# Patient Record
Sex: MALE | Race: WHITE | NOT HISPANIC OR LATINO | Employment: FULL TIME | URBAN - METROPOLITAN AREA
[De-identification: names, ages, dates, MRNs, and addresses within clinical notes are randomized per-mention and may not be internally consistent; named-entity substitution may affect disease eponyms.]

---

## 2017-03-06 ENCOUNTER — ALLSCRIPTS OFFICE VISIT (OUTPATIENT)
Dept: OTHER | Facility: OTHER | Age: 42
End: 2017-03-06

## 2017-03-06 DIAGNOSIS — K80.20 CALCULUS OF GALLBLADDER WITHOUT CHOLECYSTITIS WITHOUT OBSTRUCTION: ICD-10-CM

## 2017-03-06 DIAGNOSIS — R10.9 ABDOMINAL PAIN: ICD-10-CM

## 2017-03-10 ENCOUNTER — HOSPITAL ENCOUNTER (OUTPATIENT)
Dept: ULTRASOUND IMAGING | Facility: HOSPITAL | Age: 42
Discharge: HOME | End: 2017-03-10
Payer: COMMERCIAL

## 2017-03-10 DIAGNOSIS — R10.9 ABDOMINAL PAIN: ICD-10-CM

## 2017-03-10 PROCEDURE — 76705 ECHO EXAM OF ABDOMEN: CPT

## 2017-03-13 ENCOUNTER — GENERIC CONVERSION - ENCOUNTER (OUTPATIENT)
Dept: OTHER | Facility: OTHER | Age: 42
End: 2017-03-13

## 2017-03-23 ENCOUNTER — ALLSCRIPTS OFFICE VISIT (OUTPATIENT)
Dept: OTHER | Facility: OTHER | Age: 42
End: 2017-03-23

## 2017-03-23 ENCOUNTER — APPOINTMENT (OUTPATIENT)
Dept: LAB | Facility: CLINIC | Age: 42
End: 2017-03-23
Payer: COMMERCIAL

## 2017-03-23 DIAGNOSIS — K80.20 CALCULUS OF GALLBLADDER WITHOUT CHOLECYSTITIS WITHOUT OBSTRUCTION: ICD-10-CM

## 2017-03-23 LAB
ALBUMIN SERPL BCP-MCNC: 3.6 G/DL (ref 3.5–5)
ALP SERPL-CCNC: 91 U/L (ref 46–116)
ALT SERPL W P-5'-P-CCNC: 28 U/L (ref 12–78)
AST SERPL W P-5'-P-CCNC: 20 U/L (ref 5–45)
BILIRUB DIRECT SERPL-MCNC: 0.2 MG/DL (ref 0–0.2)
BILIRUB SERPL-MCNC: 0.96 MG/DL (ref 0.2–1)
PROT SERPL-MCNC: 7.5 G/DL (ref 6.4–8.2)

## 2017-03-23 PROCEDURE — 80076 HEPATIC FUNCTION PANEL: CPT

## 2017-03-23 PROCEDURE — 36415 COLL VENOUS BLD VENIPUNCTURE: CPT

## 2017-03-27 RX ORDER — ENALAPRIL MALEATE 10 MG/1
10 TABLET ORAL 2 TIMES DAILY
COMMUNITY
End: 2018-01-24 | Stop reason: SDUPTHER

## 2017-03-27 RX ORDER — CARVEDILOL 25 MG/1
37.5 TABLET ORAL 2 TIMES DAILY WITH MEALS
COMMUNITY
End: 2018-01-24 | Stop reason: SDUPTHER

## 2017-03-27 RX ORDER — COVID-19 ANTIGEN TEST
KIT MISCELLANEOUS AS NEEDED
COMMUNITY
End: 2018-01-24 | Stop reason: SDUPTHER

## 2017-03-28 ENCOUNTER — ANESTHESIA EVENT (OUTPATIENT)
Dept: PERIOP | Facility: HOSPITAL | Age: 42
End: 2017-03-28
Payer: COMMERCIAL

## 2017-03-29 ENCOUNTER — GENERIC CONVERSION - ENCOUNTER (OUTPATIENT)
Dept: OTHER | Facility: OTHER | Age: 42
End: 2017-03-29

## 2017-03-29 ENCOUNTER — HOSPITAL ENCOUNTER (OUTPATIENT)
Facility: HOSPITAL | Age: 42
Setting detail: OUTPATIENT SURGERY
Discharge: HOME | End: 2017-03-29
Attending: INTERNAL MEDICINE | Admitting: INTERNAL MEDICINE
Payer: COMMERCIAL

## 2017-03-29 ENCOUNTER — ANESTHESIA (OUTPATIENT)
Dept: PERIOP | Facility: HOSPITAL | Age: 42
End: 2017-03-29
Payer: COMMERCIAL

## 2017-03-29 VITALS
WEIGHT: 280.2 LBS | RESPIRATION RATE: 20 BRPM | DIASTOLIC BLOOD PRESSURE: 59 MMHG | TEMPERATURE: 97.6 F | SYSTOLIC BLOOD PRESSURE: 112 MMHG | OXYGEN SATURATION: 96 % | BODY MASS INDEX: 35.96 KG/M2 | HEART RATE: 63 BPM | HEIGHT: 74 IN

## 2017-03-29 DIAGNOSIS — R13.14 DYSPHAGIA, PHARYNGOESOPHAGEAL PHASE: ICD-10-CM

## 2017-03-29 DIAGNOSIS — K80.20 CALCULUS OF GALLBLADDER WITHOUT CHOLECYSTITIS WITHOUT OBSTRUCTION: ICD-10-CM

## 2017-03-29 LAB
ATRIAL RATE: 55 BPM
P AXIS: 36 DEGREES
PR INTERVAL: 176 MS
QRS AXIS: 53 DEGREES
QRSD INTERVAL: 92 MS
QT INTERVAL: 430 MS
QTC INTERVAL: 411 MS
T WAVE AXIS: 131 DEGREES
VENTRICULAR RATE: 55 BPM

## 2017-03-29 PROCEDURE — 88342 IMHCHEM/IMCYTCHM 1ST ANTB: CPT | Performed by: INTERNAL MEDICINE

## 2017-03-29 PROCEDURE — 88305 TISSUE EXAM BY PATHOLOGIST: CPT | Performed by: INTERNAL MEDICINE

## 2017-03-29 PROCEDURE — 93005 ELECTROCARDIOGRAM TRACING: CPT

## 2017-03-29 RX ORDER — PROPOFOL 10 MG/ML
INJECTION, EMULSION INTRAVENOUS AS NEEDED
Status: DISCONTINUED | OUTPATIENT
Start: 2017-03-29 | End: 2017-03-29 | Stop reason: SURG

## 2017-03-29 RX ORDER — SODIUM CHLORIDE, SODIUM LACTATE, POTASSIUM CHLORIDE, CALCIUM CHLORIDE 600; 310; 30; 20 MG/100ML; MG/100ML; MG/100ML; MG/100ML
50 INJECTION, SOLUTION INTRAVENOUS CONTINUOUS
Status: DISCONTINUED | OUTPATIENT
Start: 2017-03-29 | End: 2017-03-29 | Stop reason: HOSPADM

## 2017-03-29 RX ADMIN — PROPOFOL 50 MG: 10 INJECTION, EMULSION INTRAVENOUS at 08:19

## 2017-03-29 RX ADMIN — SODIUM CHLORIDE, SODIUM LACTATE, POTASSIUM CHLORIDE, AND CALCIUM CHLORIDE: .6; .31; .03; .02 INJECTION, SOLUTION INTRAVENOUS at 07:49

## 2017-03-29 RX ADMIN — PROPOFOL 150 MG: 10 INJECTION, EMULSION INTRAVENOUS at 08:16

## 2017-04-19 ENCOUNTER — HOSPITAL ENCOUNTER (OUTPATIENT)
Dept: NUCLEAR MEDICINE | Facility: HOSPITAL | Age: 42
Discharge: HOME | End: 2017-04-19
Payer: COMMERCIAL

## 2017-04-19 DIAGNOSIS — K80.20 CALCULUS OF GALLBLADDER WITHOUT CHOLECYSTITIS WITHOUT OBSTRUCTION: ICD-10-CM

## 2017-04-19 PROCEDURE — A9537 TC99M MEBROFENIN: HCPCS

## 2017-04-19 PROCEDURE — 78227 HEPATOBIL SYST IMAGE W/DRUG: CPT

## 2017-04-19 RX ORDER — MORPHINE SULFATE 4 MG/ML
4 INJECTION, SOLUTION INTRAMUSCULAR; INTRAVENOUS
Status: COMPLETED | OUTPATIENT
Start: 2017-04-19 | End: 2017-04-19

## 2017-04-19 RX ADMIN — MORPHINE SULFATE 4 MG: 4 INJECTION, SOLUTION INTRAMUSCULAR; INTRAVENOUS at 10:15

## 2017-04-20 ENCOUNTER — GENERIC CONVERSION - ENCOUNTER (OUTPATIENT)
Dept: OTHER | Facility: OTHER | Age: 42
End: 2017-04-20

## 2017-05-01 ENCOUNTER — ALLSCRIPTS OFFICE VISIT (OUTPATIENT)
Dept: OTHER | Facility: OTHER | Age: 42
End: 2017-05-01

## 2017-05-02 ENCOUNTER — TRANSCRIBE ORDERS (OUTPATIENT)
Dept: ADMINISTRATIVE | Facility: HOSPITAL | Age: 42
End: 2017-05-02

## 2017-05-02 ENCOUNTER — ALLSCRIPTS OFFICE VISIT (OUTPATIENT)
Dept: OTHER | Facility: OTHER | Age: 42
End: 2017-05-02

## 2017-05-02 DIAGNOSIS — I42.0 DILATED CARDIOMYOPATHY (HCC): ICD-10-CM

## 2017-05-02 DIAGNOSIS — K80.10 CALCULUS OF GALLBLADDER WITH CHRONIC CHOLECYSTITIS WITHOUT OBSTRUCTION: ICD-10-CM

## 2017-05-02 DIAGNOSIS — I42.0 CONGESTIVE CARDIOMYOPATHY (HCC): ICD-10-CM

## 2017-05-02 DIAGNOSIS — Z95.810 PRESENCE OF AUTOMATIC IMPLANTABLE CARDIOVERTER-DEFIBRILLATOR: ICD-10-CM

## 2017-05-02 DIAGNOSIS — R00.0 TACHYCARDIA, UNSPECIFIED: Primary | ICD-10-CM

## 2017-05-02 DIAGNOSIS — R00.0 TACHYCARDIA: ICD-10-CM

## 2017-05-10 ENCOUNTER — ALLSCRIPTS OFFICE VISIT (OUTPATIENT)
Dept: OTHER | Facility: OTHER | Age: 42
End: 2017-05-10

## 2017-05-11 ENCOUNTER — APPOINTMENT (OUTPATIENT)
Dept: LAB | Facility: CLINIC | Age: 42
End: 2017-05-11
Payer: COMMERCIAL

## 2017-05-11 DIAGNOSIS — K80.10 CALCULUS OF GALLBLADDER WITH CHRONIC CHOLECYSTITIS WITHOUT OBSTRUCTION: ICD-10-CM

## 2017-05-11 LAB
ALBUMIN SERPL BCP-MCNC: 3.8 G/DL (ref 3.5–5)
ALP SERPL-CCNC: 89 U/L (ref 46–116)
ALT SERPL W P-5'-P-CCNC: 32 U/L (ref 12–78)
ANION GAP SERPL CALCULATED.3IONS-SCNC: 6 MMOL/L (ref 4–13)
AST SERPL W P-5'-P-CCNC: 21 U/L (ref 5–45)
BILIRUB SERPL-MCNC: 0.94 MG/DL (ref 0.2–1)
BUN SERPL-MCNC: 11 MG/DL (ref 5–25)
CALCIUM SERPL-MCNC: 8.6 MG/DL (ref 8.3–10.1)
CHLORIDE SERPL-SCNC: 106 MMOL/L (ref 100–108)
CO2 SERPL-SCNC: 27 MMOL/L (ref 21–32)
CREAT SERPL-MCNC: 0.88 MG/DL (ref 0.6–1.3)
ERYTHROCYTE [DISTWIDTH] IN BLOOD BY AUTOMATED COUNT: 14.9 % (ref 11.6–15.1)
GFR SERPL CREATININE-BSD FRML MDRD: >60 ML/MIN/1.73SQ M
GLUCOSE SERPL-MCNC: 76 MG/DL (ref 65–140)
HCT VFR BLD AUTO: 44.7 % (ref 36.5–49.3)
HGB BLD-MCNC: 14.5 G/DL (ref 12–17)
MCH RBC QN AUTO: 29.1 PG (ref 26.8–34.3)
MCHC RBC AUTO-ENTMCNC: 32.4 G/DL (ref 31.4–37.4)
MCV RBC AUTO: 90 FL (ref 82–98)
PLATELET # BLD AUTO: 231 THOUSANDS/UL (ref 149–390)
PMV BLD AUTO: 12.3 FL (ref 8.9–12.7)
POTASSIUM SERPL-SCNC: 4.1 MMOL/L (ref 3.5–5.3)
PROT SERPL-MCNC: 7.5 G/DL (ref 6.4–8.2)
RBC # BLD AUTO: 4.98 MILLION/UL (ref 3.88–5.62)
SODIUM SERPL-SCNC: 139 MMOL/L (ref 136–145)
WBC # BLD AUTO: 10.73 THOUSAND/UL (ref 4.31–10.16)

## 2017-05-11 PROCEDURE — 80053 COMPREHEN METABOLIC PANEL: CPT

## 2017-05-11 PROCEDURE — 85027 COMPLETE CBC AUTOMATED: CPT

## 2017-05-11 PROCEDURE — 36415 COLL VENOUS BLD VENIPUNCTURE: CPT

## 2017-05-16 ENCOUNTER — HOSPITAL ENCOUNTER (OUTPATIENT)
Dept: NON INVASIVE DIAGNOSTICS | Facility: CLINIC | Age: 42
Discharge: HOME | End: 2017-05-16
Payer: COMMERCIAL

## 2017-05-16 DIAGNOSIS — I42.0 CONGESTIVE CARDIOMYOPATHY (HCC): ICD-10-CM

## 2017-05-16 DIAGNOSIS — R00.0 TACHYCARDIA, UNSPECIFIED: ICD-10-CM

## 2017-05-16 PROCEDURE — 93306 TTE W/DOPPLER COMPLETE: CPT

## 2017-05-18 ENCOUNTER — ALLSCRIPTS OFFICE VISIT (OUTPATIENT)
Dept: OTHER | Facility: OTHER | Age: 42
End: 2017-05-18

## 2017-05-19 ENCOUNTER — ANESTHESIA (OUTPATIENT)
Dept: PERIOP | Facility: HOSPITAL | Age: 42
End: 2017-05-19
Payer: COMMERCIAL

## 2017-05-19 ENCOUNTER — HOSPITAL ENCOUNTER (OUTPATIENT)
Facility: HOSPITAL | Age: 42
Setting detail: OUTPATIENT SURGERY
Discharge: HOME | End: 2017-05-19
Attending: SURGERY | Admitting: SURGERY
Payer: COMMERCIAL

## 2017-05-19 ENCOUNTER — ANESTHESIA EVENT (OUTPATIENT)
Dept: PERIOP | Facility: HOSPITAL | Age: 42
End: 2017-05-19
Payer: COMMERCIAL

## 2017-05-19 VITALS
HEIGHT: 73 IN | OXYGEN SATURATION: 95 % | SYSTOLIC BLOOD PRESSURE: 110 MMHG | HEART RATE: 55 BPM | TEMPERATURE: 97.2 F | BODY MASS INDEX: 37.14 KG/M2 | WEIGHT: 280.2 LBS | RESPIRATION RATE: 20 BRPM | DIASTOLIC BLOOD PRESSURE: 57 MMHG

## 2017-05-19 DIAGNOSIS — K80.10 CHRONIC CALCULOUS CHOLECYSTITIS: ICD-10-CM

## 2017-05-19 PROBLEM — K80.20 CALCULUS OF GALLBLADDER WITHOUT CHOLECYSTITIS: Status: ACTIVE | Noted: 2017-05-19

## 2017-05-19 PROCEDURE — 88304 TISSUE EXAM BY PATHOLOGIST: CPT | Performed by: SURGERY

## 2017-05-19 RX ORDER — DOCUSATE SODIUM 100 MG/1
100 CAPSULE, LIQUID FILLED ORAL 2 TIMES DAILY PRN
Qty: 60 CAPSULE | Refills: 0 | COMMUNITY
Start: 2017-05-19 | End: 2018-01-25 | Stop reason: HOSPADM

## 2017-05-19 RX ORDER — ONDANSETRON 2 MG/ML
INJECTION INTRAMUSCULAR; INTRAVENOUS AS NEEDED
Status: DISCONTINUED | OUTPATIENT
Start: 2017-05-19 | End: 2017-05-19 | Stop reason: SURG

## 2017-05-19 RX ORDER — MIDAZOLAM HYDROCHLORIDE 1 MG/ML
INJECTION INTRAMUSCULAR; INTRAVENOUS AS NEEDED
Status: DISCONTINUED | OUTPATIENT
Start: 2017-05-19 | End: 2017-05-19 | Stop reason: SURG

## 2017-05-19 RX ORDER — ONDANSETRON 2 MG/ML
4 INJECTION INTRAMUSCULAR; INTRAVENOUS ONCE AS NEEDED
Status: DISCONTINUED | OUTPATIENT
Start: 2017-05-19 | End: 2017-05-19 | Stop reason: HOSPADM

## 2017-05-19 RX ORDER — LIDOCAINE HYDROCHLORIDE 10 MG/ML
INJECTION, SOLUTION INFILTRATION; PERINEURAL AS NEEDED
Status: DISCONTINUED | OUTPATIENT
Start: 2017-05-19 | End: 2017-05-19 | Stop reason: SURG

## 2017-05-19 RX ORDER — SODIUM CHLORIDE, SODIUM LACTATE, POTASSIUM CHLORIDE, CALCIUM CHLORIDE 600; 310; 30; 20 MG/100ML; MG/100ML; MG/100ML; MG/100ML
100 INJECTION, SOLUTION INTRAVENOUS CONTINUOUS
Status: DISCONTINUED | OUTPATIENT
Start: 2017-05-26 | End: 2017-05-19

## 2017-05-19 RX ORDER — FENTANYL CITRATE 50 UG/ML
INJECTION, SOLUTION INTRAMUSCULAR; INTRAVENOUS AS NEEDED
Status: DISCONTINUED | OUTPATIENT
Start: 2017-05-19 | End: 2017-05-19 | Stop reason: SURG

## 2017-05-19 RX ORDER — SODIUM CHLORIDE, SODIUM LACTATE, POTASSIUM CHLORIDE, CALCIUM CHLORIDE 600; 310; 30; 20 MG/100ML; MG/100ML; MG/100ML; MG/100ML
100 INJECTION, SOLUTION INTRAVENOUS CONTINUOUS
Status: DISCONTINUED | OUTPATIENT
Start: 2017-05-19 | End: 2017-05-19 | Stop reason: HOSPADM

## 2017-05-19 RX ORDER — METOCLOPRAMIDE HYDROCHLORIDE 5 MG/ML
10 INJECTION INTRAMUSCULAR; INTRAVENOUS ONCE AS NEEDED
Status: DISCONTINUED | OUTPATIENT
Start: 2017-05-19 | End: 2017-05-19 | Stop reason: HOSPADM

## 2017-05-19 RX ORDER — MAGNESIUM HYDROXIDE 1200 MG/15ML
LIQUID ORAL AS NEEDED
Status: DISCONTINUED | OUTPATIENT
Start: 2017-05-19 | End: 2017-05-19 | Stop reason: HOSPADM

## 2017-05-19 RX ORDER — OXYCODONE HYDROCHLORIDE AND ACETAMINOPHEN 5; 325 MG/1; MG/1
2 TABLET ORAL EVERY 4 HOURS PRN
Qty: 50 TABLET | Refills: 0 | Status: SHIPPED | OUTPATIENT
Start: 2017-05-19 | End: 2017-05-29

## 2017-05-19 RX ORDER — SUCCINYLCHOLINE CHLORIDE 20 MG/ML
INJECTION INTRAMUSCULAR; INTRAVENOUS AS NEEDED
Status: DISCONTINUED | OUTPATIENT
Start: 2017-05-19 | End: 2017-05-19 | Stop reason: SURG

## 2017-05-19 RX ORDER — EPHEDRINE SULFATE 50 MG/ML
INJECTION, SOLUTION INTRAVENOUS AS NEEDED
Status: DISCONTINUED | OUTPATIENT
Start: 2017-05-19 | End: 2017-05-19 | Stop reason: SURG

## 2017-05-19 RX ORDER — BUPIVACAINE HYDROCHLORIDE 5 MG/ML
INJECTION, SOLUTION PERINEURAL AS NEEDED
Status: DISCONTINUED | OUTPATIENT
Start: 2017-05-19 | End: 2017-05-19 | Stop reason: HOSPADM

## 2017-05-19 RX ORDER — ALBUMIN, HUMAN INJ 5% 5 %
SOLUTION INTRAVENOUS CONTINUOUS PRN
Status: DISCONTINUED | OUTPATIENT
Start: 2017-05-19 | End: 2017-05-19 | Stop reason: SURG

## 2017-05-19 RX ORDER — ALBUTEROL SULFATE 2.5 MG/3ML
2.5 SOLUTION RESPIRATORY (INHALATION) ONCE AS NEEDED
Status: DISCONTINUED | OUTPATIENT
Start: 2017-05-19 | End: 2017-05-19 | Stop reason: HOSPADM

## 2017-05-19 RX ORDER — FENTANYL CITRATE/PF 50 MCG/ML
25 SYRINGE (ML) INJECTION
Status: DISCONTINUED | OUTPATIENT
Start: 2017-05-19 | End: 2017-05-19 | Stop reason: HOSPADM

## 2017-05-19 RX ORDER — PROPOFOL 10 MG/ML
INJECTION, EMULSION INTRAVENOUS AS NEEDED
Status: DISCONTINUED | OUTPATIENT
Start: 2017-05-19 | End: 2017-05-19 | Stop reason: SURG

## 2017-05-19 RX ORDER — GLYCOPYRROLATE 0.2 MG/ML
INJECTION INTRAMUSCULAR; INTRAVENOUS AS NEEDED
Status: DISCONTINUED | OUTPATIENT
Start: 2017-05-19 | End: 2017-05-19 | Stop reason: SURG

## 2017-05-19 RX ORDER — ROCURONIUM BROMIDE 10 MG/ML
INJECTION, SOLUTION INTRAVENOUS AS NEEDED
Status: DISCONTINUED | OUTPATIENT
Start: 2017-05-19 | End: 2017-05-19 | Stop reason: SURG

## 2017-05-19 RX ADMIN — CEFAZOLIN SODIUM 2000 MG: 2 SOLUTION INTRAVENOUS at 08:20

## 2017-05-19 RX ADMIN — HYDROMORPHONE HYDROCHLORIDE 0.2 MG: 1 INJECTION, SOLUTION INTRAMUSCULAR; INTRAVENOUS; SUBCUTANEOUS at 11:56

## 2017-05-19 RX ADMIN — GLYCOPYRROLATE 0.4 MG: 0.2 INJECTION, SOLUTION INTRAMUSCULAR; INTRAVENOUS at 10:41

## 2017-05-19 RX ADMIN — NEOSTIGMINE METHYLSULFATE 3 MG: 1 INJECTION, SOLUTION INTRAMUSCULAR; INTRAVENOUS; SUBCUTANEOUS at 10:41

## 2017-05-19 RX ADMIN — CEFAZOLIN SODIUM 1000 MG: 1 SOLUTION INTRAVENOUS at 08:20

## 2017-05-19 RX ADMIN — SODIUM CHLORIDE, SODIUM LACTATE, POTASSIUM CHLORIDE, AND CALCIUM CHLORIDE: .6; .31; .03; .02 INJECTION, SOLUTION INTRAVENOUS at 07:51

## 2017-05-19 RX ADMIN — FENTANYL CITRATE 50 MCG: 50 INJECTION, SOLUTION INTRAMUSCULAR; INTRAVENOUS at 08:16

## 2017-05-19 RX ADMIN — EPHEDRINE SULFATE 10 MG: 50 INJECTION, SOLUTION INTRAMUSCULAR; INTRAVENOUS; SUBCUTANEOUS at 09:48

## 2017-05-19 RX ADMIN — ALBUMIN HUMAN: 0.05 INJECTION, SOLUTION INTRAVENOUS at 08:36

## 2017-05-19 RX ADMIN — ROCURONIUM BROMIDE 10 MG: 10 INJECTION, SOLUTION INTRAVENOUS at 08:28

## 2017-05-19 RX ADMIN — FENTANYL CITRATE 25 MCG: 50 INJECTION INTRAMUSCULAR; INTRAVENOUS at 11:36

## 2017-05-19 RX ADMIN — HYDROMORPHONE HYDROCHLORIDE 0.2 MG: 1 INJECTION, SOLUTION INTRAMUSCULAR; INTRAVENOUS; SUBCUTANEOUS at 12:11

## 2017-05-19 RX ADMIN — ROCURONIUM BROMIDE 10 MG: 10 INJECTION, SOLUTION INTRAVENOUS at 09:37

## 2017-05-19 RX ADMIN — LIDOCAINE HYDROCHLORIDE 80 MG: 10 INJECTION, SOLUTION INFILTRATION; PERINEURAL at 08:16

## 2017-05-19 RX ADMIN — HYDROMORPHONE HYDROCHLORIDE 0.2 MG: 1 INJECTION, SOLUTION INTRAMUSCULAR; INTRAVENOUS; SUBCUTANEOUS at 12:26

## 2017-05-19 RX ADMIN — ONDANSETRON 4 MG: 2 INJECTION INTRAMUSCULAR; INTRAVENOUS at 10:08

## 2017-05-19 RX ADMIN — SUCCINYLCHOLINE CHLORIDE 200 MG: 20 INJECTION, SOLUTION INTRAMUSCULAR; INTRAVENOUS at 08:16

## 2017-05-19 RX ADMIN — ROCURONIUM BROMIDE 20 MG: 10 INJECTION, SOLUTION INTRAVENOUS at 08:21

## 2017-05-19 RX ADMIN — HYDROMORPHONE HYDROCHLORIDE 0.2 MG: 1 INJECTION, SOLUTION INTRAMUSCULAR; INTRAVENOUS; SUBCUTANEOUS at 12:16

## 2017-05-19 RX ADMIN — FENTANYL CITRATE 25 MCG: 50 INJECTION INTRAMUSCULAR; INTRAVENOUS at 11:46

## 2017-05-19 RX ADMIN — PHENYLEPHRINE HYDROCHLORIDE 50 MCG/MIN: 10 INJECTION, SOLUTION INTRAMUSCULAR; INTRAVENOUS; SUBCUTANEOUS at 08:46

## 2017-05-19 RX ADMIN — HYDROMORPHONE HYDROCHLORIDE 0.2 MG: 1 INJECTION, SOLUTION INTRAMUSCULAR; INTRAVENOUS; SUBCUTANEOUS at 11:51

## 2017-05-19 RX ADMIN — DEXAMETHASONE SODIUM PHOSPHATE 10 MG: 10 INJECTION INTRAMUSCULAR; INTRAVENOUS at 08:20

## 2017-05-19 RX ADMIN — FENTANYL CITRATE 25 MCG: 50 INJECTION INTRAMUSCULAR; INTRAVENOUS at 11:31

## 2017-05-19 RX ADMIN — ALBUMIN HUMAN: 0.05 INJECTION, SOLUTION INTRAVENOUS at 09:21

## 2017-05-19 RX ADMIN — HYDROMORPHONE HYDROCHLORIDE 0.2 MG: 1 INJECTION, SOLUTION INTRAMUSCULAR; INTRAVENOUS; SUBCUTANEOUS at 12:06

## 2017-05-19 RX ADMIN — HYDROMORPHONE HYDROCHLORIDE 0.2 MG: 1 INJECTION, SOLUTION INTRAMUSCULAR; INTRAVENOUS; SUBCUTANEOUS at 12:21

## 2017-05-19 RX ADMIN — ENOXAPARIN SODIUM 40 MG: 40 INJECTION, SOLUTION INTRAVENOUS; SUBCUTANEOUS at 08:00

## 2017-05-19 RX ADMIN — MIDAZOLAM HYDROCHLORIDE 2 MG: 1 INJECTION, SOLUTION INTRAMUSCULAR; INTRAVENOUS at 08:12

## 2017-05-19 RX ADMIN — FENTANYL CITRATE 50 MCG: 50 INJECTION, SOLUTION INTRAMUSCULAR; INTRAVENOUS at 10:22

## 2017-05-19 RX ADMIN — FENTANYL CITRATE 25 MCG: 50 INJECTION INTRAMUSCULAR; INTRAVENOUS at 11:41

## 2017-05-19 RX ADMIN — PROPOFOL 200 MG: 10 INJECTION, EMULSION INTRAVENOUS at 08:16

## 2017-05-19 RX ADMIN — HYDROMORPHONE HYDROCHLORIDE 0.2 MG: 1 INJECTION, SOLUTION INTRAMUSCULAR; INTRAVENOUS; SUBCUTANEOUS at 12:01

## 2017-05-30 ENCOUNTER — TRANSCRIBE ORDERS (OUTPATIENT)
Dept: LAB | Facility: CLINIC | Age: 42
End: 2017-05-30

## 2017-05-30 ENCOUNTER — LAB REQUISITION (OUTPATIENT)
Dept: LAB | Facility: HOSPITAL | Age: 42
End: 2017-05-30
Payer: COMMERCIAL

## 2017-05-30 ENCOUNTER — APPOINTMENT (OUTPATIENT)
Dept: LAB | Facility: CLINIC | Age: 42
End: 2017-05-30
Payer: COMMERCIAL

## 2017-05-30 DIAGNOSIS — R00.0 TACHYCARDIA: ICD-10-CM

## 2017-05-30 DIAGNOSIS — I42.0 CONGESTIVE CARDIOMYOPATHY (HCC): Primary | ICD-10-CM

## 2017-05-30 DIAGNOSIS — I42.0 DILATED CARDIOMYOPATHY (HCC): ICD-10-CM

## 2017-05-30 DIAGNOSIS — Z95.810 PRESENCE OF AUTOMATIC IMPLANTABLE CARDIOVERTER-DEFIBRILLATOR: ICD-10-CM

## 2017-05-30 DIAGNOSIS — I42.0 CONGESTIVE CARDIOMYOPATHY (HCC): ICD-10-CM

## 2017-05-30 LAB
ALBUMIN SERPL BCP-MCNC: 3.7 G/DL (ref 3.5–5)
ALP SERPL-CCNC: 68 U/L (ref 46–116)
ALT SERPL W P-5'-P-CCNC: 28 U/L (ref 12–78)
ANION GAP SERPL CALCULATED.3IONS-SCNC: 7 MMOL/L (ref 4–13)
APTT PPP: 34 SECONDS (ref 23–35)
AST SERPL W P-5'-P-CCNC: 20 U/L (ref 5–45)
BILIRUB SERPL-MCNC: 1.19 MG/DL (ref 0.2–1)
BUN SERPL-MCNC: 13 MG/DL (ref 5–25)
CALCIUM SERPL-MCNC: 8.9 MG/DL (ref 8.3–10.1)
CHLORIDE SERPL-SCNC: 107 MMOL/L (ref 100–108)
CO2 SERPL-SCNC: 25 MMOL/L (ref 21–32)
CREAT SERPL-MCNC: 1.03 MG/DL (ref 0.6–1.3)
ERYTHROCYTE [DISTWIDTH] IN BLOOD BY AUTOMATED COUNT: 14.3 % (ref 11.6–15.1)
GFR SERPL CREATININE-BSD FRML MDRD: >60 ML/MIN/1.73SQ M
GLUCOSE P FAST SERPL-MCNC: 105 MG/DL (ref 65–99)
HCT VFR BLD AUTO: 42 % (ref 36.5–49.3)
HGB BLD-MCNC: 13.8 G/DL (ref 12–17)
INR PPP: 1.03 (ref 0.86–1.16)
MAGNESIUM SERPL-MCNC: 2.2 MG/DL (ref 1.6–2.6)
MCH RBC QN AUTO: 29.1 PG (ref 26.8–34.3)
MCHC RBC AUTO-ENTMCNC: 32.9 G/DL (ref 31.4–37.4)
MCV RBC AUTO: 89 FL (ref 82–98)
PLATELET # BLD AUTO: 232 THOUSANDS/UL (ref 149–390)
PMV BLD AUTO: 11.6 FL (ref 8.9–12.7)
POTASSIUM SERPL-SCNC: 4.3 MMOL/L (ref 3.5–5.3)
PROT SERPL-MCNC: 7.5 G/DL (ref 6.4–8.2)
PROTHROMBIN TIME: 13.5 SECONDS (ref 12.1–14.4)
RBC # BLD AUTO: 4.74 MILLION/UL (ref 3.88–5.62)
SODIUM SERPL-SCNC: 139 MMOL/L (ref 136–145)
WBC # BLD AUTO: 7.7 THOUSAND/UL (ref 4.31–10.16)

## 2017-05-30 PROCEDURE — 85610 PROTHROMBIN TIME: CPT

## 2017-05-30 PROCEDURE — 86900 BLOOD TYPING SEROLOGIC ABO: CPT | Performed by: INTERNAL MEDICINE

## 2017-05-30 PROCEDURE — 85027 COMPLETE CBC AUTOMATED: CPT

## 2017-05-30 PROCEDURE — 85730 THROMBOPLASTIN TIME PARTIAL: CPT

## 2017-05-30 PROCEDURE — 80053 COMPREHEN METABOLIC PANEL: CPT

## 2017-05-30 PROCEDURE — 86850 RBC ANTIBODY SCREEN: CPT | Performed by: INTERNAL MEDICINE

## 2017-05-30 PROCEDURE — 83735 ASSAY OF MAGNESIUM: CPT

## 2017-05-30 PROCEDURE — 86901 BLOOD TYPING SEROLOGIC RH(D): CPT | Performed by: INTERNAL MEDICINE

## 2017-05-30 PROCEDURE — 36415 COLL VENOUS BLD VENIPUNCTURE: CPT

## 2017-06-01 ENCOUNTER — ANESTHESIA EVENT (OUTPATIENT)
Dept: PERIOP | Facility: HOSPITAL | Age: 42
End: 2017-06-01
Payer: COMMERCIAL

## 2017-06-01 LAB
ABO GROUP BLD: NORMAL
BLD GP AB SCN SERPL QL: NEGATIVE
RH BLD: POSITIVE
SPECIMEN EXPIRATION DATE: NORMAL

## 2017-06-02 ENCOUNTER — ANESTHESIA (OUTPATIENT)
Dept: PERIOP | Facility: HOSPITAL | Age: 42
End: 2017-06-02
Payer: COMMERCIAL

## 2017-06-02 ENCOUNTER — HOSPITAL ENCOUNTER (OUTPATIENT)
Facility: HOSPITAL | Age: 42
Discharge: HOME | End: 2017-06-03
Attending: INTERNAL MEDICINE | Admitting: INTERNAL MEDICINE
Payer: COMMERCIAL

## 2017-06-02 ENCOUNTER — APPOINTMENT (OUTPATIENT)
Dept: NON INVASIVE DIAGNOSTICS | Facility: HOSPITAL | Age: 42
End: 2017-06-02
Payer: COMMERCIAL

## 2017-06-02 ENCOUNTER — APPOINTMENT (OUTPATIENT)
Dept: NON INVASIVE DIAGNOSTICS | Facility: HOSPITAL | Age: 42
End: 2017-06-02
Attending: INTERNAL MEDICINE
Payer: COMMERCIAL

## 2017-06-02 ENCOUNTER — GENERIC CONVERSION - ENCOUNTER (OUTPATIENT)
Dept: OTHER | Facility: OTHER | Age: 42
End: 2017-06-02

## 2017-06-02 ENCOUNTER — APPOINTMENT (OUTPATIENT)
Dept: RADIOLOGY | Facility: HOSPITAL | Age: 42
End: 2017-06-02
Attending: INTERNAL MEDICINE
Payer: COMMERCIAL

## 2017-06-02 DIAGNOSIS — Z95.0 PACEMAKER: ICD-10-CM

## 2017-06-02 LAB
ANION GAP SERPL CALCULATED.3IONS-SCNC: 6 MMOL/L (ref 4–13)
ATRIAL RATE: 51 BPM
BASOPHILS # BLD AUTO: 0.02 THOUSANDS/ΜL (ref 0–0.1)
BASOPHILS NFR BLD AUTO: 0 % (ref 0–1)
BUN SERPL-MCNC: 14 MG/DL (ref 5–25)
CALCIUM SERPL-MCNC: 9.3 MG/DL (ref 8.3–10.1)
CHLORIDE SERPL-SCNC: 107 MMOL/L (ref 100–108)
CO2 SERPL-SCNC: 28 MMOL/L (ref 21–32)
CREAT SERPL-MCNC: 1.01 MG/DL (ref 0.6–1.3)
EOSINOPHIL # BLD AUTO: 0.11 THOUSAND/ΜL (ref 0–0.61)
EOSINOPHIL NFR BLD AUTO: 2 % (ref 0–6)
ERYTHROCYTE [DISTWIDTH] IN BLOOD BY AUTOMATED COUNT: 14 % (ref 11.6–15.1)
GFR SERPL CREATININE-BSD FRML MDRD: >60 ML/MIN/1.73SQ M
GLUCOSE P FAST SERPL-MCNC: 96 MG/DL (ref 65–99)
GLUCOSE SERPL-MCNC: 96 MG/DL (ref 65–140)
HCT VFR BLD AUTO: 41.6 % (ref 36.5–49.3)
HGB BLD-MCNC: 13.6 G/DL (ref 12–17)
INR PPP: 0.99 (ref 0.86–1.16)
LYMPHOCYTES # BLD AUTO: 1.62 THOUSANDS/ΜL (ref 0.6–4.47)
LYMPHOCYTES NFR BLD AUTO: 26 % (ref 14–44)
MAGNESIUM SERPL-MCNC: 2.2 MG/DL (ref 1.6–2.6)
MCH RBC QN AUTO: 28.9 PG (ref 26.8–34.3)
MCHC RBC AUTO-ENTMCNC: 32.7 G/DL (ref 31.4–37.4)
MCV RBC AUTO: 88 FL (ref 82–98)
MONOCYTES # BLD AUTO: 0.76 THOUSAND/ΜL (ref 0.17–1.22)
MONOCYTES NFR BLD AUTO: 12 % (ref 4–12)
NEUTROPHILS # BLD AUTO: 3.84 THOUSANDS/ΜL (ref 1.85–7.62)
NEUTS SEG NFR BLD AUTO: 60 % (ref 43–75)
NRBC BLD AUTO-RTO: 0 /100 WBCS
P AXIS: 32 DEGREES
PLATELET # BLD AUTO: 213 THOUSANDS/UL (ref 149–390)
PMV BLD AUTO: 11.7 FL (ref 8.9–12.7)
POTASSIUM SERPL-SCNC: 4.3 MMOL/L (ref 3.5–5.3)
PR INTERVAL: 172 MS
PROTHROMBIN TIME: 13.1 SECONDS (ref 12.1–14.4)
QRS AXIS: 32 DEGREES
QRSD INTERVAL: 94 MS
QT INTERVAL: 448 MS
QTC INTERVAL: 412 MS
RBC # BLD AUTO: 4.71 MILLION/UL (ref 3.88–5.62)
SODIUM SERPL-SCNC: 141 MMOL/L (ref 136–145)
T WAVE AXIS: 15 DEGREES
VENTRICULAR RATE: 51 BPM
WBC # BLD AUTO: 6.36 THOUSAND/UL (ref 4.31–10.16)

## 2017-06-02 PROCEDURE — C1894 INTRO/SHEATH, NON-LASER: HCPCS | Performed by: INTERNAL MEDICINE

## 2017-06-02 PROCEDURE — 80048 BASIC METABOLIC PNL TOTAL CA: CPT | Performed by: PHYSICIAN ASSISTANT

## 2017-06-02 PROCEDURE — 85025 COMPLETE CBC W/AUTO DIFF WBC: CPT | Performed by: PHYSICIAN ASSISTANT

## 2017-06-02 PROCEDURE — 93312 ECHO TRANSESOPHAGEAL: CPT

## 2017-06-02 PROCEDURE — C1777 LEAD, AICD, ENDO SINGLE COIL: HCPCS | Performed by: INTERNAL MEDICINE

## 2017-06-02 PROCEDURE — C1893 INTRO/SHEATH, FIXED,NON-PEEL: HCPCS | Performed by: INTERNAL MEDICINE

## 2017-06-02 PROCEDURE — 71010 HB CHEST X-RAY 1 VIEW FRONTAL: CPT

## 2017-06-02 PROCEDURE — 83735 ASSAY OF MAGNESIUM: CPT | Performed by: PHYSICIAN ASSISTANT

## 2017-06-02 PROCEDURE — 85610 PROTHROMBIN TIME: CPT | Performed by: PHYSICIAN ASSISTANT

## 2017-06-02 PROCEDURE — C1769 GUIDE WIRE: HCPCS | Performed by: INTERNAL MEDICINE

## 2017-06-02 PROCEDURE — C1722 AICD, SINGLE CHAMBER: HCPCS | Performed by: INTERNAL MEDICINE

## 2017-06-02 PROCEDURE — 93005 ELECTROCARDIOGRAM TRACING: CPT | Performed by: PHYSICIAN ASSISTANT

## 2017-06-02 PROCEDURE — 86920 COMPATIBILITY TEST SPIN: CPT

## 2017-06-02 PROCEDURE — C1892 INTRO/SHEATH,FIXED,PEEL-AWAY: HCPCS | Performed by: INTERNAL MEDICINE

## 2017-06-02 PROCEDURE — C1773 RET DEV, INSERTABLE: HCPCS | Performed by: INTERNAL MEDICINE

## 2017-06-02 PROCEDURE — C2628 CATHETER, OCCLUSION: HCPCS | Performed by: INTERNAL MEDICINE

## 2017-06-02 DEVICE — ANTIBIOTIC ENVELOPE TYRX AIGIS MED PACER: Type: IMPLANTABLE DEVICE | Site: CHEST | Status: FUNCTIONAL

## 2017-06-02 DEVICE — LEAD ICD SPRINT QUATTRO SECURE DF-4 62CM: Type: IMPLANTABLE DEVICE | Site: HEART | Status: FUNCTIONAL

## 2017-06-02 DEVICE — ICD GENERATOR VISIA AF MRI SURESCAN VR DEVICE: Type: IMPLANTABLE DEVICE | Site: CHEST | Status: FUNCTIONAL

## 2017-06-02 RX ORDER — PROPOFOL 10 MG/ML
INJECTION, EMULSION INTRAVENOUS CONTINUOUS PRN
Status: DISCONTINUED | OUTPATIENT
Start: 2017-06-02 | End: 2017-06-02 | Stop reason: SURG

## 2017-06-02 RX ORDER — LIDOCAINE HYDROCHLORIDE 10 MG/ML
INJECTION, SOLUTION INFILTRATION; PERINEURAL AS NEEDED
Status: DISCONTINUED | OUTPATIENT
Start: 2017-06-02 | End: 2017-06-02 | Stop reason: HOSPADM

## 2017-06-02 RX ORDER — FENTANYL CITRATE/PF 50 MCG/ML
50 SYRINGE (ML) INJECTION
Status: COMPLETED | OUTPATIENT
Start: 2017-06-02 | End: 2017-06-02

## 2017-06-02 RX ORDER — CEFAZOLIN SODIUM 1 G/3ML
INJECTION, POWDER, FOR SOLUTION INTRAMUSCULAR; INTRAVENOUS AS NEEDED
Status: DISCONTINUED | OUTPATIENT
Start: 2017-06-02 | End: 2017-06-02 | Stop reason: SURG

## 2017-06-02 RX ORDER — ONDANSETRON 2 MG/ML
4 INJECTION INTRAMUSCULAR; INTRAVENOUS ONCE AS NEEDED
Status: DISCONTINUED | OUTPATIENT
Start: 2017-06-02 | End: 2017-06-02 | Stop reason: HOSPADM

## 2017-06-02 RX ORDER — OXYCODONE HYDROCHLORIDE 5 MG/1
5 TABLET ORAL EVERY 4 HOURS PRN
Status: DISCONTINUED | OUTPATIENT
Start: 2017-06-02 | End: 2017-06-02

## 2017-06-02 RX ORDER — OXYCODONE HYDROCHLORIDE AND ACETAMINOPHEN 5; 325 MG/1; MG/1
1 TABLET ORAL EVERY 4 HOURS PRN
COMMUNITY
End: 2017-06-03 | Stop reason: HOSPADM

## 2017-06-02 RX ORDER — PROPOFOL 10 MG/ML
INJECTION, EMULSION INTRAVENOUS AS NEEDED
Status: DISCONTINUED | OUTPATIENT
Start: 2017-06-02 | End: 2017-06-02 | Stop reason: SURG

## 2017-06-02 RX ORDER — FENTANYL CITRATE 50 UG/ML
INJECTION, SOLUTION INTRAMUSCULAR; INTRAVENOUS AS NEEDED
Status: DISCONTINUED | OUTPATIENT
Start: 2017-06-02 | End: 2017-06-02 | Stop reason: SURG

## 2017-06-02 RX ORDER — OXYCODONE HYDROCHLORIDE AND ACETAMINOPHEN 5; 325 MG/1; MG/1
1 TABLET ORAL EVERY 4 HOURS PRN
Status: DISCONTINUED | OUTPATIENT
Start: 2017-06-02 | End: 2017-06-03 | Stop reason: HOSPADM

## 2017-06-02 RX ORDER — OXYCODONE HYDROCHLORIDE 5 MG/1
5 TABLET ORAL EVERY 4 HOURS PRN
Status: DISCONTINUED | OUTPATIENT
Start: 2017-06-02 | End: 2017-06-03 | Stop reason: HOSPADM

## 2017-06-02 RX ORDER — ENALAPRIL MALEATE 10 MG/1
10 TABLET ORAL 2 TIMES DAILY
Status: DISCONTINUED | OUTPATIENT
Start: 2017-06-02 | End: 2017-06-03 | Stop reason: HOSPADM

## 2017-06-02 RX ORDER — ACETAMINOPHEN 325 MG/1
650 TABLET ORAL EVERY 4 HOURS PRN
Status: DISCONTINUED | OUTPATIENT
Start: 2017-06-02 | End: 2017-06-03 | Stop reason: HOSPADM

## 2017-06-02 RX ORDER — SODIUM CHLORIDE 9 MG/ML
50 INJECTION, SOLUTION INTRAVENOUS CONTINUOUS
Status: DISCONTINUED | OUTPATIENT
Start: 2017-06-02 | End: 2017-06-03 | Stop reason: HOSPADM

## 2017-06-02 RX ORDER — SODIUM CHLORIDE 9 MG/ML
INJECTION, SOLUTION INTRAVENOUS CONTINUOUS PRN
Status: DISCONTINUED | OUTPATIENT
Start: 2017-06-02 | End: 2017-06-02 | Stop reason: SURG

## 2017-06-02 RX ORDER — MIDAZOLAM HYDROCHLORIDE 1 MG/ML
INJECTION INTRAMUSCULAR; INTRAVENOUS AS NEEDED
Status: DISCONTINUED | OUTPATIENT
Start: 2017-06-02 | End: 2017-06-02 | Stop reason: SURG

## 2017-06-02 RX ORDER — DOCUSATE SODIUM 100 MG/1
100 CAPSULE, LIQUID FILLED ORAL 2 TIMES DAILY PRN
Status: DISCONTINUED | OUTPATIENT
Start: 2017-06-02 | End: 2017-06-03 | Stop reason: HOSPADM

## 2017-06-02 RX ORDER — ROCURONIUM BROMIDE 10 MG/ML
INJECTION, SOLUTION INTRAVENOUS AS NEEDED
Status: DISCONTINUED | OUTPATIENT
Start: 2017-06-02 | End: 2017-06-02 | Stop reason: SURG

## 2017-06-02 RX ORDER — SUCCINYLCHOLINE CHLORIDE 20 MG/ML
INJECTION INTRAMUSCULAR; INTRAVENOUS AS NEEDED
Status: DISCONTINUED | OUTPATIENT
Start: 2017-06-02 | End: 2017-06-02 | Stop reason: SURG

## 2017-06-02 RX ADMIN — OXYCODONE HYDROCHLORIDE AND ACETAMINOPHEN 1 TABLET: 5; 325 TABLET ORAL at 14:20

## 2017-06-02 RX ADMIN — CARVEDILOL 37.5 MG: 25 TABLET, FILM COATED ORAL at 16:49

## 2017-06-02 RX ADMIN — SODIUM CHLORIDE 50 ML/HR: 0.9 INJECTION, SOLUTION INTRAVENOUS at 10:30

## 2017-06-02 RX ADMIN — PROPOFOL 200 MG: 10 INJECTION, EMULSION INTRAVENOUS at 07:41

## 2017-06-02 RX ADMIN — SUCCINYLCHOLINE CHLORIDE 100 MG: 20 INJECTION, SOLUTION INTRAMUSCULAR; INTRAVENOUS at 07:41

## 2017-06-02 RX ADMIN — OXYCODONE HYDROCHLORIDE 5 MG: 5 TABLET ORAL at 21:58

## 2017-06-02 RX ADMIN — ROCURONIUM BROMIDE 40 MG: 10 INJECTION, SOLUTION INTRAVENOUS at 07:48

## 2017-06-02 RX ADMIN — FENTANYL CITRATE 50 MCG: 50 INJECTION INTRAMUSCULAR; INTRAVENOUS at 10:52

## 2017-06-02 RX ADMIN — CEFAZOLIN 3000 MG: 1 INJECTION, POWDER, FOR SOLUTION INTRAVENOUS at 08:00

## 2017-06-02 RX ADMIN — OXYCODONE HYDROCHLORIDE AND ACETAMINOPHEN 1 TABLET: 5; 325 TABLET ORAL at 19:45

## 2017-06-02 RX ADMIN — FENTANYL CITRATE 50 MCG: 50 INJECTION INTRAMUSCULAR; INTRAVENOUS at 10:59

## 2017-06-02 RX ADMIN — GENTAMICIN SULFATE: 40 INJECTION, SOLUTION INTRAMUSCULAR; INTRAVENOUS at 14:56

## 2017-06-02 RX ADMIN — FENTANYL CITRATE 50 MCG: 50 INJECTION INTRAMUSCULAR; INTRAVENOUS at 11:21

## 2017-06-02 RX ADMIN — FENTANYL CITRATE 50 MCG: 50 INJECTION INTRAMUSCULAR; INTRAVENOUS at 11:35

## 2017-06-02 RX ADMIN — MIDAZOLAM HYDROCHLORIDE 2 MG: 1 INJECTION, SOLUTION INTRAMUSCULAR; INTRAVENOUS at 07:39

## 2017-06-02 RX ADMIN — PROPOFOL 50 MCG/KG/MIN: 10 INJECTION, EMULSION INTRAVENOUS at 07:43

## 2017-06-02 RX ADMIN — SODIUM CHLORIDE: 0.9 INJECTION, SOLUTION INTRAVENOUS at 07:38

## 2017-06-02 RX ADMIN — ENALAPRIL MALEATE 10 MG: 10 TABLET ORAL at 16:49

## 2017-06-02 RX ADMIN — OXYCODONE HYDROCHLORIDE 5 MG: 5 TABLET ORAL at 16:55

## 2017-06-02 RX ADMIN — FENTANYL CITRATE 100 MCG: 50 INJECTION, SOLUTION INTRAMUSCULAR; INTRAVENOUS at 07:41

## 2017-06-03 ENCOUNTER — APPOINTMENT (OUTPATIENT)
Dept: RADIOLOGY | Facility: HOSPITAL | Age: 42
End: 2017-06-03
Payer: COMMERCIAL

## 2017-06-03 VITALS
OXYGEN SATURATION: 97 % | HEART RATE: 72 BPM | TEMPERATURE: 98.9 F | SYSTOLIC BLOOD PRESSURE: 111 MMHG | BODY MASS INDEX: 35.7 KG/M2 | HEIGHT: 73 IN | DIASTOLIC BLOOD PRESSURE: 62 MMHG | WEIGHT: 269.4 LBS | RESPIRATION RATE: 18 BRPM

## 2017-06-03 PROBLEM — T82.110A ICD (IMPLANTABLE CARDIOVERTER-DEFIBRILLATOR) LEAD FAILURE: Chronic | Status: ACTIVE | Noted: 2017-06-03

## 2017-06-03 LAB
ANION GAP SERPL CALCULATED.3IONS-SCNC: 7 MMOL/L (ref 4–13)
BUN SERPL-MCNC: 9 MG/DL (ref 5–25)
CALCIUM SERPL-MCNC: 8.7 MG/DL (ref 8.3–10.1)
CHLORIDE SERPL-SCNC: 107 MMOL/L (ref 100–108)
CO2 SERPL-SCNC: 26 MMOL/L (ref 21–32)
CREAT SERPL-MCNC: 0.78 MG/DL (ref 0.6–1.3)
ERYTHROCYTE [DISTWIDTH] IN BLOOD BY AUTOMATED COUNT: 14 % (ref 11.6–15.1)
GFR SERPL CREATININE-BSD FRML MDRD: >60 ML/MIN/1.73SQ M
GLUCOSE P FAST SERPL-MCNC: 95 MG/DL (ref 65–99)
GLUCOSE SERPL-MCNC: 95 MG/DL (ref 65–140)
HCT VFR BLD AUTO: 39.4 % (ref 36.5–49.3)
HGB BLD-MCNC: 13.1 G/DL (ref 12–17)
MCH RBC QN AUTO: 29 PG (ref 26.8–34.3)
MCHC RBC AUTO-ENTMCNC: 33.2 G/DL (ref 31.4–37.4)
MCV RBC AUTO: 87 FL (ref 82–98)
PLATELET # BLD AUTO: 173 THOUSANDS/UL (ref 149–390)
PMV BLD AUTO: 11 FL (ref 8.9–12.7)
POTASSIUM SERPL-SCNC: 3.7 MMOL/L (ref 3.5–5.3)
RBC # BLD AUTO: 4.52 MILLION/UL (ref 3.88–5.62)
SODIUM SERPL-SCNC: 140 MMOL/L (ref 136–145)
WBC # BLD AUTO: 8.46 THOUSAND/UL (ref 4.31–10.16)

## 2017-06-03 PROCEDURE — 80048 BASIC METABOLIC PNL TOTAL CA: CPT | Performed by: INTERNAL MEDICINE

## 2017-06-03 PROCEDURE — 85027 COMPLETE CBC AUTOMATED: CPT | Performed by: INTERNAL MEDICINE

## 2017-06-03 PROCEDURE — 71020 HB CHEST X-RAY 2VW FRONTAL&LATL: CPT

## 2017-06-03 RX ORDER — OXYCODONE HYDROCHLORIDE AND ACETAMINOPHEN 5; 325 MG/1; MG/1
1 TABLET ORAL EVERY 4 HOURS PRN
Qty: 16 TABLET | Refills: 0 | Status: SHIPPED | OUTPATIENT
Start: 2017-06-03 | End: 2017-06-13

## 2017-06-03 RX ADMIN — CARVEDILOL 37.5 MG: 25 TABLET, FILM COATED ORAL at 08:18

## 2017-06-03 RX ADMIN — OXYCODONE HYDROCHLORIDE AND ACETAMINOPHEN 1 TABLET: 5; 325 TABLET ORAL at 08:18

## 2017-06-03 RX ADMIN — ENALAPRIL MALEATE 10 MG: 10 TABLET ORAL at 08:18

## 2017-06-03 RX ADMIN — OXYCODONE HYDROCHLORIDE 5 MG: 5 TABLET ORAL at 13:14

## 2017-06-03 RX ADMIN — OXYCODONE HYDROCHLORIDE 5 MG: 5 TABLET ORAL at 05:39

## 2017-06-04 LAB
ABO GROUP BLD BPU: NORMAL
BPU ID: NORMAL
CROSSMATCH: NORMAL
UNIT DISPENSE STATUS: NORMAL
UNIT PRODUCT CODE: NORMAL
UNIT RH: NORMAL

## 2017-06-15 ENCOUNTER — ALLSCRIPTS OFFICE VISIT (OUTPATIENT)
Dept: OTHER | Facility: OTHER | Age: 42
End: 2017-06-15

## 2017-06-21 ENCOUNTER — ALLSCRIPTS OFFICE VISIT (OUTPATIENT)
Dept: OTHER | Facility: OTHER | Age: 42
End: 2017-06-21

## 2017-06-21 ENCOUNTER — GENERIC CONVERSION - ENCOUNTER (OUTPATIENT)
Dept: OTHER | Facility: OTHER | Age: 42
End: 2017-06-21

## 2017-08-24 ENCOUNTER — ALLSCRIPTS OFFICE VISIT (OUTPATIENT)
Dept: OTHER | Facility: OTHER | Age: 42
End: 2017-08-24

## 2017-08-24 DIAGNOSIS — G47.33 OBSTRUCTIVE SLEEP APNEA: ICD-10-CM

## 2017-08-24 DIAGNOSIS — Z13.220 ENCOUNTER FOR SCREENING FOR LIPOID DISORDERS: ICD-10-CM

## 2017-08-24 DIAGNOSIS — I42.0 DILATED CARDIOMYOPATHY (HCC): ICD-10-CM

## 2017-08-24 DIAGNOSIS — I10 ESSENTIAL (PRIMARY) HYPERTENSION: ICD-10-CM

## 2017-08-28 ENCOUNTER — TRANSCRIBE ORDERS (OUTPATIENT)
Dept: ADMINISTRATIVE | Facility: HOSPITAL | Age: 42
End: 2017-08-28

## 2017-08-28 DIAGNOSIS — G47.30 SLEEP APNEA, UNSPECIFIED TYPE: Primary | ICD-10-CM

## 2017-09-07 ENCOUNTER — APPOINTMENT (OUTPATIENT)
Dept: LAB | Facility: CLINIC | Age: 42
End: 2017-09-07
Payer: COMMERCIAL

## 2017-09-07 DIAGNOSIS — Z13.220 ENCOUNTER FOR SCREENING FOR LIPOID DISORDERS: ICD-10-CM

## 2017-09-07 DIAGNOSIS — I42.0 DILATED CARDIOMYOPATHY (HCC): ICD-10-CM

## 2017-09-07 DIAGNOSIS — G47.33 OBSTRUCTIVE SLEEP APNEA: ICD-10-CM

## 2017-09-07 DIAGNOSIS — I10 ESSENTIAL (PRIMARY) HYPERTENSION: ICD-10-CM

## 2017-09-07 LAB
ALBUMIN SERPL BCP-MCNC: 3.9 G/DL (ref 3.5–5)
ALP SERPL-CCNC: 79 U/L (ref 46–116)
ALT SERPL W P-5'-P-CCNC: 33 U/L (ref 12–78)
ANION GAP SERPL CALCULATED.3IONS-SCNC: 6 MMOL/L (ref 4–13)
AST SERPL W P-5'-P-CCNC: 28 U/L (ref 5–45)
BASOPHILS # BLD AUTO: 0.04 THOUSANDS/ΜL (ref 0–0.1)
BASOPHILS NFR BLD AUTO: 1 % (ref 0–1)
BILIRUB SERPL-MCNC: 1.56 MG/DL (ref 0.2–1)
BUN SERPL-MCNC: 14 MG/DL (ref 5–25)
CALCIUM SERPL-MCNC: 9 MG/DL (ref 8.3–10.1)
CHLORIDE SERPL-SCNC: 106 MMOL/L (ref 100–108)
CHOLEST SERPL-MCNC: 140 MG/DL (ref 50–200)
CO2 SERPL-SCNC: 27 MMOL/L (ref 21–32)
CREAT SERPL-MCNC: 1.01 MG/DL (ref 0.6–1.3)
EOSINOPHIL # BLD AUTO: 0.17 THOUSAND/ΜL (ref 0–0.61)
EOSINOPHIL NFR BLD AUTO: 2 % (ref 0–6)
ERYTHROCYTE [DISTWIDTH] IN BLOOD BY AUTOMATED COUNT: 14.6 % (ref 11.6–15.1)
GFR SERPL CREATININE-BSD FRML MDRD: 92 ML/MIN/1.73SQ M
GLUCOSE P FAST SERPL-MCNC: 88 MG/DL (ref 65–99)
HCT VFR BLD AUTO: 44 % (ref 36.5–49.3)
HDLC SERPL-MCNC: 48 MG/DL (ref 40–60)
HGB BLD-MCNC: 14.9 G/DL (ref 12–17)
LDLC SERPL CALC-MCNC: 75 MG/DL (ref 0–100)
LYMPHOCYTES # BLD AUTO: 2.14 THOUSANDS/ΜL (ref 0.6–4.47)
LYMPHOCYTES NFR BLD AUTO: 27 % (ref 14–44)
MCH RBC QN AUTO: 29.6 PG (ref 26.8–34.3)
MCHC RBC AUTO-ENTMCNC: 33.9 G/DL (ref 31.4–37.4)
MCV RBC AUTO: 88 FL (ref 82–98)
MONOCYTES # BLD AUTO: 0.86 THOUSAND/ΜL (ref 0.17–1.22)
MONOCYTES NFR BLD AUTO: 11 % (ref 4–12)
NEUTROPHILS # BLD AUTO: 4.59 THOUSANDS/ΜL (ref 1.85–7.62)
NEUTS SEG NFR BLD AUTO: 59 % (ref 43–75)
NRBC BLD AUTO-RTO: 0 /100 WBCS
PLATELET # BLD AUTO: 211 THOUSANDS/UL (ref 149–390)
PMV BLD AUTO: 12.2 FL (ref 8.9–12.7)
POTASSIUM SERPL-SCNC: 4.2 MMOL/L (ref 3.5–5.3)
PROT SERPL-MCNC: 7.8 G/DL (ref 6.4–8.2)
RBC # BLD AUTO: 5.03 MILLION/UL (ref 3.88–5.62)
SODIUM SERPL-SCNC: 139 MMOL/L (ref 136–145)
TRIGL SERPL-MCNC: 85 MG/DL
WBC # BLD AUTO: 7.81 THOUSAND/UL (ref 4.31–10.16)

## 2017-09-07 PROCEDURE — 36415 COLL VENOUS BLD VENIPUNCTURE: CPT

## 2017-09-07 PROCEDURE — 80053 COMPREHEN METABOLIC PANEL: CPT

## 2017-09-07 PROCEDURE — 80061 LIPID PANEL: CPT

## 2017-09-07 PROCEDURE — 85025 COMPLETE CBC W/AUTO DIFF WBC: CPT

## 2017-09-19 ENCOUNTER — TRANSCRIBE ORDERS (OUTPATIENT)
Dept: SLEEP CENTER | Facility: CLINIC | Age: 42
End: 2017-09-19

## 2017-09-19 DIAGNOSIS — G47.33 OSA (OBSTRUCTIVE SLEEP APNEA): Primary | ICD-10-CM

## 2017-10-23 ENCOUNTER — HOSPITAL ENCOUNTER (OUTPATIENT)
Dept: SLEEP CENTER | Facility: CLINIC | Age: 42
Discharge: HOME | End: 2017-10-23
Payer: COMMERCIAL

## 2017-10-23 DIAGNOSIS — G47.33 OSA (OBSTRUCTIVE SLEEP APNEA): ICD-10-CM

## 2017-10-23 PROCEDURE — 95810 POLYSOM 6/> YRS 4/> PARAM: CPT

## 2017-10-25 ENCOUNTER — TRANSCRIBE ORDERS (OUTPATIENT)
Dept: SLEEP CENTER | Facility: CLINIC | Age: 42
End: 2017-10-25

## 2017-10-25 DIAGNOSIS — G47.33 OSA (OBSTRUCTIVE SLEEP APNEA): Primary | ICD-10-CM

## 2017-10-26 ENCOUNTER — HOSPITAL ENCOUNTER (OUTPATIENT)
Dept: SLEEP CENTER | Facility: CLINIC | Age: 42
Discharge: HOME | End: 2017-10-26
Payer: COMMERCIAL

## 2017-10-26 ENCOUNTER — TRANSCRIBE ORDERS (OUTPATIENT)
Dept: SLEEP CENTER | Facility: CLINIC | Age: 42
End: 2017-10-26

## 2017-10-26 DIAGNOSIS — G47.33 OSA (OBSTRUCTIVE SLEEP APNEA): ICD-10-CM

## 2017-10-26 DIAGNOSIS — G47.33 OSA (OBSTRUCTIVE SLEEP APNEA): Primary | ICD-10-CM

## 2017-10-26 NOTE — PROGRESS NOTES
Consultation - Sleep Center   Yohannes Lynne : 1975  MRN: [id number]      Assessment:  The patient is symptomatic from his obstructive sleep apnea  He requires treatment and I have started APAP with a range of 4 to 20 cm  I am hopeful that his sleep quality improves  He seems to be very sensitive to the type of mask that he uses  He is requesting a full face mask, since various types of nasal masks were not well tolerated  Plan:  Set up APAP 4 to 20 cm with a full face mask of the patient's choice    Follow up:  2018 for a compliance check    History of Present Illness:  40 y o male status post sleep study which demonstrated mild obstructive sleep apnea (AHI = 8 2)  The patient has previously diagnosed obstructive sleep apnea, but had stopped using his equipment because of mask discomfort  He has a history of viral cardiomyopathy with ICD placement  He also has a history of hypertension and complains of severe daytime sleepiness  He complained that his mind races and was placed on trazodone at bedtime for insomnia  Review of Systems:  Headache, bruxism, chronic pain, GERD, somnambulism    The patient denies irrepressible sleep, sleep paralysis, cataplexy, restless legs, thrashing during sleep, nocturia, disturbing dreams, poor memory, poor concentration, depression or anxiety      Historical Information    Past Medical History:  Viral cardiomyopathy, ICD placement, hypertension, obstructive sleep apnea, ALLERGIC RHINITIS     Past Surgical History:  ICD    Social History  History   Alcohol use: Not on file     History   Drug Use Not on file     History   Smoking Status    Not on file   Smokeless Tobacco    Not on file     Family History: non-contributory     Sleep History: See above     Sleep Schedule:  UNREMARKABLE     Snoring/Apnea:  YES TO BOTH    Medications/Allergies:  See chart    Objective:    Vital Signs:   See Chart    Hill Sleepiness Scale:  17    Physical Exam:    General: Alert, appropriate, cooperative, overweight    Head: NC/AT, no retrognathia    Nose: No septal deviation, nares PARTIALLY obstructed, mucosa normal    Throat: Airway lumen narrowed, tongue base thickened, no tonsils visualized    Neck: Normal, no thyromegaly or lymphadenopathy, no JVD    Heart: RR, normal S1 and S2, no murmurs    Chest: Clear bilaterally    Extremity: No clubbing, cyanosis, NO edema    Skin: Warm, dry    Neuro: No motor abnormalities, cranial nerves appear intact    Sleep Study Results:   As above  PAP Pressure: Auto PAP: 4 to 20 cm  DME Provider: [employer]    Counseling / Coordination of Care  Total clinic time spent today 25 minutes  Greater than 50% of total time was spent with the patient and / or family counseling and / or coordination of care  A description of the counseling / coordination of care: We discussed the pathophysiology of obstructive sleep apnea as well as the possible treatment options  We also discussed the rationale for positive airway pressure therapy  JAY JAY Lou    Board Certified Sleep Specialist

## 2017-12-27 ENCOUNTER — ALLSCRIPTS OFFICE VISIT (OUTPATIENT)
Dept: OTHER | Facility: OTHER | Age: 42
End: 2017-12-27

## 2017-12-27 ENCOUNTER — GENERIC CONVERSION - ENCOUNTER (OUTPATIENT)
Dept: OTHER | Facility: OTHER | Age: 42
End: 2017-12-27
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
## 2018-01-12 VITALS
BODY MASS INDEX: 38.98 KG/M2 | OXYGEN SATURATION: 98 % | HEART RATE: 64 BPM | TEMPERATURE: 98 F | HEIGHT: 73 IN | SYSTOLIC BLOOD PRESSURE: 124 MMHG | DIASTOLIC BLOOD PRESSURE: 82 MMHG | WEIGHT: 294.13 LBS

## 2018-01-13 VITALS
RESPIRATION RATE: 14 BRPM | HEIGHT: 73 IN | TEMPERATURE: 98.6 F | DIASTOLIC BLOOD PRESSURE: 80 MMHG | SYSTOLIC BLOOD PRESSURE: 118 MMHG | HEART RATE: 68 BPM | BODY MASS INDEX: 37.37 KG/M2 | WEIGHT: 282 LBS

## 2018-01-13 VITALS
BODY MASS INDEX: 37.31 KG/M2 | OXYGEN SATURATION: 98 % | SYSTOLIC BLOOD PRESSURE: 122 MMHG | HEIGHT: 73 IN | HEART RATE: 67 BPM | WEIGHT: 281.5 LBS | DIASTOLIC BLOOD PRESSURE: 74 MMHG | TEMPERATURE: 98.8 F

## 2018-01-13 VITALS
BODY MASS INDEX: 37.37 KG/M2 | DIASTOLIC BLOOD PRESSURE: 74 MMHG | SYSTOLIC BLOOD PRESSURE: 134 MMHG | HEIGHT: 73 IN | WEIGHT: 282 LBS

## 2018-01-14 VITALS
BODY MASS INDEX: 37.37 KG/M2 | DIASTOLIC BLOOD PRESSURE: 78 MMHG | SYSTOLIC BLOOD PRESSURE: 120 MMHG | HEIGHT: 73 IN | HEART RATE: 70 BPM | WEIGHT: 282 LBS

## 2018-01-15 NOTE — RESULT NOTES
Message   He has gallstones but no inflammation of the gallbladder  He has fatty liver  He should f/u with GI as we discussed     Verified Results  [address] [place] [address] [id number] 06:37PM Raheem Patel Order Number: [id number]    - Patient Instructions: To schedule this appointment, please contact Central Scheduling at [phone]  Test Name Result Flag Reference   US ABDOMEN LIMITED (Report)     RIGHT UPPER QUADRANT ULTRASOUND     INDICATION: Right upper quadrant pain  COMPARISON: None  TECHNIQUE:  Real-time ultrasound of the right upper quadrant was performed with a curvilinear transducer with both volumetric sweeps and still imaging techniques  FINDINGS:     PANCREAS: Medicine pancreas is limited     AORTA AND IVC: Visualized portions are normal for patient age  LIVER:   Size: Within normal range  The liver measures 16 1 cm in the midclavicular line  Contour: Surface contour is smooth  Parenchyma: Increased echogenicity of the liver parenchyma seen  Evaluation the liver parenchyma is limited for focal lesion due to poor acoustic window   Limited imaging of the main portal vein shows it to be patent and hepatopedal       BILIARY:   The gallbladder is normal in caliber  No wall thickening or pericholecystic fluid  Multiple liver calculi are seen within the gallbladder with the distal shadowing   No sonographic Odell's sign  No intrahepatic biliary dilatation  CBD measures 4 9 mm  No choledocholithiasis  KIDNEY:    Right kidney measures 11 6 x 5 6 cm  Within normal limits  ASCITES:  None  IMPRESSION:     Cholelithiasis  No pericholecystic fluid and no gallbladder wall thickening seen and no biliary dilation seen  Diffuse increased echogenicity of the liver suggests fatty infiltration         Workstation performed: [id number]     Signed by:   Skyler Maynard MD   3/13/17

## 2018-01-17 NOTE — RESULT NOTES
Verified Results  * [facility] HEPATOBILIARY W RX [id number] 08:16AM Francella Sickles Cara Meigs Order Number: [id number]    - Patient Instructions: To schedule this appointment, please contact Central Scheduling at [phone]  Test Name Result Flag Reference   NM HEPATOBILIARY W RX (Report)     HEPATOBILIARY SCAN     INDICATION: Right upper quadrant pain, cholelithiasis     COMPARISON: Ultrasound 3/10/2017     TECHNIQUE:  Following the intravenous administration of 4 8 mCi Tc-99m labeled mebrofenin, dynamic abdominal imaging was obtained in the anterior projection over a 60 minute time period  FINDINGS:      There is prompt, uniform accumulation with normal clearance of the radiopharmaceutical by the liver  There is normal filling of the intrahepatic ducts, common bile duct with normal excretion of the radiopharmaceutical into the duodenum  The gallbladder   was not visualized initially  Additional imaging was performed after the intravenous administration of 4 mg morphine and an additional 1 1 mCi technetium 99m mebrofenin  Final images demonstrate radiotracer uptake by the gallbladder  IMPRESSION:     The gallbladder is visualized, indicating a patent cystic duct  Workstation performed: [id number]     Signed by:   Scot Langley MD   4/19/17   Contraindication of Morphine overridden due to adverse reaction to only vicodin in the past  4mg Morphine given pt has had morphine in the past w/o incident

## 2018-01-17 NOTE — MISCELLANEOUS
RE: Gina Fuad   : 77    To Whom It May Concern:    Mr Lucianne Libman is under my professional care for his cardiology management  He did undergo a recent cardiac device procedure  Mr Bucky Soliman will be able to return to work on 17 without any cardiac restrictions  If you have any further questions, please do not hesitate to contact my office at [phone]  Sincerely,      Adan Engle MD  [facility]  Cardiac Electrophysiology       Electronically signed Angel PILLAI    2017  5:16PM EST Author

## 2018-01-24 RX ORDER — COVID-19 ANTIGEN TEST
KIT MISCELLANEOUS
COMMUNITY
End: 2018-01-24 | Stop reason: SDUPTHER

## 2018-01-24 RX ORDER — CARVEDILOL 25 MG/1
TABLET ORAL 2 TIMES DAILY
COMMUNITY
Start: 2012-11-16 | End: 2018-01-24 | Stop reason: SDUPTHER

## 2018-01-24 RX ORDER — CETIRIZINE HYDROCHLORIDE 10 MG/1
1 TABLET ORAL
COMMUNITY
End: 2018-01-24 | Stop reason: SDUPTHER

## 2018-01-24 RX ORDER — FLUTICASONE PROPIONATE 50 MCG
2 SPRAY, SUSPENSION (ML) NASAL DAILY
COMMUNITY
Start: 2017-11-21 | End: 2020-04-30

## 2018-01-24 RX ORDER — ENALAPRIL MALEATE 10 MG/1
1 TABLET ORAL 2 TIMES DAILY
COMMUNITY
Start: 2014-01-09 | End: 2018-01-24 | Stop reason: SDUPTHER

## 2018-01-24 RX ORDER — ENALAPRIL MALEATE 10 MG/1
10 TABLET ORAL 2 TIMES DAILY
Refills: 0 | Status: CANCELLED | OUTPATIENT
Start: 2018-01-24

## 2018-01-24 RX ORDER — CARVEDILOL 25 MG/1
37.5 TABLET ORAL 2 TIMES DAILY WITH MEALS
Refills: 0 | Status: CANCELLED | OUTPATIENT
Start: 2018-01-24

## 2018-01-24 RX ORDER — TRAZODONE HYDROCHLORIDE 100 MG/1
1 TABLET ORAL
COMMUNITY
Start: 2017-08-24 | End: 2018-08-09 | Stop reason: SDUPTHER

## 2018-01-25 ENCOUNTER — OFFICE VISIT (OUTPATIENT)
Dept: CARDIOLOGY CLINIC | Facility: CLINIC | Age: 43
End: 2018-01-25
Payer: COMMERCIAL

## 2018-01-25 VITALS
WEIGHT: 291 LBS | DIASTOLIC BLOOD PRESSURE: 64 MMHG | OXYGEN SATURATION: 96 % | SYSTOLIC BLOOD PRESSURE: 118 MMHG | HEART RATE: 63 BPM | HEIGHT: 74 IN | BODY MASS INDEX: 37.35 KG/M2

## 2018-01-25 DIAGNOSIS — G47.33 OBSTRUCTIVE SLEEP APNEA: ICD-10-CM

## 2018-01-25 DIAGNOSIS — I15.9 SECONDARY HYPERTENSION: Primary | ICD-10-CM

## 2018-01-25 DIAGNOSIS — I42.0 DILATED CARDIOMYOPATHY (HCC): ICD-10-CM

## 2018-01-25 DIAGNOSIS — T82.110D FAILURE OF IMPLANTABLE CARDIOVERTER-DEFIBRILLATOR (ICD) LEAD, SUBSEQUENT ENCOUNTER: Chronic | ICD-10-CM

## 2018-01-25 PROBLEM — I10 HYPERTENSION: Status: ACTIVE | Noted: 2018-01-25

## 2018-01-25 PROCEDURE — 93000 ELECTROCARDIOGRAM COMPLETE: CPT | Performed by: INTERNAL MEDICINE

## 2018-01-25 PROCEDURE — 99214 OFFICE O/P EST MOD 30 MIN: CPT | Performed by: INTERNAL MEDICINE

## 2018-01-25 RX ORDER — ENALAPRIL MALEATE 10 MG/1
10 TABLET ORAL 2 TIMES DAILY
COMMUNITY
End: 2018-05-11 | Stop reason: SDUPTHER

## 2018-01-25 RX ORDER — NAPROXEN 250 MG/1
250 TABLET ORAL AS NEEDED
COMMUNITY

## 2018-01-25 RX ORDER — CARVEDILOL 25 MG/1
37.5 TABLET ORAL 2 TIMES DAILY WITH MEALS
COMMUNITY
End: 2018-05-11 | Stop reason: SDUPTHER

## 2018-01-25 NOTE — PROGRESS NOTES
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)    Dilated cardiomyopathy (HCC)    Relevant Medications    carvedilol (COREG) 25 mg tablet    Hypertension - Primary    Relevant Medications    carvedilol (COREG) 25 mg tablet    enalapril (VASOTEC) 10 mg tablet    Other Relevant Orders    POCT ECG    Obstructive sleep apnea          39 yo male  1) H/o Nonischemic CM  EF 25% NYHAII on Enalapril/coreg  NO edema no recent CHF exacerbation  2) S/p ICD extraction recall SPrint He lead May 2017 w new Medtronic single chamber ICD now working appropriately  3) HTN well controlled  4) CONCHIS on CPAP    DISCUSSION AND PLAN:  1  Continue current CHF regemin  2  Refer to Dr Lyric Heath or Olga Benites for CHF COnsult to manage long term medications/CHF        Orders Placed This Encounter   Procedures    POCT ECG     Medications Discontinued During This Encounter   Medication Reason    Naproxen Sodium (ALEVE) 194 MG CAPS Duplicate order    Naproxen Sodium (ALEVE) 005 MG CAPS Duplicate order    cetirizine (ZyrTEC) 10 mg tablet Duplicate order    Cetirizine HCl (ZYRTEC ALLERGY) 10 MG CAPS Duplicate order    enalapril (VASOTEC) 10 mg tablet Duplicate order    enalapril (VASOTEC) 10 mg tablet Duplicate order    carvedilol (COREG) 25 mg tablet Duplicate order    carvedilol (COREG) 25 mg tablet Duplicate order    docusate sodium (COLACE) 100 mg capsule Stop Taking at Discharge       Follow up in: prn  F/u w Dr Lyric Heath or Olga Benites in 6 months            HPI: 39 yo male  39 yo male  1) H/o Nonischemic CM  EF 25% NYHAII on Enalapril/coreg  NO edema no recent CHF exacerbation  2) S/p ICD extraction recall Josephine Cutler lead May 2017 w new Medtronic single chamber ICD now working appropriately  3) HTN well controlled  4) CONCHIS on CPAP      HE is doing well today  No CP/SOB  Occassional mild palpitations  No edema  He is physcially active at work w/o restriction  ICD check has not shown arrhythmias    Please note HPI is listed by problem with with update following it, it is copied again in the assessment above and reflects medical decision making as well  Complete 12 point ROS reviewed and otherwise non pertinent or negative except as per HPI pertinent positives in Cardiovascular and Respiratory emphasized  Please see paper chart for outpatient clinic patients where the patient completed the 12 point ROS survey  Past Medical History:   Diagnosis Date    AICD (automatic cardioverter/defibrillator) present     [facility])     Hypertension     Myocarditis ([address] )     Sleep apnea     Ventricular tachycardia (HCC)        Allergies   Allergen Reactions    Vicodin [Hydrocodone-Acetaminophen] Other (See Comments)     "i get nasty"     I reviewed the Home Medication list and Allergies in the chart  Scheduled Meds:  Current Outpatient Prescriptions   Medication Sig Dispense Refill    carvedilol (COREG) 25 mg tablet Take 37 5 mg by mouth 2 (two) times a day with meals      enalapril (VASOTEC) 10 mg tablet Take 10 mg by mouth 2 (two) times a day      naproxen (NAPROSYN) 250 mg tablet Take 250 mg by mouth as needed for mild pain      traZODone (DESYREL) 100 mg tablet Take 1 tablet by mouth      fluticasone (FLONASE) 50 mcg/act nasal spray 2 sprays into each nostril daily       No current facility-administered medications for this visit        PRN Meds:         Family History   Problem Relation Age of Onset    No Known Problems Mother     No Known Problems Father      Social History     Social History    Marital status: Single     Spouse name: N/A    Number of children: N/A    Years of education: N/A     Occupational History    Not on file  Social History Main Topics    Smoking status: Never Smoker    Smokeless tobacco: Never Used    Alcohol use Yes      Comment: OCCASIONAL    Drug use: No    Sexual activity: Not on file     Other Topics Concern    Not on file     Social History Narrative    No narrative on file         OBJECTIVE:    /64 (BP Location: Left arm, Patient Position: Sitting, Cuff Size: Adult)   Pulse 63   Ht 6' 2" (1 88 m)   Wt 132 kg (291 lb)   SpO2 96%   BMI 37 36 kg/m²   Vitals:    01/25/18 0958   Weight: 132 kg (291 lb)     GEN: No acute distress, Alert and oriented, well appearing  HEENT:Head, neck, ears, oral pharynx: Mucus membranes moist, oral pharynx clear, nares clear  External ears normal  EYES: Pupils equal, sclera anicteric, midline, normal conjuctiva  NECK: No JVD, supple, no obvious masses or thryomegaly or goiter  CARDIOVASCULAR:  RRR, No murmur, rub, gallops S1,S2  LUNGS: Clear To auscultation bilaterally, normal effort, no rales, rhonchi, crackles  ABDOMEN:  nondistended,  without obvious organomegaly or ascites  EXTREMITIES/VASCULAR:  No edema  Radial pulses intact, pedal pulses difficult to palpate, warm an well perfused  PSYCH: Normal Affect, no overt suicidal ideation, linear speech pattern without evidence of psychosis  NEURO: Grossly intact, moving all extremiteis equal, face symmetric, alert and responsive, no obvious focal defecits  GAIT:  Ambulates normally without difficulty  HEME: No bleeding, bruising, petechia, purpura  SKIN: No significant rashes, warm, no diaphoresis or pallor       Lab Results:     @RESULTRCNT(1M])@    CBC with diff:     CMP:    TSH:       Lipid Profile:          Cardiac testing:   Results for orders placed during the hospital encounter of 05/16/17   Echo complete with contrast if indicated    Narrative [facility] [address]  [phone]    Transthoracic Echocardiogram  2D, M-mode, Doppler, and Color Doppler    Study date:  16-May-2017    Patient: Sanjana Olivarez  MR number: [id number]  Account number: [de-identified]  : 1975  Age: 37 years  Gender: Male  Status: Outpatient  [facility]: [facility]  Height: 73 in  Weight: 282 lb  BP: 118/ 80 mmHg    Indications: Tachycardia  Diagnoses: R00 0 - Tachycardia, unspecified    Sonographer:  Back RCS  Interpreting Physician:  Ed Camarena MD  Primary Physician:  Yessi Suresh  Referring Physician:  Lucio Shea DO    SUMMARY    LEFT VENTRICLE:  markedly dilated, non-hypertrophied left ventricle with global hypokinesis  Ejection fraction is estimated at about 25-30% range  Features were consistent with a pseudonormal left ventricular filling pattern, with concomitant abnormal relaxation and increased filling pressure (grade 2 diastolic dysfunction)  LEFT ATRIUM:  The atrium was moderately dilated  MITRAL VALVE:  There was trace regurgitation  TRICUSPID VALVE:  There was mild regurgitation  SUMMARY MEASUREMENTS  Unspecified Scan Mode measurements:  Aortic Valve:   HR was 53 /min  HISTORY: PRIOR HISTORY: Dilated cardiomyopathy  Risk factors: hypertension  PRIOR PROCEDURES: ICD implantation  PROCEDURE: The study was performed in the [address] [place] [address]  This was a routine study  The transthoracic approach was used  The study included complete 2D imaging, M-mode, complete spectral Doppler, and color Doppler  This  was a technically difficult study  LEFT VENTRICLE: markedly dilated, non-hypertrophied left ventricle with global hypokinesis   Ejection fraction is estimated at about 25-30% range DOPPLER: Features were consistent with a pseudonormal left ventricular filling pattern, with  concomitant abnormal relaxation and increased filling pressure (grade 2 diastolic dysfunction)  RIGHT VENTRICLE: The size was normal  Systolic function was normal  A pacing wire was present  LEFT ATRIUM: The atrium was moderately dilated  RIGHT ATRIUM: Size was normal     MITRAL VALVE: Valve structure was normal  There was normal leaflet separation  DOPPLER: The transmitral velocity was within the normal range  There was no evidence for stenosis  There was trace regurgitation  AORTIC VALVE: The valve was trileaflet  Leaflets exhibited normal thickness and normal cuspal separation  DOPPLER: Transaortic velocity was within the normal range  There was no evidence for stenosis  There was no regurgitation  TRICUSPID VALVE: DOPPLER: There was mild regurgitation  PERICARDIUM: There was no pericardial effusion  The pericardium was normal in appearance  AORTA: The root exhibited normal size  PULMONARY ARTERY: DOPPLER: Systolic pressure was within the normal range  SYSTEM MEASUREMENT TABLES    Apical four chamber  TAPSE: 27 1 mm    Unspecified Scan Mode  Aortic Root Diameter; End Systole;: 29 8 mm  Gradient Pressure, Peak; Simplified Bernoulli; Antegrade Flow; Systole;: 4 5 mm[Hg]  Gradient pressure, average; Simplified Bernoulli; Antegrade Flow; Systole;: 2 4 mm[Hg]  HR: 53 /min  Left atrial diameter; End Diastole;: 50 4 mm  Cardiac Output; Teichholz; Systole;: 4 04 L/min  Heart rate; Teichholz;: 56 /min  Interventricular Septum Diastolic Thickness; Teichholz; End Diastole;: 8 6 mm  Left Ventricle Internal End Diastolic Dimension; Teichholz;: 69 mm  Left Ventricle Internal Systolic Dimension; Teichholz; End Systole;: 59 3 mm  Left Ventricle Mass; Mass AVCube with Teichholz; End Diastole;: 247 g  Left Ventricle Posterior Wall Diastolic Thickness; Teichholz; End Diastole;: 7 9 mm  Left Ventricular Ejection Fraction; Teichholz;: 29 2 %  Left Ventricular End Diastolic Volume; Teichholz;: 247 3 ml  Left Ventricular End Systolic Volume;  Teichholz;: 175 2 ml  Left Ventricular Fractional Shortening;: 14 1 %  Stroke volume; Teichholz; Systole;: 72 1 ml  Mitral Valve E to A Ratio; Systole;: 2 24  Gradient Pressure, Peak; Simplified Bernoulli; Regurgitant Flow; Systole;: 24 6 mm[Hg]    [facility]    Prepared and electronically signed by    Stella Valdez MD  Signed 17-May-2017 18:22:30       No results found for this or any previous visit  No results found for this or any previous visit  No results found for this or any previous visit  EKG today is NSR w one PVC  Nonspecific  ST changes  No changes compared June 2 2017 EKG, I reviewed and interpreted EKG myself  I reviewed ICD interogation and shows normal function w/o arrhythmias

## 2018-03-07 NOTE — PROGRESS NOTES
"  Discussion/Summary  Normal device function      Results/Data  Cardiac Device Remote [id number] 07:28AM Karen Peralta     Test Name Result Flag Reference   MISCELLANEOUS COMMENT      CARELINK TRANSMISSION: BATTERY VOLTAGE ADEQUATE (11 2 YR)   < 0 1%  ALL LEAD PARAMETERS WITHIN NORMAL LIMITS  NO SIGNIFICANT HIGH RATE EPISODES  OPTI-VOL WITHIN NORMAL LIMITS  NORMAL DEVICE FUNCTION  RG   Cardiac Electrophysiology Report      [id number]  pdf   DEVICE TYPE CRT-D       Cardiac Electrophysiology Report [id number] 07:28AM Karen Peralta     Test Name Result Flag Reference   Cardiac Electrophysiology Report      [id number] pdf     Signatures   Electronically signed by : Bruce Jones, ; Dec 27 2017  2:00PM EST                       (Author)    Electronically signed by : Rob Davis DO; Jan 1 2018  8:29PM EST                       (Author)    "

## 2018-03-07 NOTE — PROGRESS NOTES
"  Discussion/Summary  Normal device function      Results/Data  Cardiac Device In Clinic [id number] 01:31PM Maame Lares     Test Name Result Flag Reference   MISCELLANEOUS COMMENT (Report)     DEVICE INTERROGATED IN THE [facility] OFFICE  WOUND CHECK: INCISION CLEAN AND DRY WITH EDGES APPROXIMATED; WOUND CARE AND RESTRICTIONS REVIEWED WITH PATIENT  [id number] VOLTAGE ADEQUATE (11 4 YRS)   = 0%  ALL LEAD PARAMETERS TEST WITHIN NORMAL LIMITS & STABLE  NO SIGNIFICANT HIGH RATE EPISODES  OPTIVOL INITIALIZING  NO PROGRAMMING CHANGES MADE TO DEVICE PARAMETERS  APPROPRIATELY FUNCTIONING ICD   eb   Cardiac Electrophysiology Report      [id number]  pdf   DEVICE TYPE CRT-D       Cardiac Electrophysiology Report [id number] 01:31PM Maame Lares     Test Name Result Flag Reference   Cardiac Electrophysiology Report      [id number] pdf     Signatures   Electronically signed by : Brook Orozco, ; Jun 21 2017  9:50AM EST                       (Author)    Electronically signed by : JAY JAY Houston ; Jun 21 2017  4:55PM EST                       (Author)    "

## 2018-03-07 NOTE — PROGRESS NOTES
"  Discussion/Summary  Normal device function      Results/Data  Cardiac Device In Clinic [id number] 04:54PM Ronen Beardsley     Test Name Result Flag Reference   MISCELLANEOUS COMMENT (Report)     DEVICE INTERROGATED IN THE [place] OFFICE: *ENTRUST & VIDYA ALEART*[id number] VOLTAGE BASILIO AS OF 4/8/17   0%  1 VHR NOTED, NO THERAPIES GIVEN  SIMILIAR MORPHOLOGY TO INTRINSIC  PT ON CARVEDILOL & EF 30% (2008)  ALL LEAD PARAMETERS WITHIN NORMAL LIMITS  BEEPING TURNED OFF  NC   Cardiac Electrophysiology Report      [id number]  pdf   DEVICE TYPE ICD       Cardiac Electrophysiology Report [id number] 04:54PM Ronen Shiv     Test Name Result Flag Reference   Cardiac Electrophysiology Report      [id number]  pdf     Signatures   Electronically signed by : Ahmet Menard, ; May  1 2017  1:03PM EST                       (Author)    Electronically signed by : Sekou Richardson DO; May  1 2017  3:18PM EST                       (Author)    "

## 2018-05-11 DIAGNOSIS — I10 ESSENTIAL HYPERTENSION: Primary | ICD-10-CM

## 2018-05-11 RX ORDER — ENALAPRIL MALEATE 10 MG/1
TABLET ORAL
Qty: 180 TABLET | Refills: 3 | Status: SHIPPED | OUTPATIENT
Start: 2018-05-11 | End: 2019-05-10 | Stop reason: SDUPTHER

## 2018-05-11 RX ORDER — CARVEDILOL 25 MG/1
TABLET ORAL
Qty: 270 TABLET | Refills: 3 | Status: SHIPPED | OUTPATIENT
Start: 2018-05-11 | End: 2019-05-10 | Stop reason: SDUPTHER

## 2018-08-09 DIAGNOSIS — F51.01 PRIMARY INSOMNIA: Primary | ICD-10-CM

## 2018-08-10 RX ORDER — TRAZODONE HYDROCHLORIDE 100 MG/1
100 TABLET ORAL
Qty: 90 TABLET | Refills: 0 | Status: SHIPPED | OUTPATIENT
Start: 2018-08-10 | End: 2018-11-08 | Stop reason: SDUPTHER

## 2018-10-16 DIAGNOSIS — B86 SCABIES: Primary | ICD-10-CM

## 2018-10-16 RX ORDER — PERMETHRIN 50 MG/G
CREAM TOPICAL ONCE
Qty: 60 G | Refills: 1 | Status: SHIPPED | OUTPATIENT
Start: 2018-10-16 | End: 2018-10-16

## 2018-11-08 DIAGNOSIS — F51.01 PRIMARY INSOMNIA: ICD-10-CM

## 2018-11-08 RX ORDER — TRAZODONE HYDROCHLORIDE 100 MG/1
100 TABLET ORAL
Qty: 90 TABLET | Refills: 0 | Status: SHIPPED | OUTPATIENT
Start: 2018-11-08 | End: 2019-02-03 | Stop reason: SDUPTHER

## 2018-12-17 ENCOUNTER — REMOTE DEVICE CLINIC VISIT (OUTPATIENT)
Dept: CARDIOLOGY CLINIC | Facility: CLINIC | Age: 43
End: 2018-12-17
Payer: COMMERCIAL

## 2018-12-17 DIAGNOSIS — I42.0 DILATED CARDIOMYOPATHY (HCC): Primary | ICD-10-CM

## 2018-12-17 DIAGNOSIS — Z95.810 AICD (AUTOMATIC CARDIOVERTER/DEFIBRILLATOR) PRESENT: ICD-10-CM

## 2018-12-17 PROCEDURE — 93295 DEV INTERROG REMOTE 1/2/MLT: CPT | Performed by: INTERNAL MEDICINE

## 2018-12-17 PROCEDURE — 93296 REM INTERROG EVL PM/IDS: CPT | Performed by: INTERNAL MEDICINE

## 2019-02-03 DIAGNOSIS — F51.01 PRIMARY INSOMNIA: ICD-10-CM

## 2019-02-04 RX ORDER — TRAZODONE HYDROCHLORIDE 100 MG/1
100 TABLET ORAL
Qty: 90 TABLET | Refills: 0 | Status: SHIPPED | OUTPATIENT
Start: 2019-02-04 | End: 2019-05-11 | Stop reason: SDUPTHER

## 2019-03-19 ENCOUNTER — REMOTE DEVICE CLINIC VISIT (OUTPATIENT)
Dept: CARDIOLOGY CLINIC | Facility: CLINIC | Age: 44
End: 2019-03-19
Payer: COMMERCIAL

## 2019-03-19 DIAGNOSIS — Z95.810 PRESENCE OF AUTOMATIC CARDIOVERTER/DEFIBRILLATOR (AICD): ICD-10-CM

## 2019-03-19 DIAGNOSIS — I42.0 DILATED CARDIOMYOPATHY (HCC): Primary | ICD-10-CM

## 2019-03-19 PROCEDURE — 93295 DEV INTERROG REMOTE 1/2/MLT: CPT | Performed by: INTERNAL MEDICINE

## 2019-03-19 PROCEDURE — 93296 REM INTERROG EVL PM/IDS: CPT | Performed by: INTERNAL MEDICINE

## 2019-03-19 PROCEDURE — 93297 REM INTERROG DEV EVAL ICPMS: CPT | Performed by: INTERNAL MEDICINE

## 2019-03-20 ENCOUNTER — TELEPHONE (OUTPATIENT)
Dept: CARDIOLOGY CLINIC | Facility: CLINIC | Age: 44
End: 2019-03-20

## 2019-03-20 DIAGNOSIS — I10 ESSENTIAL HYPERTENSION: ICD-10-CM

## 2019-03-20 DIAGNOSIS — I42.0 DILATED CARDIOMYOPATHY (HCC): Primary | ICD-10-CM

## 2019-03-20 NOTE — PROGRESS NOTES
Results for orders placed or performed in visit on 03/19/19   Cardiac EP device report    Narrative    MDT-SINGLE CHAMBER ICD  CARELINK TRANSMISSION: BATTERY ADEQUATE (10 6 YRS)   0%  ALL LEAD PARAMETERS WITHIN NORMAL LIMITS  NO EPISODES  OPTI-VOL FLUID THRESHOLD CROSSED SINCE BEGGINING OF MARCH, 2019  SENT TO NP  NORMAL DEVICE FUNCTION   PL

## 2019-03-20 NOTE — TELEPHONE ENCOUNTER
Denies any CHF symptoms  Does not weigh himself daily  Discussed checking daily weights  If he gains 3 pounds in one day, 5 pounds in one week, or experience worsening shortness of breath or increasing lower leg swelling he is to call office  Advised to monitor sodium/fluid intake as well  Reviewed last o/v note from Dr Alex King  Referred him to CHF  Appt made 5/30 @ EMIL Perez CM has ICD on ACE & BB  Last echo 5/2017   Do you want to get an updated one prior to o/v?

## 2019-03-20 NOTE — TELEPHONE ENCOUNTER
----- Message from Micki Franz sent at 3/20/2019  4:54 PM EDT -----  Regarding: Optivol Threshold Crossed  Bisi Don,       Please review from yesterdays remote transmission  See below    CARELINK TRANSMISSION: BATTERY ADEQUATE (10 6 YRS)   0%  ALL LEAD PARAMETERS WITHIN NORMAL LIMITS  NO EPISODES  OPTI-VOL FLUID THRESHOLD CROSSED SINCE BEGGINING OF MARCH, 2019  SENT TO NP  NORMAL DEVICE FUNCTION   PL  Pat Muse

## 2019-04-09 ENCOUNTER — HOSPITAL ENCOUNTER (OUTPATIENT)
Dept: NON INVASIVE DIAGNOSTICS | Facility: CLINIC | Age: 44
Discharge: HOME | End: 2019-04-09
Payer: COMMERCIAL

## 2019-04-09 DIAGNOSIS — I42.0 DILATED CARDIOMYOPATHY (HCC): ICD-10-CM

## 2019-04-09 DIAGNOSIS — I10 ESSENTIAL HYPERTENSION: ICD-10-CM

## 2019-04-09 PROCEDURE — 93306 TTE W/DOPPLER COMPLETE: CPT

## 2019-04-10 PROCEDURE — 93306 TTE W/DOPPLER COMPLETE: CPT | Performed by: INTERNAL MEDICINE

## 2019-05-10 DIAGNOSIS — I10 ESSENTIAL HYPERTENSION: ICD-10-CM

## 2019-05-10 RX ORDER — CARVEDILOL 25 MG/1
TABLET ORAL
Qty: 270 TABLET | Refills: 3 | Status: SHIPPED | OUTPATIENT
Start: 2019-05-10 | End: 2020-06-19 | Stop reason: SDUPTHER

## 2019-05-10 RX ORDER — ENALAPRIL MALEATE 10 MG/1
TABLET ORAL
Qty: 180 TABLET | Refills: 3 | Status: SHIPPED | OUTPATIENT
Start: 2019-05-10 | End: 2019-05-30 | Stop reason: ALTCHOICE

## 2019-05-11 DIAGNOSIS — F51.01 PRIMARY INSOMNIA: ICD-10-CM

## 2019-05-12 RX ORDER — TRAZODONE HYDROCHLORIDE 100 MG/1
100 TABLET ORAL
Qty: 90 TABLET | Refills: 0 | Status: SHIPPED | OUTPATIENT
Start: 2019-05-12 | End: 2019-08-03 | Stop reason: SDUPTHER

## 2019-05-30 ENCOUNTER — OFFICE VISIT (OUTPATIENT)
Dept: CARDIOLOGY CLINIC | Facility: CLINIC | Age: 44
End: 2019-05-30
Payer: COMMERCIAL

## 2019-05-30 VITALS
BODY MASS INDEX: 37.49 KG/M2 | WEIGHT: 292 LBS | SYSTOLIC BLOOD PRESSURE: 130 MMHG | DIASTOLIC BLOOD PRESSURE: 70 MMHG | OXYGEN SATURATION: 97 % | HEART RATE: 70 BPM

## 2019-05-30 DIAGNOSIS — I50.22 CHRONIC SYSTOLIC HEART FAILURE (HCC): Primary | ICD-10-CM

## 2019-05-30 PROCEDURE — 99214 OFFICE O/P EST MOD 30 MIN: CPT | Performed by: INTERNAL MEDICINE

## 2019-05-30 RX ORDER — CETIRIZINE HYDROCHLORIDE 10 MG/1
10 TABLET ORAL DAILY
COMMUNITY
End: 2020-04-30

## 2019-05-30 RX ORDER — DEXTROMETHORPHAN HYDROBROMIDE AND PROMETHAZINE HYDROCHLORIDE 15; 6.25 MG/5ML; MG/5ML
5 SYRUP ORAL
COMMUNITY
Start: 2017-02-07 | End: 2020-04-30

## 2019-06-05 ENCOUNTER — TELEPHONE (OUTPATIENT)
Dept: CARDIOLOGY CLINIC | Facility: CLINIC | Age: 44
End: 2019-06-05

## 2019-06-05 NOTE — TELEPHONE ENCOUNTER
Pt called and stated that he saw Dr Omer Enrique on 5/30 and his bp medication was changed  Pt did not remember the name of medication but,  pt has been experiencing a lot of itchiness and red dots all over his stomach  Pt also stated that when he has a bowl movement it has been burning  Pt stated he will not be taking the medication again and would like a call back

## 2019-06-05 NOTE — TELEPHONE ENCOUNTER
Please make sure he waits at least 48 hours prior to starting the enalapril again   he needs to let the [facility] watch out  Thanks!

## 2019-06-05 NOTE — TELEPHONE ENCOUNTER
Spoke with pt and explained this to him, he stated his understanding and said he's going to start taking the old med Enalapril tomorrow because he does not want to go without meds  Crescencio Aguilar

## 2019-06-05 NOTE — TELEPHONE ENCOUNTER
Thank you  Let's stop the [facility] and see if his symptoms resolve  If they resolve, then we can switch him to an alternative medication  Please instruct him to call back in 1 week with symptom update  Thanks!

## 2019-06-05 NOTE — TELEPHONE ENCOUNTER
Spoke with pt to let him know, he understood and said he stopped it yesterday around 5pm so he'll start it tomorrow after 5pm to total 48hrs-MS

## 2019-06-18 ENCOUNTER — REMOTE DEVICE CLINIC VISIT (OUTPATIENT)
Dept: CARDIOLOGY CLINIC | Facility: CLINIC | Age: 44
End: 2019-06-18
Payer: COMMERCIAL

## 2019-06-18 DIAGNOSIS — Z95.810 PRESENCE OF AUTOMATIC CARDIOVERTER/DEFIBRILLATOR (AICD): Primary | ICD-10-CM

## 2019-06-18 PROCEDURE — 93297 REM INTERROG DEV EVAL ICPMS: CPT | Performed by: INTERNAL MEDICINE

## 2019-06-18 PROCEDURE — 93295 DEV INTERROG REMOTE 1/2/MLT: CPT | Performed by: INTERNAL MEDICINE

## 2019-06-18 PROCEDURE — 93296 REM INTERROG EVL PM/IDS: CPT | Performed by: INTERNAL MEDICINE

## 2019-08-03 DIAGNOSIS — F51.01 PRIMARY INSOMNIA: ICD-10-CM

## 2019-08-03 RX ORDER — TRAZODONE HYDROCHLORIDE 100 MG/1
TABLET ORAL
Qty: 90 TABLET | Refills: 0 | Status: SHIPPED | OUTPATIENT
Start: 2019-08-03 | End: 2019-11-02 | Stop reason: SDUPTHER

## 2019-10-08 ENCOUNTER — REMOTE DEVICE CLINIC VISIT (OUTPATIENT)
Dept: CARDIOLOGY CLINIC | Facility: CLINIC | Age: 44
End: 2019-10-08
Payer: COMMERCIAL

## 2019-10-08 DIAGNOSIS — Z95.810 PRESENCE OF IMPLANTABLE CARDIOVERTER-DEFIBRILLATOR (ICD): Primary | ICD-10-CM

## 2019-10-08 PROCEDURE — 93296 REM INTERROG EVL PM/IDS: CPT | Performed by: INTERNAL MEDICINE

## 2019-10-08 PROCEDURE — 93295 DEV INTERROG REMOTE 1/2/MLT: CPT | Performed by: INTERNAL MEDICINE

## 2019-10-08 NOTE — PROGRESS NOTES
MDT-SINGLE CHAMBER ICD  CARELINK TRANSMISSION:  BATTERY VOLTAGE ADEQUATE (10 6 YR)    0%   ALL LEAD PARAMETERS WITHIN NORMAL LIMITS   1 VT-NS EPISODE WITH EGM SHOWING PROBABLE NSVT (10 @ 199 BPM)   OPTI-VOL WITHIN NORMAL LIMITS   NORMAL DEVICE FUNCTION   RG

## 2019-11-02 DIAGNOSIS — F51.01 PRIMARY INSOMNIA: ICD-10-CM

## 2019-11-02 RX ORDER — TRAZODONE HYDROCHLORIDE 100 MG/1
TABLET ORAL
Qty: 90 TABLET | Refills: 0 | Status: SHIPPED | OUTPATIENT
Start: 2019-11-02 | End: 2020-01-20

## 2020-01-07 ENCOUNTER — REMOTE DEVICE CLINIC VISIT (OUTPATIENT)
Dept: CARDIOLOGY CLINIC | Facility: CLINIC | Age: 45
End: 2020-01-07

## 2020-01-07 DIAGNOSIS — Z95.810 PRESENCE OF AUTOMATIC CARDIOVERTER/DEFIBRILLATOR (AICD): Primary | ICD-10-CM

## 2020-01-07 PROCEDURE — 93296 REM INTERROG EVL PM/IDS: CPT | Performed by: INTERNAL MEDICINE

## 2020-01-07 PROCEDURE — 93295 DEV INTERROG REMOTE 1/2/MLT: CPT | Performed by: INTERNAL MEDICINE

## 2020-01-07 NOTE — PROGRESS NOTES
Results for orders placed or performed in visit on 01/07/20   Cardiac EP device report    Narrative    MDT-SINGLE CHAMBER ICD  CARELINK TRANSMISSION: BATTERY VOLTAGE ADEQUATE(10 1 YRS)   <0 1%  ALL AVAILABLE LEAD PARAMETERS WITHIN NORMAL LIMITS  1 VT-NS EPISODE, DURATION 7 BEATS @  BPM  NO THERAPY DELIVERED  EF 25% (4/2019 ECHO)  PT TAKES CARVEDILOL  OPTI-VOL WITHIN NORMAL LIMITS  NORMAL DEVICE FUNCTION      EB

## 2020-01-18 DIAGNOSIS — F51.01 PRIMARY INSOMNIA: ICD-10-CM

## 2020-01-20 RX ORDER — TRAZODONE HYDROCHLORIDE 100 MG/1
TABLET ORAL
Qty: 90 TABLET | Refills: 0 | Status: SHIPPED | OUTPATIENT
Start: 2020-01-20 | End: 2020-06-19 | Stop reason: SDUPTHER

## 2020-03-18 ENCOUNTER — TELEPHONE (OUTPATIENT)
Dept: FAMILY MEDICINE CLINIC | Facility: CLINIC | Age: 45
End: 2020-03-18

## 2020-03-18 ENCOUNTER — TELEPHONE (OUTPATIENT)
Dept: CARDIOLOGY CLINIC | Facility: CLINIC | Age: 45
End: 2020-03-18

## 2020-03-18 NOTE — TELEPHONE ENCOUNTER
Patient called in and stated you are his PCP, however we have not record of an appt in the last 3 years for him    He did try to call his cardiologist but he said they don't call him back  He works for [employer]) and he is asking if he can get a letter b/c of his heart condition and he is a higher risk, so he can get paid time off/    At this time he didn't mention and exposure, fever or travel  Please advise

## 2020-03-18 NOTE — TELEPHONE ENCOUNTER
I think the letter should come from his cardiologist (who is his cardiologist, maybe we can try calling them?)  And since he has not been seen in 3 years, not even considered my patient

## 2020-04-08 ENCOUNTER — REMOTE DEVICE CLINIC VISIT (OUTPATIENT)
Dept: CARDIOLOGY CLINIC | Facility: CLINIC | Age: 45
End: 2020-04-08
Payer: COMMERCIAL

## 2020-04-08 ENCOUNTER — TELEPHONE (OUTPATIENT)
Dept: CARDIOLOGY CLINIC | Facility: CLINIC | Age: 45
End: 2020-04-08

## 2020-04-08 DIAGNOSIS — Z95.810 PRESENCE OF AUTOMATIC CARDIOVERTER/DEFIBRILLATOR (AICD): Primary | ICD-10-CM

## 2020-04-08 PROCEDURE — 93295 DEV INTERROG REMOTE 1/2/MLT: CPT | Performed by: INTERNAL MEDICINE

## 2020-04-08 PROCEDURE — 93296 REM INTERROG EVL PM/IDS: CPT | Performed by: INTERNAL MEDICINE

## 2020-04-10 ENCOUNTER — TELEPHONE (OUTPATIENT)
Dept: CARDIOLOGY CLINIC | Facility: CLINIC | Age: 45
End: 2020-04-10

## 2020-04-19 DIAGNOSIS — F51.01 PRIMARY INSOMNIA: ICD-10-CM

## 2020-04-20 RX ORDER — TRAZODONE HYDROCHLORIDE 100 MG/1
TABLET ORAL
Qty: 90 TABLET | Refills: 0 | OUTPATIENT
Start: 2020-04-20

## 2020-04-27 DIAGNOSIS — F51.01 PRIMARY INSOMNIA: ICD-10-CM

## 2020-04-27 RX ORDER — TRAZODONE HYDROCHLORIDE 100 MG/1
100 TABLET ORAL
Qty: 90 TABLET | Refills: 0 | OUTPATIENT
Start: 2020-04-27

## 2020-04-30 ENCOUNTER — TELEMEDICINE (OUTPATIENT)
Dept: CARDIOLOGY CLINIC | Facility: CLINIC | Age: 45
End: 2020-04-30
Payer: COMMERCIAL

## 2020-04-30 VITALS — WEIGHT: 300 LBS | HEIGHT: 74 IN | BODY MASS INDEX: 38.5 KG/M2

## 2020-04-30 DIAGNOSIS — I50.9 CHRONIC CONGESTIVE HEART FAILURE, UNSPECIFIED HEART FAILURE TYPE (HCC): Primary | ICD-10-CM

## 2020-04-30 PROCEDURE — 99214 OFFICE O/P EST MOD 30 MIN: CPT | Performed by: INTERNAL MEDICINE

## 2020-04-30 RX ORDER — ENALAPRIL MALEATE 10 MG/1
10 TABLET ORAL 2 TIMES DAILY
COMMUNITY
Start: 2020-02-02 | End: 2020-06-19 | Stop reason: SDUPTHER

## 2020-05-07 ENCOUNTER — TELEPHONE (OUTPATIENT)
Dept: CARDIOLOGY CLINIC | Facility: CLINIC | Age: 45
End: 2020-05-07

## 2020-05-28 ENCOUNTER — DOCUMENTATION (OUTPATIENT)
Dept: CARDIOLOGY CLINIC | Facility: CLINIC | Age: 45
End: 2020-05-28

## 2020-06-19 DIAGNOSIS — I10 ESSENTIAL HYPERTENSION: ICD-10-CM

## 2020-06-19 DIAGNOSIS — F51.01 PRIMARY INSOMNIA: ICD-10-CM

## 2020-06-19 RX ORDER — ENALAPRIL MALEATE 10 MG/1
10 TABLET ORAL 2 TIMES DAILY
Qty: 180 TABLET | Refills: 3 | Status: SHIPPED | OUTPATIENT
Start: 2020-06-19 | End: 2021-06-21 | Stop reason: SDUPTHER

## 2020-06-19 RX ORDER — TRAZODONE HYDROCHLORIDE 100 MG/1
100 TABLET ORAL
Qty: 90 TABLET | Refills: 3 | Status: SHIPPED | OUTPATIENT
Start: 2020-06-19 | End: 2021-07-12 | Stop reason: SDUPTHER

## 2020-06-19 RX ORDER — CARVEDILOL 25 MG/1
37.5 TABLET ORAL 2 TIMES DAILY
Qty: 270 TABLET | Refills: 3 | Status: SHIPPED | OUTPATIENT
Start: 2020-06-19 | End: 2021-06-21

## 2020-06-19 RX ORDER — CARVEDILOL 25 MG/1
37.5 TABLET ORAL 2 TIMES DAILY
Qty: 270 TABLET | Refills: 3 | Status: SHIPPED | OUTPATIENT
Start: 2020-06-19 | End: 2020-06-19 | Stop reason: SDUPTHER

## 2020-07-08 ENCOUNTER — REMOTE DEVICE CLINIC VISIT (OUTPATIENT)
Dept: CARDIOLOGY CLINIC | Facility: CLINIC | Age: 45
End: 2020-07-08
Payer: COMMERCIAL

## 2020-07-08 DIAGNOSIS — Z95.810 PRESENCE OF AUTOMATIC CARDIOVERTER/DEFIBRILLATOR (AICD): Primary | ICD-10-CM

## 2020-07-08 PROCEDURE — 93296 REM INTERROG EVL PM/IDS: CPT | Performed by: INTERNAL MEDICINE

## 2020-07-08 PROCEDURE — 93295 DEV INTERROG REMOTE 1/2/MLT: CPT | Performed by: INTERNAL MEDICINE

## 2020-08-07 ENCOUNTER — IN-CLINIC DEVICE VISIT (OUTPATIENT)
Dept: CARDIOLOGY CLINIC | Facility: CLINIC | Age: 45
End: 2020-08-07
Payer: COMMERCIAL

## 2020-08-07 DIAGNOSIS — Z95.810 PRESENCE OF IMPLANTABLE CARDIOVERTER-DEFIBRILLATOR (ICD): Primary | ICD-10-CM

## 2020-08-07 PROCEDURE — 93282 PRGRMG EVAL IMPLANTABLE DFB: CPT | Performed by: INTERNAL MEDICINE

## 2020-08-07 NOTE — PROGRESS NOTES
MDT-SINGLE CHAMBER ICD/ACTIVE SYSTEM IS MRI CONDITIONAL   DEVICE INTERROGATED IN THE [place] OFFICE:  BATTERY VOLTAGE ADEQUATE (9 6 YR)    0%   ALL LEAD PARAMETERS WITHIN NORMAL LIMITS   1 VT-NS EPISODE ON 7/12/20, WITH EGM SHOWING NSVT VS RVR (6 @ 214 BPM)   OPTI-VOL WITHIN NORMAL LIMITS   NO PROGRAMMING CHANGES MADE TO DEVICE PARAMETERS   NORMAL DEVICE FUNCTION   RG

## 2020-11-06 ENCOUNTER — TELEMEDICINE (OUTPATIENT)
Dept: CARDIOLOGY CLINIC | Facility: CLINIC | Age: 45
End: 2020-11-06
Payer: COMMERCIAL

## 2020-11-06 DIAGNOSIS — I48.0 PAROXYSMAL A-FIB (HCC): Primary | ICD-10-CM

## 2020-11-06 DIAGNOSIS — I48.0 PAROXYSMAL ATRIAL FIBRILLATION (HCC): Primary | ICD-10-CM

## 2020-11-06 PROCEDURE — 99213 OFFICE O/P EST LOW 20 MIN: CPT | Performed by: PHYSICIAN ASSISTANT

## 2020-11-09 ENCOUNTER — TELEPHONE (OUTPATIENT)
Dept: CARDIOLOGY CLINIC | Facility: CLINIC | Age: 45
End: 2020-11-09

## 2020-11-10 ENCOUNTER — REMOTE DEVICE CLINIC VISIT (OUTPATIENT)
Dept: CARDIOLOGY CLINIC | Facility: CLINIC | Age: 45
End: 2020-11-10
Payer: COMMERCIAL

## 2020-11-10 DIAGNOSIS — Z95.810 PRESENCE OF AUTOMATIC CARDIOVERTER/DEFIBRILLATOR (AICD): Primary | ICD-10-CM

## 2020-11-10 PROCEDURE — 93295 DEV INTERROG REMOTE 1/2/MLT: CPT | Performed by: INTERNAL MEDICINE

## 2020-11-10 PROCEDURE — 93296 REM INTERROG EVL PM/IDS: CPT | Performed by: INTERNAL MEDICINE

## 2021-02-09 ENCOUNTER — REMOTE DEVICE CLINIC VISIT (OUTPATIENT)
Dept: CARDIOLOGY CLINIC | Facility: CLINIC | Age: 46
End: 2021-02-09
Payer: COMMERCIAL

## 2021-02-09 DIAGNOSIS — Z95.810 AICD (AUTOMATIC CARDIOVERTER/DEFIBRILLATOR) PRESENT: Primary | ICD-10-CM

## 2021-02-09 PROCEDURE — 93296 REM INTERROG EVL PM/IDS: CPT | Performed by: INTERNAL MEDICINE

## 2021-02-09 PROCEDURE — 93295 DEV INTERROG REMOTE 1/2/MLT: CPT | Performed by: INTERNAL MEDICINE

## 2021-02-09 NOTE — PROGRESS NOTES
Results for orders placed or performed in visit on 02/09/21   Cardiac EP device report    Narrative    MDT-SINGLE CHAMBER ICD/ACTIVE SYSTEM IS MRI CONDITIONAL  CARELINK TRANSMISSION: BATTERY STATUS "OK"   0%  ALL AVAILABLE LEAD PARAMETERS WITHIN NORMAL LIMITS  1 VHR NOTED; NO THEREAPIES GIVEN  CAN'T EXCLUDE NSVT VS SVT  EF 25% (2019) & ON COREG  OPTI-VOL WITHIN NORMAL LIMITS  NORMAL DEVICE FUNCTION   NC       Current Outpatient Medications:     carvedilol (COREG) 25 mg tablet, Take 1 5 tablets (37 5 mg total) by mouth 2 (two) times a day, Disp: 270 tablet, Rfl: 3    enalapril (VASOTEC) 10 mg tablet, Take 1 tablet (10 mg total) by mouth 2 (two) times a day, Disp: 180 tablet, Rfl: 3    naproxen (NAPROSYN) 250 mg tablet, Take 250 mg by mouth as needed for mild pain, Disp: , Rfl:     rivaroxaban (XARELTO) 20 mg tablet, Take 1 tablet (20 mg total) by mouth daily with dinner, Disp: 30 tablet, Rfl: 11    traZODone (DESYREL) 100 mg tablet, Take 1 tablet (100 mg total) by mouth daily at bedtime, Disp: 90 tablet, Rfl: 3

## 2021-03-10 DIAGNOSIS — Z23 ENCOUNTER FOR IMMUNIZATION: ICD-10-CM

## 2021-03-24 ENCOUNTER — IMMUNIZATIONS (OUTPATIENT)
Dept: FAMILY MEDICINE CLINIC | Facility: HOSPITAL | Age: 46
End: 2021-03-24

## 2021-03-24 DIAGNOSIS — Z23 ENCOUNTER FOR IMMUNIZATION: Primary | ICD-10-CM

## 2021-03-24 PROCEDURE — 91300 SARS-COV-2 / COVID-19 MRNA VACCINE (PFIZER-BIONTECH) 30 MCG: CPT

## 2021-03-24 PROCEDURE — 0001A SARS-COV-2 / COVID-19 MRNA VACCINE (PFIZER-BIONTECH) 30 MCG: CPT

## 2021-04-16 ENCOUNTER — IMMUNIZATIONS (OUTPATIENT)
Dept: FAMILY MEDICINE CLINIC | Facility: HOSPITAL | Age: 46
End: 2021-04-16

## 2021-04-16 DIAGNOSIS — Z23 ENCOUNTER FOR IMMUNIZATION: Primary | ICD-10-CM

## 2021-04-16 PROCEDURE — 0002A SARS-COV-2 / COVID-19 MRNA VACCINE (PFIZER-BIONTECH) 30 MCG: CPT

## 2021-04-16 PROCEDURE — 91300 SARS-COV-2 / COVID-19 MRNA VACCINE (PFIZER-BIONTECH) 30 MCG: CPT

## 2021-05-11 ENCOUNTER — REMOTE DEVICE CLINIC VISIT (OUTPATIENT)
Dept: CARDIOLOGY CLINIC | Facility: CLINIC | Age: 46
End: 2021-05-11
Payer: COMMERCIAL

## 2021-05-11 DIAGNOSIS — Z95.810 PRESENCE OF AUTOMATIC CARDIOVERTER/DEFIBRILLATOR (AICD): Primary | ICD-10-CM

## 2021-05-11 PROCEDURE — 93295 DEV INTERROG REMOTE 1/2/MLT: CPT | Performed by: INTERNAL MEDICINE

## 2021-05-11 PROCEDURE — 93296 REM INTERROG EVL PM/IDS: CPT | Performed by: INTERNAL MEDICINE

## 2021-05-11 NOTE — PROGRESS NOTES
Results for orders placed or performed in visit on 05/11/21   Cardiac EP device report    Narrative    MDT-SINGLE CHAMBER ICD/ACTIVE SYSTEM IS MRI CONDITIONAL  CARELINK TRANSMISSION: BATTERY VOLTAGE ADEQUATE (8 7 YRS)  : <0 1%  ALL AVAILABLE LEAD PARAMETERS WITHIN NORMAL LIMITS  1 VT-NS MONITORED EPISODE W/ EGRM SHOWING NSVT vs RVR 8 BEATS @ 196 BPM  PT TAKES XARELTO, COREG  EF: 25% (ECHO 4/9/19)  OPTI-VOL WITHIN NORMAL LIMITS  APPROPRIATELY FUNCTIONING ICD    [address] [place] [address]

## 2021-06-21 ENCOUNTER — TELEPHONE (OUTPATIENT)
Dept: CARDIOLOGY CLINIC | Facility: CLINIC | Age: 46
End: 2021-06-21

## 2021-06-21 DIAGNOSIS — I10 ESSENTIAL HYPERTENSION: ICD-10-CM

## 2021-06-21 RX ORDER — ENALAPRIL MALEATE 10 MG/1
10 TABLET ORAL 2 TIMES DAILY
Qty: 60 TABLET | Refills: 0 | Status: SHIPPED | OUTPATIENT
Start: 2021-06-21 | End: 2021-07-12 | Stop reason: SDUPTHER

## 2021-06-21 RX ORDER — CARVEDILOL 25 MG/1
37.5 TABLET ORAL 2 TIMES DAILY
Qty: 270 TABLET | Refills: 0 | Status: SHIPPED | OUTPATIENT
Start: 2021-06-21 | End: 2021-09-15

## 2021-07-12 ENCOUNTER — OFFICE VISIT (OUTPATIENT)
Dept: FAMILY MEDICINE CLINIC | Facility: CLINIC | Age: 46
End: 2021-07-12
Payer: COMMERCIAL

## 2021-07-12 VITALS
DIASTOLIC BLOOD PRESSURE: 80 MMHG | SYSTOLIC BLOOD PRESSURE: 126 MMHG | RESPIRATION RATE: 20 BRPM | HEART RATE: 86 BPM | WEIGHT: 314 LBS | OXYGEN SATURATION: 98 % | HEIGHT: 74 IN | BODY MASS INDEX: 40.3 KG/M2 | TEMPERATURE: 98.5 F

## 2021-07-12 DIAGNOSIS — F51.01 PRIMARY INSOMNIA: ICD-10-CM

## 2021-07-12 DIAGNOSIS — Z00.00 ENCOUNTER FOR MEDICAL EXAMINATION TO ESTABLISH CARE: ICD-10-CM

## 2021-07-12 DIAGNOSIS — I48.0 PAROXYSMAL ATRIAL FIBRILLATION (HCC): ICD-10-CM

## 2021-07-12 DIAGNOSIS — I10 ESSENTIAL HYPERTENSION: Primary | ICD-10-CM

## 2021-07-12 DIAGNOSIS — I50.22 CHRONIC SYSTOLIC HEART FAILURE (HCC): ICD-10-CM

## 2021-07-12 DIAGNOSIS — Z13.220 SCREENING FOR LIPID DISORDERS: ICD-10-CM

## 2021-07-12 PROCEDURE — 99203 OFFICE O/P NEW LOW 30 MIN: CPT | Performed by: PHYSICIAN ASSISTANT

## 2021-07-12 PROCEDURE — 3008F BODY MASS INDEX DOCD: CPT | Performed by: PHYSICIAN ASSISTANT

## 2021-07-12 PROCEDURE — 3725F SCREEN DEPRESSION PERFORMED: CPT | Performed by: PHYSICIAN ASSISTANT

## 2021-07-12 PROCEDURE — 1036F TOBACCO NON-USER: CPT | Performed by: PHYSICIAN ASSISTANT

## 2021-07-12 RX ORDER — TRAZODONE HYDROCHLORIDE 100 MG/1
100 TABLET ORAL
Qty: 90 TABLET | Refills: 3 | Status: SHIPPED | OUTPATIENT
Start: 2021-07-12

## 2021-07-12 RX ORDER — ENALAPRIL MALEATE 10 MG/1
10 TABLET ORAL 2 TIMES DAILY
Qty: 60 TABLET | Refills: 0 | Status: SHIPPED | OUTPATIENT
Start: 2021-07-12 | End: 2021-07-16

## 2021-07-12 NOTE — PROGRESS NOTES
BMI Counseling: Body mass index is 40 32 kg/m²  The BMI is above normal  Nutrition recommendations include decreasing portion sizes, encouraging healthy choices of fruits and vegetables, limiting drinks that contain sugar and reducing intake of saturated and trans fat  Exercise recommendations include exercising 3-5 times per week  Patient referred to weight management due to patient being overweight  Assessment/Plan:    No problem-specific Assessment & Plan notes found for this encounter  Problem List Items Addressed This Visit        Cardiovascular and Mediastinum    Hypertension - Primary    Relevant Medications    enalapril (VASOTEC) 10 mg tablet    Other Relevant Orders    Comprehensive metabolic panel    Paroxysmal atrial fibrillation (HCC)      Other Visit Diagnoses     Primary insomnia        Relevant Medications    traZODone (DESYREL) 100 mg tablet    BMI 40 0-44 9, adult [facility])        Relevant Orders    Ambulatory referral to Weight Management    Chronic systolic heart failure ([facility] [address] )        Encounter for medical examination to establish care        Screening for lipid disorders        Relevant Orders    Lipid panel          Referral placed for medical weight management  Discussed benefit vs risks of [facility] medications      Subjective:      Patient ID: Uche Gordon is a 41 y o  male  45-year-old male presents to reestablish care  Patient overall doing well  Hypertension controlled on Coreg and enalapril  AFib and ICD in place- followed by Cardiology on Xarelto  Would like to discuss weight loss medications as he has gained about 20-30lbs in the last 3 years      The following portions of the patient's history were reviewed and updated as appropriate: allergies, current medications, past family history, past medical history, past social history, past surgical history and problem list     Review of Systems   Constitutional: Negative for chills, fatigue and fever     HENT: Negative for congestion, ear pain, sinus pain, sore throat and trouble swallowing  Eyes: Negative for pain, discharge and redness  Respiratory: Negative for cough, chest tightness, shortness of breath and wheezing  Cardiovascular: Negative for chest pain, palpitations and leg swelling  Gastrointestinal: Negative for abdominal pain, diarrhea, nausea and vomiting  Musculoskeletal: Negative for arthralgias, joint swelling and myalgias  Skin: Negative for rash  Neurological: Negative for dizziness, weakness, numbness and headaches  Objective:      /80 (BP Location: Left arm, Patient Position: Sitting)   Pulse 86   Temp 98 5 °F (36 9 °C)   Resp 20   Ht 6' 2" (1 88 m)   Wt (!) 142 kg (314 lb)   SpO2 98%   BMI 40 32 kg/m²          Physical Exam  Vitals and nursing note reviewed  Constitutional:       General: He is not in acute distress  Appearance: Normal appearance  He is well-developed  Cardiovascular:      Rate and Rhythm: Normal rate and regular rhythm  Pulmonary:      Effort: Pulmonary effort is normal       Breath sounds: Normal breath sounds  Abdominal:      General: Bowel sounds are normal       Palpations: Abdomen is soft  Abdomen is not rigid  Tenderness: There is no abdominal tenderness  There is no guarding or rebound  Negative signs include Odell's sign and McBurney's sign  Hernia: No hernia is present  Skin:     General: Skin is warm and dry

## 2021-07-16 DIAGNOSIS — I10 ESSENTIAL HYPERTENSION: ICD-10-CM

## 2021-07-16 RX ORDER — ENALAPRIL MALEATE 10 MG/1
TABLET ORAL
Qty: 60 TABLET | Refills: 0 | Status: SHIPPED | OUTPATIENT
Start: 2021-07-16 | End: 2021-08-10

## 2021-08-09 ENCOUNTER — TELEPHONE (OUTPATIENT)
Dept: CARDIOLOGY CLINIC | Facility: CLINIC | Age: 46
End: 2021-08-09

## 2021-08-10 DIAGNOSIS — I10 ESSENTIAL HYPERTENSION: ICD-10-CM

## 2021-08-10 RX ORDER — ENALAPRIL MALEATE 10 MG/1
TABLET ORAL
Qty: 180 TABLET | Refills: 1 | Status: SHIPPED | OUTPATIENT
Start: 2021-08-10 | End: 2021-08-15

## 2021-08-14 DIAGNOSIS — I10 ESSENTIAL HYPERTENSION: ICD-10-CM

## 2021-08-15 RX ORDER — ENALAPRIL MALEATE 10 MG/1
TABLET ORAL
Qty: 60 TABLET | Refills: 5 | Status: SHIPPED | OUTPATIENT
Start: 2021-08-15 | End: 2022-05-20

## 2021-08-18 ENCOUNTER — REMOTE DEVICE CLINIC VISIT (OUTPATIENT)
Dept: CARDIOLOGY CLINIC | Facility: CLINIC | Age: 46
End: 2021-08-18
Payer: COMMERCIAL

## 2021-08-18 DIAGNOSIS — Z95.810 PRESENCE OF AUTOMATIC CARDIOVERTER/DEFIBRILLATOR (AICD): Primary | ICD-10-CM

## 2021-08-18 PROCEDURE — 93295 DEV INTERROG REMOTE 1/2/MLT: CPT | Performed by: INTERNAL MEDICINE

## 2021-08-18 PROCEDURE — 93296 REM INTERROG EVL PM/IDS: CPT | Performed by: INTERNAL MEDICINE

## 2021-08-18 NOTE — PROGRESS NOTES
Results for orders placed or performed in visit on 08/18/21   Cardiac EP device report    Narrative    MDT-SINGLE CHAMBER ICD/ACTIVE SYSTEM IS MRI CONDITIONAL  CARELINK TRANSMISSION: BATTERY VOLTAGE ADEQUATE  (7 6 YRS)  <1%  ALL AVAILABLE LEAD PARAMETERS WITHIN NORMAL LIMITS  3 NSVT EPISODES DETECTED 9 BEATS @ 320ms  NO THERAPIES GIVEN  PATIENT IS ON XARELTO AND CARVEDILOL  OPTI-VOL WITHIN NORMAL LIMITS  NORMAL DEVICE FUNCTION  ---HOFF

## 2021-08-28 ENCOUNTER — HOSPITAL ENCOUNTER (OUTPATIENT)
Facility: HOSPITAL | Age: 46
Setting detail: OBSERVATION
Discharge: HOME | End: 2021-08-30
Attending: EMERGENCY MEDICINE | Admitting: STUDENT IN AN ORGANIZED HEALTH CARE EDUCATION/TRAINING PROGRAM
Payer: COMMERCIAL

## 2021-08-28 DIAGNOSIS — R00.2 PALPITATIONS: ICD-10-CM

## 2021-08-28 DIAGNOSIS — R77.8 ELEVATED TROPONIN I LEVEL: Primary | ICD-10-CM

## 2021-08-28 DIAGNOSIS — I48.0 PAROXYSMAL ATRIAL FIBRILLATION (HCC): ICD-10-CM

## 2021-08-28 PROBLEM — R79.89 ELEVATED TROPONIN: Status: ACTIVE | Noted: 2021-08-28

## 2021-08-28 LAB
ANION GAP SERPL CALCULATED.3IONS-SCNC: 11 MMOL/L (ref 4–13)
ATRIAL RATE: 71 BPM
ATRIAL RATE: 85 BPM
BASOPHILS # BLD AUTO: 0.02 THOUSANDS/ΜL (ref 0–0.1)
BASOPHILS NFR BLD AUTO: 1 % (ref 0–1)
BUN SERPL-MCNC: 12 MG/DL (ref 5–25)
CALCIUM SERPL-MCNC: 8.2 MG/DL (ref 8.3–10.1)
CHLORIDE SERPL-SCNC: 106 MMOL/L (ref 100–108)
CO2 SERPL-SCNC: 22 MMOL/L (ref 21–32)
CREAT SERPL-MCNC: 0.92 MG/DL (ref 0.6–1.3)
EOSINOPHIL # BLD AUTO: 0.1 THOUSAND/ΜL (ref 0–0.61)
EOSINOPHIL NFR BLD AUTO: 2 % (ref 0–6)
ERYTHROCYTE [DISTWIDTH] IN BLOOD BY AUTOMATED COUNT: 14.1 % (ref 11.6–15.1)
GFR SERPL CREATININE-BSD FRML MDRD: 100 ML/MIN/1.73SQ M
GLUCOSE SERPL-MCNC: 157 MG/DL (ref 65–140)
HCT VFR BLD AUTO: 43.4 % (ref 36.5–49.3)
HGB BLD-MCNC: 13.8 G/DL (ref 12–17)
IMM GRANULOCYTES # BLD AUTO: 0.01 THOUSAND/UL (ref 0–0.2)
IMM GRANULOCYTES NFR BLD AUTO: 0 % (ref 0–2)
LYMPHOCYTES # BLD AUTO: 1.22 THOUSANDS/ΜL (ref 0.6–4.47)
LYMPHOCYTES NFR BLD AUTO: 30 % (ref 14–44)
MCH RBC QN AUTO: 29.7 PG (ref 26.8–34.3)
MCHC RBC AUTO-ENTMCNC: 31.8 G/DL (ref 31.4–37.4)
MCV RBC AUTO: 93 FL (ref 82–98)
MONOCYTES # BLD AUTO: 0.61 THOUSAND/ΜL (ref 0.17–1.22)
MONOCYTES NFR BLD AUTO: 15 % (ref 4–12)
NEUTROPHILS # BLD AUTO: 2.17 THOUSANDS/ΜL (ref 1.85–7.62)
NEUTS SEG NFR BLD AUTO: 52 % (ref 43–75)
NRBC BLD AUTO-RTO: 0 /100 WBCS
P AXIS: 36 DEGREES
P AXIS: 41 DEGREES
PLATELET # BLD AUTO: 134 THOUSANDS/UL (ref 149–390)
PMV BLD AUTO: 11.9 FL (ref 8.9–12.7)
POTASSIUM SERPL-SCNC: 4.2 MMOL/L (ref 3.5–5.3)
PR INTERVAL: 172 MS
PR INTERVAL: 174 MS
QRS AXIS: 20 DEGREES
QRS AXIS: 31 DEGREES
QRSD INTERVAL: 84 MS
QRSD INTERVAL: 86 MS
QT INTERVAL: 358 MS
QT INTERVAL: 400 MS
QTC INTERVAL: 426 MS
QTC INTERVAL: 434 MS
RBC # BLD AUTO: 4.65 MILLION/UL (ref 3.88–5.62)
SODIUM SERPL-SCNC: 139 MMOL/L (ref 136–145)
T WAVE AXIS: 55 DEGREES
T WAVE AXIS: 73 DEGREES
TROPONIN I SERPL-MCNC: 0.29 NG/ML
TROPONIN I SERPL-MCNC: 1.11 NG/ML
TROPONIN I SERPL-MCNC: 1.12 NG/ML
TROPONIN I SERPL-MCNC: 1.32 NG/ML
TROPONIN I SERPL-MCNC: 1.36 NG/ML
VENTRICULAR RATE: 71 BPM
VENTRICULAR RATE: 85 BPM
WBC # BLD AUTO: 4.13 THOUSAND/UL (ref 4.31–10.16)

## 2021-08-28 PROCEDURE — 85025 COMPLETE CBC W/AUTO DIFF WBC: CPT | Performed by: PHYSICIAN ASSISTANT

## 2021-08-28 PROCEDURE — 99220 PR INITIAL OBSERVATION CARE/DAY 70 MINUTES: CPT | Performed by: STUDENT IN AN ORGANIZED HEALTH CARE EDUCATION/TRAINING PROGRAM

## 2021-08-28 PROCEDURE — 99285 EMERGENCY DEPT VISIT HI MDM: CPT

## 2021-08-28 PROCEDURE — 93005 ELECTROCARDIOGRAM TRACING: CPT

## 2021-08-28 PROCEDURE — 36415 COLL VENOUS BLD VENIPUNCTURE: CPT | Performed by: PHYSICIAN ASSISTANT

## 2021-08-28 PROCEDURE — 84484 ASSAY OF TROPONIN QUANT: CPT

## 2021-08-28 PROCEDURE — 80048 BASIC METABOLIC PNL TOTAL CA: CPT | Performed by: PHYSICIAN ASSISTANT

## 2021-08-28 PROCEDURE — 84484 ASSAY OF TROPONIN QUANT: CPT | Performed by: STUDENT IN AN ORGANIZED HEALTH CARE EDUCATION/TRAINING PROGRAM

## 2021-08-28 PROCEDURE — 93010 ELECTROCARDIOGRAM REPORT: CPT | Performed by: INTERNAL MEDICINE

## 2021-08-28 PROCEDURE — 99285 EMERGENCY DEPT VISIT HI MDM: CPT | Performed by: PHYSICIAN ASSISTANT

## 2021-08-28 RX ORDER — TRAZODONE HYDROCHLORIDE 100 MG/1
100 TABLET ORAL
Status: DISCONTINUED | OUTPATIENT
Start: 2021-08-28 | End: 2021-08-30 | Stop reason: HOSPADM

## 2021-08-28 RX ORDER — ONDANSETRON 2 MG/ML
4 INJECTION INTRAMUSCULAR; INTRAVENOUS EVERY 6 HOURS PRN
Status: DISCONTINUED | OUTPATIENT
Start: 2021-08-28 | End: 2021-08-30 | Stop reason: HOSPADM

## 2021-08-28 RX ORDER — LISINOPRIL 10 MG/1
10 TABLET ORAL
Status: DISCONTINUED | OUTPATIENT
Start: 2021-08-28 | End: 2021-08-29

## 2021-08-28 RX ORDER — ACETAMINOPHEN 325 MG/1
650 TABLET ORAL EVERY 6 HOURS PRN
Status: DISCONTINUED | OUTPATIENT
Start: 2021-08-28 | End: 2021-08-30 | Stop reason: HOSPADM

## 2021-08-28 RX ORDER — CALCIUM CARBONATE 200(500)MG
1000 TABLET,CHEWABLE ORAL DAILY PRN
Status: DISCONTINUED | OUTPATIENT
Start: 2021-08-28 | End: 2021-08-30 | Stop reason: HOSPADM

## 2021-08-28 RX ORDER — LISINOPRIL 10 MG/1
20 TABLET ORAL DAILY
Refills: 5 | Status: DISCONTINUED | OUTPATIENT
Start: 2021-08-28 | End: 2021-08-28

## 2021-08-28 RX ORDER — CARVEDILOL 12.5 MG/1
37.5 TABLET ORAL 2 TIMES DAILY
Status: DISCONTINUED | OUTPATIENT
Start: 2021-08-28 | End: 2021-08-29

## 2021-08-28 RX ORDER — DOCUSATE SODIUM 100 MG/1
100 CAPSULE, LIQUID FILLED ORAL 2 TIMES DAILY
Status: DISCONTINUED | OUTPATIENT
Start: 2021-08-28 | End: 2021-08-30 | Stop reason: HOSPADM

## 2021-08-28 RX ORDER — SENNOSIDES 8.6 MG
1 TABLET ORAL DAILY
Status: DISCONTINUED | OUTPATIENT
Start: 2021-08-28 | End: 2021-08-30 | Stop reason: HOSPADM

## 2021-08-28 RX ORDER — LISINOPRIL 10 MG/1
20 TABLET ORAL
Status: DISCONTINUED | OUTPATIENT
Start: 2021-08-28 | End: 2021-08-28

## 2021-08-28 RX ADMIN — ENOXAPARIN SODIUM 135 MG: 150 INJECTION SUBCUTANEOUS at 15:12

## 2021-08-28 RX ADMIN — TRAZODONE HYDROCHLORIDE 100 MG: 100 TABLET ORAL at 22:20

## 2021-08-28 RX ADMIN — CARVEDILOL 37.5 MG: 12.5 TABLET, FILM COATED ORAL at 19:40

## 2021-08-28 NOTE — ASSESSMENT & PLAN NOTE
· History of paroxysmal afib, presenting with chest palpitations   · On ED arrival, converted back to sinus rhythm  · On coreg, continue   · Continue telemetry  · Reports compliance with home medications

## 2021-08-28 NOTE — ED PROVIDER NOTES
History  Chief Complaint   Patient presents with    Atrial Fibrillation     Pt states "I believe I was in Afib around 0300 and it broke all on its own on the way here "      Patient is a 78-year-old male with a past medical history significant for CHF, atrial fibrillation, hypertension, myocarditis who presents to the emergency department for evaluation of palpitations that began at about 3:00 a m  This morning  Patient states that he felt that he was he who relation  Patient states that the palpitations resolved just prior to arrival to the emergency department  He did not have any chest pain throughout this episode  No shortness of breath  No diaphoresis  No other symptoms at this time  Prior to Admission Medications   Prescriptions Last Dose Informant Patient Reported?  Taking?   carvedilol (COREG) 25 mg tablet 8/28/2021 at Unknown time  No Yes   Sig: Take 1 5 tablets (37 5 mg total) by mouth 2 (two) times a day   enalapril (VASOTEC) 10 mg tablet 8/28/2021 at Unknown time  No Yes   Sig: TAKE 1 TABLET BY MOUTH TWICE A DAY   naproxen (NAPROSYN) 250 mg tablet 8/28/2021 at Unknown time Self Yes Yes   Sig: Take 250 mg by mouth as needed for mild pain   rivaroxaban (XARELTO) 20 mg tablet 8/28/2021 at Unknown time  No Yes   Sig: Take 1 tablet (20 mg total) by mouth daily with dinner   traZODone (DESYREL) 100 mg tablet 8/28/2021 at Unknown time  No Yes   Sig: Take 1 tablet (100 mg total) by mouth daily at bedtime      Facility-Administered Medications: None       Past Medical History:   Diagnosis Date    [facility]fib [facility]) 2020    AICD (automatic cardioverter/defibrillator) present     [facility] [address]     Hypertension     Myocarditis [facility] [address] [facility]     Sleep apnea     Ventricular tachycardia ([facility] [address] [facility]        Past Surgical History:   Procedure Laterality Date    CARDIAC DEFIBRILLATOR PLACEMENT      [place] ESOPHAGOGASTRODUODENOSCOPY TRANSORAL DIAGNOSTIC N/A 3/29/2017    Procedure: ESOPHAGOGASTRODUODENOSCOPY (EGD); Surgeon: Nayeli Stewart MD;  [facility]: [facility]; Service: Gastroenterology    [place] INSJ/RPLCMT PERM DFB W/TRNSVNS LDS 1/DUAL [facility]  N/A 6/2/2017    Procedure: LEAD EXTRACTION/REIMPLANTATION AND ICD GENERATOR CHANGE ;  Surgeon: Audie Menjivar MD;  Location:  MAIN OR;  Service: Cardiology    [place] LAP,CHOLECYSTECTOMY N/A 5/19/2017    Procedure: Joe Killings ;  Surgeon: Umang Rodriguez MD;  Location: [facility];  Service: General    TONSILLECTOMY      TYMPANOSTOMY TUBE PLACEMENT         Family History   Problem Relation Age of Onset    No Known Problems Mother     No Known Problems Father      I have reviewed and agree with the history as documented  E-Cigarette/Vaping    E-Cigarette Use Never User      E-Cigarette/Vaping Substances    Nicotine No     THC No     CBD No     Flavoring No     Other No     Unknown No      Social History     Tobacco Use    Smoking status: Never Smoker    Smokeless tobacco: Never Used   Vaping Use    Vaping Use: Never used   Substance Use Topics    Alcohol use: Yes     Comment: OCCASIONAL    Drug use: No       Review of Systems   Constitutional: Negative for chills, diaphoresis and fever  HENT: Negative for congestion, drooling, facial swelling, nosebleeds, sore throat and voice change  Eyes: Negative for discharge and redness  Respiratory: Negative for cough, choking, chest tightness, shortness of breath and stridor  Cardiovascular: Positive for palpitations  Negative for chest pain  Gastrointestinal: Negative for abdominal pain, diarrhea, nausea and vomiting  Genitourinary: Negative for decreased urine volume, difficulty urinating, dysuria, flank pain, frequency and urgency  Musculoskeletal: Negative for arthralgias, back pain, neck pain and neck stiffness  Skin: Negative for color change, rash and wound     Neurological: Negative for dizziness, syncope, facial asymmetry, weakness, light-headedness, numbness and headaches  Psychiatric/Behavioral: Negative for confusion and suicidal ideas  The patient is not nervous/anxious  All other systems reviewed and are negative  Physical Exam  Physical Exam  Vitals reviewed  Constitutional:       General: He is not in acute distress  Appearance: Normal appearance  He is normal weight  He is not ill-appearing, toxic-appearing or diaphoretic  HENT:      Head: Normocephalic and atraumatic  Right Ear: External ear normal       Left Ear: External ear normal       Mouth/Throat:      Mouth: Mucous membranes are moist       Pharynx: Oropharynx is clear  No oropharyngeal exudate or posterior oropharyngeal erythema  Eyes:      General: No scleral icterus  Right eye: No discharge  Left eye: No discharge  Extraocular Movements: Extraocular movements intact  Conjunctiva/sclera: Conjunctivae normal       Pupils: Pupils are equal, round, and reactive to light  Cardiovascular:      Rate and Rhythm: Normal rate and regular rhythm  Pulses: Normal pulses  Heart sounds: Normal heart sounds  No murmur heard  No friction rub  No gallop  Pulmonary:      Effort: Pulmonary effort is normal  No respiratory distress  Breath sounds: Normal breath sounds  No stridor  No wheezing, rhonchi or rales  Abdominal:      General: Abdomen is flat  Palpations: Abdomen is soft  Tenderness: There is no abdominal tenderness  There is no right CVA tenderness, left CVA tenderness, guarding or rebound  Musculoskeletal:         General: Normal range of motion  Cervical back: Normal range of motion and neck supple  Right lower leg: No edema  Left lower leg: No edema  Skin:     General: Skin is warm and dry  Capillary Refill: Capillary refill takes less than 2 seconds  Neurological:      General: No focal deficit present  Mental Status: He is alert and oriented to person, place, and time     Psychiatric: Mood and Affect: Mood normal          Behavior: Behavior normal          Vital Signs  ED Triage Vitals   Temperature Pulse Respirations Blood Pressure SpO2   08/28/21 0521 08/28/21 0521 08/28/21 0521 08/28/21 0521 08/28/21 0521   98 6 °F (37 °C) 84 18 121/71 97 %      Temp Source Heart Rate Source Patient Position - Orthostatic VS BP Location FiO2 (%)   08/30/21 1500 08/28/21 0521 08/28/21 0521 08/28/21 0521 --   Oral Monitor Lying Right arm       Pain Score       08/29/21 0358       No Pain           Vitals:    08/29/21 1515 08/30/21 0732 08/30/21 1318 08/30/21 1500   BP: 114/73 109/72 105/60 110/68   Pulse:    69   Patient Position - Orthostatic VS:             Visual Acuity      ED Medications  Medications   midazolam (VERSED) injection (1 mg Intravenous Given 8/30/21 1240)   fentanyl citrate (PF) 100 MCG/2ML (50 mcg Intravenous Given 8/30/21 1240)   lidocaine 1% buffered (1 mL Infiltration Given 8/30/21 1240)   nitroGLYcerin (TRIDIL) 50 mg in 250 mL infusion (premix) (200 mcg Intra-arterial Given 8/30/21 1245)   heparin (porcine) injection (5,000 Units Intravenous Given 8/30/21 1245)   iodixanol (VISIPAQUE) 320 MG/ML injection (35 mL Intravenous Given 8/30/21 1251)       Diagnostic Studies  Results Reviewed     Procedure Component Value Units Date/Time    Basic metabolic panel [[id number]] Collected: 08/29/21 0441    Lab Status: Final result Specimen: Blood from Arm, Left Updated: 08/29/21 0501     Sodium 140 mmol/L      Potassium 4 3 mmol/L      Chloride 105 mmol/L      CO2 25 mmol/L      ANION GAP 10 mmol/L      BUN 14 mg/dL      Creatinine 0 93 mg/dL      Glucose 115 mg/dL      Calcium 8 4 mg/dL      eGFR 99 ml/min/1 73sq m     Narrative:      [place] guidelines for Chronic Kidney Disease (CKD):     Stage 1 with normal or high GFR (GFR > 90 mL/min/1 73 square meters)    Stage 2 Mild CKD (GFR = 60-89 mL/min/1 73 square meters)    Stage 3A Moderate CKD (GFR = 45-59 mL/min/1 73 square meters)    Stage 3B Moderate CKD (GFR = 30-44 mL/min/1 73 square meters)    Stage 4 Severe CKD (GFR = 15-29 mL/min/1 73 square meters)    Stage 5 End Stage CKD (GFR <15 mL/min/1 73 square meters)  Note: GFR calculation is accurate only with a steady state creatinine    Magnesium [[id number]]  (Normal) Collected: 08/29/21 0441    Lab Status: Final result Specimen: Blood from Arm, Left Updated: 08/29/21 0501     Magnesium 1 9 mg/dL     CBC (With Platelets) [[id number]]  (Normal) Collected: 08/29/21 0441    Lab Status: Final result Specimen: Blood from Arm, Left Updated: 08/29/21 0458     WBC 5 51 Thousand/uL      RBC 4 87 Million/uL      Hemoglobin 14 6 g/dL      Hematocrit 44 7 %      MCV 92 fL      MCH 30 0 pg      MCHC 32 7 g/dL      RDW 14 2 %      Platelets 524 Thousands/uL      MPV 11 2 fL     Troponin I [[id number]]  (Abnormal) Collected: 08/28/21 2144    Lab Status: Final result Specimen: Blood Updated: 08/28/21 2210     Troponin I 1 11 ng/mL     Troponin I [[id number]]  (Abnormal) Collected: 08/28/21 1831    Lab Status: Final result Specimen: Blood from Arm, Right Updated: 08/28/21 1854     Troponin I 1 32 ng/mL     Troponin I [[id number]]  (Abnormal) Collected: 08/28/21 1314    Lab Status: Final result Specimen: Blood from Arm, Right Updated: 08/28/21 1337     Troponin I 1 36 ng/mL     Troponin I [[id number]]  (Abnormal) Collected: 08/28/21 0941    Lab Status: Final result Specimen: Blood from Arm, Left Updated: 08/28/21 1021     Troponin I 1 12 ng/mL     CBC and differential [[id number]]  (Abnormal) Collected: 08/28/21 0601    Lab Status: Final result Specimen: Blood from Arm, Left Updated: 08/28/21 0715     WBC 4 13 Thousand/uL      RBC 4 65 Million/uL      Hemoglobin 13 8 g/dL      Hematocrit 43 4 %      MCV 93 fL      MCH 29 7 pg      MCHC 31 8 g/dL      RDW 14 1 %      MPV 11 9 fL      Platelets 938 Thousands/uL      nRBC 0 /100 WBCs      Neutrophils Relative 52 %      Immat GRANS % 0 % Lymphocytes Relative 30 %      Monocytes Relative 15 %      Eosinophils Relative 2 %      Basophils Relative 1 %      Neutrophils Absolute 2 17 Thousands/µL      Immature Grans Absolute 0 01 Thousand/uL      Lymphocytes Absolute 1 22 Thousands/µL      Monocytes Absolute 0 61 Thousand/µL      Eosinophils Absolute 0 10 Thousand/µL      Basophils Absolute 0 02 Thousands/µL     Basic metabolic panel [[id number]]  (Abnormal) Collected: 08/28/21 0601    Lab Status: Final result Specimen: Blood from Arm, Left Updated: 08/28/21 0645     Sodium 139 mmol/L      Potassium 4 2 mmol/L      Chloride 106 mmol/L      CO2 22 mmol/L      ANION GAP 11 mmol/L      BUN 12 mg/dL      Creatinine 0 92 mg/dL      Glucose 157 mg/dL      Calcium 8 2 mg/dL      eGFR 100 ml/min/1 73sq m     Narrative:      [place] guidelines for Chronic Kidney Disease (CKD):     Stage 1 with normal or high GFR (GFR > 90 mL/min/1 73 square meters)    Stage 2 Mild CKD (GFR = 60-89 mL/min/1 73 square meters)    Stage 3A Moderate CKD (GFR = 45-59 mL/min/1 73 square meters)    Stage 3B Moderate CKD (GFR = 30-44 mL/min/1 73 square meters)    Stage 4 Severe CKD (GFR = 15-29 mL/min/1 73 square meters)    Stage 5 End Stage CKD (GFR <15 mL/min/1 73 square meters)  Note: GFR calculation is accurate only with a steady state creatinine    Troponin I [[id number]]  (Abnormal) Collected: 08/28/21 0601    Lab Status: Final result Specimen: Blood from Arm, Left Updated: 08/28/21 0634     Troponin I 0 29 ng/mL                  No orders to display              Procedures  ECG 12 Lead Documentation Only    Date/Time: 8/28/2021 6:09 AM  Performed by: Hailee Robbins PA-C  Authorized by: Hailee Robbins PA-C     Indications / Diagnosis:  Palpitations  ECG reviewed by me, the ED Provider: yes    Patient location:  ED  Previous ECG:     Previous ECG:  Compared to current    Similarity:  No change  Rate:     ECG rate:  85    ECG rate assessment: normal Rhythm:     Rhythm: sinus rhythm    Ectopy:     Ectopy: none    QRS:     QRS axis:  Normal    QRS intervals:  Normal  Conduction:     Conduction: normal    ST segments:     ST segments:  Normal  T waves:     T waves: normal    Comments:      Normal sinus rhythm with a rate 85 beats per minute  No ST or T-wave changes  Normal EKG  ED Course                             SBIRT 20yo+      Most Recent Value   SBIRT (24 yo +)   In order to provide better care to our patients, we are screening all of our patients for alcohol and drug use  Would it be okay to ask you these screening questions? Yes Filed at: 08/28/2021 1519   Initial Alcohol Screen: US AUDIT-C    1  How often do you have a drink containing alcohol?  0 Filed at: 08/28/2021 1519   2  How many drinks containing alcohol do you have on a typical day you are drinking? 0 Filed at: 08/28/2021 1519   3a  Male UNDER 65: How often do you have five or more drinks on one occasion? 0 Filed at: 08/28/2021 1519   3b  FEMALE Any Age, or MALE 65+: How often do you have 4 or more drinks on one occassion? 0 Filed at: 08/28/2021 1519   Audit-C Score  0 Filed at: 08/28/2021 1519   LASHA: How many times in the past year have you    Used an illegal drug or used a prescription medication for non-medical reasons? Never Filed at: 08/28/2021 1519                    MDM  Number of Diagnoses or Management Options  Elevated troponin I level  Palpitations  Diagnosis management comments: Patient is a 43-year-old male with a past medical history significant for CHF, atrial fibrillation, hypertension, myocarditis who presents to the emergency department for evaluation of palpitations that began at about 3:00 a m  This morning  Patient states that he felt that he was he who relation  Patient states that the palpitations resolved just prior to arrival to the emergency department  He did not have any chest pain throughout this episode  No shortness of breath    No diaphoresis  No other symptoms at this time  Patient appears comfortable in bed  He is not any acute distress  His vital signs are normal   He is not in atrial fibrillation upon arrival to the emergency department  EKG revealed normal sinus rhythm with a rate of 85 beats per minute  No ST or T-wave changes  Physical exam unremarkable  Heart is regular rate and rhythm  Lungs are clear to auscultation bilaterally  Abdomen is soft and nontender  Patient's troponin level is elevated at 0 29  Remainder of labs unremarkable  Patient has elevated troponin without chest pain or shortness of breath  Heparin not initiated at this time  I reached at this time for admission  Some agreed to admit the patient  Patient is agreeable with this plan  Patient stable at this time  Amount and/or Complexity of Data Reviewed  Clinical lab tests: ordered and reviewed    Patient Progress  Patient progress: stable      Disposition  Final diagnoses:   Elevated troponin I level   Palpitations     Time reflects when diagnosis was documented in both MDM as applicable and the Disposition within this note     Time User Action Codes Description Comment    8/28/2021  7:49 AM Robles Manner Add [R77 8] Elevated troponin I level     8/28/2021  7:49 AM Robles Manner Add [R00 2] Palpitations     8/28/2021  1:08 PM Townsend Chill Add [I48 0] Paroxysmal atrial fibrillation [facility])       ED Disposition     ED Disposition Condition Date/Time Comment    Admit Stable Sat Aug 28, 2021  7:48 AM Case was discussed with TYREL and the patient's admission status was agreed to be Admission Status: observation status to the service of Dr Stella Crisostomo           Follow-up Information    None         Discharge Medication List as of 8/30/2021  5:18 PM      CONTINUE these medications which have NOT CHANGED    Details   carvedilol (COREG) 25 mg tablet Take 1 5 tablets (37 5 mg total) by mouth 2 (two) times a day, Starting Mon 6/21/2021, Normal enalapril (VASOTEC) 10 mg tablet TAKE 1 TABLET BY MOUTH TWICE A DAY, Normal      naproxen (NAPROSYN) 250 mg tablet Take 250 mg by mouth as needed for mild pain, Historical Med      rivaroxaban (XARELTO) 20 mg tablet Take 1 tablet (20 mg total) by mouth daily with dinner, Starting Fri 11/6/2020, Normal      traZODone (DESYREL) 100 mg tablet Take 1 tablet (100 mg total) by mouth daily at bedtime, Starting Mon 7/12/2021, Normal           No discharge procedures on file      PDMP Review     None          ED Provider  Electronically Signed by           Milo Majano PA-C  08/31/21 1503

## 2021-08-28 NOTE — H&P
[address] [place] [address]  H&P- Talisha Murguia 1975, 40 y o  male MRN: [id number]  Unit/Bed#: ED 21 Encounter: [id number]  Primary Care Provider: Levi Guadarrama PA-C   Date and time admitted to hospital: 8/28/2021  5:14 AM    * Paroxysmal atrial fibrillation (HCC)  Assessment & Plan  · History of paroxysmal afib, presenting with chest palpitations   · On ED arrival, converted back to sinus rhythm  · On coreg, continue   · Continue telemetry  · Reports compliance with home medications    Elevated troponin  Assessment & Plan  · Continue trending troponin  · Non chest pain associated elevation in troponin  · Likely in setting of paroxysmal afib episode   · Adjusted home xarelto to lovenox at therapeutic dose given upward trend of troponin  · Consult cardiology     Hypertension  Assessment & Plan  · Adequately controlled, continue home meds     Dilated cardiomyopathy ([facility] [address] )  Assessment & Plan  · Secondary to viral myocarditis  · Follows with cardiology as an outpatient     ICD (implantable cardioverter-defibrillator) lead failure  Assessment & Plan  · Secondary to viral myocarditis leading to EF 30%     VTE Pharmacologic Prophylaxis: VTE Score: 3 Moderate Risk (Score 3-4) - Pharmacological DVT Prophylaxis Ordered: enoxaparin (Lovenox)  Code Status: Level 1 - Full Code   Discussion with family: Patient declined call to   Anticipated Length of Stay: Patient will be admitted on an observation basis with an anticipated length of stay of less than 2 midnights secondary to elevated troponin  Total Time for Visit, including Counseling / Coordination of Care: 30 minutes Greater than 50% of this total time spent on direct patient counseling and coordination of care      Chief Complaint: chest palpitations     History of Present Illness:  Talisha Murguia is a 40 y o  male with a PMH of dilated cardiomyopathy with EF 30% status post AICD, pAfib, HTN, morbid obesity who presents with chest palpitations  On ED arrival patients heart rate converted to sinus rhythm  Was noted to have elevated troponin and admitted to medicine  Review of Systems:  Review of Systems   Constitutional: Negative for chills and fever  HENT: Negative for ear pain and sore throat  Eyes: Negative for pain and visual disturbance  Respiratory: Negative for cough and shortness of breath  Cardiovascular: Positive for palpitations  Negative for chest pain  Gastrointestinal: Negative for abdominal pain and vomiting  Genitourinary: Negative for dysuria and hematuria  Musculoskeletal: Negative for arthralgias and back pain  Skin: Negative for color change and rash  Neurological: Negative for seizures and syncope  All other systems reviewed and are negative  Past Medical and Surgical History:   Past Medical History:   Diagnosis Date    [facility]-fib [facility]) 2020    AICD (automatic cardioverter/defibrillator) present     [facility]     Hypertension     Myocard[facility] [address] [facility]     Sleep apnea     Ventricular tachycardia ([facility] [address] [facility]        Past Surgical History:   Procedure Laterality Date    CARDIAC DEFIBRILLATOR PLACEMENT      [place] ESOPHAGOGASTRODUODENOSCOPY TRANSORAL DIAGNOSTIC N/A 3/29/2017    Procedure: ESOPHAGOGASTRODUODENOSCOPY (EGD); Surgeon: Jami Antonio MD;  Location: [facility]; Service: Gastroenterology    [place] INSJ/RPLCMT PERM DFB W/TRNSVNS LDS 1/DUAL [facility]  N/A 6/2/2017    Procedure: LEAD EXTRACTION/REIMPLANTATION AND ICD GENERATOR CHANGE ;  Surgeon: Jesica Abreu MD;  Location:  MAIN OR;  Service: Cardiology    [place] LAP,CHOLECYSTECTOMY N/A 5/19/2017    Procedure: [place] Damon ;  Surgeon: Wendie Simpson MD;  Location: [facility];  Service: General    TONSILLECTOMY      TYMPANOSTOMY TUBE PLACEMENT         Meds/Allergies:  Prior to Admission medications    Medication Sig Start Date End Date Taking?  Authorizing Provider   carvedilol (COREG) 25 mg tablet Take 1 5 tablets (37 5 mg total) by mouth 2 (two) times a day 6/21/21  Yes Ariel Pittman MD   enalapril (VASOTEC) 10 mg tablet TAKE 1 TABLET BY MOUTH TWICE A DAY 8/15/21  Yes Ariel Pittman MD   naproxen (NAPROSYN) 250 mg tablet Take 250 mg by mouth as needed for mild pain   Yes Historical Provider, MD   rivaroxaban (XARELTO) 20 mg tablet Take 1 tablet (20 mg total) by mouth daily with dinner 11/6/20  Yes Yannick Chencielny [id number]   traZODone (DESYREL) 100 mg tablet Take 1 tablet (100 mg total) by mouth daily at bedtime 7/12/21  Yes Mary Beth Pickett PA-C     I have reviewed home medications with patient personally  Allergies: Allergies   Allergen Reactions    Vicodin [Hydrocodone-Acetaminophen] Other (See Comments)     "i get nasty"       Social History:  Marital Status: Single   Occupation: na  Patient Pre-hospital Living Situation: Home  Patient Pre-hospital Level of Mobility: walks  Patient Pre-hospital Diet Restrictions: heart healthy   Substance Use History:   Social History     Substance and Sexual Activity   Alcohol Use Yes    Comment: OCCASIONAL     Social History     Tobacco Use   Smoking Status Never Smoker   Smokeless Tobacco Never Used     Social History     Substance and Sexual Activity   Drug Use No       Family History:  Family History   Problem Relation Age of Onset    No Known Problems Mother     No Known Problems Father        Physical Exam:     Vitals:   Blood Pressure: 121/66 (08/28/21 1825)  Pulse: 87 (08/28/21 1845)  Temperature: 98 6 °F (37 °C) (08/28/21 0521)  Respirations: (!) 25 (08/28/21 1845)  Weight - Scale: (!) 144 kg (318 lb 2 oz) (08/28/21 0521)  SpO2: 98 % (08/28/21 1825)    Physical Exam  Vitals and nursing note reviewed  Constitutional:       Appearance: Normal appearance  He is obese  HENT:      Head: Normocephalic and atraumatic  Right Ear: External ear normal       Left Ear: External ear normal       Nose: No congestion or rhinorrhea        Mouth/Throat: Mouth: Mucous membranes are dry  Pharynx: Oropharynx is clear  Eyes:      General:         Right eye: No discharge  Left eye: No discharge  Cardiovascular:      Rate and Rhythm: Normal rate and regular rhythm  Pulmonary:      Effort: Pulmonary effort is normal  No respiratory distress  Abdominal:      General: Abdomen is flat  Palpations: Abdomen is soft  Musculoskeletal:      Cervical back: Normal range of motion and neck supple  Right lower leg: No edema  Left lower leg: No edema  Skin:     General: Skin is warm and dry  Neurological:      General: No focal deficit present  Mental Status: He is alert  Mental status is at baseline  Psychiatric:         Mood and Affect: Mood normal          Behavior: Behavior normal           Additional Data:     Lab Results:  Results from last 7 days   Lab Units 08/28/21  0601   WBC Thousand/uL 4 13*   HEMOGLOBIN g/dL 13 8   HEMATOCRIT % 43 4   PLATELETS Thousands/uL 134*   NEUTROS PCT % 52   LYMPHS PCT % 30   MONOS PCT % 15*   EOS PCT % 2     Results from last 7 days   Lab Units 08/28/21  0601   SODIUM mmol/L 139   POTASSIUM mmol/L 4 2   CHLORIDE mmol/L 106   CO2 mmol/L 22   BUN mg/dL 12   CREATININE mg/dL 0 92   ANION GAP mmol/L 11   CALCIUM mg/dL 8 2*   GLUCOSE RANDOM mg/dL 157*                       Imaging: No pertinent imaging reviewed  No orders to display       EKG and Other Studies Reviewed on Admission:   · EKG: NSR  HR 71     ** Please Note: This note has been constructed using a voice recognition system   **

## 2021-08-28 NOTE — ASSESSMENT & PLAN NOTE
· Continue trending troponin  · Non chest pain associated elevation in troponin  · Likely in setting of paroxysmal afib episode   · Adjusted home xarelto to lovenox at therapeutic dose given upward trend of troponin  · Consult cardiology

## 2021-08-29 LAB
ANION GAP SERPL CALCULATED.3IONS-SCNC: 10 MMOL/L (ref 4–13)
APTT PPP: 38 SECONDS (ref 23–37)
ATRIAL RATE: 76 BPM
ATRIAL RATE: 79 BPM
BUN SERPL-MCNC: 14 MG/DL (ref 5–25)
CALCIUM SERPL-MCNC: 8.4 MG/DL (ref 8.3–10.1)
CHLORIDE SERPL-SCNC: 105 MMOL/L (ref 100–108)
CO2 SERPL-SCNC: 25 MMOL/L (ref 21–32)
CREAT SERPL-MCNC: 0.93 MG/DL (ref 0.6–1.3)
ERYTHROCYTE [DISTWIDTH] IN BLOOD BY AUTOMATED COUNT: 14 % (ref 11.6–15.1)
ERYTHROCYTE [DISTWIDTH] IN BLOOD BY AUTOMATED COUNT: 14.2 % (ref 11.6–15.1)
GFR SERPL CREATININE-BSD FRML MDRD: 99 ML/MIN/1.73SQ M
GLUCOSE SERPL-MCNC: 115 MG/DL (ref 65–140)
HCT VFR BLD AUTO: 44.7 % (ref 36.5–49.3)
HCT VFR BLD AUTO: 45.6 % (ref 36.5–49.3)
HGB BLD-MCNC: 14.6 G/DL (ref 12–17)
HGB BLD-MCNC: 15 G/DL (ref 12–17)
INR PPP: 1 (ref 0.84–1.19)
MAGNESIUM SERPL-MCNC: 1.9 MG/DL (ref 1.6–2.6)
MCH RBC QN AUTO: 30 PG (ref 26.8–34.3)
MCH RBC QN AUTO: 30 PG (ref 26.8–34.3)
MCHC RBC AUTO-ENTMCNC: 32.7 G/DL (ref 31.4–37.4)
MCHC RBC AUTO-ENTMCNC: 32.9 G/DL (ref 31.4–37.4)
MCV RBC AUTO: 91 FL (ref 82–98)
MCV RBC AUTO: 92 FL (ref 82–98)
P AXIS: 148 DEGREES
P AXIS: 150 DEGREES
PLATELET # BLD AUTO: 152 THOUSANDS/UL (ref 149–390)
PLATELET # BLD AUTO: 170 THOUSANDS/UL (ref 149–390)
PMV BLD AUTO: 11.2 FL (ref 8.9–12.7)
PMV BLD AUTO: 11.2 FL (ref 8.9–12.7)
POTASSIUM SERPL-SCNC: 4.3 MMOL/L (ref 3.5–5.3)
PR INTERVAL: 158 MS
PR INTERVAL: 160 MS
PROTHROMBIN TIME: 13.4 SECONDS (ref 11.6–14.5)
QRS AXIS: -25 DEGREES
QRS AXIS: -26 DEGREES
QRSD INTERVAL: 88 MS
QRSD INTERVAL: 88 MS
QT INTERVAL: 388 MS
QT INTERVAL: 394 MS
QTC INTERVAL: 443 MS
QTC INTERVAL: 444 MS
RBC # BLD AUTO: 4.87 MILLION/UL (ref 3.88–5.62)
RBC # BLD AUTO: 5 MILLION/UL (ref 3.88–5.62)
SODIUM SERPL-SCNC: 140 MMOL/L (ref 136–145)
T WAVE AXIS: 146 DEGREES
T WAVE AXIS: 152 DEGREES
VENTRICULAR RATE: 76 BPM
VENTRICULAR RATE: 79 BPM
WBC # BLD AUTO: 5.51 THOUSAND/UL (ref 4.31–10.16)
WBC # BLD AUTO: 6.79 THOUSAND/UL (ref 4.31–10.16)

## 2021-08-29 PROCEDURE — 99205 OFFICE O/P NEW HI 60 MIN: CPT | Performed by: INTERNAL MEDICINE

## 2021-08-29 PROCEDURE — 80048 BASIC METABOLIC PNL TOTAL CA: CPT | Performed by: STUDENT IN AN ORGANIZED HEALTH CARE EDUCATION/TRAINING PROGRAM

## 2021-08-29 PROCEDURE — 85610 PROTHROMBIN TIME: CPT | Performed by: PHYSICIAN ASSISTANT

## 2021-08-29 PROCEDURE — 99225 PR SBSQ OBSERVATION CARE/DAY 25 MINUTES: CPT | Performed by: STUDENT IN AN ORGANIZED HEALTH CARE EDUCATION/TRAINING PROGRAM

## 2021-08-29 PROCEDURE — 85027 COMPLETE CBC AUTOMATED: CPT | Performed by: PHYSICIAN ASSISTANT

## 2021-08-29 PROCEDURE — 93010 ELECTROCARDIOGRAM REPORT: CPT | Performed by: INTERNAL MEDICINE

## 2021-08-29 PROCEDURE — 85027 COMPLETE CBC AUTOMATED: CPT | Performed by: STUDENT IN AN ORGANIZED HEALTH CARE EDUCATION/TRAINING PROGRAM

## 2021-08-29 PROCEDURE — 85730 THROMBOPLASTIN TIME PARTIAL: CPT | Performed by: PHYSICIAN ASSISTANT

## 2021-08-29 PROCEDURE — 83735 ASSAY OF MAGNESIUM: CPT | Performed by: STUDENT IN AN ORGANIZED HEALTH CARE EDUCATION/TRAINING PROGRAM

## 2021-08-29 PROCEDURE — 93005 ELECTROCARDIOGRAM TRACING: CPT

## 2021-08-29 RX ORDER — HEPARIN SODIUM 1000 [USP'U]/ML
2000 INJECTION, SOLUTION INTRAVENOUS; SUBCUTANEOUS
Status: DISCONTINUED | OUTPATIENT
Start: 2021-08-29 | End: 2021-08-30

## 2021-08-29 RX ORDER — HEPARIN SODIUM 10000 [USP'U]/100ML
3-20 INJECTION, SOLUTION INTRAVENOUS
Status: DISCONTINUED | OUTPATIENT
Start: 2021-08-29 | End: 2021-08-30

## 2021-08-29 RX ORDER — HEPARIN SODIUM 1000 [USP'U]/ML
4000 INJECTION, SOLUTION INTRAVENOUS; SUBCUTANEOUS
Status: DISCONTINUED | OUTPATIENT
Start: 2021-08-29 | End: 2021-08-30

## 2021-08-29 RX ORDER — CARVEDILOL 12.5 MG/1
37.5 TABLET ORAL 2 TIMES DAILY
Status: DISCONTINUED | OUTPATIENT
Start: 2021-08-29 | End: 2021-08-30 | Stop reason: HOSPADM

## 2021-08-29 RX ADMIN — CARVEDILOL 37.5 MG: 12.5 TABLET, FILM COATED ORAL at 10:47

## 2021-08-29 RX ADMIN — CARVEDILOL 37.5 MG: 12.5 TABLET, FILM COATED ORAL at 17:56

## 2021-08-29 RX ADMIN — TRAZODONE HYDROCHLORIDE 100 MG: 100 TABLET ORAL at 21:12

## 2021-08-29 RX ADMIN — HEPARIN SODIUM 11.1 UNITS/KG/HR: 10000 INJECTION, SOLUTION INTRAVENOUS at 21:11

## 2021-08-29 RX ADMIN — ENOXAPARIN SODIUM 135 MG: 150 INJECTION SUBCUTANEOUS at 10:00

## 2021-08-29 NOTE — PLAN OF CARE
Problem: CARDIOVASCULAR - ADULT  Goal: Maintains optimal cardiac output and hemodynamic stability  Description: INTERVENTIONS:  - Monitor I/O, vital signs and rhythm  - Monitor for S/S and trends of decreased cardiac output  - Administer and titrate ordered vasoactive medications to optimize hemodynamic stability  - Assess quality of pulses, skin color and temperature  - Assess for signs of decreased coronary artery perfusion  - Instruct patient to report change in severity of symptoms  Outcome: Progressing     Problem: CARDIOVASCULAR - ADULT  Goal: Absence of cardiac dysrhythmias or at baseline rhythm  Description: INTERVENTIONS:  - Continuous cardiac monitoring, vital signs, obtain 12 lead EKG if ordered  - Administer antiarrhythmic and heart rate control medications as ordered  - Monitor electrolytes and administer replacement therapy as ordered  Outcome: Progressing     Problem: DISCHARGE PLANNING  Goal: Discharge to home or other facility with appropriate resources  Description: INTERVENTIONS:  - Identify barriers to discharge w/patient and caregiver  - Arrange for needed discharge resources and transportation as appropriate  - Identify discharge learning needs (meds, wound care, etc )  - Arrange for interpretive services to assist at discharge as needed  - Refer to Case Management Department for coordinating discharge planning if the patient needs post-hospital services based on physician/advanced practitioner order or complex needs related to functional status, cognitive ability, or social support system  Outcome: Progressing

## 2021-08-29 NOTE — UTILIZATION REVIEW
Initial Clinical Review    Admission: Date/Time/Statement:   Admission Orders (From admission, onward)     Ordered        08/28/21 0749  Place in Observation  Once                   Orders Placed This Encounter   Procedures    Place in Observation     Standing Status:   Standing     Number of Occurrences:   1     Order Specific Question:   Level of Care     Answer:   Med Surg [16]     ED Arrival Information     Expected Arrival Acuity    - 8/28/2021 05:08 Urgent         Means of arrival Escorted by Service Admission type    Walk-In Family Member Hospitalist Urgent         Arrival complaint    chest pain;dizziness        Chief Complaint   Patient presents with    Atrial Fibrillation     Pt states "I believe I was in Afib around 0300 and it broke all on its own on the way here "      Initial Presentation:   40 y o  male with a PMH of dilated cardiomyopathy with EF 30% status post AICD, pAfib, HTN, morbid obesity who presents with chest palpitations  On ED arrival patients heart rate converted to sinus rhythm  Was noted to have elevated troponin and admitted to medicine    Admit Observation level of care for;    Paroxysmal atrial fibrillation (HCC)  Assessment & Plan  · History of paroxysmal afib, presenting with chest palpitations   · On ED arrival, converted back to sinus rhythm  · On coreg, continue   · Continue telemetry  · Reports compliance with home medications     Elevated troponin  Assessment & Plan  · Continue trending troponin  · Non chest pain associated elevation in troponin  · Likely in setting of paroxysmal afib episode   · Adjusted home xarelto to lovenox at therapeutic dose given upward trend of troponin  · Consult cardiology      Hypertension  Assessment & Plan  · Adequately controlled, continue home meds      Dilated cardiomyopathy ([facility] [address] )  Assessment & Plan  · Secondary to viral myocarditis  · Follows with cardiology as an outpatient      ICD (implantable cardioverter-defibrillator) lead failure  Assessment & Plan  · Secondary to viral myocarditis leading to EF 30%       ED Triage Vitals   Temperature Pulse Respirations Blood Pressure SpO2   08/28/21 0521 08/28/21 0521 08/28/21 0521 08/28/21 0521 08/28/21 0521   98 6 °F (37 °C) 84 18 121/71 97 %      Temp src Heart Rate Source Patient Position - Orthostatic VS BP Location FiO2 (%)   -- 08/28/21 0521 08/28/21 0521 08/28/21 0521 --    Monitor Lying Right arm       Pain Score       08/29/21 0358       No Pain          Wt Readings from Last 1 Encounters:   08/29/21 (!) 144 kg (318 lb 2 oz)     Additional Vital Signs:   08/29/21 07:29:09  98 5 °F (36 9 °C)  63  20  110/71  84  96 %  --  --   08/29/21 04:32:47  98 2 °F (36 8 °C)  59  18  107/68  81  97 %  None (Room air)  --   08/29/21 0015  --  53Abnormal   16  110/64  --  --  --  --   08/28/21 2217  --  65  18  104/59  --  98 %  None (Room air)  Lying   08/28/21 1845  --  87  25Abnormal   --  --  --  --  --   08/28/21 1825  --  70  18  121/66  --  98 %  None (Room air)  Lying   08/28/21 1800  --  64  18  --  --  --  --  --   08/28/21 1500  --  73  18  108/57  --  97 %  None (Room air)       Pertinent Labs/Diagnostic Test Results:   8/28  EKG - NSR      Results from last 7 days   Lab Units 08/29/21  0441 08/28/21  0601   WBC Thousand/uL 5 51 4 13*   HEMOGLOBIN g/dL 14 6 13 8   HEMATOCRIT % 44 7 43 4   PLATELETS Thousands/uL 152 134*   NEUTROS ABS Thousands/µL  --  2 17         Results from last 7 days   Lab Units 08/29/21  0441 08/28/21  0601   SODIUM mmol/L 140 139   POTASSIUM mmol/L 4 3 4 2   CHLORIDE mmol/L 105 106   CO2 mmol/L 25 22   ANION GAP mmol/L 10 11   BUN mg/dL 14 12   CREATININE mg/dL 0 93 0 92   EGFR ml/min/1 73sq m 99 100   CALCIUM mg/dL 8 4 8 2*   MAGNESIUM mg/dL 1 9  --              Results from last 7 days   Lab Units 08/29/21  0441 08/28/21  0601   GLUCOSE RANDOM mg/dL 115 157*       Results from last 7 days   Lab Units 08/28/21  2144 08/28/21  1831 08/28/21  1314 08/28/21  0941 08/28/21  0601   TROPONIN I ng/mL 1 11* 1 32* 1 36* 1 12* 0 29*     ED Treatment:   Medication Administration from 08/28/2021 0508 to 08/29/2021 0354       Date/Time Order Dose Route Action     08/28/2021 1940 carvedilol (COREG) tablet 37 5 mg 37 5 mg Oral Given     08/28/2021 1512 enoxaparin (LOVENOX) subcutaneous injection 135 mg 135 mg Subcutaneous Given     08/28/2021 2220 traZODone (DESYREL) tablet 100 mg 100 mg Oral Given        Past Medical History:   Diagnosis Date    A-fib ([facility] ) 2020    AICD (automatic cardioverter/defibrillator) present     [facility]est[facility] failure) [facility])     Hypertension     Myocarditis ([facility] )     Sleep apnea     Ventricular tachycardia ([facility] )      Present on Admission:   ICD (implantable cardioverter-defibrillator) lead failure   Dilated cardiomyopathy ([place] [address] )   Hypertension   Paroxysmal atrial fibrillation ([facility])      Admitting Diagnosis: Atrial fibrillation ([facility]) [I48 91]  Palpitations [R00 2]  Paroxysmal atrial fibrillation (HCC) [I48 0]  Elevated troponin I level [R77 8]  Age/Sex: 40 y o  male     Admission Orders:  Scheduled Medications:  carvedilol, 37 5 mg, Oral, BID  docusate sodium, 100 mg, Oral, BID  enoxaparin, 1 mg/kg, Subcutaneous, Q12H SAMUEL  lisinopril, 10 mg, Oral, HS  senna, 1 tablet, Oral, Daily  traZODone, 100 mg, Oral, HS      Continuous IV Infusions:     PRN Meds:  acetaminophen, 650 mg, Oral, Q6H PRN  calcium carbonate, 1,000 mg, Oral, Daily PRN  ondansetron, 4 mg, Intravenous, Q6H PRN        IP CONSULT TO CARDIOLOGY    Network Utilization Review Department  ATTENTION: Please call with any questions or concerns to [phone] and carefully listen to the prompts so that you are directed to the right person  All voicemails are confidential   Emmanuel Ashley all requests for admission clinical reviews, approved or denied determinations and any other requests to dedicated fax number below belonging to the campus where the patient is receiving treatment   List of dedicated fax numbers for the Facilities:  [address] DENIALS (Administrative/Medical Necessity) [phone]   [address]  (Maternity/NICU/Pediatrics) 261 [facility] [address] [place] [address] [address] Eyal Fernandez [phone] [place] [address] [address] [facility] [address] [phone]     ,ed

## 2021-08-29 NOTE — CONSULTS
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  4  Chronic systolic CHF  5  Hypertension   6  CONCHIS - not tolerant of CPAP    Troponin trend: 0 29/1 12/1 36/1 32/1  11  Discontinue Lovenox, start IV heparin tonight  Check TTE to assess LVEF, regional wall motion and/or valvular abnormalities  Patient had remote cath 15 years ago which was normal   Elevated troponins could be secondary to AF, however given remote cath, will analyze further with cardiac cath to assess coronary anatomy   Risks versus benefits of the procedure were discussed with patient to include 1% chance of bleeding, infection, stroke and/or MI patient is agreeable to proceed  NPO at midnight  Consider cardiac MRI if cardiac cath is normal   Telemetry shows patient is NSR; HR controlled on Coreg 25mg BID  Hold Lisinopril pending cardiac cath  Further recommendation will based upon above results    History of Present Illness   Physician Requesting Consult: Vikas Villarreal MD  Reason for Consult / Principal Problem: AF  HPI: Kristen Cedillo is a 40y o  year old male with history of chronic systolic CHF, nonischemic cardiomyopathy with LVEF 30% s/p [employer] ICD, paroxysmal atrial fibrillation on Xarelto, hypertension and obstructive sleep apnea who presents with episodes of palpitations overnight  Patient reports history of atrial fibrillation in the past and symptoms were consistent with the same and decided come to the ED for further evaluation  Shortly before coming to the ED he reports symptoms have resolved  Patient has been maintaining normal sinus rhythm on telemetry since arrival and has been symptom free  Of note, patient reports he was at a party last night had approximately 5-6 drinks of alcohol   Denies recent illness, fever chills  Patient had his COVID vaccine  On arrival his serial troponins were found to be elevated peaked at 1 36  Inpatient consult to Cardiology  Consult performed by: Alpa Mcmahan PA-C  Consult ordered by: Bertrand Mayorga MD        Review of Systems   Constitutional: Negative for appetite change, chills, diaphoresis, fatigue and fever  Respiratory: Negative for cough, chest tightness and shortness of breath  Cardiovascular: Positive for palpitations  Negative for chest pain and leg swelling  Gastrointestinal: Negative for diarrhea, nausea and vomiting  Endocrine: Negative for cold intolerance and heat intolerance  Genitourinary: Negative for difficulty urinating, dysuria and enuresis  Musculoskeletal: Negative for arthralgias, back pain and gait problem  Allergic/Immunologic: Negative for environmental allergies and food allergies  Neurological: Negative for dizziness, facial asymmetry and headaches  Hematological: Negative for adenopathy  Does not bruise/bleed easily  Psychiatric/Behavioral: Negative for agitation, behavioral problems and confusion  Historical Information   Past Medical History:   Diagnosis Date    A-fib [facility]) 2020    AICD (automatic cardioverter/defibrillator) present     [facility]     Hypertension     Myocarditis ([facility] )     Sleep apnea     Ventricular tachycardia ([facility] )      Past Surgical History:   Procedure Laterality Date    CARDIAC DEFIBRILLATOR PLACEMENT      TX ESOPHAGOGASTRODUODENOSCOPY TRANSORAL DIAGNOSTIC N/A 3/29/2017    Procedure: ESOPHAGOGASTRODUODENOSCOPY (EGD); Surgeon: Lucy Small MD;  Location: [facility];   Service: Gastroenterology    TX INSJ/RPLCMT PERM [facility] W/TRNSVNS LDS 1/DUAL [facility]  N/A 6/2/2017    Procedure: LEAD EXTRACTION/REIMPLANTATION AND ICD GENERATOR CHANGE ;  Surgeon: Tati Del Rio MD;  Location:  MAIN OR;  Service: Cardiology    TX LAP,CHOLECYSTECTOMY N/A 5/19/2017    Procedure: Ami Dy CHOLECYSTECTOMY ;  Surgeon: Yossi Juarez MD;  Location: [facility] MAIN [facility];  Service: General    TONSILLECTOMY      TYMPANOSTOMY TUBE PLACEMENT       Social History     Substance and Sexual Activity   Alcohol Use Yes    Comment: OCCASIONAL     Social History     Substance and Sexual Activity   Drug Use No     Social History     Tobacco Use   Smoking Status Never Smoker   Smokeless Tobacco Never Used       Family History:   Family History   Problem Relation Age of Onset    No Known Problems Mother     No Known Problems Father        Meds/Allergies   current meds:   Current Facility-Administered Medications   Medication Dose Route Frequency    acetaminophen (TYLENOL) tablet 650 mg  650 mg Oral Q6H PRN    calcium carbonate (TUMS) chewable tablet 1,000 mg  1,000 mg Oral Daily PRN    carvedilol (COREG) tablet 37 5 mg  37 5 mg Oral BID    docusate sodium (COLACE) capsule 100 mg  100 mg Oral BID    heparin (porcine) 25,000 units in 0 45% NaCl 250 mL infusion (premix)  3-20 Units/kg/hr (Order-Specific) Intravenous Titrated    heparin (porcine) injection 2,000 Units  2,000 Units Intravenous Q1H PRN    heparin (porcine) injection 4,000 Units  4,000 Units Intravenous Q1H PRN    ondansetron (ZOFRAN) injection 4 mg  4 mg Intravenous Q6H PRN    senna (SENOKOT) tablet 8 6 mg  1 tablet Oral Daily    traZODone (DESYREL) tablet 100 mg  100 mg Oral HS     Allergies   Allergen Reactions    Vicodin [Hydrocodone-Acetaminophen] Other (See Comments)     "i get nasty"       Objective   Vitals: Blood pressure 110/71, pulse 63, temperature 98 5 °F (36 9 °C), resp  rate 20, height 6' 2" (1 88 m), weight (!) 144 kg (318 lb 2 oz), SpO2 96 %  , Body mass index is 40 84 kg/m² ,   Orthostatic Blood Pressures      Most Recent Value   Blood Pressure  110/71 filed at 08/29/2021 0729   Patient Position - Orthostatic VS  Lying filed at 08/28/2021 4613          Systolic (49TCT), AOH:480 , Min:104 , LZV:629     Diastolic (03LIL), ILT:06, Min:57, Max:71      No intake or output data in the 24 hours ending 08/29/21 1143    Invasive Devices     Peripheral Intravenous Line            Peripheral IV 08/29/21 Left Forearm <1 day                    Physical Exam:  GEN: Alert and oriented x 3, in no acute distress  Well appearing and well nourished  HEENT: Sclera anicteric, conjunctivae pink, mucous membranes moist  Oropharynx clear  NECK: Supple, no carotid bruits, no significant JVD  Trachea midline, no thyromegaly  HEART: Regular rhythm, normal S1 and S2, no murmurs, clicks, gallops or rubs  PMI nondisplaced, no thrills  LUNGS: Clear to auscultation bilaterally; no wheezes, rales, or rhonchi  No increased work of breathing or signs of respiratory distress  ABDOMEN: Soft, nontender, nondistended, normoactive bowel sounds  EXTREMITIES: Skin warm and well perfused, no clubbing, cyanosis, or edema  NEURO: No focal findings  Normal speech  Mood and affect normal    SKIN: Normal without suspicious lesions on exposed skin        Lab Results:     Troponins:   Results from last 7 days   Lab Units 08/28/21  2144 08/28/21  1831 08/28/21  1314   TROPONIN I ng/mL 1 11* 1 32* 1 36*       CBC with diff:   Results from last 7 days   Lab Units 08/29/21  0441 08/28/21  0601   WBC Thousand/uL 5 51 4 13*   HEMOGLOBIN g/dL 14 6 13 8   HEMATOCRIT % 44 7 43 4   MCV fL 92 93   PLATELETS Thousands/uL 152 134*   MCH pg 30 0 29 7   MCHC g/dL 32 7 31 8   RDW % 14 2 14 1   MPV fL 11 2 11 9   NRBC AUTO /100 WBCs  --  0         CMP:   Results from last 7 days   Lab Units 08/29/21  0441 08/28/21  0601   POTASSIUM mmol/L 4 3 4 2   CHLORIDE mmol/L 105 106   CO2 mmol/L 25 22   BUN mg/dL 14 12   CREATININE mg/dL 0 93 0 92   CALCIUM mg/dL 8 4 8 2*   EGFR ml/min/1 73sq m 99 100

## 2021-08-29 NOTE — PROGRESS NOTES
[address] [facility]  Progress Note - Leonid Cheung 1975, 40 y o  male MRN: [id number]  Unit/Bed#: -Luis Encounter: [id number]  Primary Care Provider: Maame Benton PA-C   Date and time admitted to hospital: 8/28/2021  5:14 AM    * Paroxysmal atrial fibrillation (HCC)  Assessment & Plan  · History of paroxysmal afib, presenting with chest palpitations   · On ED arrival, converted back to sinus rhythm  · On coreg, continue   · Continue telemetry  · Reports compliance with home medications    Elevated troponin  Assessment & Plan  · Continue trending troponin  · Non chest pain associated elevation in troponin  · Likely in setting of paroxysmal afib episode   · Adjusted home xarelto to lovenox at therapeutic dose given upward trend of troponin   · Now transitioned to heparin gtt  · Cardiology consulted, plan for cardiac cath    Hypertension  Assessment & Plan  · Adequately controlled, hold home dose vasotec equivalent    Dilated cardiomyopathy ([address] )  Assessment & Plan  · Secondary to viral myocarditis  · Follows with cardiology as an outpatient     ICD (implantable cardioverter-defibrillator) lead failure  Assessment & Plan  · Secondary to viral myocarditis leading to EF 30%           VTE Pharmacologic Prophylaxis: VTE Score: 3 Moderate Risk (Score 3-4) - Pharmacological DVT Prophylaxis Ordered: heparin drip  Patient Centered Rounds: I performed bedside rounds with nursing staff today  Discussions with Specialists or Other Care Team Provider: cardiology       Time Spent for Care: 30 minutes  More than 50% of total time spent on counseling and coordination of care as described above  Current Length of Stay: 0 day(s)  Current Patient Status: Observation   Certification Statement: The patient will continue to require additional inpatient hospital stay due to cardiac cath  Discharge Plan: Anticipate discharge in 24-48 hrs to home      Code Status: Level 1 - Full Code    Subjective:   No chest pain or chest palpitations  No shortness of breath  Appetite is good  Objective:     Vitals:   Temp (24hrs), Av 4 °F (36 9 °C), Min:98 2 °F (36 8 °C), Max:98 5 °F (36 9 °C)    Temp:  [98 2 °F (36 8 °C)-98 5 °F (36 9 °C)] 98 5 °F (36 9 °C)  HR:  [53-87] 63  Resp:  [16-26] 20  BP: (104-121)/(57-71) 110/71  SpO2:  [96 %-98 %] 96 %  Body mass index is 40 84 kg/m²  Input and Output Summary (last 24 hours):   No intake or output data in the 24 hours ending 21 1251    Physical Exam:   Physical Exam  Vitals and nursing note reviewed  Constitutional:       Appearance: Normal appearance  HENT:      Head: Normocephalic and atraumatic  Right Ear: External ear normal       Left Ear: External ear normal       Nose: No congestion or rhinorrhea  Mouth/Throat:      Mouth: Mucous membranes are dry  Pharynx: Oropharynx is clear  Eyes:      General:         Right eye: No discharge  Left eye: No discharge  Cardiovascular:      Rate and Rhythm: Normal rate and regular rhythm  Pulmonary:      Effort: Pulmonary effort is normal  No respiratory distress  Abdominal:      General: Abdomen is flat  Palpations: Abdomen is soft  Musculoskeletal:      Cervical back: Normal range of motion and neck supple  Right lower leg: No edema  Left lower leg: No edema  Skin:     General: Skin is warm and dry  Neurological:      General: No focal deficit present  Mental Status: He is alert  Mental status is at baseline     Psychiatric:         Mood and Affect: Mood normal          Behavior: Behavior normal           Additional Data:     Labs:  Results from last 7 days   Lab Units 21  1203 21  0601   WBC Thousand/uL 6 79 4 13*   HEMOGLOBIN g/dL 15 0 13 8   HEMATOCRIT % 45 6 43 4   PLATELETS Thousands/uL 170 134*   NEUTROS PCT %  --  52   LYMPHS PCT %  --  30   MONOS PCT %  --  15*   EOS PCT %  --  2     Results from last 7 days   Lab Units 08/29/21  0441   SODIUM mmol/L 140   POTASSIUM mmol/L 4 3   CHLORIDE mmol/L 105   CO2 mmol/L 25   BUN mg/dL 14   CREATININE mg/dL 0 93   ANION GAP mmol/L 10   CALCIUM mg/dL 8 4   GLUCOSE RANDOM mg/dL 115     Results from last 7 days   Lab Units 08/29/21  1203   INR  1 00                   Lines/Drains:  Invasive Devices     Peripheral Intravenous Line            Peripheral IV 08/29/21 Left Forearm <1 day                      Imaging: No pertinent imaging reviewed  Recent Cultures (last 7 days):         Last 24 Hours Medication List:   Current Facility-Administered Medications   Medication Dose Route Frequency Provider Last Rate    acetaminophen  650 mg Oral Q6H PRN Larance Hashimoto, MD      calcium carbonate  1,000 mg Oral Daily PRN Larance Hashimoto, MD      carvedilol  37 5 mg Oral BID Roque Andrade PA-C      docusate sodium  100 mg Oral BID Larance Hashimoto, MD      heparin (porcine)  3-20 Units/kg/hr (Order-Specific) Intravenous Titrated Ruby Miguel PA-C      heparin (porcine)  2,000 Units Intravenous Q1H PRN Ruby Miguel PA-C      heparin (porcine)  4,000 Units Intravenous Q1H PRN Ruby Miguel PA-C      ondansetron  4 mg Intravenous Q6H PRN Larance Hashimoto, MD      senna  1 tablet Oral Daily Larance Hashimoto, MD      traZODone  100 mg Oral HS VILMA Santos          Today, Patient Was Seen By: Martínez Carmichael MD    **Please Note: This note may have been constructed using a voice recognition system  **

## 2021-08-29 NOTE — ASSESSMENT & PLAN NOTE
· Continue trending troponin  · Non chest pain associated elevation in troponin  · Likely in setting of paroxysmal afib episode   · Adjusted home xarelto to lovenox at therapeutic dose given upward trend of troponin   · Now transitioned to heparin gtt  · Cardiology consulted, plan for cardiac cath

## 2021-08-30 ENCOUNTER — APPOINTMENT (OUTPATIENT)
Dept: INTERVENTIONAL RADIOLOGY/VASCULAR | Facility: HOSPITAL | Age: 46
End: 2021-08-30
Payer: COMMERCIAL

## 2021-08-30 ENCOUNTER — TELEPHONE (OUTPATIENT)
Dept: CARDIOLOGY CLINIC | Facility: CLINIC | Age: 46
End: 2021-08-30

## 2021-08-30 VITALS
DIASTOLIC BLOOD PRESSURE: 68 MMHG | BODY MASS INDEX: 40.43 KG/M2 | HEIGHT: 74 IN | SYSTOLIC BLOOD PRESSURE: 110 MMHG | HEART RATE: 69 BPM | WEIGHT: 315 LBS | RESPIRATION RATE: 18 BRPM | TEMPERATURE: 98 F | OXYGEN SATURATION: 97 %

## 2021-08-30 LAB
ANION GAP SERPL CALCULATED.3IONS-SCNC: 9 MMOL/L (ref 4–13)
APTT PPP: 54 SECONDS (ref 23–37)
APTT PPP: 62 SECONDS (ref 23–37)
BUN SERPL-MCNC: 11 MG/DL (ref 5–25)
CALCIUM SERPL-MCNC: 8.4 MG/DL (ref 8.3–10.1)
CHLORIDE SERPL-SCNC: 105 MMOL/L (ref 100–108)
CO2 SERPL-SCNC: 26 MMOL/L (ref 21–32)
CREAT SERPL-MCNC: 0.97 MG/DL (ref 0.6–1.3)
ERYTHROCYTE [DISTWIDTH] IN BLOOD BY AUTOMATED COUNT: 14.2 % (ref 11.6–15.1)
GFR SERPL CREATININE-BSD FRML MDRD: 94 ML/MIN/1.73SQ M
GLUCOSE SERPL-MCNC: 117 MG/DL (ref 65–140)
HCT VFR BLD AUTO: 44.1 % (ref 36.5–49.3)
HGB BLD-MCNC: 14.3 G/DL (ref 12–17)
INR PPP: 1.05 (ref 0.84–1.19)
MCH RBC QN AUTO: 29.7 PG (ref 26.8–34.3)
MCHC RBC AUTO-ENTMCNC: 32.4 G/DL (ref 31.4–37.4)
MCV RBC AUTO: 92 FL (ref 82–98)
PLATELET # BLD AUTO: 141 THOUSANDS/UL (ref 149–390)
PMV BLD AUTO: 11.4 FL (ref 8.9–12.7)
POTASSIUM SERPL-SCNC: 3.7 MMOL/L (ref 3.5–5.3)
PROTHROMBIN TIME: 13.2 SECONDS (ref 11.6–14.5)
RBC # BLD AUTO: 4.81 MILLION/UL (ref 3.88–5.62)
SODIUM SERPL-SCNC: 140 MMOL/L (ref 136–145)
WBC # BLD AUTO: 5.25 THOUSAND/UL (ref 4.31–10.16)

## 2021-08-30 PROCEDURE — C1894 INTRO/SHEATH, NON-LASER: HCPCS | Performed by: PHYSICIAN ASSISTANT

## 2021-08-30 PROCEDURE — 93005 ELECTROCARDIOGRAM TRACING: CPT

## 2021-08-30 PROCEDURE — 99217 PR OBSERVATION CARE DISCHARGE MANAGEMENT: CPT | Performed by: STUDENT IN AN ORGANIZED HEALTH CARE EDUCATION/TRAINING PROGRAM

## 2021-08-30 PROCEDURE — 85610 PROTHROMBIN TIME: CPT | Performed by: PHYSICIAN ASSISTANT

## 2021-08-30 PROCEDURE — C1769 GUIDE WIRE: HCPCS | Performed by: PHYSICIAN ASSISTANT

## 2021-08-30 PROCEDURE — 80048 BASIC METABOLIC PNL TOTAL CA: CPT | Performed by: PHYSICIAN ASSISTANT

## 2021-08-30 PROCEDURE — 85027 COMPLETE CBC AUTOMATED: CPT | Performed by: PHYSICIAN ASSISTANT

## 2021-08-30 PROCEDURE — 93454 CORONARY ARTERY ANGIO S&I: CPT | Performed by: INTERNAL MEDICINE

## 2021-08-30 PROCEDURE — 93458 L HRT ARTERY/VENTRICLE ANGIO: CPT | Performed by: PHYSICIAN ASSISTANT

## 2021-08-30 PROCEDURE — 99213 OFFICE O/P EST LOW 20 MIN: CPT | Performed by: INTERNAL MEDICINE

## 2021-08-30 PROCEDURE — 99152 MOD SED SAME PHYS/QHP 5/>YRS: CPT | Performed by: INTERNAL MEDICINE

## 2021-08-30 PROCEDURE — 85730 THROMBOPLASTIN TIME PARTIAL: CPT | Performed by: STUDENT IN AN ORGANIZED HEALTH CARE EDUCATION/TRAINING PROGRAM

## 2021-08-30 PROCEDURE — 99152 MOD SED SAME PHYS/QHP 5/>YRS: CPT | Performed by: PHYSICIAN ASSISTANT

## 2021-08-30 RX ORDER — SODIUM CHLORIDE 9 MG/ML
50 INJECTION, SOLUTION INTRAVENOUS CONTINUOUS
Status: DISCONTINUED | OUTPATIENT
Start: 2021-08-30 | End: 2021-08-30 | Stop reason: HOSPADM

## 2021-08-30 RX ORDER — HEPARIN SODIUM 1000 [USP'U]/ML
INJECTION, SOLUTION INTRAVENOUS; SUBCUTANEOUS CODE/TRAUMA/SEDATION MEDICATION
Status: COMPLETED | OUTPATIENT
Start: 2021-08-30 | End: 2021-08-30

## 2021-08-30 RX ORDER — LIDOCAINE WITH 8.4% SOD BICARB 0.9%(10ML)
SYRINGE (ML) INJECTION CODE/TRAUMA/SEDATION MEDICATION
Status: COMPLETED | OUTPATIENT
Start: 2021-08-30 | End: 2021-08-30

## 2021-08-30 RX ORDER — NITROGLYCERIN 20 MG/100ML
INJECTION INTRAVENOUS CODE/TRAUMA/SEDATION MEDICATION
Status: COMPLETED | OUTPATIENT
Start: 2021-08-30 | End: 2021-08-30

## 2021-08-30 RX ORDER — MIDAZOLAM HYDROCHLORIDE 2 MG/2ML
INJECTION, SOLUTION INTRAMUSCULAR; INTRAVENOUS CODE/TRAUMA/SEDATION MEDICATION
Status: COMPLETED | OUTPATIENT
Start: 2021-08-30 | End: 2021-08-30

## 2021-08-30 RX ORDER — FENTANYL CITRATE 50 UG/ML
INJECTION, SOLUTION INTRAMUSCULAR; INTRAVENOUS CODE/TRAUMA/SEDATION MEDICATION
Status: COMPLETED | OUTPATIENT
Start: 2021-08-30 | End: 2021-08-30

## 2021-08-30 RX ORDER — LISINOPRIL 10 MG/1
10 TABLET ORAL DAILY
Status: DISCONTINUED | OUTPATIENT
Start: 2021-08-31 | End: 2021-08-30 | Stop reason: HOSPADM

## 2021-08-30 RX ADMIN — HEPARIN SODIUM 5000 UNITS: 1000 INJECTION INTRAVENOUS; SUBCUTANEOUS at 12:45

## 2021-08-30 RX ADMIN — IODIXANOL 35 ML: 320 INJECTION, SOLUTION INTRAVASCULAR at 12:51

## 2021-08-30 RX ADMIN — Medication 1 ML: at 12:40

## 2021-08-30 RX ADMIN — FENTANYL CITRATE 50 MCG: 50 INJECTION, SOLUTION INTRAMUSCULAR; INTRAVENOUS at 12:40

## 2021-08-30 RX ADMIN — NITROGLYCERIN 200 MCG: 20 INJECTION INTRAVENOUS at 12:45

## 2021-08-30 RX ADMIN — SODIUM CHLORIDE 50 ML/HR: 0.9 INJECTION, SOLUTION INTRAVENOUS at 14:46

## 2021-08-30 RX ADMIN — MIDAZOLAM HYDROCHLORIDE 1 MG: 1 INJECTION, SOLUTION INTRAMUSCULAR; INTRAVENOUS at 12:40

## 2021-08-30 RX ADMIN — CARVEDILOL 37.5 MG: 12.5 TABLET, FILM COATED ORAL at 17:13

## 2021-08-30 RX ADMIN — CARVEDILOL 37.5 MG: 12.5 TABLET, FILM COATED ORAL at 09:12

## 2021-08-30 NOTE — UTILIZATION REVIEW
Continued Stay Review    Date: 8/30                   Current Patient Class: OBS   Current Level of Care: tele     HPI:46 y o  male initially admitted on 8/28 w/ afib and elevated trop on heparin gtt     Assessment/Plan:     Vital Signs:     Pertinent Labs/Diagnostic Results:     Results from last 7 days   Lab Units 08/30/21  0412 08/29/21  1203 08/29/21  0441 08/28/21  0601   WBC Thousand/uL 5 25 6 79 5 51 4 13*   HEMOGLOBIN g/dL 14 3 15 0 14 6 13 8   HEMATOCRIT % 44 1 45 6 44 7 43 4   PLATELETS Thousands/uL 141* 170 152 134*   NEUTROS ABS Thousands/µL  --   --   --  2 17     Results from last 7 days   Lab Units 08/30/21  0412 08/29/21  0441 08/28/21  0601   SODIUM mmol/L 140 140 139   POTASSIUM mmol/L 3 7 4 3 4 2   CHLORIDE mmol/L 105 105 106   CO2 mmol/L 26 25 22   ANION GAP mmol/L 9 10 11   BUN mg/dL 11 14 12   CREATININE mg/dL 0 97 0 93 0 92   EGFR ml/min/1 73sq m 94 99 100   CALCIUM mg/dL 8 4 8 4 8 2*   MAGNESIUM mg/dL  --  1 9  --      Results from last 7 days   Lab Units 08/30/21  0412 08/29/21  0441 08/28/21  0601   GLUCOSE RANDOM mg/dL 117 115 157*     Results from last 7 days   Lab Units 08/28/21  2144 08/28/21  1831 08/28/21  1314 08/28/21  0941 08/28/21  0601   TROPONIN I ng/mL 1 11* 1 32* 1 36* 1 12* 0 29*     Results from last 7 days   Lab Units 08/30/21  0412 08/30/21  0354 08/29/21  1203   PROTIME seconds 13 2  --  13 4   INR  1 05  --  1 00   PTT seconds  --  62* 38*     Medications:   Scheduled Medications:  carvedilol, 37 5 mg, Oral, BID  docusate sodium, 100 mg, Oral, BID  senna, 1 tablet, Oral, Daily  traZODone, 100 mg, Oral, HS      Continuous IV Infusions:  heparin (porcine), 3-20 Units/kg/hr (Order-Specific), Intravenous, Titrated      PRN Meds:  acetaminophen, 650 mg, Oral, Q6H PRN  calcium carbonate, 1,000 mg, Oral, Daily PRN  heparin (porcine), 2,000 Units, Intravenous, Q1H PRN  heparin (porcine), 4,000 Units, Intravenous, Q1H PRN  ondansetron, 4 mg, Intravenous, Q6H PRN        Discharge Plan: TBD     Network Utilization Review Department  ATTENTION: Please call with any questions or concerns to [phone] and carefully listen to the prompts so that you are directed to the right person  All voicemails are confidential   Larson Micaela all requests for admission clinical reviews, approved or denied determinations and any other requests to dedicated fax number below belonging to the campus where the patient is receiving treatment   List of dedicated fax numbers for the Facilities:  [address] DENIALS (Administrative/Medical Necessity) [phone]   [address] (Maternity/NICU/Pediatrics) [phone]   [address] [place] [address] [address] [place] [address] [address] [place] [address] [address] [address] [address] Sheryl Jain [address] [place] [address] [phone]

## 2021-08-30 NOTE — ASSESSMENT & PLAN NOTE
· Continue trending troponin  · Non chest pain associated elevation in troponin  · Likely in setting of paroxysmal afib episode   · Adjusted home xarelto to lovenox at therapeutic dose given upward trend of troponin   · Now transitioned to heparin gtt  · Cardiology consulted, status post cardiac cath, stable for discharge   · Continue xarelto on discharge

## 2021-08-30 NOTE — ASSESSMENT & PLAN NOTE
· History of paroxysmal afib, presenting with chest palpitations   · On ED arrival, converted back to sinus rhythm  · On coreg, continue   · Reports compliance with home medications  · Cardiology was consulted, concern for ischemic disease so underwent cardiac cath which did not require stenting  · Now stable for discharge

## 2021-08-30 NOTE — MALNUTRITION/BMI
This medical record reflects one or more clinical indicators suggestive of  morbid obesity  BMI Findings:  Adult BMI Classifications: Morbid Obesity 40-44 9     Body mass index is 40 84 kg/m²  See Nutrition note dated 8/30/21 for additional details  Completed nutrition assessment is viewable in the nutrition documentation

## 2021-08-30 NOTE — DISCHARGE SUMMARY
[facility]  Discharge- [facility] 1975, 40 y o  male MRN: [id number]  Unit/Bed#: -01 Encounter: [id number]  Primary Care Provider: Carlos Chapin PA-C   Date and time admitted to hospital: 8/28/2021  5:14 AM    * Paroxysmal atrial fibrillation (HCC)  Assessment & Plan  · History of paroxysmal afib, presenting with chest palpitations   · On ED arrival, converted back to sinus rhythm  · On coreg, continue   · Reports compliance with home medications  · Cardiology was consulted, concern for ischemic disease so underwent cardiac cath which did not require stenting  · Now stable for discharge     Elevated troponin  Assessment & Plan  · Continue trending troponin  · Non chest pain associated elevation in troponin  · Likely in setting of paroxysmal afib episode   · Adjusted home xarelto to lovenox at therapeutic dose given upward trend of troponin   · Now transitioned to heparin gtt  · Cardiology consulted, status post cardiac cath, stable for discharge   · Continue xarelto on discharge     Hypertension  Assessment & Plan  · Adequately controlled, hold home dose vasotec equivalent    Dilated cardiomyopathy ([facility] )  Assessment & Plan  · Secondary to viral myocarditis  · Follows with cardiology as an outpatient     ICD (implantable cardioverter-defibrillator)   Assessment & Plan  · Secondary to viral myocarditis leading to EF 30%         Medical Problems     Resolved Problems  Date Reviewed: 5/19/2017    None              Discharging Physician / Practitioner: Frantz Mahmood MD  PCP: Carlos Chapin PA-C  Admission Date:   Admission Orders (From admission, onward)     Ordered        08/28/21 0749  Place in Observation  Once                   Discharge Date: 08/30/21    Consultations During Hospital Stay:  · IP CONSULT TO CARDIOLOGY  ·     Procedures Performed:   · Imaging, cardiac cath     Significant Findings / Test Results:   Principal Problem:    Paroxysmal atrial fibrillation ([facility] )  Active Problems:    Elevated troponin    ICD (implantable cardioverter-defibrillator) lead failure    Dilated cardiomyopathy ([facility] )    Hypertension  Resolved Problems:    * No resolved hospital problems  *  ·   ·     Incidental Findings:   · See above      Test Results Pending at Discharge (will require follow up): · Na      Outpatient Tests Requested:  · Na    Complications:  See above     Reason for Admission: chest palpitations     Hospital Course:   Farhad Carter is a 40 y o  male patient who originally presented to the hospital on 8/28/2021 due to chest palpitations, has history of pAfib  Found to have elevated troponin's so underwent cardiac cath without requiring CATALINO, now stable for discharge  Condition at Discharge: good    Discharge Day Visit / Exam:   * Please refer to separate progress note for these details *    Discussion with Family: Patient declined call to   Discharge instructions/Information to patient and family:   See after visit summary for information provided to patient and family  Provisions for Follow-Up Care:  See after visit summary for information related to follow-up care and any pertinent home health orders  Disposition:   Home    Planned Readmission: none      Discharge Statement:  I spent 30+ minutes discharging the patient  This time was spent on the day of discharge  I had direct contact with the patient on the day of discharge  Greater than 50% of the total time was spent examining patient, answering all patient questions, arranging and discussing plan of care with patient as well as directly providing post-discharge instructions  Additional time then spent on discharge activities  Discharge Medications:  See after visit summary for reconciled discharge medications provided to patient and/or family        **Please Note: This note may have been constructed using a voice recognition system**

## 2021-08-30 NOTE — PROGRESS NOTES
Cardiology Progress Note - Lucianne Libman 40 y o  male MRN: [id number]    Unit/Bed#: -01 Encounter: [id number]      Assessment:    52-year-old with past medical history of nonischemic cardiomyopathy with LVEF 30%, status post ICD, atrial fibrillation on Xarelto, hypertension sleep apnea who presented with an palpitations     1  Non MI troponin from Afib  2  History of viral cardiomyopathy  3  Status post ICD  4  Paroxysmal atrial fibrillation    Plan:  Cardiac catheterization showed no significant obstructive coronary artery disease  Continue medical therapy  Continue with carvedilol  Will start restart enalapril at discharge  Symptoms likely secondary to atrial fibrillation  Currently sinus with well-controlled heart rate  Can start Xarelto from tomorrow  OK for discharge later today  Findings of cardiac catheterization and further management were discussed with the patient  Please arrange outpatient follow-up with Cardiology in 2-4 weeks  Subjective:   Patient seen and examined prior to cardiac cath  No significant events overnight  Denies chest pain, palpitations,  pnd, orthopnea, abdominal pain, nausea    Objective:     Vitals: Blood pressure 109/72, pulse 63, temperature 98 2 °F (36 8 °C), resp  rate 16, height 6' 2" (1 88 m), weight (!) 144 kg (318 lb 2 oz), SpO2 96 %  , Body mass index is 40 84 kg/m² ,   Orthostatic Blood Pressures      Most Recent Value   Blood Pressure  109/72 filed at 08/30/2021 0732   Patient Position - Orthostatic VS  Lying filed at 08/28/2021 2217            Intake/Output Summary (Last 24 hours) at 8/30/2021 1308  Last data filed at 8/30/2021 0834  Gross per 24 hour   Intake 0 ml   Output --   Net 0 ml       No significant arrhythmias seen on telemetry review       Physical Exam:    GEN: Lucianne Libman appears well, alert and oriented x 3, pleasant and cooperative   HEENT: anicteric, mucous membranes moist  NECK: no jvd, carotid bruits   HEART: regular rhythm, normal S1 and S2, no murmurs, clicks, gallops or rubs   LUNGS: clear to auscultation bilaterally; no wheezes, rales, or rhonchi   ABDOMEN: normal bowel sounds, soft, no tenderness, no distention  EXTREMITIES: peripheral pulses normal; no clubbing, cyanosis, or edema  NEURO: no focal findings   SKIN: normal without suspicious lesions on exposed skin      Current Facility-Administered Medications:     acetaminophen (TYLENOL) tablet 650 mg, 650 mg, Oral, Q6H PRN, Kristen Gomes MD    calcium carbonate (TUMS) chewable tablet 1,000 mg, 1,000 mg, Oral, Daily PRN, Kristen Gomes MD    carvedilol (COREG) tablet 37 5 mg, 37 5 mg, Oral, BID, Roque Andrade PA-C, 37 5 mg at 08/30/21 3698    docusate sodium (COLACE) capsule 100 mg, 100 mg, Oral, BID, MD [facility]] lisinopril (ZESTRIL) tablet 10 mg, 10 mg, Oral, Daily, Eileen Smith MD    ondansetron [facility]) injection 4 mg, 4 mg, Intravenous, Q6H PRN, Kristen Gomes MD    [facility]) tablet 8 6 mg, 1 tablet, Oral, Daily, Kristen Gomes MD    sodium chloride 0 9 % infusion, 50 mL/hr, Intravenous, Continuous, Eileen Smith MD    traZODone (DESYREL) tablet 100 mg, 100 mg, Oral, HS, VILMA Billings, 100 mg at 08/29/21 2112    Labs & Results:    Lab Results   Component Value Date    TROPONINI 1 11 (H) 08/28/2021    TROPONINI 1 32 (H) 08/28/2021    TROPONINI 1 36 (H) 08/28/2021       Lab Results   Component Value Date    CALCIUM 8 4 08/30/2021    K 3 7 08/30/2021    CO2 26 08/30/2021     08/30/2021    BUN 11 08/30/2021    CREATININE 0 97 08/30/2021       Lab Results   Component Value Date    WBC 5 25 08/30/2021    HGB 14 3 08/30/2021    HCT 44 1 08/30/2021    MCV 92 08/30/2021     (L) 08/30/2021     Results from last 7 days   Lab Units 08/30/21  0412   INR  1 05       No results found for: CHOL  Lab Results   Component Value Date    HDL 48 09/07/2017     Lab Results   Component Value Date    LDLCALC 75 09/07/2017     Lab Results   Component Value Date    TRIG 85 09/07/2017       Lab Results   Component Value Date    ALT 33 09/07/2017    AST 28 09/07/2017

## 2021-08-31 ENCOUNTER — TELEPHONE (OUTPATIENT)
Dept: CARDIOLOGY CLINIC | Facility: CLINIC | Age: 46
End: 2021-08-31

## 2021-08-31 LAB
ATRIAL RATE: 60 BPM
PR INTERVAL: 138 MS
QRS AXIS: 161 DEGREES
QRSD INTERVAL: 86 MS
QT INTERVAL: 436 MS
QTC INTERVAL: 436 MS
T WAVE AXIS: 21 DEGREES
VENTRICULAR RATE: 60 BPM

## 2021-08-31 PROCEDURE — 93010 ELECTROCARDIOGRAM REPORT: CPT | Performed by: INTERNAL MEDICINE

## 2021-08-31 NOTE — TELEPHONE ENCOUNTER
Letter made faxed to [phone] fax  Please sign and have angel or mack place in envelope for    Thanks

## 2021-08-31 NOTE — TELEPHONE ENCOUNTER
Pt called & stated that he had a cath yesterday & was told to stay out of work till next Tuesday  Pt states that he will need a note for work advising him to stay out  Pt also stated that he will be faxing a form over from [place] that will need to be filled out & faxed back to his job  Jean Carlos Acosta

## 2021-08-31 NOTE — TELEPHONE ENCOUNTER
S/w patient and verbally understood  Patient stated that he will  letter from [facility] office

## 2021-09-03 ENCOUNTER — TRANSITIONAL CARE MANAGEMENT (OUTPATIENT)
Dept: FAMILY MEDICINE CLINIC | Facility: CLINIC | Age: 46
End: 2021-09-03

## 2021-09-09 ENCOUNTER — OFFICE VISIT (OUTPATIENT)
Dept: CARDIOLOGY CLINIC | Facility: CLINIC | Age: 46
End: 2021-09-09
Payer: COMMERCIAL

## 2021-09-09 VITALS
HEART RATE: 69 BPM | WEIGHT: 311.8 LBS | HEIGHT: 74 IN | DIASTOLIC BLOOD PRESSURE: 80 MMHG | OXYGEN SATURATION: 96 % | BODY MASS INDEX: 40.02 KG/M2 | SYSTOLIC BLOOD PRESSURE: 115 MMHG

## 2021-09-09 DIAGNOSIS — I10 ESSENTIAL HYPERTENSION: ICD-10-CM

## 2021-09-09 DIAGNOSIS — Z95.810 PRESENCE OF AUTOMATIC CARDIOVERTER/DEFIBRILLATOR (AICD): ICD-10-CM

## 2021-09-09 DIAGNOSIS — I50.20 HEART FAILURE WITH REDUCED EJECTION FRACTION (HCC): Primary | ICD-10-CM

## 2021-09-09 DIAGNOSIS — I42.8 NONISCHEMIC CARDIOMYOPATHY (HCC): ICD-10-CM

## 2021-09-09 DIAGNOSIS — I48.0 PAROXYSMAL ATRIAL FIBRILLATION (HCC): ICD-10-CM

## 2021-09-09 PROCEDURE — 1036F TOBACCO NON-USER: CPT | Performed by: INTERNAL MEDICINE

## 2021-09-09 PROCEDURE — 3008F BODY MASS INDEX DOCD: CPT | Performed by: INTERNAL MEDICINE

## 2021-09-09 PROCEDURE — 99214 OFFICE O/P EST MOD 30 MIN: CPT | Performed by: INTERNAL MEDICINE

## 2021-09-09 PROCEDURE — 1111F DSCHRG MED/CURRENT MED MERGE: CPT | Performed by: INTERNAL MEDICINE

## 2021-09-09 RX ORDER — SPIRONOLACTONE 25 MG/1
12.5 TABLET ORAL DAILY
Qty: 15 TABLET | Refills: 3 | Status: SHIPPED | OUTPATIENT
Start: 2021-09-09 | End: 2022-01-24 | Stop reason: SDUPTHER

## 2021-09-09 NOTE — PATIENT INSTRUCTIONS
Start spironolactone 12 5mg once a day  Obtain blood work (basic metabolic panel) in 2 weeks  Obtain echocardiogram

## 2021-09-09 NOTE — PROGRESS NOTES
Advanced Heart Failure Outpatient Progress Note - Uche Gordon 40 y o  male MRN: [id number]    Encounter: [id number]      Assessment/Plan:    Patient Active Problem List    Diagnosis Date Noted    Elevated troponin 08/28/2021    Paroxysmal atrial fibrillation ([facility] ) 11/06/2020    Dilated cardiomyopathy ([facility] ) 01/25/2018    Hypertension 01/25/2018    Obstructive sleep apnea 01/25/2018    ICD (implantable cardioverter-defibrillator) lead failure 06/03/2017    Calculus of gallbladder without cholecystitis 05/19/2017     # Chronic HFrEF, NICM, LVEF 25%, LVIDd , NYHA II, ACC/AHA Stage C:  Etiology: Nonischemic, etiology unclear  Reports occasional binge drinking in the past but not regularly  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  Euvolemic, warm and well perfused on exam with good activity tolerance    Diagnostic:  --TTE 5/16/2017: LVEF 25% w/ grade II diastology  LVIDd 6 9 cm  Mod LAE  Trace MR, mild TR  Normal RV size and function  --TTE 4/9/2019: LVEF 25% w/ grade II diastology  LVIDd 6 9 cm  Mod LAE  Trace MR  Mild TR  Noninvasive assessment c/f borderline to low output  --LHC 8/30/21: Normal coronaries     Therapeutic:  --2g sodium diet  --2000 ml fluid restriction     Neurohormonal Blockade:  --Beta-Blocker: Coreg 25 mg PO BID  --ACEi, ARB or ARNi: Enalapril 10 mg PO BID  --Aldosterone Receptor Blocker: Currently not on MRA  --Diuretic: Currently not requiring diuretic     Sudden Cardiac Death Risk Reduction:  --ICD in situ   Interrogation 8/18/21:V paced less than 1%  Three NSVT episodes  No therapies given  OptiVol within normal limits  Normal device function  Electrical Resynchronization:  --Candidacy for BiV device: Narrow QRS     Advanced Therapies: Will continue to monitor  Once meds adjusted, will proceed with CPET   Extremities slightly cool       # HTN, controlled  # Mild CONCHIS on CPAP (but not using it as not able to tolerate)  # Paroxsymal atrial fibrillation, Last episode likely related to binge drinking and increased salt load  Rx: coreg as above  AC: rivaroxaban 20mg daily     Today's Plan:  Complete nonischemic serology: HIV, SPEP, ferritin, TAYLOR   Start spironolactone 12 5 mg once a day   Basic metabolic panel in 2 weeks   Obtain echocardiogram   RTC 2 months         HPI: Very pleasant 44 y o  gentleman w/ PMHx as noted above presents to establish care  He was initially diagnosed with HFrEF @ [age] years in [place]  He was diagnosed with a viral myocarditis (possible coxsackie)  Since that time he states he has been tired all of the time  He had a Mamadou lead extraction in 5/2017 w/ new medtronic single chamber AICD lead placement  He purchased a bed that allows him to prop himself up  He is able to walk around without difficulty (he averages between 8-10k steps/day)  He does get SOB going up a hill  He can do 1-2 flights of stairs  He never feels that he has to stop, but he may have to slow down  He thinks he can go a couple minutes at an all out run       4/30/2020: He has an exercise bike now and rides a bike outside  He rides the exercise bike for 15-30 minutes at about 15 mph  He can do it easily  Overall he feels well  He stopped Entresto and is back on enalapril due to progressively feeling worse on it       Interval History:  9/9/21: Here for follow up  Hospitalized 8/28-30/21  For chest palpitations found to have atrial fibrillation  Also had mild elevated troponin  Underwent cardiac catheterization which showed normal coronary arteries  Likely precipitated by binge drinking and increased salt load  He is overall doing well since discharge with adherence to diet and medications  Advised against alcohol intake  Transient lightheadedness when getting up too fast     Review of Systems   Constitutional: Negative for chills and fever  HENT: Negative for ear pain and sore throat  Eyes: Negative for pain and visual disturbance  Respiratory: Negative for cough and shortness of breath  Cardiovascular: Negative for chest pain, palpitations and leg swelling  Gastrointestinal: Negative for abdominal pain and vomiting  Genitourinary: Negative for dysuria and hematuria  Musculoskeletal: Negative for arthralgias and back pain  Skin: Negative for color change and rash  Neurological: Negative for seizures and syncope  All other systems reviewed and are negative  Past Medical History:   Diagnosis Date    A-fib [facility]) 2020    AICD (automatic cardioverter/defibrillator) present     [facility])     Hypertension     Myocarditis ([facility] )     Sleep apnea     Ventricular tachycardia (HCC)          Allergies   Allergen Reactions    Vicodin [Hydrocodone-Acetaminophen] Other (See Comments)     "i get nasty"           Current Outpatient Medications:     carvedilol (COREG) 25 mg tablet, Take 1 5 tablets (37 5 mg total) by mouth 2 (two) times a day, Disp: 270 tablet, Rfl: 0    enalapril (VASOTEC) 10 mg tablet, TAKE 1 TABLET BY MOUTH TWICE A DAY, Disp: 60 tablet, Rfl: 5    rivaroxaban (XARELTO) 20 mg tablet, Take 1 tablet (20 mg total) by mouth daily with dinner, Disp: 30 tablet, Rfl: 11    traZODone (DESYREL) 100 mg tablet, Take 1 tablet (100 mg total) by mouth daily at bedtime, Disp: 90 tablet, Rfl: 3    naproxen (NAPROSYN) 250 mg tablet, Take 250 mg by mouth as needed for mild pain (Patient not taking: Reported on 9/9/2021), Disp: , Rfl:     Social History     Socioeconomic History    Marital status: Single     Spouse name: Not on file    Number of children: Not on file    Years of education: Not on file    Highest education level: Not on file   Occupational History    Not on file   Tobacco Use    Smoking status: Never Smoker    Smokeless tobacco: Never Used   Vaping Use    Vaping Use: Never used   Substance and Sexual Activity    Alcohol use: Yes     Comment: OCCASIONAL    Drug use: No    Sexual activity: Not on file   Other Topics Concern    Not on file   Social History Narrative    Not on file     Social Determinants of Health     Financial Resource Strain:     Difficulty of Paying Living Expenses:    Food Insecurity:     Worried About Running Out of Food in the Last Year:     [address] in the Last Year:    Transportation Needs:     Lack of Transportation (Medical):  Lack of Transportation (Non-Medical):    Physical Activity:     Days of Exercise per Week:     Minutes of Exercise per Session:    Stress:     Feeling of Stress :    Social Connections:     Frequency of Communication with Friends and Family:     Frequency of Social Gatherings with Friends and Family:     Attends Baptism Services:     Active Member of Clubs or Organizations:     Attends Club or Organization Meetings:     Marital Status:    Intimate Partner Violence:     Fear of Current or Ex-Partner:     Emotionally Abused:     Physically Abused:     Sexually Abused:        Family History   Problem Relation Age of Onset    No Known Problems Mother     No Known Problems Father        Physical Exam:    Vitals:   Vitals:    09/09/21 1442   BP: 130/88   Pulse: 69   SpO2: 96%       Physical Exam  Constitutional:       General: He is not in acute distress  Appearance: Normal appearance  HENT:      Head: Normocephalic and atraumatic  Mouth/Throat:      Mouth: Mucous membranes are moist    Eyes:      General: No scleral icterus  Extraocular Movements: Extraocular movements intact  Cardiovascular:      Rate and Rhythm: Normal rate and regular rhythm  Pulses: Normal pulses  Heart sounds: S1 normal and S2 normal  No murmur heard  No friction rub  No gallop  Comments: Nonelevated JVP  Trace pitting edema bilaterally  Pulmonary:      Breath sounds: Normal breath sounds  Abdominal:      General: There is no distension  Palpations: Abdomen is soft  Tenderness: There is no abdominal tenderness  There is no guarding or rebound  Musculoskeletal:         General: Normal range of motion  Cervical back: Neck supple  Skin:     General: Skin is warm and dry  Capillary Refill: Capillary refill takes less than 2 seconds  Neurological:      General: No focal deficit present  Mental Status: He is alert and oriented to person, place, and time  Psychiatric:         Mood and Affect: Mood normal          Labs & Results:    Lab Results   Component Value Date    SODIUM 140 08/30/2021    K 3 7 08/30/2021     08/30/2021    CO2 26 08/30/2021    BUN 11 08/30/2021    CREATININE 0 97 08/30/2021    GLUC 117 08/30/2021    CALCIUM 8 4 08/30/2021     Lab Results   Component Value Date    WBC 5 25 08/30/2021    HGB 14 3 08/30/2021    HCT 44 1 08/30/2021    MCV 92 08/30/2021     (L) 08/30/2021     No results found for: NTBNP   Lab Results   Component Value Date    CHOLESTEROL 140 09/07/2017     Lab Results   Component Value Date    HDL 48 09/07/2017     Lab Results   Component Value Date    TRIG 85 09/07/2017     No results found for: Darshan    EKG personally reviewed by Irlanda Couch MD      Counseling / Coordination of Care  Time spent today 25 minutes  Greater than 50% of total time was spent with the patient and / or family counseling and / or coordination of care  We went over current diagnosis, most recent studies and any changes in treatment  Thank you for the opportunity to participate in the care of this patient      Cordell Kanner, MD  ADVANCED HEART FAILURE AND MECHANICAL CIRCULATORY SUPPORT  [facility]

## 2021-09-15 DIAGNOSIS — I10 ESSENTIAL HYPERTENSION: ICD-10-CM

## 2021-09-15 RX ORDER — CARVEDILOL 25 MG/1
37.5 TABLET ORAL 2 TIMES DAILY
Qty: 270 TABLET | Refills: 0 | Status: SHIPPED | OUTPATIENT
Start: 2021-09-15 | End: 2021-12-09

## 2021-09-16 ENCOUNTER — DOCUMENTATION (OUTPATIENT)
Dept: CARDIOLOGY CLINIC | Facility: CLINIC | Age: 46
End: 2021-09-16

## 2021-09-16 NOTE — PROGRESS NOTES
[facility]/Disab & Leave Statement of Incapacity form completed and signed by Dr Sykes Brine    Faxed to [phone] (along with recent office note 9/9/2021)

## 2021-10-11 ENCOUNTER — HOSPITAL ENCOUNTER (OUTPATIENT)
Dept: NON INVASIVE DIAGNOSTICS | Facility: CLINIC | Age: 46
Discharge: HOME | End: 2021-10-11
Payer: COMMERCIAL

## 2021-10-11 ENCOUNTER — APPOINTMENT (OUTPATIENT)
Dept: LAB | Facility: CLINIC | Age: 46
End: 2021-10-11
Payer: COMMERCIAL

## 2021-10-11 DIAGNOSIS — I50.20 HEART FAILURE WITH REDUCED EJECTION FRACTION (HCC): ICD-10-CM

## 2021-10-11 LAB
ANION GAP SERPL CALCULATED.3IONS-SCNC: 5 MMOL/L (ref 4–13)
AORTIC ROOT: 2.9 CM
AORTIC VALVE MEAN VELOCITY: 8.7 M/S
APICAL FOUR CHAMBER EJECTION FRACTION: 36 %
AV LVOT MEAN GRADIENT: 1 MMHG
AV LVOT PEAK GRADIENT: 2 MMHG
AV MEAN GRADIENT: 3 MMHG
AV PEAK GRADIENT: 6 MMHG
BUN SERPL-MCNC: 11 MG/DL (ref 5–25)
CALCIUM SERPL-MCNC: 9.7 MG/DL (ref 8.3–10.1)
CHLORIDE SERPL-SCNC: 108 MMOL/L (ref 100–108)
CO2 SERPL-SCNC: 25 MMOL/L (ref 21–32)
CREAT SERPL-MCNC: 0.95 MG/DL (ref 0.6–1.3)
DOP CALC AO VTI: 22.6 CM
DOP CALC LVOT PEAK VEL VTI: 13.83 CM
DOP CALC LVOT PEAK VEL: 0.69 M/S
E WAVE DECELERATION TIME: 182 MS
E/A RATIO: 1.18
FERRITIN SERPL-MCNC: 233 NG/ML (ref 8–388)
FRACTIONAL SHORTENING: 4 (ref 28–44)
GFR SERPL CREATININE-BSD FRML MDRD: 96 ML/MIN/1.73SQ M
GLUCOSE SERPL-MCNC: 94 MG/DL (ref 65–140)
INTERVENTRICULAR SEPTUM IN DIASTOLE (PARASTERNAL SHORT AXIS VIEW): 0.9 CM
IRON SATN MFR SERPL: 24 % (ref 20–50)
IRON SERPL-MCNC: 74 UG/DL (ref 65–175)
LEFT INTERNAL DIMENSION IN SYSTOLE: 6.9 CM (ref 2.1–4)
LEFT VENTRICULAR INTERNAL DIMENSION IN DIASTOLE: 7.2 CM (ref 3.5–6)
LEFT VENTRICULAR POSTERIOR WALL IN END DIASTOLE: 0.9 CM
LEFT VENTRICULAR STROKE VOLUME: 23 ML
MV E'TISSUE VEL-LAT: 13 CM/S
MV E'TISSUE VEL-SEP: 8 CM/S
MV PEAK A VEL: 0.76 M/S
MV PEAK E VEL: 90 CM/S
MV STENOSIS PRESSURE HALF TIME: 0 MS
MV VALVE AREA P 1/2 METHOD: 4.2
POTASSIUM SERPL-SCNC: 4.2 MMOL/L (ref 3.5–5.3)
SL CV LV EF: 25
SL CV PED ECHO LEFT VENTRICLE DIASTOLIC VOLUME (MOD BIPLANE) 2D: 270 ML
SL CV PED ECHO LEFT VENTRICLE SYSTOLIC VOLUME (MOD BIPLANE) 2D: 247 ML
SODIUM SERPL-SCNC: 138 MMOL/L (ref 136–145)
TIBC SERPL-MCNC: 307 UG/DL (ref 250–450)
TR PEAK VELOCITY: 1.8 M/S
TRICUSPID VALVE PEAK REGURGITATION VELOCITY: 1.79 M/S
TRICUSPID VALVE S': 0.7 CM/S
TV PEAK GRADIENT: 13 MMHG

## 2021-10-11 PROCEDURE — 82728 ASSAY OF FERRITIN: CPT

## 2021-10-11 PROCEDURE — 93306 TTE W/DOPPLER COMPLETE: CPT

## 2021-10-11 PROCEDURE — 36415 COLL VENOUS BLD VENIPUNCTURE: CPT | Performed by: INTERNAL MEDICINE

## 2021-10-11 PROCEDURE — 83540 ASSAY OF IRON: CPT

## 2021-10-11 PROCEDURE — 93306 TTE W/DOPPLER COMPLETE: CPT | Performed by: INTERNAL MEDICINE

## 2021-10-11 PROCEDURE — 80048 BASIC METABOLIC PNL TOTAL CA: CPT

## 2021-10-11 PROCEDURE — 87389 HIV-1 AG W/HIV-1&-2 AB AG IA: CPT

## 2021-10-11 PROCEDURE — 86038 ANTINUCLEAR ANTIBODIES: CPT | Performed by: INTERNAL MEDICINE

## 2021-10-11 PROCEDURE — 84165 PROTEIN E-PHORESIS SERUM: CPT | Performed by: PATHOLOGY

## 2021-10-11 PROCEDURE — 84165 PROTEIN E-PHORESIS SERUM: CPT

## 2021-10-11 PROCEDURE — 83550 IRON BINDING TEST: CPT

## 2021-10-12 LAB
ALBUMIN SERPL ELPH-MCNC: 4.21 G/DL (ref 3.5–5)
ALBUMIN SERPL ELPH-MCNC: 59.3 % (ref 52–65)
ALPHA1 GLOB SERPL ELPH-MCNC: 0.28 G/DL (ref 0.1–0.4)
ALPHA1 GLOB SERPL ELPH-MCNC: 4 % (ref 2.5–5)
ALPHA2 GLOB SERPL ELPH-MCNC: 0.77 G/DL (ref 0.4–1.2)
ALPHA2 GLOB SERPL ELPH-MCNC: 10.8 % (ref 7–13)
BETA GLOB ABNORMAL SERPL ELPH-MCNC: 0.37 G/DL (ref 0.4–0.8)
BETA1 GLOB SERPL ELPH-MCNC: 5.2 % (ref 5–13)
BETA2 GLOB SERPL ELPH-MCNC: 4.8 % (ref 2–8)
BETA2+GAMMA GLOB SERPL ELPH-MCNC: 0.34 G/DL (ref 0.2–0.5)
GAMMA GLOB ABNORMAL SERPL ELPH-MCNC: 1.13 G/DL (ref 0.5–1.6)
GAMMA GLOB SERPL ELPH-MCNC: 15.9 % (ref 12–22)
HIV 1+2 AB+HIV1 P24 AG SERPL QL IA: NORMAL
IGG/ALB SER: 1.46 {RATIO} (ref 1.1–1.8)
PROT PATTERN SERPL ELPH-IMP: ABNORMAL
PROT SERPL-MCNC: 7.1 G/DL (ref 6.4–8.2)

## 2021-10-13 DIAGNOSIS — I48.0 PAROXYSMAL A-FIB (HCC): ICD-10-CM

## 2021-10-13 LAB — RYE IGE QN: NEGATIVE

## 2021-10-13 RX ORDER — RIVAROXABAN 20 MG/1
TABLET, FILM COATED ORAL
Qty: 90 TABLET | Refills: 3 | Status: SHIPPED | OUTPATIENT
Start: 2021-10-13

## 2021-10-14 ENCOUNTER — TELEPHONE (OUTPATIENT)
Dept: CARDIOLOGY CLINIC | Facility: CLINIC | Age: 46
End: 2021-10-14

## 2021-11-16 ENCOUNTER — REMOTE DEVICE CLINIC VISIT (OUTPATIENT)
Dept: CARDIOLOGY CLINIC | Facility: CLINIC | Age: 46
End: 2021-11-16
Payer: COMMERCIAL

## 2021-11-16 DIAGNOSIS — Z95.810 AICD (AUTOMATIC CARDIOVERTER/DEFIBRILLATOR) PRESENT: Primary | ICD-10-CM

## 2021-11-16 PROCEDURE — 93296 REM INTERROG EVL PM/IDS: CPT | Performed by: INTERNAL MEDICINE

## 2021-11-16 PROCEDURE — 93295 DEV INTERROG REMOTE 1/2/MLT: CPT | Performed by: INTERNAL MEDICINE

## 2021-12-09 DIAGNOSIS — I10 ESSENTIAL HYPERTENSION: ICD-10-CM

## 2021-12-09 RX ORDER — CARVEDILOL 25 MG/1
37.5 TABLET ORAL 2 TIMES DAILY
Qty: 270 TABLET | Refills: 0 | Status: SHIPPED | OUTPATIENT
Start: 2021-12-09 | End: 2022-03-07

## 2022-01-07 ENCOUNTER — TELEPHONE (OUTPATIENT)
Dept: CARDIOLOGY CLINIC | Facility: CLINIC | Age: 47
End: 2022-01-07

## 2022-01-07 NOTE — TELEPHONE ENCOUNTER
----- Message from Ofe Ellington MD sent at 1/6/2022  4:59 PM EST -----  You can schedule a follow up with me  Thanks  ----- Message -----  From: Marjan Robertson  Sent: 1/6/2022  10:48 AM EST  To: Ofe Ellington MD    Hi Dr Gricel Pedersen, I called this patient to try to make 2 appts 1 with the Dr and 1 for his Medtronic ICD  he doesn't know what Dr to go to  He saw you back in September, but has seen Jameel Mcfarlane, Dr Garrett Payan and is on a recall for Dr Duyen Ramos  I just wanted to double check with you who he needs to see   Thank You

## 2022-01-24 DIAGNOSIS — I50.20 HEART FAILURE WITH REDUCED EJECTION FRACTION (HCC): ICD-10-CM

## 2022-01-26 RX ORDER — SPIRONOLACTONE 25 MG/1
12.5 TABLET ORAL DAILY
Qty: 45 TABLET | Refills: 3 | OUTPATIENT
Start: 2022-01-26 | End: 2022-05-12 | Stop reason: SDUPTHER

## 2022-02-15 ENCOUNTER — REMOTE DEVICE CLINIC VISIT (OUTPATIENT)
Dept: CARDIOLOGY CLINIC | Facility: CLINIC | Age: 47
End: 2022-02-15
Payer: COMMERCIAL

## 2022-02-15 DIAGNOSIS — Z95.810 AICD (AUTOMATIC CARDIOVERTER/DEFIBRILLATOR) PRESENT: Primary | ICD-10-CM

## 2022-02-15 PROCEDURE — 93295 DEV INTERROG REMOTE 1/2/MLT: CPT | Performed by: INTERNAL MEDICINE

## 2022-02-15 PROCEDURE — 93296 REM INTERROG EVL PM/IDS: CPT | Performed by: INTERNAL MEDICINE

## 2022-02-15 NOTE — PROGRESS NOTES
Results for orders placed or performed in visit on 02/15/22   Cardiac EP device report    Narrative    MDT-SINGLE CHAMBER ICD/ACTIVE SYSTEM IS MRI CONDITIONAL  CARELINK TRANSMISSION: BATTERY STATUS "7 YRS "  0%  ALL AVAILABLE LEAD PARAMETERS WITHIN NORMAL LIMITS  1 VHR NOTED; NO THERAPIES GIVEN  PT ON COREG & EF 25% (2021)  OPTI-VOL WITHIN NORMAL LIMITS  NORMAL DEVICE FUNCTION   NC         Current Outpatient Medications:     carvedilol (COREG) 25 mg tablet, TAKE 1 5 TABLETS (37 5 MG TOTAL) BY MOUTH 2 (TWO) TIMES A DAY, Disp: 270 tablet, Rfl: 0    enalapril (VASOTEC) 10 mg tablet, TAKE 1 TABLET BY MOUTH TWICE A DAY, Disp: 60 tablet, Rfl: 5    naproxen (NAPROSYN) 250 mg tablet, Take 250 mg by mouth as needed for mild pain (Patient not taking: Reported on 9/9/2021), Disp: , Rfl:     spironolactone (ALDACTONE) 25 mg tablet, Take 0 5 tablets (12 5 mg total) by mouth daily, Disp: 45 tablet, Rfl: 3    traZODone (DESYREL) 100 mg tablet, Take 1 tablet (100 mg total) by mouth daily at bedtime, Disp: 90 tablet, Rfl: 3    Xarelto 20 MG tablet, TAKE 1 TABLET BY MOUTH EVERY DAY WITH DINNER, Disp: 90 tablet, Rfl: 3

## 2022-03-07 DIAGNOSIS — I10 ESSENTIAL HYPERTENSION: ICD-10-CM

## 2022-03-07 RX ORDER — CARVEDILOL 25 MG/1
37.5 TABLET ORAL 2 TIMES DAILY
Qty: 270 TABLET | Refills: 0 | Status: SHIPPED | OUTPATIENT
Start: 2022-03-07 | End: 2022-06-10

## 2022-05-12 ENCOUNTER — IN-CLINIC DEVICE VISIT (OUTPATIENT)
Dept: CARDIOLOGY CLINIC | Facility: CLINIC | Age: 47
End: 2022-05-12
Payer: COMMERCIAL

## 2022-05-12 ENCOUNTER — OFFICE VISIT (OUTPATIENT)
Dept: CARDIOLOGY CLINIC | Facility: CLINIC | Age: 47
End: 2022-05-12
Payer: COMMERCIAL

## 2022-05-12 VITALS
SYSTOLIC BLOOD PRESSURE: 124 MMHG | OXYGEN SATURATION: 97 % | DIASTOLIC BLOOD PRESSURE: 74 MMHG | HEART RATE: 76 BPM | BODY MASS INDEX: 40.43 KG/M2 | HEIGHT: 74 IN | RESPIRATION RATE: 16 BRPM | WEIGHT: 315 LBS

## 2022-05-12 DIAGNOSIS — Z95.810 PRESENCE OF IMPLANTABLE CARDIOVERTER-DEFIBRILLATOR (ICD): Primary | ICD-10-CM

## 2022-05-12 DIAGNOSIS — Z95.810 AICD (AUTOMATIC CARDIOVERTER/DEFIBRILLATOR) PRESENT: ICD-10-CM

## 2022-05-12 DIAGNOSIS — I10 ESSENTIAL HYPERTENSION: ICD-10-CM

## 2022-05-12 DIAGNOSIS — I48.0 PAROXYSMAL A-FIB (HCC): ICD-10-CM

## 2022-05-12 DIAGNOSIS — I50.20 HEART FAILURE WITH REDUCED EJECTION FRACTION (HCC): Primary | ICD-10-CM

## 2022-05-12 PROCEDURE — 1036F TOBACCO NON-USER: CPT | Performed by: INTERNAL MEDICINE

## 2022-05-12 PROCEDURE — 3008F BODY MASS INDEX DOCD: CPT | Performed by: INTERNAL MEDICINE

## 2022-05-12 PROCEDURE — 99214 OFFICE O/P EST MOD 30 MIN: CPT | Performed by: INTERNAL MEDICINE

## 2022-05-12 PROCEDURE — 93282 PRGRMG EVAL IMPLANTABLE DFB: CPT | Performed by: INTERNAL MEDICINE

## 2022-05-12 RX ORDER — SPIRONOLACTONE 25 MG/1
25 TABLET ORAL DAILY
Qty: 90 TABLET | Refills: 3 | Status: SHIPPED | OUTPATIENT
Start: 2022-05-12

## 2022-05-12 NOTE — PATIENT INSTRUCTIONS
Increase spironolactone to 25mg once a day  Continue rest of cardiac medications  Obtain blood test (BMP) in 2 weeks  Nurse visit for vitals check in 2 weeks

## 2022-05-12 NOTE — PROGRESS NOTES
Advanced Heart Failure Outpatient Progress Note - Quinton Oshea 40 y o  male MRN: [id number]    Encounter: [id number]      Assessment/Plan:    Patient Active Problem List    Diagnosis Date Noted    Elevated troponin 08/28/2021    Paroxysmal atrial fibrillation ([facility] ) 11/06/2020    Dilated cardiomyopathy ([facility] ) 01/25/2018    Hypertension 01/25/2018    Obstructive sleep apnea 01/25/2018    ICD (implantable cardioverter-defibrillator) lead failure 06/03/2017    Calculus of gallbladder without cholecystitis 05/19/2017     # Chronic HFrEF, NICM, LVEF 25%, LVIDd , NYHA II, ACC/AHA Stage C:  Etiology: Nonischemic, etiology unclear  Reports occasional binge drinking in the past but not regularly  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  Euvolemic, warm and well perfused    Diagnostic:  --TTE 5/16/2017: LVEF 25% w/ grade II diastology  LVIDd 6 9 cm  Mod LAE  Trace MR, mild TR  Normal RV size and function  --TTE 4/9/2019: LVEF 25% w/ grade II diastology  LVIDd 6 9 cm  Mod LAE  Trace MR  Mild TR  Noninvasive assessment c/f borderline to low output  --LHC 8/30/21: Normal coronaries  --TTE 10/11/21:  LVEF: 25%  LVIDd: 7 2cm  RV: normal size and systolic function  MR: mild  PASP: unable to estimate, no TR     Therapeutic:  --2g sodium diet  --2000 ml fluid restriction     Neurohormonal Blockade:  --Beta-Blocker: Coreg 37 5 mg PO BID  --ACEi, ARB or ARNi: Enalapril 10 mg PO BID  --Aldosterone Receptor Blocker: spironolactone 12 5mg once a day  --Diuretic: Currently not requiring diuretic     Sudden Cardiac Death Risk Reduction:  --ICD in situ   Interrogation 8/18/21:V paced less than 1%  Three NSVT episodes  No therapies given  OptiVol within normal limits  Normal device function  Interrogation today - verbal report, normal device function, multiple episodes of VT-NS, 1 episode of AF for 12 mins      Electrical Resynchronization:  --Candidacy for BiV device: Narrow QRS     Advanced Therapies: Will continue to monitor       # HTN, need to optimize SVR reduction  # Mild CONCHIS on CPAP   # Paroxsymal atrial fibrillation, Last episode likely related to binge drinking and increased salt load  Rx: coreg as above  AC: rivaroxaban 20mg daily     Today's Plan:  Increase spironolactone to 25mg once a day  Continue rest of cardiac medications  Continue to optimize medications, plan on adding SGLT2i  BMP and nurse visit for vitals check in 2 weeks         HPI: Very pleasant 44 y o  gentleman w/ PMHx as noted above presents to establish care  He was initially diagnosed with HFrEF @ [age] years in [facility]  He was diagnosed with a viral myocarditis (possible coxsackie)  Since that time he states he has been tired all of the time  He had a Mamadou lead extraction in 5/2017 w/ new medtronic single chamber AICD lead placement  He purchased a bed that allows him to prop himself up  He is able to walk around without difficulty (he averages between 8-10k steps/day)  He does get SOB going up a hill  He can do 1-2 flights of stairs  He never feels that he has to stop, but he may have to slow down  He thinks he can go a couple minutes at an all out run       4/30/2020: He has an exercise bike now and rides a bike outside  He rides the exercise bike for 15-30 minutes at about 15 mph  He can do it easily  Overall he feels well  He stopped Entresto and is back on enalapril due to progressively feeling worse on it  9/9/21: Here for follow up  Hospitalized 8/28-30/21  For chest palpitations found to have atrial fibrillation  Also had mild elevated troponin  Underwent cardiac catheterization which showed normal coronary arteries  Likely precipitated by binge drinking and increased salt load  He is overall doing well since discharge with adherence to diet and medications  Advised against alcohol intake  Transient lightheadedness when getting up too fast      Interval History:  5/11/22: Here for follow up  He reports overall stable symptoms   He is active and works a lot at work and has done 9000+ steps today  Adherent to medications  He drinks liquor a couple of times a week  No hospitalizations or ED visits since last visit  He has gained 11 lbs since last visit he attributes to diet and reports he could be more active  Review of Systems   Constitutional: Negative for chills and fever  HENT: Negative for ear pain and sore throat  Eyes: Negative for pain and visual disturbance  Respiratory: Negative for cough and shortness of breath  Cardiovascular: Negative for chest pain, palpitations and leg swelling  Gastrointestinal: Negative for abdominal distention, abdominal pain and vomiting  Genitourinary: Negative for dysuria and hematuria  Musculoskeletal: Negative for arthralgias and back pain  Skin: Negative for color change and rash  Neurological: Negative for seizures and syncope  All other systems reviewed and are negative  Past Medical History:   Diagnosis Date    A-fib [facility]) 2020    AICD (automatic cardioverter/defibrillator) present     [facility]     Hypertension     [facility]ocard[facility]     Sleep apnea     Ventricular tachycardia ([facility])          Allergies   Allergen Reactions    Vicodin [Hydrocodone-Acetaminophen] Other (See Comments)     "i get nasty"           Current Outpatient Medications:     carvedilol (COREG) 25 mg tablet, TAKE 1 5 TABLETS (37 5 MG TOTAL) BY MOUTH 2 (TWO) TIMES A DAY , Disp: 270 tablet, Rfl: 0    enalapril (VASOTEC) 10 mg tablet, TAKE 1 TABLET BY MOUTH TWICE A DAY, Disp: 60 tablet, Rfl: 5    naproxen (NAPROSYN) 250 mg tablet, Take 250 mg by mouth as needed for mild pain (Patient not taking: Reported on 9/9/2021), Disp: , Rfl:     spironolactone (ALDACTONE) 25 mg tablet, Take 0 5 tablets (12 5 mg total) by mouth daily, Disp: 45 tablet, Rfl: 3    traZODone (DESYREL) 100 mg tablet, Take 1 tablet (100 mg total) by mouth daily at bedtime, Disp: 90 tablet, Rfl: 3    Xarelto 20 MG tablet, TAKE 1 TABLET BY MOUTH EVERY DAY WITH DINNER, Disp: 90 tablet, Rfl: 3    Social History     Socioeconomic History    Marital status: Single     Spouse name: Not on file    Number of children: Not on file    Years of education: Not on file    Highest education level: Not on file   Occupational History    Not on file   Tobacco Use    Smoking status: Never Smoker    Smokeless tobacco: Never Used   Vaping Use    Vaping Use: Never used   Substance and Sexual Activity    Alcohol use: Yes     Comment: OCCASIONAL    Drug use: No    Sexual activity: Not on file   Other Topics Concern    Not on file   Social History Narrative    Not on file     Social Determinants of Health     Financial Resource Strain: Not on file   Food Insecurity: Not on file   Transportation Needs: Not on file   Physical Activity: Not on file   Stress: Not on file   Social Connections: Not on file   Intimate Partner Violence: Not on file   Housing Stability: Not on file       Family History   Problem Relation Age of Onset    No Known Problems Mother     No Known Problems Father        Physical Exam:    Vitals:   Vitals:    05/12/22 1549   Pulse: 76   Resp: 16   SpO2: 97%       Physical Exam  Constitutional:       General: He is not in acute distress  Appearance: Normal appearance  HENT:      Head: Normocephalic and atraumatic  Mouth/Throat:      Mouth: Mucous membranes are moist    Eyes:      General: No scleral icterus  Extraocular Movements: Extraocular movements intact  Cardiovascular:      Rate and Rhythm: Normal rate and regular rhythm  Pulses: Normal pulses  Heart sounds: S1 normal and S2 normal  No murmur heard  No friction rub  No gallop  Comments: Nonelevated JVP  Trace pitting edema bilaterally  Pulmonary:      Breath sounds: Normal breath sounds  Abdominal:      General: There is no distension  Palpations: Abdomen is soft  Tenderness: There is no abdominal tenderness   There is no guarding or rebound  Musculoskeletal:         General: Normal range of motion  Cervical back: Neck supple  Skin:     General: Skin is warm and dry  Capillary Refill: Capillary refill takes less than 2 seconds  Neurological:      General: No focal deficit present  Mental Status: He is alert and oriented to person, place, and time  Psychiatric:         Mood and Affect: Mood normal          Labs & Results:    Lab Results   Component Value Date    SODIUM 138 10/11/2021    K 4 2 10/11/2021     10/11/2021    CO2 25 10/11/2021    BUN 11 10/11/2021    CREATININE 0 95 10/11/2021    GLUC 94 10/11/2021    CALCIUM 9 7 10/11/2021     Lab Results   Component Value Date    WBC 5 25 08/30/2021    HGB 14 3 08/30/2021    HCT 44 1 08/30/2021    MCV 92 08/30/2021     (L) 08/30/2021     No results found for: NTBNP   Lab Results   Component Value Date    CHOLESTEROL 140 09/07/2017     Lab Results   Component Value Date    HDL 48 09/07/2017     Lab Results   Component Value Date    TRIG 85 09/07/2017     No results found for: Darshan    EKG personally reviewed by Viji Leung MD      Counseling / Coordination of Care  Time spent today 25 minutes  Greater than 50% of total time was spent with the patient and / or family counseling and / or coordination of care  We went over current diagnosis, most recent studies and any changes in treatment  Thank you for the opportunity to participate in the care of this patient      Sera Miller MD  ADVANCED HEART FAILURE AND MECHANICAL CIRCULATORY SUPPORT  [facility]

## 2022-05-13 NOTE — PROGRESS NOTES
MDT-SINGLE CHAMBER ICD/ACTIVE SYSTEM IS MRI CONDITIONAL   DEVICE INTERROGATED IN THE [place] OFFICE:  BATTERY VOLTAGE ADEQUATE (7 4 YR)    <0 1%   ALL LEAD PARAMETERS WITHIN NORMAL LIMITS   2 VT-NS EPISODES WITH 1 EGM SHOWING NSVT (10 @ 200 BPM), AND 1 EGM SHOWING PAT WITH QRS SIMILAR TO INTRINSIC (#192 - 6 @ 188 BPM)    2 AF EPISODES WITH LONGEST 12 MIN   PT TAKES XARELTO AND CARVEDILOL   NO PROGRAMMING CHANGES MADE TO DEVICE PARAMETERS   OPTI-VOL WITHIN NORMAL LIMITS   NORMAL DEVICE FUNCTION   RG

## 2022-05-20 DIAGNOSIS — I10 ESSENTIAL HYPERTENSION: ICD-10-CM

## 2022-05-20 RX ORDER — ENALAPRIL MALEATE 10 MG/1
TABLET ORAL
Qty: 180 TABLET | Refills: 1 | Status: SHIPPED | OUTPATIENT
Start: 2022-05-20

## 2022-08-23 ENCOUNTER — REMOTE DEVICE CLINIC VISIT (OUTPATIENT)
Dept: CARDIOLOGY CLINIC | Facility: CLINIC | Age: 47
End: 2022-08-23
Payer: COMMERCIAL

## 2022-08-23 DIAGNOSIS — Z95.810 PRESENCE OF AUTOMATIC CARDIOVERTER/DEFIBRILLATOR (AICD): Primary | ICD-10-CM

## 2022-08-23 PROCEDURE — 93295 DEV INTERROG REMOTE 1/2/MLT: CPT | Performed by: INTERNAL MEDICINE

## 2022-08-23 PROCEDURE — 93296 REM INTERROG EVL PM/IDS: CPT | Performed by: INTERNAL MEDICINE

## 2022-08-23 NOTE — PROGRESS NOTES
Results for orders placed or performed in visit on 08/23/22   Cardiac EP device report    Narrative    MDT-SINGLE CHAMBER ICD/ACTIVE SYSTEM IS MRI CONDITIONAL  CARELINK TRANSMISSION: BATTERY VOLTAGE ADEQUATE  ( 6 6 YRS)  1%  ALL AVAILABLE LEAD PARAMETERS WITHIN NORMAL LIMITS  4 NSVT EPISODES DETECTED 13 BEATS @ 320ms  OPTI-VOL WITHIN NORMAL LIMITS  NORMAL DEVICE FUNCTION  ----HOFF

## 2022-10-10 ENCOUNTER — TELEPHONE (OUTPATIENT)
Dept: CARDIOLOGY CLINIC | Facility: CLINIC | Age: 47
End: 2022-10-10

## 2022-10-10 ENCOUNTER — APPOINTMENT (OUTPATIENT)
Dept: RADIOLOGY | Facility: HOSPITAL | Age: 47
End: 2022-10-10
Payer: COMMERCIAL

## 2022-10-10 ENCOUNTER — HOSPITAL ENCOUNTER (OUTPATIENT)
Facility: HOSPITAL | Age: 47
Setting detail: OBSERVATION
Discharge: HOME | End: 2022-10-11
Attending: EMERGENCY MEDICINE | Admitting: FAMILY MEDICINE
Payer: COMMERCIAL

## 2022-10-10 DIAGNOSIS — I49.9 ARRHYTHMIA: ICD-10-CM

## 2022-10-10 DIAGNOSIS — R06.02 SOB (SHORTNESS OF BREATH): ICD-10-CM

## 2022-10-10 DIAGNOSIS — I47.29 NON-SUSTAINED VENTRICULAR TACHYCARDIA: Primary | ICD-10-CM

## 2022-10-10 PROBLEM — Z45.02 ICD (IMPLANTABLE CARDIOVERTER-DEFIBRILLATOR) DISCHARGE: Chronic | Status: ACTIVE | Noted: 2022-10-10

## 2022-10-10 PROBLEM — Z45.02 ICD (IMPLANTABLE CARDIOVERTER-DEFIBRILLATOR) DISCHARGE: Status: ACTIVE | Noted: 2022-10-10

## 2022-10-10 LAB
2HR DELTA HS TROPONIN: -7 NG/L
4HR DELTA HS TROPONIN: -12 NG/L
ALBUMIN SERPL BCP-MCNC: 3.7 G/DL (ref 3.5–5)
ALP SERPL-CCNC: 67 U/L (ref 46–116)
ALT SERPL W P-5'-P-CCNC: 43 U/L (ref 12–78)
ANION GAP SERPL CALCULATED.3IONS-SCNC: 5 MMOL/L (ref 4–13)
AST SERPL W P-5'-P-CCNC: 35 U/L (ref 5–45)
BASOPHILS # BLD AUTO: 0.04 THOUSANDS/ΜL (ref 0–0.1)
BASOPHILS NFR BLD AUTO: 1 % (ref 0–1)
BILIRUB SERPL-MCNC: 1.74 MG/DL (ref 0.2–1)
BUN SERPL-MCNC: 13 MG/DL (ref 5–25)
CALCIUM SERPL-MCNC: 8.7 MG/DL (ref 8.3–10.1)
CARDIAC TROPONIN I PNL SERPL HS: 75 NG/L
CARDIAC TROPONIN I PNL SERPL HS: 80 NG/L
CARDIAC TROPONIN I PNL SERPL HS: 87 NG/L
CHLORIDE SERPL-SCNC: 103 MMOL/L (ref 96–108)
CO2 SERPL-SCNC: 29 MMOL/L (ref 21–32)
CREAT SERPL-MCNC: 1.11 MG/DL (ref 0.6–1.3)
EOSINOPHIL # BLD AUTO: 0.09 THOUSAND/ΜL (ref 0–0.61)
EOSINOPHIL NFR BLD AUTO: 1 % (ref 0–6)
ERYTHROCYTE [DISTWIDTH] IN BLOOD BY AUTOMATED COUNT: 13.6 % (ref 11.6–15.1)
GFR SERPL CREATININE-BSD FRML MDRD: 79 ML/MIN/1.73SQ M
GLUCOSE SERPL-MCNC: 107 MG/DL (ref 65–140)
HCT VFR BLD AUTO: 44.7 % (ref 36.5–49.3)
HGB BLD-MCNC: 14.5 G/DL (ref 12–17)
IMM GRANULOCYTES # BLD AUTO: 0.01 THOUSAND/UL (ref 0–0.2)
IMM GRANULOCYTES NFR BLD AUTO: 0 % (ref 0–2)
LYMPHOCYTES # BLD AUTO: 1.86 THOUSANDS/ΜL (ref 0.6–4.47)
LYMPHOCYTES NFR BLD AUTO: 26 % (ref 14–44)
MAGNESIUM SERPL-MCNC: 2 MG/DL (ref 1.6–2.6)
MCH RBC QN AUTO: 30.4 PG (ref 26.8–34.3)
MCHC RBC AUTO-ENTMCNC: 32.4 G/DL (ref 31.4–37.4)
MCV RBC AUTO: 94 FL (ref 82–98)
MONOCYTES # BLD AUTO: 0.98 THOUSAND/ΜL (ref 0.17–1.22)
MONOCYTES NFR BLD AUTO: 14 % (ref 4–12)
NEUTROPHILS # BLD AUTO: 4.19 THOUSANDS/ΜL (ref 1.85–7.62)
NEUTS SEG NFR BLD AUTO: 58 % (ref 43–75)
NRBC BLD AUTO-RTO: 0 /100 WBCS
PLATELET # BLD AUTO: 167 THOUSANDS/UL (ref 149–390)
PMV BLD AUTO: 12.3 FL (ref 8.9–12.7)
POTASSIUM SERPL-SCNC: 4.1 MMOL/L (ref 3.5–5.3)
PROT SERPL-MCNC: 7.5 G/DL (ref 6.4–8.4)
RBC # BLD AUTO: 4.77 MILLION/UL (ref 3.88–5.62)
SODIUM SERPL-SCNC: 137 MMOL/L (ref 135–147)
WBC # BLD AUTO: 7.17 THOUSAND/UL (ref 4.31–10.16)

## 2022-10-10 PROCEDURE — 80053 COMPREHEN METABOLIC PANEL: CPT | Performed by: EMERGENCY MEDICINE

## 2022-10-10 PROCEDURE — 99220 PR INITIAL OBSERVATION CARE/DAY 70 MINUTES: CPT | Performed by: FAMILY MEDICINE

## 2022-10-10 PROCEDURE — 85025 COMPLETE CBC W/AUTO DIFF WBC: CPT | Performed by: EMERGENCY MEDICINE

## 2022-10-10 PROCEDURE — 71046 X-RAY EXAM CHEST 2 VIEWS: CPT

## 2022-10-10 PROCEDURE — 99285 EMERGENCY DEPT VISIT HI MDM: CPT | Performed by: EMERGENCY MEDICINE

## 2022-10-10 PROCEDURE — 36415 COLL VENOUS BLD VENIPUNCTURE: CPT | Performed by: EMERGENCY MEDICINE

## 2022-10-10 PROCEDURE — 99285 EMERGENCY DEPT VISIT HI MDM: CPT

## 2022-10-10 PROCEDURE — 83735 ASSAY OF MAGNESIUM: CPT | Performed by: EMERGENCY MEDICINE

## 2022-10-10 PROCEDURE — 99244 OFF/OP CNSLTJ NEW/EST MOD 40: CPT | Performed by: INTERNAL MEDICINE

## 2022-10-10 PROCEDURE — 93005 ELECTROCARDIOGRAM TRACING: CPT

## 2022-10-10 PROCEDURE — 84484 ASSAY OF TROPONIN QUANT: CPT | Performed by: EMERGENCY MEDICINE

## 2022-10-10 RX ORDER — METOPROLOL SUCCINATE 100 MG/1
100 TABLET, EXTENDED RELEASE ORAL 2 TIMES DAILY
Status: DISCONTINUED | OUTPATIENT
Start: 2022-10-10 | End: 2022-10-11

## 2022-10-10 RX ORDER — TRAZODONE HYDROCHLORIDE 100 MG/1
100 TABLET ORAL
Status: DISCONTINUED | OUTPATIENT
Start: 2022-10-10 | End: 2022-10-11 | Stop reason: HOSPADM

## 2022-10-10 RX ORDER — LISINOPRIL 10 MG/1
10 TABLET ORAL DAILY
Status: DISCONTINUED | OUTPATIENT
Start: 2022-10-10 | End: 2022-10-11 | Stop reason: HOSPADM

## 2022-10-10 RX ORDER — SPIRONOLACTONE 25 MG/1
25 TABLET ORAL DAILY
Status: DISCONTINUED | OUTPATIENT
Start: 2022-10-10 | End: 2022-10-11 | Stop reason: HOSPADM

## 2022-10-10 RX ADMIN — RIVAROXABAN 20 MG: 20 TABLET, FILM COATED ORAL at 17:39

## 2022-10-10 RX ADMIN — METOPROLOL SUCCINATE 100 MG: 100 TABLET, EXTENDED RELEASE ORAL at 17:37

## 2022-10-10 RX ADMIN — TRAZODONE HYDROCHLORIDE 100 MG: 100 TABLET ORAL at 21:15

## 2022-10-10 RX ADMIN — LISINOPRIL 10 MG: 10 TABLET ORAL at 16:19

## 2022-10-10 NOTE — ASSESSMENT & PLAN NOTE
· Cont AC  · Consideration for ablation, cardiology to discuss with EP  · Monitor on tele  · Cont BB

## 2022-10-10 NOTE — CONSULTS
Consultation - Cardiology   Jeffy Sullivan 56 y o  male MRN: [id number]  Unit/Bed#: ED 28 Encounter: [id number]  10/10/22  11:44 AM    Assessment/ Plan:  1  Defibrillator discharge x2  • Device interrogation report shows ventricular tachycardia, however difficult to differentiate from AFib with RVR  • History of paroxysmal AFib which patient notes felt similar to last night's episode  • Reports dehydration, alcohol consumption, and salty steak prior to discharges  • Discontinue Coreg  • Start Toprol XL 20 mg b i d   • Continue telemetry monitoring    2  Nonischemic dilated cardiomyopathy   · S/p single-chamber ICD  • Echo EF 25% (10/11/2021)  • Discontinue Coreg  • Start Toprol  mg b i d   • Follows with Dr Linda Rdz at [address]    3  Paroxysmal atrial fibrillation   • Continue Xaretlo 20 mg  • Discontinue Coreg  • Start Toprol  mg b i d     4  Hypertension  · Currently 106/57; continue to monitor  · Discontinue Coreg  · Start Toprol  mg b i d    · Hold Enalapril and Aldactone at this time      History of Present Illness   Physician Requesting Consult: Yessi Weir MD    Reason for Consult / Principal Problem:  Defibrillator discharge    HPI: Jeffy Sullivan is a 56y o  year old male with history of paroxysmal AFib on Xarelto, nonischemic dilated cardiomyopathy status post single-chamber ICD, and HTN who presents following ICD discharge x2  Patient notes dehydration, alcohol consumption, salty steak, and palpitations prior to discharge  Patient states palpitations felt similar to episodes of atrial fibrillation in the past  Reports chronic shortness of breath upon exertion without recent change  Denies chest pain  Inpatient consult to Cardiology  Consult performed by: Rox Perez PA-C  Consult ordered by: Yessi Weir MD          EKG:  Normal sinus rhythm; possible inferior infarct, age indeterminate      Review of Systems   Constitutional: Negative for chills and fever  HENT: Negative for ear pain and sore throat  Eyes: Negative for pain and visual disturbance  Respiratory: Positive for shortness of breath (chronic on exertion)  Negative for cough  Cardiovascular: Positive for palpitations  Negative for chest pain and leg swelling  Gastrointestinal: Negative for abdominal pain and vomiting  Genitourinary: Negative for dysuria and hematuria  Musculoskeletal: Negative for arthralgias and back pain  Skin: Negative for color change and rash  Neurological: Negative for seizures and syncope  Psychiatric/Behavioral: Positive for sleep disturbance  Negative for agitation  All other systems reviewed and are negative  Historical Information   Past Medical History:   Diagnosis Date   • A-fib [facility]) 2020   • AICD (automatic cardioverter/defibrillator) present    • [facility]F (congestive heart failure) ([facility] )    • Hypertension    • Myocarditis ([facility] )    • Sleep apnea    • Ventricular tachycardia      Past Surgical History:   Procedure Laterality Date   • CARDIAC DEFIBRILLATOR PLACEMENT     • [place] ESOPHAGOGASTRODUODENOSCOPY TRANSORAL DIAGNOSTIC N/A 3/29/2017    Procedure: ESOPHAGOGASTRODUODENOSCOPY (EGD); Surgeon: Rahul Mancini MD;  Location: [facility];   Service: Gastroenterology   • [place] INSJ/RPLCMT PERM DFB W/TRNSVNS LDS 1/DUAL CHMBR N/A 6/2/2017    Procedure: LEAD EXTRACTION/REIMPLANTATION AND ICD GENERATOR CHANGE ;  Surgeon: Mary Murillo MD;  Location:  MAIN OR;  Service: Cardiology   • [place] LAP,CHOLECYSTECTOMY N/A 5/19/2017    Procedure: Jesusita Carmona ;  Surgeon: Jory Montes De Oca MD;  Location: [facility];  Service: General   • TONSILLECTOMY     • TYMPANOSTOMY TUBE PLACEMENT       Social History     Substance and Sexual Activity   Alcohol Use Yes    Comment: OCCASIONAL     Social History     Substance and Sexual Activity   Drug Use No     Social History     Tobacco Use   Smoking Status Never Smoker   Smokeless Tobacco Never Used       Family History: Family History   Problem Relation Age of Onset   • No Known Problems Mother    • No Known Problems Father        Meds/Allergies   all current active meds have been reviewed  Allergies   Allergen Reactions   • Vicodin [Hydrocodone-Acetaminophen] Other (See Comments)     "i get nasty"       Objective   Vitals: Blood pressure 94/55, pulse 55, temperature 98 8 °F (37 1 °C), resp  rate 16, SpO2 97 %  , There is no height or weight on file to calculate BMI ,   Orthostatic Blood Pressures    Flowsheet Row Most Recent Value   Blood Pressure 94/55 filed at 10/10/2022 1100   Patient Position - Orthostatic VS Lying filed at 10/10/2022 3352          Systolic (60TUW), BGW:858 , Min:94 , JKH:481     Diastolic (47FAQ), BRQ:30, Min:55, Max:69      No intake or output data in the 24 hours ending 10/10/22 1144    Invasive Devices  Report    Peripheral Intravenous Line  Duration           Peripheral IV 10/11/21 Distal;Right;Upper;Ventral (anterior) Arm 364 days    Peripheral IV 10/10/22 Left Antecubital <1 day                    Physical Exam:  Physical Exam  Vitals and nursing note reviewed  Constitutional:       General: He is not in acute distress  Appearance: He is well-developed  He is not toxic-appearing or diaphoretic  HENT:      Head: Normocephalic and atraumatic  Nose: Nose normal    Eyes:      Conjunctiva/sclera: Conjunctivae normal    Cardiovascular:      Rate and Rhythm: Normal rate and regular rhythm  Pulses: Normal pulses  Heart sounds: Normal heart sounds  No murmur heard  Pulmonary:      Effort: Pulmonary effort is normal  No respiratory distress  Breath sounds: Normal breath sounds  No wheezing or rales  Abdominal:      Palpations: Abdomen is soft  Tenderness: There is no abdominal tenderness  Musculoskeletal:         General: No swelling  Cervical back: Neck supple  Right lower leg: No edema  Left lower leg: No edema  Skin:     General: Skin is warm and dry  Neurological:      Mental Status: He is alert and oriented to person, place, and time     Psychiatric:         Mood and Affect: Mood normal          Judgment: Judgment normal           Lab Results:     Cardiac Profile:   Results from last 7 days   Lab Units 10/10/22  0919   HS TNI 0HR ng/L 87*       CBC with diff:   Results from last 7 days   Lab Units 10/10/22  0919   WBC Thousand/uL 7 17   HEMOGLOBIN g/dL 14 5   HEMATOCRIT % 44 7   MCV fL 94   PLATELETS Thousands/uL 167   MCH pg 30 4   MCHC g/dL 32 4   RDW % 13 6   MPV fL 12 3   NRBC AUTO /100 WBCs 0         CMP:   Results from last 7 days   Lab Units 10/10/22  0919   POTASSIUM mmol/L 4 1   CHLORIDE mmol/L 103   CO2 mmol/L 29   BUN mg/dL 13   CREATININE mg/dL 1 11   CALCIUM mg/dL 8 7   AST U/L 35   ALT U/L 43   ALK PHOS U/L 67   EGFR ml/min/1 73sq m 79

## 2022-10-10 NOTE — ASSESSMENT & PLAN NOTE
· Found to have Afib vs Vtach  · Cardiology consulted  · ICD interrogated  · BB changed from coreg to toprol  · Will need eval for ablation  · Maintain on telmetry  · Keep K+>4 and Mg>2

## 2022-10-10 NOTE — ASSESSMENT & PLAN NOTE
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

## 2022-10-10 NOTE — PLAN OF CARE
Problem: Potential for Falls  Goal: Patient will remain free of falls  Description: INTERVENTIONS:  - Educate patient/family on patient safety including physical limitations  - Instruct patient to call for assistance with activity   - Consult OT/PT to assist with strengthening/mobility   - Keep Call bell within reach  - Keep bed low and locked with side rails adjusted as appropriate  - Keep care items and personal belongings within reach  - Initiate and maintain comfort rounds  - Make Fall Risk Sign visible to staff  - Offer Toileting every  Hours, in advance of need  - Initiate/Maintain alarm  - Obtain necessary fall risk management equipment:   - Apply yellow socks and bracelet for high fall risk patients  - Consider moving patient to room near nurses station  Outcome: Progressing     Problem: PAIN - ADULT  Goal: Verbalizes/displays adequate comfort level or baseline comfort level  Description: Interventions:  - Encourage patient to monitor pain and request assistance  - Assess pain using appropriate pain scale  - Administer analgesics based on type and severity of pain and evaluate response  - Implement non-pharmacological measures as appropriate and evaluate response  - Consider cultural and social influences on pain and pain management  - Notify physician/advanced practitioner if interventions unsuccessful or patient reports new pain  Outcome: Progressing     Problem: INFECTION - ADULT  Goal: Absence or prevention of progression during hospitalization  Description: INTERVENTIONS:  - Assess and monitor for signs and symptoms of infection  - Monitor lab/diagnostic results  - Monitor all insertion sites, i e  indwelling lines, tubes, and drains  - Monitor endotracheal if appropriate and nasal secretions for changes in amount and color  - [place] appropriate cooling/warming therapies per order  - Administer medications as ordered  - Instruct and encourage patient and family to use good hand hygiene technique  - Identify and instruct in appropriate isolation precautions for identified infection/condition  Outcome: Progressing  Goal: Absence of fever/infection during neutropenic period  Description: INTERVENTIONS:  - Monitor WBC    Outcome: Progressing     Problem: SAFETY ADULT  Goal: Patient will remain free of falls  Description: INTERVENTIONS:  - Educate patient/family on patient safety including physical limitations  - Instruct patient to call for assistance with activity   - Consult OT/PT to assist with strengthening/mobility   - Keep Call bell within reach  - Keep bed low and locked with side rails adjusted as appropriate  - Keep care items and personal belongings within reach  - Initiate and maintain comfort rounds  - Make Fall Risk Sign visible to staff  - Offer Toileting every  Hours, in advance of need  - Initiate/Maintain alarm  - Obtain necessary fall risk management equipment:   - Apply yellow socks and bracelet for high fall risk patients  - Consider moving patient to room near nurses station  Outcome: Progressing  Goal: Maintain or return to baseline ADL function  Description: INTERVENTIONS:  -  Assess patient's ability to carry out ADLs; assess patient's baseline for ADL function and identify physical deficits which impact ability to perform ADLs (bathing, care of mouth/teeth, toileting, grooming, dressing, etc )  - Assess/evaluate cause of self-care deficits   - Assess range of motion  - Assess patient's mobility; develop plan if impaired  - Assess patient's need for assistive devices and provide as appropriate  - Encourage maximum independence but intervene and supervise when necessary  - Involve family in performance of ADLs  - Assess for home care needs following discharge   - Consider OT consult to assist with ADL evaluation and planning for discharge  - Provide patient education as appropriate  Outcome: Progressing  Goal: Maintains/Returns to pre admission functional level  Description: INTERVENTIONS:  - Perform BMAT or MOVE assessment daily    - Set and communicate daily mobility goal to care team and patient/family/caregiver  - Collaborate with rehabilitation services on mobility goals if consulted  - Perform Range of Motion  times a day  - Reposition patient every  hours  - Dangle patient  times a day  - Stand patient  times a day  - Ambulate patient  times a day  - Out of bed to chair  times a day   - Out of bed for meals times a day  - Out of bed for toileting  - Record patient progress and toleration of activity level   Outcome: Progressing     Problem: DISCHARGE PLANNING  Goal: Discharge to home or other facility with appropriate resources  Description: INTERVENTIONS:  - Identify barriers to discharge w/patient and caregiver  - Arrange for needed discharge resources and transportation as appropriate  - Identify discharge learning needs (meds, wound care, etc )  - Arrange for interpretive services to assist at discharge as needed  - Refer to Case Management Department for coordinating discharge planning if the patient needs post-hospital services based on physician/advanced practitioner order or complex needs related to functional status, cognitive ability, or social support system  Outcome: Progressing     Problem: Knowledge Deficit  Goal: Patient/family/caregiver demonstrates understanding of disease process, treatment plan, medications, and discharge instructions  Description: Complete learning assessment and assess knowledge base    Interventions:  - Provide teaching at level of understanding  - Provide teaching via preferred learning methods  Outcome: Progressing

## 2022-10-10 NOTE — ED PROVIDER NOTES
History  Chief Complaint   Patient presents with   • Pacemaker Problem     Patient states"he received a call today about his ICD going off on Saturday"  Patient states"he felt dizziness on Saturday night around 1030pm that lasted until Sunday 130am"  Patient states"he was informed he had runs of Vtach"  Patient denies any chest pain or shortness of breath at this time  67-year-old male history of dilated cardiomyopathy, heart failure, AFib on Xarelto, hypertension presenting at request of cardiology for ICD firing for ventricular arrhythmia  Incident occurred on Saturday  Patient reports late Saturday evening, he symptoms began with shortness of breath and felt like his device discharged  Denies any loss consciousness or chest pain  Cardiology contacted patient and told him of rhythm abnormality and recommended ED evaluation  Reports medication compliance  Denies any other complaints  Past Medical History:  [facility] )  No date: AICD (automatic cardioverter/defibrillator) present  No date: CHF (congestive heart failure) ([facility])  No date: Hypertension  No date: Myocarditis ([facility] )  No date: Sleep apnea  No date: Ventricular tachycardia  Family History: non-contributory  Social History            Prior to Admission Medications   Prescriptions Last Dose Informant Patient Reported? Taking? Xarelto 20 MG tablet 10/9/2022 at Unknown time Self No Yes   Sig: TAKE 1 TABLET BY MOUTH EVERY DAY WITH DINNER   enalapril (VASOTEC) 10 mg tablet 10/10/2022 at Unknown time  No Yes   Sig: TAKE 1 TABLET BY MOUTH TWICE A DAY   naproxen (NAPROSYN) 250 mg tablet Not Taking at Unknown time  Yes No   Sig: Take 250 mg by mouth as needed for mild pain   Patient not taking: No sig reported   spironolactone (ALDACTONE) 25 mg tablet 10/10/2022 at Unknown time  No Yes   Sig: Take 1 tablet (25 mg total) by mouth in the morning     traZODone (DESYREL) 100 mg tablet More than a month at Unknown time Self No No   Sig: Take 1 tablet (100 mg total) by mouth daily at bedtime      Facility-Administered Medications: None       Past Medical History:   Diagnosis Date   • A-fib ([facility] ) 2020   • AICD (automatic cardioverter/defibrillator) present    • [facility]congest[facility] ([facility] )    • Hypertension    • Myocarditis ([facility] )    • Sleep apnea    • Ventricular tachycardia        Past Surgical History:   Procedure Laterality Date   • CARDIAC DEFIBRILLATOR PLACEMENT     • [place] ESOPHAGOGASTRODUODENOSCOPY TRANSORAL DIAGNOSTIC N/A 3/29/2017    Procedure: ESOPHAGOGASTRODUODENOSCOPY (EGD); Surgeon: Harmony Martinez MD;  Location: [facility]; Service: Gastroenterology   • [place] INSJ/RPLCMT PERM DFB W/TRNSVNS LDS 1/DUAL CHMBR N/A 6/2/2017    Procedure: LEAD EXTRACTION/REIMPLANTATION AND ICD GENERATOR CHANGE ;  Surgeon: Lyric Grier MD;  Location:  MAIN OR;  Service: Cardiology   • [place] LAP,CHOLECYSTECTOMY N/A 5/19/2017    Procedure: Dee Montelongo ;  Surgeon: Toni Vo MD;  Location: [facility];  Service: General   • TONSILLECTOMY     • TYMPANOSTOMY TUBE PLACEMENT         Family History   Problem Relation Age of Onset   • No Known Problems Mother    • No Known Problems Father      I have reviewed and agree with the history as documented  E-Cigarette/Vaping   • E-Cigarette Use Never User      E-Cigarette/Vaping Substances   • Nicotine No    • THC No    • CBD No    • Flavoring No    • Other No    • Unknown No      Social History     Tobacco Use   • Smoking status: Never Smoker   • Smokeless tobacco: Never Used   Vaping Use   • Vaping Use: Never used   Substance Use Topics   • Alcohol use: Yes     Comment: OCCASIONAL   • Drug use: No       Review of Systems   Constitutional: Negative for appetite change, chills, diaphoresis, fever and unexpected weight change  HENT: Negative for congestion and rhinorrhea  Eyes: Negative for photophobia and visual disturbance  Respiratory: Positive for shortness of breath   Negative for cough and chest tightness  Cardiovascular: Negative for chest pain, palpitations and leg swelling  Gastrointestinal: Negative for abdominal distention, abdominal pain, blood in stool, constipation, diarrhea, nausea and vomiting  Genitourinary: Negative for dysuria and hematuria  Musculoskeletal: Negative for back pain, joint swelling, neck pain and neck stiffness  Skin: Negative for color change, pallor, rash and wound  Neurological: Negative for dizziness, syncope, weakness, light-headedness and headaches  Psychiatric/Behavioral: Negative for agitation  All other systems reviewed and are negative  Physical Exam  Physical Exam  Vitals and nursing note reviewed  Constitutional:       General: He is not in acute distress  Appearance: Normal appearance  He is well-developed  He is not ill-appearing, toxic-appearing or diaphoretic  HENT:      Head: Normocephalic and atraumatic  Nose: Nose normal  No congestion or rhinorrhea  Mouth/Throat:      Mouth: Mucous membranes are moist       Pharynx: Oropharynx is clear  No oropharyngeal exudate or posterior oropharyngeal erythema  Eyes:      General: No scleral icterus  Right eye: No discharge  Left eye: No discharge  Extraocular Movements: Extraocular movements intact  Conjunctiva/sclera: Conjunctivae normal       Pupils: Pupils are equal, round, and reactive to light  Neck:      Vascular: No JVD  Trachea: No tracheal deviation  Comments: Supple  Normal range of motion  Cardiovascular:      Rate and Rhythm: Normal rate and regular rhythm  Heart sounds: Normal heart sounds  No murmur heard  No friction rub  No gallop  Comments: Normal rate and regular rhythm  Pulmonary:      Effort: Pulmonary effort is normal  No respiratory distress  Breath sounds: Normal breath sounds  No stridor  No wheezing or rales  Comments: Clear to auscultation bilaterally  Chest:      Chest wall: No tenderness  Abdominal:      General: Bowel sounds are normal  There is no distension  Palpations: Abdomen is soft  Tenderness: There is no abdominal tenderness  There is no right CVA tenderness, left CVA tenderness, guarding or rebound  Comments: Soft, nontender, nondistended  Normal bowel sounds throughout   Musculoskeletal:         General: No swelling, tenderness, deformity or signs of injury  Normal range of motion  Cervical back: Normal range of motion and neck supple  No rigidity  No muscular tenderness  Right lower leg: No edema  Left lower leg: No edema  Lymphadenopathy:      Cervical: No cervical adenopathy  Skin:     General: Skin is warm and dry  Coloration: Skin is not pale  Findings: No erythema or rash  Neurological:      General: No focal deficit present  Mental Status: He is alert  Mental status is at baseline  Sensory: No sensory deficit  Motor: No weakness or abnormal muscle tone  Coordination: Coordination normal       Gait: Gait normal       Comments: Alert  Strength and sensation grossly intact  Ambulatory without difficulty at baseline  Psychiatric:         Behavior: Behavior normal          Thought Content:  Thought content normal          Vital Signs  ED Triage Vitals   Temperature Pulse Respirations Blood Pressure SpO2   10/10/22 0858 10/10/22 0858 10/10/22 0858 10/10/22 0858 10/10/22 0858   98 8 °F (37 1 °C) 69 20 115/69 98 %      Temp Source Heart Rate Source Patient Position - Orthostatic VS BP Location FiO2 (%)   10/11/22 0620 10/10/22 1030 10/10/22 1030 10/10/22 1030 --   Oral Monitor Lying Right arm       Pain Score       10/10/22 1300       No Pain           Vitals:    10/10/22 1918 10/10/22 2248 10/11/22 0253 10/11/22 0620   BP: 110/63 103/60 (!) 100/49 116/68   Pulse: 62 64 58 62   Patient Position - Orthostatic VS:    Lying         Visual Acuity      ED Medications  Medications   metoprolol succinate (TOPROL-XL) 24 hr tablet 100 mg (100 mg Oral Given 10/10/22 1737)   lisinopril (ZESTRIL) tablet 10 mg (10 mg Oral Given 10/10/22 1619)   spironolactone (ALDACTONE) tablet 25 mg (25 mg Oral Not Given 10/10/22 1619)   traZODone (DESYREL) tablet 100 mg (100 mg Oral Given 10/10/22 2115)   rivaroxaban (XARELTO) tablet 20 mg (20 mg Oral Not Given 10/10/22 1620)       Diagnostic Studies  Results Reviewed     Procedure Component Value Units Date/Time    Basic metabolic panel [[id number]] Collected: 10/11/22 0445    Lab Status: Final result Specimen: Blood from Arm, Right Updated: 10/11/22 0505     Sodium 138 mmol/L      Potassium 4 0 mmol/L      Chloride 106 mmol/L      CO2 26 mmol/L      ANION GAP 6 mmol/L      BUN 10 mg/dL      Creatinine 0 96 mg/dL      Glucose 109 mg/dL      Calcium 8 5 mg/dL      eGFR 94 ml/min/1 73sq m     Narrative:      [place] guidelines for Chronic Kidney Disease (CKD):   •  Stage 1 with normal or high GFR (GFR > 90 mL/min/1 73 square meters)  •  Stage 2 Mild CKD (GFR = 60-89 mL/min/1 73 square meters)  •  Stage 3A Moderate CKD (GFR = 45-59 mL/min/1 73 square meters)  •  Stage 3B Moderate CKD (GFR = 30-44 mL/min/1 73 square meters)  •  Stage 4 Severe CKD (GFR = 15-29 mL/min/1 73 square meters)  •  Stage 5 End Stage CKD (GFR <15 mL/min/1 73 square meters)  Note: GFR calculation is accurate only with a steady state creatinine    CBC (With Platelets) [[id number]]  (Normal) Collected: 10/11/22 0445    Lab Status: Final result Specimen: Blood from Arm, Right Updated: 10/11/22 0451     WBC 5 78 Thousand/uL      RBC 4 58 Million/uL      Hemoglobin 13 9 g/dL      Hematocrit 42 5 %      MCV 93 fL      MCH 30 3 pg      MCHC 32 7 g/dL      RDW 13 9 %      Platelets 796 Thousands/uL      MPV 11 6 fL     HS Troponin I 4hr [[id number]]  (Abnormal) Collected: 10/10/22 1317    Lab Status: Final result Specimen: Blood from Arm, Left Updated: 10/10/22 1349     hs TnI 4hr 75 ng/L      Delta 4hr hsTnI -12 ng/L HS Troponin I 2hr [[id number]]  (Abnormal) Collected: 10/10/22 1153    Lab Status: Final result Specimen: Blood from Arm, Right Updated: 10/10/22 1227     hs TnI 2hr 80 ng/L      Delta 2hr hsTnI -7 ng/L     HS Troponin 0hr (reflex protocol) [[id number]]  (Abnormal) Collected: 10/10/22 0919    Lab Status: Final result Specimen: Blood from Arm, Left Updated: 10/10/22 0956     hs TnI 0hr 87 ng/L     Comprehensive metabolic panel [[id number]]  (Abnormal) Collected: 10/10/22 0919    Lab Status: Final result Specimen: Blood from Arm, Left Updated: 10/10/22 0950     Sodium 137 mmol/L      Potassium 4 1 mmol/L      Chloride 103 mmol/L      CO2 29 mmol/L      ANION GAP 5 mmol/L      BUN 13 mg/dL      Creatinine 1 11 mg/dL      Glucose 107 mg/dL      Calcium 8 7 mg/dL      AST 35 U/L      ALT 43 U/L      Alkaline Phosphatase 67 U/L      Total Protein 7 5 g/dL      Albumin 3 7 g/dL      Total Bilirubin 1 74 mg/dL      eGFR 79 ml/min/1 73sq m     Narrative:      [place] guidelines for Chronic Kidney Disease (CKD):   •  Stage 1 with normal or high GFR (GFR > 90 mL/min/1 73 square meters)  •  Stage 2 Mild CKD (GFR = 60-89 mL/min/1 73 square meters)  •  Stage 3A Moderate CKD (GFR = 45-59 mL/min/1 73 square meters)  •  Stage 3B Moderate CKD (GFR = 30-44 mL/min/1 73 square meters)  •  Stage 4 Severe CKD (GFR = 15-29 mL/min/1 73 square meters)  •  Stage 5 End Stage CKD (GFR <15 mL/min/1 73 square meters)  Note: GFR calculation is accurate only with a steady state creatinine    Magnesium [[id number]]  (Normal) Collected: 10/10/22 0919    Lab Status: Final result Specimen: Blood from Arm, Left Updated: 10/10/22 0950     Magnesium 2 0 mg/dL     CBC and differential [[id number]]  (Abnormal) Collected: 10/10/22 0919    Lab Status: Final result Specimen: Blood from Arm, Left Updated: 10/10/22 0945     WBC 7 17 Thousand/uL      RBC 4 77 Million/uL      Hemoglobin 14 5 g/dL      Hematocrit 44 7 %      MCV 94 fL MCH 30 4 pg      MCHC 32 4 g/dL      RDW 13 6 %      MPV 12 3 fL      Platelets 649 Thousands/uL      nRBC 0 /100 WBCs      Neutrophils Relative 58 %      Immat GRANS % 0 %      Lymphocytes Relative 26 %      Monocytes Relative 14 %      Eosinophils Relative 1 %      Basophils Relative 1 %      Neutrophils Absolute 4 19 Thousands/µL      Immature Grans Absolute 0 01 Thousand/uL      Lymphocytes Absolute 1 86 Thousands/µL      Monocytes Absolute 0 98 Thousand/µL      Eosinophils Absolute 0 09 Thousand/µL      Basophils Absolute 0 04 Thousands/µL                  XR chest 2 views   Final Result by Gio Pierre MD (10/10 0478)      No acute cardiopulmonary disease  Workstation performed: [id number]                    Procedures  Procedures         ED Course             HEART Risk Score    Flowsheet Row Most Recent Value   Heart Score Risk Calculator    History 0 Filed at: 10/11/2022 0700   ECG 1 Filed at: 10/11/2022 0700   Age 1 Filed at: 10/11/2022 0700   Risk Factors 2 Filed at: 10/11/2022 0700   Troponin 2 Filed at: 10/11/2022 0700   HEART Score 6 Filed at: 10/11/2022 0700                                      MDM  Number of Diagnoses or Management Options  Arrhythmia  Non-sustained ventricular tachycardia  Diagnosis management comments: 43-year-old male history of dilated cardiomyopathy, heart failure, AFib on Xarelto, hypertension presenting at request of cardiology for ICD firing for ventricular arrhythmia  History concerning for ventricular dysrhythmia  Plan for cardiac evaluation with trop ekg plus electrolytes  Cardiac monitoring  Cardiac consultation  ICD interrogation  Reassess  EKG sinus rhythm with nonspecific ST abnormalities  ICD interrogation with VF x2 with shock delivered  Labs with normal electrolytes and troponin 87  Discussed with Cardiology  Admission         Amount and/or Complexity of Data Reviewed  Clinical lab tests: reviewed and ordered  Tests in the radiology section of CPT®: ordered and reviewed  Tests in the medicine section of CPT®: reviewed and ordered  Review and summarize past medical records: yes  Independent visualization of images, tracings, or specimens: yes    Risk of Complications, Morbidity, and/or Mortality  Presenting problems: high  Diagnostic procedures: high  Management options: high    Patient Progress  Patient progress: improved      Disposition  Final diagnoses:   Arrhythmia   Non-sustained ventricular tachycardia   SOB (shortness of breath)     Time reflects when diagnosis was documented in both MDM as applicable and the Disposition within this note     Time User Action Codes Description Comment    10/10/2022  9:01 AM Penaloza Terese Add [I49 9] Arrhythmia     10/10/2022  9:01 AM Penaloza Terese Add [I47 29] Non-sustained ventricular tachycardia     10/10/2022  9:01 AM Penaloza Terese Modify [I49 9] Arrhythmia     10/10/2022  9:01 AM Penaloza Terese Modify [I47 29] Non-sustained ventricular tachycardia     10/11/2022  7:05 AM Penaloza Terese Add [R06 02] SOB (shortness of breath)       ED Disposition     ED Disposition   Admit    Condition   Stable    Date/Time   Mon Oct 10, 2022 12:15 PM    Comment   Case was discussed with TYREL and the patient's admission status was agreed to be Admission Status: observation status to the service of Dr Mariam Christiansen   Follow-up Information    None         Current Discharge Medication List      CONTINUE these medications which have NOT CHANGED    Details   enalapril (VASOTEC) 10 mg tablet TAKE 1 TABLET BY MOUTH TWICE A DAY  Qty: 180 tablet, Refills: 1    Associated Diagnoses: Essential hypertension      spironolactone (ALDACTONE) 25 mg tablet Take 1 tablet (25 mg total) by mouth in the morning    Qty: 90 tablet, Refills: 3    Associated Diagnoses: Heart failure with reduced ejection fraction (HCC)      Xarelto 20 MG tablet TAKE 1 TABLET BY MOUTH EVERY DAY WITH DINNER  Qty: 90 tablet, Refills: 3    Associated Diagnoses: Paroxysmal A-fib (HCC)      naproxen (NAPROSYN) 250 mg tablet Take 250 mg by mouth as needed for mild pain      traZODone (DESYREL) 100 mg tablet Take 1 tablet (100 mg total) by mouth daily at bedtime  Qty: 90 tablet, Refills: 3    Comments: DX Code Needed    Associated Diagnoses: Primary insomnia             No discharge procedures on file      PDMP Review     None          ED Provider  Electronically Signed by           Law Dempsey MD  10/11/22 0627

## 2022-10-10 NOTE — TELEPHONE ENCOUNTER
"----- Message from Wilian Lovett sent at 10/10/2022  9:21 AM EDT -----  Sorry forgot to attach this          Yuridia TORRI Soliz MD; P Cardiology [facility]  2x shock; pt says he is overdue for an appt for you.                                Vadim Chapa  Cardiac EP device report  Order# [id number]  Ordering physician: Mihai Walter DO Study date: 10/09/2022    Name: Vadim Chapa                       : 1975  MRN: [id number]                       Age: 47 y.o.  Patient Status:           Gender: male    Vitals    Height Weight BSA (Calculated - m2) BP Pulse          PACS Images     Show images for Cardiac EP device report    Indications    Not on file    Cardiac EP device report  Order: [id number]  Status: Final result      Visible to patient: Yes (not seen)      0 Result Notes      Details    Narrative  MDT-SINGLE CHAMBER ICD/ACTIVE SYSTEM IS MRI CONDITIONAL   NB/SHOCK-CARELINK TRANSMISSION: 2 DEVICE CLASSIFIED VF TREATED W/2 ATP & 2 SHOCKS; AVAIL EGRAMS PRESENT AS VT @ 240 BPM. PT STATES HE WAS \"SUPER TIRED, HAD AN ALCOHOL BEVERAGE(S), NOT ENOUGH WATER, & SALTY FOOD.\" HE DID FEEL IS LAST ABOUT 3 HRS. FELT FINE FOLLOWING DAY ASIDE FOR BEING TIRED. 80 VT-NS NOTED; NO THERAPIES GIVEN; AVAIL EGRAMS PRESENT AS RVR (SIMILAR MORPHOLOGY TO PRESENTING) W/RATES >170 BPM. 5 AF NOTED; 0% BURDEN. PT ON XARELTO & COREG. EF 25% ().  MDC & DT MADE AWARE. OPTI-VOL WITHIN NORMAL LIMITS. BATTERY STATUS \"7 YRS.\"  0%. ALL AVAILABLE LEAD PARAMETERS WITHIN NORMAL LIMITS. NC            Specimen Collected: 10/09/22  3:51 AM Last Resulted: 10/09/22  3:51 AM      Order Details       View Encounter       Lab and Collection Details       Routing       Result History            Linked Documents       View Image   View Image      Result Care Coordination        Patient Communication       Released  Not seen Back to Top        MUSE LINK     View MUSE Strips    Printable Result Report    Result " "Report    Interpretation Summary    MDT-SINGLE CHAMBER ICD/ACTIVE SYSTEM IS MRI CONDITIONAL   NB/SHOCK-CARELINK TRANSMISSION: 2 DEVICE CLASSIFIED VF TREATED W/2 ATP & 2 SHOCKS; AVAIL EGRAMS PRESENT AS VT @ 240 BPM. PT STATES HE WAS \"SUPER TIRED, HAD AN ALCOHOL BEVERAGE(S), NOT ENOUGH WATER, & SALTY FOOD.\" HE DID FEEL IS LAST ABOUT 3 HRS. FELT FINE FOLLOWING DAY ASIDE FOR BEING TIRED. 80 VT-NS NOTED; NO THERAPIES GIVEN; AVAIL EGRAMS PRESENT AS RVR (SIMILAR MORPHOLOGY TO PRESENTING) W/RATES >170 BPM. 5 AF NOTED; 0% BURDEN. PT ON XARELTO & COREG. EF 25% (2021).  MDC & DT MADE AWARE. OPTI-VOL WITHIN NORMAL LIMITS. BATTERY STATUS \"7 YRS.\"  0%. ALL AVAILABLE LEAD PARAMETERS WITHIN NORMAL LIMITS. NC          "

## 2022-10-10 NOTE — H&P
[facility]  H&P- Mame Sebastian 1975, 56 y o  male MRN: [id number]  Unit/Bed#: -01 Encounter: [id number]  Primary Care Provider: Mike Lloyd PA-C   Date and time admitted to hospital: 10/10/2022  8:56 AM    * ICD (implantable cardioverter-defibrillator) discharge  Assessment & Plan  · Found to have Afib vs Vtach  · Cardiology consulted  · ICD interrogated  · BB changed from coreg to toprol  · Will need eval for ablation  · Maintain on telmetry  · Keep K+>4 and Mg>2    Dilated cardiomyopathy ([address] )  Assessment & Plan  · No acute exacerbation of CMP  · Not on diuretics  · As per records, Etiology: Nonischemic, etiology unclear  Reports occasional binge drinking in the past but not regularly  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  Paroxysmal atrial fibrillation (HCC)  Assessment & Plan  · Cont AC  · Consideration for ablation, cardiology to discuss with EP  · Monitor on tele  · Cont BB    VTE Pharmacologic Prophylaxis: VTE Score: 3 Moderate Risk (Score 3-4) - Pharmacological DVT Prophylaxis Ordered: rivaroxaban (Xarelto)  Code Status: Level 1 - Full Code   Discussion with family: no family at bedside  will call later  Anticipated Length of Stay: Patient will be admitted on an observation basis with an anticipated length of stay of less than 2 midnights secondary to need for cardiology workup for ICD discharge  Total Time for Visit, including Counseling / Coordination of Care: 45 minutes Greater than 50% of this total time spent on direct patient counseling and coordination of care  Chief Complaint: ICD discharge    History of Present Illness:  Mame Sebastian is a 56 y o  male with a PMH of NICM, ICD who presents with NICM, presents with ICD discharge while pt was resting and trying to sleep  Denies cp, SOB, lightheadedness, dizziness, abd pain, seizures, fever  Review of Systems:  Review of Systems   Constitutional: Negative for chills and fever     HENT: Negative for ear pain and sore throat  Eyes: Negative for pain and visual disturbance  Respiratory: Negative for cough and shortness of breath  Cardiovascular: Negative for chest pain and palpitations  ICD shock   Gastrointestinal: Negative for abdominal pain and vomiting  Genitourinary: Negative for dysuria and hematuria  Musculoskeletal: Negative for arthralgias and back pain  Skin: Negative for color change and rash  Neurological: Negative for seizures and syncope  All other systems reviewed and are negative  Past Medical and Surgical History:   Past Medical History:   Diagnosis Date   • A-fib [facility]) 2020   • AICD (automatic cardioverter/defibrillator) present    • CHF (congestive heart failure) ([facility] [address] )    • Hypertension    • Myocarditis ([facility] [address] )    • Sleep apnea    • Ventricular tachycardia        Past Surgical History:   Procedure Laterality Date   • CARDIAC DEFIBRILLATOR PLACEMENT     • NE ESOPHAGOGASTRODUODENOSCOPY TRANSORAL DIAGNOSTIC N/A 3/29/2017    Procedure: ESOPHAGOGASTRODUODENOSCOPY (EGD); Surgeon: Vlad Chapman MD;  Location: [facility]; Service: Gastroenterology   • NE INSJ/RPLCMT PERM DFB W/TRNSVNS LDS 1/DUAL CHMBR N/A 6/2/2017    Procedure: LEAD EXTRACTION/REIMPLANTATION AND ICD GENERATOR CHANGE ;  Surgeon: Richard Newton MD;  Location:  MAIN OR;  Service: Cardiology   • NE LAP,CHOLECYSTECTOMY N/A 5/19/2017    Procedure: Yoel Salvador ;  Surgeon: Lidia Read MD;  Location: [facility];  Service: General   • TONSILLECTOMY     • TYMPANOSTOMY TUBE PLACEMENT         Meds/Allergies:  Prior to Admission medications    Medication Sig Start Date End Date Taking? Authorizing Provider   enalapril (VASOTEC) 10 mg tablet TAKE 1 TABLET BY MOUTH TWICE A DAY 5/20/22  Yes Elisabeth Barrera MD   spironolactone (ALDACTONE) 25 mg tablet Take 1 tablet (25 mg total) by mouth in the morning   5/12/22  Yes Elisabeth Barrera MD   Xarelto 20 MG tablet TAKE 1 TABLET BY MOUTH EVERY DAY WITH DINNER 10/13/21  Yes Moi FriendLUCHO   naproxen (NAPROSYN) 250 mg tablet Take 250 mg by mouth as needed for mild pain  Patient not taking: No sig reported    Historical Provider, MD   traZODone (DESYREL) 100 mg tablet Take 1 tablet (100 mg total) by mouth daily at bedtime 7/12/21   Mar yBeth Pickett PA-C   carvedilol (COREG) 25 mg tablet TAKE 1 5 TABLETS (37 5 MG TOTAL) BY MOUTH 2 (TWO) TIMES A DAY  6/10/22 10/10/22  Deb Puga MD     I have reviewed home medications with patient personally  Allergies:    Allergies   Allergen Reactions   • Vicodin [Hydrocodone-Acetaminophen] Other (See Comments)     "i get nasty"       Social History:  Marital Status: Single     Substance Use History:   Social History     Substance and Sexual Activity   Alcohol Use Yes    Comment: OCCASIONAL     Social History     Tobacco Use   Smoking Status Never Smoker   Smokeless Tobacco Never Used     Social History     Substance and Sexual Activity   Drug Use No       Family History:  Family History   Problem Relation Age of Onset   • No Known Problems Mother    • No Known Problems Father        Physical Exam:     Vitals:   Blood Pressure: 110/70 (10/10/22 1434)  Pulse: 56 (10/10/22 1434)  Temperature: 98 2 °F (36 8 °C) (10/10/22 1434)  Respirations: 15 (10/10/22 1433)  Height: 6' 2" (188 cm) (10/10/22 1300)  Weight - Scale: (!) 141 kg (310 lb) (10/10/22 1347)  SpO2: 96 % (10/10/22 1434)    Physical Exam General- Awake, alert and oriented x 3, looks comfortable, morbid obese  HEENT- Normocephalic, atraumatic, oral mucosa- moist  Neck- Supple, No carotid bruit, no JVD  CVS- Normal S1/ S2, Regular rate and rhythm, No murmur, No edema  Respiratory system- B/L clear breath sounds, no wheezing  Abdomen- Soft, Non distended, no tenderness, Bowel sound- present 4 quads  Genitourinary- No suprapubic tenderness, No CVA tenderness  Skin- No new bruise or rash  Musculoskeletal- No gross deformity  Psych- No acute psychosis  CNS- CN II- XII grossly intact, No acute focal neurologic deficit noted      Additional Data:     Lab Results:  Results from last 7 days   Lab Units 10/10/22  0919   WBC Thousand/uL 7 17   HEMOGLOBIN g/dL 14 5   HEMATOCRIT % 44 7   PLATELETS Thousands/uL 167   NEUTROS PCT % 58   LYMPHS PCT % 26   MONOS PCT % 14*   EOS PCT % 1     Results from last 7 days   Lab Units 10/10/22  0919   SODIUM mmol/L 137   POTASSIUM mmol/L 4 1   CHLORIDE mmol/L 103   CO2 mmol/L 29   BUN mg/dL 13   CREATININE mg/dL 1 11   ANION GAP mmol/L 5   CALCIUM mg/dL 8 7   ALBUMIN g/dL 3 7   TOTAL BILIRUBIN mg/dL 1 74*   ALK PHOS U/L 67   ALT U/L 43   AST U/L 35   GLUCOSE RANDOM mg/dL 107                       Imaging: Reviewed radiology reports from this admission including: chest xray  XR chest 2 views   Final Result by Ivette Fregoso MD (10/10 2567)      No acute cardiopulmonary disease  Workstation performed: [id number]             EKG and Other Studies Reviewed on Admission:   · EKG: NSR, T wave changes: cardiology following  ** Please Note: This note has been constructed using a voice recognition system   **

## 2022-10-10 NOTE — Clinical Note
Case was discussed with TYREL and the patient's admission status was agreed to be Admission Status: observation status to the service of

## 2022-10-10 NOTE — TELEPHONE ENCOUNTER
----- Message from Wilian Lovett sent at 10/10/2022  9:15 AM EDT -----  Pt follows in [place]. Forwarded to [place]

## 2022-10-11 ENCOUNTER — TELEPHONE (OUTPATIENT)
Dept: CARDIOLOGY CLINIC | Facility: CLINIC | Age: 47
End: 2022-10-11

## 2022-10-11 VITALS
HEART RATE: 61 BPM | WEIGHT: 309.08 LBS | RESPIRATION RATE: 17 BRPM | OXYGEN SATURATION: 95 % | SYSTOLIC BLOOD PRESSURE: 129 MMHG | BODY MASS INDEX: 39.67 KG/M2 | TEMPERATURE: 98.2 F | HEIGHT: 74 IN | DIASTOLIC BLOOD PRESSURE: 72 MMHG

## 2022-10-11 LAB
ANION GAP SERPL CALCULATED.3IONS-SCNC: 6 MMOL/L (ref 4–13)
ATRIAL RATE: 72 BPM
BUN SERPL-MCNC: 10 MG/DL (ref 5–25)
CALCIUM SERPL-MCNC: 8.5 MG/DL (ref 8.3–10.1)
CHLORIDE SERPL-SCNC: 106 MMOL/L (ref 96–108)
CO2 SERPL-SCNC: 26 MMOL/L (ref 21–32)
CREAT SERPL-MCNC: 0.96 MG/DL (ref 0.6–1.3)
ERYTHROCYTE [DISTWIDTH] IN BLOOD BY AUTOMATED COUNT: 13.9 % (ref 11.6–15.1)
GFR SERPL CREATININE-BSD FRML MDRD: 94 ML/MIN/1.73SQ M
GLUCOSE SERPL-MCNC: 109 MG/DL (ref 65–140)
HCT VFR BLD AUTO: 42.5 % (ref 36.5–49.3)
HGB BLD-MCNC: 13.9 G/DL (ref 12–17)
MCH RBC QN AUTO: 30.3 PG (ref 26.8–34.3)
MCHC RBC AUTO-ENTMCNC: 32.7 G/DL (ref 31.4–37.4)
MCV RBC AUTO: 93 FL (ref 82–98)
P AXIS: 62 DEGREES
PLATELET # BLD AUTO: 152 THOUSANDS/UL (ref 149–390)
PMV BLD AUTO: 11.6 FL (ref 8.9–12.7)
POTASSIUM SERPL-SCNC: 4 MMOL/L (ref 3.5–5.3)
PR INTERVAL: 170 MS
QRS AXIS: 63 DEGREES
QRSD INTERVAL: 92 MS
QT INTERVAL: 378 MS
QTC INTERVAL: 413 MS
RBC # BLD AUTO: 4.58 MILLION/UL (ref 3.88–5.62)
SODIUM SERPL-SCNC: 138 MMOL/L (ref 135–147)
T WAVE AXIS: -8 DEGREES
VENTRICULAR RATE: 72 BPM
WBC # BLD AUTO: 5.78 THOUSAND/UL (ref 4.31–10.16)

## 2022-10-11 PROCEDURE — 99214 OFFICE O/P EST MOD 30 MIN: CPT | Performed by: INTERNAL MEDICINE

## 2022-10-11 PROCEDURE — 99217 PR OBSERVATION CARE DISCHARGE MANAGEMENT: CPT | Performed by: INTERNAL MEDICINE

## 2022-10-11 PROCEDURE — 85027 COMPLETE CBC AUTOMATED: CPT | Performed by: FAMILY MEDICINE

## 2022-10-11 PROCEDURE — 93010 ELECTROCARDIOGRAM REPORT: CPT | Performed by: INTERNAL MEDICINE

## 2022-10-11 PROCEDURE — 80048 BASIC METABOLIC PNL TOTAL CA: CPT | Performed by: FAMILY MEDICINE

## 2022-10-11 RX ORDER — METOPROLOL SUCCINATE 100 MG/1
100 TABLET, EXTENDED RELEASE ORAL 2 TIMES DAILY
Qty: 60 TABLET | Refills: 0 | Status: SHIPPED | OUTPATIENT
Start: 2022-10-11 | End: 2022-11-10

## 2022-10-11 RX ORDER — METOPROLOL SUCCINATE 100 MG/1
100 TABLET, EXTENDED RELEASE ORAL 2 TIMES DAILY
Status: DISCONTINUED | OUTPATIENT
Start: 2022-10-11 | End: 2022-10-11 | Stop reason: HOSPADM

## 2022-10-11 RX ORDER — METOPROLOL SUCCINATE 50 MG/1
50 TABLET, EXTENDED RELEASE ORAL 2 TIMES DAILY
Status: DISCONTINUED | OUTPATIENT
Start: 2022-10-11 | End: 2022-10-11

## 2022-10-11 RX ADMIN — METOPROLOL SUCCINATE 100 MG: 100 TABLET, EXTENDED RELEASE ORAL at 09:31

## 2022-10-11 RX ADMIN — LISINOPRIL 10 MG: 10 TABLET ORAL at 09:31

## 2022-10-11 RX ADMIN — SPIRONOLACTONE 25 MG: 25 TABLET ORAL at 09:31

## 2022-10-11 NOTE — DISCHARGE SUMMARY
[facility]  Discharge- Ladona Head 1975, 56 y o  male MRN: [id number]  Unit/Bed#: -01 Encounter: [id number]  Primary Care Provider: Esteban Gutierrez PA-C   Date and time admitted to hospital: 10/10/2022  8:56 AM    * ICD (implantable cardioverter-defibrillator) discharge  Assessment & Plan  · Found to have Afib vs Vtach  · Cardiology consulted  · ICD interrogated  · Will discharge home on Toprol 100 mg b i d   · Follow-up with EP as outpatient for further evaluation and management    Paroxysmal [facility] fibrillation ([facility] )  Assessment & Plan  · Continue anticoagulation  · Follow-up with EP as outpatient  · No significant events on telemetry  · Continue Toprol  mg b i d  · Cleared for discharge today    Dilated cardiomyopathy ([facility] )  Assessment & Plan  · No acute exacerbation of CMP  · Not on diuretics  · As per records, Etiology: Nonischemic, etiology unclear  Reports occasional binge drinking in the past but not regularly  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  · Continue outpatient follow-up with Heart failure Team  · Continue home Ace/Arb, BB, spironolactone      Medical Problems             Resolved Problems  Date Reviewed: 10/10/2022   None               Discharging Physician / Practitioner: Serg English  PCP: Esteban Gutierrez PA-C  Admission Date:   Admission Orders (From admission, onward)     Ordered        10/10/22 1215  Place in Observation  Once                      Discharge Date: 10/11/22    Consultations During Hospital Stay:  · Cardiology    Procedures Performed:   · NA    Significant Findings / Test Results:   XR chest 2 views    Result Date: 10/10/2022  Impression: No acute cardiopulmonary disease  Workstation performed: [id number]       XR chest 2 views    Result Date: 10/10/2022  Impression No acute cardiopulmonary disease   Workstation performed: [id number]   ·      Incidental Findings:   · NA     Test Results Pending at Discharge (will require follow up):   · NA     Outpatient Tests Requested:  · Follow-up with PCP/cardiology for further outpatient testing    Complications:  None    Reason for Admission:  abnormal ICD discharge    Hospital Course:   Rajeev Chaudhary is a 56 y o  male patient who originally presented to the hospital on 10/10/2022 due to abnormal ICD discharge  Patient was seen evaluated by Cardiology  Patient was closely monitored on telemetry  Patient's ICD was interrogated  Patient's medications were adjusted, Coreg was switched to Toprol-XL with good response  Patient was cleared by Cardiology for discharge today  He will follow-up with EP as outpatient  He will also follow up with his heart failure team as outpatient  Patient amenable to plans  Stable for discharge today  Please see above list of diagnoses and related plan for additional information  Condition at Discharge: stable    Discharge Day Visit / Exam:   Subjective:  Denies any chest pain or shortness of breath  Doug Salm to go home today  Vitals: Blood Pressure: 129/72 (10/11/22 1100)  Pulse: 61 (10/11/22 1100)  Temperature: 98 2 °F (36 8 °C) (10/11/22 1052)  Temp Source: Oral (10/11/22 0933)  Respirations: 17 (10/11/22 1052)  Height: 6' 2" (188 cm) (10/10/22 1300)  Weight - Scale: (!) 140 kg (309 lb 1 4 oz) (10/11/22 0552)  SpO2: 95 % (10/11/22 1100)  Exam:   Physical Exam  Vitals and nursing note reviewed  Constitutional:       General: He is not in acute distress  Appearance: He is well-developed  He is obese  He is not toxic-appearing or diaphoretic  HENT:      Head: Normocephalic and atraumatic  Nose: Nose normal       Mouth/Throat:      Mouth: Mucous membranes are moist       Pharynx: Oropharynx is clear  Eyes:      General: No scleral icterus  Conjunctiva/sclera: Conjunctivae normal       Pupils: Pupils are equal, round, and reactive to light  Neck:      Vascular: No JVD     Cardiovascular:      Rate and Rhythm: Normal rate and regular rhythm  Pulses: Normal pulses  Heart sounds: Normal heart sounds  Pulmonary:      Effort: Pulmonary effort is normal  No respiratory distress  Breath sounds: Normal breath sounds  No wheezing  Abdominal:      General: Bowel sounds are normal       Palpations: Abdomen is soft  Tenderness: There is no abdominal tenderness  There is no guarding or rebound  Musculoskeletal:         General: No swelling, tenderness or deformity  Normal range of motion  Cervical back: Normal range of motion and neck supple  No rigidity  Lymphadenopathy:      Cervical: No cervical adenopathy  Skin:     General: Skin is warm and dry  Findings: No erythema or rash  Neurological:      General: No focal deficit present  Mental Status: He is alert and oriented to person, place, and time  Mental status is at baseline  Motor: No weakness or abnormal muscle tone  Psychiatric:         Mood and Affect: Mood normal          Behavior: Behavior normal          Thought Content: Thought content normal          Judgment: Judgment normal          Discussion with Family: Patient declined call to   Discharge instructions/Information to patient and family:   See after visit summary for information provided to patient and family  Provisions for Follow-Up Care:  See after visit summary for information related to follow-up care and any pertinent home health orders  Disposition:   Home    Planned Readmission: NA     Discharge Statement:  I spent 45 minutes discharging the patient  This time was spent on the day of discharge  I had direct contact with the patient on the day of discharge  Greater than 50% of the total time was spent examining patient, answering all patient questions, arranging and discussing plan of care with patient as well as directly providing post-discharge instructions  Additional time then spent on discharge activities      Discharge Medications:  See after visit summary for reconciled discharge medications provided to patient and/or family        **Please Note: This note may have been constructed using a voice recognition system**

## 2022-10-11 NOTE — PROGRESS NOTES
Cardiology Progress Note - Tyrell Umanzor 56 y o  male MRN: [id number]    Unit/Bed#: -01 Encounter: [id number]      Assessment/Plan:  1  Defibrillator discharge x2  · Device interrogation report shows ventricular tachycardia, however difficult to differentiate from AFib with RVR ; History of paroxysmal AFib  · No episodes noted on telemetry  · Continue Toprol  mg b i d  · Recommend cessation of alcohol use  · Follow up with Dr Jonas Wilson and Dr Gricel Pedersen at first available appointment    2  Nonischemic dilated cardiomyopathy   · S/p single-chamber ICD  · Echo EF 25% (10/11/2021)  · Continue Toprol  mg b i d  · Recommend cessation of alcohol use  · Follows with Dr Gricel Pedersen at the [facility]    3  Paroxysmal atrial fibrillation   • Currently normal sinus rhythm  • Continue rate control with Toprol  mg b i d  · Continue Xaretlo 20 mg    4  Hypertension  Currently 114/68; Continue to monitor  • Continue Toprol  mg b i d  • Hold Enalapril and Aldactone at this time    Patient clear for discharge from cardiology perspective at this time on Toprol  mg BID  Follow up with Dr Jonas Wilson and Dr Gricel Pedersen at first available appointment  Subjective:   Patient seen and examined  No significant events overnight  Patient feels well at this time and denies new complaints  Objective:     Vitals: Blood pressure 129/72, pulse 68, temperature 98 2 °F (36 8 °C), resp  rate 17, height 6' 2" (1 88 m), weight (!) 140 kg (309 lb 1 4 oz), SpO2 96 %  , Body mass index is 39 68 kg/m² ,   Orthostatic Blood Pressures    Flowsheet Row Most Recent Value   Blood Pressure 129/72 filed at 10/11/2022 1052   Patient Position - Orthostatic VS Lying filed at 10/11/2022 0933            Intake/Output Summary (Last 24 hours) at 10/11/2022 1134  Last data filed at 10/11/2022 0933  Gross per 24 hour   Intake 675 ml   Output --   Net 675 ml         Physical Exam:  Physical Exam  Vitals and nursing note reviewed  Constitutional:       Appearance: He is well-developed  HENT:      Head: Normocephalic and atraumatic  Eyes:      Conjunctiva/sclera: Conjunctivae normal    Cardiovascular:      Rate and Rhythm: Normal rate and regular rhythm  Pulses: Normal pulses  Heart sounds: Normal heart sounds  No murmur heard  Pulmonary:      Effort: Pulmonary effort is normal  No respiratory distress  Breath sounds: Normal breath sounds  No wheezing or rales  Chest:      Chest wall: No tenderness  Abdominal:      Palpations: Abdomen is soft  Tenderness: There is no abdominal tenderness  Musculoskeletal:      Cervical back: Neck supple  Skin:     General: Skin is warm and dry  Neurological:      Mental Status: He is alert     Psychiatric:         Mood and Affect: Mood normal               Medications:      Current Facility-Administered Medications:   •  lisinopril (ZESTRIL) tablet 10 mg, 10 mg, Oral, Daily, Sheeba Sky MD, 10 mg at 10/11/22 6017  •  metoprolol succinate (TOPROL-XL) 24 hr tablet 100 mg, 100 mg, Oral, BID, Marcello Urbano MD  •  rivaroxaban (XARELTO) tablet 20 mg, 20 mg, Oral, Daily With Janis Adams MD  •  spironolactone (ALDACTONE) tablet 25 mg, 25 mg, Oral, Daily, Sheeba Sky MD, 25 mg at 10/11/22 0931  •  traZODone (DESYREL) tablet 100 mg, 100 mg, Oral, HS, Sheeba Sky MD, 100 mg at 10/10/22 2115     Labs & Results:     Results from last 7 days   Lab Units 10/10/22  1317 10/10/22  1153 10/10/22  0919   HS TNI 0HR ng/L  --   --  87*   HS TNI 2HR ng/L  --  80*  --    HSTNI D2 ng/L  --  -7  --    HS TNI 4HR ng/L 75*  --   --    HSTNI D4 ng/L -12  --   --      Results from last 7 days   Lab Units 10/11/22  0445 10/10/22  0919   WBC Thousand/uL 5 78 7 17   HEMOGLOBIN g/dL 13 9 14 5   HEMATOCRIT % 42 5 44 7   PLATELETS Thousands/uL 152 167         Results from last 7 days   Lab Units 10/11/22  0445 10/10/22  0919   POTASSIUM mmol/L 4 0 4 1   CHLORIDE mmol/L 106 103   CO2 mmol/L 26 29 BUN mg/dL 10 13   CREATININE mg/dL 0 96 1 11   CALCIUM mg/dL 8 5 8 7   ALK PHOS U/L  --  67   ALT U/L  --  43   AST U/L  --  35         Results from last 7 days   Lab Units 10/10/22  0919   MAGNESIUM mg/dL 2 0       Vitals: Blood pressure 129/72, pulse 68, temperature 98 2 °F (36 8 °C), resp  rate 17, height 6' 2" (1 88 m), weight (!) 140 kg (309 lb 1 4 oz), SpO2 96 %  , Body mass index is 39 68 kg/m² ,   Orthostatic Blood Pressures    Flowsheet Row Most Recent Value   Blood Pressure 129/72 filed at 10/11/2022 1052   Patient Position - Orthostatic VS Lying filed at 10/11/2022 4664          Systolic (32ENH), MLD:470 , Min:100 , KMI:881     Diastolic (03HEI), CPL:07, Min:49, Max:72        Intake/Output Summary (Last 24 hours) at 10/11/2022 1134  Last data filed at 10/11/2022 1594  Gross per 24 hour   Intake 675 ml   Output --   Net 675 ml       Invasive Devices  Report    Peripheral Intravenous Line  Duration           Peripheral IV 10/11/21 Distal;Right;Upper;Ventral (anterior) Arm 365 days    Peripheral IV 10/10/22 Left Antecubital 1 day                  EKG: Normal sinus rhythm; Possible Inferior infarct , age undetermined    Telemetry:  Telemetry Orders (From admission, onward)             50 Hour Telemetry Monitoring  Continuous x 48 hours        References:    Telemetry Guidelines   Question:  Reason for 48 Hour Telemetry  Answer:  Arrhythmias Requiring Medical Therapy (eg  SVT, Vtach/fib, Bradycardia, Uncontrolled A-fib)                 Telemetry Reviewed: Normal Sinus Rhythm  Indication for Continued Telemetry Use: Arrthymias requiring medical therapy    BP Readings from Last 3 Encounters:   10/11/22 129/72   05/12/22 124/74   09/09/21 115/80      Wt Readings from Last 3 Encounters:   10/11/22 (!) 140 kg (309 lb 1 4 oz)   05/12/22 (!) 146 kg (322 lb)   09/09/21 (!) 141 kg (311 lb 12 8 oz)

## 2022-10-11 NOTE — ASSESSMENT & PLAN NOTE
· Continue anticoagulation  · Follow-up with EP as outpatient  · No significant events on telemetry  · Continue Toprol  mg b i d    · Cleared for discharge today

## 2022-10-11 NOTE — ASSESSMENT & PLAN NOTE
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

## 2022-10-11 NOTE — TELEPHONE ENCOUNTER
----- Message from Greg Nino PA-C sent at 10/11/2022 11:55 AM EDT -----  Regarding: Hosp f/u  Patient clinical visit for first available appointment at the Cardio location: [place] office.    Schedule visit with Cardio [place] Providers: Dr. Soliz and Dr. Walter.    Type of Visit: VISIT TYPE: in-person office visit.    Additional Details: Hosp f/u defibrillator discharge x2

## 2022-10-11 NOTE — PLAN OF CARE
Problem: Potential for Falls  Goal: Patient will remain free of falls  Description: INTERVENTIONS:  - Educate patient/family on patient safety including physical limitations  - Instruct patient to call for assistance with activity   - Consult OT/PT to assist with strengthening/mobility   - Keep Call bell within reach  - Keep bed low and locked with side rails adjusted as appropriate  - Keep care items and personal belongings within reach  - Initiate and maintain comfort rounds  - Make Fall Risk Sign visible to staff  - Offer Toileting every  Hours, in advance of need  - Initiate/Maintain alarm  - Obtain necessary fall risk management equipment:   - Apply yellow socks and bracelet for high fall risk patients  - Consider moving patient to room near nurses station  Outcome: Progressing     Problem: PAIN - ADULT  Goal: Verbalizes/displays adequate comfort level or baseline comfort level  Description: Interventions:  - Encourage patient to monitor pain and request assistance  - Assess pain using appropriate pain scale  - Administer analgesics based on type and severity of pain and evaluate response  - Implement non-pharmacological measures as appropriate and evaluate response  - Consider cultural and social influences on pain and pain management  - Notify physician/advanced practitioner if interventions unsuccessful or patient reports new pain  Outcome: Progressing     Problem: INFECTION - ADULT  Goal: Absence or prevention of progression during hospitalization  Description: INTERVENTIONS:  - Assess and monitor for signs and symptoms of infection  - Monitor lab/diagnostic results  - Monitor all insertion sites, i e  indwelling lines, tubes, and drains  - Monitor endotracheal if appropriate and nasal secretions for changes in amount and color  - Independence appropriate cooling/warming therapies per order  - Administer medications as ordered  - Instruct and encourage patient and family to use good hand hygiene technique  - Identify and instruct in appropriate isolation precautions for identified infection/condition  Outcome: Progressing  Goal: Absence of fever/infection during neutropenic period  Description: INTERVENTIONS:  - Monitor WBC    Outcome: Progressing     Problem: SAFETY ADULT  Goal: Patient will remain free of falls  Description: INTERVENTIONS:  - Educate patient/family on patient safety including physical limitations  - Instruct patient to call for assistance with activity   - Consult OT/PT to assist with strengthening/mobility   - Keep Call bell within reach  - Keep bed low and locked with side rails adjusted as appropriate  - Keep care items and personal belongings within reach  - Initiate and maintain comfort rounds  - Make Fall Risk Sign visible to staff  - Offer Toileting every  Hours, in advance of need  - Initiate/Maintain alarm  - Obtain necessary fall risk management equipment:   - Apply yellow socks and bracelet for high fall risk patients  - Consider moving patient to room near nurses station  Outcome: Progressing  Goal: Maintain or return to baseline ADL function  Description: INTERVENTIONS:  -  Assess patient's ability to carry out ADLs; assess patient's baseline for ADL function and identify physical deficits which impact ability to perform ADLs (bathing, care of mouth/teeth, toileting, grooming, dressing, etc )  - Assess/evaluate cause of self-care deficits   - Assess range of motion  - Assess patient's mobility; develop plan if impaired  - Assess patient's need for assistive devices and provide as appropriate  - Encourage maximum independence but intervene and supervise when necessary  - Involve family in performance of ADLs  - Assess for home care needs following discharge   - Consider OT consult to assist with ADL evaluation and planning for discharge  - Provide patient education as appropriate  Outcome: Progressing  Goal: Maintains/Returns to pre admission functional level  Description: INTERVENTIONS:  - Perform BMAT or MOVE assessment daily    - Set and communicate daily mobility goal to care team and patient/family/caregiver  - Collaborate with rehabilitation services on mobility goals if consulted  - Perform Range of Motion  times a day  - Reposition patient every  hours  - Dangle patient  times a day  - Stand patient  times a day  - Ambulate patient  times a day  - Out of bed to chair  times a day   - Out of bed for meals  times a day  - Out of bed for toileting  - Record patient progress and toleration of activity level   Outcome: Progressing     Problem: DISCHARGE PLANNING  Goal: Discharge to home or other facility with appropriate resources  Description: INTERVENTIONS:  - Identify barriers to discharge w/patient and caregiver  - Arrange for needed discharge resources and transportation as appropriate  - Identify discharge learning needs (meds, wound care, etc )  - Arrange for interpretive services to assist at discharge as needed  - Refer to Case Management Department for coordinating discharge planning if the patient needs post-hospital services based on physician/advanced practitioner order or complex needs related to functional status, cognitive ability, or social support system  Outcome: Progressing     Problem: Knowledge Deficit  Goal: Patient/family/caregiver demonstrates understanding of disease process, treatment plan, medications, and discharge instructions  Description: Complete learning assessment and assess knowledge base    Interventions:  - Provide teaching at level of understanding  - Provide teaching via preferred learning methods  Outcome: Progressing

## 2022-10-11 NOTE — UTILIZATION REVIEW
Initial Clinical Review    Admission: Date/Time/Statement:   Admission Orders (From admission, onward)     Ordered        10/10/22 1215  Place in Observation  Once                      Orders Placed This Encounter   Procedures   • Place in Observation     Standing Status:   Standing     Number of Occurrences:   1     Order Specific Question:   Level of Care     Answer:   Med Surg [16]     ED Arrival Information     Expected   -    Arrival   10/10/2022 08:51    Acuity   Emergent            Means of arrival   Walk-In    Escorted by   Self    Service   Hospitalist    Admission type   Emergency            Arrival complaint   MEDICAL PROBLEM           Chief Complaint   Patient presents with   • Pacemaker Problem     Patient states"he received a call today about his ICD going off on Saturday"  Patient states"he felt dizziness on Saturday night around 1030pm that lasted until Sunday 130am"  Patient states"he was informed he had runs of Vtach"  Patient denies any chest pain or shortness of breath at this time  Initial Presentation: 56 y o  male sent to the ED for ICD firing as per cardiology  Admitted under observation for iC discharge, dilated cardiomyopathy  2 days  Prior, he felt dizzy, short of breath and felt like his device discharged  H/o CHF, afib, AICD, myocarditis, vtach  Arrives with GCS 15, lungs clear  BB changed from coreg to toprol  Monitor tele  Cardiology consult:  Reports dehydration, alocohol consumptin, salty steak prior to discharge  Continue with tele  DC core, start toprol  Continue with xarelto  Denies chest pain  ELevated troponin       Date:     Day 2:      ED Triage Vitals   Temperature Pulse Respirations Blood Pressure SpO2   10/10/22 0858 10/10/22 0858 10/10/22 0858 10/10/22 0858 10/10/22 0858   98 8 °F (37 1 °C) 69 20 115/69 98 %      Temp Source Heart Rate Source Patient Position - Orthostatic VS BP Location FiO2 (%)   10/11/22 0620 10/10/22 1030 10/10/22 1030 10/10/22 1030 -- Oral Monitor Lying Right arm       Pain Score       10/10/22 1300       No Pain          Wt Readings from Last 1 Encounters:   10/11/22 (!) 140 kg (309 lb 1 4 oz)     Additional Vital Signs:   Date/Time Temp Pulse Resp BP MAP (mmHg) SpO2 O2 Device Patient Position - Orthostatic VS   10/11/22 06:20:55 97 7 °F (36 5 °C) 62 18 116/68 84 98 % None (Room air) Lying   10/11/22 02:53:40 97 7 °F (36 5 °C) 58 17 100/49 Abnormal  66 95 % -- --   10/10/22 2314 -- -- -- -- -- 91 % -- --   10/10/22 22:48:58 98 °F (36 7 °C) 64 18 103/60 74 95 % -- --   10/10/22 19:18:55 98 2 °F (36 8 °C) 62 20 110/63 79 94 % -- --   10/10/22 17:38:12 -- 68 -- 111/63 79 95 % -- --   10/10/22 1737 -- 70 -- 111/63 -- -- -- --   10/10/22 16:18:38 -- 67 -- 112/62 79 96 % -- --   10/10/22 14:34:05 98 2 °F (36 8 °C) 56 -- 110/70 83 96 % -- --   10/10/22 14:33:17 98 2 °F (36 8 °C) 56 15 110/70 83 97 % -- --   10/10/22 1300 -- -- -- -- -- 97 % None (Room air) --   10/10/22 1230 -- 54 Abnormal  12 106/57 76 98 % None (Room air) Lying   10/10/22 1200 -- 66 12 111/53 77 97 % None (Room air) Lying   10/10/22 1100 -- 55 16 94/55 70 97 % None (Room air) Lying   10/10/22 1030 -- 65 15 102/55 76 98 % None (Room air) Lying   10/10/22 0858 98 8 °F (37 1 °C) 69 20 115/69 -- 98 % None (Room air) --       Pertinent Labs/Diagnostic Test Results:     10/9 AICD REPORT:  Details    Narrative  MDT-SINGLE CHAMBER ICD/ACTIVE SYSTEM IS MRI CONDITIONAL   NB/SHOCK-CARELINK TRANSMISSION: 2 DEVICE CLASSIFIED VF TREATED W/2 ATP & 2 SHOCKS; AVAIL EGRAMS PRESENT AS VT @ 240 BPM  PT STATES HE WAS "SUPER TIRED, HAD AN ALCOHOL BEVERAGE(S), NOT ENOUGH WATER, & SALTY FOOD " HE DID FEEL IS LAST ABOUT 3 HRS  FELT FINE FOLLOWING DAY ASIDE FOR BEING TIRED  80 VT-NS NOTED; NO THERAPIES GIVEN; AVAIL EGRAMS PRESENT AS RVR (SIMILAR MORPHOLOGY TO PRESENTING) W/RATES >170 BPM  5 AF NOTED; 0% BURDEN  PT ON [address]  EF 25% (2021)  DR  COPPER [facility] & DT MADE AWARE  OPTI-VOL WITHIN NORMAL LIMITS  BATTERY STATUS "7 YRS "  0%  ALL AVAILABLE LEAD PARAMETERS WITHIN NORMAL LIMITS  NC   XR chest 2 views   Final Result by Nicholas Garcia MD (10/10 8486)      No acute cardiopulmonary disease  Workstation performed: [id number]               Results from last 7 days   Lab Units 10/11/22  0445 10/10/22  0919   WBC Thousand/uL 5 78 7 17   HEMOGLOBIN g/dL 13 9 14 5   HEMATOCRIT % 42 5 44 7   PLATELETS Thousands/uL 152 167   NEUTROS ABS Thousands/µL  --  4 19         Results from last 7 days   Lab Units 10/11/22  0445 10/10/22  0919   SODIUM mmol/L 138 137   POTASSIUM mmol/L 4 0 4 1   CHLORIDE mmol/L 106 103   CO2 mmol/L 26 29   ANION GAP mmol/L 6 5   BUN mg/dL 10 13   CREATININE mg/dL 0 96 1 11   EGFR ml/min/1 73sq m 94 79   CALCIUM mg/dL 8 5 8 7   MAGNESIUM mg/dL  --  2 0     Results from last 7 days   Lab Units 10/10/22  0919   AST U/L 35   ALT U/L 43   ALK PHOS U/L 67   TOTAL PROTEIN g/dL 7 5   ALBUMIN g/dL 3 7   TOTAL BILIRUBIN mg/dL 1 74*         Results from last 7 days   Lab Units 10/11/22  0445 10/10/22  0919   GLUCOSE RANDOM mg/dL 109 107             Results from last 7 days   Lab Units 10/10/22  1317 10/10/22  1153 10/10/22  0919   HS TNI 0HR ng/L  --   --  87*   HS TNI 2HR ng/L  --  80*  --    HSTNI D2 ng/L  --  -7  --    HS TNI 4HR ng/L 75*  --   --    HSTNI D4 ng/L -12  --   --          Past Medical History:   Diagnosis Date   • [facility] 2020   • AICD (automatic cardioverter/defibrillator) present    • CHF (congestive heart failure) ([facility])    • Hypertension    • Myocarditis ([facility] )    • Sleep apnea    • Ventricular tachycardia        Admitting Diagnosis: Arrhythmia [I49 9]  Non-sustained ventricular tachycardia [V63 04]  Pacemaker complications [P78  9XXA]  Age/Sex: 56 y o  male  Admission Orders:    Scheduled Medications:  lisinopril, 10 mg, Oral, Daily  metoprolol succinate, 100 mg, Oral, BID  rivaroxaban, 20 mg, Oral, Daily With Dinner  spironolactone, 25 mg, Oral, Daily  traZODone, 100 mg, Oral, HS      Continuous IV Infusions:     PRN Meds:       IP CONSULT TO CARDIOLOGY    Network Utilization Review Department  ATTENTION: Please call with any questions or concerns to [phone] and carefully listen to the prompts so that you are directed to the right person  All voicemails are confidential   Jas Amaral all requests for admission clinical reviews, approved or denied determinations and any other requests to dedicated fax number below belonging to the campus where the patient is receiving treatment   List of dedicated fax numbers for the Facilities:  [address] DENIALS (Administrative/Medical Necessity) [phone]   [address] (Maternity/NICU/Pediatrics) [phone]   919 Elaine Ramos [phone]   Karen Jha 77 [phone]   1306 [facility] [place] [address] [address] Marti Gonsales 28 [phone]   [address]sey [address] [place] [address] 815 [facility] [phone]

## 2022-10-11 NOTE — ASSESSMENT & PLAN NOTE
· Found to have Afib vs Vtach  · Cardiology consulted  · ICD interrogated  · Will discharge home on Toprol 100 mg b i d   · Follow-up with EP as outpatient for further evaluation and management

## 2022-10-13 ENCOUNTER — TRANSITIONAL CARE MANAGEMENT (OUTPATIENT)
Dept: FAMILY MEDICINE CLINIC | Facility: CLINIC | Age: 47
End: 2022-10-13

## 2022-10-27 DIAGNOSIS — I48.0 PAROXYSMAL A-FIB (HCC): ICD-10-CM

## 2022-10-27 RX ORDER — RIVAROXABAN 20 MG/1
TABLET, FILM COATED ORAL
Qty: 90 TABLET | Refills: 3 | Status: SHIPPED | OUTPATIENT
Start: 2022-10-27

## 2022-11-08 ENCOUNTER — TELEPHONE (OUTPATIENT)
Dept: CARDIOLOGY CLINIC | Facility: CLINIC | Age: 47
End: 2022-11-08

## 2022-11-08 DIAGNOSIS — I47.29 NON-SUSTAINED VENTRICULAR TACHYCARDIA: ICD-10-CM

## 2022-11-08 RX ORDER — METOPROLOL SUCCINATE 100 MG/1
100 TABLET, EXTENDED RELEASE ORAL 2 TIMES DAILY
Qty: 60 TABLET | Refills: 5 | Status: SHIPPED | OUTPATIENT
Start: 2022-11-08 | End: 2022-11-16

## 2022-11-08 NOTE — TELEPHONE ENCOUNTER
Patient called to have his metoprolol 100mg bid renewed as will run out prior to his OV with you on 11/16/22  I did renew for patient  Patient, however, states that he has not been feeling well on the metoprolol (previously on carvedilol) due to postural lightheadedness and low HR's  Do you want to make any changes prior to next week's visit or just have him continue metoprolol at 100mg bid until then?

## 2022-11-16 ENCOUNTER — OFFICE VISIT (OUTPATIENT)
Dept: CARDIOLOGY CLINIC | Facility: CLINIC | Age: 47
End: 2022-11-16

## 2022-11-16 VITALS
OXYGEN SATURATION: 98 % | RESPIRATION RATE: 16 BRPM | WEIGHT: 315 LBS | BODY MASS INDEX: 40.43 KG/M2 | HEIGHT: 74 IN | DIASTOLIC BLOOD PRESSURE: 80 MMHG | SYSTOLIC BLOOD PRESSURE: 110 MMHG | HEART RATE: 61 BPM

## 2022-11-16 DIAGNOSIS — Z45.02 ICD (IMPLANTABLE CARDIOVERTER-DEFIBRILLATOR) DISCHARGE: ICD-10-CM

## 2022-11-16 DIAGNOSIS — I48.0 PAROXYSMAL ATRIAL FIBRILLATION (HCC): ICD-10-CM

## 2022-11-16 DIAGNOSIS — I10 HYPERTENSION, UNSPECIFIED TYPE: ICD-10-CM

## 2022-11-16 DIAGNOSIS — F51.01 PRIMARY INSOMNIA: ICD-10-CM

## 2022-11-16 DIAGNOSIS — I42.0 DILATED CARDIOMYOPATHY (HCC): Primary | ICD-10-CM

## 2022-11-16 RX ORDER — DIGOXIN 125 MCG
125 TABLET ORAL DAILY
Qty: 90 TABLET | Refills: 3 | Status: SHIPPED | OUTPATIENT
Start: 2022-11-16

## 2022-11-16 RX ORDER — CARVEDILOL 25 MG/1
37.5 TABLET ORAL 2 TIMES DAILY WITH MEALS
Qty: 270 TABLET | Refills: 3 | Status: SHIPPED | OUTPATIENT
Start: 2022-11-16

## 2022-11-16 RX ORDER — TRAZODONE HYDROCHLORIDE 100 MG/1
100 TABLET ORAL
Qty: 90 TABLET | Refills: 3 | Status: SHIPPED | OUTPATIENT
Start: 2022-11-16

## 2022-11-16 NOTE — PROGRESS NOTES
EPS Progress Note - Zulma Esteban 56 y o  male MRN: [id number]           ASSESSMENT:  1  Dilated cardiomyopathy (HCC)  carvedilol (COREG) 25 mg tablet      2  Paroxysmal atrial fibrillation (HCC)  POCT ECG    carvedilol (COREG) 25 mg tablet    digoxin (LANOXIN) 0 125 mg tablet      3  ICD (implantable cardioverter-defibrillator) discharge  carvedilol (COREG) 25 mg tablet      4  Hypertension, unspecified type  carvedilol (COREG) 25 mg tablet      5  Primary insomnia  traZODone (DESYREL) 100 mg tablet              PLAN:  1  Dilated Cardiomyopathy on appropriate medical therapy he feels much more tired on metoprolol than carvedilol some going to switch him back to carvedilol    2  PAF he does go rapidly when he is in atrial fibrillation and he received the ICD shock for rapid atrial fibrillation that changed to rapid ventricular tachycardia  I am going to start him on digoxin 125 mcg daily I explained to him this may also help with his fatigue    3  ICD working appropriately    4  Inability to sleep I renewed his trazodone form as he is running out of it and he is on a limited budget therefore currently would prefer not follow-up with his primary care physician however I indicated that should be reduced by primary care in the future  5  Alcohol use he understands that the day that he was shocked he had drank too much alcohol and he states he will not do that in the future    HPI:     Trish Anderson (Hillard Re)donal presents to the [facility] office today for follow up to hospital admission for abnormal ICD discharge on 10/10/2022  The patient was advised to go to the hospital for evaluation  Interim history  follow-up from ICD shock in September  He had had too much to drink that day alcohol was does not remember proximally how much this is an unusual thing for him    He went into rapid atrial fibrillation which turned into rapid ventricular tachycardia and he was appropriately shocked        ROS  12 point review of systems negative for chest pain, shortness of breath, palpitations, lightheadedness, dizziness, syncope  Objective:     Vitals:    Blood pressure 110/80, pulse 61, resp  rate 16, height 6' 2" (1 88 m), weight (!) 144 kg (317 lb), SpO2 98 %      Physical Exam:    GEN: [place] appears well, alert and oriented x 3, pleasant and cooperative   HEENT: pupils equal, round, and reactive to light; extraocular muscles intact  NECK: supple, no carotid bruits   HEART: regular rhythm, normal S1 and S2, no murmurs, clicks, gallops or rubs   LUNGS: clear to auscultation bilaterally; no wheezes, rales, or rhonchi   ABDOMEN: normal bowel sounds, soft, no tenderness, no distention  EXTREMITIES: peripheral pulses normal; no clubbing, cyanosis, or edema  NEURO: no focal findings   SKIN: normal without suspicious lesions on exposed skin    Medications:      Current Outpatient Medications:   •  carvedilol (COREG) 25 mg tablet, Take 1 5 tablets (37 5 mg total) by mouth 2 (two) times a day with meals, Disp: 270 tablet, Rfl: 3  •  digoxin (LANOXIN) 0 125 mg tablet, Take 1 tablet (125 mcg total) by mouth daily, Disp: 90 tablet, Rfl: 3  •  enalapril (VASOTEC) 10 mg tablet, TAKE 1 TABLET BY MOUTH TWICE A DAY, Disp: 180 tablet, Rfl: 1  •  spironolactone (ALDACTONE) 25 mg tablet, Take 1 tablet (25 mg total) by mouth in the morning , Disp: 90 tablet, Rfl: 3  •  traZODone (DESYREL) 100 mg tablet, Take 1 tablet (100 mg total) by mouth daily at bedtime, Disp: 90 tablet, Rfl: 3  •  Xarelto 20 MG tablet, TAKE 1 TABLET BY MOUTH EVERY DAY WITH DINNER, Disp: 90 tablet, Rfl: 3     Family History   Problem Relation Age of Onset   • No Known Problems Mother    • No Known Problems Father      Social History     Socioeconomic History   • Marital status: Single     Spouse name: Not on file   • Number of children: Not on file   • Years of education: Not on file   • Highest education level: Not on file   Occupational History   • Not on file Tobacco Use   • Smoking status: Never   • Smokeless tobacco: Never   Vaping Use   • Vaping Use: Never used   Substance and Sexual Activity   • Alcohol use: Yes     Comment: OCCASIONAL   • Drug use: No   • Sexual activity: Not on file   Other Topics Concern   • Not on file   Social History Narrative   • Not on file     Social Determinants of Health     Financial Resource Strain: Not on file   Food Insecurity: Not on file   Transportation Needs: Not on file   Physical Activity: Not on file   Stress: Not on file   Social Connections: Not on file   Intimate Partner Violence: Not on file   Housing Stability: Not on file     Social History     Tobacco Use   Smoking Status Never   Smokeless Tobacco Never     Social History     Substance and Sexual Activity   Alcohol Use Yes    Comment: OCCASIONAL       Labs & Results:  Below is the patient's most recent value for Albumin, ALT, AST, BUN, Calcium, Chloride, Cholesterol, CO2, Creatinine, GFR, Glucose, HDL, Hematocrit, Hemoglobin, Hemoglobin A1C, LDL, Magnesium, Phosphorus, Platelets, Potassium, PSA, Sodium, Triglycerides, and WBC  Lab Results   Component Value Date    ALT 43 10/10/2022    AST 35 10/10/2022    BUN 10 10/11/2022    CALCIUM 8 5 10/11/2022     10/11/2022    CO2 26 10/11/2022    CREATININE 0 96 10/11/2022    HDL 48 09/07/2017    HCT 42 5 10/11/2022    HGB 13 9 10/11/2022    MG 2 0 10/10/2022     10/11/2022    K 4 0 10/11/2022    TRIG 85 09/07/2017    WBC 5 78 10/11/2022     Note: for a comprehensive list of the patient's lab results, access the Results Review activity  Cardiac testing:     Device check  10/9/2022    MDT-SINGLE CHAMBER ICD/ACTIVE SYSTEM IS MRI CONDITIONAL   NB/SHOCK-CARELINK TRANSMISSION: 2 DEVICE CLASSIFIED VF TREATED W/2 ATP & 2 SHOCKS; AVAIL EGRAMS PRESENT AS VT @ 240 BPM  PT STATES HE WAS "SUPER TIRED, HAD AN ALCOHOL BEVERAGE(S), NOT ENOUGH WATER, & SALTY FOOD " HE DID FEEL IS LAST ABOUT 3 HRS   FELT FINE FOLLOWING DAY ASIDE FOR BEING TIRED  80 VT-NS NOTED; NO THERAPIES GIVEN; AVAIL EGRAMS PRESENT AS RVR (SIMILAR MORPHOLOGY TO PRESENTING) W/RATES >170 BPM  5 AF NOTED; 0% BURDEN  PT ON [address]  EF 25% (2021)  DR Montserrat Spencer [address] & DT MADE AWARE  OPTI-VOL WITHIN NORMAL LIMITS  BATTERY STATUS "7 YRS "  0%  ALL AVAILABLE LEAD PARAMETERS WITHIN NORMAL LIMITS  NC       Echocardiogram  10/11/2021    •  Left Ventricle: Left ventricular cavity size is moderately dilated  The left ventricular ejection fraction is 25%  Systolic function is severely reduced  Global hypokinesis  •  Right Ventricle: Right ventricular cavity size is normal  ICD lead noted  •  Mitral Valve: There is mild subvalvular thickening and mild subvalvular calcification  There is mild regurgitation      Findings    Left Ventricle Left ventricular cavity size is moderately dilated  The left ventricular ejection fraction is 25%  Systolic function is severely reduced  Global hypokinesis  Right Ventricle Right ventricular cavity size is normal  ICD lead noted  Systolic function is normal  Wall thickness is normal    Left Atrium The atrium is normal in size  Right Atrium The atrium is normal in size  Aortic Valve The aortic valve was not well visualized  The aortic valve is trileaflet  The leaflets are not thickened  The leaflets are not calcified  The leaflets exhibit normal mobility  There is no evidence of regurgitation  There is no evidence of stenosis  AV peak gradient is 6 mmHg  Mitral Valve There is mild subvalvular thickening and mild subvalvular calcification  There is mild regurgitation  Tricuspid Valve Tricuspid valve structure is normal  There is no evidence of regurgitation  There is no evidence of stenosis  Pulmonic Valve Pulmonic valve structure is normal  There is no evidence of regurgitation  There is no evidence of stenosis  Ascending Aorta The aortic root is normal in size  Pericardium There is no pericardial effusion   The pericardium is normal in appearance  Results for orders placed during the hospital encounter of 19    Echo complete with contrast if indicated    Narrative  [address] [facility]  [phone]    Transthoracic Echocardiogram  2D, M-mode, Doppler, and Color Doppler    Study date:  2019    Patient: Luis Miguel Plata  MR number: [id number]  Account number: [de-identified]  : 1975  Age: 44 years  Gender: Male  Status: Outpatient  Location: [facility]  Height: 74 in  Weight: 291 lb  BP: 118/ 64 mmHg    Indications: Dilated Cardiomyopathy  Diagnoses: I42 0 - Dilated cardiomyopathy    Sonographer:  Finney RCS  Interpreting Physician:  Ronal Barlow MD  Primary Physician:  Hina Mace MD  Referring Physician:  Lacie Alegria MD  Group:  [facility]    SUMMARY    LEFT VENTRICLE:  The ventricle was moderately to markedly dilated  Ejection fraction was estimated to be 25 %  There was severe diffuse hypokinesis  Features were consistent with a pseudonormal left ventricular filling pattern, with concomitant abnormal relaxation and increased filling pressure (grade 2 diastolic dysfunction)  RIGHT VENTRICLE:  Estimated peak pressure was at least 30 mmHg  ICD lead noted  LEFT ATRIUM:  The atrium was moderately dilated  RIGHT ATRIUM:  The atrium was moderately dilated  MITRAL VALVE:  There was trace regurgitation  TRICUSPID VALVE:  There was mild regurgitation  HISTORY: PRIOR HISTORY: Risk factors: hypertension  PROCEDURE: The study was performed in the [address] [place] [address]  This was a routine study  The transthoracic approach was used  The study included complete 2D imaging, M-mode, complete spectral Doppler, and color Doppler  The  heart rate was 74 bpm, at the start of the study  This was a technically difficult study      LEFT VENTRICLE: The ventricle was moderately to markedly dilated  Ejection fraction was estimated to be 25 %  There was severe diffuse hypokinesis  DOPPLER: Features were consistent with a pseudonormal left ventricular filling pattern,  with concomitant abnormal relaxation and increased filling pressure (grade 2 diastolic dysfunction)  RIGHT VENTRICLE: The size was normal  Systolic function was normal  Wall thickness was normal  DOPPLER: Estimated peak pressure was at least 30 mmHg  ICD lead noted  LEFT ATRIUM: The atrium was moderately dilated  RIGHT ATRIUM: The atrium was moderately dilated  MITRAL VALVE: There was annular calcification  DOPPLER: There was trace regurgitation  AORTIC VALVE: The valve was trileaflet  Leaflets exhibited normal thickness and normal cuspal separation  The valve was not well visualized  DOPPLER: Transaortic velocity was within the normal range  There was no evidence for stenosis  There was no regurgitation  TRICUSPID VALVE: The valve structure was normal  There was normal leaflet separation  DOPPLER: The transtricuspid velocity was within the normal range  There was no evidence for stenosis  There was mild regurgitation  PULMONIC VALVE: Leaflets exhibited normal thickness, no calcification, and normal cuspal separation  DOPPLER: The transpulmonic velocity was within the normal range  There was no regurgitation  PERICARDIUM: There was no pericardial effusion  The pericardium was normal in appearance  AORTA: The root exhibited normal size      SYSTEM MEASUREMENT TABLES    2D  %FS: 12 58 %  Ao Diam: 2 95 cm  EDV(Teich): 249 22 ml  EF(Teich): 26 31 %  ESV(Teich): 183 65 ml  IVSd: 1 03 cm  LA Area: 29 37 cm2  LA Diam: 5 32 cm  LVEDV MOD A4C: 213 51 ml  LVEF MOD A4C: 30 3 %  LVESV MOD A4C: 148 81 ml  LVIDd: 6 92 cm  LVIDs: 6 05 cm  LVLd A4C: 9 46 cm  LVLs A4C: 8 02 cm  LVPWd: 0 71 cm  RA Area: 23 69 cm2  RVIDd: 4 49 cm  SV MOD A4C: 64 7 ml  SV(Teich): 65 57 ml    CW  TR MaxP 1 mmHg  TR Vmax: 2 55 m/s    MM  TAPSE: 2 55 cm    PW  E': 0 08 m/s  E/E': 11 03  MV A [place]: 0 5 m/s  MV Dec [place]: 3 77 m/s2  MV DecT: 234 ms  MV E [place]: 0 88 m/s  MV E/A Ratio: 1 76  MV PHT: 67 86 ms  MVA By PHT: 3 24 cm2    [facility] Accredited Echocardiography Laboratory    Prepared and electronically signed by    Vanessa Ortiz MD  Signed 10-Apr-2019 13:52:24    No results found for this or any previous visit  No results found for this or any previous visit  No results found for this or any previous visit

## 2022-11-19 DIAGNOSIS — I10 ESSENTIAL HYPERTENSION: ICD-10-CM

## 2022-11-21 RX ORDER — ENALAPRIL MALEATE 10 MG/1
TABLET ORAL
Qty: 180 TABLET | Refills: 1 | Status: SHIPPED | OUTPATIENT
Start: 2022-11-21

## 2022-11-22 ENCOUNTER — REMOTE DEVICE CLINIC VISIT (OUTPATIENT)
Dept: CARDIOLOGY CLINIC | Facility: CLINIC | Age: 47
End: 2022-11-22

## 2022-11-22 DIAGNOSIS — Z95.810 PRESENCE OF AUTOMATIC CARDIOVERTER/DEFIBRILLATOR (AICD): Primary | ICD-10-CM

## 2022-11-22 NOTE — PROGRESS NOTES
Results for orders placed or performed in visit on 11/22/22   Cardiac EP device report    Narrative    MDT-SINGLE CHAMBER ICD/ACTIVE SYSTEM IS MRI CONDITIONAL  CARELINK TRANSMISSION: BATTERY VOLTAGE ADEQUATE  (6 1 YRS)  <1%  ALL AVAILABLE LEAD PARAMETERS WITHIN NORMAL LIMITS  NO SIGNIFICANT HIGH RATE EPISODES  OPTI-VOL WITHIN NORMAL LIMITS  NORMAL DEVICE FUNCTION  ---HOFF

## 2022-12-29 ENCOUNTER — APPOINTMENT (OUTPATIENT)
Dept: RADIOLOGY | Facility: CLINIC | Age: 47
End: 2022-12-29

## 2022-12-29 ENCOUNTER — OFFICE VISIT (OUTPATIENT)
Dept: URGENT CARE | Facility: CLINIC | Age: 47
End: 2022-12-29

## 2022-12-29 VITALS
HEIGHT: 74 IN | DIASTOLIC BLOOD PRESSURE: 59 MMHG | SYSTOLIC BLOOD PRESSURE: 118 MMHG | HEART RATE: 72 BPM | BODY MASS INDEX: 40.43 KG/M2 | WEIGHT: 315 LBS | OXYGEN SATURATION: 97 %
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
## 2022-12-29 DIAGNOSIS — S09.90XA INJURY OF HEAD, INITIAL ENCOUNTER: ICD-10-CM

## 2022-12-29 DIAGNOSIS — M25.572 ACUTE LEFT ANKLE PAIN: Primary | ICD-10-CM

## 2022-12-29 DIAGNOSIS — M25.572 ACUTE LEFT ANKLE PAIN: ICD-10-CM

## 2022-12-29 NOTE — LETTER
December 29, 2022     Patient: Larwance Councilman   YOB: 1975   Date of Visit: 12/29/2022       To Whom it May Concern:    Larwance Councilman was seen in my clinic on 12/29/2022  He may return to work on 1/3/22  If you have any questions or concerns, please don't hesitate to call           Sincerely,          VILMA Rowell        CC: No Recipients

## 2022-12-29 NOTE — PROGRESS NOTES
[facility] [facility]        NAME: Andres Barraza is a 56 y o  male  : 1975    MRN: [id number]  DATE: 2022  TIME: 7:15 PM    Assessment and Plan   Acute left ankle pain [M25 [id number]]  1  Acute left ankle pain  XR ankle 3+ vw left    Ambulatory referral to Orthopedic Surgery      2  Fall, initial encounter  XR ankle 3+ vw left    Ambulatory referral to Orthopedic Surgery      3  Injury of head, initial encounter          Xray appears negative for any fracture  Will follow up with radiologist report when available  Placed in [address] [place] [address], [place] [address] and given crutches  Recommend proceeding to ER for CT scan of head to rule out any acute brain bleeding after hitting head in fall  Is on xarelto  Patient declined  Patient Instructions     Rest and elevation  Non-weight bearing for first 2-3 days, then weight bear as tolerated  As symptoms improve you may use your splint less and apply an elastic bandage(ACE wrap) for support  Do not sleep with elastic bandage on  Ice for 15min, 3-4x daily  Tylenol/Motrin as needed for pain    PCP follow up  Follow up with ortho if pain does not improve over the next 5-7 days  Proceed to the ER with new or worsening symptoms such as pain, swelling, loss of color or sensation  Chief Complaint     Chief Complaint   Patient presents with   • Ankle Injury     Left Ankle, was tripped up and fell down the steps  History of Present Illness       The patient presents today after falling down his steps this evening injuring his L ankle  He states he thinks he tripped over his dogs which caused him to stumble down the stairs  He has a bi level house and states he fell down the first set of steps onto the landing and then went down the second set of steps  He was able to bear weight, initially, but seems to becoming increasingly painful  He also hit his head off the concrete and has an area of ecchymosis to the R side of his temple  He denies LOC  He is on xarelto  Review of Systems   Review of Systems   Constitutional: Negative for chills, fatigue and fever  HENT: Negative for congestion  Eyes: Negative  Respiratory: Negative for cough and shortness of breath  Cardiovascular: Negative for chest pain and palpitations  Gastrointestinal: Negative for abdominal pain, diarrhea, nausea and vomiting  Genitourinary: Negative for difficulty urinating  Musculoskeletal: Positive for arthralgias (L ankle)  Negative for myalgias  Skin: Negative for rash  Allergic/Immunologic: Negative for environmental allergies  Neurological: Negative for dizziness and headaches  Psychiatric/Behavioral: Negative            Current Medications       Current Outpatient Medications:   •  carvedilol (COREG) 25 mg tablet, Take 1 5 tablets (37 5 mg total) by mouth 2 (two) times a day with meals, Disp: 270 tablet, Rfl: 3  •  digoxin (LANOXIN) 0 125 mg tablet, Take 1 tablet (125 mcg total) by mouth daily, Disp: 90 tablet, Rfl: 3  •  enalapril (VASOTEC) 10 mg tablet, TAKE 1 TABLET BY MOUTH TWICE A DAY, Disp: 180 tablet, Rfl: 1  •  spironolactone (ALDACTONE) 25 mg tablet, Take 1 tablet (25 mg total) by mouth in the morning , Disp: 90 tablet, Rfl: 3  •  traZODone (DESYREL) 100 mg tablet, Take 1 tablet (100 mg total) by mouth daily at bedtime, Disp: 90 tablet, Rfl: 3  •  Xarelto 20 MG tablet, TAKE 1 TABLET BY MOUTH EVERY DAY WITH DINNER, Disp: 90 tablet, Rfl: 3    Current Allergies     Allergies as of 12/29/2022 - Reviewed 12/29/2022   Allergen Reaction Noted   • Vicodin [hydrocodone-acetaminophen] Other (See Comments) 03/27/2017            The following portions of the patient's history were reviewed and updated as appropriate: allergies, current medications, past family history, past medical history, past social history, past surgical history and problem list      Past Medical History:   Diagnosis Date   • A-fib ([address] ) 2020   • AICD (automatic cardioverter/defibrillator) present    • CHF (congestive heart failure) ([facility])    • Hypertension    • Myocarditis ([facility] [address] )    • Sleep apnea    • Ventricular tachycardia        Past Surgical History:   Procedure Laterality Date   • CARDIAC DEFIBRILLATOR PLACEMENT     • SD ESOPHAGOGASTRODUODENOSCOPY TRANSORAL DIAGNOSTIC N/A 3/29/2017    Procedure: ESOPHAGOGASTRODUODENOSCOPY (EGD); Surgeon: García Briones MD;  Location: [facility]; Service: Gastroenterology   • SD INSJ/RPLCMT PERM DFB W/TRNSVNS LDS 1/DUAL CHMBR N/A 6/2/2017    Procedure: LEAD EXTRACTION/REIMPLANTATION AND ICD GENERATOR CHANGE ;  Surgeon: Mandy Alcantara MD;  Location:  MAIN OR;  Service: Cardiology   • SD LAPAROSCOPY SURG CHOLECYSTECTOMY N/A 5/19/2017    Procedure: Regino Sellers ;  Surgeon: Wilson Ruiz MD;  Location: [facility];  Service: General   • TONSILLECTOMY     • TYMPANOSTOMY TUBE PLACEMENT         Family History   Problem Relation Age of Onset   • No Known Problems Mother    • No Known Problems Father          Medications have been verified  Objective   /59   Pulse 72   Ht 6' 2" (1 88 m)   Wt (!) 144 kg (317 lb)   SpO2 97%   BMI 40 70 kg/m²        Physical Exam     Physical Exam  Vitals and nursing note reviewed  Constitutional:       General: He is not in acute distress  Appearance: Normal appearance  He is not ill-appearing  HENT:      Head: Normocephalic and atraumatic  Right Ear: External ear normal       Left Ear: External ear normal       Nose: Nose normal       Mouth/Throat:      Lips: Pink  Mouth: Mucous membranes are moist       Pharynx: Oropharynx is clear  Eyes:      General: Vision grossly intact  Extraocular Movements: Extraocular movements intact  Pupils: Pupils are equal, round, and reactive to light  Cardiovascular:      Rate and Rhythm: Normal rate and regular rhythm  Heart sounds: Normal heart sounds  No murmur heard    Pulmonary:      Effort: Pulmonary effort is normal       Breath sounds: Normal breath sounds  Chest:      Chest wall: No tenderness  Abdominal:      General: Abdomen is flat  Bowel sounds are normal       Palpations: Abdomen is soft  Musculoskeletal:      Cervical back: Normal range of motion  No pain with movement, spinous process tenderness or muscular tenderness  Normal range of motion  Right ankle: Normal       Left ankle: Swelling (mild) present  Tenderness present over the lateral malleolus  Decreased range of motion  Normal pulse  Comments: Has full ROM of L ankle but with pain  5/5 strength, NVI  Skin:     General: Skin is warm  Findings: Abrasion (Superficial to L knee) and ecchymosis (R side of temple area  ) present  No rash  Neurological:      Mental Status: He is alert and oriented to person, place, and time  Cranial Nerves: No facial asymmetry  Motor: Motor function is intact  No pronator drift  Coordination: Coordination is intact     Psychiatric:         Attention and Perception: Attention normal          Mood and Affect: Mood normal

## 2022-12-30 NOTE — PATIENT INSTRUCTIONS
Rest and elevation  Non-weight bearing for first 2-3 days, then weight bear as tolerated  As symptoms improve you may use your splint less and apply an elastic bandage(ACE wrap) for support  Do not sleep with elastic bandage on  Ice for 15min, 3-4x daily  Tylenol/Motrin as needed for pain    PCP follow up  Follow up with ortho if pain does not improve over the next 5-7 days  Proceed to the ER with new or worsening symptoms such as pain, swelling, loss of color or sensation  Ankle Sprain  WHAT YOU NEED TO KNOW:   A muscle strain is a twist, pull, or tear of a muscle or tendon in your ankle  An acute strain is a strain that happens suddenly  A chronic strain can happen over several days or weeks  A chronic strain can be caused by moving your ankle the same way over and over  DISCHARGE INSTRUCTIONS:   Return to the emergency department if:   You have severe pain in your ankle when you rest or put pressure on it  Your foot or toes are cold or numb  Your swelling has increased  Contact your healthcare provider if:   Your pain or swelling do not go away, even after treatment  You have questions or concerns about your condition or care  Medicines: You may need any of the following:  NSAIDs , such as ibuprofen, help decrease swelling, pain, and fever  This medicine is available with or without a doctor's order  NSAIDs can cause stomach bleeding or kidney problems in certain people  If you take blood thinner medicine, always ask if NSAIDs are safe for you  Always read the medicine label and follow directions  Do not give these medicines to children under 10months of age without direction from your child's healthcare provider  Acetaminophen  decreases pain  It is available without a doctor's order  Ask how much to take and how often to take it  Follow directions  Acetaminophen can cause liver damage if not taken correctly  Take your medicine as directed    Contact your healthcare provider if you think your medicine is not helping or if you have side effects  Tell him of her if you are allergic to any medicine  Keep a list of the medicines, vitamins, and herbs you take  Include the amounts, and when and why you take them  Bring the list or the pill bottles to follow-up visits  Carry your medicine list with you in case of an emergency  Self-care:   Rest  your ankle so that it can heal  Return to normal activities as directed  Apply ice on your ankle for 15 to 20 minutes every hour or as directed  Use an ice pack, or put crushed ice in a plastic bag  Cover it with a towel  Ice helps prevent tissue damage and decreases swelling and pain  Compress  your ankle as directed  Ask your healthcare provider how to wrap an elastic bandage around your ankle  An elastic bandage provides support and helps decrease swelling and movement so your ankle can heal  Wear it as long as directed  Elevate  your ankle above the level of your heart as often as you can  This will help decrease swelling and pain  Prop your ankle on pillows or blankets to keep it elevated comfortably  Prevent an ankle strain:   Always wear proper shoes when you play sports  Replace your old running shoes with new ones often if you are a runner  Use special shoe inserts or arch supports to correct leg or foot problems  Ask your healthcare provider for more information on shoe supports  Do warm up and cool down exercises  Stretch before you work out or do sports activities  This will help loosen your muscles and prevent injury  Cool down and stretch after your workout  Do not stop and rest after a workout without cooling down first      Do strength training exercises  Exercises such as weight lifting help keep your muscles flexible and strong  A physical therapist or  may help you with these exercises       Slowly start your exercise or sports training program   Follow your healthcare provider's advice on when to start exercising  Slowly increase time, distance, and intensity of your exercises  Sudden increases in how often or how intensely you train may cause you to injure your muscle again  Follow up with your doctor as directed:  Write down your questions so you remember to ask them during your visits  © Copyright [employer] 2021 Information is for End User's use only and may not be sold, redistributed or otherwise used for commercial purposes  All illustrations and images included in CareNotes® are the copyrighted property of [employer]  or Tomasa Trejo  The above information is an  only  It is not intended as medical advice for individual conditions or treatments  Talk to your doctor, nurse or pharmacist before following any medical regimen to see if it is safe and effective for you

## 2023-02-21 ENCOUNTER — REMOTE DEVICE CLINIC VISIT (OUTPATIENT)
Dept: CARDIOLOGY CLINIC | Facility: CLINIC | Age: 48
End: 2023-02-21

## 2023-02-21 DIAGNOSIS — Z95.810 PRESENCE OF AUTOMATIC CARDIOVERTER/DEFIBRILLATOR (AICD): Primary | ICD-10-CM

## 2023-02-21 NOTE — PROGRESS NOTES
Results for orders placed or performed in visit on 02/21/23   Cardiac EP device report    Narrative    MDT-SINGLE CHAMBER ICD/ACTIVE SYSTEM IS MRI CONDITIONAL  CARELINK TRANSMISSION: BATTERY VOLTAGE ADEQUATE (5 6 YRS)  : <0 1%  ALL AVAILABLE LEAD PARAMETERS WITHIN NORMAL LIMITS  3 VT-NS EPISODES W/ EGMS SHOWING NSVT vs RVR 9 BEATS @ 205 BPM, 6 BEATS  @200 BPM & 9 BEATS @ 207 BPM  PT TAKES DIGOXIN, COREG, XARELTO  EF: 25% (ECHO 10/11/21)  OPTI-VOL WITHIN NORMAL LIMITS  APPROPRIATELY FUNCTIONING ICD    [address]

## 2023-04-16 ENCOUNTER — HOSPITAL ENCOUNTER (OUTPATIENT)
Facility: HOSPITAL | Age: 48
Setting detail: OBSERVATION
Discharge: HOME | End: 2023-04-18
Attending: EMERGENCY MEDICINE | Admitting: INTERNAL MEDICINE

## 2023-04-16 DIAGNOSIS — Z95.810 AICD (AUTOMATIC CARDIOVERTER/DEFIBRILLATOR) PRESENT: ICD-10-CM

## 2023-04-16 DIAGNOSIS — I48.0 PAROXYSMAL ATRIAL FIBRILLATION (HCC): ICD-10-CM

## 2023-04-16 DIAGNOSIS — I47.20 V-TACH (HCC): Primary | ICD-10-CM

## 2023-04-16 DIAGNOSIS — R00.2 PALPITATIONS: ICD-10-CM

## 2023-04-16 PROBLEM — R55 SYNCOPE: Status: ACTIVE | Noted: 2023-04-16

## 2023-04-16 LAB
2HR DELTA HS TROPONIN: 10 NG/L
4HR DELTA HS TROPONIN: 8 NG/L
ALBUMIN SERPL BCP-MCNC: 4 G/DL (ref 3.5–5)
ALP SERPL-CCNC: 59 U/L (ref 34–104)
ALT SERPL W P-5'-P-CCNC: 42 U/L (ref 7–52)
ANION GAP SERPL CALCULATED.3IONS-SCNC: 7 MMOL/L (ref 4–13)
AST SERPL W P-5'-P-CCNC: 33 U/L (ref 13–39)
BASOPHILS # BLD AUTO: 0.05 THOUSANDS/ΜL (ref 0–0.1)
BASOPHILS NFR BLD AUTO: 1 % (ref 0–1)
BILIRUB SERPL-MCNC: 1.32 MG/DL (ref 0.2–1)
BUN SERPL-MCNC: 13 MG/DL (ref 5–25)
CALCIUM SERPL-MCNC: 9.3 MG/DL (ref 8.4–10.2)
CARDIAC TROPONIN I PNL SERPL HS: 36 NG/L
CARDIAC TROPONIN I PNL SERPL HS: 44 NG/L
CARDIAC TROPONIN I PNL SERPL HS: 46 NG/L
CHLORIDE SERPL-SCNC: 104 MMOL/L (ref 96–108)
CO2 SERPL-SCNC: 25 MMOL/L (ref 21–32)
CREAT SERPL-MCNC: 0.97 MG/DL (ref 0.6–1.3)
DIGOXIN SERPL-MCNC: <0.5 NG/ML (ref 0.8–2)
EOSINOPHIL # BLD AUTO: 0.11 THOUSAND/ΜL (ref 0–0.61)
EOSINOPHIL NFR BLD AUTO: 1 % (ref 0–6)
ERYTHROCYTE [DISTWIDTH] IN BLOOD BY AUTOMATED COUNT: 14.1 % (ref 11.6–15.1)
GFR SERPL CREATININE-BSD FRML MDRD: 93 ML/MIN/1.73SQ M
GLUCOSE SERPL-MCNC: 153 MG/DL (ref 65–140)
HCT VFR BLD AUTO: 44.6 % (ref 36.5–49.3)
HGB BLD-MCNC: 15 G/DL (ref 12–17)
IMM GRANULOCYTES # BLD AUTO: 0.01 THOUSAND/UL (ref 0–0.2)
IMM GRANULOCYTES NFR BLD AUTO: 0 % (ref 0–2)
INR PPP: 1.51 (ref 0.84–1.19)
LYMPHOCYTES # BLD AUTO: 1.9 THOUSANDS/ΜL (ref 0.6–4.47)
LYMPHOCYTES NFR BLD AUTO: 19 % (ref 14–44)
MAGNESIUM SERPL-MCNC: 1.9 MG/DL (ref 1.9–2.7)
MCH RBC QN AUTO: 30.4 PG (ref 26.8–34.3)
MCHC RBC AUTO-ENTMCNC: 33.6 G/DL (ref 31.4–37.4)
MCV RBC AUTO: 91 FL (ref 82–98)
MONOCYTES # BLD AUTO: 1.05 THOUSAND/ΜL (ref 0.17–1.22)
MONOCYTES NFR BLD AUTO: 10 % (ref 4–12)
NEUTROPHILS # BLD AUTO: 6.99 THOUSANDS/ΜL (ref 1.85–7.62)
NEUTS SEG NFR BLD AUTO: 69 % (ref 43–75)
NRBC BLD AUTO-RTO: 0 /100 WBCS
PLATELET # BLD AUTO: 174 THOUSANDS/UL (ref 149–390)
PMV BLD AUTO: 11.2 FL (ref 8.9–12.7)
POTASSIUM SERPL-SCNC: 3.9 MMOL/L (ref 3.5–5.3)
PROT SERPL-MCNC: 7.2 G/DL (ref 6.4–8.4)
PROTHROMBIN TIME: 17.9 SECONDS (ref 11.6–14.5)
RBC # BLD AUTO: 4.93 MILLION/UL (ref 3.88–5.62)
SODIUM SERPL-SCNC: 136 MMOL/L (ref 135–147)
WBC # BLD AUTO: 10.11 THOUSAND/UL (ref 4.31–10.16)

## 2023-04-16 RX ORDER — LISINOPRIL 20 MG/1
40 TABLET ORAL DAILY
Status: DISCONTINUED | OUTPATIENT
Start: 2023-04-17 | End: 2023-04-18 | Stop reason: HOSPADM

## 2023-04-16 RX ORDER — MAGNESIUM HYDROXIDE/ALUMINUM HYDROXICE/SIMETHICONE 120; 1200; 1200 MG/30ML; MG/30ML; MG/30ML
30 SUSPENSION ORAL EVERY 6 HOURS PRN
Status: DISCONTINUED | OUTPATIENT
Start: 2023-04-16 | End: 2023-04-18 | Stop reason: HOSPADM

## 2023-04-16 RX ORDER — HYDROXYZINE HYDROCHLORIDE 25 MG/1
25 TABLET, FILM COATED ORAL EVERY 6 HOURS PRN
Status: DISCONTINUED | OUTPATIENT
Start: 2023-04-16 | End: 2023-04-18 | Stop reason: HOSPADM

## 2023-04-16 RX ORDER — ACETAMINOPHEN 325 MG/1
650 TABLET ORAL EVERY 6 HOURS PRN
Status: DISCONTINUED | OUTPATIENT
Start: 2023-04-16 | End: 2023-04-18 | Stop reason: HOSPADM

## 2023-04-16 RX ORDER — SPIRONOLACTONE 25 MG/1
25 TABLET ORAL DAILY
Status: DISCONTINUED | OUTPATIENT
Start: 2023-04-17 | End: 2023-04-18 | Stop reason: HOSPADM

## 2023-04-16 RX ORDER — CARVEDILOL 12.5 MG/1
37.5 TABLET ORAL 2 TIMES DAILY WITH MEALS
Status: DISCONTINUED | OUTPATIENT
Start: 2023-04-17 | End: 2023-04-18 | Stop reason: HOSPADM

## 2023-04-16 RX ORDER — TRAZODONE HYDROCHLORIDE 100 MG/1
100 TABLET ORAL
Status: DISCONTINUED | OUTPATIENT
Start: 2023-04-16 | End: 2023-04-18 | Stop reason: HOSPADM

## 2023-04-16 RX ORDER — MAGNESIUM SULFATE 1 G/100ML
1 INJECTION INTRAVENOUS ONCE
Status: COMPLETED | OUTPATIENT
Start: 2023-04-16 | End: 2023-04-17

## 2023-04-16 RX ORDER — DIGOXIN 125 MCG
125 TABLET ORAL DAILY
Status: DISCONTINUED | OUTPATIENT
Start: 2023-04-17 | End: 2023-04-17

## 2023-04-16 RX ADMIN — TRAZODONE HYDROCHLORIDE 100 MG: 100 TABLET ORAL at 23:01

## 2023-04-16 RX ADMIN — SODIUM CHLORIDE 1000 ML: 0.9 INJECTION, SOLUTION INTRAVENOUS at 17:20

## 2023-04-16 RX ADMIN — MAGNESIUM SULFATE HEPTAHYDRATE 1 G: 1 INJECTION, SOLUTION INTRAVENOUS at 23:01

## 2023-04-16 NOTE — ED PROVIDER NOTES
"History  Chief Complaint   Patient presents with   • Dizziness     Patient co dizziness and \"not feeling right- my heart starts to race and I get ringing in my ears  \" Patient states \"On Friday I may have drank a bit to much alcohol  \"      69-year-old male patient presents emergency department for evaluation of possible discharge of his ICD 2 nights ago  The patient states that he was drinking a rather heavy amount and began feeling unwell and then passed out  The patient is not sure if he passed out from the alcohol or if he passed out from his ICD going off  He has had multiple episodes of ventricular tachycardia in the past, none that were recent  The patient has no other symptoms at that point in time  He states, however, over the next 2 days that he felt generally unwell and he describes some fatigue or malaise but not anything other specific including no chest pains or shortness of breath  Patient with a cardiac work-up done and we will interrogate his ICD now  History provided by:  Patient   used: No    Dizziness  Quality:  Head spinning  Severity:  Mild  Onset quality:  Gradual  Timing:  Constant  Progression:  Worsening  Chronicity:  New  Relieved by:  Nothing  Worsened by:  Nothing  Ineffective treatments:  None tried  Associated symptoms: no chest pain, no tinnitus and no vision changes        Prior to Admission Medications   Prescriptions Last Dose Informant Patient Reported? Taking?    Xarelto 20 MG tablet 4/16/2023  No Yes   Sig: TAKE 1 TABLET BY MOUTH EVERY DAY WITH DINNER   carvedilol (COREG) 25 mg tablet 4/16/2023  No Yes   Sig: Take 1 5 tablets (37 5 mg total) by mouth 2 (two) times a day with meals   digoxin (LANOXIN) 0 125 mg tablet 4/16/2023  No Yes   Sig: Take 1 tablet (125 mcg total) by mouth daily   enalapril (VASOTEC) 10 mg tablet 4/16/2023  No Yes   Sig: TAKE 1 TABLET BY MOUTH TWICE A DAY   spironolactone (ALDACTONE) 25 mg tablet 4/16/2023  No Yes   Sig: Take 1 " tablet (25 mg total) by mouth in the morning  traZODone (DESYREL) 100 mg tablet 4/15/2023  No Yes   Sig: Take 1 tablet (100 mg total) by mouth daily at bedtime      Facility-Administered Medications: None       Past Medical History:   Diagnosis Date   • A-fib ([facility] [address] ) 2020   • AICD (automatic cardioverter/defibrillator) present    • CHF (congestive heart failure) ([facility] [address] )    • Hypertension    • [facility]    • Sleep apnea    • Ventricular tachycardia [facility])        Past Surgical History:   Procedure Laterality Date   • CARDIAC DEFIBRILLATOR PLACEMENT     • DC ESOPHAGOGASTRODUODENOSCOPY TRANSORAL DIAGNOSTIC N/A 3/29/2017    Procedure: ESOPHAGOGASTRODUODENOSCOPY (EGD); Surgeon: Juani Cobos MD;  Location: [facility]; Service: Gastroenterology   • DC INSJ/RPLCMT PERM DFB W/TRNSVNS LDS 1/DUAL CHMBR N/A 6/2/2017    Procedure: LEAD EXTRACTION/REIMPLANTATION AND ICD GENERATOR CHANGE ;  Surgeon: Dione Monk MD;  Location:  MAIN OR;  Service: Cardiology   • DC LAPAROSCOPY SURG CHOLECYSTECTOMY N/A 5/19/2017    Procedure: Doyce Severin ;  Surgeon: Eric Rodriguez MD;  Location: [facility];  Service: General   • TONSILLECTOMY     • TYMPANOSTOMY TUBE PLACEMENT         Family History   Problem Relation Age of Onset   • No Known Problems Mother    • No Known Problems Father      I have reviewed and agree with the history as documented  E-Cigarette/Vaping   • E-Cigarette Use Never User      E-Cigarette/Vaping Substances   • Nicotine No    • THC No    • CBD No    • Flavoring No    • Other No    • Unknown No      Social History     Tobacco Use   • Smoking status: Never   • Smokeless tobacco: Never   Vaping Use   • Vaping Use: Never used   Substance Use Topics   • Alcohol use: Yes     Comment: OCCASIONAL   • Drug use: No       Review of Systems   HENT: Negative for tinnitus  Cardiovascular: Negative for chest pain  Neurological: Positive for dizziness     All other systems reviewed and are negative  Physical Exam  Physical Exam  Vitals and nursing note reviewed  Constitutional:       General: He is not in acute distress  Appearance: He is well-developed  He is not diaphoretic  HENT:      Head: Normocephalic and atraumatic  Right Ear: External ear normal       Left Ear: External ear normal    Eyes:      General: No scleral icterus  Right eye: No discharge  Left eye: No discharge  Conjunctiva/sclera: Conjunctivae normal    Neck:      Thyroid: No thyromegaly  Vascular: No JVD  Trachea: No tracheal deviation  Cardiovascular:      Rate and Rhythm: Normal rate and regular rhythm  Pulmonary:      Effort: Pulmonary effort is normal  No respiratory distress  Breath sounds: Normal breath sounds  No stridor  No wheezing or rales  Abdominal:      General: Bowel sounds are normal  There is no distension  Palpations: Abdomen is soft  Tenderness: There is no abdominal tenderness  Musculoskeletal:         General: No tenderness or deformity  Normal range of motion  Cervical back: Normal range of motion and neck supple  Skin:     General: Skin is warm and dry  Neurological:      Mental Status: He is alert and oriented to person, place, and time  Cranial Nerves: No cranial nerve deficit        Coordination: Coordination normal    Psychiatric:         Behavior: Behavior normal          Vital Signs  ED Triage Vitals   Temperature Pulse Respirations Blood Pressure SpO2   04/16/23 1639 04/16/23 1639 04/16/23 1639 04/16/23 1639 04/16/23 1639   97 9 °F (36 6 °C) 90 18 143/76 95 %      Temp Source Heart Rate Source Patient Position - Orthostatic VS BP Location FiO2 (%)   04/16/23 1639 04/16/23 1639 04/16/23 1639 04/16/23 1639 --   Tympanic Monitor Sitting Left arm       Pain Score       04/16/23 2140       No Pain           Vitals:    04/18/23 0331 04/18/23 0832 04/18/23 0843 04/18/23 1105   BP: 110/55 108/60 108/62 108/66   Pulse: (!) 53 69 65 58   Patient Position - Orthostatic VS: Lying            Visual Acuity      ED Medications  Medications   sodium chloride 0 9 % bolus 1,000 mL (0 mL Intravenous Stopped 4/16/23 1820)   magnesium sulfate IVPB (premix) SOLN 1 g (0 g Intravenous Stopped 4/17/23 1234)   perflutren lipid microsphere (DEFINITY) injection (0 8 mL/min Intravenous Given 4/17/23 1223)   magnesium sulfate 2 g/50 mL IVPB (premix) 2 g (2 g Intravenous New Bag 4/18/23 0403)       Diagnostic Studies  Results Reviewed     Procedure Component Value Units Date/Time    HS Troponin I 4hr [[id number]]  (Normal) Collected: 04/16/23 2118    Lab Status: Final result Specimen: Blood from Line, Venous Updated: 04/16/23 2149     hs TnI 4hr 44 ng/L      Delta 4hr hsTnI 8 ng/L     HS Troponin I 2hr [[id number]]  (Normal) Collected: 04/16/23 1930    Lab Status: Final result Specimen: Blood from Line, Venous Updated: 04/16/23 1958     hs TnI 2hr 46 ng/L      Delta 2hr hsTnI 10 ng/L     HS Troponin 0hr (reflex protocol) [[id number]]  (Normal) Collected: 04/16/23 1719    Lab Status: Final result Specimen: Blood from Arm, Right Updated: 04/16/23 1753     hs TnI 0hr 36 ng/L     Protime-INR [[id number]]  (Abnormal) Collected: 04/16/23 1719    Lab Status: Final result Specimen: Blood from Arm, Right Updated: 04/16/23 1748     Protime 17 9 seconds      INR 1 51    Comprehensive metabolic panel [[id number]]  (Abnormal) Collected: 04/16/23 1719    Lab Status: Final result Specimen: Blood from Arm, Right Updated: 04/16/23 1744     Sodium 136 mmol/L      Potassium 3 9 mmol/L      Chloride 104 mmol/L      CO2 25 mmol/L      ANION GAP 7 mmol/L      BUN 13 mg/dL      Creatinine 0 97 mg/dL      Glucose 153 mg/dL      Calcium 9 3 mg/dL      AST 33 U/L      ALT 42 U/L      Alkaline Phosphatase 59 U/L      Total Protein 7 2 g/dL      Albumin 4 0 g/dL      Total Bilirubin 1 32 mg/dL      eGFR 93 ml/min/1 73sq m     Narrative:      [place] guidelines for Chronic Kidney Disease (CKD):   •  Stage 1 with normal or high GFR (GFR > 90 mL/min/1 73 square meters)  •  Stage 2 Mild CKD (GFR = 60-89 mL/min/1 73 square meters)  •  Stage 3A Moderate CKD (GFR = 45-59 mL/min/1 73 square meters)  •  Stage 3B Moderate CKD (GFR = 30-44 mL/min/1 73 square meters)  •  Stage 4 Severe CKD (GFR = 15-29 mL/min/1 73 square meters)  •  Stage 5 End Stage CKD (GFR <15 mL/min/1 73 square meters)  Note: GFR calculation is accurate only with a steady state creatinine    Magnesium [[id number]]  (Normal) Collected: 04/16/23 1719    Lab Status: Final result Specimen: Blood from Arm, Right Updated: 04/16/23 1744     Magnesium 1 9 mg/dL     CBC and differential [[id number]] Collected: 04/16/23 1719    Lab Status: Final result Specimen: Blood from Arm, Right Updated: 04/16/23 1728     WBC 10 11 Thousand/uL      RBC 4 93 Million/uL      Hemoglobin 15 0 g/dL      Hematocrit 44 6 %      MCV 91 fL      MCH 30 4 pg      MCHC 33 6 g/dL      RDW 14 1 %      MPV 11 2 fL      Platelets 835 Thousands/uL      nRBC 0 /100 WBCs      Neutrophils Relative 69 %      Immat GRANS % 0 %      Lymphocytes Relative 19 %      Monocytes Relative 10 %      Eosinophils Relative 1 %      Basophils Relative 1 %      Neutrophils Absolute 6 99 Thousands/µL      Immature Grans Absolute 0 01 Thousand/uL      Lymphocytes Absolute 1 90 Thousands/µL      Monocytes Absolute 1 05 Thousand/µL      Eosinophils Absolute 0 11 Thousand/µL      Basophils Absolute 0 05 Thousands/µL                  No orders to display              Procedures  Procedures         ED Course                                             Medical Decision Making  [facility]): complicated acute illness or injury  Amount and/or Complexity of Data Reviewed  Labs: ordered  Risk  Decision regarding hospitalization            Disposition  Final diagnoses:   [facility])     Time reflects when diagnosis was documented in both MDM as applicable and the Disposition within this note     Time User Action Codes Description Comment    4/16/2023  9:03 PM Dane Pump Add [I47 20] V-tach ([address] )     4/16/2023 10:37 PM Wilnerkaylene Hickey Add [R00 2] Palpitations     4/16/2023 10:38 PM Wilner Ruperto Add [H01 076] AICD (automatic cardioverter/defibrillator) present     4/18/2023 11:23 AM Herrera Wiley Add [I48 0] Paroxysmal atrial fibrillation [facility])       ED Disposition     ED Disposition   Admit    Condition   Stable    Date/Time   Sun Apr 16, 2023  9:03 PM    Comment   Case was discussed with Zen Amanda and the patient's admission status was agreed to be Admission Status: observation status to the service of Dr Miguel Angel Hernandez   Follow-up Information     Follow up With Specialties Details Why Faith Greenwood8DO Cardiology Schedule an appointment as soon as possible for a visit in 1 week(s)  [facility] [address]   [phone]            Discharge Medication List as of 4/18/2023 11:48 AM      CONTINUE these medications which have CHANGED    Details   digoxin (LANOXIN) 0 125 mg tablet Take 2 tablets (250 mcg total) by mouth daily, Starting Tue 4/18/2023, No Print         CONTINUE these medications which have NOT CHANGED    Details   carvedilol (COREG) 25 mg tablet Take 1 5 tablets (37 5 mg total) by mouth 2 (two) times a day with meals, Starting Wed 11/16/2022, Normal      enalapril (VASOTEC) 10 mg tablet TAKE 1 TABLET BY MOUTH TWICE A DAY, Normal      spironolactone (ALDACTONE) 25 mg tablet Take 1 tablet (25 mg total) by mouth in the morning , Starting Thu 5/12/2022, Normal      traZODone (DESYREL) 100 mg tablet Take 1 tablet (100 mg total) by mouth daily at bedtime, Starting Wed 11/16/2022, Normal      Xarelto 20 MG tablet TAKE 1 TABLET BY MOUTH EVERY DAY WITH DINNER, Normal             No discharge procedures on file      PDMP Review     None          ED Provider  Electronically Signed by           Rimma Smyth DO  04/18/23 1736

## 2023-04-17 ENCOUNTER — APPOINTMENT (OUTPATIENT)
Dept: NON INVASIVE DIAGNOSTICS | Facility: HOSPITAL | Age: 48
End: 2023-04-17

## 2023-04-17 DIAGNOSIS — Z45.02 ICD (IMPLANTABLE CARDIOVERTER-DEFIBRILLATOR) DISCHARGE: Primary | ICD-10-CM

## 2023-04-17 LAB
ANION GAP SERPL CALCULATED.3IONS-SCNC: 7 MMOL/L (ref 4–13)
AORTIC ROOT: 2.8 CM
APICAL FOUR CHAMBER EJECTION FRACTION: 33 %
ASCENDING AORTA: 2.9 CM
ATRIAL RATE: 67 BPM
ATRIAL RATE: 73 BPM
ATRIAL RATE: 74 BPM
BUN SERPL-MCNC: 12 MG/DL (ref 5–25)
CALCIUM SERPL-MCNC: 8.9 MG/DL (ref 8.4–10.2)
CHLORIDE SERPL-SCNC: 107 MMOL/L (ref 96–108)
CO2 SERPL-SCNC: 24 MMOL/L (ref 21–32)
CREAT SERPL-MCNC: 0.87 MG/DL (ref 0.6–1.3)
E WAVE DECELERATION TIME: 164 MS
FRACTIONAL SHORTENING: 15 % (ref 28–44)
GFR SERPL CREATININE-BSD FRML MDRD: 103 ML/MIN/1.73SQ M
GLUCOSE P FAST SERPL-MCNC: 121 MG/DL (ref 65–99)
GLUCOSE SERPL-MCNC: 121 MG/DL (ref 65–140)
INTERVENTRICULAR SEPTUM IN DIASTOLE (PARASTERNAL SHORT AXIS VIEW): 1 CM
INTERVENTRICULAR SEPTUM: 1 CM (ref 0.6–1.1)
LAAS-AP2: 25.2 CM2
LAAS-AP4: 24.2 CM2
LEFT ATRIUM AREA SYSTOLE SINGLE PLANE A4C: 23.3 CM2
LEFT ATRIUM SIZE: 5.1 CM
LEFT INTERNAL DIMENSION IN SYSTOLE: 6 CM (ref 2.1–4)
LEFT VENTRICULAR INTERNAL DIMENSION IN DIASTOLE: 7.1 CM (ref 3.5–6)
LEFT VENTRICULAR POSTERIOR WALL IN END DIASTOLE: 1 CM
LEFT VENTRICULAR STROKE VOLUME: 80 ML
LVSV (TEICH): 80 ML
MAGNESIUM SERPL-MCNC: 2 MG/DL (ref 1.9–2.7)
MITRAL REGURGITATION PEAK VELOCITY: 3.67 M/S
MITRAL VALVE MEAN INFLOW VELOCITY: 3.29 M/S
MITRAL VALVE REGURGITANT PEAK GRADIENT: 54 MMHG
MV E'TISSUE VEL-SEP: 8 CM/S
MV PEAK A VEL: 0.53 M/S
MV PEAK E VEL: 106 CM/S
MV STENOSIS PRESSURE HALF TIME: 48 MS
MV VALVE AREA P 1/2 METHOD: 4.58 CM2
P AXIS: 34 DEGREES
P AXIS: 35 DEGREES
P AXIS: 42 DEGREES
POTASSIUM SERPL-SCNC: 4 MMOL/L (ref 3.5–5.3)
PR INTERVAL: 138 MS
PR INTERVAL: 164 MS
PR INTERVAL: 168 MS
QRS AXIS: 13 DEGREES
QRS AXIS: 19 DEGREES
QRS AXIS: 24 DEGREES
QRSD INTERVAL: 92 MS
QRSD INTERVAL: 94 MS
QRSD INTERVAL: 96 MS
QT INTERVAL: 364 MS
QT INTERVAL: 386 MS
QT INTERVAL: 400 MS
QTC INTERVAL: 404 MS
QTC INTERVAL: 422 MS
QTC INTERVAL: 425 MS
RIGHT ATRIUM AREA SYSTOLE A4C: 20.9 CM2
RIGHT VENTRICLE ID DIMENSION: 4 CM
SL CV DOP CALC MV VTI RETROGRADE: 132.5 CM
SL CV LEFT ATRIUM LENGTH A2C: 6.3 CM
SL CV MV MEAN GRADIENT RETROGRADE: 45 MMHG
SL CV PED ECHO LEFT VENTRICLE DIASTOLIC VOLUME (MOD BIPLANE) 2D: 260 ML
SL CV PED ECHO LEFT VENTRICLE SYSTOLIC VOLUME (MOD BIPLANE) 2D: 180 ML
SODIUM SERPL-SCNC: 138 MMOL/L (ref 135–147)
T WAVE AXIS: -27 DEGREES
T WAVE AXIS: -50 DEGREES
T WAVE AXIS: -52 DEGREES
TR MAX PG: 21 MMHG
TR PEAK VELOCITY: 2.3 M/S
TRICUSPID ANNULAR PLANE SYSTOLIC EXCURSION: 1.9 CM
TRICUSPID VALVE PEAK REGURGITATION VELOCITY: 2.28 M/S
TSH SERPL DL<=0.05 MIU/L-ACNC: 1.35 UIU/ML (ref 0.45–4.5)
VENTRICULAR RATE: 67 BPM
VENTRICULAR RATE: 73 BPM
VENTRICULAR RATE: 74 BPM

## 2023-04-17 RX ORDER — DIGOXIN 125 MCG
250 TABLET ORAL DAILY
Status: DISCONTINUED | OUTPATIENT
Start: 2023-04-18 | End: 2023-04-18 | Stop reason: HOSPADM

## 2023-04-17 RX ADMIN — SPIRONOLACTONE 25 MG: 25 TABLET ORAL at 08:11

## 2023-04-17 RX ADMIN — LISINOPRIL 40 MG: 20 TABLET ORAL at 08:11

## 2023-04-17 RX ADMIN — DIGOXIN 125 MCG: 125 TABLET ORAL at 08:11

## 2023-04-17 RX ADMIN — TRAZODONE HYDROCHLORIDE 100 MG: 100 TABLET ORAL at 22:01

## 2023-04-17 RX ADMIN — CARVEDILOL 37.5 MG: 12.5 TABLET, FILM COATED ORAL at 16:35

## 2023-04-17 RX ADMIN — RIVAROXABAN 20 MG: 20 TABLET, FILM COATED ORAL at 16:35

## 2023-04-17 RX ADMIN — PERFLUTREN 0.8 ML/MIN: 6.52 INJECTION, SUSPENSION INTRAVENOUS at 12:23

## 2023-04-17 RX ADMIN — CARVEDILOL 37.5 MG: 12.5 TABLET, FILM COATED ORAL at 08:10

## 2023-04-17 NOTE — UTILIZATION REVIEW
"Initial Clinical Review    Admission: Date/Time/Statement:   Admission Orders (From admission, onward)     Ordered        04/16/23 2104  Place in Observation  Once                      Orders Placed This Encounter   Procedures   • Place in Observation     Standing Status:   Standing     Number of Occurrences:   1     Order Specific Question:   Level of Care     Answer:   Med Surg [16]     ED Arrival Information     Expected   -    Arrival   4/16/2023 16:33    Acuity   Emergent            Means of arrival   Walk-In    Escorted by   Family Member    Service   Hospitalist    Admission type   Emergency            Arrival complaint   Palpitations,Ear Ringing,(ICD PT)           Chief Complaint   Patient presents with   • Dizziness     Patient co dizziness and \"not feeling right- my heart starts to race and I get ringing in my ears  \" Patient states \"On Friday I may have drank a bit to much alcohol  \"        Initial Presentation: 56 y o  male , presented to the ED @ [address], from home via walk in w Family Member  Admitted as Observation due to  Palpitations  Date: 04/16/2023   Presents with complaint of sudden onset dizziness, feeling flushed and a syncopal episode on Friday night  Patient does admit he was drinking a lot of alcohol on Friday night and then reports he was told by his daughter that he passed out and was unresponsive for about a minute  He reports he did not recall feeling like he had to pass out  He reports since Friday night he has not had any further syncopal episodes  However, he does report since Friday night he has continued to have malaise and fatigue and intermittent dizziness and palpitations  Patient also reports that he wonders if this could be related to increased anxiety as he reports over the last 4 months he has had a lot of family stressors and has felt more anxious and worried with trouble sleeping and feeling flushed with palpitations  He denies chest pain, shortness of breath   "  AICD interrogated in ED revealing 1 shock, multiple small episodes of V  Tach  Troponin x3 negative at 36, 46, 44 respectively, denies chest pain  EKG x3 revealing normal sinus rhythm, will continue to monitor  Telemetry  Monitor electrolytes, patient did have a 10 beat run of V  tach and then 11 beat run, will give a dose of IV magnesium now ICD interrogation pending  Consult cardio  Continue home Coreg 37 5 mg twice daily and digoxin 125 mcg daily and check dig level  Patient also expresses increased anxiety episodes x4 months, so will add on after discussion with patient as needed Atarax for anxiety  Day 2: 04/17/2023  Will try to get official reports of ICD interrogation  Follow-up TTE  Continue carvedilol, lisinopril, Aldactone, digoxin  Continue telemetry monitoring  Maintain electrolyte potassium more than 4 and magnesium more than 2     04/17/2023  Consult Cardio:   VT / VF s/p ICD discharge while patient was having alcohol  Few other episodes of NSVT seen on the ICD interrogation report  Potassium and magnesium normal   Nonischemic cardiomyopathy EF 25% s/p [employer] single-chamber ICD  Continue carvedilol, lisinopril, Aldactone, digoxin  Paroxysmal atrial fibrillation  On rate control with carvedilol and digoxin and on Xarelto for anticoagulation  Digoxin level was subtherapeutic  He was asked to increase his dose up to 250 mcg daily  Repeat digoxin level in 4 days  Essential hypertension  BP stable  Continue current medications  CONCHIS -states he uses CPAP regularly  Alcohol abuse - pt has been repeatedly requested to stop alcohol consumption but he continues to do so          ED Triage Vitals   Temperature Pulse Respirations Blood Pressure SpO2   04/16/23 1639 04/16/23 1639 04/16/23 1639 04/16/23 1639 04/16/23 1639   97 9 °F (36 6 °C) 90 18 143/76 95 %      Temp Source Heart Rate Source Patient Position - Orthostatic VS BP Location FiO2 (%)   04/16/23 1639 04/16/23 1639 23 1639 23 1639 --   Tympanic Monitor Sitting Left arm       Pain Score       23 2140       No Pain          Wt Readings from Last 1 Encounters:   23 (!) 141 kg (310 lb)     Additional Vital Signs:  Date/Time Temp Pulse Resp BP MAP (mmHg) SpO2 O2 Device Patient Position - Orthostatic VS   23 16:19:11 98 4 °F (36 9 °C) 67 18 109/59 76 94 % -- --   23 11:04:08 98 5 °F (36 9 °C) 65 18 114/60 78 96 % -- --   23 1100 -- 61 -- 114/60 -- -- -- --   23 07:09:51 97 9 °F (36 6 °C) 75 18 113/58 76 95 % -- --   23 02:58:26 96 8 °F (36 °C) Abnormal  52 Abnormal  18 103/58 73 96 % -- --   23 23:35:22 98 9 °F (37 2 °C) 62 18 100/58 72 96 % -- --   23 21:40:08 98 2 °F (36 8 °C) 70 -- 107/57 74 94 % -- --   23 -- 67 18 107/56 -- 97 % -- --   23 -- 65 20 109/54 76 96 % -- --   23 1930 -- 62 17 118/55 79 97 % -- --   23 1830 -- 70 21 110/58 77 97 % -- --   23 180 -- 68 20 107/55 75 97 % -- --   23 173 -- 70 16 111/53 76 96 % -- --   23 170 -- 73 -- 132/58 83 97 % -- --       2023  EC, NSR      Pertinent Labs/Diagnostic Test Results:   Results from last 7 days   Lab Units 23  1719   WBC Thousand/uL 10 11   HEMOGLOBIN g/dL 15 0   HEMATOCRIT % 44 6   PLATELETS Thousands/uL 174   NEUTROS ABS Thousands/µL 6 99     Results from last 7 days   Lab Units 23  0517 23  1719   SODIUM mmol/L 138 136   POTASSIUM mmol/L 4 0 3 9   CHLORIDE mmol/L 107 104   CO2 mmol/L 24 25   ANION GAP mmol/L 7 7   BUN mg/dL 12 13   CREATININE mg/dL 0 87 0 97   EGFR ml/min/1 73sq m 103 93   CALCIUM mg/dL 8 9 9 3   MAGNESIUM mg/dL 2 0 1 9     Results from last 7 days   Lab Units 23  1719   AST U/L 33   ALT U/L 42   ALK PHOS U/L 59   TOTAL PROTEIN g/dL 7 2   ALBUMIN g/dL 4 0   TOTAL BILIRUBIN mg/dL 1 32*     Results from last 7 days   Lab Units 23  0517 23  1719   GLUCOSE RANDOM mg/dL 121 153* Results from last 7 days   Lab Units 04/16/23  2118 04/16/23  1930 04/16/23  1719   HS TNI 0HR ng/L  --   --  36   HS TNI 2HR ng/L  --  46  --    HSTNI D2 ng/L  --  10  --    HS TNI 4HR ng/L 44  --   --    HSTNI D4 ng/L 8  --   --      Results from last 7 days   Lab Units 04/16/23  1719   PROTIME seconds 17 9*   INR  1 51*     Results from last 7 days   Lab Units 04/17/23  0517   TSH 3RD GENERATON uIU/mL 1 355     Results from last 7 days   Lab Units 04/16/23  2217   DIGOXIN LVL ng/mL <0 5*     ED Treatment:   Medication Administration from 04/16/2023 1633 to 04/16/2023 2135       Date/Time Order Dose Route Action     04/16/2023 1720 EDT sodium chloride 0 9 % bolus 1,000 mL 1,000 mL Intravenous New Bag        Past Medical History:   Diagnosis Date   • A-fib ([place] [address] ) 2020   • AICD (automatic cardioverter/defibrillator) present    • CHF (congestive heart failure) (HCC)    • Hypertension    • Myocarditis ([place] [address] )    • Sleep apnea    • Ventricular tachycardia (HCC)      Present on Admission:  • Paroxysmal atrial fibrillation (HCC)  • Dilated cardiomyopathy (HCC)      Admitting Diagnosis: Palpitations [R00 2]  Ear ringing [H93 19]  V-tach ([place] [address] ) [I47 20]  Age/Sex: 56 y o  male  Admission Orders:  CV diet  Up & OOB as tolerated / Ambulate QID  Telemetry  Obtain ECHO    Scheduled Medications:  carvedilol, 37 5 mg, Oral, BID With Meals  digoxin, 125 mcg, Oral, Daily  lisinopril, 40 mg, Oral, Daily  rivaroxaban, 20 mg, Oral, Daily With Dinner  spironolactone, 25 mg, Oral, Daily  traZODone, 100 mg, Oral, HS      Continuous IV Infusions:     PRN Meds:  acetaminophen, 650 mg, Oral, Q6H PRN  aluminum-magnesium hydroxide-simethicone, 30 mL, Oral, Q6H PRN  hydrOXYzine HCL, 25 mg, Oral, Q6H PRN        IP CONSULT TO CARDIOLOGY    Network Utilization Review Department  ATTENTION: Please call with any questions or concerns to [phone] and carefully listen to the prompts so that you are directed to the right person   All voicemails are confidential   Twila Prajapati all requests for admission clinical reviews, approved or denied determinations and any other requests to dedicated fax number below belonging to the campus where the patient is receiving treatment   List of dedicated fax numbers for the Facilities:  [address] DENIALS (Administrative/Medical Necessity) [phone]   [address] (Maternity/NICU/Pediatrics) [phone]   [address] Elaine [address] [phone]   [place] [address] [phone]   [address] [phone]   155 [facility] [address] [facility] [phone]

## 2023-04-17 NOTE — PLAN OF CARE
Problem: PAIN - ADULT  Goal: Verbalizes/displays adequate comfort level or baseline comfort level  Description: Interventions:  - Encourage patient to monitor pain and request assistance  - Assess pain using appropriate pain scale  - Administer analgesics based on type and severity of pain and evaluate response  - Implement non-pharmacological measures as appropriate and evaluate response  - Consider cultural and social influences on pain and pain management  - Notify physician/advanced practitioner if interventions unsuccessful or patient reports new pain  Outcome: Progressing     Problem: INFECTION - ADULT  Goal: Absence or prevention of progression during hospitalization  Description: INTERVENTIONS:  - Assess and monitor for signs and symptoms of infection  - Monitor lab/diagnostic results  - Monitor all insertion sites, i e  indwelling lines, tubes, and drains  - Monitor endotracheal if appropriate and nasal secretions for changes in amount and color  - [place] appropriate cooling/warming therapies per order  - Administer medications as ordered  - Instruct and encourage patient and family to use good hand hygiene technique  - Identify and instruct in appropriate isolation precautions for identified infection/condition  Outcome: Progressing  Goal: Absence of fever/infection during neutropenic period  Description: INTERVENTIONS:  - Monitor WBC    Outcome: Progressing     Problem: SAFETY ADULT  Goal: Patient will remain free of falls  Description: INTERVENTIONS:  - Educate patient/family on patient safety including physical limitations  - Instruct patient to call for assistance with activity   - Consult OT/PT to assist with strengthening/mobility   - Keep Call bell within reach  - Keep bed low and locked with side rails adjusted as appropriate  - Keep care items and personal belongings within reach  - Initiate and maintain comfort rounds  - Make Fall Risk Sign visible to staff  - Offer Toileting every  Hours, in advance of need  - Initiate/Maintain alarm  - Obtain necessary fall risk management equipment:   - Apply yellow socks and bracelet for high fall risk patients  - Consider moving patient to room near nurses station  Outcome: Progressing  Goal: Maintain or return to baseline ADL function  Description: INTERVENTIONS:  -  Assess patient's ability to carry out ADLs; assess patient's baseline for ADL function and identify physical deficits which impact ability to perform ADLs (bathing, care of mouth/teeth, toileting, grooming, dressing, etc )  - Assess/evaluate cause of self-care deficits   - Assess range of motion  - Assess patient's mobility; develop plan if impaired  - Assess patient's need for assistive devices and provide as appropriate  - Encourage maximum independence but intervene and supervise when necessary  - Involve family in performance of ADLs  - Assess for home care needs following discharge   - Consider OT consult to assist with ADL evaluation and planning for discharge  - Provide patient education as appropriate  Outcome: Progressing  Goal: Maintains/Returns to pre admission functional level  Description: INTERVENTIONS:  - Perform BMAT or MOVE assessment daily    - Set and communicate daily mobility goal to care team and patient/family/caregiver  - Collaborate with rehabilitation services on mobility goals if consulted  - Perform Range of Motion  times a day  - Reposition patient every  hours    - Dangle patient times a day  - Stand patient  times a day  - Ambulate patient  times a day  - Out of bed to chair  times a day   - Out of bed for meals  times a day  - Out of bed for toileting  - Record patient progress and toleration of activity level   Outcome: Progressing

## 2023-04-17 NOTE — PROGRESS NOTES
INTERNAL MEDICINE RESIDENCY PROGRESS NOTE     Name: Tami Alexander   Age & Sex: 56 y o  male   MRN: [id number]  Unit/Bed#: -01   Encounter: [id number]    PATIENT INFORMATION     Name: Tami Alexander   Age & Sex: 56 y o  male   MRN: [id number]  Hospital Stay Days: 0    ASSESSMENT/PLAN     Principal Problem:    Palpitations  Active Problems:    Syncope    Dilated cardiomyopathy ([address] )    Hypertension    Obstructive sleep apnea    Paroxysmal atrial fibrillation ([facility] [address] )    AICD (automatic cardioverter/defibrillator) present      * Palpitations  Assessment & Plan  · Patient reporting worsening palpitations occurring intermittently since last 3 days  · Reported drinking alcohol prior to onset of symptoms  · Syncope appears like vasovagal induced as patient stated having warmth, lightheadedness, lightheadedness prior to passing out  · Dizziness currently has resolved  · ICD interrogation in ED revealed 2 episodes of VT with 1 ICD discharge however official reports are unable to be obtained so did not confirm  · Had similar episodes in 10/2022 where he had ICD discharged but states he did not feel anything this time  · Troponin x3 WNL  EKG showing normal sinus rhythm  Telemetry overnight showing multiple episodes of NSVT otherwise NSR  Also showing 2 episodes of V pacing with heart rate in low 40s  · Last echocardiogram in 2021 showing 25% EF  · Electrolytes are WNL  Cardiology following plan to discuss with EP for further recommendations  Will try to get official reports of ICD interrogation  Follow-up TTE  Continue carvedilol, lisinopril, Aldactone, digoxin  Continue telemetry monitoring overnight  Maintain electrolyte potassium more than 4 and magnesium more than 2    Syncope  Assessment & Plan  Patient states he passed out for around 3 to 4 minutes after he was drinking alcohol  Had similar events 6 months ago where he drank alcohol and had a passing out episode    Etiology likely secondary to "vasovagal syncope as patient states having warmth, leg heaviness, lightheadedness prior to passing out  Denied any seizure-like activity, bowel or bladder incontinence, headache, visual changes, new neurological deficits  Has been worked up for Daiva Yonas which can also be etiology  See plan as above  AICD (automatic cardioverter/defibrillator) present  Assessment & Plan  · Recent device report from  was WNL  Paroxysmal atrial fibrillation (HCC)  Assessment & Plan  Currently in normal sinus rhythm and rate controlled  On Xarelto, Coreg, digoxin which we will continue    Obstructive sleep apnea  Assessment & Plan  · Compliant with CPAP   · qHS CPAP ordered     Hypertension  Assessment & Plan  · Continue Coreg, lisinopril, Aldactone    Dilated cardiomyopathy ([facility] [address] )  Assessment & Plan  · History of NICM with EF 25% and moderate LV dilation from TTE 10/21  ·  looks euvolemic on exam  · Status post ICD placement  · Continue home spironolactone, lisinopril, Coreg, digoxin      Disposition: Discharge home when medically cleared    SUBJECTIVE     Patient seen and examined  No acute events overnight  No episodes of syncope overnight  No lightheadedness  No palpitations overnight  Has been asymptomatic since admission  No events on telemetry overnight      OBJECTIVE     Vitals:    23 0258 23 0709 23 1100 23 1104   BP: 103/58 113/58 114/60 114/60   Pulse: (!) 52 75 61 65   Resp: 18 18  18   Temp: (!) 96 8 °F (36 °C) 97 9 °F (36 6 °C)  98 5 °F (36 9 °C)   TempSrc:       SpO2: 96% 95%  96%   Weight:   (!) 141 kg (310 lb)    Height:   6' 2\" (1 88 m)       Temperature:   Temp (24hrs), Av °F (36 7 °C), Min:96 8 °F (36 °C), Max:98 9 °F (37 2 °C)    Temperature: 98 5 °F (36 9 °C)  Intake & Output:  I/O       04/15 0701   0700  0701   0700  0701   0700    IV Piggyback  1000     Total Intake(mL/kg)  1000 (7 1)     Net  +1000                Weights:   IBW (Ideal Body " Weight): 82 2 kg    Body mass index is 39 8 kg/m²  Weight (last 2 days)     Date/Time Weight    04/17/23 1100 141 (310)    04/16/23 1639 141 (310)        Physical Exam  Vitals reviewed  Constitutional:       General: He is not in acute distress  Appearance: Normal appearance  HENT:      Head: Normocephalic and atraumatic  Eyes:      General: No scleral icterus  Right eye: No discharge  Left eye: No discharge  Cardiovascular:      Rate and Rhythm: Normal rate and regular rhythm  Pulses: Normal pulses  Heart sounds: Normal heart sounds  Pulmonary:      Effort: Pulmonary effort is normal  No respiratory distress  Breath sounds: Normal breath sounds  No wheezing or rales  Chest:      Chest wall: No tenderness  Abdominal:      General: Abdomen is flat  Bowel sounds are normal  There is no distension  Palpations: Abdomen is soft  Tenderness: There is no abdominal tenderness  Musculoskeletal:         General: No deformity  Normal range of motion  Cervical back: Normal range of motion and neck supple  Right lower leg: No edema  Left lower leg: No edema  Skin:     General: Skin is warm  Coloration: Skin is not jaundiced or pale  Findings: No rash  Neurological:      General: No focal deficit present  Mental Status: He is alert and oriented to person, place, and time  Mental status is at baseline  Cranial Nerves: No cranial nerve deficit  Motor: No weakness  Psychiatric:         Mood and Affect: Mood normal          Behavior: Behavior normal        LABORATORY DATA     Labs: I have personally reviewed pertinent reports    Results from last 7 days   Lab Units 04/16/23  1719   WBC Thousand/uL 10 11   HEMOGLOBIN g/dL 15 0   HEMATOCRIT % 44 6   PLATELETS Thousands/uL 174   NEUTROS PCT % 69   MONOS PCT % 10      Results from last 7 days   Lab Units 04/17/23  0517 04/16/23  1719   POTASSIUM mmol/L 4 0 3 9   CHLORIDE mmol/L 107 "104   CO2 mmol/L 24 25   BUN mg/dL 12 13   CREATININE mg/dL 0 87 0 97   CALCIUM mg/dL 8 9 9 3   ALK PHOS U/L  --  59   ALT U/L  --  42   AST U/L  --  33     Results from last 7 days   Lab Units 04/17/23  0517 04/16/23  1719   MAGNESIUM mg/dL 2 0 1 9          Results from last 7 days   Lab Units 04/16/23  1719   INR  1 51*               IMAGING & DIAGNOSTIC TESTING     Radiology Results: I have personally reviewed pertinent reports  No results found  Other Diagnostic Testing: I have personally reviewed pertinent reports  ACTIVE MEDICATIONS     Current Facility-Administered Medications   Medication Dose Route Frequency   • acetaminophen (TYLENOL) tablet 650 mg  650 mg Oral Q6H PRN   • aluminum-magnesium hydroxide-simethicone (MYLANTA) oral suspension 30 mL  30 mL Oral Q6H PRN   • carvedilol (COREG) tablet 37 5 mg  37 5 mg Oral BID With Meals   • digoxin (LANOXIN) tablet 125 mcg  125 mcg Oral Daily   • hydrOXYzine HCL (ATARAX) tablet 25 mg  25 mg Oral Q6H PRN   • lisinopril (ZESTRIL) tablet 40 mg  40 mg Oral Daily   • rivaroxaban (XARELTO) tablet 20 mg  20 mg Oral Daily With Dinner   • spironolactone (ALDACTONE) tablet 25 mg  25 mg Oral Daily   • traZODone (DESYREL) tablet 100 mg  100 mg Oral HS       VTE Pharmacologic Prophylaxis: RX contraindicated due to: Xarelto  VTE Mechanical Prophylaxis: sequential compression device and foot pump applied    Portions of the record may have been created with voice recognition software  Occasional wrong word or \"sound a like\" substitutions may have occurred due to the inherent limitations of voice recognition software    Read the chart carefully and recognize, using context, where substitutions have occurred   ==  Sandra Collazo MD  [facility]  Internal Medicine Residency       "

## 2023-04-17 NOTE — ASSESSMENT & PLAN NOTE
· 02/2023 EP device report w/ normal functioning  · Device to be interrogated while inpatient as above

## 2023-04-17 NOTE — ASSESSMENT & PLAN NOTE
· Reporting palpitations with associated dizziness x2 days, one syncopal episode on Friday night  · Per ED provider, AICD interrogated in ED revealing 1 shock, multiple small episodes of V  tach  · Troponin x3 negative at 36, 46, 44 respectively, denies chest pain  · EKG x3 revealing normal sinus rhythm, will continue to monitor  · Telemetry  · Monitor electrolytes, patient did have a 10 beat run of V  tach and then 11 beat run, will give a dose of IV magnesium now ICD interrogation pending   · Cardiology consult appreciated  · Continue home Coreg 37 5 mg twice daily and digoxin 125 mcg daily and check dig level  · Patient also expresses increased anxiety episodes x4 months, so will add on after discussion with patient as needed Atarax for anxiety

## 2023-04-17 NOTE — ASSESSMENT & PLAN NOTE
Currently in normal sinus rhythm and rate controlled    On Xarelto, Coreg, digoxin which we will continue

## 2023-04-17 NOTE — H&P
[address] [place] [address]  H&P  Name: Dayron Perez 56 y o  male I MRN: [id number]  Unit/Bed#: -01 I Date of Admission: 4/16/2023   Date of Service: 4/16/2023 I Hospital Day: 0      Assessment/Plan   * Palpitations  Assessment & Plan  · Reporting palpitations with associated dizziness x2 days, one syncopal episode on Friday night  · Per ED provider, AICD interrogated in ED revealing 1 shock, multiple small episodes of V  tach  · Troponin x3 negative at 36, 46, 44 respectively, denies chest pain  · EKG x3 revealing normal sinus rhythm, will continue to monitor  · Telemetry  · Monitor electrolytes, patient did have a 10 beat run of V  tach and then 11 beat run, will give a dose of IV magnesium now ICD interrogation pending   · Cardiology consult appreciated  · Continue home Coreg 37 5 mg twice daily and digoxin 125 mcg daily and check dig level  · Patient also expresses increased anxiety episodes x4 months, so will add on after discussion with patient as needed Atarax for anxiety    AICD (automatic cardioverter/defibrillator) present  Assessment & Plan  · 02/2023 EP device report w/ normal functioning  · Device to be interrogated while inpatient as above     CHF (congestive heart failure) ([facility] [address] )  Assessment & Plan  Wt Readings from Last 3 Encounters:   04/16/23 (!) 141 kg (310 lb)   12/29/22 (!) 144 kg (317 lb)   11/16/22 (!) 144 kg (317 lb)     · Continue home spironolactone and carvedilol  · Monitor for s/s of volume retention        Paroxysmal atrial fibrillation (HCC)  Assessment & Plan  · HR currently controlled, continue to monitor  · Continue home carvedilol, digoxin, and xarelto      Obstructive sleep apnea  Assessment & Plan  · qHS CPAP ordered     Hypertension  Assessment & Plan  · /54  · Continue home carvedilol and enalapril  · Monitor BP per unit protocol     [facility]  Assessment & Plan  · Status post ICD placement  · Continue home spironolactone, lisinopril, "Coreg, digoxin          VTE Pharmacologic Prophylaxis: VTE Score: 2 Low Risk (Score 0-2) - Encourage Ambulation  , home xarelto ordered  Code Status: Level 1 - Full Code   Discussion with family: pt  Anticipated Length of Stay: Patient will be admitted on an observation basis with an anticipated length of stay of less than 2 midnights secondary to see above  Total Time Spent on Date of Encounter in care of patient: 75 minutes This time was spent on one or more of the following: performing physical exam; counseling and coordination of care; obtaining or reviewing history; documenting in the medical record; reviewing/ordering tests, medications or procedures; communicating with other healthcare professionals and discussing with patient's family/caregivers  Chief Complaint:    Chief Complaint   Patient presents with   • Dizziness     Patient co dizziness and \"not feeling right- my heart starts to race and I get ringing in my ears  \" Patient states \"On Friday I may have drank a bit to much alcohol  \"        History of Present Illness:  Glenny Gagnon is a 56 y o  male with a PMH of dilated cardiomyopathy status post AICD placement, CHF, A-fib on Xarelto who presents with complaint of sudden onset dizziness, feeling flushed and a syncopal episode on Friday night  Patient does admit he was drinking a lot of alcohol on Friday night and then reports he was told by his daughter that he passed out and was unresponsive for about a minute  He reports he did not recall feeling like he had to pass out  He reports since Friday night he has not had any further syncopal episodes  However, he does report since Friday night he has continued to have malaise and fatigue and intermittent dizziness and palpitations    Patient also reports that he wonders if this could be related to increased anxiety as he reports over the last 4 months he has had a lot of family stressors and has felt more anxious and worried with trouble " sleeping and feeling flushed with palpitations  He denies chest pain, shortness of breath       Review of Systems:  Review of Systems   Constitutional: Positive for fatigue  Negative for activity change  Respiratory: Negative for shortness of breath  Cardiovascular: Positive for palpitations  Negative for chest pain  Gastrointestinal: Negative for abdominal pain, nausea and vomiting  Neurological: Positive for dizziness and syncope  Negative for headaches  Psychiatric/Behavioral: Negative for dysphoric mood  The patient is nervous/anxious  Past Medical and Surgical History:   Past Medical History:   Diagnosis Date   • A-fib [facility]) 2020   • AICD (automatic cardioverter/defibrillator) present    • CHF (congestive heart failure) ([facility] [address] )    • Hypertension    • Myocarditis ([facility] )    • Sleep apnea    • Ventricular tachycardia [facility])        Past Surgical History:   Procedure Laterality Date   • CARDIAC DEFIBRILLATOR PLACEMENT     • CT ESOPHAGOGASTRODUODENOSCOPY TRANSORAL DIAGNOSTIC N/A 3/29/2017    Procedure: ESOPHAGOGASTRODUODENOSCOPY (EGD); Surgeon: Lauro Carmichael MD;  Location: [facility]; Service: Gastroenterology   • CT INSJ/RPLCMT PERM DFB W/TRNSVNS LDS 1/DUAL CHMBR N/A 6/2/2017    Procedure: LEAD EXTRACTION/REIMPLANTATION AND ICD GENERATOR CHANGE ;  Surgeon: Akash Scott MD;  Location:  MAIN OR;  Service: Cardiology   • CT LAPAROSCOPY SURG CHOLECYSTECTOMY N/A 5/19/2017    Procedure: Magdalene Mcrae ;  Surgeon: Mendel Priestly, MD;  Location: [facility];  Service: General   • TONSILLECTOMY     • TYMPANOSTOMY TUBE PLACEMENT         Meds/Allergies:  Prior to Admission medications    Medication Sig Start Date End Date Taking?  Authorizing Provider   carvedilol (COREG) 25 mg tablet Take 1 5 tablets (37 5 mg total) by mouth 2 (two) times a day with meals 11/16/22  Yes Eloina Stanton DO   digoxin (LANOXIN) 0 125 mg tablet Take 1 tablet (125 mcg total) by mouth daily 11/16/22  Yes Laquita Parsons "Karina Britton DO   enalapril (VASOTEC) 10 mg tablet TAKE 1 TABLET BY MOUTH TWICE A DAY 11/21/22  Yes Mihai Britton DO   spironolactone (ALDACTONE) 25 mg tablet Take 1 tablet (25 mg total) by mouth in the morning  5/12/22  Yes Kristy Mckeon MD   traZODone (DESYREL) 100 mg tablet Take 1 tablet (100 mg total) by mouth daily at bedtime 11/16/22  Yes Mihai Britton DO   Xarelto 20 MG tablet TAKE 1 TABLET BY MOUTH EVERY DAY WITH DINNER 10/27/22  Yes Kristy Mckeon MD   metoprolol succinate (TOPROL-XL) 100 mg 24 hr tablet Take 1 tablet (100 mg total) by mouth 2 (two) times a day 11/8/22 11/16/22  Juan Antonio Boyce DO     I have reviewed her medications per review of chart  Allergies: Allergies   Allergen Reactions   • Vicodin [Hydrocodone-Acetaminophen] Other (See Comments)     \"i get nasty\"       Social History:  Marital Status: Single     Patient Pre-hospital Living Situation: Home  Patient Pre-hospital Level of Mobility: walks  Patient Pre-hospital Diet Restrictions: n/a  Substance Use History:   Social History     Substance and Sexual Activity   Alcohol Use Yes    Comment: OCCASIONAL     Social History     Tobacco Use   Smoking Status Never   Smokeless Tobacco Never     Social History     Substance and Sexual Activity   Drug Use No       Family History:  Family History   Problem Relation Age of Onset   • No Known Problems Mother    • No Known Problems Father        Physical Exam:     Vitals:   Blood Pressure: 107/57 (04/16/23 2140)  Pulse: 70 (04/16/23 2140)  Temperature: 98 2 °F (36 8 °C) (04/16/23 2140)  Temp Source: Tympanic (04/16/23 1639)  Respirations: 18 (04/16/23 2127)  Height: 6' 2\" (188 cm) (04/16/23 1639)  Weight - Scale: (!) 141 kg (310 lb) (04/16/23 1639)  SpO2: 94 % (04/16/23 2140)    Physical Exam  Vitals and nursing note reviewed  Constitutional:       General: He is not in acute distress  Appearance: Normal appearance  He is obese  He is not toxic-appearing or diaphoretic     HENT:      Head: " Normocephalic  Cardiovascular:      Rate and Rhythm: Normal rate and regular rhythm  Pulmonary:      Eff[place]: Pulmonary effort is normal  No respiratory distress  Breath sounds: Normal breath sounds  Abdominal:      General: Abdomen is flat  Bowel sounds are normal    Musculoskeletal:         General: No tenderness  Right lower leg: No edema  Left lower leg: No edema  Skin:     General: Skin is warm and dry  Neurological:      General: No focal deficit present  Mental Status: He is alert and oriented to person, place, and time  Psychiatric:         Behavior: Behavior normal          Thought Content: Thought content normal       Comments: Anxious mood and affect          Additional Data:     Lab Results:  Results from last 7 days   Lab Units 04/16/23  1719   WBC Thousand/uL 10 11   HEMOGLOBIN g/dL 15 0   HEMATOCRIT % 44 6   PLATELETS Thousands/uL 174   NEUTROS PCT % 69   LYMPHS PCT % 19   MONOS PCT % 10   EOS PCT % 1     Results from last 7 days   Lab Units 04/16/23  1719   SODIUM mmol/L 136   POTASSIUM mmol/L 3 9   CHLORIDE mmol/L 104   CO2 mmol/L 25   BUN mg/dL 13   CREATININE mg/dL 0 97   ANION GAP mmol/L 7   CALCIUM mg/dL 9 3   ALBUMIN g/dL 4 0   TOTAL BILIRUBIN mg/dL 1 32*   ALK PHOS U/L 59   ALT U/L 42   AST U/L 33   GLUCOSE RANDOM mg/dL 153*     Results from last 7 days   Lab Units 04/16/23  1719   INR  1 51*                   Lines/Drains:  Invasive Devices     Peripheral Intravenous Line  Duration           Peripheral IV 04/16/23 Right Antecubital <1 day                    Imaging: No pertinent imaging reviewed  No orders to display       EKG and Other Studies Reviewed on Admission:   · EKG: Normal sinus rhythm x3     ** Please Note: This note has been constructed using a voice recognition system   **

## 2023-04-17 NOTE — ASSESSMENT & PLAN NOTE
· History of NICM with EF 25% and moderate LV dilation from TTE 10/21    ·  looks euvolemic on exam  · Status post ICD placement  · Continue home spironolactone, lisinopril, Coreg, digoxin

## 2023-04-17 NOTE — ASSESSMENT & PLAN NOTE
Patient having 1 episode of passing out for around 3 to 4 minutes after drinking alcohol  Complaining of palpitation since then intermittently  Suspected syncope secondary to vasovagal or cardiogenic  ICD interrogation did show 2 episodes of V  tach s/p 1 ICD discharge however patient not feel it  See plan as above

## 2023-04-17 NOTE — CASE MANAGEMENT
Case Management Discharge Planning Note    Patient name Kelli Marvin  Location [address] [id number] MRN [id number]  : 1975 Date 2023       Current Admission Date: 2023  Current Admission Diagnosis:Palpitations   Patient Active Problem List    Diagnosis Date Noted   • Syncope 2023   • CHF (congestive heart failure) ([facility])    • Palpitations    • AICD (automatic cardioverter/defibrillator) present    • ICD (implantable cardioverter-defibrillator) discharge 10/10/2022   • Elevated troponin 2021   • Paroxysmal atrial fibrillation ([facility] [address] ) 2020   • Dilated cardiomyopathy ([facility] [address] ) 2018   • Hypertension 2018   • Obstructive sleep apnea 2018   • ICD (implantable cardioverter-defibrillator) lead failure 2017   • Calculus of gallbladder without cholecystitis 2017      LOS (days): 0  Geometric Mean LOS (GMLOS) (days):   Days to GMLOS:     OBJECTIVE:      Current admission status: Observation   Preferred Pharmacy:   [id number]- [place], [place] - [address]  Phone: [phone] Fax: [phone]    Primary Care Provider: Alice Crisostomo PA-C    Primary Insurance: Natan Alford  Secondary Insurance:     DISCHARGE DETAILS:  Patient reviewed during Interdisciplinary Rounds with SLIM today  Anticipated d/c today vs tomorrow  Patient is independent with no anticipated CM needs  CM will remain available to address any needs if they arise  CM assessment pending needs

## 2023-04-17 NOTE — ASSESSMENT & PLAN NOTE
Wt Readings from Last 3 Encounters:   04/16/23 (!) 141 kg (310 lb)   12/29/22 (!) 144 kg (317 lb)   11/16/22 (!) 144 kg (317 lb)     · Continue home spironolactone and carvedilol  · Monitor for s/s of volume retention

## 2023-04-17 NOTE — ASSESSMENT & PLAN NOTE
· Presented with worsening palpitations occurring intermittently started 3 days prior to presentation likely due to patient's alcohol intake on the day symptoms started  · Had 1 episode of syncope undetermined whether it was vasovagal versus cardiogenic  · ICD interrogation in ED showed 2 episodes of VT s/p ICD discharged and many episodes of NSVT  · Telemetry during hospitalization showing normal sinus rhythm with intermittent episodes of NSVT  · Echocardiogram showing chronic systolic CHF with EF 81%  Digoxin level was subtherapeutic on presentation    Increase digoxin to 250 mcg daily  Follow-up cardiologist Dr Abdullahi Person in 1 week  Follow-up digoxin level after 4 days

## 2023-04-17 NOTE — CONSULTS
Consultation - Cardiology   Socorro Garcia 56 y o  male MRN: [id number]  Unit/Bed#: -01 Encounter: [id number]  04/17/23  8:47 AM    Assessment/ Plan:  1  Ventricular tachycardia s/p ICD discharge  • Formal device interrogation report shows 2 episodes of VT with 1 ICD discharge  • Troponins negative x3  • EKG reveals NSR without acute ischemic abnormalities  • Telemetry shows NSR and 5 episodes of NSVT lasting approximately 10 beats per occurrence  2 episodes of ventricular pacing were noted at 40 bpm  • Discussed case with EP Dr Jose F Cohn who recommends increasing digoxin to 250 mcg    2  NICM with EF 25% s/p MDT single-chamber ICD  • Euvolemic on exam  • TTE ordered, previous TTE revealed EF 25% with moderately dilated LV (10/11/2021)  • Continue carvedilol, lisinopril, Aldactone, and digoxin  • Continue follow-up with device clinic    3  Paroxysmal atrial fibrillation  • No A-fib noted on EKG or telemetry  • Continue carvedilol, digoxin, and Xarelto    4  Hypertension  • Currently 113/58, continue to monitor  • Continue carvedilol, lisinopril, and Aldactone    5  CONCHIS on CPAP  • Continue CPAP qhs    6  History of alcohol use  • Cessation advised      Patient is stable for discharge from a cardiac standpoint on current medications  Plan to check digoxin level this Friday  Plan for follow-up with electrophysiologist Dr Jose F Cohn  Thank you for this consultation  History of Present Illness   Physician Requesting Consult: Gaviota Tiwari MD    Reason for Consult / Principal Problem: VT s/p ICD discharge    HPI: Socorro Garcia is a 56y o  year old male with history of dilated cardiomyopathy with EF 25% s/p MDT single-chamber ICD, paroxysmal atrial fibrillation on chronic anticoagulation, hypertension, obstructive sleep apnea on CPAP, and history of alcohol abuse who presents after syncopal episode    Patient states he was drinking alcohol on Friday night when he developed a warm feeling and passed out on Friday  Denies feeling ICD discharge  In ED device interrogation reportedly showed 2 episodes of VT and 1 ICD discharge  Cardiology consulted for further evaluation management  Patient notes he has been under a lot more stress over the past few weeks  Endorses strict compliance to all medications  Patient follows with Dr Rimma Saldana for advanced heart failure and electrophysiologist Dr Fiona Le  Inpatient consult to Cardiology  Consult performed by: Annmarie Pedro PA-C  Consult ordered by: Trinh Brewer PA-C          EKG: Normal sinus rhythm with occasional PVCs; inferior infarct      Review of Systems   Constitutional: Negative for chills and fever  HENT: Negative for ear pain and sore throat  Eyes: Negative for pain and visual disturbance  Respiratory: Negative for cough and shortness of breath  Cardiovascular: Negative for chest pain, palpitations and leg swelling  Gastrointestinal: Negative for abdominal pain and vomiting  Genitourinary: Negative for dysuria and hematuria  Musculoskeletal: Negative for arthralgias and back pain  Skin: Negative for color change and rash  Neurological: Positive for syncope  Negative for seizures  All other systems reviewed and are negative  Historical Information   Past Medical History:   Diagnosis Date   • A-fib [facility]) 2020   • AICD (automatic cardioverter/defibrillator) present    • CHF (congestive heart failure) ([facility] [address] )    • Hypertension    • Myocarditis ([facility] [address] )    • Sleep apnea    • Ventricular tachycardia [facility])      Past Surgical History:   Procedure Laterality Date   • CARDIAC DEFIBRILLATOR PLACEMENT     • [place] ESOPHAGOGASTRODUODENOSCOPY TRANSORAL DIAGNOSTIC N/A 3/29/2017    Procedure: ESOPHAGOGASTRODUODENOSCOPY (EGD); Surgeon: Chela Street MD;  Location: [facility];   Service: Gastroenterology   • WI INSJ/RPLCMT PERM DFB W/TRNSVNS LDS 1/DUAL CHMBR N/A 6/2/2017    Procedure: LEAD EXTRACTION/REIMPLANTATION AND ICD GENERATOR CHANGE ; " Surgeon: Joao Monreal MD;  Location:  MAIN OR;  Service: Cardiology   • MO LAPAROSCOPY SURG CHOLECYSTECTOMY N/A 5/19/2017    Procedure: Nj Rodríguez ;  Surgeon: Gracia German MD;  Location: [facility];  Service: General   • TONSILLECTOMY     • TYMPANOSTOMY TUBE PLACEMENT       Social History     Substance and Sexual Activity   Alcohol Use Yes    Comment: OCCASIONAL     Social History     Substance and Sexual Activity   Drug Use No     Social History     Tobacco Use   Smoking Status Never   Smokeless Tobacco Never       Family History:   Family History   Problem Relation Age of Onset   • No Known Problems Mother    • No Known Problems Father        Meds/Allergies   all current active meds have been reviewed  Allergies   Allergen Reactions   • Vicodin [Hydrocodone-Acetaminophen] Other (See Comments)     \"i get nasty\"       Objective   Vitals: Blood pressure 113/58, pulse 75, temperature 97 9 °F (36 6 °C), resp  rate 18, height 6' 2\" (1 88 m), weight (!) 141 kg (310 lb), SpO2 95 %  , Body mass index is 39 8 kg/m² ,   Orthostatic Blood Pressures    Flowsheet Row Most Recent Value   Blood Pressure 113/58 filed at 04/17/2023 0709   Patient Position - Orthostatic VS Sitting filed at 04/16/2023 5520          Systolic (95RUZ), ALU:779 , Min:100 , JCO:551     Diastolic (72DPN), YNH:28, Min:53, Max:76        Intake/Output Summary (Last 24 hours) at 4/17/2023 0847  Last data filed at 4/16/2023 1820  Gross per 24 hour   Intake 1000 ml   Output --   Net 1000 ml       Invasive Devices     Peripheral Intravenous Line  Duration           Peripheral IV 04/16/23 Right Antecubital <1 day                    Physical Exam:  Physical Exam  Vitals and nursing note reviewed  Constitutional:       General: He is not in acute distress  Appearance: He is well-developed  He is obese  He is not diaphoretic  HENT:      Head: Normocephalic and atraumatic        Nose: Nose normal    Eyes:      Conjunctiva/sclera: " Conjunctivae normal    Cardiovascular:      Rate and Rhythm: Normal rate and regular rhythm  Pulses: Normal pulses  Heart sounds: Normal heart sounds  No murmur heard  No friction rub  Pulmonary:      [place]: Pulmonary effort is normal  No respiratory distress  Breath sounds: Normal breath sounds  No wheezing or rales  Chest:      Chest wall: No tenderness  Abdominal:      Palpations: Abdomen is soft  Tenderness: There is no abdominal tenderness  Musculoskeletal:         General: No swelling  Cervical back: Neck supple  Right lower leg: No edema  Left lower leg: No edema  Skin:     General: Skin is warm and dry  Capillary Refill: Capillary refill takes less than 2 seconds  Neurological:      Mental Status: He is alert and oriented to person, place, and time     Psychiatric:         Mood and Affect: Mood normal          Judgment: Judgment normal           Lab Results:     Cardiac Profile:   Results from last 7 days   Lab Units 04/16/23  2118 04/16/23  1930 04/16/23  1719   HS TNI 0HR ng/L  --   --  36   HS TNI 2HR ng/L  --  46  --    HSTNI D2 ng/L  --  10  --    HS TNI 4HR ng/L 44  --   --    HSTNI D4 ng/L 8  --   --        CBC with diff:   Results from last 7 days   Lab Units 04/16/23  1719   WBC Thousand/uL 10 11   HEMOGLOBIN g/dL 15 0   HEMATOCRIT % 44 6   MCV fL 91   PLATELETS Thousands/uL 174   MCH pg 30 4   MCHC g/dL 33 6   RDW % 14 1   MPV fL 11 2   NRBC AUTO /100 WBCs 0         CMP:   Results from last 7 days   Lab Units 04/17/23  0517 04/16/23  1719   POTASSIUM mmol/L 4 0 3 9   CHLORIDE mmol/L 107 104   CO2 mmol/L 24 25   BUN mg/dL 12 13   CREATININE mg/dL 0 87 0 97   CALCIUM mg/dL 8 9 9 3   AST U/L  --  33   ALT U/L  --  42   ALK PHOS U/L  --  59   EGFR ml/min/1 73sq m 103 93

## 2023-04-17 NOTE — PLAN OF CARE
Problem: PAIN - ADULT  Goal: Verbalizes/displays adequate comfort level or baseline comfort level  Description: Interventions:  - Encourage patient to monitor pain and request assistance  - Assess pain using appropriate pain scale  - Administer analgesics based on type and severity of pain and evaluate response  - Implement non-pharmacological measures as appropriate and evaluate response  - Consider cultural and social influences on pain and pain management  - Notify physician/advanced practitioner if interventions unsuccessful or patient reports new pain  Outcome: Progressing     Problem: INFECTION - ADULT  Goal: Absence or prevention of progression during hospitalization  Description: INTERVENTIONS:  - Assess and monitor for signs and symptoms of infection  - Monitor lab/diagnostic results  - Monitor all insertion sites, i e  indwelling lines, tubes, and drains  - Monitor endotracheal if appropriate and nasal secretions for changes in amount and color  - [place] appropriate cooling/warming therapies per order  - Administer medications as ordered  - Instruct and encourage patient and family to use good hand hygiene technique  - Identify and instruct in appropriate isolation precautions for identified infection/condition  Outcome: Progressing  Goal: Absence of fever/infection during neutropenic period  Description: INTERVENTIONS:  - Monitor WBC    Outcome: Progressing     Problem: SAFETY ADULT  Goal: Patient will remain free of falls  Description: INTERVENTIONS:  - Educate patient/family on patient safety including physical limitations  - Instruct patient to call for assistance with activity   - Consult OT/PT to assist with strengthening/mobility   - Keep Call bell within reach  - Keep bed low and locked with side rails adjusted as appropriate  - Keep care items and personal belongings within reach  - Initiate and maintain comfort rounds  - Make Fall Risk Sign visible to staff  - Offer Toileting every  Hours, in advance of need  - Initiate/Maintain alarm  - Obtain necessary fall risk management equipment:   - Apply yellow socks and bracelet for high fall risk patients  - Consider moving patient to room near nurses station  Outcome: Progressing  Goal: Maintain or return to baseline ADL function  Description: INTERVENTIONS:  -  Assess patient's ability to carry out ADLs; assess patient's baseline for ADL function and identify physical deficits which impact ability to perform ADLs (bathing, care of mouth/teeth, toileting, grooming, dressing, etc )  - Assess/evaluate cause of self-care deficits   - Assess range of motion  - Assess patient's mobility; develop plan if impaired  - Assess patient's need for assistive devices and provide as appropriate  - Encourage maximum independence but intervene and supervise when necessary  - Involve family in performance of ADLs  - Assess for home care needs following discharge   - Consider OT consult to assist with ADL evaluation and planning for discharge  - Provide patient education as appropriate  Outcome: Progressing  Goal: Maintains/Returns to pre admission functional level  Description: INTERVENTIONS:  - Perform BMAT or MOVE assessment daily    - Set and communicate daily mobility goal to care team and patient/family/caregiver  - Collaborate with rehabilitation services on mobility goals if consulted  - Perform Range of Motion  times a day  - Reposition patient every  hours    - Dangle patient  times a day  - Stand patient  times a day  - Ambulate patient  times a day  - Out of bed to chair  times a day   - Out of bed for joselo times a day  - Out of bed for toileting  - Record patient progress and toleration of activity level   Outcome: Progressing     Problem: DISCHARGE PLANNING  Goal: Discharge to home or other facility with appropriate resources  Description: INTERVENTIONS:  - Identify barriers to discharge w/patient and caregiver  - Arrange for needed discharge resources and transportation as appropriate  - Identify discharge learning needs (meds, wound care, etc )  - Arrange for interpretive services to assist at discharge as needed  - Refer to Case Management Department for coordinating discharge planning if the patient needs post-hospital services based on physician/advanced practitioner order or complex needs related to functional status, cognitive ability, or social support system  Outcome: Progressing     Problem: Knowledge Deficit  Goal: Patient/family/caregiver demonstrates understanding of disease process, treatment plan, medications, and discharge instructions  Description: Complete learning assessment and assess knowledge base    Interventions:  - Provide teaching at level of understanding  - Provide teaching via preferred learning methods  Outcome: Progressing

## 2023-04-18 ENCOUNTER — TRANSITIONAL CARE MANAGEMENT (OUTPATIENT)
Dept: FAMILY MEDICINE CLINIC | Facility: CLINIC | Age: 48
End: 2023-04-18

## 2023-04-18 VITALS
BODY MASS INDEX: 39.78 KG/M2 | SYSTOLIC BLOOD PRESSURE: 108 MMHG | DIASTOLIC BLOOD PRESSURE: 66 MMHG | WEIGHT: 310 LBS | HEIGHT: 74 IN | HEART RATE: 58 BPM | TEMPERATURE: 98.1 F | RESPIRATION RATE: 18 BRPM | OXYGEN SATURATION: 96 %

## 2023-04-18 PROBLEM — R55 SYNCOPE: Status: RESOLVED | Noted: 2023-04-16 | Resolved: 2023-04-18

## 2023-04-18 LAB
ANION GAP SERPL CALCULATED.3IONS-SCNC: 7 MMOL/L (ref 4–13)
BUN SERPL-MCNC: 15 MG/DL (ref 5–25)
CALCIUM SERPL-MCNC: 8.9 MG/DL (ref 8.4–10.2)
CHLORIDE SERPL-SCNC: 105 MMOL/L (ref 96–108)
CO2 SERPL-SCNC: 24 MMOL/L (ref 21–32)
CREAT SERPL-MCNC: 0.96 MG/DL (ref 0.6–1.3)
GFR SERPL CREATININE-BSD FRML MDRD: 94 ML/MIN/1.73SQ M
GLUCOSE P FAST SERPL-MCNC: 126 MG/DL (ref 65–99)
GLUCOSE SERPL-MCNC: 126 MG/DL (ref 65–140)
MAGNESIUM SERPL-MCNC: 2.5 MG/DL (ref 1.9–2.7)
POTASSIUM SERPL-SCNC: 4.1 MMOL/L (ref 3.5–5.3)
SODIUM SERPL-SCNC: 136 MMOL/L (ref 135–147)

## 2023-04-18 RX ORDER — DIGOXIN 125 MCG
250 TABLET ORAL DAILY
Qty: 90 TABLET | Refills: 1 | Status: ON HOLD
Start: 2023-04-18 | End: 2023-05-03 | Stop reason: SDUPTHER

## 2023-04-18 RX ORDER — MAGNESIUM SULFATE HEPTAHYDRATE 40 MG/ML
2 INJECTION, SOLUTION INTRAVENOUS ONCE
Status: COMPLETED | OUTPATIENT
Start: 2023-04-18 | End: 2023-04-18

## 2023-04-18 RX ADMIN — CARVEDILOL 37.5 MG: 12.5 TABLET, FILM COATED ORAL at 08:46

## 2023-04-18 RX ADMIN — MAGNESIUM SULFATE HEPTAHYDRATE 2 G: 40 INJECTION, SOLUTION INTRAVENOUS at 04:03

## 2023-04-18 RX ADMIN — SPIRONOLACTONE 25 MG: 25 TABLET ORAL at 08:39

## 2023-04-18 RX ADMIN — LISINOPRIL 40 MG: 20 TABLET ORAL at 08:46

## 2023-04-18 RX ADMIN — DIGOXIN 250 MCG: 125 TABLET ORAL at 08:39

## 2023-04-18 NOTE — PLAN OF CARE
Problem: PAIN - ADULT  Goal: Verbalizes/displays adequate comfort level or baseline comfort level  Description: Interventions:  - Encourage patient to monitor pain and request assistance  - Assess pain using appropriate pain scale  - Administer analgesics based on type and severity of pain and evaluate response  - Implement non-pharmacological measures as appropriate and evaluate response  - Consider cultural and social influences on pain and pain management  - Notify physician/advanced practitioner if interventions unsuccessful or patient reports new pain  Outcome: Progressing     Problem: INFECTION - ADULT  Goal: Absence or prevention of progression during hospitalization  Description: INTERVENTIONS:  - Assess and monitor for signs and symptoms of infection  - Monitor lab/diagnostic results  - Monitor all insertion sites, i e  indwelling lines, tubes, and drains  - Monitor endotracheal if appropriate and nasal secretions for changes in amount and color  - [place] appropriate cooling/warming therapies per order  - Administer medications as ordered  - Instruct and encourage patient and family to use good hand hygiene technique  - Identify and instruct in appropriate isolation precautions for identified infection/condition  Outcome: Progressing  Goal: Absence of fever/infection during neutropenic period  Description: INTERVENTIONS:  - Monitor WBC    Outcome: Progressing     Problem: SAFETY ADULT  Goal: Patient will remain free of falls  Description: INTERVENTIONS:  - Educate patient/family on patient safety including physical limitations  - Instruct patient to call for assistance with activity   - Consult OT/PT to assist with strengthening/mobility   - Keep Call bell within reach  - Keep bed low and locked with side rails adjusted as appropriate  - Keep care items and personal belongings within reach  - Initiate and maintain comfort rounds  - Make Fall Risk Sign visible to staff  - Offer Toileting every    Hours, in advance of need  - Initiate/Maintain   alarm  - Obtain necessary fall risk management equipment:       - Apply yellow socks and bracelet for high fall risk patients  - Consider moving patient to room near nurses station  Outcome: Progressing  Goal: Maintain or return to baseline ADL function  Description: INTERVENTIONS:  -  Assess patient's ability to carry out ADLs; assess patient's baseline for ADL function and identify physical deficits which impact ability to perform ADLs (bathing, care of mouth/teeth, toileting, grooming, dressing, etc )  - Assess/evaluate cause of self-care deficits   - Assess range of motion  - Assess patient's mobility; develop plan if impaired  - Assess patient's need for assistive devices and provide as appropriate  - Encourage maximum independence but intervene and supervise when necessary  - Involve family in performance of ADLs  - Assess for home care needs following discharge   - Consider OT consult to assist with ADL evaluation and planning for discharge  - Provide patient education as appropriate  Outcome: Progressing  Goal: Maintains/Returns to pre admission functional level  Description: INTERVENTIONS:  - Perform BMAT or MOVE assessment daily    - Set and communicate daily mobility goal to care team and patient/family/caregiver  - Collaborate with rehabilitation services on mobility goals if consulted  - Perform Range of Motion    times a day  - Reposition patient every    hours    - Dangle patient    times a day  - Stand patient    times a day  - Ambulate patient    times a day  - Out of bed to chair    times a day   - Out of bed for meals    times a day  - Out of bed for toileting  - Record patient progress and toleration of activity level   Outcome: Progressing     Problem: DISCHARGE PLANNING  Goal: Discharge to home or other facility with appropriate resources  Description: INTERVENTIONS:  - Identify barriers to discharge w/patient and caregiver  - Arrange for needed discharge resources and transportation as appropriate  - Identify discharge learning needs (meds, wound care, etc )  - Arrange for interpretive services to assist at discharge as needed  - Refer to Case Management Department for coordinating discharge planning if the patient needs post-hospital services based on physician/advanced practitioner order or complex needs related to functional status, cognitive ability, or social support system  Outcome: Progressing     Problem: Knowledge Deficit  Goal: Patient/family/caregiver demonstrates understanding of disease process, treatment plan, medications, and discharge instructions  Description: Complete learning assessment and assess knowledge base    Interventions:  - Provide teaching at level of understanding  - Provide teaching via preferred learning methods  Outcome: Progressing

## 2023-04-18 NOTE — DISCHARGE INSTR - AVS FIRST PAGE
You are advised to start taking Lanoxin 2 tablets to make a total of 250 mcg daily    Follow-up with your cardiologist Dr Taylor Simms in 1 week regarding your recent hospitalization and ER arrhythmias  Take your medication regularly  Repeat digoxin level on Friday and follow-up with your cardiologist regarding your results

## 2023-04-18 NOTE — PROGRESS NOTES
[address] [facility]  Progress Note  Name: David Loredo  MRN: [id number]  Unit/Bed#: -01 I Date of Admission: 4/16/2023   Date of Service: 4/18/2023 I Hospital Day: 0    Assessment/Plan   * Palpitations-resolved as of 4/18/2023  Assessment & Plan  · Presented with worsening palpitations occurring intermittently started 3 days prior to presentation likely due to patient's alcohol intake on the day symptoms started  · Had 1 episode of syncope undetermined whether it was vasovagal versus cardiogenic  · ICD interrogation in ED showed 2 episodes of VT s/p ICD discharged and many episodes of NSVT  · Telemetry during hospitalization showing normal sinus rhythm with intermittent episodes of NSVT  · Echocardiogram showing chronic systolic CHF with EF 16%  Digoxin level was subtherapeutic on presentation  Increase digoxin to 250 mcg daily  Follow-up cardiologist Dr Birdie Grant in 1 week  Follow-up digoxin level after 4 days    Syncope-resolved as of 4/18/2023  Assessment & Plan  Patient having 1 episode of passing out for around 3 to 4 minutes after drinking alcohol  Complaining of palpitation since then intermittently  Suspected syncope secondary to vasovagal or cardiogenic  ICD interrogation did show 2 episodes of V  tach s/p 1 ICD discharge however patient not feel it  See plan as above  AICD (automatic cardioverter/defibrillator) present  Assessment & Plan  · Recent device report from 2/23 was WNL  Paroxysmal atrial fibrillation (HCC)  Assessment & Plan  Currently in normal sinus rhythm and rate controlled  On Xarelto, Coreg, digoxin which we will continue    Obstructive sleep apnea  Assessment & Plan  · Compliant with CPAP   · qHS CPAP ordered     Hypertension  Assessment & Plan  · Continue Coreg, lisinopril, Aldactone    Dilated cardiomyopathy ([facility] [address] )  Assessment & Plan  · History of NICM with EF 25% and moderate LV dilation from TTE 10/21    ·  looks euvolemic on exam  · Status post ICD placement  · Continue home spironolactone, lisinopril, Coreg, digoxin            VTE Pharmacologic Prophylaxis:   VTE Score: 2 Low Risk (Score 0-2) - Encourage Ambulation  Mechanical VTE Prophylaxis in Place: Yes    Patient Centered Rounds: I have performed bedside rounds with nursing staff today  Discussions with Specialists or Other Care Team Provider: Cardiology    Education and Discussions with Family / Patient: None    Time Spent for Care: 30 minutes  More than 50% of total time spent on counseling and coordination of care as described above  Current Length of Stay: 0 day(s)  Current Patient Status: Observation     Discharge Plan / Estimated Discharge Date: Anticipate discharge today    Code Status: Level 1 - Full Code      Subjective:   Patient seen and examined at bedside  Overnight telemetry showed 6 beat run of ventricular tachycardia while patient was sleeping  2 g of IV Mg was given  Has been having palpitations but says he always has them  No chest pain, lightheadedness, dizziness, or dyspnea  All potation's have been resolved  Syncope did not occur during this hospitalization  Objective:     Vitals:   Temp (24hrs), Av 1 °F (36 7 °C), Min:97 6 °F (36 4 °C), Max:98 4 °F (36 9 °C)    Temp:  [97 6 °F (36 4 °C)-98 4 °F (36 9 °C)] 98 1 °F (36 7 °C)  HR:  [53-69] 58  Resp:  [16-18] 18  BP: (101-110)/(55-66) 108/66  SpO2:  [93 %-96 %] 96 %  Body mass index is 39 8 kg/m²  Input and Output Summary (last 24 hours): Intake/Output Summary (Last 24 hours) at 2023 1131  Last data filed at 2023 1947  Gross per 24 hour   Intake 980 ml   Output --   Net 980 ml       Physical Exam:   Physical Exam  Constitutional:       Appearance: Normal appearance  He is obese  HENT:      Head: Normocephalic  Eyes:      Extraocular Movements: Extraocular movements intact  Conjunctiva/sclera: Conjunctivae normal    Cardiovascular:      Rate and Rhythm: Normal rate and regular rhythm  Pulses: Normal pulses  Heart sounds: Normal heart sounds  Pulmonary:      Effort: Pulmonary effort is normal       Breath sounds: Normal breath sounds  Abdominal:      General: Bowel sounds are normal       Palpations: Abdomen is soft  Tenderness: There is no abdominal tenderness  Musculoskeletal:         General: Normal range of motion  Cervical back: Normal range of motion and neck supple  Right lower leg: No edema  Left lower leg: No edema  Skin:     General: Skin is warm and dry  Capillary Refill: Capillary refill takes less than 2 seconds  Coloration: Skin is not jaundiced  Neurological:      General: No focal deficit present  Mental Status: He is alert and oriented to person, place, and time  Cranial Nerves: Cranial nerves 2-12 are intact  Motor: Motor function is intact  Coordination: Coordination is intact  Gait: Gait is intact  Psychiatric:         Attention and Perception: Attention normal          Mood and Affect: Mood normal           Additional Data:     Labs:  Results from last 7 days   Lab Units 04/16/23  1719   WBC Thousand/uL 10 11   HEMOGLOBIN g/dL 15 0   HEMATOCRIT % 44 6   PLATELETS Thousands/uL 174   NEUTROS PCT % 69   LYMPHS PCT % 19   MONOS PCT % 10   EOS PCT % 1     Results from last 7 days   Lab Units 04/18/23  0559 04/17/23  0517 04/16/23  1719   SODIUM mmol/L 136   < > 136   POTASSIUM mmol/L 4 1   < > 3 9   CHLORIDE mmol/L 105   < > 104   CO2 mmol/L 24   < > 25   BUN mg/dL 15   < > 13   CREATININE mg/dL 0 96   < > 0 97   ANION GAP mmol/L 7   < > 7   CALCIUM mg/dL 8 9   < > 9 3   ALBUMIN g/dL  --   --  4 0   TOTAL BILIRUBIN mg/dL  --   --  1 32*   ALK PHOS U/L  --   --  59   ALT U/L  --   --  42   AST U/L  --   --  33   GLUCOSE RANDOM mg/dL 126   < > 153*    < > = values in this interval not displayed       Results from last 7 days   Lab Units 04/16/23  1719   INR  1 51*                   Imaging: No pertinent imaging reviewed  Recent Cultures (last 7 days):           Lines/Drains:  Invasive Devices     Peripheral Intravenous Line  Duration           Peripheral IV 23 Left;Proximal;Ventral (anterior) Forearm <1 day                Telemetry:   Telemetry Orders (From admission, onward)             24 Hour Telemetry Monitoring  Continuous x 24 Hours (Telem)           References:    Telemetry Guidelines   Question:  Reason for 24 Hour Telemetry  Answer:  Syncope suspected to be cardiac in origin                    Last 24 Hours Medication List:   Current Facility-Administered Medications   Medication Dose Route Frequency Provider Last Rate   • acetaminophen  650 mg Oral Q6H PRN Barrera Tavera PA-C     • aluminum-magnesium hydroxide-simethicone  30 mL Oral Q6H PRN Barrera Tavera PA-C     • carvedilol  37 5 mg Oral BID With Meals Barrera Tavera PA-C     • digoxin  250 mcg Oral Daily Levester LUCHO Ceron     • hydrOXYzine HCL  25 mg Oral Q6H PRN Barrera Tavera PA-C     • lisinopril  40 mg Oral Daily Barrera Tavera PA-C     • rivaroxaban  20 mg Oral Daily With Dinner Barrera Tavera PA-C     • spironolactone  25 mg Oral Daily Barrera Tavera PA-C     • traZODone  100 mg Oral HS Barrera Tavera PA-C          Today, Patient Was Seen By: Jessica Torres MD    ** Please Note: This note has been constructed using a voice recognition system   **

## 2023-04-18 NOTE — UTILIZATION REVIEW
"Continued Stay Review    Date: 04/18/2023                         Current Patient Class: Observation  Current Level of Care: Med/Surg    HPI:47 y o  male initially admitted on 04/16/2023    Assessment/Plan:  Palpitations  Monitor on telemetry  Digoxin level was subtherapeutic on presentation  Increase digoxin to 250 mcg daily  Follow-up cardiologist in 1 week  Follow-up digoxin level after 4 days        Vital Signs: /62   Pulse 65   Temp 98 °F (36 7 °C)   Resp 18   Ht 6' 2\" (1 88 m)   Wt (!) 141 kg (310 lb)   SpO2 95%   BMI 39 80 kg/m²     Pertinent Labs/Diagnostic Results:     Results from last 7 days   Lab Units 04/16/23  1719   WBC Thousand/uL 10 11   HEMOGLOBIN g/dL 15 0   HEMATOCRIT % 44 6   PLATELETS Thousands/uL 174   NEUTROS ABS Thousands/µL 6 99     Results from last 7 days   Lab Units 04/18/23  0559 04/17/23  0517 04/16/23  1719   SODIUM mmol/L 136 138 136   POTASSIUM mmol/L 4 1 4 0 3 9   CHLORIDE mmol/L 105 107 104   CO2 mmol/L 24 24 25   ANION GAP mmol/L 7 7 7   BUN mg/dL 15 12 13   CREATININE mg/dL 0 96 0 87 0 97   EGFR ml/min/1 73sq m 94 103 93   CALCIUM mg/dL 8 9 8 9 9 3   MAGNESIUM mg/dL 2 5 2 0 1 9     Results from last 7 days   Lab Units 04/16/23  1719   AST U/L 33   ALT U/L 42   ALK PHOS U/L 59   TOTAL PROTEIN g/dL 7 2   ALBUMIN g/dL 4 0   TOTAL BILIRUBIN mg/dL 1 32*     Results from last 7 days   Lab Units 04/18/23  0559 04/17/23  0517 04/16/23  1719   GLUCOSE RANDOM mg/dL 126 121 153*     Results from last 7 days   Lab Units 04/16/23  2118 04/16/23  1930 04/16/23  1719   HS TNI 0HR ng/L  --   --  36   HS TNI 2HR ng/L  --  46  --    HSTNI D2 ng/L  --  10  --    HS TNI 4HR ng/L 44  --   --    HSTNI D4 ng/L 8  --   --      Results from last 7 days   Lab Units 04/16/23  1719   PROTIME seconds 17 9*   INR  1 51*     Results from last 7 days   Lab Units 04/17/23  0517   TSH 3RD GENERATON uIU/mL 1 355     Results from last 7 days   Lab Units 04/16/23  2217   DIGOXIN LVL ng/mL <0 5* " Medications:   Scheduled Medications:  carvedilol, 37 5 mg, Oral, BID With Meals  digoxin, 250 mcg, Oral, Daily  lisinopril, 40 mg, Oral, Daily  rivaroxaban, 20 mg, Oral, Daily With Dinner  spironolactone, 25 mg, Oral, Daily  traZODone, 100 mg, Oral, HS      Continuous IV Infusions:     PRN Meds:  acetaminophen, 650 mg, Oral, Q6H PRN  aluminum-magnesium hydroxide-simethicone, 30 mL, Oral, Q6H PRN  hydrOXYzine HCL, 25 mg, Oral, Q6H PRN        Discharge Plan: D    Network Utilization Review Department  ATTENTION: Please call with any questions or concerns to [phone] and carefully listen to the prompts so that you are directed to the right person  All voicemails are confidential   Gradie Musty all requests for admission clinical reviews, approved or denied determinations and any other requests to dedicated fax number below belonging to the campus where the patient is receiving treatment   List of dedicated fax numbers for the Facilities:  [address] DENIALS (Administrative/Medical Necessity) [phone]   [address] (Maternity/NICU/Pediatrics) [phone]   [address] [phone]   [place] [address] [phone]   [address] [facility] [phone]   [address] [facility] 134 815 [facility] [phone]

## 2023-04-18 NOTE — QUICK NOTE
Updated by nursing, patient with 6 beat run of V  tach while sleeping  Will order 2G IV magnesium, continue telemetry monitoring

## 2023-04-18 NOTE — PROGRESS NOTES
"Cardiology Progress Note - Dae Segovia 56 y o  male MRN: [id number]    Unit/Bed#: -01 Encounter: [id number]      Assessment/Plan:  1  Ventricular tachycardia s/p ICD discharge  • Formal device interrogation report shows 2 episodes of VT with 1 ICD discharge  • Troponins negative x3  • EKG reveals NSR without acute ischemic abnormalities  • Telemetry showed NSR with several episodes of NSVT throughout hospitalization  • Discussed case with EP Dr Fiona Le who recommended increasing digoxin to 250 mcg     2  NICM with EF 30% s/p MDT single-chamber ICD  • Euvolemic on exam  • TTE revealed EF 30% with dilated LV and severe diastolic dysfunction  • Continue carvedilol, lisinopril, Aldactone, and digoxin  • Continue follow-up with device clinic     3  Paroxysmal atrial fibrillation  • No A-fib noted on EKG or telemetry  • Continue carvedilol, digoxin, and Xarelto     4  Hypertension  • Currently 108/66, continue to monitor  • Continue carvedilol, lisinopril, and Aldactone     5  CONCHIS on CPAP  • Continue CPAP qhs    6  History of alcohol use  • Cessation advised        Patient is stable for discharge from a cardiac standpoint on current medications  Will check digoxin level on Friday  Plan for follow-up with electrophysiologist Dr Fiona Le  Thank you for this consultation        Subjective:   Patient seen and examined  No significant events overnight  Patient reports he is feeling well overall and denies any new complaints at this time  Objective:     Vitals: Blood pressure 108/66, pulse 58, temperature 98 1 °F (36 7 °C), resp  rate 18, height 6' 2\" (1 88 m), weight (!) 141 kg (310 lb), SpO2 96 %  , Body mass index is 39 8 kg/m² ,   Orthostatic Blood Pressures    Flowsheet Row Most Recent Value   Blood Pressure 108/66 filed at 04/18/2023 1105   Patient Position - Orthostatic VS Lying filed at 04/18/2023 0331            Intake/Output Summary (Last 24 hours) at 4/18/2023 1310  Last data filed at 4/18/2023 " 0900  Gross per 24 hour   Intake 1100 ml   Output --   Net 1100 ml         Physical Exam:  Physical Exam  Vitals and nursing note reviewed  Constitutional:       General: He is not in acute distress  Appearance: He is well-developed  He is obese  He is not diaphoretic  HENT:      Head: Normocephalic and atraumatic  Nose: Nose normal    Eyes:      Conjunctiva/sclera: Conjunctivae normal    Cardiovascular:      Rate and Rhythm: Normal rate and regular rhythm  Pulses: Normal pulses  Heart sounds: Normal heart sounds  No murmur heard  No friction rub  Pulmonary:      [place]ff[place]: Pulmonary effort is normal  No respiratory distress  Breath sounds: Normal breath sounds  No wheezing or rales  Chest:      Chest wall: No tenderness  Abdominal:      Palpations: Abdomen is soft  Tenderness: There is no abdominal tenderness  Musculoskeletal:         General: No swelling  Cervical back: Neck supple  Right lower leg: No edema  Left lower leg: No edema  Skin:     General: Skin is warm and dry  Capillary Refill: Capillary refill takes less than 2 seconds  Neurological:      Mental Status: He is alert and oriented to person, place, and time     Psychiatric:         Mood and Affect: Mood normal          Judgment: Judgment normal               Medications:      Current Facility-Administered Medications:   •  acetaminophen (TYLENOL) tablet 650 mg, 650 mg, Oral, Q6H PRN, Kaya Chang PA-C  •  aluminum-magnesium hydroxide-simethicone (MYLANTA) oral suspension 30 mL, 30 mL, Oral, Q6H PRN, Kaya Chang PA-C  •  carvedilol (COREG) tablet 37 5 mg, 37 5 mg, Oral, BID With Meals, Kaya Chang PA-C, 37 5 mg at 04/18/23 0846  •  digoxin (LANOXIN) tablet 250 mcg, 250 mcg, Oral, Daily, Sherman Nino PA-C, 250 mcg at 04/18/23 5749  •  hydrOXYzine HCL (ATARAX) tablet 25 mg, 25 mg, Oral, Q6H PRN, Kaya Chang PA-C  •  lisinopril (ZESTRIL) tablet 40 mg, 40 mg, Oral, Daily, Liyah Braswell PA-C, 40 mg at 04/18/23 7580  •  rivaroxaban (XARELTO) tablet 20 mg, 20 mg, Oral, Daily With Dinner, Liyah Braswell PA-C, 20 mg at 04/17/23 1635  •  spironolactone (ALDACTONE) tablet 25 mg, 25 mg, Oral, Daily, Liyah Braswell PA-C, 25 mg at 04/18/23 7934  •  traZODone (DESYREL) tablet 100 mg, 100 mg, Oral, HS, Liyah Braswell PA-C, 100 mg at 04/17/23 2201    Current Outpatient Medications:   •  carvedilol (COREG) 25 mg tablet, Take 1 5 tablets (37 5 mg total) by mouth 2 (two) times a day with meals, Disp: 270 tablet, Rfl: 3  •  digoxin (LANOXIN) 0 125 mg tablet, Take 2 tablets (250 mcg total) by mouth daily, Disp: 90 tablet, Rfl: 1  •  enalapril (VASOTEC) 10 mg tablet, TAKE 1 TABLET BY MOUTH TWICE A DAY, Disp: 180 tablet, Rfl: 1  •  spironolactone (ALDACTONE) 25 mg tablet, Take 1 tablet (25 mg total) by mouth in the morning , Disp: 90 tablet, Rfl: 3  •  traZODone (DESYREL) 100 mg tablet, Take 1 tablet (100 mg total) by mouth daily at bedtime, Disp: 90 tablet, Rfl: 3  •  Xarelto 20 MG tablet, TAKE 1 TABLET BY MOUTH EVERY DAY WITH DINNER, Disp: 90 tablet, Rfl: 3     Labs & Results:     Results from last 7 days   Lab Units 04/16/23 2118 04/16/23  1930 04/16/23  1719   HS TNI 0HR ng/L  --   --  36   HS TNI 2HR ng/L  --  46  --    HSTNI D2 ng/L  --  10  --    HS TNI 4HR ng/L 44  --   --    HSTNI D4 ng/L 8  --   --      Results from last 7 days   Lab Units 04/16/23  1719   WBC Thousand/uL 10 11   HEMOGLOBIN g/dL 15 0   HEMATOCRIT % 44 6   PLATELETS Thousands/uL 174         Results from last 7 days   Lab Units 04/18/23  0559 04/17/23  0517 04/16/23  1719   POTASSIUM mmol/L 4 1 4 0 3 9   CHLORIDE mmol/L 105 107 104   CO2 mmol/L 24 24 25   BUN mg/dL 15 12 13   CREATININE mg/dL 0 96 0 87 0 97   CALCIUM mg/dL 8 9 8 9 9 3   ALK PHOS U/L  --   --  59   ALT U/L  --   --  42   AST U/L  --   --  33     Results from last 7 days   Lab Units 04/16/23  1719   INR  1 51*     Results from last 7 days   Lab Units "23  0559 23  0517 23  1719   MAGNESIUM mg/dL 2 5 2 0 1 9       Vitals: Blood pressure 108/66, pulse 58, temperature 98 1 °F (36 7 °C), resp  rate 18, height 6' 2\" (1 88 m), weight (!) 141 kg (310 lb), SpO2 96 %  , Body mass index is 39 8 kg/m² ,   Orthostatic Blood Pressures    Flowsheet Row Most Recent Value   Blood Pressure 108/66 filed at 2023 1105   Patient Position - Orthostatic VS Lying filed at 2023 5739          Systolic (93JWG), PDO:288 , Min:101 , YVS:516     Diastolic (95MCL), XDC:93, Min:55, Max:66        Intake/Output Summary (Last 24 hours) at 2023 1310  Last data filed at 2023 0900  Gross per 24 hour   Intake 1100 ml   Output --   Net 1100 ml       Invasive Devices     None                   EKG: Sinus rhythm with occasional PVC; inferior infarct    Telemetry:  Telemetry Orders (From admission, onward)             24 Hour Telemetry Monitoring  Continuous x 24 Hours (Telem)           References:    Telemetry Guidelines   Question:  Reason for 24 Hour Telemetry  Answer:  Syncope suspected to be cardiac in origin                 Telemetry Reviewed: Normal Sinus Rhythm and NSVT  Indication for Continued Telemetry Use: No indication for continued use  Will discontinue       BP Readings from Last 3 Encounters:   23 108/66   22 118/59   22 110/80      Wt Readings from Last 3 Encounters:   23 (!) 141 kg (310 lb)   22 (!) 144 kg (317 lb)   22 (!) 144 kg (317 lb)                  "

## 2023-04-30 ENCOUNTER — APPOINTMENT (EMERGENCY)
Dept: RADIOLOGY | Facility: HOSPITAL | Age: 48
End: 2023-04-30

## 2023-04-30 ENCOUNTER — HOSPITAL ENCOUNTER (INPATIENT)
Facility: HOSPITAL | Age: 48
LOS: 3 days | Discharge: HOME | End: 2023-05-03
Attending: EMERGENCY MEDICINE | Admitting: ANESTHESIOLOGY

## 2023-04-30 DIAGNOSIS — I48.0 PAROXYSMAL ATRIAL FIBRILLATION (HCC): ICD-10-CM

## 2023-04-30 DIAGNOSIS — I50.9 CHF (CONGESTIVE HEART FAILURE) (HCC): ICD-10-CM

## 2023-04-30 DIAGNOSIS — I10 HYPERTENSION, UNSPECIFIED TYPE: ICD-10-CM

## 2023-04-30 DIAGNOSIS — Z45.02 AICD DISCHARGE: ICD-10-CM

## 2023-04-30 DIAGNOSIS — I42.0 DILATED CARDIOMYOPATHY (HCC): ICD-10-CM

## 2023-04-30 DIAGNOSIS — Z45.02 ICD (IMPLANTABLE CARDIOVERTER-DEFIBRILLATOR) DISCHARGE: ICD-10-CM

## 2023-04-30 DIAGNOSIS — I47.20 V-TACH (HCC): Primary | ICD-10-CM

## 2023-04-30 LAB
2HR DELTA HS TROPONIN: 50 NG/L
4HR DELTA HS TROPONIN: 60 NG/L
ALBUMIN SERPL BCP-MCNC: 4.1 G/DL (ref 3.5–5)
ALP SERPL-CCNC: 66 U/L (ref 34–104)
ALT SERPL W P-5'-P-CCNC: 44 U/L (ref 7–52)
ANION GAP SERPL CALCULATED.3IONS-SCNC: 7 MMOL/L (ref 4–13)
APTT PPP: 31 SECONDS (ref 23–37)
AST SERPL W P-5'-P-CCNC: 33 U/L (ref 13–39)
ATRIAL RATE: 72 BPM
ATRIAL RATE: 73 BPM
ATRIAL RATE: 78 BPM
BASOPHILS # BLD AUTO: 0.06 THOUSANDS/ÂΜL (ref 0–0.1)
BASOPHILS NFR BLD AUTO: 1 % (ref 0–1)
BILIRUB SERPL-MCNC: 0.88 MG/DL (ref 0.2–1)
BNP SERPL-MCNC: 166 PG/ML (ref 0–100)
BUN SERPL-MCNC: 12 MG/DL (ref 5–25)
CALCIUM SERPL-MCNC: 9.6 MG/DL (ref 8.4–10.2)
CARDIAC TROPONIN I PNL SERPL HS: 103 NG/L
CARDIAC TROPONIN I PNL SERPL HS: 43 NG/L
CARDIAC TROPONIN I PNL SERPL HS: 93 NG/L
CHLORIDE SERPL-SCNC: 106 MMOL/L (ref 96–108)
CO2 SERPL-SCNC: 24 MMOL/L (ref 21–32)
CREAT SERPL-MCNC: 1.03 MG/DL (ref 0.6–1.3)
DIGOXIN SERPL-MCNC: 0.6 NG/ML (ref 0.8–2)
EOSINOPHIL # BLD AUTO: 0.22 THOUSAND/ÂΜL (ref 0–0.61)
EOSINOPHIL NFR BLD AUTO: 2 % (ref 0–6)
ERYTHROCYTE [DISTWIDTH] IN BLOOD BY AUTOMATED COUNT: 13.6 % (ref 11.6–15.1)
GFR SERPL CREATININE-BSD FRML MDRD: 86 ML/MIN/1.73SQ M
GLUCOSE SERPL-MCNC: 168 MG/DL (ref 65–140)
HCT VFR BLD AUTO: 45.6 % (ref 36.5–49.3)
HGB BLD-MCNC: 15.4 G/DL (ref 12–17)
IMM GRANULOCYTES # BLD AUTO: 0.04 THOUSAND/UL (ref 0–0.2)
IMM GRANULOCYTES NFR BLD AUTO: 0 % (ref 0–2)
INR PPP: 1.18 (ref 0.84–1.19)
LYMPHOCYTES # BLD AUTO: 3.07 THOUSANDS/ÂΜL (ref 0.6–4.47)
LYMPHOCYTES NFR BLD AUTO: 27 % (ref 14–44)
MAGNESIUM SERPL-MCNC: 1.9 MG/DL (ref 1.9–2.7)
MCH RBC QN AUTO: 30.3 PG (ref 26.8–34.3)
MCHC RBC AUTO-ENTMCNC: 33.8 G/DL (ref 31.4–37.4)
MCV RBC AUTO: 90 FL (ref 82–98)
MONOCYTES # BLD AUTO: 1.57 THOUSAND/ÂΜL (ref 0.17–1.22)
MONOCYTES NFR BLD AUTO: 14 % (ref 4–12)
NEUTROPHILS # BLD AUTO: 6.4 THOUSANDS/ÂΜL (ref 1.85–7.62)
NEUTS SEG NFR BLD AUTO: 56 % (ref 43–75)
NRBC BLD AUTO-RTO: 0 /100 WBCS
P AXIS: 34 DEGREES
P AXIS: 45 DEGREES
P AXIS: 49 DEGREES
PLATELET # BLD AUTO: 205 THOUSANDS/UL (ref 149–390)
PMV BLD AUTO: 11.3 FL (ref 8.9–12.7)
POTASSIUM SERPL-SCNC: 3.9 MMOL/L (ref 3.5–5.3)
PR INTERVAL: 172 MS
PR INTERVAL: 176 MS
PR INTERVAL: 178 MS
PROT SERPL-MCNC: 7.4 G/DL (ref 6.4–8.4)
PROTHROMBIN TIME: 14.8 SECONDS (ref 11.6–14.5)
QRS AXIS: 12 DEGREES
QRS AXIS: 2 DEGREES
QRS AXIS: 2 DEGREES
QRSD INTERVAL: 92 MS
QRSD INTERVAL: 96 MS
QRSD INTERVAL: 98 MS
QT INTERVAL: 364 MS
QT INTERVAL: 372 MS
QT INTERVAL: 374 MS
QTC INTERVAL: 401 MS
QTC INTERVAL: 409 MS
QTC INTERVAL: 424 MS
RBC # BLD AUTO: 5.08 MILLION/UL (ref 3.88–5.62)
SODIUM SERPL-SCNC: 137 MMOL/L (ref 135–147)
T WAVE AXIS: 243 DEGREES
T WAVE AXIS: 28 DEGREES
T WAVE AXIS: 63 DEGREES
VENTRICULAR RATE: 72 BPM
VENTRICULAR RATE: 73 BPM
VENTRICULAR RATE: 78 BPM
WBC # BLD AUTO: 11.36 THOUSAND/UL (ref 4.31–10.16)

## 2023-04-30 RX ORDER — FAMOTIDINE 20 MG/1
20 TABLET, FILM COATED ORAL 2 TIMES DAILY
Status: DISCONTINUED | OUTPATIENT
Start: 2023-04-30 | End: 2023-05-03 | Stop reason: HOSPADM

## 2023-04-30 RX ORDER — DIGOXIN 125 MCG
250 TABLET ORAL DAILY
Status: DISCONTINUED | OUTPATIENT
Start: 2023-05-01 | End: 2023-05-02

## 2023-04-30 RX ORDER — CARVEDILOL 12.5 MG/1
37.5 TABLET ORAL 2 TIMES DAILY WITH MEALS
Status: DISCONTINUED | OUTPATIENT
Start: 2023-04-30 | End: 2023-05-02

## 2023-04-30 RX ORDER — ACETAMINOPHEN 325 MG/1
650 TABLET ORAL EVERY 6 HOURS PRN
Status: DISCONTINUED | OUTPATIENT
Start: 2023-04-30 | End: 2023-05-03 | Stop reason: HOSPADM

## 2023-04-30 RX ORDER — SPIRONOLACTONE 25 MG/1
25 TABLET ORAL DAILY
Status: DISCONTINUED | OUTPATIENT
Start: 2023-05-01 | End: 2023-05-02

## 2023-04-30 RX ORDER — MAGNESIUM SULFATE HEPTAHYDRATE 40 MG/ML
2 INJECTION, SOLUTION INTRAVENOUS ONCE
Status: COMPLETED | OUTPATIENT
Start: 2023-04-30 | End: 2023-05-01

## 2023-04-30 RX ORDER — CHLORHEXIDINE GLUCONATE 0.12 MG/ML
15 RINSE ORAL EVERY 12 HOURS SCHEDULED
Status: DISCONTINUED | OUTPATIENT
Start: 2023-04-30 | End: 2023-05-03 | Stop reason: HOSPADM

## 2023-04-30 RX ORDER — TRAZODONE HYDROCHLORIDE 100 MG/1
100 TABLET ORAL
Status: DISCONTINUED | OUTPATIENT
Start: 2023-04-30 | End: 2023-05-03 | Stop reason: HOSPADM

## 2023-04-30 RX ORDER — SODIUM CHLORIDE 9 MG/ML
75 INJECTION, SOLUTION INTRAVENOUS CONTINUOUS
Status: DISCONTINUED | OUTPATIENT
Start: 2023-04-30 | End: 2023-05-01

## 2023-04-30 RX ADMIN — CHLORHEXIDINE GLUCONATE 0.12% ORAL RINSE 15 ML: 1.2 LIQUID ORAL at 21:45

## 2023-04-30 RX ADMIN — SODIUM CHLORIDE 75 ML/HR: 0.9 INJECTION, SOLUTION INTRAVENOUS at 21:50

## 2023-04-30 RX ADMIN — RIVAROXABAN 20 MG: 20 TABLET, FILM COATED ORAL at 22:37

## 2023-04-30 RX ADMIN — CARVEDILOL 37.5 MG: 12.5 TABLET, FILM COATED ORAL at 21:44

## 2023-04-30 RX ADMIN — FAMOTIDINE 20 MG: 20 TABLET, FILM COATED ORAL at 22:37

## 2023-04-30 RX ADMIN — AMIODARONE HYDROCHLORIDE 1 MG/MIN: 50 INJECTION, SOLUTION INTRAVENOUS at 17:52

## 2023-04-30 RX ADMIN — TRAZODONE HYDROCHLORIDE 100 MG: 100 TABLET ORAL at 22:37

## 2023-04-30 RX ADMIN — DEXTROSE MONOHYDRATE 150 MG: 50 INJECTION, SOLUTION INTRAVENOUS at 17:33

## 2023-04-30 NOTE — LETTER
[address] [facility] [address]  [address] [facility] Steven Juan  [address] [place] [id number]  Dept: [phone]    May 3, 2023     Patient: Reyes Begin   YOB: 1975   Date of Visit: 4/30/2023       To Whom it May Concern:    Reyes Begin is under my professional care  He was seen in the hospital from 4/30/2023 to 05/03/23  He may return to work once/if he is cleared by EP/cardiology physician at outpatient appointment 05/18/23  The patient is unable to drive up until this appointment as well  Further instruction on limitations will be provided by the physician who evaluates him on 05/18/23  If you have any questions or concerns, please don't hesitate to call           Sincerely,          VILMA Meng

## 2023-05-01 ENCOUNTER — APPOINTMENT (INPATIENT)
Dept: NON INVASIVE DIAGNOSTICS | Facility: HOSPITAL | Age: 48
End: 2023-05-01

## 2023-05-01 LAB
ALBUMIN SERPL BCP-MCNC: 3.5 G/DL (ref 3.5–5)
ALP SERPL-CCNC: 52 U/L (ref 34–104)
ALT SERPL W P-5'-P-CCNC: 41 U/L (ref 7–52)
ANION GAP SERPL CALCULATED.3IONS-SCNC: 5 MMOL/L (ref 4–13)
AORTIC ROOT: 2.7 CM
APICAL FOUR CHAMBER EJECTION FRACTION: 39 %
AST SERPL W P-5'-P-CCNC: 28 U/L (ref 13–39)
BASOPHILS # BLD AUTO: 0.04 THOUSANDS/ÂΜL (ref 0–0.1)
BASOPHILS NFR BLD AUTO: 0 % (ref 0–1)
BILIRUB SERPL-MCNC: 0.99 MG/DL (ref 0.2–1)
BUN SERPL-MCNC: 12 MG/DL (ref 5–25)
CA-I BLD-SCNC: 1.12 MMOL/L (ref 1.12–1.32)
CALCIUM SERPL-MCNC: 8.7 MG/DL (ref 8.4–10.2)
CHLORIDE SERPL-SCNC: 107 MMOL/L (ref 96–108)
CO2 SERPL-SCNC: 24 MMOL/L (ref 21–32)
CREAT SERPL-MCNC: 0.91 MG/DL (ref 0.6–1.3)
DIGOXIN SERPL-MCNC: 0.6 NG/ML (ref 0.8–2)
E WAVE DECELERATION TIME: 118 MS
EOSINOPHIL # BLD AUTO: 0.15 THOUSAND/ÂΜL (ref 0–0.61)
EOSINOPHIL NFR BLD AUTO: 2 % (ref 0–6)
ERYTHROCYTE [DISTWIDTH] IN BLOOD BY AUTOMATED COUNT: 13.7 % (ref 11.6–15.1)
FRACTIONAL SHORTENING: 10 % (ref 28–44)
GFR SERPL CREATININE-BSD FRML MDRD: 100 ML/MIN/1.73SQ M
GLUCOSE SERPL-MCNC: 142 MG/DL (ref 65–140)
HCT VFR BLD AUTO: 42.8 % (ref 36.5–49.3)
HGB BLD-MCNC: 14.1 G/DL (ref 12–17)
IMM GRANULOCYTES # BLD AUTO: 0.03 THOUSAND/UL (ref 0–0.2)
IMM GRANULOCYTES NFR BLD AUTO: 0 % (ref 0–2)
INR PPP: 2.33 (ref 0.84–1.19)
INTERVENTRICULAR SEPTUM IN DIASTOLE (PARASTERNAL SHORT AXIS VIEW): 0.9 CM
INTERVENTRICULAR SEPTUM: 0.9 CM (ref 0.6–1.1)
LEFT ATRIUM AREA SYSTOLE SINGLE PLANE A4C: 24.2 CM2
LEFT ATRIUM SIZE: 4.2 CM
LEFT INTERNAL DIMENSION IN SYSTOLE: 6.2 CM (ref 2.1–4)
LEFT VENTRICULAR INTERNAL DIMENSION IN DIASTOLE: 6.9 CM (ref 3.5–6)
LEFT VENTRICULAR POSTERIOR WALL IN END DIASTOLE: 0.8 CM
LEFT VENTRICULAR STROKE VOLUME: 54 ML
LVSV (TEICH): 54 ML
LYMPHOCYTES # BLD AUTO: 2 THOUSANDS/ÂΜL (ref 0.6–4.47)
LYMPHOCYTES NFR BLD AUTO: 22 % (ref 14–44)
MAGNESIUM SERPL-MCNC: 2.2 MG/DL (ref 1.9–2.7)
MCH RBC QN AUTO: 29.8 PG (ref 26.8–34.3)
MCHC RBC AUTO-ENTMCNC: 32.9 G/DL (ref 31.4–37.4)
MCV RBC AUTO: 91 FL (ref 82–98)
MITRAL REGURGITATION PEAK VELOCITY: 3.76 M/S
MITRAL VALVE MEAN INFLOW VELOCITY: 3.05 M/S
MITRAL VALVE REGURGITANT PEAK GRADIENT: 57 MMHG
MONOCYTES # BLD AUTO: 0.9 THOUSAND/ÂΜL (ref 0.17–1.22)
MONOCYTES NFR BLD AUTO: 10 % (ref 4–12)
MV PEAK A VEL: 0.48 M/S
MV PEAK E VEL: 94 CM/S
NEUTROPHILS # BLD AUTO: 5.81 THOUSANDS/ÂΜL (ref 1.85–7.62)
NEUTS SEG NFR BLD AUTO: 66 % (ref 43–75)
NRBC BLD AUTO-RTO: 0 /100 WBCS
PHOSPHATE SERPL-MCNC: 4 MG/DL (ref 2.7–4.5)
PLATELET # BLD AUTO: 153 THOUSANDS/UL (ref 149–390)
PMV BLD AUTO: 11 FL (ref 8.9–12.7)
POTASSIUM SERPL-SCNC: 4 MMOL/L (ref 3.5–5.3)
PROT SERPL-MCNC: 6.3 G/DL (ref 6.4–8.4)
PROTHROMBIN TIME: 25 SECONDS (ref 11.6–14.5)
RBC # BLD AUTO: 4.73 MILLION/UL (ref 3.88–5.62)
RIGHT ATRIUM AREA SYSTOLE A4C: 20.2 CM2
RIGHT VENTRICLE ID DIMENSION: 4.2 CM
SL CV DOP CALC MV VTI RETROGRADE: 135.7 CM
SL CV LV EF: 30
SL CV MV MEAN GRADIENT RETROGRADE: 41 MMHG
SL CV PED ECHO LEFT VENTRICLE DIASTOLIC VOLUME (MOD BIPLANE) 2D: 246 ML
SL CV PED ECHO LEFT VENTRICLE SYSTOLIC VOLUME (MOD BIPLANE) 2D: 192 ML
SODIUM SERPL-SCNC: 136 MMOL/L (ref 135–147)
WBC # BLD AUTO: 8.93 THOUSAND/UL (ref 4.31–10.16)

## 2023-05-01 RX ADMIN — AMIODARONE HYDROCHLORIDE 1 MG/MIN: 50 INJECTION, SOLUTION INTRAVENOUS at 06:54

## 2023-05-01 RX ADMIN — RIVAROXABAN 20 MG: 20 TABLET, FILM COATED ORAL at 16:40

## 2023-05-01 RX ADMIN — ACETAMINOPHEN 650 MG: 325 TABLET ORAL at 08:44

## 2023-05-01 RX ADMIN — DIGOXIN 250 MCG: 125 TABLET ORAL at 08:43

## 2023-05-01 RX ADMIN — CARVEDILOL 37.5 MG: 12.5 TABLET, FILM COATED ORAL at 08:47

## 2023-05-01 RX ADMIN — CHLORHEXIDINE GLUCONATE 0.12% ORAL RINSE 15 ML: 1.2 LIQUID ORAL at 21:40

## 2023-05-01 RX ADMIN — SPIRONOLACTONE 25 MG: 25 TABLET ORAL at 08:48

## 2023-05-01 RX ADMIN — AMIODARONE HYDROCHLORIDE 1 MG/MIN: 50 INJECTION, SOLUTION INTRAVENOUS at 21:55

## 2023-05-01 RX ADMIN — CHLORHEXIDINE GLUCONATE 0.12% ORAL RINSE 15 ML: 1.2 LIQUID ORAL at 08:44

## 2023-05-01 RX ADMIN — PERFLUTREN 0.6 ML/MIN: 6.52 INJECTION, SUSPENSION INTRAVENOUS at 09:40

## 2023-05-01 RX ADMIN — MAGNESIUM SULFATE HEPTAHYDRATE 2 G: 40 INJECTION, SOLUTION INTRAVENOUS at 00:00

## 2023-05-01 RX ADMIN — TRAZODONE HYDROCHLORIDE 100 MG: 100 TABLET ORAL at 21:40

## 2023-05-01 RX ADMIN — CARVEDILOL 37.5 MG: 12.5 TABLET, FILM COATED ORAL at 16:40

## 2023-05-01 NOTE — ASSESSMENT & PLAN NOTE
· Patient with history of dilated CMP with no significant CAD  · He has a single chamber ICD in place  · His last ECHO showed EF 30%, restricted relaxation but no significant RV dysfunction or valuvuopathy    Plan:  · Appears euvolemic  · Care as above

## 2023-05-01 NOTE — PLAN OF CARE
Problem: NEUROSENSORY - ADULT  Goal: Achieves maximal functionality and self care  Description: INTERVENTIONS  - Monitor swallowing and airway patency with patient fatigue and changes in neurological status  - Encourage and assist patient to increase activity and self care     - Encourage visually impaired, hearing impaired and aphasic patients to use assistive/communication devices  Outcome: Progressing     Problem: CARDIOVASCULAR - ADULT  Goal: Maintains optimal cardiac output and hemodynamic stability  Description: INTERVENTIONS:  - Monitor I/O, vital signs and rhythm  - Monitor for S/S and trends of decreased cardiac output  - Administer and titrate ordered vasoactive medications to optimize hemodynamic stability  - Assess quality of pulses, skin color and temperature  - Assess for signs of decreased coronary artery perfusion  - Instruct patient to report change in severity of symptoms  Outcome: Progressing  Goal: Absence of cardiac dysrhythmias or at baseline rhythm  Description: INTERVENTIONS:  - Continuous cardiac monitoring, vital signs, obtain 12 lead EKG if ordered  - Administer antiarrhythmic and heart rate control medications as ordered  - Monitor electrolytes and administer replacement therapy as ordered  Outcome: Progressing     Problem: CARDIOVASCULAR - ADULT  Goal: Absence of cardiac dysrhythmias or at baseline rhythm  Description: INTERVENTIONS:  - Continuous cardiac monitoring, vital signs, obtain 12 lead EKG if ordered  - Administer antiarrhythmic and heart rate control medications as ordered  - Monitor electrolytes and administer replacement therapy as ordered  Outcome: Progressing     Problem: GENITOURINARY - ADULT  Goal: Maintains or returns to baseline urinary function  Description: INTERVENTIONS:  - Assess urinary function  - Encourage oral fluids to ensure adequate hydration if ordered  - Administer IV fluids as ordered to ensure adequate hydration  - Administer ordered medications as needed  - Offer frequent toileting  - Follow urinary retention protocol if ordered  Outcome: Progressing     Problem: METABOLIC, FLUID AND ELECTROLYTES - ADULT  Goal: Electrolytes maintained within normal limits  Description: INTERVENTIONS:  - Monitor labs and assess patient for signs and symptoms of electrolyte imbalances  - Administer electrolyte replacement as ordered  - Monitor response to electrolyte replacements, including repeat lab results as appropriate  - Instruct patient on fluid and nutrition as appropriate  Outcome: Progressing  Goal: Fluid balance maintained  Description: INTERVENTIONS:  - Monitor labs   - Monitor I/O and WT  - Instruct patient on fluid and nutrition as appropriate  - Assess for signs & symptoms of volume excess or deficit  Outcome: Progressing     Problem: SKIN/TISSUE INTEGRITY - ADULT  Goal: Skin Integrity remains intact(Skin Breakdown Prevention)  Description: Assess:  -Perform Justin assessment every shift  -Clean and moisturize skin every shift  -Inspect skin when repositioning, toileting, and assisting with ADLS  -Assess under medical devices such as ekg patches every shift  -Assess extremities for adequate circulation and sensation     Bed Management:  -Have minimal linens on bed & keep smooth, unwrinkled  -Change linens as needed when moist or perspiring  -Avoid sitting or lying in one position for more than 2 hours while in bed  -Keep HOB at 30degrees     Toileting:  -Offer bedside commode  -Assess for incontinence every 2 hr    Activity:  -Mobilize patient 2 times a day  -Encourage activity and walks on unit  -Encourage or provide ROM exercises   -Turn and reposition patient every 2 Hours  -Use appropriate equipment to lift or move patient in bed  -Instruct/ Assist with weight shifting every 2 when out of bed in chair  -Consider limitation of chair time 3 hour intervals    Skin Care:  -Avoid use of baby powder, tape, friction and shearing, hot water or constrictive clothing  -Relieve pressure over bony prominences using allevyn  -Do not massage red bony areas    Next Steps:  -Teach patient strategies to minimize risks such as chair cushion use, weight shifting   -Consider consults to  interdisciplinary teams such as PT  Outcome: Progressing

## 2023-05-01 NOTE — CASE MANAGEMENT
Case Management Assessment    Patient name Shadi Stratton  Location / MRN [id number]  : 1975 Date 2023       Current Admission Date: 2023  Current Admission Diagnosis:ICD (implantable cardioverter-defibrillator) discharge   Patient Active Problem List    Diagnosis Date Noted    [facility]congestive heart failure) ([facility] [address] )     AICD (automatic cardioverter/defibrillator) present     ICD (implantable cardioverter-defibrillator) discharge 10/10/2022    Paroxysmal atrial fibrillation ([facility] ) 2020    Dilated cardiomyopathy ([facility] ) 2018    Hypertension 2018    Obstructive sleep apnea 2018    ICD (implantable cardioverter-defibrillator) lead failure 2017    Calculus of gallbladder without cholecystitis 2017      LOS (days): 1  Geometric Mean LOS (GMLOS) (days):   Days to GMLOS:     OBJECTIVE:    Risk of Unplanned Readmission Score: 11 03         Current admission status: Inpatient       Preferred Pharmacy:   [facility]/pharmacy #[id number]- [place], [place] - [address]  Phone: [phone] Fax: [phone]    Primary Care Provider: Yolanda Bran PA-C    Primary Insurance: [employer]  Secondary Insurance:     ASSESSMENT:  Fran 26 Proxies    There are no active Health Care Proxies on file  CM referred to OP Care Management for follow up support and management phone contact upon hospital d/c

## 2023-05-01 NOTE — ASSESSMENT & PLAN NOTE
· Continue coreg/spironolactone  · SBP on the lower end; hold ACEi for now in case we will need to increase coreg

## 2023-05-01 NOTE — ASSESSMENT & PLAN NOTE
"· Patient with ICD discharge at home and again while in waiting room  · Recent admission for [address] admission 4/15 to 4/18 after discharge  · At that admission, he had his digoxin increased  · Since that admission he has felt \"off\" stating he was still having palpitations  · Today he stated he felt dizzy, short of breath, laid on the couch and passed out and was awoken by his ICD firing;  He also had similar event in the ED when being moved from a stretcher  · EKG showing sinus with frequent PVCs  · Cardiology was contacted and he was placed on amiodarone  · At time of my evaluation he was stilll having intermittent PVCs with some short, non-sustatined runs of VT  · EKG with normal QTc    Plan:  · Continue amio gtt at 1  · Continue home digoxin and coreg  · Optimize electrolytes  · Gabi on tele  · Cardiology consult in AM  · If continued runs will need lidocaine infusion and possible transfer to cardiac EP in [place]  "

## 2023-05-01 NOTE — CONSULTS
Consultation - Cardiology   Tamara Vincent 56 y o  male MRN: [id number]  Unit/Bed#:  Encounter: [id number]  05/01/23  10:35 AM    Assessment/ Plan:  1   Ventricular tachycardia s/p ICD discharge   Formal device interrogation report shows 2 episodes of VT with 1 ICD discharge   Troponins up trending to 103   EKG reveals NSR without acute ischemic abnormalities   Telemetry showed NSR with several episodes of NSVT throughout hospitalization   Discussed case with EP who recommends initiating rhythm control   Start amiodarone gtt   Continue carvedilol, digoxin, and Xarelto     2   NICM with EF 30% s/p MDT single-chamber ICD   Euvolemic on exam   Limited TTE revealed EF 30% with dilated LV and severe diastolic dysfunction   Continue carvedilol, Aldactone, and digoxin   Continue follow-up with device clinic     3   Paroxysmal atrial fibrillation   EKG shows normal sinus rhythm   Continue carvedilol, digoxin, and Xarelto   Start amiodarone gtt     4   Hypertension   Currently  117/69, continue to monitor   Continue carvedilol and Aldactone     5   CONCHIS on CPAP   Continue CPAP qhs     6   History of alcohol use   Endorses compliance to cessation, denies recent alcohol use       History of Present Illness   Physician Requesting Consult: Sajan Sousa MD    Reason for Consult / Principal Problem: VT s/p ICD discharge    HPI: Tamara Vincent is a 56y o  year old male with history of VT and prior ICD discharge, NICM with EF 30% s/p MDT single-chamber ICD, paroxysmal atrial fibrillation on chronic anticoagulation, hypertension, CONCHIS on CPAP, and history of alcohol use who presents after ICD discharge  Of note he was recently admitted from 4/15 - 4/18 for the same  Patient reports after doing laundry he went to feed the dogs and became lightheaded  He sat down on a couch and passed out soon after  Reports this is similar to previous ICD discharge  In ED patient experienced another similar episode    Formal device report reveals VT prompting ICD discharge at home  VT was also noted in ED, however rhythm broke before ICD discharge  Cardiology consulted for further evaluation and management  Patient endorses strict compliance to all medications  Patient endorses strict compliance to cessation of alcohol, denies any recent consumption  Denies chest pain or LE edema  Inpatient consult to Cardiology  Consult performed by: Jessica Ashby PA-C  Consult ordered by: VILMA Deleon          EKG: Normal sinus rhythm; cannot rule out inferior infarct; anterolateral infarct      Review of Systems   Constitutional: Negative for chills and fever  HENT: Negative for ear pain and sore throat  Eyes: Negative for pain and visual disturbance  Respiratory: Negative for cough, chest tightness and shortness of breath  Cardiovascular: Positive for palpitations  Negative for chest pain and leg swelling  Gastrointestinal: Negative for abdominal pain and vomiting  Genitourinary: Negative for dysuria and hematuria  Musculoskeletal: Negative for arthralgias and back pain  Skin: Negative for color change and rash  Neurological: Positive for dizziness, syncope and light-headedness  Negative for seizures  All other systems reviewed and are negative  Historical Information   Past Medical History:   Diagnosis Date    A-fib [facility]) 2020    AICD (automatic cardioverter/defibrillator) present     CHF (congestive heart failure) (HCC)     Hypertension     Myocarditis ([facility] [address] )     Sleep apnea     Ventricular tachycardia ([facility] [address] )      Past Surgical History:   Procedure Laterality Date    CARDIAC DEFIBRILLATOR PLACEMENT      [place] ESOPHAGOGASTRODUODENOSCOPY TRANSORAL DIAGNOSTIC N/A 3/29/2017    Procedure: ESOPHAGOGASTRODUODENOSCOPY (EGD); Surgeon: Deion Perrin MD;  Location: [facility];   Service: Gastroenterology    [place] INSJ/RPLCMT PERM DFB W/TRNSVNS LDS 1/DUAL [address] N/A 6/2/2017    Procedure: LEAD "EXTRACTION/REIMPLANTATION AND ICD GENERATOR CHANGE ;  Surgeon: Bryan Foster MD;  Location: [facility];  Service: Cardiology    [place] LAPAROSCOPY SURG CHOLECYSTECTOMY N/A 5/19/2017    Procedure: Gonzalo Mccarthy ;  Surgeon: Georges York MD;  Location: [facility];  Service: General    TONSILLECTOMY      TYMPANOSTOMY TUBE PLACEMENT       Social History     Substance and Sexual Activity   Alcohol Use Yes    Comment: OCCASIONAL     Social History     Substance and Sexual Activity   Drug Use No     Social History     Tobacco Use   Smoking Status Never   Smokeless Tobacco Never       Family History:   Family History   Problem Relation Age of Onset    No Known Problems Mother     No Known Problems Father        Meds/Allergies   all current active meds have been reviewed  Allergies   Allergen Reactions    Vicodin [Hydrocodone-Acetaminophen] Other (See Comments)     \"i get nasty\"       Objective   Vitals: Blood pressure 113/70, pulse 63, temperature 97 7 °F (36 5 °C), temperature source Oral, resp  rate 18, height 6' 2\" (1 88 m), weight (!) 139 kg (307 lb), SpO2 97 %  , Body mass index is 39 42 kg/m² ,   Orthostatic Blood Pressures    Flowsheet Row Most Recent Value   Blood Pressure 113/70 filed at 05/01/2023 0801   Patient Position - Orthostatic VS Lying filed at 05/01/2023 8849          Systolic (69SMP), GLX:929 , Min:97 , QYT:584     Diastolic (97EEK), EQH:53, Min:52, Max:76        Intake/Output Summary (Last 24 hours) at 5/1/2023 1035  Last data filed at 5/1/2023 0501  Gross per 24 hour   Intake 949 94 ml   Output 1350 ml   Net -400 06 ml       Invasive Devices     Peripheral Intravenous Line  Duration           Peripheral IV 04/30/23 Left Antecubital 1 day    Peripheral IV 04/30/23 Right Antecubital 1 day                    Physical Exam:  Physical Exam  Vitals and nursing note reviewed  Constitutional:       General: He is not in acute distress  Appearance: He is well-developed   He is not " diaphoretic  HENT:      Head: Normocephalic and atraumatic  Nose: Nose normal    Eyes:      Conjunctiva/sclera: Conjunctivae normal    Cardiovascular:      Rate and Rhythm: Normal rate and regular rhythm  Pulses: Normal pulses  Heart sounds: Normal heart sounds  No murmur heard  No friction rub  Pulmonary:      Effort: Pulmonary effort is normal  No respiratory distress  Breath sounds: Normal breath sounds  No wheezing or rales  Chest:      Chest wall: No tenderness  Abdominal:      Palpations: Abdomen is soft  Tenderness: There is no abdominal tenderness  Musculoskeletal:         General: No swelling  Cervical back: Neck supple  Right lower leg: No edema  Left lower leg: No edema  Skin:     General: Skin is warm and dry  Capillary Refill: Capillary refill takes less than 2 seconds  Neurological:      Mental Status: He is alert and oriented to person, place, and time     Psychiatric:         Mood and Affect: Mood normal          Judgment: Judgment normal           Lab Results:     Cardiac Profile:   Results from last 7 days   Lab Units 04/30/23 2015 04/30/23  1740 04/30/23  1536   HS TNI 0HR ng/L  --   --  43   HS TNI 2HR ng/L  --  93*  --    HSTNI D2 ng/L  --  50*  --    HS TNI 4HR ng/L 103*  --   --    HSTNI D4 ng/L 60*  --   --        CBC with diff:   Results from last 7 days   Lab Units 05/01/23  0429 04/30/23  1536   WBC Thousand/uL 8 93 11 36*   HEMOGLOBIN g/dL 14 1 15 4   HEMATOCRIT % 42 8 45 6   MCV fL 91 90   PLATELETS Thousands/uL 153 205   MCH pg 29 8 30 3   MCHC g/dL 32 9 33 8   RDW % 13 7 13 6   MPV fL 11 0 11 3   NRBC AUTO /100 WBCs 0 0         CMP:   Results from last 7 days   Lab Units 05/01/23  0429 04/30/23  1536   POTASSIUM mmol/L 4 0 3 9   CHLORIDE mmol/L 107 106   CO2 mmol/L 24 24   BUN mg/dL 12 12   CREATININE mg/dL 0 91 1 03   CALCIUM mg/dL 8 7 9 6   AST U/L 28 33   ALT U/L 41 44   ALK PHOS U/L 52 66   EGFR ml/min/1 73sq m 100 86

## 2023-05-01 NOTE — ASSESSMENT & PLAN NOTE
· Continue coreg/spironolactone  · SBP on the lower end; hold ACEi for now in case we will need to increas coreg

## 2023-05-01 NOTE — H&P
"[address] [place] [address]  H&P  Name: Neto Mcghee 56 y o  male I MRN: [id number]  Unit/Bed#:  I Date of Admission: 4/30/2023   Date of Service: 4/30/2023 I Hospital Day: 0      Assessment/Plan   * ICD (implantable cardioverter-defibrillator) discharge  Assessment & Plan  · Patient with ICD discharge at home and again while in waiting room  · Recent admission for [address] [place] [address] admission 4/15 to 4/18 after discharge  · At that admission, he had his digoxin increased  · Since that admission he has felt \"off\" stating he was still having palpitations  · Today he stated he felt dizzy, short of breath, laid on the couch and passed out and was awoken by his ICD firing;  He also had similar event in the ED when being moved from a stretcher  · EKG showing sinus with frequent PVCs  · Cardiology was contacted and he was placed on amiodarone  · At time of my evaluation he was stilll having intermittent PVCs with some short, non-sustatined runs of VT  · EKG with normal QTc    Plan:  · Continue amio gtt at 1  · Continue home digoxin and coreg  · Optimize electrolytes  · Montior on tele  · Cardiology consult in AM  · If continued runs will need lidocaine infusion and possible transfer to cardiac EP in [place]    Hypertension  Assessment & Plan  · Continue coreg/spironolactone  · SBP on the lower end; hold ACEi for now in case we will need to increas coreg    Dilated cardiomyopathy ([facility] [address] )  Assessment & Plan  · Patient with history of dilated CMP with no significant CAD  · He has a single chamber ICD in place  · His last ECHO showed EF 30%, restricted relaxation but no significant RV dysfunction or valuvuopathy    Plan:  · Appears euvolemic  · Care as above    Paroxysmal atrial fibrillation ([address] )  Assessment & Plan  · Currently in NSR  · Continue coreg, digoxin, and addition of amiodarone as below  · Continue xarelto             History of Present Illness     HPI: Neto Mcghee is a 56 y o  who presents after an ICD " "firing  Patient has a PMH of dilated NICM, pAF, essential hypertension  Patient was recently admitted for the same 4/15-4/18  Patient states since that admission he was still having palpitations and even describes phenomenon of \"dropped beats  \" He denies any other complaint  He states he changed over the laundry, walked upstairs, fed the dog, and suddenly felt dizzy, short of breath and laid himself on the couch and \"blacked out\" and was awakened by ICD firing  EMS was activated and he had a similar event when moving him into the ED stretcher and believes he had another shock  In ED he had since waves with increased PVCs and was placed on amiodarone at suggestion of cardiology ad was admitted to the ICU for management  History obtained from chart review and the patient  Review of Systems   Respiratory: Positive for shortness of breath  Cardiovascular: Positive for palpitations  Neurological: Positive for dizziness and light-headedness  Historical Information   Past Medical History:  2020: A[facility]fib ([facility] )  No date: AICD (automatic cardioverter/defibrillator) present  No date: CHF (congestive heart failure) ([facility])  No date: Hypertension  No date: Myocarditis ([facility] [address] )  No date: Sleep apnea  No date: Ventricular tachycardia [facility]) Past Surgical History:  No date: CARDIAC DEFIBRILLATOR PLACEMENT  3/29/2017: AK ESOPHAGOGASTRODUODENOSCOPY TRANSORAL DIAGNOSTIC; N/A      Comment:  Procedure: ESOPHAGOGASTRODUODENOSCOPY (EGD); Surgeon:                Polly Granados MD;  Location: [facility];   Service:                Gastroenterology  6/2/2017: AK INSJ/RPLCMT PERM DFB W/TRNSVNS LDS 1/DUAL CHMBR; N/A      Comment:  Procedure: LEAD EXTRACTION/REIMPLANTATION AND ICD                GENERATOR CHANGE ;  Surgeon: Jake Valerio MD;                 Location:  MAIN OR;  Service: Cardiology  5/19/2017: AK LAPAROSCOPY SURG CHOLECYSTECTOMY; N/A      Comment:  Procedure: LAPAROSCOPIC CHOLECYSTECTOMY ;  Surgeon:                " "Devante Chapin MD;  Location: [facility] OR;  Service:                General  No date: TONSILLECTOMY  No date: TYMPANOSTOMY TUBE PLACEMENT   Current Outpatient Medications   Medication Instructions    carvedilol (COREG) 37 5 mg, Oral, 2 times daily with meals    digoxin (LANOXIN) 250 mcg, Oral, Daily    enalapril (VASOTEC) 10 mg tablet TAKE 1 TABLET BY MOUTH TWICE A DAY    spironolactone (ALDACTONE) 25 mg, Oral, Daily    traZODone (DESYREL) 100 mg, Oral, Daily at bedtime    Xarelto 20 MG tablet TAKE 1 TABLET BY MOUTH EVERY DAY WITH DINNER    Allergies   Allergen Reactions    Vicodin [Hydrocodone-Acetaminophen] Other (See Comments)     \"i get nasty\"      Social History     Tobacco Use    Smoking status: Never    Smokeless tobacco: Never   Vaping Use    Vaping Use: Never used   Substance Use Topics    Alcohol use: Yes     Comment: OCCASIONAL    Drug use: No    Family History   Problem Relation Age of Onset    No Known Problems Mother     No Known Problems Father           Objective                            Vitals I/O      Most Recent Min/Max in 24hrs   Temp 98 9 °F (37 2 °C) Temp  Min: 98 1 °F (36 7 °C)  Max: 98 9 °F (37 2 °C)   Pulse 61 Pulse  Min: 61  Max: 80   Resp (!) 26 Resp  Min: 11  Max: 26   /73 BP  Min: 105/74  Max: 150/72   O2 Sat 96 % SpO2  Min: 95 %  Max: 97 %      Intake/Output Summary (Last 24 hours) at 4/30/2023 2305  Last data filed at 4/30/2023 1743  Gross per 24 hour   Intake 100 ml   Output --   Net 100 ml         Diet NPO     Invasive Monitoring Physical exam    Physical Exam  Vitals and nursing note reviewed  Constitutional:       General: He is not in acute distress  Appearance: He is not toxic-appearing  HENT:      Head: Normocephalic and atraumatic  Mouth/Throat:      Mouth: Mucous membranes are moist    Eyes:      Extraocular Movements: Extraocular movements intact        Conjunctiva/sclera: Conjunctivae normal       Pupils: Pupils are equal, round, and " reactive to light  Cardiovascular:      Rate and Rhythm: Normal rate and regular rhythm  Pulses: Normal pulses  Heart sounds: Normal heart sounds  Pulmonary:      Effort: Pulmonary effort is normal       Breath sounds: Normal breath sounds  No wheezing or rales  Abdominal:      General: Abdomen is flat  Bowel sounds are normal  There is distension  Palpations: Abdomen is soft  Tenderness: There is no abdominal tenderness  Genitourinary:     Comments: Deferred  Musculoskeletal:         General: Normal range of motion  Cervical back: Neck supple  Right lower leg: No edema  Left lower leg: No edema  Skin:     General: Skin is warm and dry  Capillary Refill: Capillary refill takes less than 2 seconds  Neurological:      General: No focal deficit present  Mental Status: He is alert and oriented to person, place, and time  Mental status is at baseline              Diagnostic Studies      EKG:   Imaging:  I have personally reviewed pertinent films in PACS     Medications:  Scheduled PRN   carvedilol, 37 5 mg, BID With Meals  chlorhexidine, 15 mL, Q12H [address]  [START ON 5/1/2023] digoxin, 250 mcg, Daily  famotidine, 20 mg, BID  magnesium sulfate, 2 g, Once  rivaroxaban, 20 mg, Daily With Dinner  [START ON 5/1/2023] spironolactone, 25 mg, Daily  traZODone, 100 mg, HS      acetaminophen, 650 mg, Q6H PRN       Continuous    amiodarone, 1 mg/min, Last Rate: 1 mg/min (04/30/23 1752)  sodium chloride, 75 mL/hr, Last Rate: 75 mL/hr (04/30/23 2150)         Labs:    CBC    Recent Labs     04/30/23  1536   WBC 11 36*   HGB 15 4   HCT 45 6        BMP    Recent Labs     04/30/23  1536   SODIUM 137   K 3 9      CO2 24   AGAP 7   BUN 12   CREATININE 1 03   CALCIUM 9 6       Coags    Recent Labs     04/30/23  1536   INR 1 18   PTT 31        Additional Electrolytes  Recent Labs     04/30/23  1536   MG 1 9          Blood Gas    No recent results  No recent results LFTs  Recent Labs     04/30/23  1536   ALT 44   AST 33   ALKPHOS 66   ALB 4 1   TBILI 0 88       Infectious  No recent results  Glucose  Recent Labs     04/30/23  1536   GLUC 168*               Anticipated Length of Stay is > 2 midnights  Sally Downing PA-C

## 2023-05-01 NOTE — ASSESSMENT & PLAN NOTE
"· Patient with ICD discharge at home and again while in waiting room  · [facility] [address] [facility]/15 to 4/18 after discharge  · At that admission, he had his digoxin increased  · Digoxin level still low  · Since that admission he has felt \"off\" stating he was still having palpitations  · Today he stated he felt dizzy, short of breath, laid on the couch and passed out and was awoken by his ICD firing;  He also had similar event in the ED when being moved from a stretcher  · EKG showing sinus with frequent PVCs  · Cardiology was contacted and he was placed on amiodarone  · At time of my evaluation he was stilll having intermittent PVCs with some short, non-sustatined runs of VT  · EKG with normal QTc    Plan:  · Continue amio gtt at 1  · Continue home digoxin and coreg  · Though digoxin level still subtherapeutic  · Optimize electrolytes  · Montior on tele  · Cardiology consult   · If continued runs will need lidocaine infusion and possible transfer to cardiac EP in [place]  "

## 2023-05-01 NOTE — ASSESSMENT & PLAN NOTE
· Currently in NSR  · Continue coreg, digoxin, and addition of amiodarone as below  · Digoxin level subtherapeutic  · Continue xarelto

## 2023-05-01 NOTE — UTILIZATION REVIEW
Initial Clinical Review    Admission: Date/Time/Statement:   Admission Orders (From admission, onward)     Ordered        04/30/23 1745  INPATIENT ADMISSION  Once                      Orders Placed This Encounter   Procedures    INPATIENT ADMISSION     Standing Status:   Standing     Number of Occurrences:   1     Order Specific Question:   Level of Care     Answer:   Critical Care [15]     Order Specific Question:   Estimated length of stay     Answer:   More than 2 Midnights     Order Specific Question:   Certification     Answer:   I certify that inpatient services are medically necessary for this patient for a duration of greater than two midnights  See H&P and MD Progress Notes for additional information about the patient's course of treatment  ED Arrival Information     Expected   -    Arrival   4/30/2023 15:26    Acuity   Immediate            Means of arrival   Walk-In    Escorted by   Family Member    Service   Critical Care/ICU    Admission type   Emergency            Arrival complaint   ICD Shock            Chief Complaint   Patient presents with    Syncope     Per pt his ICD shocked him at home, while being transported to the room pt became unresponsive and breathing agonally  Pt was transferred to bed and then became responsive  Pt alert and oriented at this time  Initial Presentation: 56 y o  male to the ED from home with complaints of AICD shock at home and while in waiting room in ED  H/O dilated CMP, htn, afib, aicd  Admitted to [address] [place] [address] for icd discharged  Recently admitted from 4/15/23-4/18 for AICD discharge at which time his digoxin dosing was increased  At home, he felt dizzy prior to shock  EKG on arrival shows NSR with frequent pvcs  Started on Amiodarone drip  Electrolytes WNL  EF 30%  Cardiology consult  Dig level 0 6  Date: 5/1   Day 2:   Continue amiodarone drip  Continues to have runs of vt on monitor    Cardiology consult:  Ventricular tachycardia s/p ICD discharge  Formal device interrogation report shows 2 episodes of VT with 1 ICD discharge  EKG showing NSR currently  COntinue to monitor  Continue amiodarone drip  Continue with coreg, digoxin     ED Triage Vitals   Temperature Pulse Respirations Blood Pressure SpO2   04/30/23 1705 04/30/23 1534 04/30/23 1534 04/30/23 1534 04/30/23 1534   98 2 °F (36 8 °C) 80 16 150/72 97 %      Temp Source Heart Rate Source Patient Position - Orthostatic VS BP Location FiO2 (%)   04/30/23 1705 04/30/23 1534 04/30/23 1534 04/30/23 1534 --   Oral Monitor Lying Left arm       Pain Score       04/30/23 2100       No Pain          Wt Readings from Last 1 Encounters:   05/01/23 (!) 139 kg (307 lb)     Additional Vital Signs:   /Time Temp Pulse Resp BP MAP (mmHg) SpO2 O2 Device Patient Position - Orthostatic VS   05/01/23 1000 -- 50 Abnormal  14 99/56 72 95 % -- --   05/01/23 0900 -- 58 15 107/62 79 96 % -- --   05/01/23 0801 -- 63 -- 113/70 -- -- -- --   05/01/23 0800 -- 51 Abnormal  12 114/70 88 96 % -- --   05/01/23 0700 97 7 °F (36 5 °C) 62 18 113/70 83 97 % None (Room air) Lying   05/01/23 0208 98 2 °F (36 8 °C) 51 Abnormal  20 99/52 71 94 % None (Room air) Lying   05/01/23 0200 -- 52 Abnormal  17 99/52 71 95 % -- --   05/01/23 0100 -- 57 15 113/67 85 95 % -- Lying   05/01/23 0000 -- 54 Abnormal  16 110/66 83 96 % -- Lying   04/30/23 2300 -- 60 11 Abnormal  106/56 78 94 % -- Lying   04/30/23 2242 98 9 °F (37 2 °C) 61 26 Abnormal  126/73 93 96 % None (Room air) Lying   04/30/23 2200 -- 62 16 99/57 74 95 % -- --   04/30/23 2106 98 1 °F (36 7 °C) 68 20 109/61 79 96 % -- --   04/30/23 2100 -- 68 11 Abnormal  109/61 79 95 % -- Lying   04/30/23 2000 -- 71 16 111/57 82 95 % -- --   04/30/23 1930 -- 71 13 111/58 80 95 % -- --   04/30/23 1900 -- 71 11 Abnormal  108/57 77 96 % -- --   04/30/23 1830 -- 72 -- 111/63 82 96 % -- --   04/30/23 1800 -- 71 15 110/65 81 96 % -- --   04/30/23 1705 98 2 °F (36 8 °C) -- -- -- -- -- -- --   04/30/23 1700 -- 80 16 116/72 89 96 % -- --   04/30/23 1630 -- 74 -- 105/74 86 96 % -- --   04/30/23 1545 -- -- -- -- -- -- None (Room air) --   04/30/23 1534 -- 80 16 150/72 -- 97 % None (Room air) Lying       Pertinent Labs/Diagnostic Test Results:   5/1 ECHO: Left Ventricle: Left ventricular cavity size is dilated  Wall thickness is normal  The left ventricular ejection fraction is 30%  Systolic function is severely reduced  There is severe global hypokinesis  Diastolic function is severely abnormal, consistent with ungraded restrictive relaxation  4/30 EKG: Normal sinus rhythm  Lateral infarct , age undetermined  Possible Inferior infarct (cited on or before 10-OCT-2022)  Abnormal ECG  When compared with ECG of 30-APR-2023 15:33, (unconfirmed)  Non-specific change in ST segment in Inferior leads  Nonspecific T wave abnormality has replaced inverted T waves in Inferior leads  Nonspecific T wave abnormality has replaced inverted T waves in Anterolateral leads  XR chest portable   ED Interpretation by Lolita Key MD (04/30 1641)   NAD      Final Result by Ric Carmen MD (05/01 2415)      No acute cardiopulmonary disease                    Workstation performed: [id number]               Results from last 7 days   Lab Units 05/01/23  0429 04/30/23  1536   WBC Thousand/uL 8 93 11 36*   HEMOGLOBIN g/dL 14 1 15 4   HEMATOCRIT % 42 8 45 6   PLATELETS Thousands/uL 153 205   NEUTROS ABS Thousands/µL 5 81 6 40         Results from last 7 days   Lab Units 05/01/23  0429 04/30/23  1536   SODIUM mmol/L 136 137   POTASSIUM mmol/L 4 0 3 9   CHLORIDE mmol/L 107 106   CO2 mmol/L 24 24   ANION GAP mmol/L 5 7   BUN mg/dL 12 12   CREATININE mg/dL 0 91 1 03   EGFR ml/min/1 73sq m 100 86   CALCIUM mg/dL 8 7 9 6   CALCIUM, IONIZED mmol/L 1 12  --    MAGNESIUM mg/dL 2 2 1 9   PHOSPHORUS mg/dL 4 0  --      Results from last 7 days   Lab Units 05/01/23  0429 04/30/23  1536   AST U/L 28 33   ALT U/L 41 44   ALK PHOS U/L 52 66   TOTAL PROTEIN g/dL 6 3* 7 4   ALBUMIN g/dL 3 5 4 1   TOTAL BILIRUBIN mg/dL 0 99 0 88         Results from last 7 days   Lab Units 05/01/23  0429 04/30/23  1536   GLUCOSE RANDOM mg/dL 142* 168*             Results from last 7 days   Lab Units 04/30/23 2015 04/30/23  1740 04/30/23  1536   HS TNI 0HR ng/L  --   --  43   HS TNI 2HR ng/L  --  93*  --    HSTNI D2 ng/L  --  50*  --    HS TNI 4HR ng/L 103*  --   --    HSTNI D4 ng/L 60*  --   --          Results from last 7 days   Lab Units 05/01/23  0429 04/30/23  1536   PROTIME seconds 25 0* 14 8*   INR  2 33* 1 18   PTT seconds  --  31       Results from last 7 days   Lab Units 05/01/23 0429 04/30/23  1536   DIGOXIN LVL ng/mL 0 6* 0 6*     Results from last 7 days   Lab Units 04/30/23  1536   BNP pg/mL 166*       ED Treatment:   Medication Administration from 04/30/2023 1526 to 04/30/2023 2048       Date/Time Order Dose Route Action Comments     04/30/2023 1733 EDT amiodarone 150 mg in dextrose 5 % 100 mL IV bolus 150 mg Intravenous New Bag --     04/30/2023 1752 EDT amiodarone (CORDARONE) 900 mg in dextrose 5 % 500 mL infusion 1 mg/min Intravenous New Bag --        Past Medical History:   Diagnosis Date    A-fib ([place] [address] ) 2020    AICD (automatic cardioverter/defibrillator) present     [facility]     Hypertension     Myocarditis ([place] [address] )     Sleep apnea     Ventricular tachycardia ([place] [address] )        Admitting Diagnosis: CHF (congestive heart failure) ([place] [address] ) [I50 9]  Syncope [R55]  V-tach ([place] [address] ) [I47 20]  AICD discharge [Z45 02]  Age/Sex: 56 y o  male  Admission Orders:  Neuro checks every 2 hours  Tele  scds  ECHO  Scheduled Medications:  carvedilol, 37 5 mg, Oral, BID With Meals  chlorhexidine, 15 mL, Mouth/Throat, Q12H [facility]  digoxin, 250 mcg, Oral, Daily  famotidine, 20 mg, Oral, BID  rivaroxaban, 20 mg, Oral, Daily With Dinner  spironolactone, 25 mg, Oral, Daily  traZODone, 100 mg, Oral, HS      Continuous IV Infusions:  amiodarone, 1 mg/min, Intravenous, Continuous      PRN Meds:  acetaminophen, 650 mg, Oral, Q6H PRN        IP CONSULT TO CASE MANAGEMENT  IP CONSULT TO CARDIOLOGY    Network Utilization Review Department  ATTENTION: Please call with any questions or concerns to [phone] and carefully listen to the prompts so that you are directed to the right person  All voicemails are confidential   Laura Rousseau all requests for admission clinical reviews, approved or denied determinations and any other requests to dedicated fax number below belonging to the campus where the patient is receiving treatment   List of dedicated fax numbers for the Facilities:  [address] DENIALS (Administrative/Medical Necessity) [phone]   [address] (Maternity/NICU/Pediatrics) [phone] [address] [place] [address]  [phone]   Abby Cortez 50 [address] [facility] [address] Margie Osorio [facility] [place] [address] [place] [address] [phone]

## 2023-05-01 NOTE — ASSESSMENT & PLAN NOTE
· Currently in NSR  · Continue coreg, digoxin, and addition of amiodarone as below  · Continue xarelto

## 2023-05-01 NOTE — PROGRESS NOTES
"[facility]  Progress Note  Name: Gilford Lawman  MRN: [id number]  Unit/Bed#:  I Date of Admission: 4/30/2023   Date of Service: 5/1/2023 I Hospital Day: 1    Assessment/Plan   * ICD (implantable cardioverter-defibrillator) discharge  Assessment & Plan  · Patient with ICD discharge at home and again while in waiting room  · Recent admission for [address] [place] [address] admission 4/15 to 4/18 after discharge  · At that admission, he had his digoxin increased  · Digoxin level still low  · Since that admission he has felt \"off\" stating he was still having palpitations  · Today he stated he felt dizzy, short of breath, laid on the couch and passed out and was awoken by his ICD firing;  He also had similar event in the ED when being moved from a stretcher  · EKG showing sinus with frequent PVCs  · Cardiology was contacted and he was placed on amiodarone  · At time of my evaluation he was stilll having intermittent PVCs with some short, non-sustatined runs of VT  · EKG with normal QTc    Plan:  · Continue amio gtt at 1  · Continue home digoxin and coreg  · Though digoxin level still subtherapeutic  · Optimize electrolytes  · [place] on tele  · Cardiology consult   · If continued runs will need lidocaine infusion and possible transfer to cardiac EP in [place]    Hypertension  Assessment & Plan  · Continue coreg/spironolactone  · SBP on the lower end; hold ACEi for now in case we will need to increase coreg    Dilated cardiomyopathy ([address] )  Assessment & Plan  · Patient with history of dilated CMP with no significant CAD  · He has a single chamber ICD in place  · His last ECHO showed EF 30%, restricted relaxation but no significant RV dysfunction or valuvuopathy    Plan:  · Appears euvolemic  · Care as above    Paroxysmal atrial fibrillation ([address] )  Assessment & Plan  · Currently in NSR  · Continue coreg, digoxin, and addition of amiodarone as below  · Digoxin level subtherapeutic  · Continue xarelto         " Show Previous Accession Variable: Yes "      ICU Core Measures     A: Assess, Prevent, and Manage Pain · Has pain been assessed? NA  · Need for changes to pain regimen? NA   B: Both SAT/SAT  · N/A   C: Choice of Sedation · RASS Goal: 0 Alert and Calm or N/A patient not on sedation  · Need for changes to sedation or analgesia regimen? NA   D: Delirium · CAM-ICU: Negative   E: Early Mobility  · Plan for early mobility? Yes   F: Family Engagement · Plan for family engagement today? Yes         Prophylaxis:  VTE VTE covered by:  rivaroxaban, Oral, 20 mg at 04/30/23 2237       Stress Ulcer  covered byfamotidine (PEPCID) tablet 20 mg [[id number]]       Subjective   HPI/24hr events:   56 y o  who presents after an ICD firing  Patient has a PMH of dilated NICM, pAF, essential hypertension  Patient was recently admitted for the same 4/15-4/18  Patient states since that admission he was still having palpitations and even describes phenomenon of \"dropped beats  \" He denies any other complaint  He states he changed over the laundry, walked upstairs, fed the dog, and suddenly felt dizzy, short of breath and laid himself on the couch and \"blacked out\" and was awakened by ICD firing  EMS was activated and he had a similar event when moving him into the ED stretcher and believes he had another shock  In ED he had since waves with increased PVCs and was placed on amiodarone at suggestion of cardiology ad was admitted to the ICU for management  Overnight: Bradycardia but no significant VT; Infrequent PVCs  Review of Systems   All other systems reviewed and are negative         Objective                            Vitals I/O      Most Recent Min/Max in 24hrs   Temp 98 2 °F (36 8 °C) Temp  Min: 98 1 °F (36 7 °C)  Max: 98 9 °F (37 2 °C)   Pulse 61 Pulse  Min: 48  Max: 80   Resp 14 Resp  Min: 0  Max: 26   /76 BP  Min: 97/52  Max: 150/72   O2 Sat 97 % SpO2  Min: 94 %  Max: 97 %      Intake/Output Summary (Last 24 hours) at 5/1/2023 0604  Last data filed at 5/1/2023 " Mercedes Flap Text: The defect edges were debeveled with a #15 scalpel blade.  Given the location of the defect, shape of the defect and the proximity to free margins a Mercedes flap was deemed most appropriate.  Using a sterile surgical marker, an appropriate advancement flap was drawn incorporating the defect and placing the expected incisions within the relaxed skin tension lines where possible. The area thus outlined was incised deep to adipose tissue with a #15 scalpel blade.  The skin margins were undermined to an appropriate distance in all directions utilizing iris scissors. 0501  Gross per 24 hour   Intake 949 94 ml   Output 1350 ml   Net -400 06 ml         Diet NPO     Invasive Monitoring Physical exam    Physical Exam  Vitals and nursing note reviewed  Constitutional:       Appearance: He is obese  HENT:      Head: Normocephalic and atraumatic  Mouth/Throat:      Mouth: Mucous membranes are moist    Eyes:      Extraocular Movements: Extraocular movements intact  Conjunctiva/sclera: Conjunctivae normal       Pupils: Pupils are equal, round, and reactive to light  Cardiovascular:      Rate and Rhythm: Regular rhythm  Bradycardia present  Heart sounds: Normal heart sounds  No murmur heard  Pulmonary:      Effort: Pulmonary effort is normal       Breath sounds: Normal breath sounds  Abdominal:      General: Abdomen is flat  Bowel sounds are normal  There is no distension  Palpations: Abdomen is soft  Tenderness: There is no abdominal tenderness  Genitourinary:     Comments: Deferred  Musculoskeletal:         General: Normal range of motion  Cervical back: Neck supple  Skin:     General: Skin is warm and dry  Capillary Refill: Capillary refill takes less than 2 seconds  Neurological:      General: No focal deficit present  Mental Status: He is alert and oriented to person, place, and time  Mental status is at baseline  Cranial Nerves: No cranial nerve deficit              Diagnostic Studies      EKG:   Imaging:  I have personally reviewed pertinent films in PACS     Medications:  Scheduled PRN   carvedilol, 37 5 mg, BID With Meals  chlorhexidine, 15 mL, Q12H [facility]  digoxin, 250 mcg, Daily  famotidine, 20 mg, BID  rivaroxaban, 20 mg, Daily With Dinner  spironolactone, 25 mg, Daily  traZODone, 100 mg, HS      acetaminophen, 650 mg, Q6H PRN       Continuous    amiodarone, 1 mg/min, Last Rate: 1 mg/min (04/30/23 1752)  sodium chloride, 75 mL/hr, Last Rate: 75 mL/hr (04/30/23 2150)         Labs:    CBC    Recent Labs     04/30/23  1536 Positioning (Leave Blank If You Do Not Want): The patient was placed in a comfortable position exposing the surgical site. 05/01/23  0429   WBC 11 36* 8 93   HGB 15 4 14 1   HCT 45 6 42 8    153     BMP    Recent Labs     04/30/23  1536 05/01/23  0429   SODIUM 137 136   K 3 9 4 0    107   CO2 24 24   AGAP 7 5   BUN 12 12   CREATININE 1 03 0 91   CALCIUM 9 6 8 7       Coags    Recent Labs     04/30/23  1536 05/01/23  0429   INR 1 18 2 33*   PTT 31  --         Additional Electrolytes  Recent Labs     04/30/23 1536 05/01/23  0429   MG 1 9 2 2   PHOS  --  4 0   CAIONIZED  --  1 12          Blood Gas    No recent results  No recent results LFTs  Recent Labs     04/30/23  1536 05/01/23  0429   ALT 44 41   AST 33 28   ALKPHOS 66 52   ALB 4 1 3 5   TBILI 0 88 0 99       Infectious  No recent results  Glucose  Recent Labs     04/30/23 1536 05/01/23  0429   GLUC 168* 142*                   Arnav Castro PA-C Path Notes (To The Dermatopathologist): Please check margins. Home Suture Removal Text: Patient was provided a home suture removal kit and will remove their sutures at home.  If they have any questions or difficulties they will call the office. Complex Repair And O-T Advancement Flap Text: The defect edges were debeveled with a #15 scalpel blade.  The primary defect was closed partially with a complex linear closure.  Given the location of the remaining defect, shape of the defect and the proximity to free margins an O-T advancement flap was deemed most appropriate for complete closure of the defect.  Using a sterile surgical marker, an appropriate advancement flap was drawn incorporating the defect and placing the expected incisions within the relaxed skin tension lines where possible.    The area thus outlined was incised deep to adipose tissue with a #15 scalpel blade.  The skin margins were undermined to an appropriate distance in all directions utilizing iris scissors. Additional Anesthesia Volume In Cc: 0 Z Plasty Text: The lesion was extirpated to the level of the fat with a #15 scalpel blade.  Given the location of the defect, shape of the defect and the proximity to free margins a Z-plasty was deemed most appropriate for repair.  Using a sterile surgical marker, the appropriate transposition arms of the Z-plasty were drawn incorporating the defect and placing the expected incisions within the relaxed skin tension lines where possible.    The area thus outlined was incised deep to adipose tissue with a #15 scalpel blade.  The skin margins were undermined to an appropriate distance in all directions utilizing iris scissors.  The opposing transposition arms were then transposed into place in opposite direction and anchored with interrupted buried subcutaneous sutures. Complex Repair And O-L Flap Text: The defect edges were debeveled with a #15 scalpel blade.  The primary defect was closed partially with a complex linear closure.  Given the location of the remaining defect, shape of the defect and the proximity to free margins an O-L flap was deemed most appropriate for complete closure of the defect.  Using a sterile surgical marker, an appropriate flap was drawn incorporating the defect and placing the expected incisions within the relaxed skin tension lines where possible.    The area thus outlined was incised deep to adipose tissue with a #15 scalpel blade.  The skin margins were undermined to an appropriate distance in all directions utilizing iris scissors. Wound Care: Mupirocin Complex Repair And W Plasty Text: The defect edges were debeveled with a #15 scalpel blade.  The primary defect was closed partially with a complex linear closure.  Given the location of the remaining defect, shape of the defect and the proximity to free margins a W plasty was deemed most appropriate for complete closure of the defect.  Using a sterile surgical marker, an appropriate advancement flap was drawn incorporating the defect and placing the expected incisions within the relaxed skin tension lines where possible.    The area thus outlined was incised deep to adipose tissue with a #15 scalpel blade.  The skin margins were undermined to an appropriate distance in all directions utilizing iris scissors. Purse String (Intermediate) Text: Given the location of the defect and the characteristics of the surrounding skin a pursestring intermediate closure was deemed most appropriate.  Undermining was performed circumfirentially around the surgical defect.  A purstring suture was then placed and tightened. Anesthesia Type: 1% lidocaine with epinephrine Melolabial Interpolation Flap Text: A decision was made to reconstruct the defect utilizing an interpolation axial flap and a staged reconstruction.  A telfa template was made of the defect.  This telfa template was then used to outline the melolabial interpolation flap.  The donor area for the pedicle flap was then injected with anesthesia.  The flap was excised through the skin and subcutaneous tissue down to the layer of the underlying musculature.  The pedicle flap was carefully excised within this deep plane to maintain its blood supply.  The edges of the donor site were undermined.   The donor site was closed in a primary fashion.  The pedicle was then rotated into position and sutured.  Once the tube was sutured into place, adequate blood supply was confirmed with blanching and refill.  The pedicle was then wrapped with xeroform gauze and dressed appropriately with a telfa and gauze bandage to ensure continued blood supply and protect the attached pedicle. S Plasty Text: Given the location and shape of the defect, and the orientation of relaxed skin tension lines, an S-plasty was deemed most appropriate for repair.  Using a sterile surgical marker, the appropriate outline of the S-plasty was drawn, incorporating the defect and placing the expected incisions within the relaxed skin tension lines where possible.  The area thus outlined was incised deep to adipose tissue with a #15 scalpel blade.  The skin margins were undermined to an appropriate distance in all directions utilizing iris scissors. The skin flaps were advanced over the defect.  The opposing margins were then approximated with interrupted buried subcutaneous sutures. Banner Transposition Flap Text: The defect edges were debeveled with a #15 scalpel blade.  Given the location of the defect and the proximity to free margins a Banner transposition flap was deemed most appropriate.  Using a sterile surgical marker, an appropriate flap drawn around the defect. The area thus outlined was incised deep to adipose tissue with a #15 scalpel blade.  The skin margins were undermined to an appropriate distance in all directions utilizing iris scissors. Excision Depth: adipose tissue Curvilinear Excision Additional Text (Leave Blank If You Do Not Want): The margin was drawn around the clinically apparent lesion.  A curvilinear shape was then drawn on the skin incorporating the lesion and margins.  Incisions were then made along these lines to the appropriate tissue plane and the lesion was extirpated. Complex Repair And Single Advancement Flap Text: The defect edges were debeveled with a #15 scalpel blade.  The primary defect was closed partially with a complex linear closure.  Given the location of the remaining defect, shape of the defect and the proximity to free margins a single advancement flap was deemed most appropriate for complete closure of the defect.  Using a sterile surgical marker, an appropriate advancement flap was drawn incorporating the defect and placing the expected incisions within the relaxed skin tension lines where possible.    The area thus outlined was incised deep to adipose tissue with a #15 scalpel blade.  The skin margins were undermined to an appropriate distance in all directions utilizing iris scissors. Complex Repair And Skin Substitute Graft Text: The defect edges were debeveled with a #15 scalpel blade.  The primary defect was closed partially with a complex linear closure.  Given the location of the remaining defect, shape of the defect and the proximity to free margins a skin substitute graft was deemed most appropriate to repair the remaining defect.  The graft was trimmed to fit the size of the remaining defect.  The graft was then placed in the primary defect, oriented appropriately, and sutured into place. Fusiform Excision Additional Text (Leave Blank If You Do Not Want): The margin was drawn around the clinically apparent lesion.  A fusiform shape was then drawn on the skin incorporating the lesion and margins.  Incisions were then made along these lines to the appropriate tissue plane and the lesion was extirpated. Lab Facility: [id number] Melolabial Transposition Flap Text: The defect edges were debeveled with a #15 scalpel blade.  Given the location of the defect and the proximity to free margins a melolabial flap was deemed most appropriate.  Using a sterile surgical marker, an appropriate melolabial transposition flap was drawn incorporating the defect.    The area thus outlined was incised deep to adipose tissue with a #15 scalpel blade.  The skin margins were undermined to an appropriate distance in all directions utilizing iris scissors. Complex Repair And M Plasty Text: The defect edges were debeveled with a #15 scalpel blade.  The primary defect was closed partially with a complex linear closure.  Given the location of the remaining defect, shape of the defect and the proximity to free margins an M plasty was deemed most appropriate for complete closure of the defect.  Using a sterile surgical marker, an appropriate advancement flap was drawn incorporating the defect and placing the expected incisions within the relaxed skin tension lines where possible.    The area thus outlined was incised deep to adipose tissue with a #15 scalpel blade.  The skin margins were undermined to an appropriate distance in all directions utilizing iris scissors. Epidermal Closure Graft Donor Site (Optional): simple interrupted Graft Donor Site Bandage (Optional-Leave Blank If You Don't Want In Note): Steri-strips and a pressure bandage were applied to the donor site. O-T Advancement Flap Text: The defect edges were debeveled with a #15 scalpel blade.  Given the location of the defect, shape of the defect and the proximity to free margins an O-T advancement flap was deemed most appropriate.  Using a sterile surgical marker, an appropriate advancement flap was drawn incorporating the defect and placing the expected incisions within the relaxed skin tension lines where possible.    The area thus outlined was incised deep to adipose tissue with a #15 scalpel blade.  The skin margins were undermined to an appropriate distance in all directions utilizing iris scissors. Size Of Margin In Cm: 0.4 Lazy S Complex Repair Preamble Text (Leave Blank If You Do Not Want): Extensive wide undermining was performed. Complex Repair And Z Plasty Text: The defect edges were debeveled with a #15 scalpel blade.  The primary defect was closed partially with a complex linear closure.  Given the location of the remaining defect, shape of the defect and the proximity to free margins a Z plasty was deemed most appropriate for complete closure of the defect.  Using a sterile surgical marker, an appropriate advancement flap was drawn incorporating the defect and placing the expected incisions within the relaxed skin tension lines where possible.    The area thus outlined was incised deep to adipose tissue with a #15 scalpel blade.  The skin margins were undermined to an appropriate distance in all directions utilizing iris scissors. Paramedian Forehead Flap Text: A decision was made to reconstruct the defect utilizing an interpolation axial flap and a staged reconstruction.  A telfa template was made of the defect.  This telfa template was then used to outline the paramedian forehead pedicle flap.  The donor area for the pedicle flap was then injected with anesthesia.  The flap was excised through the skin and subcutaneous tissue down to the layer of the underlying musculature.  The pedicle flap was carefully excised within this deep plane to maintain its blood supply.  The edges of the donor site were undermined.   The donor site was closed in a primary fashion.  The pedicle was then rotated into position and sutured.  Once the tube was sutured into place, adequate blood supply was confirmed with blanching and refill.  The pedicle was then wrapped with xeroform gauze and dressed appropriately with a telfa and gauze bandage to ensure continued blood supply and protect the attached pedicle. Rotation Flap Text: The defect edges were debeveled with a #15 scalpel blade.  Given the location of the defect, shape of the defect and the proximity to free margins a rotation flap was deemed most appropriate.  Using a sterile surgical marker, an appropriate rotation flap was drawn incorporating the defect and placing the expected incisions within the relaxed skin tension lines where possible.    The area thus outlined was incised deep to adipose tissue with a #15 scalpel blade.  The skin margins were undermined to an appropriate distance in all directions utilizing iris scissors. Ftsg Text: The defect edges were debeveled with a #15 scalpel blade.  Given the location of the defect, shape of the defect and the proximity to free margins a full thickness skin graft was deemed most appropriate.  Using a sterile surgical marker, the primary defect shape was transferred to the donor site. The area thus outlined was incised deep to adipose tissue with a #15 scalpel blade.  The harvested graft was then trimmed of adipose tissue until only dermis and epidermis was left.  The skin margins of the secondary defect were undermined to an appropriate distance in all directions utilizing iris scissors.  The secondary defect was closed with interrupted buried subcutaneous sutures.  The skin edges were then re-apposed with running  sutures.  The skin graft was then placed in the primary defect and oriented appropriately. Bilobed Transposition Flap Text: The defect edges were debeveled with a #15 scalpel blade.  Given the location of the defect and the proximity to free margins a bilobed transposition flap was deemed most appropriate.  Using a sterile surgical marker, an appropriate bilobe flap drawn around the defect.    The area thus outlined was incised deep to adipose tissue with a #15 scalpel blade.  The skin margins were undermined to an appropriate distance in all directions utilizing iris scissors. Hemostasis: Pressure Estimated Blood Loss (Cc): minimal Muscle Hinge Flap Text: The defect edges were debeveled with a #15 scalpel blade.  Given the size, depth and location of the defect and the proximity to free margins a muscle hinge flap was deemed most appropriate.  Using a sterile surgical marker, an appropriate hinge flap was drawn incorporating the defect. The area thus outlined was incised with a #15 scalpel blade.  The skin margins were undermined to an appropriate distance in all directions utilizing iris scissors. Advancement Flap (Single) Text: The defect edges were debeveled with a #15 scalpel blade.  Given the location of the defect and the proximity to free margins a single advancement flap was deemed most appropriate.  Using a sterile surgical marker, an appropriate advancement flap was drawn incorporating the defect and placing the expected incisions within the relaxed skin tension lines where possible.    The area thus outlined was incised deep to adipose tissue with a #15 scalpel blade.  The skin margins were undermined to an appropriate distance in all directions utilizing iris scissors. Pre-Excision Curettage Text (Leave Blank If You Do Not Want): Prior to drawing the surgical margin the visible lesion was removed with electrodesiccation and curettage to clearly define the lesion size. Ear Star Wedge Flap Text: The defect edges were debeveled with a #15 blade scalpel.  Given the location of the defect and the proximity to free margins (helical rim) an ear star wedge flap was deemed most appropriate.  Using a sterile surgical marker, the appropriate flap was drawn incorporating the defect and placing the expected incisions between the helical rim and antihelix where possible.  The area thus outlined was incised through and through with a #15 scalpel blade. Bilateral Helical Rim Advancement Flap Text: The defect edges were debeveled with a #15 blade scalpel.  Given the location of the defect and the proximity to free margins (helical rim) a bilateral helical rim advancement flap was deemed most appropriate.  Using a sterile surgical marker, the appropriate advancement flaps were drawn incorporating the defect and placing the expected incisions between the helical rim and antihelix where possible.  The area thus outlined was incised through and through with a #15 scalpel blade.  With a skin hook and iris scissors, the flaps were gently and sharply undermined and freed up. Complex Repair And Double M Plasty Text: The defect edges were debeveled with a #15 scalpel blade.  The primary defect was closed partially with a complex linear closure.  Given the location of the remaining defect, shape of the defect and the proximity to free margins a double M plasty was deemed most appropriate for complete closure of the defect.  Using a sterile surgical marker, an appropriate advancement flap was drawn incorporating the defect and placing the expected incisions within the relaxed skin tension lines where possible.    The area thus outlined was incised deep to adipose tissue with a #15 scalpel blade.  The skin margins were undermined to an appropriate distance in all directions utilizing iris scissors. Cheek Interpolation Flap Text: A decision was made to reconstruct the defect utilizing an interpolation axial flap and a staged reconstruction.  A telfa template was made of the defect.  This telfa template was then used to outline the Cheek Interpolation flap.  The donor area for the pedicle flap was then injected with anesthesia.  The flap was excised through the skin and subcutaneous tissue down to the layer of the underlying musculature.  The interpolation flap was carefully excised within this deep plane to maintain its blood supply.  The edges of the donor site were undermined.   The donor site was closed in a primary fashion.  The pedicle was then rotated into position and sutured.  Once the tube was sutured into place, adequate blood supply was confirmed with blanching and refill.  The pedicle was then wrapped with xeroform gauze and dressed appropriately with a telfa and gauze bandage to ensure continued blood supply and protect the attached pedicle. O-L Flap Text: The defect edges were debeveled with a #15 scalpel blade.  Given the location of the defect, shape of the defect and the proximity to free margins an O-L flap was deemed most appropriate.  Using a sterile surgical marker, an appropriate advancement flap was drawn incorporating the defect and placing the expected incisions within the relaxed skin tension lines where possible.    The area thus outlined was incised deep to adipose tissue with a #15 scalpel blade.  The skin margins were undermined to an appropriate distance in all directions utilizing iris scissors. Advancement-Rotation Flap Text: The defect edges were debeveled with a #15 scalpel blade.  Given the location of the defect, shape of the defect and the proximity to free margins an advancement-rotation flap was deemed most appropriate.  Using a sterile surgical marker, an appropriate flap was drawn incorporating the defect and placing the expected incisions within the relaxed skin tension lines where possible. The area thus outlined was incised deep to adipose tissue with a #15 scalpel blade.  The skin margins were undermined to an appropriate distance in all directions utilizing iris scissors. Render Path Notes In Note?: no Alar Island Pedicle Flap Text: The defect edges were debeveled with a #15 scalpel blade.  Given the location of the defect, shape of the defect and the proximity to the alar rim an island pedicle advancement flap was deemed most appropriate.  Using a sterile surgical marker, an appropriate advancement flap was drawn incorporating the defect, outlining the appropriate donor tissue and placing the expected incisions within the nasal ala running parallel to the alar rim. The area thus outlined was incised with a #15 scalpel blade.  The skin margins were undermined minimally to an appropriate distance in all directions around the primary defect and laterally outward around the island pedicle utilizing iris scissors.  There was minimal undermining beneath the pedicle flap. Star Wedge Flap Text: The defect edges were debeveled with a #15 scalpel blade.  Given the location of the defect, shape of the defect and the proximity to free margins a star wedge flap was deemed most appropriate.  Using a sterile surgical marker, an appropriate rotation flap was drawn incorporating the defect and placing the expected incisions within the relaxed skin tension lines where possible. The area thus outlined was incised deep to adipose tissue with a #15 scalpel blade.  The skin margins were undermined to an appropriate distance in all directions utilizing iris scissors. Partial Purse String (Intermediate) Text: Given the location of the defect and the characteristics of the surrounding skin an intermediate purse string closure was deemed most appropriate.  Undermining was performed circumferentially around the surgical defect.  A purse string suture was then placed and tightened. Wound tension of the circular defect prevented complete closure of the wound. Crescentic Intermediate Repair Preamble Text (Leave Blank If You Do Not Want): Undermining was performed with blunt dissection. Spiral Flap Text: The defect edges were debeveled with a #15 scalpel blade.  Given the location of the defect, shape of the defect and the proximity to free margins a spiral flap was deemed most appropriate.  Using a sterile surgical marker, an appropriate rotation flap was drawn incorporating the defect and placing the expected incisions within the relaxed skin tension lines where possible. The area thus outlined was incised deep to adipose tissue with a #15 scalpel blade.  The skin margins were undermined to an appropriate distance in all directions utilizing iris scissors. Complex Repair And Xenograft Text: The defect edges were debeveled with a #15 scalpel blade.  The primary defect was closed partially with a complex linear closure.  Given the location of the defect, shape of the defect and the proximity to free margins an tissue cultured epidermal autograft was deemed most appropriate to repair the remaining defect.  The graft was trimmed to fit the size of the remaining defect.  The graft was then placed in the primary defect, oriented appropriately, and sutured into place. Purse String (Simple) Text: Given the location of the defect and the characteristics of the surrounding skin a purse string simple closure was deemed most appropriate.  Undermining was performed circumferentially around the surgical defect.  A purse string suture was then placed and tightened. Complex Repair And Rhombic Flap Text: The defect edges were debeveled with a #15 scalpel blade.  The primary defect was closed partially with a complex linear closure.  Given the location of the remaining defect, shape of the defect and the proximity to free margins a rhombic flap was deemed most appropriate for complete closure of the defect.  Using a sterile surgical marker, an appropriate advancement flap was drawn incorporating the defect and placing the expected incisions within the relaxed skin tension lines where possible.    The area thus outlined was incised deep to adipose tissue with a #15 scalpel blade.  The skin margins were undermined to an appropriate distance in all directions utilizing iris scissors. O-T Plasty Text: The defect edges were debeveled with a #15 scalpel blade.  Given the location of the defect, shape of the defect and the proximity to free margins an O-T plasty was deemed most appropriate.  Using a sterile surgical marker, an appropriate O-T plasty was drawn incorporating the defect and placing the expected incisions within the relaxed skin tension lines where possible.    The area thus outlined was incised deep to adipose tissue with a #15 scalpel blade.  The skin margins were undermined to an appropriate distance in all directions utilizing iris scissors. Suture Removal: 10 days Rhombic Flap Text: The defect edges were debeveled with a #15 scalpel blade.  Given the location of the defect and the proximity to free margins a rhombic flap was deemed most appropriate.  Using a sterile surgical marker, an appropriate rhombic flap was drawn incorporating the defect.    The area thus outlined was incised deep to adipose tissue with a #15 scalpel blade.  The skin margins were undermined to an appropriate distance in all directions utilizing iris scissors. Island Pedicle Flap With Canthal Suspension Text: The defect edges were debeveled with a #15 scalpel blade.  Given the location of the defect, shape of the defect and the proximity to free margins an island pedicle advancement flap was deemed most appropriate.  Using a sterile surgical marker, an appropriate advancement flap was drawn incorporating the defect, outlining the appropriate donor tissue and placing the expected incisions within the relaxed skin tension lines where possible. The area thus outlined was incised deep to adipose tissue with a #15 scalpel blade.  The skin margins were undermined to an appropriate distance in all directions around the primary defect and laterally outward around the island pedicle utilizing iris scissors.  There was minimal undermining beneath the pedicle flap. A suspension suture was placed in the canthal tendon to prevent tension and prevent ectropion. V-Y Flap Text: The defect edges were debeveled with a #15 scalpel blade.  Given the location of the defect, shape of the defect and the proximity to free margins a V-Y flap was deemed most appropriate.  Using a sterile surgical marker, an appropriate advancement flap was drawn incorporating the defect and placing the expected incisions within the relaxed skin tension lines where possible.    The area thus outlined was incised deep to adipose tissue with a #15 scalpel blade.  The skin margins were undermined to an appropriate distance in all directions utilizing iris scissors. Anesthesia Volume In Cc: 6 Post-Care Instructions: I reviewed with the patient in detail post-care instructions. Patient is not to engage in any heavy lifting, exercise, or swimming for the next 14 days. Should the patient develop any fevers, chills, bleeding, severe pain patient will contact the office immediately. Leave pressure bandage in place for 24-48 hours. Wound care handout given. Complex Repair And Dorsal Nasal Flap Text: The defect edges were debeveled with a #15 scalpel blade.  The primary defect was closed partially with a complex linear closure.  Given the location of the remaining defect, shape of the defect and the proximity to free margins a dorsal nasal flap was deemed most appropriate for complete closure of the defect.  Using a sterile surgical marker, an appropriate flap was drawn incorporating the defect and placing the expected incisions within the relaxed skin tension lines where possible.    The area thus outlined was incised deep to adipose tissue with a #15 scalpel blade.  The skin margins were undermined to an appropriate distance in all directions utilizing iris scissors. Transposition Flap Text: The defect edges were debeveled with a #15 scalpel blade.  Given the location of the defect and the proximity to free margins a transposition flap was deemed most appropriate.  Using a sterile surgical marker, an appropriate transposition flap was drawn incorporating the defect.    The area thus outlined was incised deep to adipose tissue with a #15 scalpel blade.  The skin margins were undermined to an appropriate distance in all directions utilizing iris scissors. Double Island Pedicle Flap Text: The defect edges were debeveled with a #15 scalpel blade.  Given the location of the defect, shape of the defect and the proximity to free margins a double island pedicle advancement flap was deemed most appropriate.  Using a sterile surgical marker, an appropriate advancement flap was drawn incorporating the defect, outlining the appropriate donor tissue and placing the expected incisions within the relaxed skin tension lines where possible.    The area thus outlined was incised deep to adipose tissue with a #15 scalpel blade.  The skin margins were undermined to an appropriate distance in all directions around the primary defect and laterally outward around the island pedicle utilizing iris scissors.  There was minimal undermining beneath the pedicle flap. Bi-Rhombic Flap Text: The defect edges were debeveled with a #15 scalpel blade.  Given the location of the defect and the proximity to free margins a bi-rhombic flap was deemed most appropriate.  Using a sterile surgical marker, an appropriate rhombic flap was drawn incorporating the defect. The area thus outlined was incised deep to adipose tissue with a #15 scalpel blade.  The skin margins were undermined to an appropriate distance in all directions utilizing iris scissors. Detail Level: Detailed Lip Wedge Excision Repair Text: Given the location of the defect and the proximity to free margins a full thickness wedge repair was deemed most appropriate.  Using a sterile surgical marker, the appropriate repair was drawn incorporating the defect and placing the expected incisions perpendicular to the vermillion border.  The vermillion border was also meticulously outlined to ensure appropriate reapproximation during the repair.  The area thus outlined was incised through and through with a #15 scalpel blade.  The muscularis and dermis were reaproximated with deep sutures following hemostasis. Care was taken to realign the vermillion border before proceeding with the superficial closure.  Once the vermillion was realigned the superfical and mucosal closure was finished. Advancement Flap (Double) Text: The defect edges were debeveled with a #15 scalpel blade.  Given the location of the defect and the proximity to free margins a double advancement flap was deemed most appropriate.  Using a sterile surgical marker, the appropriate advancement flaps were drawn incorporating the defect and placing the expected incisions within the relaxed skin tension lines where possible.    The area thus outlined was incised deep to adipose tissue with a #15 scalpel blade.  The skin margins were undermined to an appropriate distance in all directions utilizing iris scissors. Date Of Previous Biopsy (Optional): 8/14/18 O-Z Plasty Text: The defect edges were debeveled with a #15 scalpel blade.  Given the location of the defect, shape of the defect and the proximity to free margins an O-Z plasty (double transposition flap) was deemed most appropriate.  Using a sterile surgical marker, the appropriate transposition flaps were drawn incorporating the defect and placing the expected incisions within the relaxed skin tension lines where possible.    The area thus outlined was incised deep to adipose tissue with a #15 scalpel blade.  The skin margins were undermined to an appropriate distance in all directions utilizing iris scissors.  Hemostasis was achieved with electrocautery.  The flaps were then transposed into place, one clockwise and the other counterclockwise, and anchored with interrupted buried subcutaneous sutures. Epidermal Closure: running Complex Repair And Rotation Flap Text: The defect edges were debeveled with a #15 scalpel blade.  The primary defect was closed partially with a complex linear closure.  Given the location of the remaining defect, shape of the defect and the proximity to free margins a rotation flap was deemed most appropriate for complete closure of the defect.  Using a sterile surgical marker, an appropriate advancement flap was drawn incorporating the defect and placing the expected incisions within the relaxed skin tension lines where possible.    The area thus outlined was incised deep to adipose tissue with a #15 scalpel blade.  The skin margins were undermined to an appropriate distance in all directions utilizing iris scissors. Epidermal Autograft Text: The defect edges were debeveled with a #15 scalpel blade.  Given the location of the defect, shape of the defect and the proximity to free margins an epidermal autograft was deemed most appropriate.  Using a sterile surgical marker, the primary defect shape was transferred to the donor site. The epidermal graft was then harvested.  The skin graft was then placed in the primary defect and oriented appropriately. Scalpel Size: 15 blade Hatchet Flap Text: The defect edges were debeveled with a #15 scalpel blade.  Given the location of the defect, shape of the defect and the proximity to free margins a hatchet flap was deemed most appropriate.  Using a sterile surgical marker, an appropriate hatchet flap was drawn incorporating the defect and placing the expected incisions within the relaxed skin tension lines where possible.    The area thus outlined was incised deep to adipose tissue with a #15 scalpel blade.  The skin margins were undermined to an appropriate distance in all directions utilizing iris scissors. Bilobed Flap Text: The defect edges were debeveled with a #15 scalpel blade.  Given the location of the defect and the proximity to free margins a bilobe flap was deemed most appropriate.  Using a sterile surgical marker, an appropriate bilobe flap drawn around the defect.    The area thus outlined was incised deep to adipose tissue with a #15 scalpel blade.  The skin margins were undermined to an appropriate distance in all directions utilizing iris scissors. Xenograft Text: The defect edges were debeveled with a #15 scalpel blade.  Given the location of the defect, shape of the defect and the proximity to free margins a xenograft was deemed most appropriate.  The graft was then trimmed to fit the size of the defect.  The graft was then placed in the primary defect and oriented appropriately. Complex Repair And Modified Advancement Flap Text: The defect edges were debeveled with a #15 scalpel blade.  The primary defect was closed partially with a complex linear closure.  Given the location of the remaining defect, shape of the defect and the proximity to free margins a modified advancement flap was deemed most appropriate for complete closure of the defect.  Using a sterile surgical marker, an appropriate advancement flap was drawn incorporating the defect and placing the expected incisions within the relaxed skin tension lines where possible.    The area thus outlined was incised deep to adipose tissue with a #15 scalpel blade.  The skin margins were undermined to an appropriate distance in all directions utilizing iris scissors. Cartilage Graft Text: The defect edges were debeveled with a #15 scalpel blade.  Given the location of the defect, shape of the defect, the fact the defect involved a full thickness cartilage defect a cartilage graft was deemed most appropriate.  An appropriate donor site was identified, cleansed, and anesthetized. The cartilage graft was then harvested and transferred to the recipient site, oriented appropriately and then sutured into place.  The secondary defect was then repaired using a primary closure. Deep Sutures: 4-0 Polysorb Excision Method: Elliptical Consent was obtained from the patient. The risks and benefits to therapy were discussed in detail. Specifically, the risks of infection, scarring, bleeding, prolonged wound healing, incomplete removal, allergy to anesthesia, nerve injury and recurrence were addressed. Prior to the procedure, the treatment site was clearly identified and confirmed by the patient. Helical Rim Advancement Flap Text: The defect edges were debeveled with a #15 blade scalpel.  Given the location of the defect and the proximity to free margins (helical rim) a double helical rim advancement flap was deemed most appropriate.  Using a sterile surgical marker, the appropriate advancement flaps were drawn incorporating the defect and placing the expected incisions between the helical rim and antihelix where possible.  The area thus outlined was incised through and through with a #15 scalpel blade.  With a skin hook and iris scissors, the flaps were gently and sharply undermined and freed up. Island Pedicle Flap-Requiring Vessel Identification Text: The defect edges were debeveled with a #15 scalpel blade.  Given the location of the defect, shape of the defect and the proximity to free margins an island pedicle advancement flap was deemed most appropriate.  Using a sterile surgical marker, an appropriate advancement flap was drawn, based on the axial vessel mentioned above, incorporating the defect, outlining the appropriate donor tissue and placing the expected incisions within the relaxed skin tension lines where possible.    The area thus outlined was incised deep to adipose tissue with a #15 scalpel blade.  The skin margins were undermined to an appropriate distance in all directions around the primary defect and laterally outward around the island pedicle utilizing iris scissors.  There was minimal undermining beneath the pedicle flap. Cheek-To-Nose Interpolation Flap Text: A decision was made to reconstruct the defect utilizing an interpolation axial flap and a staged reconstruction.  A telfa template was made of the defect.  This telfa template was then used to outline the Cheek-To-Nose Interpolation flap.  The donor area for the pedicle flap was then injected with anesthesia.  The flap was excised through the skin and subcutaneous tissue down to the layer of the underlying musculature.  The interpolation flap was carefully excised within this deep plane to maintain its blood supply.  The edges of the donor site were undermined.   The donor site was closed in a primary fashion.  The pedicle was then rotated into position and sutured.  Once the tube was sutured into place, adequate blood supply was confirmed with blanching and refill.  The pedicle was then wrapped with xeroform gauze and dressed appropriately with a telfa and gauze bandage to ensure continued blood supply and protect the attached pedicle. Mastoid Interpolation Flap Text: A decision was made to reconstruct the defect utilizing an interpolation axial flap and a staged reconstruction.  A telfa template was made of the defect.  This telfa template was then used to outline the mastoid interpolation flap.  The donor area for the pedicle flap was then injected with anesthesia.  The flap was excised through the skin and subcutaneous tissue down to the layer of the underlying musculature.  The pedicle flap was carefully excised within this deep plane to maintain its blood supply.  The edges of the donor site were undermined.   The donor site was closed in a primary fashion.  The pedicle was then rotated into position and sutured.  Once the tube was sutured into place, adequate blood supply was confirmed with blanching and refill.  The pedicle was then wrapped with xeroform gauze and dressed appropriately with a telfa and gauze bandage to ensure continued blood supply and protect the attached pedicle. Dressing: pressure dressing with telfa Burow's Advancement Flap Text: The defect edges were debeveled with a #15 scalpel blade.  Given the location of the defect and the proximity to free margins a Burow's advancement flap was deemed most appropriate.  Using a sterile surgical marker, the appropriate advancement flap was drawn incorporating the defect and placing the expected incisions within the relaxed skin tension lines where possible.    The area thus outlined was incised deep to adipose tissue with a #15 scalpel blade.  The skin margins were undermined to an appropriate distance in all directions utilizing iris scissors. Interpolation Flap Text: A decision was made to reconstruct the defect utilizing an interpolation axial flap and a staged reconstruction.  A telfa template was made of the defect.  This telfa template was then used to outline the interpolation flap.  The donor area for the pedicle flap was then injected with anesthesia.  The flap was excised through the skin and subcutaneous tissue down to the layer of the underlying musculature.  The interpolation flap was carefully excised within this deep plane to maintain its blood supply.  The edges of the donor site were undermined.   The donor site was closed in a primary fashion.  The pedicle was then rotated into position and sutured.  Once the tube was sutured into place, adequate blood supply was confirmed with blanching and refill.  The pedicle was then wrapped with xeroform gauze and dressed appropriately with a telfa and gauze bandage to ensure continued blood supply and protect the attached pedicle. Perilesional Excision Additional Text (Leave Blank If You Do Not Want): The margin was drawn around the clinically apparent lesion. Incisions were then made along these lines to the appropriate tissue plane and the lesion was extirpated. Island Pedicle Flap Text: The defect edges were debeveled with a #15 scalpel blade.  Given the location of the defect, shape of the defect and the proximity to free margins an island pedicle advancement flap was deemed most appropriate.  Using a sterile surgical marker, an appropriate advancement flap was drawn incorporating the defect, outlining the appropriate donor tissue and placing the expected incisions within the relaxed skin tension lines where possible.    The area thus outlined was incised deep to adipose tissue with a #15 scalpel blade.  The skin margins were undermined to an appropriate distance in all directions around the primary defect and laterally outward around the island pedicle utilizing iris scissors.  There was minimal undermining beneath the pedicle flap. Accession #: [id number] Billing Type: Third-Party Bill Mucosal Advancement Flap Text: Given the location of the defect, shape of the defect and the proximity to free margins a mucosal advancement flap was deemed most appropriate. Incisions were made with a 15 blade scalpel in the appropriate fashion along the cutaneous vermillion border and the mucosal lip. The remaining actinically damaged mucosal tissue was excised.  The mucosal advancement flap was then elevated to the gingival sulcus with care taken to preserve the neurovascular structures and advanced into the primary defect. Care was taken to ensure that precise realignment of the vermillion border was achieved. Intermediate / Complex Repair - Final Wound Length In Cm: 4.5 Partial Purse String (Simple) Text: Given the location of the defect and the characteristics of the surrounding skin a simple purse string closure was deemed most appropriate.  Undermining was performed circumferentially around the surgical defect.  A purse string suture was then placed and tightened. Wound tension of the circular defect prevented complete closure of the wound. A-T Advancement Flap Text: The defect edges were debeveled with a #15 scalpel blade.  Given the location of the defect, shape of the defect and the proximity to free margins an A-T advancement flap was deemed most appropriate.  Using a sterile surgical marker, an appropriate advancement flap was drawn incorporating the defect and placing the expected incisions within the relaxed skin tension lines where possible.    The area thus outlined was incised deep to adipose tissue with a #15 scalpel blade.  The skin margins were undermined to an appropriate distance in all directions utilizing iris scissors. Saucerization Excision Additional Text (Leave Blank If You Do Not Want): The margin was drawn around the clinically apparent lesion.  Incisions were then made along these lines, in a tangential fashion, to the appropriate tissue plane and the lesion was extirpated. Composite Graft Text: The defect edges were debeveled with a #15 scalpel blade.  Given the location of the defect, shape of the defect, the proximity to free margins and the fact the defect was full thickness a composite graft was deemed most appropriate.  The defect was outline and then transferred to the donor site.  A full thickness graft was then excised from the donor site. The graft was then placed in the primary defect, oriented appropriately and then sutured into place.  The secondary defect was then repaired using a primary closure. Complex Repair And A-T Advancement Flap Text: The defect edges were debeveled with a #15 scalpel blade.  The primary defect was closed partially with a complex linear closure.  Given the location of the remaining defect, shape of the defect and the proximity to free margins an A-T advancement flap was deemed most appropriate for complete closure of the defect.  Using a sterile surgical marker, an appropriate advancement flap was drawn incorporating the defect and placing the expected incisions within the relaxed skin tension lines where possible.    The area thus outlined was incised deep to adipose tissue with a #15 scalpel blade.  The skin margins were undermined to an appropriate distance in all directions utilizing iris scissors. Complex Repair And Ftsg Text: The defect edges were debeveled with a #15 scalpel blade.  The primary defect was closed partially with a complex linear closure.  Given the location of the defect, shape of the defect and the proximity to free margins a full thickness skin graft was deemed most appropriate to repair the remaining defect.  The graft was trimmed to fit the size of the remaining defect.  The graft was then placed in the primary defect, oriented appropriately, and sutured into place. H Plasty Text: Given the location of the defect, shape of the defect and the proximity to free margins a H-plasty was deemed most appropriate for repair.  Using a sterile surgical marker, the appropriate advancement arms of the H-plasty were drawn incorporating the defect and placing the expected incisions within the relaxed skin tension lines where possible. The area thus outlined was incised deep to adipose tissue with a #15 scalpel blade. The skin margins were undermined to an appropriate distance in all directions utilizing iris scissors.  The opposing advancement arms were then advanced into place in opposite direction and anchored with interrupted buried subcutaneous sutures. Repair Type: Complex Tissue Cultured Epidermal Autograft Text: The defect edges were debeveled with a #15 scalpel blade.  Given the location of the defect, shape of the defect and the proximity to free margins a tissue cultured epidermal autograft was deemed most appropriate.  The graft was then trimmed to fit the size of the defect.  The graft was then placed in the primary defect and oriented appropriately. No Repair - Repaired With Adjacent Surgical Defect Text (Leave Blank If You Do Not Want): After the excision the defect was repaired concurrently with another surgical defect which was in close approximation. Split-Thickness Skin Graft Text: The defect edges were debeveled with a #15 scalpel blade.  Given the location of the defect, shape of the defect and the proximity to free margins a split thickness skin graft was deemed most appropriate.  Using a sterile surgical marker, the primary defect shape was transferred to the donor site. The split thickness graft was then harvested.  The skin graft was then placed in the primary defect and oriented appropriately. Size Of Lesion In Cm: 1 Excisional Biopsy Additional Text (Leave Blank If You Do Not Want): The margin was drawn around the clinically apparent lesion.  An elliptical shape was then drawn on the skin incorporating the lesion and margins.  Incisions were then made along these lines to the appropriate tissue plane and the lesion was extirpated. Epidermal Sutures: 4-0 Monosof Complex Repair And Melolabial Flap Text: The defect edges were debeveled with a #15 scalpel blade.  The primary defect was closed partially with a complex linear closure.  Given the location of the remaining defect, shape of the defect and the proximity to free margins a melolabial flap was deemed most appropriate for complete closure of the defect.  Using a sterile surgical marker, an appropriate advancement flap was drawn incorporating the defect and placing the expected incisions within the relaxed skin tension lines where possible.    The area thus outlined was incised deep to adipose tissue with a #15 scalpel blade.  The skin margins were undermined to an appropriate distance in all directions utilizing iris scissors. Complex Repair And V-Y Plasty Text: The defect edges were debeveled with a #15 scalpel blade.  The primary defect was closed partially with a complex linear closure.  Given the location of the remaining defect, shape of the defect and the proximity to free margins a V-Y plasty was deemed most appropriate for complete closure of the defect.  Using a sterile surgical marker, an appropriate advancement flap was drawn incorporating the defect and placing the expected incisions within the relaxed skin tension lines where possible.    The area thus outlined was incised deep to adipose tissue with a #15 scalpel blade.  The skin margins were undermined to an appropriate distance in all directions utilizing iris scissors. Complex Repair And Bilobe Flap Text: The defect edges were debeveled with a #15 scalpel blade.  The primary defect was closed partially with a complex linear closure.  Given the location of the remaining defect, shape of the defect and the proximity to free margins a bilobe flap was deemed most appropriate for complete closure of the defect.  Using a sterile surgical marker, an appropriate advancement flap was drawn incorporating the defect and placing the expected incisions within the relaxed skin tension lines where possible.    The area thus outlined was incised deep to adipose tissue with a #15 scalpel blade.  The skin margins were undermined to an appropriate distance in all directions utilizing iris scissors. Complex Repair And Dermal Autograft Text: The defect edges were debeveled with a #15 scalpel blade.  The primary defect was closed partially with a complex linear closure.  Given the location of the defect, shape of the defect and the proximity to free margins an dermal autograft was deemed most appropriate to repair the remaining defect.  The graft was trimmed to fit the size of the remaining defect.  The graft was then placed in the primary defect, oriented appropriately, and sutured into place. Crescentic Advancement Flap Text: The defect edges were debeveled with a #15 scalpel blade.  Given the location of the defect and the proximity to free margins a crescentic advancement flap was deemed most appropriate.  Using a sterile surgical marker, the appropriate advancement flap was drawn incorporating the defect and placing the expected incisions within the relaxed skin tension lines where possible.    The area thus outlined was incised deep to adipose tissue with a #15 scalpel blade.  The skin margins were undermined to an appropriate distance in all directions utilizing iris scissors. Lab: -[id number] Complex Repair And Double Advancement Flap Text: The defect edges were debeveled with a #15 scalpel blade.  The primary defect was closed partially with a complex linear closure.  Given the location of the remaining defect, shape of the defect and the proximity to free margins a double advancement flap was deemed most appropriate for complete closure of the defect.  Using a sterile surgical marker, an appropriate advancement flap was drawn incorporating the defect and placing the expected incisions within the relaxed skin tension lines where possible.    The area thus outlined was incised deep to adipose tissue with a #15 scalpel blade.  The skin margins were undermined to an appropriate distance in all directions utilizing iris scissors. Dermal Autograft Text: The defect edges were debeveled with a #15 scalpel blade.  Given the location of the defect, shape of the defect and the proximity to free margins a dermal autograft was deemed most appropriate.  Using a sterile surgical marker, the primary defect shape was transferred to the donor site. The area thus outlined was incised deep to adipose tissue with a #15 scalpel blade.  The harvested graft was then trimmed of adipose and epidermal tissue until only dermis was left.  The skin graft was then placed in the primary defect and oriented appropriately. Slit Excision Additional Text (Leave Blank If You Do Not Want): A linear line was drawn on the skin overlying the lesion. An incision was made slowly until the lesion was visualized.  Once visualized, the lesion was removed with blunt dissection. Trilobed Flap Text: The defect edges were debeveled with a #15 scalpel blade.  Given the location of the defect and the proximity to free margins a trilobed flap was deemed most appropriate.  Using a sterile surgical marker, an appropriate trilobed flap drawn around the defect.    The area thus outlined was incised deep to adipose tissue with a #15 scalpel blade.  The skin margins were undermined to an appropriate distance in all directions utilizing iris scissors. Dorsal Nasal Flap Text: The defect edges were debeveled with a #15 scalpel blade.  Given the location of the defect and the proximity to free margins a dorsal nasal flap was deemed most appropriate.  Using a sterile surgical marker, an appropriate dorsal nasal flap was drawn around the defect.    The area thus outlined was incised deep to adipose tissue with a #15 scalpel blade.  The skin margins were undermined to an appropriate distance in all directions utilizing iris scissors. Posterior Auricular Interpolation Flap Text: A decision was made to reconstruct the defect utilizing an interpolation axial flap and a staged reconstruction.  A telfa template was made of the defect.  This telfa template was then used to outline the posterior auricular interpolation flap.  The donor area for the pedicle flap was then injected with anesthesia.  The flap was excised through the skin and subcutaneous tissue down to the layer of the underlying musculature.  The pedicle flap was carefully excised within this deep plane to maintain its blood supply.  The edges of the donor site were undermined.   The donor site was closed in a primary fashion.  The pedicle was then rotated into position and sutured.  Once the tube was sutured into place, adequate blood supply was confirmed with blanching and refill.  The pedicle was then wrapped with xeroform gauze and dressed appropriately with a telfa and gauze bandage to ensure continued blood supply and protect the attached pedicle. Complex Repair And Split-Thickness Skin Graft Text: The defect edges were debeveled with a #15 scalpel blade.  The primary defect was closed partially with a complex linear closure.  Given the location of the defect, shape of the defect and the proximity to free margins a split thickness skin graft was deemed most appropriate to repair the remaining defect.  The graft was trimmed to fit the size of the remaining defect.  The graft was then placed in the primary defect, oriented appropriately, and sutured into place. V-Y Plasty Text: The defect edges were debeveled with a #15 scalpel blade.  Given the location of the defect, shape of the defect and the proximity to free margins an V-Y advancement flap was deemed most appropriate.  Using a sterile surgical marker, an appropriate advancement flap was drawn incorporating the defect and placing the expected incisions within the relaxed skin tension lines where possible.    The area thus outlined was incised deep to adipose tissue with a #15 scalpel blade.  The skin margins were undermined to an appropriate distance in all directions utilizing iris scissors. Keystone Flap Text: The defect edges were debeveled with a #15 scalpel blade.  Given the location of the defect, shape of the defect a keystone flap was deemed most appropriate.  Using a sterile surgical marker, an appropriate keystone flap was drawn incorporating the defect, outlining the appropriate donor tissue and placing the expected incisions within the relaxed skin tension lines where possible. The area thus outlined was incised deep to adipose tissue with a #15 scalpel blade.  The skin margins were undermined to an appropriate distance in all directions around the primary defect and laterally outward around the flap utilizing iris scissors. W Plasty Text: The lesion was extirpated to the level of the fat with a #15 scalpel blade.  Given the location of the defect, shape of the defect and the proximity to free margins a W-plasty was deemed most appropriate for repair.  Using a sterile surgical marker, the appropriate transposition arms of the W-plasty were drawn incorporating the defect and placing the expected incisions within the relaxed skin tension lines where possible.    The area thus outlined was incised deep to adipose tissue with a #15 scalpel blade.  The skin margins were undermined to an appropriate distance in all directions utilizing iris scissors.  The opposing transposition arms were then transposed into place in opposite direction and anchored with interrupted buried subcutaneous sutures. Complex Repair And Transposition Flap Text: The defect edges were debeveled with a #15 scalpel blade.  The primary defect was closed partially with a complex linear closure.  Given the location of the remaining defect, shape of the defect and the proximity to free margins a transposition flap was deemed most appropriate for complete closure of the defect.  Using a sterile surgical marker, an appropriate advancement flap was drawn incorporating the defect and placing the expected incisions within the relaxed skin tension lines where possible.    The area thus outlined was incised deep to adipose tissue with a #15 scalpel blade.  The skin margins were undermined to an appropriate distance in all directions utilizing iris scissors. Repair Performed By Another Provider Text (Leave Blank If You Do Not Want): After the tissue was excised the defect was repaired by another provider. Modified Advancement Flap Text: The defect edges were debeveled with a #15 scalpel blade.  Given the location of the defect, shape of the defect and the proximity to free margins a modified advancement flap was deemed most appropriate.  Using a sterile surgical marker, an appropriate advancement flap was drawn incorporating the defect and placing the expected incisions within the relaxed skin tension lines where possible.    The area thus outlined was incised deep to adipose tissue with a #15 scalpel blade.  The skin margins were undermined to an appropriate distance in all directions utilizing iris scissors. Skin Substitute Text: The defect edges were debeveled with a #15 scalpel blade.  Given the location of the defect, shape of the defect and the proximity to free margins a skin substitute graft was deemed most appropriate.  The graft material was trimmed to fit the size of the defect. The graft was then placed in the primary defect and oriented appropriately. Complex Repair And Epidermal Autograft Text: The defect edges were debeveled with a #15 scalpel blade.  The primary defect was closed partially with a complex linear closure.  Given the location of the defect, shape of the defect and the proximity to free margins an epidermal autograft was deemed most appropriate to repair the remaining defect.  The graft was trimmed to fit the size of the remaining defect.  The graft was then placed in the primary defect, oriented appropriately, and sutured into place.

## 2023-05-01 NOTE — CASE MANAGEMENT
Case Management Assessment    Patient name Carmella Fleming  Location / MRN [id number]  : 1975 Date 2023       Current Admission Date: 2023  Current Admission Diagnosis:ICD (implantable cardioverter-defibrillator) discharge   Patient Active Problem List    Diagnosis Date Noted    CHF (congestive heart failure) ([facility] )     AICD (automatic cardioverter/defibrillator) present     ICD (implantable cardioverter-defibrillator) discharge 10/10/2022    Paroxysmal atrial fibrillation ([facility] ) 2020    Dilated cardiomyopathy ([facility] ) 2018    Hypertension 2018    Obstructive sleep apnea 2018    ICD (implantable cardioverter-defibrillator) lead failure 2017    Calculus of gallbladder without cholecystitis 2017      LOS (days): 1  Geometric Mean LOS (GMLOS) (days):   Days to GMLOS:     OBJECTIVE:    Risk of Unplanned Readmission Score: 11 03         Current admission status: Inpatient       Preferred Pharmacy:   [facility]/pharmacy #[id number]- [place], [place] - [address]  Phone: [phone] Fax: [phone]    Primary Care Provider: Fredrick Riley PA-C    Primary Insurance: [employer]  Secondary Insurance:     ASSESSMENT:  Fran 26 Proxies    There are no active Health Care Proxies on file  CARE MANAGEMENT:  Chart reviewed and case discussed in [facility] interdisciplinary rounds  At this time no identified CM needs  Patient was independent at baseline and employed  CM will continue to monitor and remain available to intervene as needed

## 2023-05-02 LAB
ALBUMIN SERPL BCP-MCNC: 3.6 G/DL (ref 3.5–5)
ALP SERPL-CCNC: 54 U/L (ref 34–104)
ALT SERPL W P-5'-P-CCNC: 43 U/L (ref 7–52)
ANION GAP SERPL CALCULATED.3IONS-SCNC: 5 MMOL/L (ref 4–13)
AST SERPL W P-5'-P-CCNC: 29 U/L (ref 13–39)
BILIRUB SERPL-MCNC: 1.03 MG/DL (ref 0.2–1)
BUN SERPL-MCNC: 11 MG/DL (ref 5–25)
CALCIUM SERPL-MCNC: 8.7 MG/DL (ref 8.4–10.2)
CHLORIDE SERPL-SCNC: 107 MMOL/L (ref 96–108)
CO2 SERPL-SCNC: 24 MMOL/L (ref 21–32)
CREAT SERPL-MCNC: 0.94 MG/DL (ref 0.6–1.3)
ERYTHROCYTE [DISTWIDTH] IN BLOOD BY AUTOMATED COUNT: 13.5 % (ref 11.6–15.1)
GFR SERPL CREATININE-BSD FRML MDRD: 96 ML/MIN/1.73SQ M
GLUCOSE SERPL-MCNC: 160 MG/DL (ref 65–140)
HCT VFR BLD AUTO: 42.6 % (ref 36.5–49.3)
HGB BLD-MCNC: 14.3 G/DL (ref 12–17)
INR PPP: 1.43 (ref 0.84–1.19)
MAGNESIUM SERPL-MCNC: 2 MG/DL (ref 1.9–2.7)
MCH RBC QN AUTO: 30.2 PG (ref 26.8–34.3)
MCHC RBC AUTO-ENTMCNC: 33.6 G/DL (ref 31.4–37.4)
MCV RBC AUTO: 90 FL (ref 82–98)
PHOSPHATE SERPL-MCNC: 3.3 MG/DL (ref 2.7–4.5)
PLATELET # BLD AUTO: 155 THOUSANDS/UL (ref 149–390)
PMV BLD AUTO: 11.7 FL (ref 8.9–12.7)
POTASSIUM SERPL-SCNC: 4.4 MMOL/L (ref 3.5–5.3)
PROT SERPL-MCNC: 6.5 G/DL (ref 6.4–8.4)
PROTHROMBIN TIME: 17.1 SECONDS (ref 11.6–14.5)
RBC # BLD AUTO: 4.74 MILLION/UL (ref 3.88–5.62)
SODIUM SERPL-SCNC: 136 MMOL/L (ref 135–147)
WBC # BLD AUTO: 8.4 THOUSAND/UL (ref 4.31–10.16)

## 2023-05-02 RX ORDER — SPIRONOLACTONE 25 MG/1
25 TABLET ORAL DAILY
Status: DISCONTINUED | OUTPATIENT
Start: 2023-05-03 | End: 2023-05-03 | Stop reason: HOSPADM

## 2023-05-02 RX ORDER — AMIODARONE HYDROCHLORIDE 200 MG/1
200 TABLET ORAL
Status: DISCONTINUED | OUTPATIENT
Start: 2023-05-02 | End: 2023-05-03 | Stop reason: HOSPADM

## 2023-05-02 RX ORDER — CARVEDILOL 12.5 MG/1
37.5 TABLET ORAL 2 TIMES DAILY WITH MEALS
Status: DISCONTINUED | OUTPATIENT
Start: 2023-05-02 | End: 2023-05-03 | Stop reason: HOSPADM

## 2023-05-02 RX ORDER — DIGOXIN 125 MCG
125 TABLET ORAL DAILY
Status: DISCONTINUED | OUTPATIENT
Start: 2023-05-03 | End: 2023-05-03 | Stop reason: HOSPADM

## 2023-05-02 RX ADMIN — CHLORHEXIDINE GLUCONATE 0.12% ORAL RINSE 15 ML: 1.2 LIQUID ORAL at 08:31

## 2023-05-02 RX ADMIN — TRAZODONE HYDROCHLORIDE 100 MG: 100 TABLET ORAL at 22:24

## 2023-05-02 RX ADMIN — AMIODARONE HYDROCHLORIDE 200 MG: 200 TABLET ORAL at 17:39

## 2023-05-02 RX ADMIN — CHLORHEXIDINE GLUCONATE 0.12% ORAL RINSE 15 ML: 1.2 LIQUID ORAL at 22:24

## 2023-05-02 RX ADMIN — CARVEDILOL 37.5 MG: 12.5 TABLET, FILM COATED ORAL at 17:39

## 2023-05-02 RX ADMIN — RIVAROXABAN 20 MG: 20 TABLET, FILM COATED ORAL at 17:39

## 2023-05-02 RX ADMIN — SPIRONOLACTONE 25 MG: 25 TABLET ORAL at 08:31

## 2023-05-02 RX ADMIN — ACETAMINOPHEN 650 MG: 325 TABLET ORAL at 17:43

## 2023-05-02 RX ADMIN — DIGOXIN 250 MCG: 125 TABLET ORAL at 08:30

## 2023-05-02 RX ADMIN — AMIODARONE HYDROCHLORIDE 200 MG: 200 TABLET ORAL at 09:24

## 2023-05-02 RX ADMIN — CARVEDILOL 37.5 MG: 12.5 TABLET, FILM COATED ORAL at 08:30

## 2023-05-02 RX ADMIN — AMIODARONE HYDROCHLORIDE 200 MG: 200 TABLET ORAL at 12:17

## 2023-05-02 RX ADMIN — FAMOTIDINE 20 MG: 20 TABLET, FILM COATED ORAL at 17:36

## 2023-05-02 NOTE — PROGRESS NOTES
"Cardiology Progress Note - Denny Desai 56 y o  male MRN: [id number]    Unit/Bed#:  Encounter: [id number]      Assessment/Plan:  1   Ventricular tachycardia s/p ICD discharge   Formal device interrogation report shows 2 episodes of VT with 1 ICD discharge   Troponins trended up to 103   EKG shows NSR without acute ischemic abnormalities   Telemetry reveals NSR with several episodes of NSVT throughout hospitalization   Discussed case with EP who recommends rhythm control, will attempt to set up sooner f/u appointment   Transition to oral amiodarone 200 mg TID, discontinue amiodarone gtt   Decrease digoxin to 125 mcg daily   Decrease hold parameters of carvedilol   Continue Xarelto     2   NICM with EF 30% s/p MDT single-chamber ICD   Euvolemic on exam   Limited TTE revealed EF 30% with dilated LV and severe diastolic dysfunction   Decrease hold parameters of carvedilol and Aldactone   Continue follow-up with device clinic     3   Paroxysmal atrial fibrillation   EKG shows normal sinus rhythm   Transition to oral amiodarone 200 mg TID, discontinue amiodarone gtt   Decrease digoxin to 125 mcg daily   Decrease hold parameters of carvedilol   Continue Xarelto     4   Hypertension   Blood pressures have been running soft, continue to monitor   Decrease hold parameters for carvedilol and Aldactone     5   CONCHIS on CPAP   Continue CPAP qhs     6   History of alcohol use   Endorses compliance to cessation, denies recent alcohol use       Subjective:   Patient seen and examined  No significant events overnight  Denies any new complaints at this time  Objective:     Vitals: Blood pressure 111/59, pulse 62, temperature 97 9 °F (36 6 °C), temperature source Oral, resp  rate 18, height 6' 2\" (1 88 m), weight (!) 140 kg (308 lb 6 8 oz), SpO2 96 %  , Body mass index is 39 6 kg/m² ,   Orthostatic Blood Pressures    Flowsheet Row Most Recent Value   Blood Pressure 111/59 filed at 05/02/2023 1500 " Patient Position - Orthostatic VS Lying filed at 05/02/2023 1500            Intake/Output Summary (Last 24 hours) at 5/2/2023 1659  Last data filed at 5/2/2023 0559  Gross per 24 hour   Intake 632 54 ml   Output 2325 ml   Net -1692 46 ml         Physical Exam:  Physical Exam  Vitals and nursing note reviewed  Constitutional:       General: He is not in acute distress  Appearance: He is well-developed  He is not diaphoretic  HENT:      Head: Normocephalic and atraumatic  Nose: Nose normal    Eyes:      Conjunctiva/sclera: Conjunctivae normal    Cardiovascular:      Rate and Rhythm: Normal rate and regular rhythm  Pulses: Normal pulses  Heart sounds: Normal heart sounds  No murmur heard  No friction rub  Pulmonary:      Effort: Pulmonary effort is normal  No respiratory distress  Breath sounds: Normal breath sounds  No wheezing or rales  Chest:      Chest wall: No tenderness  Abdominal:      Palpations: Abdomen is soft  Tenderness: There is no abdominal tenderness  Musculoskeletal:         General: No swelling  Cervical back: Neck supple  Right lower leg: No edema  Left lower leg: No edema  Skin:     General: Skin is warm and dry  Capillary Refill: Capillary refill takes less than 2 seconds  Neurological:      Mental Status: He is alert and oriented to person, place, and time     Psychiatric:         Mood and Affect: Mood normal          Judgment: Judgment normal               Medications:      Current Facility-Administered Medications:     acetaminophen (TYLENOL) tablet 650 mg, 650 mg, Oral, Q6H PRN, Bony Encarnacion PA-C, 650 mg at 05/01/23 0844    amiodarone tablet 200 mg, 200 mg, Oral, TID With Meals, Mc Dorsey PA-C, 200 mg at 05/02/23 1217    carvedilol (COREG) tablet 37 5 mg, 37 5 mg, Oral, BID With Meals, Mc Dorsey PA-C    chlorhexidine (PERIDEX) 0 12 % oral rinse 15 mL, 15 mL, Mouth/Throat, Q12H SAMUEL, VILMA Grover, 15 mL at "05/02/23 0831    [START ON 5/3/2023] digoxin (LANOXIN) tablet 125 mcg, 125 mcg, Oral, Daily, Sakshi Rangel PA-C    famotidine (PEPCID) tablet 20 mg, 20 mg, Oral, BID, Joao Brambila PA-C, 20 mg at 04/30/23 2237    rivaroxaban (XARELTO) tablet 20 mg, 20 mg, Oral, Daily With Karly Olson PA-C, 20 mg at 05/01/23 1640    [START ON 5/3/2023] spironolactone (ALDACTONE) tablet 25 mg, 25 mg, Oral, Daily, LUCHO Mckeon    traZODone (DESYREL) tablet 100 mg, 100 mg, Oral, HS, Joao Brambila PA-C, 100 mg at 05/01/23 2140     Labs & Results:     Results from last 7 days   Lab Units 04/30/23 2015 04/30/23  1740 04/30/23  1536   HS TNI 0HR ng/L  --   --  43   HS TNI 2HR ng/L  --  93*  --    HSTNI D2 ng/L  --  50*  --    HS TNI 4HR ng/L 103*  --   --    HSTNI D4 ng/L 60*  --   --      Results from last 7 days   Lab Units 05/02/23 0527 05/01/23 0429 04/30/23  1536   WBC Thousand/uL 8 40 8 93 11 36*   HEMOGLOBIN g/dL 14 3 14 1 15 4   HEMATOCRIT % 42 6 42 8 45 6   PLATELETS Thousands/uL 155 153 205         Results from last 7 days   Lab Units 05/02/23 0527 05/01/23 0429 04/30/23  1536   POTASSIUM mmol/L 4 4 4 0 3 9   CHLORIDE mmol/L 107 107 106   CO2 mmol/L 24 24 24   BUN mg/dL 11 12 12   CREATININE mg/dL 0 94 0 91 1 03   CALCIUM mg/dL 8 7 8 7 9 6   ALK PHOS U/L 54 52 66   ALT U/L 43 41 44   AST U/L 29 28 33     Results from last 7 days   Lab Units 05/02/23 0527 05/01/23 0429 04/30/23  1536   INR  1 43* 2 33* 1 18   PTT seconds  --   --  31     Results from last 7 days   Lab Units 05/02/23  0527 05/01/23  0429 04/30/23  1536   MAGNESIUM mg/dL 2 0 2 2 1 9       Vitals: Blood pressure 111/59, pulse 62, temperature 97 9 °F (36 6 °C), temperature source Oral, resp  rate 18, height 6' 2\" (1 88 m), weight (!) 140 kg (308 lb 6 8 oz), SpO2 96 %  , Body mass index is 39 6 kg/m² ,   Orthostatic Blood Pressures    Flowsheet Row Most Recent Value   Blood Pressure 111/59 filed at 05/02/2023 1500   Patient Position - Orthostatic " VS Lying filed at 05/02/2023 4698          Systolic (05FKA), TMT:948 , Min:86 , ZYT:794     Diastolic (16AYD), BKY:79, Min:42, Max:78        Intake/Output Summary (Last 24 hours) at 5/2/2023 1659  Last data filed at 5/2/2023 0559  Gross per 24 hour   Intake 632 54 ml   Output 2325 ml   Net -1692 46 ml       Invasive Devices     Peripheral Intravenous Line  Duration           Peripheral IV 04/30/23 Left Antecubital 2 days    Peripheral IV 05/01/23 Left;Ventral (anterior) Hand 1 day                  EKG: Normal sinus rhythm; cannot rule out inferior infarct; anterior lateral infarct    Telemetry:  Telemetry Orders (From admission, onward)             48 Hour Telemetry Monitoring  Continuous x 48 hours        References:    Telemetry Guidelines   Question:  Reason for 48 Hour Telemetry  Answer:  Arrhythmias Requiring Medical Therapy (eg  SVT, Vtach/fib, Bradycardia, Uncontrolled A-fib)                   BP Readings from Last 3 Encounters:   05/02/23 111/59   04/18/23 108/66   12/29/22 118/59      Wt Readings from Last 3 Encounters:   05/02/23 (!) 140 kg (308 lb 6 8 oz)   04/17/23 (!) 141 kg (310 lb)   12/29/22 (!) 144 kg (317 lb)

## 2023-05-02 NOTE — PROGRESS NOTES
"[address]  Progress Note  Name: Christina Knowles  MRN: [id number]  Unit/Bed#:  I Date of Admission: 4/30/2023   Date of Service: 5/2/2023 I Hospital Day: 2    Assessment/Plan   * ICD (implantable cardioverter-defibrillator) discharge  Assessment & Plan  · Patient with ICD discharge at home and again while in waiting room  · Recent admission for similar: admission 4/15 to 4/18 after discharge  · At that admission, he had his digoxin increased  · Digoxin level still low  · Since that admission he has felt \"off\" stating he was still having palpitations  · Today he stated he felt dizzy, short of breath, laid on the couch and passed out and was awoken by his ICD firing; He also had similar event in the ED when being moved from a stretcher  · EKG showing sinus with frequent PVCs  · Cardiology was contacted and he was placed on amiodarone  · EKG with normal QTc    Plan:  · Continue amio gtt at 1, possible transition to PO today  · Continue home digoxin and coreg  · Though digoxin level still subtherapeutic  · Optimize electrolytes  · Montior on tele  · Cardiology consult   · Follow up with EP as an outpatient, continue PO amio until that time    Hypertension  Assessment & Plan  · Continue coreg/spironolactone  · SBP on the lower end; hold ACEi for now in case we will need to increase coreg    Dilated cardiomyopathy ([facility] [address] )  Assessment & Plan  · Patient with history of dilated CMP with no significant CAD  · He has a single chamber ICD in place  · His last ECHO showed EF 30%, restricted relaxation but no significant RV dysfunction or valuvuopathy    Plan:  · Appears euvolemic  · Care as above    Paroxysmal atrial fibrillation (HCC)  Assessment & Plan  · Currently in NSR  · Continue coreg, digoxin, and addition of amiodarone as below  · Digoxin level subtherapeutic  · Continue xarelto               ICU Core Measures     A: Assess, Prevent, and Manage Pain · Has pain been assessed?  Yes  · Need for " changes to pain regimen? No   B: Both SAT/SAT  · N/A   C: Choice of Sedation · RASS Goal: N/A patient not on sedation  · Need for changes to sedation or analgesia regimen? NA   D: Delirium · CAM-ICU: Negative   E: Early Mobility  · Plan for early mobility? Yes   F: Family Engagement · Plan for family engagement today? Yes         Prophylaxis:  VTE VTE covered by:  rivaroxaban, Oral, 20 mg at 05/01/23 1640       Stress Ulcer  covered byfamotidine (PEPCID) tablet 20 mg [[id number]]     Subjective   HPI/24hr events: Patient continues on amiodarone infusion at 1 mg/min  He has no ectopy overnight  No overnight events  Review of Systems   Cardiovascular: Negative for chest pain and palpitations  Neurological: Negative for dizziness, syncope and light-headedness  All other systems reviewed and are negative  Objective                            Vitals I/O      Most Recent Min/Max in 24hrs   Temp 97 8 °F (36 6 °C) Temp  Min: 97 7 °F (36 5 °C)  Max: 98 2 °F (36 8 °C)   Pulse 59 Pulse  Min: 48  Max: 67   Resp 19 Resp  Min: 0  Max: 26   /54 BP  Min: 97/52  Max: 134/78   O2 Sat 96 % SpO2  Min: 94 %  Max: 97 %      Intake/Output Summary (Last 24 hours) at 5/2/2023 0113  Last data filed at 5/1/2023 2201  Gross per 24 hour   Intake 2543 5 ml   Output 2400 ml   Net 143 5 ml         Diet Cardiovascular; Cardiac     Invasive Monitoring Physical exam     Physical Exam  Vitals reviewed  Constitutional:       Appearance: He is obese  He is not ill-appearing  HENT:      Head: Normocephalic and atraumatic  Mouth/Throat:      Mouth: Mucous membranes are moist    Eyes:      Conjunctiva/sclera: Conjunctivae normal    Cardiovascular:      Rate and Rhythm: Normal rate and regular rhythm  Pulses: Normal pulses  Heart sounds: Normal heart sounds  Comments: Bradycardic at times  Pulmonary:      Effort: Pulmonary effort is normal  No respiratory distress  Breath sounds: Normal breath sounds   No rales    Abdominal:      Palpations: Abdomen is soft  Musculoskeletal:      Cervical back: Normal range of motion  Right lower leg: No edema  Left lower leg: No edema  Skin:     General: Skin is warm and dry  Neurological:      General: No focal deficit present  Mental Status: He is alert and oriented to person, place, and time  Mental status is at baseline  Psychiatric:         Mood and Affect: Mood normal          Thought Content: Thought content normal          Judgment: Judgment normal           Diagnostic Studies    EKG: Sinus rhythm in the 50-60s on telemetry   Imaging:  I have personally reviewed pertinent reports         Medications:  Scheduled PRN   carvedilol, 37 5 mg, BID With Meals  chlorhexidine, 15 mL, Q12H [address]  digoxin, 250 mcg, Daily  famotidine, 20 mg, BID  rivaroxaban, 20 mg, Daily With Dinner  spironolactone, 25 mg, Daily  traZODone, 100 mg, HS      acetaminophen, 650 mg, Q6H PRN       Continuous    amiodarone, 1 mg/min, Last Rate: 1 mg/min (05/01/23 2155)         Labs:  CBC    Recent Labs     04/30/23 1536 05/01/23  0429   WBC 11 36* 8 93   HGB 15 4 14 1   HCT 45 6 42 8    153     BMP    Recent Labs     04/30/23 1536 05/01/23  0429   SODIUM 137 136   K 3 9 4 0    107   CO2 24 24   AGAP 7 5   BUN 12 12   CREATININE 1 03 0 91   CALCIUM 9 6 8 7       Coags    Recent Labs     04/30/23 1536 05/01/23  0429   INR 1 18 2 33*   PTT 31  --         Additional Electrolytes  Recent Labs     04/30/23 1536 05/01/23  0429   MG 1 9 2 2   PHOS  --  4 0   CAIONIZED  --  1 12          Blood Gas    No recent results  No recent results LFTs  Recent Labs     04/30/23 1536 05/01/23  0429   ALT 44 41   AST 33 28   ALKPHOS 66 52   ALB 4 1 3 5   TBILI 0 88 0 99       Infectious  No recent results  Glucose  Recent Labs     04/30/23 1536 05/01/23  0429   GLUC 168* [address] [facility]

## 2023-05-02 NOTE — ASSESSMENT & PLAN NOTE
"· Patient with ICD discharge at home and again while in waiting room  · Recent admission for similar: admission 4/15 to 4/18 after discharge  · At that admission, he had his digoxin increased  · Digoxin level still low  · Since that admission he has felt \"off\" stating he was still having palpitations  · Today he stated he felt dizzy, short of breath, laid on the couch and passed out and was awoken by his ICD firing;  He also had similar event in the ED when being moved from a stretcher  · EKG showing sinus with frequent PVCs  · Cardiology was contacted and he was placed on amiodarone  · EKG with normal QTc    Plan:  · Continue amio gtt at 1, possible transition to PO today  · Continue home digoxin and coreg  · Though digoxin level still subtherapeutic  · Optimize electrolytes  · Montior on tele  · Cardiology consult   · Follow up with EP as an outpatient, continue PO amio until that time  " Finasteride Counseling:  I discussed with the patient the risks of use of finasteride including but not limited to decreased libido, decreased ejaculate volume, gynecomastia, and depression. Women should not handle medication.  All of the patient's questions and concerns were addressed. Finasteride Male Counseling: Finasteride Counseling:  I discussed with the patient the risks of use of finasteride including but not limited to decreased libido, decreased ejaculate volume, gynecomastia, and depression. Women should not handle medication.  All of the patient's questions and concerns were addressed.

## 2023-05-02 NOTE — CASE MANAGEMENT
Case Management Assessment & Discharge Planning Note    Patient name Jonni Severance  Location / MRN [id number]  : 1975 Date 2023       Current Admission Date: 2023  Current Admission Diagnosis:ICD (implantable cardioverter-defibrillator) discharge   Patient Active Problem List    Diagnosis Date Noted    CHF (congestive heart failure) ([facility] [address] )     AICD (automatic cardioverter/defibrillator) present     ICD (implantable cardioverter-defibrillator) discharge 10/10/2022    Paroxysmal atrial fibrillation ([facility] [address] ) 2020    Dilated cardiomyopathy ([facility] [address] ) 2018    Hypertension 2018    Obstructive sleep apnea 2018    ICD (implantable cardioverter-defibrillator) lead failure 2017    Calculus of gallbladder without cholecystitis 2017      LOS (days): 2  Geometric Mean LOS (GMLOS) (days):   Days to GMLOS:     OBJECTIVE:    Risk of Unplanned Readmission Score: 11 17         Current admission status: Inpatient       Preferred Pharmacy:   [id number]- [place], [place] - [address]  Phone: [phone] Fax: [phone]    Primary Care Provider: Billy Montes De Oca PA-C    Primary Insurance: Glenys Soria  Secondary Insurance:     ASSESSMENT:  [facility], [address] [place] [address] Phone: [phone] (Home)               Advance Directives  Does patient have a [address] [place] [address] Avenue?: No  Was patient offered paperwork?: Yes (not interested)  Does patient currently have a Health Care decision maker?: Yes, please see Health Care Proxy section  Does patient have Advance Directives?: No  Was patient offered paperwork?: Yes              Patient Information  Admitted from[de-identified] Home  Mental Status: Alert  During Assessment patient was accompanied by: Not accompanied during assessment  Assessment information provided by[de-identified] Patient  Primary Caregiver: Self  Support Systems: Daughter  [place] of Residence: [place] do you live in?: [address] [facility] entry access options   Select all that apply : Stairs  Number of steps to enter home : 3  Do the steps have railings?: Yes  Type of Current Residence: Bi-level  Upon entering residence, is there a bedroom on the main floor (no further steps)?: Yes  Upon entering residence, is there a bathroom on the main floor (no further steps)?: Yes  In the last 12 months, was there a time when you were not able to pay the mortgage or rent on time?: No  In the last 12 months, how many places have you lived?: 1  In the last 12 months, was there a time when you did not have a steady place to sleep or slept in a shelter (including now)?: No  Homeless/housing insecurity resource given?: Yes  Living Arrangements: Lives w/ Daughter  Is patient a ?: No    Activities of Daily Living Prior to Admission  Functional Status: Independent  Completes ADLs independently?: Yes  Ambulates independently?: Yes  Does patient use assisted devices?: No  Does patient currently own DME?: No  Does patient have a history of Outpatient Therapy (PT/OT)?: Yes  Does the patient have a history of Short-Term Rehab?: No  Does patient have a history of HHC?: No  Does patient currently have [facility]?: No         Patient Information Continued  Income Source: Employed  Does patient have prescription coverage?: Yes  Within the past 12 months, you worried that your food would run out before you got the money to buy more : Never true  Within the past 12 months, the food you bought just didn't last and you didn't have money to get more : Never true  Food insecurity resource given?: No  Does patient receive dialysis treatments?: No  Does patient have a history of substance abuse?: No  Does patient have a history of Mental Health Diagnosis?: No         Means of Transportation  Means of Transport to Appts[de-identified] Drives Self  In the past 12 months, has lack of transportation kept you from medical appointments or from getting medications?: No  In the past 12 months, has lack of transportation kept you from meetings, work, or from getting things needed for daily living?: No  Was application for public transport provided?: N/A        DISCHARGE DETAILS:    Discharge planning discussed with[de-identified] patient  Freedom of Choice: Yes  Comments - Freedom of Choice: no DC needs anticipated  CM contacted family/caregiver?: No- see comments                  Requested [address]         Is the patient interested in [place] [address] at discharge?: No    DME Referral Provided  Referral made for DME?: No              Treatment Team Recommendation: Home  Discharge Destination Plan[de-identified] Home  Transport at Discharge : Automobile

## 2023-05-02 NOTE — ED PROVIDER NOTES
History  Chief Complaint   Patient presents with    Syncope     Per pt his ICD shocked him at home, while being transported to the room pt became unresponsive and breathing agonally  Pt was transferred to bed and then became responsive  Pt alert and oriented at this time  History provided by:  Patient and parent  Syncope  Episode history:  Multiple  Most recent episode: Today  Duration:  3 minutes  Timing:  Intermittent  Progression:  Waxing and waning  Chronicity:  Recurrent  Context: normal activity    Witnessed: yes (By staff and myself personally)    Relieved by:  Nothing  Worsened by:  Nothing  Ineffective treatments:  None tried  Associated symptoms: diaphoresis    Associated symptoms comment:  His AICD fired earlier today      Prior to Admission Medications   Prescriptions Last Dose Informant Patient Reported? Taking? Xarelto 20 MG tablet 4/29/2023  No No   Sig: TAKE 1 TABLET BY MOUTH EVERY DAY WITH DINNER   carvedilol (COREG) 25 mg tablet 4/30/2023  No Yes   Sig: Take 1 5 tablets (37 5 mg total) by mouth 2 (two) times a day with meals   digoxin (LANOXIN) 0 125 mg tablet 4/30/2023  No Yes   Sig: Take 2 tablets (250 mcg total) by mouth daily   enalapril (VASOTEC) 10 mg tablet 4/30/2023  No Yes   Sig: TAKE 1 TABLET BY MOUTH TWICE A DAY   spironolactone (ALDACTONE) 25 mg tablet 4/30/2023  No Yes   Sig: Take 1 tablet (25 mg total) by mouth in the morning     traZODone (DESYREL) 100 mg tablet 4/30/2023  No Yes   Sig: Take 1 tablet (100 mg total) by mouth daily at bedtime      Facility-Administered Medications: None       Past Medical History:   Diagnosis Date    A-fib ([facility] [address] ) 2020    AICD (automatic cardioverter/defibrillator) present     CHF (congestive heart failure) ([facility] [address] )     Hypertension     Myocarditis ([facility] [address] )     Sleep apnea     Ventricular tachycardia ([facility] [address] )        Past Surgical History:   Procedure Laterality Date    CARDIAC DEFIBRILLATOR PLACEMENT      TX ESOPHAGOGASTRODUODENOSCOPY TRANSORAL DIAGNOSTIC N/A 3/29/2017    Procedure: ESOPHAGOGASTRODUODENOSCOPY (EGD); Surgeon: Mila Wilburn MD;  Location: [facility]; Service: Gastroenterology    NV INSJ/RPLCMT PERM DFB W/TRNSVNS LDS 1/DUAL [facility]  N/A 6/2/2017    Procedure: LEAD EXTRACTION/REIMPLANTATION AND ICD GENERATOR CHANGE ;  Surgeon: Nikko Rodríguez MD;  Location:  MAIN OR;  Service: Cardiology    NV LAPAROSCOPY SURG CHOLECYSTECTOMY N/A 5/19/2017    Procedure: Esther Kelly ;  Surgeon: Doug Garcia MD;  Location: [facility];  Service: General    TONSILLECTOMY      TYMPANOSTOMY TUBE PLACEMENT         Family History   Problem Relation Age of Onset    No Known Problems Mother     No Known Problems Father      I have reviewed and agree with the history as documented  E-Cigarette/Vaping    E-Cigarette Use Never User      E-Cigarette/Vaping Substances    Nicotine No     THC No     CBD No     Flavoring No     Other No     Unknown No      Social History     Tobacco Use    Smoking status: Never    Smokeless tobacco: Never   Vaping Use    Vaping Use: Never used   Substance Use Topics    Alcohol use: Yes     Comment: OCCASIONAL    Drug use: No       Review of Systems   Constitutional: Positive for diaphoresis  Cardiovascular: Positive for syncope  All other systems reviewed and are negative  Physical Exam  Physical Exam  Constitutional:       General: He is not in acute distress  Appearance: Normal appearance  He is obese  He is diaphoretic  He is not toxic-appearing  HENT:      Head: Normocephalic and atraumatic  Eyes:      Extraocular Movements: Extraocular movements intact  Pupils: Pupils are equal, round, and reactive to light  Cardiovascular:      Rate and Rhythm: Normal rate  Pulses: Normal pulses  Pulmonary:      Effort: Pulmonary effort is normal  No respiratory distress  Abdominal:      General: There is no distension  Palpations: Abdomen is soft  Tenderness:  There is no abdominal tenderness  Musculoskeletal:         General: Normal range of motion  Cervical back: Neck supple  Right lower leg: No edema  Left lower leg: No edema  Skin:     General: Skin is warm  Findings: No rash  Neurological:      General: No focal deficit present  Mental Status: He is alert and oriented to person, place, and time  Mental status is at baseline  Cranial Nerves: No cranial nerve deficit     Psychiatric:         Mood and Affect: Mood normal          Vital Signs  ED Triage Vitals   Temperature Pulse Respirations Blood Pressure SpO2   04/30/23 1705 04/30/23 1534 04/30/23 1534 04/30/23 1534 04/30/23 1534   98 2 °F (36 8 °C) 80 16 150/72 97 %      Temp Source Heart Rate Source Patient Position - Orthostatic VS BP Location FiO2 (%)   04/30/23 1705 04/30/23 1534 04/30/23 1534 04/30/23 1534 --   Oral Monitor Lying Left arm       Pain Score       04/30/23 2100       No Pain           Vitals:    05/01/23 2000 05/01/23 2100 05/01/23 2200 05/01/23 2300   BP: 119/67 104/55 107/51 106/54   Pulse: 61 57 61 59   Patient Position - Orthostatic VS: Lying   Lying         Visual Acuity  Visual Acuity    Flowsheet Row Most Recent Value   L Pupil Size (mm) 4   R Pupil Size (mm) 4          ED Medications  Medications   amiodarone (CORDARONE) 900 mg in dextrose 5 % 500 mL infusion (1 mg/min Intravenous New Bag 5/1/23 2155)   chlorhexidine (PERIDEX) 0 12 % oral rinse 15 mL (15 mL Mouth/Throat Given 5/1/23 2140)   carvedilol (COREG) tablet 37 5 mg (37 5 mg Oral Given 5/1/23 1640)   digoxin (LANOXIN) tablet 250 mcg (250 mcg Oral Given 5/1/23 0843)   spironolactone (ALDACTONE) tablet 25 mg (25 mg Oral Given 5/1/23 0848)   rivaroxaban (XARELTO) tablet 20 mg (20 mg Oral Given 5/1/23 1640)   famotidine (PEPCID) tablet 20 mg (20 mg Oral Refused 5/1/23 1700)   acetaminophen (TYLENOL) tablet 650 mg (650 mg Oral Given 5/1/23 0844)   traZODone (DESYREL) tablet 100 mg (100 mg Oral Given 5/1/23 2140)   amiodarone 150 mg in dextrose 5 % 100 mL IV bolus (0 mg Intravenous Stopped 4/30/23 1743)   magnesium sulfate 2 g/50 mL IVPB (premix) 2 g (0 g Intravenous Stopped 5/1/23 0828)   perflutren lipid microsphere (DEFINITY) injection (0 6 mL/min Intravenous Given 5/1/23 0940)       Diagnostic Studies  Results Reviewed     Procedure Component Value Units Date/Time    Protime-INR [[id number]]  (Abnormal) Collected: 05/01/23 0429    Lab Status: Final result Specimen: Blood from Arm, Left Updated: 05/01/23 0528     Protime 25 0 seconds      INR 2 33    Comprehensive metabolic panel [[id number]]  (Abnormal) Collected: 05/01/23 0429    Lab Status: Final result Specimen: Blood from Arm, Left Updated: 05/01/23 0503     Sodium 136 mmol/L      Potassium 4 0 mmol/L      Chloride 107 mmol/L      CO2 24 mmol/L      ANION GAP 5 mmol/L      BUN 12 mg/dL      Creatinine 0 91 mg/dL      Glucose 142 mg/dL      Calcium 8 7 mg/dL      AST 28 U/L      ALT 41 U/L      Alkaline Phosphatase 52 U/L      Total Protein 6 3 g/dL      Albumin 3 5 g/dL      Total Bilirubin 0 99 mg/dL      eGFR 100 ml/min/1 73sq m     Narrative:      [place] guidelines for Chronic Kidney Disease (CKD):     Stage 1 with normal or high GFR (GFR > 90 mL/min/1 73 square meters)    Stage 2 Mild CKD (GFR = 60-89 mL/min/1 73 square meters)    Stage 3A Moderate CKD (GFR = 45-59 mL/min/1 73 square meters)    Stage 3B Moderate CKD (GFR = 30-44 mL/min/1 73 square meters)    Stage 4 Severe CKD (GFR = 15-29 mL/min/1 73 square meters)    Stage 5 End Stage CKD (GFR <15 mL/min/1 73 square meters)  Note: GFR calculation is accurate only with a steady state creatinine    Magnesium [[id number]]  (Normal) Collected: 05/01/23 0429    Lab Status: Final result Specimen: Blood from Arm, Left Updated: 05/01/23 0503     Magnesium 2 2 mg/dL     Phosphorus [[id number]]  (Normal) Collected: 05/01/23 0429    Lab Status: Final result Specimen: Blood from Arm, Left Updated: 05/01/23 0503     Phosphorus 4 0 mg/dL     Calcium, ionized [[id number]]  (Normal) Collected: 05/01/23 0429    Lab Status: Final result Specimen: Blood from Arm, Left Updated: 05/01/23 0441     Calcium, Ionized 1 12 mmol/L     CBC and differential [[id number]] Collected: 05/01/23 0429    Lab Status: Final result Specimen: Blood from Arm, Left Updated: 05/01/23 0439     WBC 8 93 Thousand/uL      RBC 4 73 Million/uL      Hemoglobin 14 1 g/dL      Hematocrit 42 8 %      MCV 91 fL      MCH 29 8 pg      MCHC 32 9 g/dL      RDW 13 7 %      MPV 11 0 fL      Platelets 770 Thousands/uL      nRBC 0 /100 WBCs      Neutrophils Relative 66 %      Immat GRANS % 0 %      Lymphocytes Relative 22 %      Monocytes Relative 10 %      Eosinophils Relative 2 %      Basophils Relative 0 %      Neutrophils Absolute 5 81 Thousands/µL      Immature Grans Absolute 0 03 Thousand/uL      Lymphocytes Absolute 2 00 Thousands/µL      Monocytes Absolute 0 90 Thousand/µL      Eosinophils Absolute 0 15 Thousand/µL      Basophils Absolute 0 04 Thousands/µL     HS Troponin I 4hr [[id number]]  (Abnormal) Collected: 04/30/23 2015    Lab Status: Final result Specimen: Blood from Line, Venous Updated: 04/30/23 2044     hs TnI 4hr 103 ng/L      Delta 4hr hsTnI 60 ng/L     HS Troponin I 2hr [[id number]]  (Abnormal) Collected: 04/30/23 1740    Lab Status: Final result Specimen: Blood from Line, Venous Updated: 04/30/23 1817     hs TnI 2hr 93 ng/L      Delta 2hr hsTnI 50 ng/L     HS Troponin 0hr (reflex protocol) [[id number]]  (Normal) Collected: 04/30/23 1536    Lab Status: Final result Specimen: Blood from Arm, Right Updated: 04/30/23 1611     hs TnI 0hr 43 ng/L     B-Type Natriuretic Peptide(BNP) [[id number]]  (Abnormal) Collected: 04/30/23 1536    Lab Status: Final result Specimen: Blood from Arm, Right Updated: 04/30/23 1611      pg/mL     Comprehensive metabolic panel [[id number]]  (Abnormal) Collected: 04/30/23 1536    Lab Status: Final result Specimen: Blood from Arm, Right Updated: 04/30/23 1601     Sodium 137 mmol/L      Potassium 3 9 mmol/L      Chloride 106 mmol/L      CO2 24 mmol/L      ANION GAP 7 mmol/L      BUN 12 mg/dL      Creatinine 1 03 mg/dL      Glucose 168 mg/dL      Calcium 9 6 mg/dL      AST 33 U/L      ALT 44 U/L      Alkaline Phosphatase 66 U/L      Total Protein 7 4 g/dL      Albumin 4 1 g/dL      Total Bilirubin 0 88 mg/dL      eGFR 86 ml/min/1 73sq m     Narrative:      [place] guidelines for Chronic Kidney Disease (CKD):     Stage 1 with normal or high GFR (GFR > 90 mL/min/1 73 square meters)    Stage 2 Mild CKD (GFR = 60-89 mL/min/1 73 square meters)    Stage 3A Moderate CKD (GFR = 45-59 mL/min/1 73 square meters)    Stage 3B Moderate CKD (GFR = 30-44 mL/min/1 73 square meters)    Stage 4 Severe CKD (GFR = 15-29 mL/min/1 73 square meters)    Stage 5 End Stage CKD (GFR <15 mL/min/1 73 square meters)  Note: GFR calculation is accurate only with a steady state creatinine    Magnesium [[id number]]  (Normal) Collected: 04/30/23 1536    Lab Status: Final result Specimen: Blood from Arm, Right Updated: 04/30/23 1601     Magnesium 1 9 mg/dL     Digoxin level [[id number]]  (Abnormal) Collected: 04/30/23 1536    Lab Status: Final result Specimen: Blood from Arm, Right Updated: 04/30/23 1601     Digoxin Lvl 0 6 ng/mL     Protime-INR [[id number]]  (Abnormal) Collected: 04/30/23 1536    Lab Status: Final result Specimen: Blood from Arm, Right Updated: 04/30/23 1557     Protime 14 8 seconds      INR 1 18    APTT [[id number]]  (Normal) Collected: 04/30/23 1536    Lab Status: Final result Specimen: Blood from Arm, Right Updated: 04/30/23 1557     PTT 31 seconds     CBC and differential [[id number]]  (Abnormal) Collected: 04/30/23 1536    Lab Status: Final result Specimen: Blood from Arm, Right Updated: 04/30/23 1543     WBC 11 36 Thousand/uL      RBC 5 08 Million/uL      Hemoglobin 15 4 g/dL      Hematocrit 45 6 %      MCV 90 fL      MCH 30 3 pg      MCHC 33 8 g/dL      RDW 13 6 %      MPV 11 3 fL      Platelets 855 Thousands/uL      nRBC 0 /100 WBCs      Neutrophils Relative 56 %      Immat GRANS % 0 %      Lymphocytes Relative 27 %      Monocytes Relative 14 %      Eosinophils Relative 2 %      Basophils Relative 1 %      Neutrophils Absolute 6 40 Thousands/µL      Immature Grans Absolute 0 04 Thousand/uL      Lymphocytes Absolute 3 07 Thousands/µL      Monocytes Absolute 1 57 Thousand/µL      Eosinophils Absolute 0 22 Thousand/µL      Basophils Absolute 0 06 Thousands/µL                  XR chest portable   ED Interpretation by Alba Calderon MD (04/30 1641)   NAD      Final Result by Loc Montes De Oca MD (05/01 9291)      No acute cardiopulmonary disease                    Workstation performed: [id number]                    Procedures  ECG 12 Lead Documentation Only    Date/Time: 4/30/2023 7:57 PM  Performed by: Alba Calderon MD  Authorized by: Alba Calderon MD     Rate:     ECG rate:  73    ECG rate assessment: normal    T waves:     T waves: non-specific    Comments:      Inferolateral changes  CriticalCare Time    Date/Time: 4/30/2023 7:59 PM  Performed by: Alba Calderon MD  Authorized by: Alba Calderon MD     Critical care provider statement:     Critical care time (minutes):  60    Critical care was necessary to treat or prevent imminent or life-threatening deterioration of the following conditions:  Cardiac failure    Critical care was time spent personally by me on the following activities:  Discussions with consultants, evaluation of patient's response to treatment, ordering and review of laboratory studies, ordering and performing treatments and interventions, interpretation of cardiac output measurements, obtaining history from patient or surrogate, re-evaluation of patient's condition, examination of patient, ordering and review of radiographic studies and review of old charts  Comments:      Discussions with cardiology and ICU, pacer/AICD interrogation, antiarrhythmic drips  Discussion with or not patient should be transferred for EP  ED Course                                             Medical Decision Making  60-year-old male was coming in for evaluation of AICD firing and potential syncopal event that happened earlier today  Patient has a history of V  tach and heart failure with AICD  He was in the triage box when he started to get clammy and diaphoretic the nurse noted that he started to look a little off, had him get over into the wheelchair, started wheeling him back towards the emergency department when he slumped over in the wheelchair and went unresponsive in the wheelchair, nurses scream for help the patient went unresponsive patient was wheeled a few feet into our critical care resuscitation room  He was diaphoretic with his head slumped over, he was still breathing making agonal sonorous respirations, numerous people began to then lift him and transfer him onto the stretcher, he was laid flat onto the stretcher to then feel for a pulse although he was still making sonorous respirations it was at that time his eyes started to flutter awake and he started to become more aroused and make some movements and he had more color to his skin  Also during all this time nurses were setting up for IVs and getting everything to put him on a cardiac monitor including pacer pads and telemetry monitoring although he had history of having AICD that was known  Seems like patient had another syncopal event because he has been talking in triage and sent over and went unresponsive that was witnessed by myself, was breathing throughout the other entire time and then was fully laid flat on the stretcher and then became responsive again  Patient was coming in for an episode of his AICD firing which is concerning for cardiac etiology and potential cardiac syncope  During this witnessed event he did not have any seizure-like movement, so would be concerning for another cardiac type event  He was not exerting himself and had not recently significantly changed position and it was sudden and abrupt and self terminated again so again would also be concerning for arrhythmia type event  We will need to interrogate his device  We will need to discuss with cardiology  We will need to evaluate him for cardiac etiology like ACS so will need EKG for arrhythmia and serial troponins and will need to keep him on telemetry monitoring  Again we will need to interrogate his pacer/AICD  We will need to evaluate for his electrolytes and check his digoxin level which she is on  We will need to discuss with cardiology about potential other antiarrhythmics and will likely need admission to ICU so we will discuss with both cardiology and ICU  AICD discharge: acute illness or injury  [facility]): acute illness or injury  Amount and/or Complexity of Data Reviewed  Labs: ordered  Radiology: ordered and independent interpretation performed  ECG/medicine tests: ordered and independent interpretation performed  Risk  Decision regarding hospitalization            Disposition  Final diagnoses:   [facility])   AICD discharge     Time reflects when diagnosis was documented in both MDM as applicable and the Disposition within this note     Time User Action Codes Description Comment    4/30/2023  5:16 PM Johnny Forge Add [I47 20] V-tach ([facility] [address] )     4/30/2023  5:16 PM Johnny Forge Add [Z45 02] AICD discharge     4/30/2023  5:55 PM Renato Conde Add [I50 9] CHF (congestive heart failure) [facility])       ED Disposition     ED Disposition   Admit    Condition   Stable    Date/Time   Sun Apr 30, 2023  4:27 PM    Comment              Follow-up Information    None         Current Discharge Medication List      CONTINUE these medications which have NOT CHANGED    Details   carvedilol (COREG) 25 mg tablet Take 1 5 tablets (37 5 mg total) by mouth 2 (two) times a day with meals  Qty: 270 tablet, Refills: 3    Associated Diagnoses: Dilated cardiomyopathy ([address] ); Paroxysmal atrial fibrillation ([facility] [address] ); ICD (implantable cardioverter-defibrillator) discharge; Hypertension, unspecified type      digoxin (LANOXIN) 0 125 mg tablet Take 2 tablets (250 mcg total) by mouth daily  Qty: 90 tablet, Refills: 1    Associated Diagnoses: Paroxysmal atrial fibrillation (HCC)      enalapril (VASOTEC) 10 mg tablet TAKE 1 TABLET BY MOUTH TWICE A DAY  Qty: 180 tablet, Refills: 1    Comments: DX Code Needed  NEED NEW RX  Associated Diagnoses: Essential hypertension      spironolactone (ALDACTONE) 25 mg tablet Take 1 tablet (25 mg total) by mouth in the morning  Qty: 90 tablet, Refills: 3    Associated Diagnoses: Heart failure with reduced ejection fraction (HCC)      traZODone (DESYREL) 100 mg tablet Take 1 tablet (100 mg total) by mouth daily at bedtime  Qty: 90 tablet, Refills: 3    Comments: DX Code Needed    Associated Diagnoses: Primary insomnia      Xarelto 20 MG tablet TAKE 1 TABLET BY MOUTH EVERY DAY WITH DINNER  Qty: 90 tablet, Refills: 3    Associated Diagnoses: Paroxysmal A-fib (HCC)             No discharge procedures on file      PDMP Review     None          ED Provider  Electronically Signed by           Edilberto Ruiz MD  05/05/23 8961

## 2023-05-02 NOTE — PLAN OF CARE
Problem: NEUROSENSORY - ADULT  Goal: Achieves maximal functionality and self care  Description: INTERVENTIONS  - Monitor swallowing and airway patency with patient fatigue and changes in neurological status  - Encourage and assist patient to increase activity and self care     - Encourage visually impaired, hearing impaired and aphasic patients to use assistive/communication devices  Outcome: Adequate for Discharge     Problem: CARDIOVASCULAR - ADULT  Goal: Maintains optimal cardiac output and hemodynamic stability  Description: INTERVENTIONS:  - Monitor I/O, vital signs and rhythm  - Monitor for S/S and trends of decreased cardiac output  - Administer and titrate ordered vasoactive medications to optimize hemodynamic stability  - Assess quality of pulses, skin color and temperature  - Assess for signs of decreased coronary artery perfusion  - Instruct patient to report change in severity of symptoms  Outcome: Progressing  Goal: Absence of cardiac dysrhythmias or at baseline rhythm  Description: INTERVENTIONS:  - Continuous cardiac monitoring, vital signs, obtain 12 lead EKG if ordered  - Administer antiarrhythmic and heart rate control medications as ordered  - Monitor electrolytes and administer replacement therapy as ordered  Outcome: Progressing     Problem: GENITOURINARY - ADULT  Goal: Maintains or returns to baseline urinary function  Description: INTERVENTIONS:  - Assess urinary function  - Encourage oral fluids to ensure adequate hydration if ordered  - Administer IV fluids as ordered to ensure adequate hydration  - Administer ordered medications as needed  - Offer frequent toileting  - Follow urinary retention protocol if ordered  Outcome: Adequate for Discharge     Problem: SKIN/TISSUE INTEGRITY - ADULT  Goal: Skin Integrity remains intact(Skin Breakdown Prevention)  Description: Assess:  -Perform Justin assessment every shift  -Clean and moisturize skin every day  -Inspect skin when repositioning, toileting, and assisting with ADLS  -Assess under medical devices such as ekg pads every shift  -Assess extremities for adequate circulation and sensation     Bed Management:  -Have minimal linens on bed & keep smooth, unwrinkled  -Change linens as needed when moist or perspiring  -Avoid sitting or lying in one position for more than 2 hours while in bed  -Keep HOB at 30 degrees     Toileting:  -Offer bedside commode  -Assess for incontinence every 2 hr  -Use incontinent care products after each incontinent episode such as moisture barrier cream    Activity:  -Mobilize patient 3 times a day  -Encourage activity and walks on unit  -Encourage or provide ROM exercises   -Turn and reposition patient every 2 Hours  -Use appropriate equipment to lift or move patient in bed  -Instruct/ Assist with weight shifting every 2 when out of bed in chair  -Consider limitation of chair time 3 hour intervals    Skin Care:  -Avoid use of baby powder, tape, friction and shearing, hot water or constrictive clothing  -Relieve pressure over bony prominences using allevyn  -Do not massage red bony areas    Outcome: Adequate for Discharge

## 2023-05-02 NOTE — RESPIRATORY THERAPY NOTE
05/01/23 2062   Respiratory Assessment   Resp Comments placed patient on CPAP of 10  Patient was uncomfortalbe with our mask and has decided to forgo the machine for tonight     O2 Device RA

## 2023-05-03 ENCOUNTER — TRANSITIONAL CARE MANAGEMENT (OUTPATIENT)
Dept: FAMILY MEDICINE CLINIC | Facility: CLINIC | Age: 48
End: 2023-05-03

## 2023-05-03 ENCOUNTER — TELEPHONE (OUTPATIENT)
Dept: CARDIOLOGY CLINIC | Facility: CLINIC | Age: 48
End: 2023-05-03

## 2023-05-03 VITALS
DIASTOLIC BLOOD PRESSURE: 63 MMHG | WEIGHT: 308.42 LBS | OXYGEN SATURATION: 97 % | SYSTOLIC BLOOD PRESSURE: 109 MMHG | HEIGHT: 74 IN | HEART RATE: 59 BPM | BODY MASS INDEX: 39.58 KG/M2 | TEMPERATURE: 97.5 F | RESPIRATION RATE: 15 BRPM

## 2023-05-03 DIAGNOSIS — Z71.89 COMPLEX CARE COORDINATION: Primary | ICD-10-CM

## 2023-05-03 LAB
ANION GAP SERPL CALCULATED.3IONS-SCNC: 7 MMOL/L (ref 4–13)
BUN SERPL-MCNC: 10 MG/DL (ref 5–25)
CALCIUM SERPL-MCNC: 9.1 MG/DL (ref 8.4–10.2)
CHLORIDE SERPL-SCNC: 105 MMOL/L (ref 96–108)
CO2 SERPL-SCNC: 25 MMOL/L (ref 21–32)
CREAT SERPL-MCNC: 1.02 MG/DL (ref 0.6–1.3)
ERYTHROCYTE [DISTWIDTH] IN BLOOD BY AUTOMATED COUNT: 13.5 % (ref 11.6–15.1)
GFR SERPL CREATININE-BSD FRML MDRD: 87 ML/MIN/1.73SQ M
GLUCOSE SERPL-MCNC: 148 MG/DL (ref 65–140)
HCT VFR BLD AUTO: 44.2 % (ref 36.5–49.3)
HGB BLD-MCNC: 14.8 G/DL (ref 12–17)
MCH RBC QN AUTO: 30 PG (ref 26.8–34.3)
MCHC RBC AUTO-ENTMCNC: 33.5 G/DL (ref 31.4–37.4)
MCV RBC AUTO: 90 FL (ref 82–98)
PLATELET # BLD AUTO: 169 THOUSANDS/UL (ref 149–390)
PMV BLD AUTO: 11.3 FL (ref 8.9–12.7)
POTASSIUM SERPL-SCNC: 4.1 MMOL/L (ref 3.5–5.3)
RBC # BLD AUTO: 4.94 MILLION/UL (ref 3.88–5.62)
SODIUM SERPL-SCNC: 137 MMOL/L (ref 135–147)
WBC # BLD AUTO: 8.63 THOUSAND/UL (ref 4.31–10.16)

## 2023-05-03 RX ORDER — AMIODARONE HYDROCHLORIDE 200 MG/1
TABLET ORAL
Qty: 30 TABLET | Refills: 0 | Status: SHIPPED | OUTPATIENT
Start: 2023-05-03 | End: 2023-06-16

## 2023-05-03 RX ORDER — DIGOXIN 125 MCG
125 TABLET ORAL DAILY
Qty: 90 TABLET | Refills: 0 | Status: SHIPPED | OUTPATIENT
Start: 2023-05-03

## 2023-05-03 RX ADMIN — CARVEDILOL 37.5 MG: 12.5 TABLET, FILM COATED ORAL at 08:10

## 2023-05-03 RX ADMIN — AMIODARONE HYDROCHLORIDE 200 MG: 200 TABLET ORAL at 08:10

## 2023-05-03 RX ADMIN — CHLORHEXIDINE GLUCONATE 0.12% ORAL RINSE 15 ML: 1.2 LIQUID ORAL at 08:10

## 2023-05-03 RX ADMIN — SPIRONOLACTONE 25 MG: 25 TABLET ORAL at 08:10

## 2023-05-03 RX ADMIN — DIGOXIN 125 MCG: 125 TABLET ORAL at 08:10

## 2023-05-03 NOTE — TELEPHONE ENCOUNTER
Does anybody take care of these in the [facility] office? I have not seen this patient since 5/2022  He is currently admitted and being seen by Desiree Wiley and Dr Yumiko Contreras so I would defer to them

## 2023-05-03 NOTE — PROGRESS NOTES
"[facility]  Progress Note  Name: Adan Champion  MRN: [id number]  Unit/Bed#:  I Date of Admission: 4/30/2023   Date of Service: 5/3/2023 I Hospital Day: 3    Assessment/Plan   * ICD (implantable cardioverter-defibrillator) discharge  Assessment & Plan  · Patient with ICD discharge at home and again while in waiting room  · Recent admission for similar: admission 4/15 to 4/18 after discharge  · At that admission, he had his digoxin increased  · Digoxin level still low  · Since that admission he has felt \"off\" stating he was still having palpitations  · Today he stated he felt dizzy, short of breath, laid on the couch and passed out and was awoken by his ICD firing; He also had similar event in the ED when being moved from a stretcher  · EKG showing sinus with frequent PVCs  · Cardiology was contacted and he was placed on amiodarone  · EKG with normal QTc    Plan:  · Amiodarone transitioned to PO yesterday, continue TID  · Continue home digoxin and coreg  · Digoxin dose reduced back to 125 mcg  · Optimize electrolytes  · Montior on tele  · Cardiology consult   · Follow up with EP as an outpatient, continue PO amio until that time    Hypertension  Assessment & Plan  · Continue coreg/spironolactone  · SBP on the lower end; hold ACEi for now in case we will need to increase coreg    Dilated cardiomyopathy ([facility] [address] )  Assessment & Plan  · Patient with history of dilated CMP with no significant CAD  · He has a single chamber ICD in place  · His last ECHO showed EF 30%, restricted relaxation but no significant RV dysfunction or valuvuopathy    Plan:  · Appears euvolemic  · Care as above    Paroxysmal atrial fibrillation (HCC)  Assessment & Plan  · Currently in NSR  · Continue coreg, digoxin, and addition of amiodarone as below  · Digoxin level subtherapeutic  · Continue xarelto             ICU Core Measures     A: Assess, Prevent, and Manage Pain · Has pain been assessed?  Yes  · Need for changes " to pain regimen? No   B: Both SAT/SAT  · N/A   C: Choice of Sedation · RASS Goal: N/A patient not on sedation  · Need for changes to sedation or analgesia regimen? NA   D: Delirium · CAM-ICU: Negative   E: Early Mobility  · Plan for early mobility? Yes   F: Family Engagement · Plan for family engagement today? Yes         Prophylaxis:  VTE VTE covered by:  rivaroxaban, Oral, 20 mg at 05/02/23 1739       Stress Ulcer  covered byfamotidine (PEPCID) tablet 20 mg [[id number]]     Subjective   HPI/24hr events: Patient transitioned off amiodarone infusion to oral amiodarone  He continues to be without ectopy  Likely discharge today with outpatient EP follow up  No overnight events  Review of Systems   All other systems reviewed and are negative  Objective                            Vitals I/O      Most Recent Min/Max in 24hrs   Temp 98 1 °F (36 7 °C) Temp  Min: 97 5 °F (36 4 °C)  Max: 98 1 °F (36 7 °C)   Pulse 63 Pulse  Min: 57  Max: 66   Resp 15 Resp  Min: 15  Max: 34   /57 BP  Min: 108/57  Max: 139/73   O2 Sat 95 % SpO2  Min: 95 %  Max: 97 %      Intake/Output Summary (Last 24 hours) at 5/3/2023 0521  Last data filed at 5/2/2023 0559  Gross per 24 hour   Intake --   Output 450 ml   Net -450 ml         Diet Cardiovascular; Cardiac     Invasive Monitoring Physical exam    Physical Exam  Vitals reviewed  Constitutional:       Appearance: He is obese  HENT:      Head: Normocephalic and atraumatic  Mouth/Throat:      Mouth: Mucous membranes are moist    Eyes:      Conjunctiva/sclera: Conjunctivae normal    Cardiovascular:      Rate and Rhythm: Normal rate and regular rhythm  Pulses: Normal pulses  Heart sounds: Normal heart sounds  Comments: Bradycardia at times while sleeping  Pulmonary:      Effort: Pulmonary effort is normal       Breath sounds: Normal breath sounds  Abdominal:      Palpations: Abdomen is soft  Musculoskeletal:      Cervical back: Normal range of motion  Right lower leg: No edema  Left lower leg: No edema  Skin:     General: Skin is warm and dry  Capillary Refill: Capillary refill takes less than 2 seconds  Neurological:      General: No focal deficit present  Mental Status: He is alert and oriented to person, place, and time  Mental status is at baseline  Diagnostic Studies    EKG: Sinus rhythm in the 50-60s  Imaging:  I have personally reviewed pertinent reports         Medications:  Scheduled PRN   amiodarone, 200 mg, TID With Meals  carvedilol, 37 5 mg, BID With Meals  chlorhexidine, 15 mL, Q12H [address]  digoxin, 125 mcg, Daily  famotidine, 20 mg, BID  rivaroxaban, 20 mg, Daily With Dinner  spironolactone, 25 mg, Daily  traZODone, 100 mg, HS      acetaminophen, 650 mg, Q6H PRN       Continuous          Labs:  CBC    Recent Labs     05/02/23  0527   WBC 8 40   HGB 14 3   HCT 42 6        BMP    Recent Labs     05/02/23  0527   SODIUM 136   K 4 4      CO2 24   AGAP 5   BUN 11   CREATININE 0 94   CALCIUM 8 7       Coags    Recent Labs     05/02/23  0527   INR 1 43*        Additional Electrolytes  Recent Labs     05/02/23  0527   MG 2 0   PHOS 3 3          Blood Gas    No recent results  No recent results LFTs  Recent Labs     05/02/23  0527   ALT 43   AST 29   ALKPHOS 54   ALB 3 6   TBILI 1 03*       Infectious  No recent results  Glucose  Recent Labs     05/02/23  0527   GLUC 404 [facility] Mariza Adamson

## 2023-05-03 NOTE — PLAN OF CARE
Problem: NEUROSENSORY - ADULT  Goal: Achieves maximal functionality and self care  Description: INTERVENTIONS  - Monitor swallowing and airway patency with patient fatigue and changes in neurological status  - Encourage and assist patient to increase activity and self care     - Encourage visually impaired, hearing impaired and aphasic patients to use assistive/communication devices  Outcome: Progressing     Problem: CARDIOVASCULAR - ADULT  Goal: Maintains optimal cardiac output and hemodynamic stability  Description: INTERVENTIONS:  - Monitor I/O, vital signs and rhythm  - Monitor for S/S and trends of decreased cardiac output  - Administer and titrate ordered vasoactive medications to optimize hemodynamic stability  - Assess quality of pulses, skin color and temperature  - Assess for signs of decreased coronary artery perfusion  - Instruct patient to report change in severity of symptoms  Outcome: Progressing  Goal: Absence of cardiac dysrhythmias or at baseline rhythm  Description: INTERVENTIONS:  - Continuous cardiac monitoring, vital signs, obtain 12 lead EKG if ordered  - Administer antiarrhythmic and heart rate control medications as ordered  - Monitor electrolytes and administer replacement therapy as ordered  Outcome: Progressing     Problem: GENITOURINARY - ADULT  Goal: Maintains or returns to baseline urinary function  Description: INTERVENTIONS:  - Assess urinary function  - Encourage oral fluids to ensure adequate hydration if ordered  - Administer IV fluids as ordered to ensure adequate hydration  - Administer ordered medications as needed  - Offer frequent toileting  - Follow urinary retention protocol if ordered  Outcome: Progressing     Problem: METABOLIC, FLUID AND ELECTROLYTES - ADULT  Goal: Electrolytes maintained within normal limits  Description: INTERVENTIONS:  - Monitor labs and assess patient for signs and symptoms of electrolyte imbalances  - Administer electrolyte replacement as ordered  - Monitor response to electrolyte replacements, including repeat lab results as appropriate  - Instruct patient on fluid and nutrition as appropriate  Outcome: Progressing  Goal: Fluid balance maintained  Description: INTERVENTIONS:  - Monitor labs   - Monitor I/O and WT  - Instruct patient on fluid and nutrition as appropriate  - Assess for signs & symptoms of volume excess or deficit  Outcome: Progressing     Problem: SKIN/TISSUE INTEGRITY - ADULT  Goal: Skin Integrity remains intact(Skin Breakdown Prevention)  Description: Assess:  -Perform Justin assessment every shift   -Clean and moisturize skin every shift      Bed Management:  -Have minimal linens on bed & keep smooth, unwrinkled  -Change linens as needed when moist or perspiring  -Avoid sitting or lying in one position for more than 2 hours while in bed  -Keep HOB at 30 degrees

## 2023-05-03 NOTE — DISCHARGE SUMMARY
"  Discharge Summary - Samra Benjamin 56 y o  male MRN: [id number]    Unit/Bed#:  Encounter: [id number]    Admission Date:   Admission Orders (From admission, onward)     Ordered        04/30/23 1745  INPATIENT ADMISSION  Once                        Admitting Diagnosis: CHF (congestive heart failure) ([facility] [address] ) [I50 9]  Syncope [R55]  V-t[facility]) [I47 20]  AICD discharge [Z45 02]    HPI: Per Isac Nicholson PA-C, \"Vadim De Leon is a 56 y o  who presents after an ICD firing  Patient has a PMH of dilated NICM, pAF, essential hypertension  Patient was recently admitted for the same 4/15-4/18  Patient states since that admission he was still having palpitations and even describes phenomenon of \"dropped beats  \" He denies any other complaint  He states he changed over the laundry, walked upstairs, fed the dog, and suddenly felt dizzy, short of breath and laid himself on the couch and \"blacked out\" and was awakened by ICD firing  EMS was activated and he had a similar event when moving him into the ED stretcher and believes he had another shock  In ED he had since waves with increased PVCs and was placed on amiodarone at suggestion of cardiology ad was admitted to the ICU for management  \"    Procedures Performed: No orders of the defined types were placed in this encounter  Summary of Hospital Course: Patient was given Amiodarone bolus and started on continuous infusion for approximately 48 hours prior to being transitioned to PO dosing  The patient had no further incidents on telemetry during this time  Cardiology followed closely, consulted with EP physicians in regard to plan moving forward       Significant Findings, Care, Treatment and Services Provided: n/a    Complications: n/a    Discharge Diagnosis: Non ischemic cardiomyopathy, ICD discharge    Medical Problems     Resolved Problems  Date Reviewed: 5/1/2023   None         Condition at Discharge: good         Discharge instructions/Information to patient and " family:   See after visit summary for information provided to patient and family  Provisions for Follow-Up Care:  See after visit summary for information related to follow-up care and any pertinent home health orders  PCP: Niko Pickett PA-C    Disposition: Home    Planned Readmission: No      Discharge Statement   I spent 15 minutes discharging the patient  This time was spent on the day of discharge  I had direct contact with the patient on the day of discharge  Additional documentation is required if more than 30 minutes were spent on discharge  Discharge Medications:  See after visit summary for reconciled discharge medications provided to patient and family

## 2023-05-03 NOTE — ASSESSMENT & PLAN NOTE
"· Patient with ICD discharge at home and again while in waiting room  · Recent admission for similar: admission 4/15 to 4/18 after discharge  · At that admission, he had his digoxin increased  · Digoxin level still low  · Since that admission he has felt \"off\" stating he was still having palpitations  · Today he stated he felt dizzy, short of breath, laid on the couch and passed out and was awoken by his ICD firing;  He also had similar event in the ED when being moved from a stretcher  · EKG showing sinus with frequent PVCs  · Cardiology was contacted and he was placed on amiodarone  · EKG with normal QTc    Plan:  · Amiodarone transitioned to PO yesterday, continue TID  · Continue home digoxin and coreg  · Digoxin dose reduced back to 125 mcg  · Optimize electrolytes  · Juanior on tele  · Cardiology consult   · Follow up with EP as an outpatient, continue PO amio until that time  "

## 2023-05-03 NOTE — TELEPHONE ENCOUNTER
Patient is requesting [facility] paperwork to be filled out  Patient is requesting a call back, please call patient at [phone]

## 2023-05-03 NOTE — PROGRESS NOTES
Cardiology Progress Note - Luis Villanueva 56 y o  male MRN: [id number]    Unit/Bed#:  Encounter: [id number]      Assessment/Plan:  1   Ventricular tachycardia s/p ICD discharge   Formal device interrogation report showed 2 episodes of VT with 1 ICD discharge   Troponins trended up to 103   EKG shows NSR without acute ischemic abnormalities   Telemetry reveals NSR,  no further episodes of NSVT noted   Case discussed with EP who recommends rhythm control   Decrease amiodarone to 200 mg BID; after 2 weeks decrease amiodarone to 200 mg daily   Continue digoxin and carvedilol   Recommend close follow-up with EP, advised not to return to work or drive until follow-up appointment with EP     2   NICM with EF 30% s/p MDT single-chamber ICD   Euvolemic on exam   Limited TTE revealed EF 30% with dilated LV and severe diastolic dysfunction   Continue carvedilol and Aldactone; no ACE/ARB due to soft BP   Continue follow-up with device clinic     3   Paroxysmal atrial fibrillation   EKG shows normal sinus rhythm   Decrease amiodarone to 200 mg BID, after 2 weeks decrease to 200 mg daily   Continue digoxin and carvedilol   Continue Xarelto, advised to avoid NSAIDs with anticoagulation due to increased bleeding risk     4   Hypertension   Currently 109/63, continue to monitor   Continue carvedilol and Aldactone     5   CONCHIS on CPAP   Continue CPAP qhs     6   History of alcohol use   Endorses strict compliance to cessation       Patient is stable for discharge from a cardiac standpoint on current medications  Recommend close follow-up with electrophysiology  Advised not to return to work or drive until appointment with EP  Thank you for this consultation        Subjective:   Patient seen and examined  No significant events overnight  Patient lying in bed comfortably  Denies any new complaints at this time  Reports he feels well overall      Objective:     Vitals: Blood pressure 109/63, pulse 59, "temperature 97 5 °F (36 4 °C), temperature source Oral, resp  rate 15, height 6' 2\" (1 88 m), weight (!) 140 kg (308 lb 6 8 oz), SpO2 97 %  , Body mass index is 39 6 kg/m² ,   Orthostatic Blood Pressures    Flowsheet Row Most Recent Value   Blood Pressure 109/63 filed at 05/03/2023 0802   Patient Position - Orthostatic VS Lying filed at 05/03/2023 0802            Intake/Output Summary (Last 24 hours) at 5/3/2023 2612  Last data filed at 5/3/2023 0501  Gross per 24 hour   Intake 480 ml   Output --   Net 480 ml         Physical Exam:  Physical Exam  Vitals and nursing note reviewed  Constitutional:       General: He is not in acute distress  Appearance: He is well-developed  He is obese  He is not diaphoretic  HENT:      Head: Normocephalic and atraumatic  Nose: Nose normal    Eyes:      Conjunctiva/sclera: Conjunctivae normal    Cardiovascular:      Rate and Rhythm: Normal rate and regular rhythm  Pulses: Normal pulses  Heart sounds: Normal heart sounds  No murmur heard  No friction rub  Pulmonary:      Effort: Pulmonary effort is normal  No respiratory distress  Breath sounds: Normal breath sounds  No wheezing or rales  Chest:      Chest wall: No tenderness  Abdominal:      Palpations: Abdomen is soft  Tenderness: There is no abdominal tenderness  Musculoskeletal:         General: No swelling  Cervical back: Neck supple  Right lower leg: No edema  Left lower leg: No edema  Skin:     General: Skin is warm and dry  Capillary Refill: Capillary refill takes less than 2 seconds  Neurological:      Mental Status: He is alert and oriented to person, place, and time     Psychiatric:         Mood and Affect: Mood normal          Judgment: Judgment normal               Medications:      Current Facility-Administered Medications:     acetaminophen (TYLENOL) tablet 650 mg, 650 mg, Oral, Q6H PRN, Sharath Smyth PA-C, 650 mg at 05/02/23 1743    amiodarone tablet " 200 mg, 200 mg, Oral, TID With Meals, Wyvonna Ring, PA-C, 200 mg at 05/03/23 0810    carvedilol (COREG) tablet 37 5 mg, 37 5 mg, Oral, BID With Meals, Wyvonna Ring, PA-C, 37 5 mg at 05/03/23 0810    chlorhexidine (PERIDEX) 0 12 % oral rinse 15 mL, 15 mL, Mouth/Throat, Q12H [address], Albaangel Barrios, CRNP, 15 mL at 05/03/23 0810    digoxin (LANOXIN) tablet 125 mcg, 125 mcg, Oral, Daily, Sakshi Juan Carlos, PA-C, 125 mcg at 05/03/23 0810    famotidine (PEPCID) tablet 20 mg, 20 mg, Oral, BID, Edra Meth, PA-C, 20 mg at 05/02/23 1736    rivaroxaban (XARELTO) tablet 20 mg, 20 mg, Oral, Daily With Grayyessenia Fuentes, PA-C, 20 mg at 05/02/23 1739    spironolactone (ALDACTONE) tablet 25 mg, 25 mg, Oral, Daily, Sakshi Juan Carlos, PA-C, 25 mg at 05/03/23 0810    traZODone (DESYREL) tablet 100 mg, 100 mg, Oral, HS, Edra Meth, PA-C, 100 mg at 05/02/23 2224     Labs & Results:     Results from last 7 days   Lab Units 04/30/23 2015 04/30/23  1740 04/30/23  1536   HS TNI 0HR ng/L  --   --  43   HS TNI 2HR ng/L  --  93*  --    HSTNI D2 ng/L  --  50*  --    HS TNI 4HR ng/L 103*  --   --    HSTNI D4 ng/L 60*  --   --      Results from last 7 days   Lab Units 05/03/23 0522 05/02/23 0527 05/01/23 0429   WBC Thousand/uL 8 63 8 40 8 93   HEMOGLOBIN g/dL 14 8 14 3 14 1   HEMATOCRIT % 44 2 42 6 42 8   PLATELETS Thousands/uL 169 155 153         Results from last 7 days   Lab Units 05/03/23 0522 05/02/23 0527 05/01/23  0429 04/30/23  1536   POTASSIUM mmol/L 4 1 4 4 4 0 3 9   CHLORIDE mmol/L 105 107 107 106   CO2 mmol/L 25 24 24 24   BUN mg/dL 10 11 12 12   CREATININE mg/dL 1 02 0 94 0 91 1 03   CALCIUM mg/dL 9 1 8 7 8 7 9 6   ALK PHOS U/L  --  54 52 66   ALT U/L  --  43 41 44   AST U/L  --  29 28 33     Results from last 7 days   Lab Units 05/02/23 0527 05/01/23 0429 04/30/23  1536   INR  1 43* 2 33* 1 18   PTT seconds  --   --  31     Results from last 7 days   Lab Units 05/02/23 0527 05/01/23 0429 04/30/23  1536   MAGNESIUM mg/dL 2 0 2 2 "1 9       Vitals: Blood pressure 109/63, pulse 59, temperature 97 5 °F (36 4 °C), temperature source Oral, resp  rate 15, height 6' 2\" (1 88 m), weight (!) 140 kg (308 lb 6 8 oz), SpO2 97 %  , Body mass index is 39 6 kg/m² ,   Orthostatic Blood Pressures    Flowsheet Row Most Recent Value   Blood Pressure 109/63 filed at 05/03/2023 0802   Patient Position - Orthostatic VS Lying filed at 05/03/2023 8151          Systolic (45RZX), TCH:726 , Min:108 , YYK:094     Diastolic (11BWS), GCR:77, Min:57, Max:73        Intake/Output Summary (Last 24 hours) at 5/3/2023 0691  Last data filed at 5/3/2023 0501  Gross per 24 hour   Intake 480 ml   Output --   Net 480 ml       Invasive Devices     Peripheral Intravenous Line  Duration           Peripheral IV 04/30/23 Left Antecubital 3 days    Peripheral IV 05/01/23 Left;Ventral (anterior) Hand 1 day                  EKG: Normal sinus rhythm; cannot rule out inferior infarct; anterolateral infarct    Telemetry:  Telemetry Orders (From admission, onward)             48 Hour Telemetry Monitoring  Continuous x 48 hours        References:    Telemetry Guidelines   Question:  Reason for 48 Hour Telemetry  Answer:  Arrhythmias Requiring Medical Therapy (eg  SVT, Vtach/fib, Bradycardia, Uncontrolled A-fib)                 Telemetry Reviewed: Normal Sinus Rhythm    BP Readings from Last 3 Encounters:   05/03/23 109/63   04/18/23 108/66   12/29/22 118/59      Wt Readings from Last 3 Encounters:   05/02/23 (!) 140 kg (308 lb 6 8 oz)   04/17/23 (!) 141 kg (310 lb)   12/29/22 (!) 144 kg (317 lb)                  "

## 2023-05-03 NOTE — TELEPHONE ENCOUNTER
Fax received from [employer] for [email or web address] [facility] paperwork  Spoke with patient, patient states that his FMLA paperwork is for him  Forms needs to be fill out  FMLA Paperwork scanned into chart

## 2023-05-04 NOTE — UTILIZATION REVIEW
NOTIFICATION OF ADMISSION DISCHARGE   This is a Notification of Discharge from [address] [place] [address]  Please be advised that this patient has been discharge from our facility  Below you will find the admission and discharge date and time including the patients disposition  UTILIZATION REVIEW CONTACT:  Bridget Mar  Utilization   Network Utilization Review Department  Phone: [phone] x carefully listen to the prompts  All voicemails are confidential   Email: [email or web address]     ADMISSION INFORMATION  PRESENTATION DATE: 4/30/2023  3:28 PM  OBERVATION ADMISSION DATE:   INPATIENT ADMISSION DATE: 4/30/23  5:45 PM   DISCHARGE DATE: 5/3/2023 11:30 AM   DISPOSITION:Home/Self Care    IMPORTANT INFORMATION:  Send all requests for admission clinical reviews, approved or denied determinations and any other requests to dedicated fax number below belonging to the campus where the patient is receiving treatment   List of dedicated fax numbers:  [address] DENIALS (Administrative/Medical Necessity) [phone]   [address] (Maternity/NICU/Pediatrics) [phone]   [facility] [phone]   [address] [place] [address] [phone]   [address] [place] [address] [phone]   2000 [facility] [address] [place] [address],[address] [facility] [phone]   [facility]  [phone]   [address] [facility] [address]  [address] [facility] [address] [facility] [phone]

## 2023-05-05 NOTE — TELEPHONE ENCOUNTER
In his chart states that he sees Mandy Madison and Arvin Aguirre mostly likely it probably would be one of them  None of our providers have ever seen him

## 2023-05-09 ENCOUNTER — PATIENT OUTREACH (OUTPATIENT)
Dept: FAMILY MEDICINE CLINIC | Facility: CLINIC | Age: 48
End: 2023-05-09

## 2023-05-10 ENCOUNTER — TELEPHONE (OUTPATIENT)
Dept: CARDIOLOGY CLINIC | Facility: CLINIC | Age: 48
End: 2023-05-10

## 2023-05-10 NOTE — TELEPHONE ENCOUNTER
Kaz Weaver from [facility] called the office regarding patient insurance and she would like a call back  She states that she have a few questions       Kaz Weaver # [phone]    Please advise

## 2023-05-12 DIAGNOSIS — I42.0 DILATED CARDIOMYOPATHY (HCC): ICD-10-CM

## 2023-05-12 DIAGNOSIS — I47.20 V-TACH (HCC): ICD-10-CM

## 2023-05-12 NOTE — PROGRESS NOTES
Impression: Degenerative myopia, bilateral: H44.23. Bilateral. Status: Symptomatic. Plan: Patient here for follow up of myopic degeneration and blurry vision OS. Exam and OCT confirm the presence of RPE changes and lacquer cracks consistent with myopic degeneration. The diagnosis, natural history, and prognosis of this disease were discussed at length. The patient was instructed that AREDS2 vitamins may be helpful and informed that smoking increases the risk of blindness for this disease. The patient was educated on the proper use of the Amsler grid and encouraged to use it daily to monitor the vision in each eye. The patient was instructed to call our office immediately upon any decreased vision or increased distortion. 

RTC 12-24 mo with OCT OU Results for orders placed or performed in visit on 12/17/18   Cardiac EP device report    Narrative    MDT SINGLE ICD  CARELINK TRANSMISSION: BATTERY VOLTAGE ADEQUATE (10 8 YRS)  <0 1%  ALL LEAD PARAMETERS WITHIN NORMAL LIMITS  2 VHR EPISODES W/ SIMILAR MORPHOLGY TO INTRINSIC- PROB SVT - 12 & 13 BEATS, AVG CL~335MS  1 DEVICE CLASSIFIED AF EPISODE DUE TO PVC'S (NO AF)  PT ON CARVEDILOL  OPTI-VOL WITHIN NORMAL LIMITS  NORMAL DEVICE FUNCTION   GV

## 2023-05-13 RX ORDER — AMIODARONE HYDROCHLORIDE 200 MG/1
200 TABLET ORAL DAILY
Qty: 30 TABLET | Refills: 5 | Status: SHIPPED | OUTPATIENT
Start: 2023-05-13

## 2023-05-18 ENCOUNTER — IN-CLINIC DEVICE VISIT (OUTPATIENT)
Dept: CARDIOLOGY CLINIC | Facility: CLINIC | Age: 48
End: 2023-05-18

## 2023-05-18 DIAGNOSIS — Z95.810 PRESENCE OF IMPLANTABLE CARDIOVERTER-DEFIBRILLATOR (ICD): Primary | ICD-10-CM

## 2023-05-18 NOTE — PROGRESS NOTES
MDT-SINGLE CHAMBER ICD/ACTIVE SYSTEM IS MRI CONDITIONAL   DEVICE INTERROGATED IN THE [place] OFFICE:  BATTERY VOLTAGE ADEQUATE (4 7 YR )    0 2%    ALL LEAD PARAMETERS WITHIN NORMAL LIMITS   NO NEW HIGH RATE EPISODES   NO PROGRAMMING CHANGES MADE TO DEVICE PARAMETERS   OPTI-VOL WITHIN NORMAL LIMITS   NORMAL DEVICE FUNCTION   RG

## 2023-05-24 ENCOUNTER — TELEPHONE (OUTPATIENT)
Dept: CARDIOLOGY CLINIC | Facility: CLINIC | Age: 48
End: 2023-05-24

## 2023-05-24 ENCOUNTER — PREP FOR PROCEDURE (OUTPATIENT)
Dept: CARDIOLOGY CLINIC | Facility: CLINIC | Age: 48
End: 2023-05-24

## 2023-05-24 ENCOUNTER — OFFICE VISIT (OUTPATIENT)
Dept: CARDIOLOGY CLINIC | Facility: CLINIC | Age: 48
End: 2023-05-24

## 2023-05-24 VITALS
BODY MASS INDEX: 40.43 KG/M2 | HEART RATE: 65 BPM | HEIGHT: 74 IN | DIASTOLIC BLOOD PRESSURE: 70 MMHG | WEIGHT: 315 LBS | SYSTOLIC BLOOD PRESSURE: 144 MMHG

## 2023-05-24 DIAGNOSIS — Z45.02 ICD (IMPLANTABLE CARDIOVERTER-DEFIBRILLATOR) DISCHARGE: ICD-10-CM

## 2023-05-24 DIAGNOSIS — I50.22 CHRONIC SYSTOLIC CONGESTIVE HEART FAILURE (HCC): ICD-10-CM

## 2023-05-24 DIAGNOSIS — I48.0 PAROXYSMAL ATRIAL FIBRILLATION (HCC): Primary | ICD-10-CM

## 2023-05-24 DIAGNOSIS — I47.20 V-TACH (HCC): Primary | ICD-10-CM

## 2023-05-24 DIAGNOSIS — I42.0 DILATED CARDIOMYOPATHY (HCC): ICD-10-CM

## 2023-05-24 DIAGNOSIS — I10 HYPERTENSION, UNSPECIFIED TYPE: ICD-10-CM

## 2023-05-24 DIAGNOSIS — I48.0 PAROXYSMAL ATRIAL FIBRILLATION (HCC): ICD-10-CM

## 2023-05-24 DIAGNOSIS — Z95.810 AICD (AUTOMATIC CARDIOVERTER/DEFIBRILLATOR) PRESENT: ICD-10-CM

## 2023-05-24 NOTE — TELEPHONE ENCOUNTER
Patient scheduled for EPS/SVT/AFIB at [facility] on 07/03/2023 with Dr Nathalia Fitzgerald  Patient aware of general instructions, labs test required  Meds holds: XARELTO: Hold 24 hrs  SPIRONOLACTONE:Hold the morning of the procedure  AMIODARONE: Hold the week prior to the procedure  CARVEDILOL: hold the morning of the procedure  DIGOXIN:hold the morning of the procedure      Can we please check insurance for approval

## 2023-05-25 NOTE — TELEPHONE ENCOUNTER
Dr Gabby Esteban, patient mentioned to remember you said something about new medication to start at the hospital after his procedure  Do you wants us to check price for Dofetilide/Sotalol, also to get authorize a med admission post procedure? ?

## 2023-05-30 NOTE — PROGRESS NOTES
"EPS Progress Note - Deepak Loera 56 y o  male MRN: [id number]           ASSESSMENT:  1  V-tach [facility])  POCT ECG      2  Dilated cardiomyopathy ([address] )        3  Hypertension, unspecified type        4  ICD (implantable cardioverter-defibrillator) discharge        5  AICD (automatic cardioverter/defibrillator) present        6  Chronic systolic congestive heart failure (HCC)        7  Paroxysmal atrial fibrillation (HCC)                PLAN:  I have recommended an electrophysiologic study for this patient possible catheter ablation as it is not clear to me whether he is having rapid SVT with hemodynamic sequelae or actually ventricular tachycardia  He is currently on amiodarone which is not a good long-term option for this young man  I have recommended that he continue to stay off of work until we figure out what is going on from an arrhythmia perspective  Particularly given the fact that he had syncope and ideally we can get him off of amiodarone long-term otherwise from a heart failure perspective he is on appropriate medications    HPI:   Interim history Hospital follow-up for ICD shock that was presumed to be ventricular tachycardia  He did have a syncopal episode prior to the event  ROS   Positive for syncope positive for shortness of breath with exertion negative for chest discomfort all other 12 point ROS negative for complaints today    Objective:     Vitals: Blood pressure 144/70, pulse 65, height 6' 2\" (1 88 m), weight (!) 145 kg (318 lb 11 2 oz)  , Body mass index is 40 92 kg/m²  ,        Physical Exam:    GEN: Deepak Loera appears well, alert and oriented x 3, pleasant and cooperative   HEENT: pupils equal, round, and reactive to light; extraocular muscles intact  NECK: supple, no carotid bruits   HEART: regular rhythm, normal S1 and S2, no murmurs, clicks, gallops or rubs   LUNGS: clear to auscultation bilaterally; no wheezes, rales, or rhonchi   ABDOMEN: normal bowel sounds, soft, no " tenderness, no distention  EXTREMITIES: peripheral pulses normal; no clubbing, cyanosis, or edema  NEURO: no focal findings   SKIN: normal without suspicious lesions on exposed skin    Medications:      Current Outpatient Medications:   •  amiodarone 200 mg tablet, Take 1 tablet (200 mg total) by mouth 2 (two) times a day for 14 days, THEN 1 tablet (200 mg total) daily  , Disp: 30 tablet, Rfl: 0  •  amiodarone 200 mg tablet, Take 1 tablet (200 mg total) by mouth daily, Disp: 30 tablet, Rfl: 5  •  carvedilol (COREG) 25 mg tablet, Take 1 5 tablets (37 5 mg total) by mouth 2 (two) times a day with meals, Disp: 270 tablet, Rfl: 3  •  digoxin (LANOXIN) 0 125 mg tablet, Take 1 tablet (125 mcg total) by mouth daily, Disp: 90 tablet, Rfl: 0  •  spironolactone (ALDACTONE) 25 mg tablet, Take 1 tablet (25 mg total) by mouth in the morning , Disp: 90 tablet, Rfl: 3  •  traZODone (DESYREL) 100 mg tablet, Take 1 tablet (100 mg total) by mouth daily at bedtime, Disp: 90 tablet, Rfl: 3  •  Xarelto 20 MG tablet, TAKE 1 TABLET BY MOUTH EVERY DAY WITH DINNER, Disp: 90 tablet, Rfl: 3     Family History   Problem Relation Age of Onset   • No Known Problems Mother    • No Known Problems Father      Social History     Socioeconomic History   • Marital status: Single     Spouse name: Not on file   • Number of children: Not on file   • Years of education: Not on file   • Highest education level: Not on file   Occupational History   • Not on file   Tobacco Use   • Smoking status: Never   • Smokeless tobacco: Never   Vaping Use   • Vaping Use: Never used   Substance and Sexual Activity   • Alcohol use: Not Currently     Comment: OCCASIONAL   • Drug use: No   • Sexual activity: Not on file   Other Topics Concern   • Not on file   Social History Narrative   • Not on file     Social Determinants of Health     Financial Resource Strain: Not on file   Food Insecurity: No Food Insecurity (5/2/2023)    Hunger Vital Sign    • Worried About Running Out of Food in the Last Year: Never true    • Ran Out of Food in the Last Year: Never true   Transportation Needs: No Transportation Needs (5/2/2023)    PRAPARE - Transportation    • Lack of Transportation (Medical): No    • Lack of Transportation (Non-Medical): No   Physical Activity: Not on file   Stress: Not on file   Social Connections: Not on file   Intimate Partner Violence: Not on file   Housing Stability: Low Risk  (5/2/2023)    Housing Stability Vital Sign    • Unable to Pay for Housing in the Last Year: No    • Number of Places Lived in the Last Year: 1    • Unstable Housing in the Last Year: No     Social History     Tobacco Use   Smoking Status Never   Smokeless Tobacco Never     Social History     Substance and Sexual Activity   Alcohol Use Not Currently    Comment: OCCASIONAL       Labs & Results:  Below is the patient's most recent value for Albumin, ALT, AST, BUN, Calcium, Chloride, Cholesterol, CO2, Creatinine, GFR, Glucose, HDL, Hematocrit, Hemoglobin, Hemoglobin A1C, LDL, Magnesium, Phosphorus, Platelets, Potassium, PSA, Sodium, Triglycerides, and WBC  Lab Results   Component Value Date    ALT 43 05/02/2023    AST 29 05/02/2023    BUN 10 05/03/2023    CALCIUM 9 1 05/03/2023     05/03/2023    CO2 25 05/03/2023    CREATININE 1 02 05/03/2023    HCT 44 2 05/03/2023    HDL 48 09/07/2017    HGB 14 8 05/03/2023    K 4 1 05/03/2023    MG 2 0 05/02/2023    PHOS 3 3 05/02/2023     05/03/2023    TRIG 85 09/07/2017    WBC 8 63 05/03/2023     Note: for a comprehensive list of the patient's lab results, access the Results Review activity              Cardiac testing:   Results for orders placed during the hospital encounter of 04/09/19    Echo complete with contrast if indicated    Narrative  [place] [id number], [address] [facility]  [phone]    Transthoracic Echocardiogram  2D, M-mode, Doppler, and Color Doppler    Study date:  09-Apr-2019    Patient: Alfredo TROTTER number: [id number]  Account number: [de-identified]  : 1975  Age: 44 years  Gender: Male  Status: Outpatient  Location: [facility]  Height: 74 in  Weight: 291 lb  BP: 118/ 64 mmHg    Indications: Dilated Cardiomyopathy  Diagnoses: I42 0 - Dilated cardiomyopathy    Sonographer:  Boyd RCS  Interpreting Physician:  Jose Martin King MD  Primary Physician:  Dawna Gutierrez MD  Referring Physician:  Олег Fisher MD  Group:  [facility]    SUMMARY    LEFT VENTRICLE:  The ventricle was moderately to markedly dilated  Ejection fraction was estimated to be 25 %  There was severe diffuse hypokinesis  Features were consistent with a pseudonormal left ventricular filling pattern, with concomitant abnormal relaxation and increased filling pressure (grade 2 diastolic dysfunction)  RIGHT VENTRICLE:  Estimated peak pressure was at least 30 mmHg  ICD lead noted  LEFT ATRIUM:  The atrium was moderately dilated  RIGHT ATRIUM:  The atrium was moderately dilated  MITRAL VALVE:  There was trace regurgitation  TRICUSPID VALVE:  There was mild regurgitation  HISTORY: PRIOR HISTORY: Risk factors: hypertension  PROCEDURE: The study was performed in the [address] [place] [address]  This was a routine study  The transthoracic approach was used  The study included complete 2D imaging, M-mode, complete spectral Doppler, and color Doppler  The  heart rate was 74 bpm, at the start of the study  This was a technically difficult study  LEFT VENTRICLE: The ventricle was moderately to markedly dilated  Ejection fraction was estimated to be 25 %  There was severe diffuse hypokinesis  DOPPLER: Features were consistent with a pseudonormal left ventricular filling pattern,  with concomitant abnormal relaxation and increased filling pressure (grade 2 diastolic dysfunction)      RIGHT VENTRICLE: The size was normal  Systolic function was normal  Wall thickness was normal  DOPPLER: Estimated peak pressure was at least 30 mmHg  ICD lead noted  LEFT ATRIUM: The atrium was moderately dilated  RIGHT ATRIUM: The atrium was moderately dilated  MITRAL VALVE: There was annular calcification  DOPPLER: There was trace regurgitation  AORTIC VALVE: The valve was trileaflet  Leaflets exhibited normal thickness and normal cuspal separation  The valve was not well visualized  DOPPLER: Transaortic velocity was within the normal range  There was no evidence for stenosis  There was no regurgitation  TRICUSPID VALVE: The valve structure was normal  There was normal leaflet separation  DOPPLER: The transtricuspid velocity was within the normal range  There was no evidence for stenosis  There was mild regurgitation  PULMONIC VALVE: Leaflets exhibited normal thickness, no calcification, and normal cuspal separation  DOPPLER: The transpulmonic velocity was within the normal range  There was no regurgitation  PERICARDIUM: There was no pericardial effusion  The pericardium was normal in appearance  AORTA: The root exhibited normal size      SYSTEM MEASUREMENT TABLES    2D  %FS: 12 58 %  Ao Diam: 2 95 cm  EDV(Teich): 249 22 ml  EF(Teich): 26 31 %  ESV(Teich): 183 65 ml  IVSd: 1 03 cm  LA Area: 29 37 cm2  LA Diam: 5 32 cm  LVEDV MOD A4C: 213 51 ml  LVEF MOD A4C: 30 3 %  LVESV MOD A4C: 148 81 ml  LVIDd: 6 92 cm  LVIDs: 6 05 cm  LVLd A4C: 9 46 cm  LVLs A4C: 8 02 cm  LVPWd: 0 71 cm  RA Area: 23 69 cm2  RVIDd: 4 49 cm  SV MOD A4C: 64 7 ml  SV(Teich): 65 57 ml    CW  TR MaxP 1 mmHg  TR Vmax: 2 55 m/s    MM  TAPSE: 2 55 cm    PW  E': 0 08 m/s  E/E': 11 03  MV A Kamron: 0 5 m/s  MV Dec Leslie: 3 77 m/s2  MV DecT: 234 ms  MV E Kamron: 0 88 m/s  MV E/A Ratio: 1 76  MV PHT: 67 86 ms  MVA By PHT: 3 24 cm2    [employer] Accredited Echocardiography Laboratory    Prepared and electronically signed by    Corina Friedman MD  Signed 10-Apr-2019 13:52:24    No results found for this or any previous visit  No results found for this or any previous visit  No results found for this or any previous visit

## 2023-05-31 NOTE — TELEPHONE ENCOUNTER
Dofetilide 500 mcg BID -REQUIRES PRIOR AUTHORIZATION    60 for 30 $ 20 00    Sotalol 160 mg BID- no prior auth required   60 for 30 $ 4 00    Prior auth was submitted for dofetilide but will possible take a few days for determination      [facility]  [id number]

## 2023-05-31 NOTE — TELEPHONE ENCOUNTER
Dofetilide 500 mcg has been approved through 5/31/2024    PA# [facility] [id number]   [facility]

## 2023-06-09 DIAGNOSIS — I47.20 V-TACH (HCC): ICD-10-CM

## 2023-06-12 RX ORDER — AMIODARONE HYDROCHLORIDE 200 MG/1
TABLET ORAL
Qty: 90 TABLET | Refills: 2 | Status: SHIPPED | OUTPATIENT
Start: 2023-06-12

## 2023-06-16 ENCOUNTER — APPOINTMENT (OUTPATIENT)
Dept: LAB | Facility: CLINIC | Age: 48
End: 2023-06-16
Payer: COMMERCIAL

## 2023-06-16 LAB
ALBUMIN SERPL BCP-MCNC: 3.4 G/DL (ref 3.5–5)
ALP SERPL-CCNC: 71 U/L (ref 46–116)
ALT SERPL W P-5'-P-CCNC: 65 U/L (ref 12–78)
ANION GAP SERPL CALCULATED.3IONS-SCNC: 3 MMOL/L (ref 4–13)
AST SERPL W P-5'-P-CCNC: 53 U/L (ref 5–45)
BASOPHILS # BLD AUTO: 0.05 THOUSANDS/ÂΜL (ref 0–0.1)
BASOPHILS NFR BLD AUTO: 1 % (ref 0–1)
BILIRUB SERPL-MCNC: 1.28 MG/DL (ref 0.2–1)
BUN SERPL-MCNC: 13 MG/DL (ref 5–25)
CALCIUM ALBUM COR SERPL-MCNC: 9.6 MG/DL (ref 8.3–10.1)
CALCIUM SERPL-MCNC: 9.1 MG/DL (ref 8.3–10.1)
CHLORIDE SERPL-SCNC: 106 MMOL/L (ref 96–108)
CO2 SERPL-SCNC: 27 MMOL/L (ref 21–32)
CREAT SERPL-MCNC: 1.14 MG/DL (ref 0.6–1.3)
EOSINOPHIL # BLD AUTO: 0.17 THOUSAND/ÂΜL (ref 0–0.61)
EOSINOPHIL NFR BLD AUTO: 3 % (ref 0–6)
ERYTHROCYTE [DISTWIDTH] IN BLOOD BY AUTOMATED COUNT: 14.3 % (ref 11.6–15.1)
GFR SERPL CREATININE-BSD FRML MDRD: 76 ML/MIN/1.73SQ M
GLUCOSE P FAST SERPL-MCNC: 153 MG/DL (ref 65–99)
HCT VFR BLD AUTO: 46.2 % (ref 36.5–49.3)
HGB BLD-MCNC: 14.9 G/DL (ref 12–17)
IMM GRANULOCYTES # BLD AUTO: 0.02 THOUSAND/UL (ref 0–0.2)
IMM GRANULOCYTES NFR BLD AUTO: 0 % (ref 0–2)
LYMPHOCYTES # BLD AUTO: 1.85 THOUSANDS/ÂΜL (ref 0.6–4.47)
LYMPHOCYTES NFR BLD AUTO: 27 % (ref 14–44)
MCH RBC QN AUTO: 29.7 PG (ref 26.8–34.3)
MCHC RBC AUTO-ENTMCNC: 32.3 G/DL (ref 31.4–37.4)
MCV RBC AUTO: 92 FL (ref 82–98)
MONOCYTES # BLD AUTO: 0.66 THOUSAND/ÂΜL (ref 0.17–1.22)
MONOCYTES NFR BLD AUTO: 10 % (ref 4–12)
NEUTROPHILS # BLD AUTO: 4.02 THOUSANDS/ÂΜL (ref 1.85–7.62)
NEUTS SEG NFR BLD AUTO: 59 % (ref 43–75)
NRBC BLD AUTO-RTO: 0 /100 WBCS
PLATELET # BLD AUTO: 146 THOUSANDS/UL (ref 149–390)
PMV BLD AUTO: 13 FL (ref 8.9–12.7)
POTASSIUM SERPL-SCNC: 4.6 MMOL/L (ref 3.5–5.3)
PROT SERPL-MCNC: 6.9 G/DL (ref 6.4–8.4)
RBC # BLD AUTO: 5.02 MILLION/UL (ref 3.88–5.62)
SODIUM SERPL-SCNC: 136 MMOL/L (ref 135–147)
WBC # BLD AUTO: 6.77 THOUSAND/UL (ref 4.31–10.16)

## 2023-07-02 ENCOUNTER — ANESTHESIA EVENT (OUTPATIENT)
Dept: NON INVASIVE DIAGNOSTICS | Facility: HOSPITAL | Age: 48
DRG: 274 | End: 2023-07-02
Payer: COMMERCIAL

## 2023-07-02 PROBLEM — E66.01 MORBID OBESITY WITH BMI OF 40.0-44.9, ADULT (HCC): Status: ACTIVE | Noted: 2023-07-02

## 2023-07-02 NOTE — ANESTHESIA PREPROCEDURE EVALUATION
Procedure:  Cardiac eps/svt ablation (Chest)  Cardiac eps/afib ablation (Chest)    Relevant Problems   ANESTHESIA (within normal limits)   (-) History of anesthesia complications      CARDIO   (+) CHF (congestive heart failure) (HCC)   (+) Hypertension   (+) Paroxysmal atrial fibrillation (HCC)      ENDO (within normal limits)      GI/HEPATIC (within normal limits)      /RENAL (within normal limits)      HEMATOLOGY (within normal limits)      MUSCULOSKELETAL (within normal limits)      NEURO/PSYCH (within normal limits)      PULMONARY   (+) Obstructive sleep apnea      Cardiovascular and Mediastinum   (+) Dilated cardiomyopathy (HCC)      Other   (+) AICD (automatic cardioverter/defibrillator) present   (+) Morbid obesity with BMI of 40.0-44.9, adult (HCC)        Physical Exam    Airway    Mallampati score: II  TM Distance: >3 FB  Neck ROM: full     Dental   No notable dental hx     Cardiovascular  Rhythm: regular, Rate: normal, Cardiovascular exam normal    Pulmonary  Pulmonary exam normal Breath sounds clear to auscultation,     Other Findings        Anesthesia Plan  ASA Score- 4     Anesthesia Type- general with ASA Monitors. Additional Monitors: arterial line. Airway Plan: ETT. Plan Factors-Exercise tolerance (METS): >4 METS. Chart reviewed. EKG reviewed. Imaging results reviewed. Existing labs reviewed. Patient summary reviewed. Patient is not a current smoker. Patient did not smoke on day of surgery. Induction- intravenous. Postoperative Plan- . Planned trial extubation    Informed Consent- Anesthetic plan and risks discussed with patient and spouse. I personally reviewed this patient with the CRNA. Discussed and agreed on the Anesthesia Plan with the CRNA. Wilma Cobos Echo (5/1/23): Left Ventricle: Left ventricular cavity size is dilated. Wall thickness is normal. The left ventricular ejection fraction is 30%. Systolic function is severely reduced.  There is severe global hypokinesis. Diastolic function is severely abnormal, consistent with ungraded restrictive relaxation.       NPO and allergies verified. Plan:  GETA, arterial line    Risks and benefits of general anesthesia were discussed with the patient. Discussed risks of anesthesia including, but not limited to, the risk of dental injury, n/v, sore throat, corneal abrasions, and arrhythmias. All questions were answered. Anesthesia consent was obtained from the patient.

## 2023-07-03 ENCOUNTER — PATIENT OUTREACH (OUTPATIENT)
Dept: FAMILY MEDICINE CLINIC | Facility: CLINIC | Age: 48
End: 2023-07-03

## 2023-07-03 ENCOUNTER — HOSPITAL ENCOUNTER (INPATIENT)
Facility: HOSPITAL | Age: 48
LOS: 3 days | Discharge: HOME | DRG: 274 | End: 2023-07-06
Attending: INTERNAL MEDICINE | Admitting: INTERNAL MEDICINE
Payer: COMMERCIAL

## 2023-07-03 ENCOUNTER — ANESTHESIA (OUTPATIENT)
Dept: NON INVASIVE DIAGNOSTICS | Facility: HOSPITAL | Age: 48
DRG: 274 | End: 2023-07-03
Payer: COMMERCIAL

## 2023-07-03 DIAGNOSIS — I48.0 PAROXYSMAL ATRIAL FIBRILLATION (HCC): ICD-10-CM

## 2023-07-03 LAB
ANION GAP SERPL CALCULATED.3IONS-SCNC: 1 MMOL/L
ATRIAL RATE: 64 BPM
ATRIAL RATE: 77 BPM
BUN SERPL-MCNC: 13 MG/DL (ref 5–25)
CALCIUM SERPL-MCNC: 8.8 MG/DL (ref 8.3–10.1)
CHLORIDE SERPL-SCNC: 111 MMOL/L (ref 96–108)
CO2 SERPL-SCNC: 26 MMOL/L (ref 21–32)
CREAT SERPL-MCNC: 1.03 MG/DL (ref 0.6–1.3)
ERYTHROCYTE [DISTWIDTH] IN BLOOD BY AUTOMATED COUNT: 13.8 % (ref 11.6–15.1)
GFR SERPL CREATININE-BSD FRML MDRD: 86 ML/MIN/1.73SQ M
GLUCOSE P FAST SERPL-MCNC: 140 MG/DL (ref 65–99)
GLUCOSE SERPL-MCNC: 140 MG/DL (ref 65–140)
HCT VFR BLD AUTO: 42.4 % (ref 36.5–49.3)
HGB BLD-MCNC: 14 G/DL (ref 12–17)
INR PPP: 1.07 (ref 0.84–1.19)
KCT BLD-ACNC: 341 SEC (ref 89–137)
KCT BLD-ACNC: 362 SEC (ref 89–137)
KCT BLD-ACNC: 376 SEC (ref 89–137)
KCT BLD-ACNC: 398 SEC (ref 89–137)
KCT BLD-ACNC: 427 SEC (ref 89–137)
MCH RBC QN AUTO: 29.7 PG (ref 26.8–34.3)
MCHC RBC AUTO-ENTMCNC: 33 G/DL (ref 31.4–37.4)
MCV RBC AUTO: 90 FL (ref 82–98)
P AXIS: 59 DEGREES
P AXIS: 9 DEGREES
PLATELET # BLD AUTO: 147 THOUSANDS/UL (ref 149–390)
PMV BLD AUTO: 11.6 FL (ref 8.9–12.7)
POTASSIUM SERPL-SCNC: 4.2 MMOL/L (ref 3.5–5.3)
PR INTERVAL: 175 MS
PR INTERVAL: 188 MS
PROTHROMBIN TIME: 14.1 SECONDS (ref 11.6–14.5)
QRS AXIS: 1 DEGREES
QRS AXIS: 56 DEGREES
QRSD INTERVAL: 100 MS
QRSD INTERVAL: 96 MS
QT INTERVAL: 358 MS
QT INTERVAL: 420 MS
QTC INTERVAL: 406 MS
QTC INTERVAL: 433 MS
RBC # BLD AUTO: 4.71 MILLION/UL (ref 3.88–5.62)
SODIUM SERPL-SCNC: 138 MMOL/L (ref 135–147)
SPECIMEN SOURCE: ABNORMAL
T WAVE AXIS: -20 DEGREES
T WAVE AXIS: 29 DEGREES
VENTRICULAR RATE: 64 BPM
VENTRICULAR RATE: 77 BPM
WBC # BLD AUTO: 6.66 THOUSAND/UL (ref 4.31–10.16)

## 2023-07-03 PROCEDURE — 93005 ELECTROCARDIOGRAM TRACING: CPT

## 2023-07-03 PROCEDURE — 92960 CARDIOVERSION ELECTRIC EXT: CPT | Performed by: INTERNAL MEDICINE

## 2023-07-03 PROCEDURE — C1894 INTRO/SHEATH, NON-LASER: HCPCS | Performed by: INTERNAL MEDICINE

## 2023-07-03 PROCEDURE — 76937 US GUIDE VASCULAR ACCESS: CPT | Performed by: INTERNAL MEDICINE

## 2023-07-03 PROCEDURE — 93655 ICAR CATH ABLTJ DSCRT ARRHYT: CPT | Performed by: INTERNAL MEDICINE

## 2023-07-03 PROCEDURE — C1893 INTRO/SHEATH, FIXED,NON-PEEL: HCPCS | Performed by: INTERNAL MEDICINE

## 2023-07-03 PROCEDURE — 85027 COMPLETE CBC AUTOMATED: CPT | Performed by: PHYSICIAN ASSISTANT

## 2023-07-03 PROCEDURE — C1769 GUIDE WIRE: HCPCS | Performed by: INTERNAL MEDICINE

## 2023-07-03 PROCEDURE — 93656 COMPRE EP EVAL ABLTJ ATR FIB: CPT | Performed by: INTERNAL MEDICINE

## 2023-07-03 PROCEDURE — C1730 CATH, EP, 19 OR FEW ELECT: HCPCS | Performed by: INTERNAL MEDICINE

## 2023-07-03 PROCEDURE — 80048 BASIC METABOLIC PNL TOTAL CA: CPT | Performed by: PHYSICIAN ASSISTANT

## 2023-07-03 PROCEDURE — C1732 CATH, EP, DIAG/ABL, 3D/VECT: HCPCS | Performed by: INTERNAL MEDICINE

## 2023-07-03 PROCEDURE — 93010 ELECTROCARDIOGRAM REPORT: CPT | Performed by: INTERNAL MEDICINE

## 2023-07-03 PROCEDURE — C1759 CATH, INTRA ECHOCARDIOGRAPHY: HCPCS | Performed by: INTERNAL MEDICINE

## 2023-07-03 PROCEDURE — NC001 PR NO CHARGE: Performed by: PHYSICIAN ASSISTANT

## 2023-07-03 PROCEDURE — 85347 COAGULATION TIME ACTIVATED: CPT

## 2023-07-03 PROCEDURE — 02583ZZ DESTRUCTION OF CONDUCTION MECHANISM, PERCUTANEOUS APPROACH: ICD-10-PCS | Performed by: INTERNAL MEDICINE

## 2023-07-03 PROCEDURE — 02K83ZZ MAP CONDUCTION MECHANISM, PERCUTANEOUS APPROACH: ICD-10-PCS | Performed by: INTERNAL MEDICINE

## 2023-07-03 PROCEDURE — 93623 PRGRMD STIMJ&PACG IV RX NFS: CPT | Performed by: INTERNAL MEDICINE

## 2023-07-03 PROCEDURE — 85610 PROTHROMBIN TIME: CPT | Performed by: PHYSICIAN ASSISTANT

## 2023-07-03 PROCEDURE — C1733 CATH, EP, OTHR THAN COOL-TIP: HCPCS | Performed by: INTERNAL MEDICINE

## 2023-07-03 RX ORDER — HEPARIN SODIUM 1000 [USP'U]/ML
INJECTION, SOLUTION INTRAVENOUS; SUBCUTANEOUS CODE/TRAUMA/SEDATION MEDICATION
Status: DISCONTINUED | OUTPATIENT
Start: 2023-07-03 | End: 2023-07-03 | Stop reason: HOSPADM

## 2023-07-03 RX ORDER — FENTANYL CITRATE 50 UG/ML
INJECTION, SOLUTION INTRAMUSCULAR; INTRAVENOUS AS NEEDED
Status: DISCONTINUED | OUTPATIENT
Start: 2023-07-03 | End: 2023-07-03

## 2023-07-03 RX ORDER — TRAZODONE HYDROCHLORIDE 100 MG/1
100 TABLET ORAL
Status: DISCONTINUED | OUTPATIENT
Start: 2023-07-03 | End: 2023-07-06 | Stop reason: HOSPADM

## 2023-07-03 RX ORDER — ONDANSETRON 2 MG/ML
4 INJECTION INTRAMUSCULAR; INTRAVENOUS ONCE AS NEEDED
Status: DISCONTINUED | OUTPATIENT
Start: 2023-07-03 | End: 2023-07-03 | Stop reason: HOSPADM

## 2023-07-03 RX ORDER — FENTANYL CITRATE/PF 50 MCG/ML
25 SYRINGE (ML) INJECTION
Status: DISCONTINUED | OUTPATIENT
Start: 2023-07-03 | End: 2023-07-03 | Stop reason: HOSPADM

## 2023-07-03 RX ORDER — PANTOPRAZOLE SODIUM 40 MG/1
40 TABLET, DELAYED RELEASE ORAL
Status: DISCONTINUED | OUTPATIENT
Start: 2023-07-04 | End: 2023-07-06 | Stop reason: HOSPADM

## 2023-07-03 RX ORDER — SODIUM CHLORIDE 9 MG/ML
INJECTION, SOLUTION INTRAVENOUS CONTINUOUS PRN
Status: DISCONTINUED | OUTPATIENT
Start: 2023-07-03 | End: 2023-07-03

## 2023-07-03 RX ORDER — HYDROMORPHONE HCL/PF 1 MG/ML
0.5 SYRINGE (ML) INJECTION
Status: DISCONTINUED | OUTPATIENT
Start: 2023-07-03 | End: 2023-07-03 | Stop reason: HOSPADM

## 2023-07-03 RX ORDER — LIDOCAINE HYDROCHLORIDE 20 MG/ML
INJECTION, SOLUTION EPIDURAL; INFILTRATION; INTRACAUDAL; PERINEURAL AS NEEDED
Status: DISCONTINUED | OUTPATIENT
Start: 2023-07-03 | End: 2023-07-03

## 2023-07-03 RX ORDER — ACETAMINOPHEN 325 MG/1
650 TABLET ORAL EVERY 4 HOURS PRN
Status: DISCONTINUED | OUTPATIENT
Start: 2023-07-03 | End: 2023-07-06 | Stop reason: HOSPADM

## 2023-07-03 RX ORDER — PANTOPRAZOLE SODIUM 40 MG/10ML
40 INJECTION, POWDER, LYOPHILIZED, FOR SOLUTION INTRAVENOUS ONCE
Status: DISCONTINUED | OUTPATIENT
Start: 2023-07-03 | End: 2023-07-06 | Stop reason: HOSPADM

## 2023-07-03 RX ORDER — DOFETILIDE 0.5 MG/1
500 CAPSULE ORAL
Status: COMPLETED | OUTPATIENT
Start: 2023-07-03 | End: 2023-07-05

## 2023-07-03 RX ORDER — FENTANYL CITRATE/PF 50 MCG/ML
25 SYRINGE (ML) INJECTION
Status: DISCONTINUED | OUTPATIENT
Start: 2023-07-03 | End: 2023-07-03

## 2023-07-03 RX ORDER — SUCCINYLCHOLINE/SOD CL,ISO/PF 100 MG/5ML
SYRINGE (ML) INTRAVENOUS AS NEEDED
Status: DISCONTINUED | OUTPATIENT
Start: 2023-07-03 | End: 2023-07-03

## 2023-07-03 RX ORDER — DIGOXIN 125 MCG
125 TABLET ORAL DAILY
Status: DISCONTINUED | OUTPATIENT
Start: 2023-07-04 | End: 2023-07-06 | Stop reason: HOSPADM

## 2023-07-03 RX ORDER — PROTAMINE SULFATE 10 MG/ML
INJECTION, SOLUTION INTRAVENOUS AS NEEDED
Status: DISCONTINUED | OUTPATIENT
Start: 2023-07-03 | End: 2023-07-03

## 2023-07-03 RX ORDER — SPIRONOLACTONE 25 MG/1
25 TABLET ORAL DAILY
Status: DISCONTINUED | OUTPATIENT
Start: 2023-07-04 | End: 2023-07-06 | Stop reason: HOSPADM

## 2023-07-03 RX ORDER — PROPOFOL 10 MG/ML
INJECTION, EMULSION INTRAVENOUS AS NEEDED
Status: DISCONTINUED | OUTPATIENT
Start: 2023-07-03 | End: 2023-07-03

## 2023-07-03 RX ADMIN — DOFETILIDE 500 MCG: 500 CAPSULE ORAL at 22:25

## 2023-07-03 RX ADMIN — FENTANYL CITRATE 25 MCG: 50 INJECTION, SOLUTION INTRAMUSCULAR; INTRAVENOUS at 15:21

## 2023-07-03 RX ADMIN — FENTANYL CITRATE 25 MCG: 50 INJECTION, SOLUTION INTRAMUSCULAR; INTRAVENOUS at 15:58

## 2023-07-03 RX ADMIN — Medication 3000 MG: at 12:15

## 2023-07-03 RX ADMIN — PROPOFOL 200 MG: 10 INJECTION, EMULSION INTRAVENOUS at 11:53

## 2023-07-03 RX ADMIN — REMIFENTANIL HYDROCHLORIDE 0.15 MCG/KG/MIN: 1 INJECTION, POWDER, LYOPHILIZED, FOR SOLUTION INTRAVENOUS at 12:10

## 2023-07-03 RX ADMIN — SODIUM CHLORIDE: 0.9 INJECTION, SOLUTION INTRAVENOUS at 11:45

## 2023-07-03 RX ADMIN — FENTANYL CITRATE 25 MCG: 50 INJECTION, SOLUTION INTRAMUSCULAR; INTRAVENOUS at 15:49

## 2023-07-03 RX ADMIN — FENTANYL CITRATE 25 MCG: 50 INJECTION, SOLUTION INTRAMUSCULAR; INTRAVENOUS at 14:19

## 2023-07-03 RX ADMIN — PROTAMINE SULFATE 50 MG: 10 INJECTION, SOLUTION INTRAVENOUS at 14:39

## 2023-07-03 RX ADMIN — HYDROMORPHONE HYDROCHLORIDE 0.5 MG: 1 INJECTION, SOLUTION INTRAMUSCULAR; INTRAVENOUS; SUBCUTANEOUS at 16:12

## 2023-07-03 RX ADMIN — FENTANYL CITRATE 25 MCG: 50 INJECTION, SOLUTION INTRAMUSCULAR; INTRAVENOUS at 15:32

## 2023-07-03 RX ADMIN — LIDOCAINE HYDROCHLORIDE 100 MG: 20 INJECTION, SOLUTION EPIDURAL; INFILTRATION; INTRACAUDAL; PERINEURAL at 11:53

## 2023-07-03 RX ADMIN — ACETAMINOPHEN 650 MG: 325 TABLET, FILM COATED ORAL at 18:30

## 2023-07-03 RX ADMIN — SODIUM CHLORIDE: 9 INJECTION, SOLUTION INTRAVENOUS at 11:45

## 2023-07-03 RX ADMIN — FENTANYL CITRATE 25 MCG: 50 INJECTION, SOLUTION INTRAMUSCULAR; INTRAVENOUS at 11:53

## 2023-07-03 RX ADMIN — EPINEPHRINE 2 MCG/MIN: 1 INJECTION, SOLUTION, CONCENTRATE INTRAVENOUS at 12:00

## 2023-07-03 RX ADMIN — FENTANYL CITRATE 50 MCG: 50 INJECTION, SOLUTION INTRAMUSCULAR; INTRAVENOUS at 12:10

## 2023-07-03 RX ADMIN — Medication 150 MG: at 11:54

## 2023-07-03 RX ADMIN — TRAZODONE HYDROCHLORIDE 100 MG: 100 TABLET ORAL at 22:25

## 2023-07-03 NOTE — ANESTHESIA PROCEDURE NOTES
Arterial Line Insertion    Performed by: Elan Michel CRNA  Authorized by: Corbin Gutierrez MD  Consent: Verbal consent obtained. Written consent obtained. Risks and benefits: risks, benefits and alternatives were discussed  Consent given by: patient  Patient understanding: patient states understanding of the procedure being performed  Patient consent: the patient's understanding of the procedure matches consent given  Procedure consent: procedure consent matches procedure scheduled  Relevant documents: relevant documents present and verified  Patient identity confirmed: verbally with patient, arm band and hospital-assigned identification number  Time out: Immediately prior to procedure a "time out" was called to verify the correct patient, procedure, equipment, support staff and site/side marked as required. Preparation: Patient was prepped and draped in the usual sterile fashion. Indications: hemodynamic monitoring  Orientation:  Right  Location: radial artery  Sedation:  Patient sedated: yes  Sedatives: see MAR for details  Analgesia: see MAR for details  Vitals: Vital signs were monitored during sedation.     Procedure Details:  Needle gauge: 20  Seldinger technique: Seldinger technique used  Number of attempts: 1    Post-procedure:  Post-procedure: dressing applied  Waveform: good waveform and waveform confirmed  Post-procedure CNS: normal  Patient tolerance: patient tolerated the procedure well with no immediate complications

## 2023-07-03 NOTE — Clinical Note
Defib pad site: left lower flank and right upper scapula. Defib pad site assessment: skin integrity intact.

## 2023-07-03 NOTE — PROGRESS NOTES
Outpatient Care Management Note:  Inbasket ADT admission alert received. Chart review completed. Patient admitted to [facility] today to undergo cardiac EPS/afib and SVT ablation under general anesthesia. This OPCM will continue to monitor EMR for progress towards discharge and AYDIN.

## 2023-07-03 NOTE — LETTER
[address] [place] [address]  [address] [address]  Dept: [phone]    July 6, 2023     Patient: Melly Leggett   YOB: 1975   Date of Visit: 7/3/2023       To Whom it May Concern:    Melly Leggett is currently under my professional care and recently underwent a procedure. He was seen in the hospital from 7/3/2023 to 07/06/23. He is cleared to return to work on Monday, 7/17/2023, without restrictions or limitations. If you have any questions or concerns, please do not hesitate to contact our office - [phone].          Sincerely,          [facility]  - Cardiac Electrophysiology

## 2023-07-03 NOTE — ANESTHESIA POSTPROCEDURE EVALUATION
Post-Op Assessment Note    CV Status:  Stable  Pain Score: 0    Pain management: adequate     Mental Status:  Alert and awake   Hydration Status:  Euvolemic   PONV Controlled:  Controlled   Airway Patency:  Patent   Two or more mitigation strategies used for obstructive sleep apnea   Post Op Vitals Reviewed: Yes      Staff: Anesthesiologist, CRNA         No notable events documented.     /77 (07/03/23 1510)    Temp 99.4 °F (37.4 °C) (07/03/23 1510)    Pulse 78 (07/03/23 1510)   Resp 18 (07/03/23 1510)    SpO2 100 % (07/03/23 1510)

## 2023-07-03 NOTE — H&P
H&P Exam - Cardiology   Melly Leggett 56 y.o. male MRN: [id number]  Unit/Bed#: BE CATH LAB ROOM Encounter: [id number]    Assessment/Plan     Assessment:  1. Recent syncope with ICD shock, unclear if VT versus SVT   A.)  Medtronic single-chamber ICD in situ - initially implanted in 2006, status post extraction of recalled [place] lead with reimplantation 6/2017   B.)  Previously maintained on amiodarone antiarrhythmic therapy - started 5/2023 after most recent ICD therapy, discontinued 1 week prior to admission   C.)  History of prior ICD therapy - 4/2023, 10/2022  2. Nonischemic cardiomyopathy with LVEF 30% per echo 5/2023   A.)  Maintained on carvedilol and spironolactone, no ACEi/ARB/ARNi due to soft BP   B.)  No obstructive CAD per cardiac catheterization 8/2021  3. Paroxysmal atrial fibrillation and flutter with rapid ventricular response   A.)  History of inappropriate ICD shock for rapid A-fib (10/2022)   B.)  Maintained on Xarelto anticoagulation, maintained on carvedilol and digoxin for rate control  4. Hypertension  5. Mild obstructive sleep apnea  6. Obesity with BMI 41      Plan:  Atrial fibrillation/flutter ablation + EPS to rule out SVT or other arrhythmias, followed by admission for dofetilide initiation       History of Present Illness   HPI:  Melly Leggett is a 56y.o. year old male with nonischemic cardiomyopathy with LVEF 30%, Medtronic single-chamber ICD in situ, paroxysmal atrial fibrillation and flutter with rapid ventricular response, hypertension, mild obstructive sleep apnea, and obesity. He typically follows with Dr. Brie Menard and our heart failure team.  He has a longstanding history of viral myocarditis/nonischemic cardiomyopathy with reduced EF. He had a primary prevention Medtronic single-chamber ICD implantation in 2006, and when his device reached BASILIO 4/2017 he underwent extraction of recalled Sprint Mamadou lead with reimplantation.   Device interrogations revealed a new diagnosis of paroxysmal atrial fibrillation 11/2020, and he was started on Xarelto anticoagulation. In 10/2022, he presented to the hospital with ICD shock x 2. His carvedilol was changed to Toprol-XL, and he was then seen in EP follow-up by Dr. Eleazar Barber shortly thereafter. At that time, it was thought that he was inappropriately shocked for rapid atrial fibrillation/flutter and thus he was started on digoxin for better rate control. Unfortunately, he received another ICD shock 4/2023 in the setting of alcohol use. This was thought to be the trigger, and his digoxin dose was increased to 250 mcg daily. Several weeks later in 5/2023, he had a syncopal episode and another ICD shock. He was started on amiodarone antiarrhythmic therapy at that time. As it is a single-chamber device, it was difficult to determine whether he was shocked appropriately for VT or inappropriately for SVT/atrial flutter. He was again seen by Dr. Eleazar Barber, who recommended atrial fibrillation/flutter ablation as well as EP study to rule out SVT or other arrhythmias. He presents today to undergo that procedure. His amiodarone was discontinued 1 week ago, and he will be started on dofetilide antiarrhythmic therapy in the post ablation setting. Review of Systems  ROS as noted above, otherwise 12 point review of systems was performed and is negative. Historical Information   Past Medical History:   Diagnosis Date   • A-fib [facility]) 2020   • AICD (automatic cardioverter/defibrillator) present    • CHF (congestive heart failure) ([address])    • Hypertension    • Myocarditis ([address])    • Sleep apnea    • Ventricular tachycardia [facility])      Past Surgical History:   Procedure Laterality Date   • CARDIAC DEFIBRILLATOR PLACEMENT     • [place] ESOPHAGOGASTRODUODENOSCOPY TRANSORAL DIAGNOSTIC N/A 3/29/2017    Procedure: ESOPHAGOGASTRODUODENOSCOPY (EGD); Surgeon: Adali Medrano MD;  Location: [facility];   Service: Gastroenterology   • WI INSJ/RPLCMT PERM DFB W/TRNSVNS LDS 1/DUAL CHMBR N/A 6/2/2017    Procedure: LEAD EXTRACTION/REIMPLANTATION AND ICD GENERATOR CHANGE ;  Surgeon: Sonia Barone MD;  Location:  MAIN OR;  Service: Cardiology   • [place] LAPAROSCOPY SURG CHOLECYSTECTOMY N/A 5/19/2017    Procedure: Law Zabala ;  Surgeon: Charissa Baldwin MD;  Location: [facility];  Service: General   • TONSILLECTOMY     • TYMPANOSTOMY TUBE PLACEMENT       Family History:   Family History   Problem Relation Age of Onset   • No Known Problems Mother    • No Known Problems Father        Social History   Social History     Substance and Sexual Activity   Alcohol Use Not Currently    Comment: OCCASIONAL     Social History     Substance and Sexual Activity   Drug Use No     Social History     Tobacco Use   Smoking Status Never   Smokeless Tobacco Never         Meds/Allergies   all medications and allergies reviewed  Home Medications:   Medications Prior to Admission   Medication   • amiodarone 200 mg tablet   • carvedilol (COREG) 25 mg tablet   • digoxin (LANOXIN) 0.125 mg tablet   • spironolactone (ALDACTONE) 25 mg tablet   • traZODone (DESYREL) 100 mg tablet   • Xarelto 20 MG tablet   • amiodarone 200 mg tablet       Allergies   Allergen Reactions   • Vicodin [Hydrocodone-Acetaminophen] Other (See Comments)     "i get nasty"       Objective   Vitals: Blood pressure 127/71, pulse 64, temperature 98.1 °F (36.7 °C), temperature source Temporal, resp. rate 16, height 6' 2" (1.88 m), weight (!) 145 kg (320 lb), SpO2 96 %.   Orthostatic Blood Pressures    Flowsheet Row Most Recent Value   Blood Pressure 127/71 filed at 07/03/2023 1022   Patient Position - Orthostatic VS Lying filed at 07/03/2023 1022          No intake or output data in the 24 hours ending 07/03/23 1041    Invasive Devices     Peripheral Intravenous Line  Duration           Peripheral IV 07/03/23 Left;Ventral (anterior) Forearm <1 day                Physical Exam  GEN: NAD, alert and oriented x 3, well appearing  SKIN: dry without significant lesions or rashes  HEENT: NCAT, PERRL, EOMs intact  NECK: No JVD appreciated  CARDIOVASCULAR: RRR, normal S1, S2 without murmurs, rubs, or gallops appreciated  LUNGS: Clear to auscultation bilaterally without wheezes, rhonchi, or rales  ABDOMEN: Soft, nontender, nondistended  EXTREMITIES/VASCULAR: perfused without clubbing, cyanosis, or LE edema b/l  PSYCH: Normal mood and affect  NEURO: CN ll-Xll grossly intact      Lab Results: I have personally reviewed pertinent lab results. Results from last 7 days   Lab Units 23  0953   WBC Thousand/uL 6.66   HEMOGLOBIN g/dL 14.0   HEMATOCRIT % 42.4   PLATELETS Thousands/uL 147*     Results from last 7 days   Lab Units 23  0953   POTASSIUM mmol/L 4.2   CHLORIDE mmol/L 111*   CO2 mmol/L 26   BUN mg/dL 13   CREATININE mg/dL 1.03   CALCIUM mg/dL 8.8     Results from last 7 days   Lab Units 23  0953   INR  1.07             Imaging: I have personally reviewed pertinent reports. ECHO: Results for orders placed during the hospital encounter of 19    Echo complete with contrast if indicated    Narrative  [address] [place] [address]  [phone]    Transthoracic Echocardiogram  2D, M-mode, Doppler, and Color Doppler    Study date:  2019    Patient: Cecy Roberson  MR number: [id number]  Account number: [de-identified]  : 1975  Age: 44 years  Gender: Male  Status: Outpatient  Location: [facility]  Height: 74 in  Weight: 291 lb  BP: 118/ 64 mmHg    Indications: Dilated Cardiomyopathy. Diagnoses: I42.0 - Dilated cardiomyopathy    Sonographer:  Alyx Nolan, KAYLEE  Interpreting Physician:  Walter Salazar MD  Primary Physician:  Corazon Willett MD  Referring Physician:  Ptai Camara MD  Group:  [facility]    SUMMARY    LEFT VENTRICLE:  The ventricle was moderately to markedly dilated.   Ejection fraction was estimated to be 25 %. There was severe diffuse hypokinesis. Features were consistent with a pseudonormal left ventricular filling pattern, with concomitant abnormal relaxation and increased filling pressure (grade 2 diastolic dysfunction). RIGHT VENTRICLE:  Estimated peak pressure was at least 30 mmHg. ICD lead noted. LEFT ATRIUM:  The atrium was moderately dilated. RIGHT ATRIUM:  The atrium was moderately dilated. MITRAL VALVE:  There was trace regurgitation. TRICUSPID VALVE:  There was mild regurgitation. HISTORY: PRIOR HISTORY: Risk factors: hypertension. PROCEDURE: The study was performed in the [facility]. This was a routine study. The transthoracic approach was used. The study included complete 2D imaging, M-mode, complete spectral Doppler, and color Doppler. The  heart rate was 74 bpm, at the start of the study. This was a technically difficult study. LEFT VENTRICLE: The ventricle was moderately to markedly dilated. Ejection fraction was estimated to be 25 %. There was severe diffuse hypokinesis. DOPPLER: Features were consistent with a pseudonormal left ventricular filling pattern,  with concomitant abnormal relaxation and increased filling pressure (grade 2 diastolic dysfunction). RIGHT VENTRICLE: The size was normal. Systolic function was normal. Wall thickness was normal. DOPPLER: Estimated peak pressure was at least 30 mmHg. ICD lead noted. LEFT ATRIUM: The atrium was moderately dilated. RIGHT ATRIUM: The atrium was moderately dilated. MITRAL VALVE: There was annular calcification. DOPPLER: There was trace regurgitation. AORTIC VALVE: The valve was trileaflet. Leaflets exhibited normal thickness and normal cuspal separation. The valve was not well visualized. DOPPLER: Transaortic velocity was within the normal range. There was no evidence for stenosis. There was no regurgitation.     TRICUSPID VALVE: The valve structure was normal. There was normal leaflet separation. DOPPLER: The transtricuspid velocity was within the normal range. There was no evidence for stenosis. There was mild regurgitation. PULMONIC VALVE: Leaflets exhibited normal thickness, no calcification, and normal cuspal separation. DOPPLER: The transpulmonic velocity was within the normal range. There was no regurgitation. PERICARDIUM: There was no pericardial effusion. The pericardium was normal in appearance. AORTA: The root exhibited normal size. SYSTEM MEASUREMENT TABLES    2D  %FS: 12.58 %  Ao Diam: 2.95 cm  EDV(Teich): 249.22 ml  EF(Teich): 26.31 %  ESV(Teich): 183.65 ml  IVSd: 1.03 cm  LA Area: 29.37 cm2  LA Diam: 5.32 cm  LVEDV MOD A4C: 213.51 ml  LVEF MOD A4C: 30.3 %  LVESV MOD A4C: 148.81 ml  LVIDd: 6.92 cm  LVIDs: 6.05 cm  LVLd A4C: 9.46 cm  LVLs A4C: 8.02 cm  LVPWd: 0.71 cm  RA Area: 23.69 cm2  RVIDd: 4.49 cm  SV MOD A4C: 64.7 ml  SV(Teich): 65.57 ml    CW  TR MaxP.1 mmHg  TR Vmax: 2.55 m/s    MM  TAPSE: 2.55 cm    PW  E': 0.08 m/s  E/E': 11.03  MV A [place]: 0.5 m/s  MV Dec [place]: 3.77 m/s2  MV DecT: 234 ms  MV E [place]: 0.88 m/s  MV E/A Ratio: 1.76  MV PHT: 67.86 ms  MVA By PHT: 3.24 cm2    [employer] Accredited Echocardiography Laboratory    Prepared and electronically signed by    Ronnie Diaz MD  Signed 10-Apr-2019 13:52:24      Results for orders placed during the hospital encounter of 23    Echo complete w/ contrast if indicated    Interpretation Summary  •  Left Ventricle: Left ventricular cavity size is dilated. Wall thickness is normal. Systolic function is severely reduced (30%). There is severe global hypokinesis. Diastolic function is severely abnormal, consistent with ungraded restrictive relaxation. •  Right Ventricle: Right ventricular cavity size is normal. Systolic function is normal.  •  Left Atrium: The atrium is mildly dilated. •  Right Atrium: The atrium is mildly dilated. •  Mitral Valve: There is mild regurgitation.   • Tricuspid Valve: There is mild regurgitation.  The right ventricular systolic pressure is normal.        EKG:         Code Status: Level 1 - Full Code

## 2023-07-03 NOTE — Clinical Note
Site (pad location): lateral thigh. Laterality: left. Grounding pad site assessment: skin integrity intact.

## 2023-07-03 NOTE — PERIOPERATIVE NURSING NOTE
RN contacted Maynor Bagley MD regarding patient's inability to urinate and bladder scan volume of 703mL. MD requested RN defer urinary retention to cardiology. RN contacted [facility] [place] [facility] regarding patient's inability to urinate and placement of nursing urinary retention protocol. PA acknowledged message and requests that RN contact cardiology fellow on call for any other issues.

## 2023-07-04 LAB
ANION GAP SERPL CALCULATED.3IONS-SCNC: 5 MMOL/L
ATRIAL RATE: 66 BPM
ATRIAL RATE: 66 BPM
ATRIAL RATE: 67 BPM
ATRIAL RATE: 86 BPM
BUN SERPL-MCNC: 11 MG/DL (ref 5–25)
CALCIUM SERPL-MCNC: 7.9 MG/DL (ref 8.3–10.1)
CHLORIDE SERPL-SCNC: 109 MMOL/L (ref 96–108)
CO2 SERPL-SCNC: 23 MMOL/L (ref 21–32)
CREAT SERPL-MCNC: 0.94 MG/DL (ref 0.6–1.3)
ERYTHROCYTE [DISTWIDTH] IN BLOOD BY AUTOMATED COUNT: 13.9 % (ref 11.6–15.1)
GFR SERPL CREATININE-BSD FRML MDRD: 96 ML/MIN/1.73SQ M
GLUCOSE SERPL-MCNC: 131 MG/DL (ref 65–140)
HCT VFR BLD AUTO: 40.2 % (ref 36.5–49.3)
HGB BLD-MCNC: 13.1 G/DL (ref 12–17)
MAGNESIUM SERPL-MCNC: 2 MG/DL (ref 1.6–2.6)
MCH RBC QN AUTO: 29.9 PG (ref 26.8–34.3)
MCHC RBC AUTO-ENTMCNC: 32.6 G/DL (ref 31.4–37.4)
MCV RBC AUTO: 92 FL (ref 82–98)
P AXIS: 13 DEGREES
P AXIS: 13 DEGREES
P AXIS: 43 DEGREES
P AXIS: 9 DEGREES
PLATELET # BLD AUTO: 148 THOUSANDS/UL (ref 149–390)
PMV BLD AUTO: 11.4 FL (ref 8.9–12.7)
POTASSIUM SERPL-SCNC: 4 MMOL/L (ref 3.5–5.3)
PR INTERVAL: 172 MS
PR INTERVAL: 176 MS
PR INTERVAL: 178 MS
PR INTERVAL: 182 MS
QRS AXIS: 1 DEGREES
QRS AXIS: 24 DEGREES
QRS AXIS: 3 DEGREES
QRS AXIS: 3 DEGREES
QRSD INTERVAL: 100 MS
QRSD INTERVAL: 96 MS
QRSD INTERVAL: 98 MS
QRSD INTERVAL: 98 MS
QT INTERVAL: 386 MS
QT INTERVAL: 440 MS
QT INTERVAL: 440 MS
QT INTERVAL: 478 MS
QTC INTERVAL: 461 MS
QTC INTERVAL: 461 MS
QTC INTERVAL: 464 MS
QTC INTERVAL: 501 MS
RBC # BLD AUTO: 4.38 MILLION/UL (ref 3.88–5.62)
SODIUM SERPL-SCNC: 137 MMOL/L (ref 135–147)
T WAVE AXIS: 23 DEGREES
T WAVE AXIS: 38 DEGREES
T WAVE AXIS: 39 DEGREES
T WAVE AXIS: 52 DEGREES
VENTRICULAR RATE: 66 BPM
VENTRICULAR RATE: 66 BPM
VENTRICULAR RATE: 67 BPM
VENTRICULAR RATE: 86 BPM
WBC # BLD AUTO: 11.28 THOUSAND/UL (ref 4.31–10.16)

## 2023-07-04 PROCEDURE — 93010 ELECTROCARDIOGRAM REPORT: CPT | Performed by: INTERNAL MEDICINE

## 2023-07-04 PROCEDURE — 93005 ELECTROCARDIOGRAM TRACING: CPT

## 2023-07-04 PROCEDURE — 94760 N-INVAS EAR/PLS OXIMETRY 1: CPT

## 2023-07-04 PROCEDURE — 83735 ASSAY OF MAGNESIUM: CPT | Performed by: PHYSICIAN ASSISTANT

## 2023-07-04 PROCEDURE — 85027 COMPLETE CBC AUTOMATED: CPT | Performed by: PHYSICIAN ASSISTANT

## 2023-07-04 PROCEDURE — 94660 CPAP INITIATION&MGMT: CPT

## 2023-07-04 PROCEDURE — 80048 BASIC METABOLIC PNL TOTAL CA: CPT | Performed by: PHYSICIAN ASSISTANT

## 2023-07-04 PROCEDURE — 99232 SBSQ HOSP IP/OBS MODERATE 35: CPT | Performed by: INTERNAL MEDICINE

## 2023-07-04 RX ORDER — NAPROXEN 250 MG/1
250 TABLET ORAL EVERY 8 HOURS PRN
Status: DISCONTINUED | OUTPATIENT
Start: 2023-07-04 | End: 2023-07-06 | Stop reason: HOSPADM

## 2023-07-04 RX ADMIN — NAPROXEN 250 MG: 250 TABLET ORAL at 08:15

## 2023-07-04 RX ADMIN — SPIRONOLACTONE 25 MG: 25 TABLET ORAL at 08:12

## 2023-07-04 RX ADMIN — DIGOXIN 125 MCG: 125 TABLET ORAL at 08:12

## 2023-07-04 RX ADMIN — DOFETILIDE 500 MCG: 500 CAPSULE ORAL at 19:05

## 2023-07-04 RX ADMIN — NAPROXEN 250 MG: 250 TABLET ORAL at 15:37

## 2023-07-04 RX ADMIN — PANTOPRAZOLE SODIUM 40 MG: 40 TABLET, DELAYED RELEASE ORAL at 05:51

## 2023-07-04 RX ADMIN — TRAZODONE HYDROCHLORIDE 100 MG: 100 TABLET ORAL at 22:09

## 2023-07-04 RX ADMIN — DOFETILIDE 500 MCG: 500 CAPSULE ORAL at 08:12

## 2023-07-04 RX ADMIN — CARVEDILOL 37.5 MG: 25 TABLET, FILM COATED ORAL at 08:12

## 2023-07-04 RX ADMIN — CARVEDILOL 37.5 MG: 25 TABLET, FILM COATED ORAL at 15:35

## 2023-07-04 RX ADMIN — RIVAROXABAN 20 MG: 20 TABLET, FILM COATED ORAL at 15:35

## 2023-07-04 NOTE — PROGRESS NOTES
Progress Note - Electrophysiology - Cardiology  Alexei Lombardo 56 y.o. male MRN: [id number]  Unit/Bed#: [facility] 530-01 Encounter: [id number]      Assessment:  1. Recent syncope with ICD shock, unclear if VT versus SVT              A.)  Medtronic single-chamber ICD in situ - initially implanted in 2006, status post extraction of recalled Mamadou lead with reimplantation 6/2017              B.)  Previously maintained on amiodarone antiarrhythmic therapy - started 5/2023 after most recent ICD therapy, discontinued 1 week prior to admission              C.)  History of prior ICD therapy - 4/2023, 10/2022  2. Nonischemic cardiomyopathy with LVEF 30% per echo 5/2023              A.)  Maintained on carvedilol and spironolactone, no ACEi/ARB/ARNi due to soft BP              B.)  No obstructive CAD per cardiac catheterization 8/2021  3. Paroxysmal atrial fibrillation and flutter with rapid ventricular response              A.)  History of inappropriate ICD shock for rapid A-fib (10/2022)              B.)  Maintained on Xarelto anticoagulation, maintained on carvedilol and digoxin for rate control  4. Hypertension  5. Mild obstructive sleep apnea  6. Obesity with BMI 41    Plan:  -- S/p Afib ablation  -- groin sites stable, groin sutures removed bilaterally  -- maintaining sinus rhythm  -- continue Tikosyn initiation 500 mcg every 12 hours  -- QTc stable at 460 ms after 2nd dose   -- monitor for 5 doses, check QTc 2 hours after each dose  -- labs stable today  -- continue carvedilol and digoxin  -- PPI daily x 30 days  -- continue anticoagulation with Xarelto  -- continue to monitor on tele      Subjective/Objective   Subjective: Alexei Lombardo is a 56year old male with NICM LVEF 30%, Medtronic single-chamber ICD, paroxysmal atrial fibrillation and flutter with rapid ventricular response on Xarelto, hypertension, mild obstructive sleep apnea, and obesity with recent ICD shock for VT versus SVT.  He presented to [facility] for EPS and underwent Afib ablation. He was admitted to the hospital for Tikosyn initiation 500 mcg every 12  Hours. Today, he is feeling well. He denies chest pain, shortness of breath, palpitations, lightheadedness, dizziness, orthopnea, or PND. He denies groin pain or back pain. TELE: sinus rhythm HR 70s, one brief run of NSVT several beats    EKG: sinus rhythm QTc 460 ms      Objective:  Vitals: /67   Pulse 65   Temp 99.4 °F (37.4 °C)   Resp 18   Ht 6' 2" (1.88 m)   Wt (!) 145 kg (320 lb)   SpO2 93%   BMI 41.09 kg/m²     Vitals:    07/03/23 1022   Weight: (!) 145 kg (320 lb)     Orthostatic Blood Pressures    Flowsheet Row Most Recent Value   Blood Pressure 114/67 filed at 07/04/2023 0726   Patient Position - Orthostatic VS Lying filed at 07/03/2023 1022            Intake/Output Summary (Last 24 hours) at 7/4/2023 0849  Last data filed at 7/3/2023 2121  Gross per 24 hour   Intake 1900 ml   Output 1175 ml   Net 725 ml       Invasive Devices     Peripheral Intravenous Line  Duration           Peripheral IV 07/03/23 Dorsal (posterior); Right Forearm <1 day    Peripheral IV 07/03/23 Left;Ventral (anterior) Forearm <1 day                Scheduled Meds:  Current Facility-Administered Medications   Medication Dose Route Frequency Provider Last Rate   • acetaminophen  650 mg Oral Q4H PRN Dara Peterson PA-C     • carvedilol  37.5 mg Oral BID With Meals Dara Peterson PA-C     • digoxin  125 mcg Oral Daily Dara Peterson PA-C     • dofetilide  500 mcg Oral Q11H Dara Peterson PA-C     • naproxen  250 mg Oral Q8H PRN Sanket Leslie MD     • pantoprazole  40 mg Oral Early Morning Dara Peterson PA-C     • pantoprazole  40 mg Intravenous Once Dara Peterson PA-C     • rivaroxaban  20 mg Oral Daily With LUCHO Holly     • spironolactone  25 mg Oral Daily Dara Peterson PA-C     • traZODone  100 mg Oral HS Dara Peterson PA-C       Continuous Infusions: PRN Meds:.•  acetaminophen  •  naproxen    Review of Systems:  Review of Systems   Constitutional: Negative. HENT: Negative. Eyes: Negative. Cardiovascular: Negative for chest pain, irregular heartbeat, palpitations and syncope. Respiratory: Negative for cough, shortness of breath and wheezing. Endocrine: Negative. Hematologic/Lymphatic: Negative. Musculoskeletal: Negative for back pain. Gastrointestinal: Negative for abdominal pain, nausea and vomiting. Genitourinary: Negative. Neurological: Negative. Psychiatric/Behavioral: Negative. ROS as noted above, otherwise 12 point review of systems was performed and is negative. Physical Exam:   GEN: NAD, alert and oriented x 3, well appearing  SKIN: warm, dry without significant lesions or rashes. HEENT: NCAT, PERRL, EOMs intact  NECK: supple, no JVD appreciated  CARDIOVASCULAR: RRR, normal S1, S2 without murmurs, rubs, or gallops   LUNGS: CTA bilaterally without wheezes, rhonchi, or rales  ABDOMEN: Soft, nontender, nondistended. Groins soft with no bleeding, bruits or hematoma bilaterally. Groin sutures removed bilaterally. EXTREMITIES/VASCULAR: perfused without clubbing, cyanosis, or LE edema b/l  PSYCH: Normal mood and affect  NEURO: CN ll-Xll grossly intact       Lab Results: I have personally reviewed pertinent lab results. Results from last 7 days   Lab Units 07/04/23  0602 07/03/23  0953   WBC Thousand/uL 11.28* 6.66   HEMOGLOBIN g/dL 13.1 14.0   HEMATOCRIT % 40.2 42.4   PLATELETS Thousands/uL 148* 147*     Results from last 7 days   Lab Units 07/04/23  0602 07/03/23  0953   POTASSIUM mmol/L 4.0 4.2   CHLORIDE mmol/L 109* 111*   CO2 mmol/L 23 26   BUN mg/dL 11 13   CREATININE mg/dL 0.94 1.03   CALCIUM mg/dL 7.9* 8.8     Results from last 7 days   Lab Units 07/03/23  0953   INR  1.07     Results from last 7 days   Lab Units 07/04/23  0602   MAGNESIUM mg/dL 2.0       Imaging: I have personally reviewed pertinent reports. Results for orders placed during the hospital encounter of 19    Echo complete with contrast if indicated    Narrative  [address] [facility]  [phone]    Transthoracic Echocardiogram  2D, M-mode, Doppler, and Color Doppler    Study date:  2019    Patient: J Carlos Tierney  MR number: [id number]  Account number: [de-identified]  : 1975  Age: 44 years  Gender: Male  Status: Outpatient  Location: [facility]  Height: 74 in  Weight: 291 lb  BP: 118/ 64 mmHg    Indications: Dilated Cardiomyopathy. Diagnoses: I42.0 - Dilated cardiomyopathy    Sonographer:  Barney RCS  Interpreting Physician:  Byron Mahan MD  Primary Physician:  Latrice Claros MD  Referring Physician:  Yair Juarez MD  Group:  [facility]    SUMMARY    LEFT VENTRICLE:  The ventricle was moderately to markedly dilated. Ejection fraction was estimated to be 25 %. There was severe diffuse hypokinesis. Features were consistent with a pseudonormal left ventricular filling pattern, with concomitant abnormal relaxation and increased filling pressure (grade 2 diastolic dysfunction). RIGHT VENTRICLE:  Estimated peak pressure was at least 30 mmHg. ICD lead noted. LEFT ATRIUM:  The atrium was moderately dilated. RIGHT ATRIUM:  The atrium was moderately dilated. MITRAL VALVE:  There was trace regurgitation. TRICUSPID VALVE:  There was mild regurgitation. HISTORY: PRIOR HISTORY: Risk factors: hypertension. PROCEDURE: The study was performed in the [facility]. This was a routine study. The transthoracic approach was used. The study included complete 2D imaging, M-mode, complete spectral Doppler, and color Doppler. The  heart rate was 74 bpm, at the start of the study. This was a technically difficult study. LEFT VENTRICLE: The ventricle was moderately to markedly dilated.  Ejection fraction was estimated to be 25 %. There was severe diffuse hypokinesis. DOPPLER: Features were consistent with a pseudonormal left ventricular filling pattern,  with concomitant abnormal relaxation and increased filling pressure (grade 2 diastolic dysfunction). RIGHT VENTRICLE: The size was normal. Systolic function was normal. Wall thickness was normal. DOPPLER: Estimated peak pressure was at least 30 mmHg. ICD lead noted. LEFT ATRIUM: The atrium was moderately dilated. RIGHT ATRIUM: The atrium was moderately dilated. MITRAL VALVE: There was annular calcification. DOPPLER: There was trace regurgitation. AORTIC VALVE: The valve was trileaflet. Leaflets exhibited normal thickness and normal cuspal separation. The valve was not well visualized. DOPPLER: Transaortic velocity was within the normal range. There was no evidence for stenosis. There was no regurgitation. TRICUSPID VALVE: The valve structure was normal. There was normal leaflet separation. DOPPLER: The transtricuspid velocity was within the normal range. There was no evidence for stenosis. There was mild regurgitation. PULMONIC VALVE: Leaflets exhibited normal thickness, no calcification, and normal cuspal separation. DOPPLER: The transpulmonic velocity was within the normal range. There was no regurgitation. PERICARDIUM: There was no pericardial effusion. The pericardium was normal in appearance. AORTA: The root exhibited normal size.     SYSTEM MEASUREMENT TABLES    2D  %FS: 12.58 %  Ao Diam: 2.95 cm  EDV(Teich): 249.22 ml  EF(Teich): 26.31 %  ESV(Teich): 183.65 ml  IVSd: 1.03 cm  LA Area: 29.37 cm2  LA Diam: 5.32 cm  LVEDV MOD A4C: 213.51 ml  LVEF MOD A4C: 30.3 %  LVESV MOD A4C: 148.81 ml  LVIDd: 6.92 cm  LVIDs: 6.05 cm  LVLd A4C: 9.46 cm  LVLs A4C: 8.02 cm  LVPWd: 0.71 cm  RA Area: 23.69 cm2  RVIDd: 4.49 cm  SV MOD A4C: 64.7 ml  SV(Teich): 65.57 ml    CW  TR MaxP.1 mmHg  TR Vmax: 2.55 m/s    MM  TAPSE: 2.55 cm    PW  E': 0.08 m/s  E/E': 11.03  MV A [place]: 0.5 m/s  MV Dec [place]: 3.77 m/s2  MV DecT: 234 ms  MV E [place]: 0.88 m/s  MV E/A Ratio: 1.76  MV PHT: 67.86 ms  MVA By PHT: 3.24 cm2    [facility] Accredited Echocardiography Laboratory    Prepared and electronically signed by    Joseph Rosenthal MD  Signed 10-Apr-2019 13:52:24

## 2023-07-04 NOTE — UTILIZATION REVIEW
Initial Clinical Review    Admission: Date/Time/Statement:   Admission Orders (From admission, onward)     Ordered        07/03/23 1149  Inpatient Admission  Once                      Orders Placed This Encounter   Procedures   • Inpatient Admission     Standing Status:   Standing     Number of Occurrences:   1     Order Specific Question:   Level of Care     Answer:   Med Surg [16]     Order Specific Question:   Bed request comments     Answer:   telemetry     Order Specific Question:   Estimated length of stay     Answer:   More than 2 Midnights     Comments:   starting dofetilide     Order Specific Question:   Certification     Answer:   I certify that inpatient services are medically necessary for this patient for a duration of greater than two midnights. See H&P and MD Progress Notes for additional information about the patient's course of treatment. ED Arrival Information     Patient not seen in ED                     No chief complaint on file. Initial Presentation: 56 y.o. male with PMH of  nonischemic cardiomyopathy w/ LVEF 30%, ICD in situ, paroxysmal Afib and flutter w/ RVR, HTN, mild CONCHIS, and obesity presented to [facility] as direct Inpatient admission to the EP service, Plan for atrial fibrillation/flutter ablation as well as EP study to rule out SVT or other arrhythmias and dofetilide antiarrhythmic therapy in the post ablation. Pt had a recent syncopal episode and ICD shock on 05/2023, started on amiodarone antiarrhythmic therapy at that time. He has a single-chamber ICD device, it was difficult to determine whether he was shocked appropriately for VT or inappropriately for SVT/atrial flutter. EP recommended atrial fibrillation/flutter ablation as well as EP study to rule out SVT or other arrhythmias. His amiodarone was discontinued 1 week ago, and he will be started on dofetilide antiarrhythmic therapy in the post ablation setting.   On arrival to [facility], pt aaox3, normal HR and regular rhythm. Date: 07/04  Day 2:   EP Notes: pt felling well today. Denies sob, cp, palpitations. TELE: sinus rhythm HR 70s, one brief run of NSVT several beats. EKG: sinus rhythm QTc 460 ms. Labs stable today. continue Tikosyn initiation 500 mcg every 12 hours. monitor for 5 doses, check QTc 2 hours after each dose. continue carvedilol and digoxin. PPI daily. continue anticoagulation with Xarelto. continue to monitor on tele  On exam, groin sites stable, groin sutures removed bilaterally.     ED Triage Vitals   Temperature Pulse Respirations Blood Pressure SpO2   07/03/23 1022 07/03/23 1022 07/03/23 1022 07/03/23 1022 07/03/23 1022   98.1 °F (36.7 °C) 64 16 127/71 96 %      Temp Source Heart Rate Source Patient Position - Orthostatic VS BP Location FiO2 (%)   07/03/23 1022 07/03/23 1510 07/03/23 1022 07/03/23 1022 --   Temporal Monitor Lying Right arm       Pain Score       07/03/23 1506       No Pain          Wt Readings from Last 1 Encounters:   07/03/23 (!) 145 kg (320 lb)     Additional Vital Signs:   Date/Time Temp Pulse Resp BP MAP (mmHg) SpO2 Calculated FIO2 (%) - Nasal Cannula Nasal Cannula O2 Flow Rate (L/min) O2 Device Cardiac (WDL) Patient Position - Orthostatic VS   07/04/23 07:26:16 99.4 °F (37.4 °C) 65 -- 114/67 83 93 % -- -- -- -- --   07/04/23 02:34:34 -- 67 -- 102/55 71 94 % -- -- -- -- --   07/03/23 23:52:31 98.5 °F (36.9 °C) 67 -- 102/54 70 94 % -- -- -- -- --   07/03/23 20:48:09 98.7 °F (37.1 °C) 88 18 107/65 79 95 % -- -- -- -- --   07/03/23 1947 -- -- -- -- -- 90 % -- -- None (Room air) -- --   07/03/23 19:44:27 99 °F (37.2 °C) 84 18 110/75 87 90 % -- -- -- -- --   07/03/23 18:58:07 98.9 °F (37.2 °C) 79 20 111/71 84 93 % -- -- -- -- --   07/03/23 18:20:55 98.5 °F (36.9 °C) 74 20 116/78 91 95 % -- -- None (Room air) -- --   07/03/23 1800 -- 70 18 139/70 96 97 % 28 2 L/min Nasal cannula -- --   07/03/23 1745 -- 74 18 134/76 93 98 % 28 2 L/min Nasal cannula -- --   07/03/23 1730 -- 74 18 125/60 82 98 % 28 2 L/min Nasal cannula -- --   07/03/23 1715 -- 72 18 118/63 86 96 % 28 2 L/min Nasal cannula -- --   07/03/23 1700 -- 72 14 124/77 101 98 % 28 2 L/min Nasal cannula -- --   07/03/23 1645 -- 70 14 128/69 95 96 % -- -- -- -- --   07/03/23 1630 98 °F (36.7 °C) 70 17 144/68 99 94 % 28 2 L/min Nasal cannula WDL --   07/03/23 1615 -- 72 14 137/69 94 96 % 28 2 L/min Nasal cannula -- --   07/03/23 1600 -- 72 14 140/70 98 97 % 28 2 L/min Nasal cannula -- --   07/03/23 1545 -- 74 18 134/67 97 94 % 28 2 L/min Nasal cannula -- --   07/03/23 1530 -- 72 14 134/65 83 94 % 28 2 L/min Nasal cannula -- --   07/03/23 1521 -- -- 14 -- -- -- -- -- -- -- --   07/03/23 1515 -- 74 14 127/54 69 92 % 28 2 L/min Nasal cannula -- --   07/03/23 1510 99.4 °F (37.4 °C) 78 18 134/77 -- 100 % -- -- None (Room air) -- --   07/03/23 1506 99.4 °F (37.4 °C) 77 16 134/71 97 93 % -- -- None (Room air) WDL --       Pertinent Labs/Diagnostic Test Results:   07/06  EKG result: Normal sinus rhythm  Nonspecific T wave abnormality      Results from last 7 days   Lab Units 07/04/23  0602 07/03/23  0953   WBC Thousand/uL 11.28* 6.66   HEMOGLOBIN g/dL 13.1 14.0   HEMATOCRIT % 40.2 42.4   PLATELETS Thousands/uL 148* 147*         Results from last 7 days   Lab Units 07/04/23  0602 07/03/23  0953   SODIUM mmol/L 137 138   POTASSIUM mmol/L 4.0 4.2   CHLORIDE mmol/L 109* 111*   CO2 mmol/L 23 26   ANION GAP mmol/L 5 1   BUN mg/dL 11 13   CREATININE mg/dL 0.94 1.03   EGFR ml/min/1.73sq m 96 86   CALCIUM mg/dL 7.9* 8.8   MAGNESIUM mg/dL 2.0  --              Results from last 7 days   Lab Units 07/04/23  0602 07/03/23  0953   GLUCOSE RANDOM mg/dL 131 140       Results from last 7 days   Lab Units 07/03/23  0953   PROTIME seconds 14.1   INR  1.07            ED Treatment:   Medication Administration - No Administrations Displayed (No Start Event Found)     None        Past Medical History:   Diagnosis Date   • A-fib ([address]) 2020   • AICD (automatic cardioverter/defibrillator) present    • CHF (congestive heart failure) (HCC)    • Hypertension    • Myocarditis ([address])    • Sleep apnea    • Ventricular tachycardia (HCC)      Present on Admission:  • Paroxysmal atrial fibrillation (HCC)      Admitting Diagnosis: Paroxysmal atrial fibrillation (HCC) [I48.0]  Age/Sex: 56 y.o. male  Admission Orders:  Continuous ST segment monitoring  Telemetry monitoring    Scheduled Medications:  carvedilol, 37.5 mg, Oral, BID With Meals  digoxin, 125 mcg, Oral, Daily  dofetilide, 500 mcg, Oral, Q11H  pantoprazole, 40 mg, Oral, Early Morning  pantoprazole, 40 mg, Intravenous, Once  rivaroxaban, 20 mg, Oral, Daily With Dinner  spironolactone, 25 mg, Oral, Daily  traZODone, 100 mg, Oral, HS      Continuous IV Infusions: none     PRN Meds:  acetaminophen, 650 mg, Oral, Q4H PRN  naproxen, 250 mg, Oral, Q8H PRN 07/04 x 1        None    Network Utilization Review Department  ATTENTION: Please call with any questions or concerns to [phone] and carefully listen to the prompts so that you are directed to the right person. All voicemails are confidential.  Simi Khanna all requests for admission clinical reviews, approved or denied determinations and any other requests to dedicated fax number below belonging to the campus where the patient is receiving treatment.  List of dedicated fax numbers for the Facilities:  [place] DENIALS (Administrative/Medical Necessity) [phone] [address] [facility] (Maternity/NICU/Pediatrics) [phone]   [address] [place] [address] Drive [phone]   [facility] [address] [facility] [phone] [address] [phone] [address] [facility] [phone]   [address] [place] [address]  [address]  [phone]

## 2023-07-05 LAB
ANION GAP SERPL CALCULATED.3IONS-SCNC: 2 MMOL/L
BASOPHILS # BLD AUTO: 0.03 THOUSANDS/ÂΜL (ref 0–0.1)
BASOPHILS NFR BLD AUTO: 1 % (ref 0–1)
BUN SERPL-MCNC: 9 MG/DL (ref 5–25)
CALCIUM SERPL-MCNC: 8.1 MG/DL (ref 8.3–10.1)
CHLORIDE SERPL-SCNC: 109 MMOL/L (ref 96–108)
CO2 SERPL-SCNC: 27 MMOL/L (ref 21–32)
CREAT SERPL-MCNC: 0.93 MG/DL (ref 0.6–1.3)
EOSINOPHIL # BLD AUTO: 0.14 THOUSAND/ÂΜL (ref 0–0.61)
EOSINOPHIL NFR BLD AUTO: 2 % (ref 0–6)
ERYTHROCYTE [DISTWIDTH] IN BLOOD BY AUTOMATED COUNT: 13.9 % (ref 11.6–15.1)
GFR SERPL CREATININE-BSD FRML MDRD: 98 ML/MIN/1.73SQ M
GLUCOSE SERPL-MCNC: 139 MG/DL (ref 65–140)
HCT VFR BLD AUTO: 38.5 % (ref 36.5–49.3)
HGB BLD-MCNC: 12.8 G/DL (ref 12–17)
IMM GRANULOCYTES # BLD AUTO: 0.02 THOUSAND/UL (ref 0–0.2)
IMM GRANULOCYTES NFR BLD AUTO: 0 % (ref 0–2)
LYMPHOCYTES # BLD AUTO: 1.81 THOUSANDS/ÂΜL (ref 0.6–4.47)
LYMPHOCYTES NFR BLD AUTO: 28 % (ref 14–44)
MAGNESIUM SERPL-MCNC: 1.9 MG/DL (ref 1.6–2.6)
MCH RBC QN AUTO: 30.6 PG (ref 26.8–34.3)
MCHC RBC AUTO-ENTMCNC: 33.2 G/DL (ref 31.4–37.4)
MCV RBC AUTO: 92 FL (ref 82–98)
MONOCYTES # BLD AUTO: 0.93 THOUSAND/ÂΜL (ref 0.17–1.22)
MONOCYTES NFR BLD AUTO: 15 % (ref 4–12)
NEUTROPHILS # BLD AUTO: 3.47 THOUSANDS/ÂΜL (ref 1.85–7.62)
NEUTS SEG NFR BLD AUTO: 54 % (ref 43–75)
NRBC BLD AUTO-RTO: 0 /100 WBCS
PLATELET # BLD AUTO: 112 THOUSANDS/UL (ref 149–390)
PMV BLD AUTO: 11.2 FL (ref 8.9–12.7)
POTASSIUM SERPL-SCNC: 4 MMOL/L (ref 3.5–5.3)
RBC # BLD AUTO: 4.18 MILLION/UL (ref 3.88–5.62)
SODIUM SERPL-SCNC: 138 MMOL/L (ref 135–147)
WBC # BLD AUTO: 6.4 THOUSAND/UL (ref 4.31–10.16)

## 2023-07-05 PROCEDURE — 85025 COMPLETE CBC W/AUTO DIFF WBC: CPT | Performed by: PHYSICIAN ASSISTANT

## 2023-07-05 PROCEDURE — 93005 ELECTROCARDIOGRAM TRACING: CPT

## 2023-07-05 PROCEDURE — 99232 SBSQ HOSP IP/OBS MODERATE 35: CPT | Performed by: PHYSICIAN ASSISTANT

## 2023-07-05 PROCEDURE — 80048 BASIC METABOLIC PNL TOTAL CA: CPT | Performed by: PHYSICIAN ASSISTANT

## 2023-07-05 PROCEDURE — 83735 ASSAY OF MAGNESIUM: CPT | Performed by: PHYSICIAN ASSISTANT

## 2023-07-05 PROCEDURE — 94760 N-INVAS EAR/PLS OXIMETRY 1: CPT

## 2023-07-05 RX ORDER — DOFETILIDE 0.5 MG/1
500 CAPSULE ORAL EVERY 12 HOURS SCHEDULED
Status: DISCONTINUED | OUTPATIENT
Start: 2023-07-05 | End: 2023-07-05

## 2023-07-05 RX ORDER — DOFETILIDE 0.25 MG/1
250 CAPSULE ORAL EVERY 12 HOURS SCHEDULED
Status: DISCONTINUED | OUTPATIENT
Start: 2023-07-05 | End: 2023-07-06 | Stop reason: HOSPADM

## 2023-07-05 RX ORDER — COLCHICINE 0.6 MG/1
0.6 TABLET ORAL 2 TIMES DAILY
Status: DISCONTINUED | OUTPATIENT
Start: 2023-07-05 | End: 2023-07-06 | Stop reason: HOSPADM

## 2023-07-05 RX ADMIN — RIVAROXABAN 20 MG: 20 TABLET, FILM COATED ORAL at 15:34

## 2023-07-05 RX ADMIN — CARVEDILOL 37.5 MG: 25 TABLET, FILM COATED ORAL at 08:52

## 2023-07-05 RX ADMIN — DIGOXIN 125 MCG: 125 TABLET ORAL at 08:52

## 2023-07-05 RX ADMIN — COLCHICINE 0.6 MG: 0.6 TABLET ORAL at 13:37

## 2023-07-05 RX ADMIN — PANTOPRAZOLE SODIUM 40 MG: 40 TABLET, DELAYED RELEASE ORAL at 06:07

## 2023-07-05 RX ADMIN — NAPROXEN 250 MG: 250 TABLET ORAL at 06:07

## 2023-07-05 RX ADMIN — SPIRONOLACTONE 25 MG: 25 TABLET ORAL at 08:52

## 2023-07-05 RX ADMIN — COLCHICINE 0.6 MG: 0.6 TABLET ORAL at 21:54

## 2023-07-05 RX ADMIN — CARVEDILOL 37.5 MG: 25 TABLET, FILM COATED ORAL at 15:34

## 2023-07-05 RX ADMIN — DOFETILIDE 250 MCG: 0.25 CAPSULE ORAL at 18:02

## 2023-07-05 RX ADMIN — DOFETILIDE 500 MCG: 500 CAPSULE ORAL at 06:07

## 2023-07-05 RX ADMIN — TRAZODONE HYDROCHLORIDE 100 MG: 100 TABLET ORAL at 21:54

## 2023-07-05 RX ADMIN — NAPROXEN 250 MG: 250 TABLET ORAL at 22:33

## 2023-07-05 NOTE — PLAN OF CARE
Problem: PAIN - ADULT  Goal: Verbalizes/displays adequate comfort level or baseline comfort level  Description: Interventions:  - Encourage patient to monitor pain and request assistance  - Assess pain using appropriate pain scale  - Administer analgesics based on type and severity of pain and evaluate response  - Implement non-pharmacological measures as appropriate and evaluate response  - Consider cultural and social influences on pain and pain management  - Notify physician/advanced practitioner if interventions unsuccessful or patient reports new pain  Outcome: Progressing     Problem: SAFETY ADULT  Goal: Patient will remain free of falls  Description: INTERVENTIONS:  - Educate patient/family on patient safety including physical limitations  - Instruct patient to call for assistance with activity   - Consult OT/PT to assist with strengthening/mobility   - Keep Call bell within reach  - Keep bed low and locked with side rails adjusted as appropriate  - Keep care items and personal belongings within reach  - Initiate and maintain comfort rounds  - Make Fall Risk Sign visible to staff  - Apply yellow socks and bracelet for high fall risk patients  - Consider moving patient to room near nurses station  Outcome: Progressing     Problem: CARDIOVASCULAR - ADULT  Goal: Maintains optimal cardiac output and hemodynamic stability  Description: INTERVENTIONS:  - Monitor I/O, vital signs and rhythm  - Monitor for S/S and trends of decreased cardiac output  - Administer and titrate ordered vasoactive medications to optimize hemodynamic stability  - Assess quality of pulses, skin color and temperature  - Assess for signs of decreased coronary artery perfusion  - Instruct patient to report change in severity of symptoms  Outcome: Progressing

## 2023-07-05 NOTE — PROGRESS NOTES
Progress Note - Electrophysiology-Cardiology (EP)   Anais Hernandez 56 y.o. male MRN: [id number]  Unit/Bed#: [facility] 530-01 Encounter: [id number]      Assessment:  1. Recent syncope with ICD shock, unclear if VT versus SVT              A.)  [employer] single-chamber ICD in situ - initially implanted in 2006, status post extraction of recalled [place] lead with reimplantation 6/2017              B.)  Previously maintained on amiodarone antiarrhythmic therapy - started 5/2023 after most recent ICD therapy, discontinued 1 week prior to admission              C.)  History of prior ICD therapy - 4/2023, 10/2022  2. Nonischemic cardiomyopathy with LVEF 30% per echo 5/2023              A.)  Maintained on carvedilol and spironolactone, no ACEi/ARB/ARNi due to soft BP              B.)  No obstructive CAD per cardiac catheterization 8/2021  3. Paroxysmal atrial fibrillation and flutter with rapid ventricular response --- status post cryoablation with pulmonary vein isolation and typical flutter line 7/3/2023              A.)  History of inappropriate ICD shock for rapid A-fib (10/2022)              B.)  Maintained on Xarelto anticoagulation, maintained on carvedilol and digoxin for rate control  4. Hypertension  5. Mild obstructive sleep apnea  6. Obesity with BMI 41      Plan:  ECG from this morning shows borderline QTc, around 500 ms. To be cautious, we will reduce his dose to 250 mcg. Recommend that he remain in the hospital for at least the next 2 doses to monitor his response to this change. Continue to monitor electrolytes, telemetry, and ECGs throughout his admission. He has generalized soreness following his procedure, including chest discomfort with deep inspiration that is slowly improving. We will trial colchicine to see if his symptoms are more consistent with pericarditis.       Subjective/Objective   Chief Complaint: Generalized soreness    Subjective: He reports that his whole body is sore following his ablation. He has chest discomfort with deep inspiration, however this has improved. He also has left-sided tongue ecchymosis and mild swelling, which has also improved. He reports that his breathing is at baseline, he does not feel volume overloaded. He has been ambulating without issues. Occasional palpitations. Objective:     Vitals: /70   Pulse 65   Temp 98.7 °F (37.1 °C)   Resp 16   Ht 6' 2" (1.88 m)   Wt (!) 145 kg (320 lb)   SpO2 96%   BMI 41.09 kg/m²   Vitals:    07/03/23 1022   Weight: (!) 145 kg (320 lb)     Orthostatic Blood Pressures    Flowsheet Row Most Recent Value   Blood Pressure 118/70 filed at 07/05/2023 2649   Patient Position - Orthostatic VS Lying filed at 07/03/2023 1022            Intake/Output Summary (Last 24 hours) at 7/5/2023 1246  Last data filed at 7/5/2023 1220  Gross per 24 hour   Intake 760 ml   Output 1600 ml   Net -840 ml       Invasive Devices     Peripheral Intravenous Line  Duration           Peripheral IV 07/03/23 Dorsal (posterior); Right Forearm 2 days    Peripheral IV 07/03/23 Left;Ventral (anterior) Forearm 2 days                            Scheduled Meds:  Current Facility-Administered Medications   Medication Dose Route Frequency Provider Last Rate   • acetaminophen  650 mg Oral Q4H PRN Pat Alejandro PA-C     • carvedilol  37.5 mg Oral BID With Meals Pat Alejandro PA-C     • digoxin  125 mcg Oral Daily Pat Alejandro PA-C     • dofetilide  500 mcg Oral Q12H [address] Pat Alejandro PA-C     • naproxen  250 mg Oral Q8H PRN Jes Brown MD     • pantoprazole  40 mg Oral Early Morning Pat Alejandro PA-C     • pantoprazole  40 mg Intravenous Once Pat Alejandro PA-C     • rivaroxaban  20 mg Oral Daily With LUCHO Holly     • spironolactone  25 mg Oral Daily Pat Alejandro PA-C     • traZODone  100 mg Oral HS Pat Alejandro PA-C       Continuous Infusions:   PRN Meds:.•  acetaminophen  •  naproxen    Review of Systems   Constitutional: Negative for fever and malaise/fatigue. Cardiovascular: Positive for chest pain, dyspnea on exertion and palpitations. Negative for irregular heartbeat, leg swelling, near-syncope, orthopnea and syncope. All other systems reviewed and are negative. Physical Exam:   GEN: NAD, alert and oriented x 3, well appearing  SKIN: dry without significant lesions or rashes  HEENT: NCAT, PERRL, EOMs intact  NECK: No JVD appreciated  CARDIOVASCULAR: RRR, normal S1, S2 without murmurs, rubs, or gallops appreciated  LUNGS: Clear to auscultation bilaterally without wheezes, rhonchi, or rales  ABDOMEN: Soft, nontender, nondistended  EXTREMITIES/VASCULAR: perfused without clubbing, cyanosis, or LE edema b/l  PSYCH: Normal mood and affect  NEURO: CN ll-Xll grossly intact                Lab Results: I have personally reviewed pertinent lab results. Results from last 7 days   Lab Units 07/05/23  0442 07/04/23 0602 07/03/23  0953   WBC Thousand/uL 6.40 11.28* 6.66   HEMOGLOBIN g/dL 12.8 13.1 14.0   HEMATOCRIT % 38.5 40.2 42.4   PLATELETS Thousands/uL 112* 148* 147*     Results from last 7 days   Lab Units 07/05/23  0442 07/04/23  0602 07/03/23  0953   POTASSIUM mmol/L 4.0 4.0 4.2   CHLORIDE mmol/L 109* 109* 111*   CO2 mmol/L 27 23 26   BUN mg/dL 9 11 13   CREATININE mg/dL 0.93 0.94 1.03   CALCIUM mg/dL 8.1* 7.9* 8.8     Results from last 7 days   Lab Units 07/03/23  0953   INR  1.07     Results from last 7 days   Lab Units 07/05/23  0442 07/04/23  0602   MAGNESIUM mg/dL 1.9 2.0         Imaging: I have personally reviewed pertinent reports.       ECHO: Results for orders placed during the hospital encounter of 04/09/19    Echo complete with contrast if indicated    Narrative  [address] [place] [address], [address] [place] [address]  [phone]    Transthoracic Echocardiogram  2D, M-mode, Doppler, and Color Doppler    Study date:  09-Apr-2019    Patient: Florencia Cardenas  MR number: [id number]  Account number: [de-identified]  : 1975  Age: 44 years  Gender: Male  Status: Outpatient  Location: [facility]  Height: 74 in  Weight: 291 lb  BP: 118/ 64 mmHg    Indications: Dilated Cardiomyopathy. Diagnoses: I42.0 - Dilated cardiomyopathy    Sonographer:  Wilkinson RCS  Interpreting Physician:  Chauncey Paredes MD  Primary Physician:  Pura Curry MD  Referring Physician:  Greg Sheth MD  Group:  [facility]    SUMMARY    LEFT VENTRICLE:  The ventricle was moderately to markedly dilated. Ejection fraction was estimated to be 25 %. There was severe diffuse hypokinesis. Features were consistent with a pseudonormal left ventricular filling pattern, with concomitant abnormal relaxation and increased filling pressure (grade 2 diastolic dysfunction). RIGHT VENTRICLE:  Estimated peak pressure was at least 30 mmHg. ICD lead noted. LEFT ATRIUM:  The atrium was moderately dilated. RIGHT ATRIUM:  The atrium was moderately dilated. MITRAL VALVE:  There was trace regurgitation. TRICUSPID VALVE:  There was mild regurgitation. HISTORY: PRIOR HISTORY: Risk factors: hypertension. PROCEDURE: The study was performed in the [facility]. This was a routine study. The transthoracic approach was used. The study included complete 2D imaging, M-mode, complete spectral Doppler, and color Doppler. The  heart rate was 74 bpm, at the start of the study. This was a technically difficult study. LEFT VENTRICLE: The ventricle was moderately to markedly dilated. Ejection fraction was estimated to be 25 %. There was severe diffuse hypokinesis. DOPPLER: Features were consistent with a pseudonormal left ventricular filling pattern,  with concomitant abnormal relaxation and increased filling pressure (grade 2 diastolic dysfunction).     RIGHT VENTRICLE: The size was normal. Systolic function was normal. Wall thickness was normal. DOPPLER: Estimated peak pressure was at least 30 mmHg. ICD lead noted. LEFT ATRIUM: The atrium was moderately dilated. RIGHT ATRIUM: The atrium was moderately dilated. MITRAL VALVE: There was annular calcification. DOPPLER: There was trace regurgitation. AORTIC VALVE: The valve was trileaflet. Leaflets exhibited normal thickness and normal cuspal separation. The valve was not well visualized. DOPPLER: Transaortic velocity was within the normal range. There was no evidence for stenosis. There was no regurgitation. TRICUSPID VALVE: The valve structure was normal. There was normal leaflet separation. DOPPLER: The transtricuspid velocity was within the normal range. There was no evidence for stenosis. There was mild regurgitation. PULMONIC VALVE: Leaflets exhibited normal thickness, no calcification, and normal cuspal separation. DOPPLER: The transpulmonic velocity was within the normal range. There was no regurgitation. PERICARDIUM: There was no pericardial effusion. The pericardium was normal in appearance. AORTA: The root exhibited normal size.     SYSTEM MEASUREMENT TABLES    2D  %FS: 12.58 %  Ao Diam: 2.95 cm  EDV(Teich): 249.22 ml  EF(Teich): 26.31 %  ESV(Teich): 183.65 ml  IVSd: 1.03 cm  LA Area: 29.37 cm2  LA Diam: 5.32 cm  LVEDV MOD A4C: 213.51 ml  LVEF MOD A4C: 30.3 %  LVESV MOD A4C: 148.81 ml  LVIDd: 6.92 cm  LVIDs: 6.05 cm  LVLd A4C: 9.46 cm  LVLs A4C: 8.02 cm  LVPWd: 0.71 cm  RA Area: 23.69 cm2  RVIDd: 4.49 cm  SV MOD A4C: 64.7 ml  SV(Teich): 65.57 ml    CW  TR MaxP.1 mmHg  TR Vmax: 2.55 m/s    MM  TAPSE: 2.55 cm    PW  E': 0.08 m/s  E/E': 11.03  MV A Kamron: 0.5 m/s  MV Dec [place]: 3.77 m/s2  MV DecT: 234 ms  MV E Kamron: 0.88 m/s  MV E/A Ratio: 1.76  MV PHT: 67.86 ms  MVA By PHT: 3.24 cm2    [employer] Commission Accredited Echocardiography Laboratory    Prepared and electronically signed by    Aurelia Marie MD  Signed 10-Apr-2019 13:52:24      Results for orders placed during the hospital encounter of 04/16/23    Echo complete w/ contrast if indicated    Interpretation Summary  •  Left Ventricle: Left ventricular cavity size is dilated. Wall thickness is normal. Systolic function is severely reduced (30%). There is severe global hypokinesis. Diastolic function is severely abnormal, consistent with ungraded restrictive relaxation. •  Right Ventricle: Right ventricular cavity size is normal. Systolic function is normal.  •  Left Atrium: The atrium is mildly dilated. •  Right Atrium: The atrium is mildly dilated. •  Mitral Valve: There is mild regurgitation. •  Tricuspid Valve: There is mild regurgitation.  The right ventricular systolic pressure is normal.        EKG:        TELEMETRY: Normal sinus rhythm, brief 4 beat run of monomorphic nonsustained VT noted, isolated PVCs, no sustained arrhythmias      VTE Pharmacologic Prophylaxis: Xarelto

## 2023-07-05 NOTE — UTILIZATION REVIEW
NOTIFICATION OF INPATIENT ADMISSION   AUTHORIZATION REQUEST   SERVICING FACILITY:   [facility]  Address: [address] [facility], [address] [address] [address]  Tax ID: [id number]  NPI: [id number] ATTENDING PROVIDER:  Attending Name and NPI#: Tere Nate [[id number]]  Address: [address]  Phone: [phone]   ADMISSION INFORMATION:  Place of Service: Inpatient [address]  Place of Service Code: 21  Inpatient Admission Date/Time: 7/3/23 11:49 AM  Discharge Date/Time: No discharge date for patient encounter. Admitting Diagnosis Code/Description:  Paroxysmal atrial fibrillation ([address] [facility]) [I48.0]     UTILIZATION REVIEW CONTACT:  Kevin Marroquin Utilization   Network Utilization Review Department  Phone: [phone]  Fax: [phone]  Email: [email or web address]  Contact for approvals/pending authorizations, clinical reviews, and discharge. PHYSICIAN ADVISORY SERVICES:  Medical Necessity Denial & Rxnl-mr-Tqog Review  Phone: [phone]  Fax: [phone]  Email: [email or web address]

## 2023-07-06 VITALS
WEIGHT: 315 LBS | HEART RATE: 76 BPM | SYSTOLIC BLOOD PRESSURE: 146 MMHG | RESPIRATION RATE: 14 BRPM | TEMPERATURE: 98.1 F | BODY MASS INDEX: 40.43 KG/M2 | DIASTOLIC BLOOD PRESSURE: 89 MMHG | OXYGEN SATURATION: 97 % | HEIGHT: 74 IN

## 2023-07-06 LAB
ANION GAP SERPL CALCULATED.3IONS-SCNC: 1 MMOL/L
ATRIAL RATE: 68 BPM
ATRIAL RATE: 71 BPM
ATRIAL RATE: 72 BPM
BUN SERPL-MCNC: 12 MG/DL (ref 5–25)
CALCIUM SERPL-MCNC: 8.4 MG/DL (ref 8.3–10.1)
CHLORIDE SERPL-SCNC: 109 MMOL/L (ref 96–108)
CO2 SERPL-SCNC: 27 MMOL/L (ref 21–32)
CREAT SERPL-MCNC: 0.93 MG/DL (ref 0.6–1.3)
GFR SERPL CREATININE-BSD FRML MDRD: 98 ML/MIN/1.73SQ M
GLUCOSE SERPL-MCNC: 139 MG/DL (ref 65–140)
MAGNESIUM SERPL-MCNC: 1.9 MG/DL (ref 1.6–2.6)
P AXIS: 17 DEGREES
P AXIS: 4 DEGREES
P AXIS: 5 DEGREES
POTASSIUM SERPL-SCNC: 3.9 MMOL/L (ref 3.5–5.3)
PR INTERVAL: 176 MS
PR INTERVAL: 180 MS
PR INTERVAL: 192 MS
QRS AXIS: -10 DEGREES
QRS AXIS: -3 DEGREES
QRS AXIS: 0 DEGREES
QRSD INTERVAL: 100 MS
QRSD INTERVAL: 102 MS
QRSD INTERVAL: 96 MS
QT INTERVAL: 418 MS
QT INTERVAL: 452 MS
QT INTERVAL: 462 MS
QTC INTERVAL: 457 MS
QTC INTERVAL: 480 MS
QTC INTERVAL: 502 MS
SODIUM SERPL-SCNC: 137 MMOL/L (ref 135–147)
T WAVE AXIS: 19 DEGREES
T WAVE AXIS: 24 DEGREES
T WAVE AXIS: 27 DEGREES
VENTRICULAR RATE: 68 BPM
VENTRICULAR RATE: 71 BPM
VENTRICULAR RATE: 72 BPM

## 2023-07-06 PROCEDURE — 80048 BASIC METABOLIC PNL TOTAL CA: CPT | Performed by: PHYSICIAN ASSISTANT

## 2023-07-06 PROCEDURE — 93005 ELECTROCARDIOGRAM TRACING: CPT

## 2023-07-06 PROCEDURE — 83735 ASSAY OF MAGNESIUM: CPT | Performed by: PHYSICIAN ASSISTANT

## 2023-07-06 PROCEDURE — 94660 CPAP INITIATION&MGMT: CPT

## 2023-07-06 PROCEDURE — 99238 HOSP IP/OBS DSCHRG MGMT 30/<: CPT | Performed by: PHYSICIAN ASSISTANT

## 2023-07-06 PROCEDURE — 93010 ELECTROCARDIOGRAM REPORT: CPT | Performed by: INTERNAL MEDICINE

## 2023-07-06 PROCEDURE — 94760 N-INVAS EAR/PLS OXIMETRY 1: CPT

## 2023-07-06 RX ORDER — DOFETILIDE 0.25 MG/1
250 CAPSULE ORAL EVERY 12 HOURS SCHEDULED
Qty: 60 CAPSULE | Refills: 3 | Status: SHIPPED | OUTPATIENT
Start: 2023-07-06

## 2023-07-06 RX ORDER — FAMOTIDINE 20 MG/1
20 TABLET, FILM COATED ORAL DAILY
Qty: 30 TABLET | Refills: 0 | Status: SHIPPED | OUTPATIENT
Start: 2023-07-06 | End: 2023-08-05

## 2023-07-06 RX ORDER — COLCHICINE 0.6 MG/1
0.6 TABLET ORAL 2 TIMES DAILY
Qty: 14 TABLET | Refills: 0 | Status: SHIPPED | OUTPATIENT
Start: 2023-07-06 | End: 2023-07-13

## 2023-07-06 RX ADMIN — SPIRONOLACTONE 25 MG: 25 TABLET ORAL at 08:10

## 2023-07-06 RX ADMIN — DOFETILIDE 250 MCG: 0.25 CAPSULE ORAL at 06:06

## 2023-07-06 RX ADMIN — DIGOXIN 125 MCG: 125 TABLET ORAL at 08:10

## 2023-07-06 RX ADMIN — PANTOPRAZOLE SODIUM 40 MG: 40 TABLET, DELAYED RELEASE ORAL at 06:06

## 2023-07-06 RX ADMIN — CARVEDILOL 37.5 MG: 25 TABLET, FILM COATED ORAL at 08:10

## 2023-07-06 RX ADMIN — COLCHICINE 0.6 MG: 0.6 TABLET ORAL at 08:10

## 2023-07-06 NOTE — DISCHARGE SUMMARY
Discharge Summary - Alejandro Robles 56 y.o. male MRN: [id number]    Unit/Bed#: [facility] 530-01 Encounter: [id number]      Admission Date: 7/3/2023   Discharge Date: 7/6/2023    Discharge Diagnosis:   1.  Recent syncope with ICD shock, unclear if VT versus SVT              A.)  Medtronic single-chamber ICD in situ - initially implanted in 2006, status post extraction of recalled Mamadou lead with reimplantation 6/2017              B.)  Previously maintained on amiodarone antiarrhythmic therapy - started 5/2023 after most recent ICD therapy, discontinued 1 week prior to admission              C.)  History of prior ICD therapy - 4/2023, 10/2022  2.  Nonischemic cardiomyopathy with LVEF 30% per echo 5/2023              A.)  Maintained on carvedilol and spironolactone, no ACEi/ARB/ARNi due to soft BP              B.)  No obstructive CAD per cardiac catheterization 8/2021  3.  Paroxysmal atrial fibrillation and flutter with rapid ventricular response --- status post cryoablation with pulmonary vein isolation and typical flutter line 7/3/2023              A.)  History of inappropriate ICD shock for rapid A-fib (10/2022)              B.)  Maintained on Xarelto anticoagulation, maintained on carvedilol and digoxin for rate control   C.)  Status post dofetilide antiarrhythmic initiation - mild QTc prolongation on 500 mcg, stable on 250 mcg  4.  Hypertension  5.  Mild obstructive sleep apnea  6.  Obesity with BMI 41      Procedures Performed:   Orders Placed This Encounter   Procedures   • Cardiac ep lab eps/ablations   1) Cryoballoon Pulmonary Vein Isolation for treatment of  paroxysmal atrial fibrillation   2) 3 D electroanatomic mapping  3) Intracardiac echo  4) Catheter ablation of typical caval tricuspid isthmus dependent atrial flutter  5) Ultrasound guided venous access        Consultants: none      HPI: Please refer to the initial history and physical as well as procedure notes for full details.  Briefly, Alejandro gasca a 56y.o. year old male with nonischemic cardiomyopathy with LVEF 30%, Medtronic single-chamber ICD in situ, paroxysmal atrial fibrillation and flutter with rapid ventricular response, hypertension, mild obstructive sleep apnea, and obesity. He typically follows with Dr. Tacos Smyth and our heart failure team.  He has a longstanding history of viral myocarditis/nonischemic cardiomyopathy with reduced EF. He had a primary prevention Medtronic single-chamber ICD implantation in 2006, and when his device reached BASILIO 4/2017 he underwent extraction of recalled Sprint Mamadou lead with reimplantation. Device interrogations revealed a new diagnosis of paroxysmal atrial fibrillation 11/2020, and he was started on Xarelto anticoagulation. In 10/2022, he presented to the hospital with ICD shock x 2. His carvedilol was changed to Toprol-XL, and he was then seen in EP follow-up by Dr. Tacos Smyth shortly thereafter. At that time, it was thought that he was inappropriately shocked for rapid atrial fibrillation/flutter and thus he was started on digoxin for better rate control. Unfortunately, he received another ICD shock 4/2023 in the setting of alcohol use. This was thought to be the trigger, and his digoxin dose was increased to 250 mcg daily. Several weeks later in 5/2023, he had a syncopal episode and another ICD shock. He was started on amiodarone antiarrhythmic therapy at that time. As it is a single-chamber device, it was difficult to determine whether he was shocked appropriately for VT or inappropriately for SVT/atrial flutter. He was again seen by Dr. Tacos Smyth, who recommended atrial fibrillation/flutter ablation as well as EP study to rule out SVT or other arrhythmias. He presented this hospital admission to undergo this procedure. His amiodarone was discontinued 1 week ago, with the plan to be started on dofetilide antiarrhythmic therapy in the post ablation setting.       Hospital Course: Cosette Alpers presented at his baseline state of health. After the procedure was explained in detail and consent was obtained, he underwent the above procedures without complications. Please see operative notes by Dr. Pedro Georges for full details. He tolerated the procedure well. He was then monitored overnight for further observation, and was started on dofetilide 500 mcg that evening after close review of his baseline QTc and creatinine clearance. There were no acute issues or events overnight. The following morning he admitted to generalized soreness following the procedure, including left-sided tongue swelling from anesthesia. He otherwise denied dyspnea, palpitations, dizziness, lightheadedness, or syncope. His vital signs were reviewed and labs are stable. Telemetry showed normal sinus rhythm without significant arrhythmias. His groins were soft without significant hematoma or recurrent bleeding, and groin sutures were removed without incident. He remained in the hospital for ongoing QTc monitoring. On POD #2, EKG showed mild QTc prolongation just over 500 ms. Thus, his dose was reduced to 250 mcg and it was recommended that he remain in the hospital for further monitoring with these changes. Telemetry showed only brief 4 beat run of nonsustained VT, with no other significant arrhythmias. Vital signs and labs remained stable. He admitted to ongoing chest discomfort with deep inspiration, and was trialed on colchicine for postop pericarditis. On POD #3, he reported feeling much better, without as much chest discomfort or generalized soreness. His EKG showed improved QTc on the lower dose of dofetilide, without worsening arrhythmias. Again, labs and vital signs were stable. He was thus deemed stable for discharge. Review of Systems   Constitutional: Negative for fever and malaise/fatigue.    Cardiovascular: Negative for chest pain, dyspnea on exertion, irregular heartbeat, leg swelling, near-syncope, orthopnea, palpitations, paroxysmal nocturnal dyspnea and syncope. All other systems reviewed and are negative. Physical exam:  GEN: NAD, alert and oriented x 3, well appearing  SKIN: dry without significant lesions or rashes  HEENT: NCAT, PERRL, EOMs intact  NECK: No JVD appreciated  CARDIOVASCULAR: RRR, normal S1, S2 without murmurs, rubs, or gallops appreciated  LUNGS: Clear to auscultation bilaterally without wheezes, rhonchi, or rales  ABDOMEN: Soft, nontender, nondistended  EXTREMITIES/VASCULAR: perfused without clubbing, cyanosis, or LE edema b/l  PSYCH: Normal mood and affect  NEURO: CN ll-Xll grossly intact    He was given routine post ablation discharge instructions and restrictions, and these were explained in detail. He was given a follow up appointment with Jennifer Floyd PA-C, and he was instructed to follow up with his primary cardiologist as previously instructed. In terms of his medications, he will be discharged on dofetilide 250 mcg every 12 hours. He will remain on carvedilol, digoxin, and Xarelto as previously instructed. He will take Pepcid 20 mg daily for 30 days, at which time it can be discontinued. He will be given a 1 week supply of colchicine 0.6 mg twice daily to take as needed for postop chest pain. He was given education regarding dofetilide, including the potential interaction with other medications. He was asked to contact our office or speak with the pharmacist before starting any new medications. All his prescription will be sent to [address] [place] [address] to  upon discharge. He is stable for discharge at this time with all questions answered. He was discussed in detail with Dr. Karina Witt who is in agreement with this discharge summary. Discharge Medications:  See after visit summary for reconciled discharge medications provided to patient and family.     Medications Prior to Admission   Medication   • amiodarone 200 mg tablet   • carvedilol (COREG) 25 mg tablet   • digoxin (LANOXIN) 0.125 mg tablet   • spironolactone (ALDACTONE) 25 mg tablet   • traZODone (DESYREL) 100 mg tablet   • Xarelto 20 MG tablet   • amiodarone 200 mg tablet         [facility]inet Labs/diagnostics:  CBC with diff:   Results from last 7 days   Lab Units 07/05/23 0442 07/04/23 0602 07/03/23  0953   WBC Thousand/uL 6.40 11.28* 6.66   HEMOGLOBIN g/dL 12.8 13.1 14.0   HEMATOCRIT % 38.5 40.2 42.4   MCV fL 92 92 90   PLATELETS Thousands/uL 112* 148* 147*   RBC Million/uL 4.18 4.38 4.71   MCH pg 30.6 29.9 29.7   MCHC g/dL 33.2 32.6 33.0   RDW % 13.9 13.9 13.8   MPV fL 11.2 11.4 11.6   NRBC AUTO /100 WBCs 0  --   --        BMP:  Results from last 7 days   Lab Units 07/06/23 0441 07/05/23 0442 07/04/23 0602 07/03/23  0953   POTASSIUM mmol/L 3.9 4.0 4.0 4.2   CHLORIDE mmol/L 109* 109* 109* 111*   CO2 mmol/L 27 27 23 26   BUN mg/dL 12 9 11 13   CREATININE mg/dL 0.93 0.93 0.94 1.03   CALCIUM mg/dL 8.4 8.1* 7.9* 8.8       Magnesium:   Results from last 7 days   Lab Units 07/06/23 0441 07/05/23 0442 07/04/23  0602   MAGNESIUM mg/dL 1.9 1.9 2.0       Coags:   Results from last 7 days   Lab Units 07/03/23  0953   INR  0.25         Complications: none    Condition at Discharge: good     Discharge instructions/Information to patient and family:   See after visit summary for information provided to patient and family. Provisions for Follow-Up Care:  See after visit summary for information related to follow-up care and any pertinent home health orders. Disposition: Home    Planned Readmission: No    Discharge Statement   I spent 45 minutes minutes discharging the patient. This time was spent on the day of discharge. I had direct contact with the patient on the day of discharge. Additional documentation is required if more than 30 minutes were spent on discharge.  Evaluating the incision, discussing discharge instructions and restrictions, arranging follow up appointments, discussing medications

## 2023-07-06 NOTE — PLAN OF CARE
Problem: PAIN - ADULT  Goal: Verbalizes/displays adequate comfort level or baseline comfort level  Description: Interventions:  - Encourage patient to monitor pain and request assistance  - Assess pain using appropriate pain scale  - Administer analgesics based on type and severity of pain and evaluate response  - Implement non-pharmacological measures as appropriate and evaluate response  - Consider cultural and social influences on pain and pain management  - Notify physician/advanced practitioner if interventions unsuccessful or patient reports new pain  Outcome: Progressing     Problem: INFECTION - ADULT  Goal: Absence or prevention of progression during hospitalization  Description: INTERVENTIONS:  - Assess and monitor for signs and symptoms of infection  - Monitor lab/diagnostic results  - Monitor all insertion sites, i.e. indwelling lines, tubes, and drains  - Monitor endotracheal if appropriate and nasal secretions for changes in amount and color  - [place] appropriate cooling/warming therapies per order  - Administer medications as ordered  - Instruct and encourage patient and family to use good hand hygiene technique  - Identify and instruct in appropriate isolation precautions for identified infection/condition  Outcome: Progressing  Goal: Absence of fever/infection during neutropenic period  Description: INTERVENTIONS:  - Monitor WBC    Outcome: Progressing     Problem: SAFETY ADULT  Goal: Patient will remain free of falls  Description: INTERVENTIONS:  - Educate patient/family on patient safety including physical limitations  - Instruct patient to call for assistance with activity   - Consult OT/PT to assist with strengthening/mobility   - Keep Call bell within reach  - Keep bed low and locked with side rails adjusted as appropriate  - Keep care items and personal belongings within reach  - Initiate and maintain comfort rounds  - Make Fall Risk Sign visible to staff  - Apply yellow socks and bracelet for high fall risk patients  - Consider moving patient to room near nurses station  Outcome: Progressing  Goal: Maintain or return to baseline ADL function  Description: INTERVENTIONS:  -  Assess patient's ability to carry out ADLs; assess patient's baseline for ADL function and identify physical deficits which impact ability to perform ADLs (bathing, care of mouth/teeth, toileting, grooming, dressing, etc.)  - Assess/evaluate cause of self-care deficits   - Assess range of motion  - Assess patient's mobility; develop plan if impaired  - Assess patient's need for assistive devices and provide as appropriate  - Encourage maximum independence but intervene and supervise when necessary  - Involve family in performance of ADLs  - Assess for home care needs following discharge   - Consider OT consult to assist with ADL evaluation and planning for discharge  - Provide patient education as appropriate  Outcome: Progressing  Goal: Maintains/Returns to pre admission functional level  Description: INTERVENTIONS:  - Perform BMAT or MOVE assessment daily.   - Set and communicate daily mobility goal to care team and patient/family/caregiver. - Collaborate with rehabilitation services on mobility goals if consulted  - Perform Range of Motion 2 times a day. - Reposition patient every 2 hours.   - Dangle patient 2 times a day  - Stand patient 2 times a day  - Ambulate patient 2 times a day  - Out of bed to chair 2 times a day   - Out of bed for meals 2 times a day  - Out of bed for toileting  - Record patient progress and toleration of activity level   Outcome: Progressing     Problem: DISCHARGE PLANNING  Goal: Discharge to home or other facility with appropriate resources  Description: INTERVENTIONS:  - Identify barriers to discharge w/patient and caregiver  - Arrange for needed discharge resources and transportation as appropriate  - Identify discharge learning needs (meds, wound care, etc.)  - Arrange for interpretive services to assist at discharge as needed  - Refer to Case Management Department for coordinating discharge planning if the patient needs post-hospital services based on physician/advanced practitioner order or complex needs related to functional status, cognitive ability, or social support system  Outcome: Progressing     Problem: Knowledge Deficit  Goal: Patient/family/caregiver demonstrates understanding of disease process, treatment plan, medications, and discharge instructions  Description: Complete learning assessment and assess knowledge base.   Interventions:  - Provide teaching at level of understanding  - Provide teaching via preferred learning methods  Outcome: Progressing     Problem: CARDIOVASCULAR - ADULT  Goal: Maintains optimal cardiac output and hemodynamic stability  Description: INTERVENTIONS:  - Monitor I/O, vital signs and rhythm  - Monitor for S/S and trends of decreased cardiac output  - Administer and titrate ordered vasoactive medications to optimize hemodynamic stability  - Assess quality of pulses, skin color and temperature  - Assess for signs of decreased coronary artery perfusion  - Instruct patient to report change in severity of symptoms  Outcome: Progressing  Goal: Absence of cardiac dysrhythmias or at baseline rhythm  Description: INTERVENTIONS:  - Continuous cardiac monitoring, vital signs, obtain 12 lead EKG if ordered  - Administer antiarrhythmic and heart rate control medications as ordered  - Monitor electrolytes and administer replacement therapy as ordered  Outcome: Progressing

## 2023-07-06 NOTE — DISCHARGE INSTR - AVS FIRST PAGE
PLEASE NOTE THE FOLLOWING MEDICATION RECOMMENDATIONS:  - continue dofetilide 250 mcg every 12 hours  - continue carvedilol, digoxin, and Xarelto as previously instructed  - take Pepcid 20 mg daily for 30 days, at which time it can be discontinued  - take colchicine 0.6 mg twice daily as needed for one week for post ablation chest pain      *At your one week follow up ECG appointment, please ask that your dofetilide be sent to your home [facility]*        RESTRICTIONS:  No heavy lifting or strenuous activity for one week. No soaking in a bath tub/hot tub/swimming pool for one week or until groin heals. You may shower - please let soap and water run over the groins, no scrubbing, and pat the area dry. Please place band-aid on groins daily for up to five days, but you may remove sooner if no issues are noted. If you notice ongoing bleeding, swelling, or firm lumps in groin near ablation incision, please contact Dr. Jas Dennis office - [phone].       Please refer to this medication list and contact our office prior to beginning any new medications while on Tikosyn:

## 2023-07-06 NOTE — UTILIZATION REVIEW
Continued Stay Review    Date: 7/6                     Current Patient Class: IP  Current Level of Care: MS    HPI:47 y.o. male initially admitted on 7/3 for  atrial fibrillation/flutter ablation as well as EP study to rule out SVT or other arrhythmias and start on  dofetilide antiarrhythmic therapy in the post ablation setting. Assessment/Plan:   POD # 3 cryoablation w/ pulm vein isolatio and typical flutter line. ECG from this morning shows borderline QTc, around 500 ms. To be cautious, we will reduce his dose to 250 mcg. Recommend that he remain in the hospital for at least the next 2 doses to monitor his response to this change. He has generalized whole body soreness following his procedure, including chest discomfort with deep inspiration that is slowly improving. We will trial colchicine to see if his symptoms are more consistent with pericarditis. L side tone ecchymosis improving. Ambulating, occasional palpitations. Continue Tele.   Using PRN Naprosyn.       Vital Signs:   07/06/23 10:58:44 -- 76 14 146/89 108 97 % -- -- --   07/06/23 0811 -- -- -- -- -- 96 % None (Room air) -- --   07/06/23 07:09:35 98.1 °F (36.7 °C) 66 16 111/65 80 97 % CPAP -- Lying   07/06/23 0339 -- -- -- -- -- 96 % -- Face mask --   07/06/23 03:17:44 99.4 °F (37.4 °C) 65 18 120/74 89 97 % CPAP -- --   07/06/23 0021 -- -- -- -- -- 95 % -- Face mask --   07/05/23 23:13:10 99.7 °F (37.6 °C) 72 21 114/68 83 95 % None (Room air) -- Lying   07/05/23 18:53:28 99.6 °F (37.6 °C) 72 -- 114/67 83 95 % -- -- --   07/05/23 18:03:26 -- 66 -- 115/69 84 96 % -- -- --   07/05/23 1600 -- -- -- -- -- -- None (Room air) -- --   07/05/23 15:17:14 98.7 °F (37.1 °C) 66 21 117/69 85 96 % -- -- --   07/05/23 07:13:54 98.7 °F (37.1 °C) 65 -- 118/70 86 96 % -- -- --   07/05/23 06:06:40 98.1 °F (36.7 °C) 64 -- 109/63 78 96 % -- -- --   07/05/23 0322 -- -- -- -- -- 97 % -- Face mask --   07/04/23 2234 -- -- -- -- -- 97 % -- Face mask --   07/04/23 22:05:36 99.2 °F (37.3 °C) 64 16 109/62 78 96 % -- -- --   07/04/23 19:14:11 99.3 °F (37.4 °C) 68 16 113/63 80 96 % -- -- --   07/04/23 15:26:15 99.1 °F (37.3 °C) 63 18 107/61 76 95 % -- -- --   07/04/23 11:51:01 99.4 °F (37.4 °C) 66 -- 131/76 94 95 % -- -- --   07/04/23 07:26:16 99.4 °F (37.4 °C) 65 -- 114/67 83 93 % -- -- --   07/04/23 02:34:34 -- 67 -- 102/55 71 94 % -- -- --     Pertinent Labs/Diagnostic Results:     7/6 ECG - Normal sinus rhythm  Lateral infarct (cited on or before 05-JUL-2023)  Abnormal ECG      Results from last 7 days   Lab Units 07/05/23 0442 07/04/23 0602 07/03/23  0953   WBC Thousand/uL 6.40 11.28* 6.66   HEMOGLOBIN g/dL 12.8 13.1 14.0   HEMATOCRIT % 38.5 40.2 42.4   PLATELETS Thousands/uL 112* 148* 147*   NEUTROS ABS Thousands/µL 3.47  --   --          Results from last 7 days   Lab Units 07/06/23 0441 07/05/23 0442 07/04/23 0602 07/03/23  0953   SODIUM mmol/L 137 138 137 138   POTASSIUM mmol/L 3.9 4.0 4.0 4.2   CHLORIDE mmol/L 109* 109* 109* 111*   CO2 mmol/L 27 27 23 26   ANION GAP mmol/L 1 2 5 1   BUN mg/dL 12 9 11 13   CREATININE mg/dL 0.93 0.93 0.94 1.03   EGFR ml/min/1.73sq m 98 98 96 86   CALCIUM mg/dL 8.4 8.1* 7.9* 8.8   MAGNESIUM mg/dL 1.9 1.9 2.0  --              Results from last 7 days   Lab Units 07/06/23 0441 07/05/23 0442 07/04/23 0602 07/03/23  0953   GLUCOSE RANDOM mg/dL 139 139 131 140     Results from last 7 days   Lab Units 07/03/23  0953   PROTIME seconds 14.1   INR  1.07     Medications:   Scheduled Medications:  carvedilol, 37.5 mg, Oral, BID With Meals  colchicine, 0.6 mg, Oral, BID  digoxin, 125 mcg, Oral, Daily  dofetilide, 250 mcg, Oral, Q12H SAMUEL  pantoprazole, 40 mg, Oral, Early Morning  pantoprazole, 40 mg, Intravenous, Once  rivaroxaban, 20 mg, Oral, Daily With Dinner  spironolactone, 25 mg, Oral, Daily  traZODone, 100 mg, Oral, HS      Continuous IV Infusions:     PRN Meds:  acetaminophen, 650 mg, Oral, Q4H PRN  naproxen, 250 mg, Oral, Q8H PRN - x 2 7/5    Discharge Plan: D    Network Utilization Review Department  ATTENTION: Please call with any questions or concerns to [phone] and carefully listen to the prompts so that you are directed to the right person. All voicemails are confidential.  Philip Amen all requests for admission clinical reviews, approved or denied determinations and any other requests to dedicated fax number below belonging to the campus where the patient is receiving treatment.  List of dedicated fax numbers for the Facilities:  [place] DENIALS (Administrative/Medical Necessity) [phone] [address] [facility] (Maternity/NICU/Pediatrics) [phone]   [address] [place] [address] [phone]   [facility] [address] [phone]   [address] [place] [address] [address] [facility] [address] [place] [address] [id number] [place] [address] [phone]   [address] [facility] [address] [facility] [id number] [place] [address] Nn [phone]

## 2023-07-07 ENCOUNTER — PATIENT OUTREACH (OUTPATIENT)
Dept: FAMILY MEDICINE CLINIC | Facility: CLINIC | Age: 48
End: 2023-07-07

## 2023-07-07 ENCOUNTER — TRANSITIONAL CARE MANAGEMENT (OUTPATIENT)
Dept: FAMILY MEDICINE CLINIC | Facility: CLINIC | Age: 48
End: 2023-07-07

## 2023-07-07 NOTE — PROGRESS NOTES
Outpatient Care Management Note:    Telephone outreach  made to introduce self and role of outpatient care management, follow up on general health, and outreach for any possible medical or psychosocial services needed. Spoke with patient. Patient stated he is not interested in any program at this time.       Complex Care episode closed and I removed myself from the care team.

## 2023-07-10 ENCOUNTER — PATIENT OUTREACH (OUTPATIENT)
Dept: FAMILY MEDICINE CLINIC | Facility: CLINIC | Age: 48
End: 2023-07-10

## 2023-07-13 ENCOUNTER — CLINICAL SUPPORT (OUTPATIENT)
Dept: CARDIOLOGY CLINIC | Facility: CLINIC | Age: 48
End: 2023-07-13
Payer: COMMERCIAL

## 2023-07-13 DIAGNOSIS — I48.0 PAROXYSMAL A-FIB (HCC): Primary | ICD-10-CM

## 2023-07-13 PROCEDURE — 93000 ELECTROCARDIOGRAM COMPLETE: CPT | Performed by: INTERNAL MEDICINE

## 2023-07-13 NOTE — PROGRESS NOTES
Patient was in office for 1 week visit for ekg . EKG was dicussed with Kimi Kline .   Patient is STV waves are normal .

## 2023-08-21 ENCOUNTER — REMOTE DEVICE CLINIC VISIT (OUTPATIENT)
Dept: CARDIOLOGY CLINIC | Facility: CLINIC | Age: 48
End: 2023-08-21
Payer: COMMERCIAL

## 2023-08-21 DIAGNOSIS — Z95.810 AICD (AUTOMATIC CARDIOVERTER/DEFIBRILLATOR) PRESENT: Primary | ICD-10-CM

## 2023-08-21 PROCEDURE — 93295 DEV INTERROG REMOTE 1/2/MLT: CPT | Performed by: INTERNAL MEDICINE

## 2023-08-21 PROCEDURE — 93296 REM INTERROG EVL PM/IDS: CPT | Performed by: INTERNAL MEDICINE

## 2023-08-21 NOTE — PROGRESS NOTES
Results for orders placed or performed in visit on 08/21/23   Cardiac EP device report    Narrative    MDT-SINGLE CHAMBER ICD/ACTIVE SYSTEM IS MRI CONDITIONAL  CARELINK TRANSMISSION: BATTERY VOLTAGE ADEQUATE (4.2 YRS). <0.1%. ALL AVAILABLE LEAD PARAMETERS WITHIN NORMAL LIMITS. NO SIGNIFICANT HIGH RATE EPISODES. OPTI-VOL FLUID THRESHOLD CROSSED ON 7/20/23 & TRENDING TO WNL. PT ON SPIRONOLACTONE. WILL RECHECK IN 1 MONTH. NORMAL DEVICE FUNCTION.  GV

## 2023-09-22 ENCOUNTER — REMOTE DEVICE CLINIC VISIT (OUTPATIENT)
Dept: CARDIOLOGY CLINIC | Facility: CLINIC | Age: 48
End: 2023-09-22
Payer: COMMERCIAL

## 2023-09-22 DIAGNOSIS — Z95.810 PRESENCE OF AUTOMATIC CARDIOVERTER/DEFIBRILLATOR (AICD): Primary | ICD-10-CM

## 2023-09-22 PROCEDURE — G2066 INTER DEVC REMOTE 30D: HCPCS | Performed by: INTERNAL MEDICINE

## 2023-09-22 PROCEDURE — 93297 REM INTERROG DEV EVAL ICPMS: CPT | Performed by: INTERNAL MEDICINE

## 2023-09-22 NOTE — PROGRESS NOTES
Results for orders placed or performed in visit on 09/22/23   Cardiac EP device report    Narrative    MDT-SINGLE CHAMBER ICD/ACTIVE SYSTEM IS MRI CONDITIONAL  CARELINK TRANSMISSION - OPTI-VOL ONLY: BATTERY VOLTAGE ADEQUATE (5.3 YRS). : <0.1%. ALL AVAILABLE LEAD PARAMETERS WITHIN NORMAL LIMITS. NO SIGNIFICANT HIGH RATE EPISODES. OPTI-VOL WITHIN NORMAL LIMITS. NORMAL DEVICE FUNCTION.   [address] [address]

## 2023-10-31 DIAGNOSIS — I48.0 PAROXYSMAL ATRIAL FIBRILLATION (HCC): ICD-10-CM

## 2023-10-31 RX ORDER — DOFETILIDE 0.25 MG/1
250 CAPSULE ORAL EVERY 12 HOURS SCHEDULED
Qty: 180 CAPSULE | Refills: 3 | Status: SHIPPED | OUTPATIENT
Start: 2023-10-31

## 2023-10-31 RX ORDER — DOFETILIDE 0.25 MG/1
250 CAPSULE ORAL EVERY 12 HOURS
Qty: 180 CAPSULE | Refills: 4 | Status: SHIPPED | OUTPATIENT
Start: 2023-10-31

## 2023-10-31 RX ORDER — DOFETILIDE 0.25 MG/1
250 CAPSULE ORAL EVERY 12 HOURS SCHEDULED
Qty: 60 CAPSULE | Refills: 11 | Status: SHIPPED | OUTPATIENT
Start: 2023-10-31 | End: 2023-10-31 | Stop reason: SDUPTHER

## 2023-11-02 DIAGNOSIS — I48.0 PAROXYSMAL A-FIB (HCC): ICD-10-CM

## 2023-11-02 RX ORDER — RIVAROXABAN 20 MG/1
TABLET, FILM COATED ORAL
Qty: 90 TABLET | Refills: 3 | Status: SHIPPED | OUTPATIENT
Start: 2023-11-02

## 2023-11-20 ENCOUNTER — REMOTE DEVICE CLINIC VISIT (OUTPATIENT)
Dept: CARDIOLOGY CLINIC | Facility: CLINIC | Age: 48
End: 2023-11-20
Payer: COMMERCIAL

## 2023-11-20 DIAGNOSIS — Z95.810 AICD (AUTOMATIC CARDIOVERTER/DEFIBRILLATOR) PRESENT: Primary | ICD-10-CM

## 2023-11-20 PROCEDURE — 93296 REM INTERROG EVL PM/IDS: CPT | Performed by: INTERNAL MEDICINE

## 2023-11-20 PROCEDURE — 93295 DEV INTERROG REMOTE 1/2/MLT: CPT | Performed by: INTERNAL MEDICINE

## 2023-11-20 NOTE — PROGRESS NOTES
Results for orders placed or performed in visit on 11/20/23   Cardiac EP device report    Narrative    MDT-SINGLE CHAMBER ICD/ACTIVE SYSTEM IS MRI CONDITIONAL  CARELINK TRANSMISSION: BATTERY VOLTAGE ADEQUATE (3.5 YRS). <0.1%. ALL AVAILABLE LEAD PARAMETERS WITHIN NORMAL LIMITS. NO SIGNIFICANT HIGH RATE EPISODES. OPTI-VOL WITHIN NORMAL LIMITS. NORMAL DEVICE FUNCTION.  GV

## 2023-12-01 ENCOUNTER — HOSPITAL ENCOUNTER (INPATIENT)
Facility: HOSPITAL | Age: 48
LOS: 6 days | Discharge: HOME | DRG: 309 | End: 2023-12-07
Attending: ANESTHESIOLOGY | Admitting: ANESTHESIOLOGY
Payer: COMMERCIAL

## 2023-12-01 ENCOUNTER — HOSPITAL ENCOUNTER (EMERGENCY)
Facility: HOSPITAL | Age: 48
End: 2023-12-01
Attending: EMERGENCY MEDICINE
Payer: COMMERCIAL

## 2023-12-01 ENCOUNTER — APPOINTMENT (EMERGENCY)
Dept: RADIOLOGY | Facility: HOSPITAL | Age: 48
End: 2023-12-01
Payer: COMMERCIAL

## 2023-12-01 VITALS
SYSTOLIC BLOOD PRESSURE: 128 MMHG | RESPIRATION RATE: 17 BRPM | TEMPERATURE: 97.5 F | DIASTOLIC BLOOD PRESSURE: 70 MMHG | HEART RATE: 67 BPM | OXYGEN SATURATION: 100 %

## 2023-12-01 DIAGNOSIS — I47.20 VENTRICULAR TACHYARRHYTHMIA (HCC): Primary | ICD-10-CM

## 2023-12-01 DIAGNOSIS — I50.22 CHRONIC HFREF (HEART FAILURE WITH REDUCED EJECTION FRACTION) (HCC): ICD-10-CM

## 2023-12-01 DIAGNOSIS — G47.00 INSOMNIA, UNSPECIFIED TYPE: ICD-10-CM

## 2023-12-01 DIAGNOSIS — R00.0 WIDE-COMPLEX TACHYCARDIA: ICD-10-CM

## 2023-12-01 DIAGNOSIS — I42.0 DILATED CARDIOMYOPATHY (HCC): ICD-10-CM

## 2023-12-01 DIAGNOSIS — I47.20 VENTRICULAR TACHYCARDIA (HCC): Primary | ICD-10-CM

## 2023-12-01 LAB
2HR DELTA HS TROPONIN: 6160 NG/L
ALBUMIN SERPL BCP-MCNC: 4 G/DL (ref 3.5–5)
ALP SERPL-CCNC: 91 U/L (ref 34–104)
ALT SERPL W P-5'-P-CCNC: 42 U/L (ref 7–52)
ANION GAP SERPL CALCULATED.3IONS-SCNC: 9 MMOL/L
AST SERPL W P-5'-P-CCNC: 48 U/L (ref 13–39)
BASOPHILS # BLD AUTO: 0.06 THOUSANDS/ÂΜL (ref 0–0.1)
BASOPHILS NFR BLD AUTO: 1 % (ref 0–1)
BILIRUB DIRECT SERPL-MCNC: 0.26 MG/DL (ref 0–0.2)
BILIRUB SERPL-MCNC: 1.85 MG/DL (ref 0.2–1)
BUN SERPL-MCNC: 15 MG/DL (ref 5–25)
CALCIUM SERPL-MCNC: 9 MG/DL (ref 8.4–10.2)
CARDIAC TROPONIN I PNL SERPL HS: 6240 NG/L
CARDIAC TROPONIN I PNL SERPL HS: 80 NG/L
CHLORIDE SERPL-SCNC: 103 MMOL/L (ref 96–108)
CO2 SERPL-SCNC: 24 MMOL/L (ref 21–32)
CREAT SERPL-MCNC: 1.27 MG/DL (ref 0.6–1.3)
EOSINOPHIL # BLD AUTO: 0.13 THOUSAND/ÂΜL (ref 0–0.61)
EOSINOPHIL NFR BLD AUTO: 1 % (ref 0–6)
ERYTHROCYTE [DISTWIDTH] IN BLOOD BY AUTOMATED COUNT: 15.1 % (ref 11.6–15.1)
GFR SERPL CREATININE-BSD FRML MDRD: 66 ML/MIN/1.73SQ M
GLUCOSE SERPL-MCNC: 235 MG/DL (ref 65–140)
GLUCOSE SERPL-MCNC: 246 MG/DL (ref 65–140)
HCT VFR BLD AUTO: 48.6 % (ref 36.5–49.3)
HGB BLD-MCNC: 16.2 G/DL (ref 12–17)
IMM GRANULOCYTES # BLD AUTO: 0.03 THOUSAND/UL (ref 0–0.2)
IMM GRANULOCYTES NFR BLD AUTO: 0 % (ref 0–2)
LYMPHOCYTES # BLD AUTO: 2.33 THOUSANDS/ÂΜL (ref 0.6–4.47)
LYMPHOCYTES NFR BLD AUTO: 24 % (ref 14–44)
MCH RBC QN AUTO: 29.3 PG (ref 26.8–34.3)
MCHC RBC AUTO-ENTMCNC: 33.3 G/DL (ref 31.4–37.4)
MCV RBC AUTO: 88 FL (ref 82–98)
MONOCYTES # BLD AUTO: 0.96 THOUSAND/ÂΜL (ref 0.17–1.22)
MONOCYTES NFR BLD AUTO: 10 % (ref 4–12)
NEUTROPHILS # BLD AUTO: 6.32 THOUSANDS/ÂΜL (ref 1.85–7.62)
NEUTS SEG NFR BLD AUTO: 64 % (ref 43–75)
NRBC BLD AUTO-RTO: 0 /100 WBCS
PLATELET # BLD AUTO: 182 THOUSANDS/UL (ref 149–390)
PMV BLD AUTO: 10.3 FL (ref 8.9–12.7)
POTASSIUM SERPL-SCNC: 4.2 MMOL/L (ref 3.5–5.3)
PROT SERPL-MCNC: 7 G/DL (ref 6.4–8.4)
RBC # BLD AUTO: 5.53 MILLION/UL (ref 3.88–5.62)
SODIUM SERPL-SCNC: 136 MMOL/L (ref 135–147)
WBC # BLD AUTO: 9.83 THOUSAND/UL (ref 4.31–10.16)

## 2023-12-01 PROCEDURE — 96368 THER/DIAG CONCURRENT INF: CPT

## 2023-12-01 PROCEDURE — 96365 THER/PROPH/DIAG IV INF INIT: CPT

## 2023-12-01 PROCEDURE — 80048 BASIC METABOLIC PNL TOTAL CA: CPT | Performed by: EMERGENCY MEDICINE

## 2023-12-01 PROCEDURE — 92960 CARDIOVERSION ELECTRIC EXT: CPT

## 2023-12-01 PROCEDURE — 92960 CARDIOVERSION ELECTRIC EXT: CPT | Performed by: EMERGENCY MEDICINE

## 2023-12-01 PROCEDURE — NC001 PR NO CHARGE: Performed by: EMERGENCY MEDICINE

## 2023-12-01 PROCEDURE — 85025 COMPLETE CBC W/AUTO DIFF WBC: CPT | Performed by: EMERGENCY MEDICINE

## 2023-12-01 PROCEDURE — 99291 CRITICAL CARE FIRST HOUR: CPT | Performed by: EMERGENCY MEDICINE

## 2023-12-01 PROCEDURE — 84484 ASSAY OF TROPONIN QUANT: CPT | Performed by: EMERGENCY MEDICINE

## 2023-12-01 PROCEDURE — 96367 TX/PROPH/DG ADDL SEQ IV INF: CPT

## 2023-12-01 PROCEDURE — 71045 X-RAY EXAM CHEST 1 VIEW: CPT

## 2023-12-01 PROCEDURE — 80076 HEPATIC FUNCTION PANEL: CPT | Performed by: EMERGENCY MEDICINE

## 2023-12-01 PROCEDURE — 96375 TX/PRO/DX INJ NEW DRUG ADDON: CPT

## 2023-12-01 PROCEDURE — 36415 COLL VENOUS BLD VENIPUNCTURE: CPT | Performed by: EMERGENCY MEDICINE

## 2023-12-01 PROCEDURE — 82948 REAGENT STRIP/BLOOD GLUCOSE: CPT

## 2023-12-01 PROCEDURE — 96366 THER/PROPH/DIAG IV INF ADDON: CPT

## 2023-12-01 PROCEDURE — 99291 CRITICAL CARE FIRST HOUR: CPT

## 2023-12-01 PROCEDURE — 93005 ELECTROCARDIOGRAM TRACING: CPT

## 2023-12-01 RX ORDER — LIDOCAINE HCL/PF 100 MG/5ML
SYRINGE (ML) INJECTION
Status: COMPLETED
Start: 2023-12-01 | End: 2023-12-01

## 2023-12-01 RX ORDER — ADENOSINE 3 MG/ML
3 INJECTION, SOLUTION INTRAVENOUS ONCE
Status: COMPLETED | OUTPATIENT
Start: 2023-12-01 | End: 2023-12-01

## 2023-12-01 RX ORDER — LIDOCAINE HCL/PF 100 MG/5ML
150 SYRINGE (ML) INJECTION ONCE
Status: COMPLETED | OUTPATIENT
Start: 2023-12-01 | End: 2023-12-01

## 2023-12-01 RX ORDER — AMIODARONE HYDROCHLORIDE 50 MG/ML
1 INJECTION, SOLUTION INTRAVENOUS ONCE
Status: COMPLETED | OUTPATIENT
Start: 2023-12-01 | End: 2023-12-01

## 2023-12-01 RX ORDER — MAGNESIUM SULFATE 1 G/100ML
1 INJECTION INTRAVENOUS ONCE
Status: COMPLETED | OUTPATIENT
Start: 2023-12-01 | End: 2023-12-01

## 2023-12-01 RX ORDER — NOREPINEPHRINE BITARTRATE 1 MG/ML
INJECTION, SOLUTION INTRAVENOUS
Status: DISCONTINUED
Start: 2023-12-01 | End: 2023-12-01 | Stop reason: HOSPADM

## 2023-12-01 RX ADMIN — Medication 150 MG: at 20:08

## 2023-12-01 RX ADMIN — LIDOCAINE HYDROCHLORIDE 150 MG: 20 INJECTION INTRAVENOUS at 20:08

## 2023-12-01 RX ADMIN — AMIODARONE HYDROCHLORIDE 0.5 MG/MIN: 50 INJECTION, SOLUTION INTRAVENOUS at 20:29

## 2023-12-01 RX ADMIN — MAGNESIUM SULFATE HEPTAHYDRATE 1 G: 1 INJECTION, SOLUTION INTRAVENOUS at 20:15

## 2023-12-01 RX ADMIN — NOREPINEPHRINE BITARTRATE 10 MCG/MIN: 1 INJECTION INTRAVENOUS at 20:28

## 2023-12-01 NOTE — LETTER
[address] [place], [place] [id number]  Dept: [phone]    December 7, 2023     Patient: Lisa Orozco   YOB: 1975   Date of Visit: 12/1/2023       To Whom it May Concern:    Lisa Orozco is under my professional care. He was seen in the hospital from 12/01/2023 to 12/07/2023. We are anticipating patient to be out of work for a minimum of 1 month. During this time, he will be closely followed in our outpatient cardiology office to treat his cardiac condition(s) and to assess for his fitness to return to work. Patient has provided our office with additional paperwork for short term disability - plan to have completed and faxed next week. If you have any questions or concerns, please don't hesitate to call or contact our cardiology office at [phone].        Sincerely,       Harshal Del Real PA-C

## 2023-12-02 ENCOUNTER — APPOINTMENT (INPATIENT)
Dept: NON INVASIVE DIAGNOSTICS | Facility: HOSPITAL | Age: 48
DRG: 309 | End: 2023-12-02
Payer: COMMERCIAL

## 2023-12-02 PROBLEM — R00.0 WIDE-COMPLEX TACHYCARDIA: Status: ACTIVE | Noted: 2023-12-02

## 2023-12-02 LAB
ANION GAP SERPL CALCULATED.3IONS-SCNC: 7 MMOL/L
AORTIC ROOT: 3.4 CM
APICAL FOUR CHAMBER EJECTION FRACTION: 23 %
ATRIAL RATE: 67 BPM
BACTERIA UR QL AUTO: ABNORMAL /HPF
BASOPHILS # BLD MANUAL: 0.13 THOUSAND/UL (ref 0–0.1)
BASOPHILS NFR MAR MANUAL: 1 % (ref 0–1)
BILIRUB UR QL STRIP: NEGATIVE
BUN SERPL-MCNC: 14 MG/DL (ref 5–25)
CALCIUM SERPL-MCNC: 9 MG/DL (ref 8.4–10.2)
CHLORIDE SERPL-SCNC: 106 MMOL/L (ref 96–108)
CLARITY UR: CLEAR
CO2 SERPL-SCNC: 23 MMOL/L (ref 21–32)
COLOR UR: ABNORMAL
CREAT SERPL-MCNC: 1.04 MG/DL (ref 0.6–1.3)
DIGOXIN SERPL-MCNC: 0.4 NG/ML (ref 0.8–2)
E WAVE DECELERATION TIME: 135 MS
E/A RATIO: 1.97
EOSINOPHIL # BLD MANUAL: 0.13 THOUSAND/UL (ref 0–0.4)
EOSINOPHIL NFR BLD MANUAL: 1 % (ref 0–6)
ERYTHROCYTE [DISTWIDTH] IN BLOOD BY AUTOMATED COUNT: 15.1 % (ref 11.6–15.1)
FRACTIONAL SHORTENING: 10 (ref 28–44)
GFR SERPL CREATININE-BSD FRML MDRD: 85 ML/MIN/1.73SQ M
GLUCOSE SERPL-MCNC: 146 MG/DL (ref 65–140)
GLUCOSE UR STRIP-MCNC: ABNORMAL MG/DL
HCT VFR BLD AUTO: 49.3 % (ref 36.5–49.3)
HGB BLD-MCNC: 16 G/DL (ref 12–17)
HGB UR QL STRIP.AUTO: NEGATIVE
INTERVENTRICULAR SEPTUM IN DIASTOLE (PARASTERNAL SHORT AXIS VIEW): 1 CM
INTERVENTRICULAR SEPTUM: 1 CM (ref 0.6–1.1)
KETONES UR STRIP-MCNC: NEGATIVE MG/DL
LEFT ATRIUM SIZE: 4.9 CM
LEFT INTERNAL DIMENSION IN SYSTOLE: 6.6 CM (ref 2.1–4)
LEFT VENTRICULAR INTERNAL DIMENSION IN DIASTOLE: 7.3 CM (ref 3.5–6)
LEFT VENTRICULAR POSTERIOR WALL IN END DIASTOLE: 0.9 CM
LEFT VENTRICULAR STROKE VOLUME: 58 ML
LEUKOCYTE ESTERASE UR QL STRIP: NEGATIVE
LVSV (TEICH): 58 ML
LYMPHOCYTES # BLD AUTO: 1.58 THOUSAND/UL (ref 0.6–4.47)
LYMPHOCYTES # BLD AUTO: 6 % (ref 14–44)
MAGNESIUM SERPL-MCNC: 2 MG/DL (ref 1.9–2.7)
MCH RBC QN AUTO: 28.9 PG (ref 26.8–34.3)
MCHC RBC AUTO-ENTMCNC: 32.5 G/DL (ref 31.4–37.4)
MCV RBC AUTO: 89 FL (ref 82–98)
MONOCYTES # BLD AUTO: 1.31 THOUSAND/UL (ref 0–1.22)
MONOCYTES NFR BLD: 10 % (ref 4–12)
MV PEAK A VEL: 0.37 M/S
MV PEAK E VEL: 73 CM/S
MV STENOSIS PRESSURE HALF TIME: 39 MS
MV VALVE AREA P 1/2 METHOD: 5.64
NEUTROPHILS # BLD MANUAL: 9.99 THOUSAND/UL (ref 1.85–7.62)
NEUTS SEG NFR BLD AUTO: 76 % (ref 43–75)
NITRITE UR QL STRIP: NEGATIVE
NON-SQ EPI CELLS URNS QL MICRO: ABNORMAL /HPF
P AXIS: 27 DEGREES
PH UR STRIP.AUTO: 5 [PH]
PHOSPHATE SERPL-MCNC: 3.5 MG/DL (ref 2.7–4.5)
PLATELET # BLD AUTO: 216 THOUSANDS/UL (ref 149–390)
PLATELET BLD QL SMEAR: ADEQUATE
PMV BLD AUTO: 10.7 FL (ref 8.9–12.7)
POTASSIUM SERPL-SCNC: 4.5 MMOL/L (ref 3.5–5.3)
PR INTERVAL: 176 MS
PROT UR STRIP-MCNC: NEGATIVE MG/DL
QRS AXIS: 69 DEGREES
QRSD INTERVAL: 100 MS
QT INTERVAL: 418 MS
QTC INTERVAL: 441 MS
RA PRESSURE ESTIMATED: 3 MMHG
RBC # BLD AUTO: 5.53 MILLION/UL (ref 3.88–5.62)
RBC #/AREA URNS AUTO: ABNORMAL /HPF
RBC MORPH BLD: NORMAL
RV PSP: 30 MMHG
SL CV LV EF: 10
SL CV PED ECHO LEFT VENTRICLE DIASTOLIC VOLUME (MOD BIPLANE) 2D: 282 ML
SL CV PED ECHO LEFT VENTRICLE SYSTOLIC VOLUME (MOD BIPLANE) 2D: 224 ML
SODIUM SERPL-SCNC: 136 MMOL/L (ref 135–147)
SP GR UR STRIP.AUTO: 1.01 (ref 1–1.03)
T WAVE AXIS: 16 DEGREES
TR MAX PG: 27 MMHG
TR PEAK VELOCITY: 2.6 M/S
TRICUSPID ANNULAR PLANE SYSTOLIC EXCURSION: 2 CM
TRICUSPID VALVE PEAK REGURGITATION VELOCITY: 2.59 M/S
UROBILINOGEN UR STRIP-ACNC: <2 MG/DL
VARIANT LYMPHS # BLD AUTO: 6 %
VENTRICULAR RATE: 67 BPM
WBC # BLD AUTO: 13.14 THOUSAND/UL (ref 4.31–10.16)
WBC #/AREA URNS AUTO: ABNORMAL /HPF

## 2023-12-02 PROCEDURE — 84100 ASSAY OF PHOSPHORUS: CPT | Performed by: NURSE PRACTITIONER

## 2023-12-02 PROCEDURE — 85027 COMPLETE CBC AUTOMATED: CPT | Performed by: NURSE PRACTITIONER

## 2023-12-02 PROCEDURE — 81001 URINALYSIS AUTO W/SCOPE: CPT | Performed by: ANESTHESIOLOGY

## 2023-12-02 PROCEDURE — 93005 ELECTROCARDIOGRAM TRACING: CPT

## 2023-12-02 PROCEDURE — 80162 ASSAY OF DIGOXIN TOTAL: CPT | Performed by: NURSE PRACTITIONER

## 2023-12-02 PROCEDURE — 93321 DOPPLER ECHO F-UP/LMTD STD: CPT

## 2023-12-02 PROCEDURE — 85007 BL SMEAR W/DIFF WBC COUNT: CPT | Performed by: NURSE PRACTITIONER

## 2023-12-02 PROCEDURE — 99291 CRITICAL CARE FIRST HOUR: CPT | Performed by: ANESTHESIOLOGY

## 2023-12-02 PROCEDURE — 93308 TTE F-UP OR LMTD: CPT

## 2023-12-02 PROCEDURE — 93308 TTE F-UP OR LMTD: CPT | Performed by: INTERNAL MEDICINE

## 2023-12-02 PROCEDURE — 80048 BASIC METABOLIC PNL TOTAL CA: CPT | Performed by: NURSE PRACTITIONER

## 2023-12-02 PROCEDURE — 93321 DOPPLER ECHO F-UP/LMTD STD: CPT | Performed by: INTERNAL MEDICINE

## 2023-12-02 PROCEDURE — 99223 1ST HOSP IP/OBS HIGH 75: CPT | Performed by: STUDENT IN AN ORGANIZED HEALTH CARE EDUCATION/TRAINING PROGRAM

## 2023-12-02 PROCEDURE — 93325 DOPPLER ECHO COLOR FLOW MAPG: CPT

## 2023-12-02 PROCEDURE — 83735 ASSAY OF MAGNESIUM: CPT | Performed by: NURSE PRACTITIONER

## 2023-12-02 PROCEDURE — 93325 DOPPLER ECHO COLOR FLOW MAPG: CPT | Performed by: INTERNAL MEDICINE

## 2023-12-02 RX ORDER — AMIODARONE HYDROCHLORIDE 200 MG/1
400 TABLET ORAL 2 TIMES DAILY WITH MEALS
Status: DISCONTINUED | OUTPATIENT
Start: 2023-12-02 | End: 2023-12-07 | Stop reason: HOSPADM

## 2023-12-02 RX ORDER — ACETAMINOPHEN 325 MG/1
650 TABLET ORAL EVERY 6 HOURS PRN
Status: DISCONTINUED | OUTPATIENT
Start: 2023-12-02 | End: 2023-12-07 | Stop reason: HOSPADM

## 2023-12-02 RX ORDER — DIGOXIN 125 MCG
125 TABLET ORAL DAILY
Status: DISCONTINUED | OUTPATIENT
Start: 2023-12-02 | End: 2023-12-02

## 2023-12-02 RX ORDER — SPIRONOLACTONE 25 MG/1
25 TABLET ORAL DAILY
Status: DISCONTINUED | OUTPATIENT
Start: 2023-12-02 | End: 2023-12-03

## 2023-12-02 RX ORDER — TRAZODONE HYDROCHLORIDE 100 MG/1
100 TABLET ORAL
Status: DISCONTINUED | OUTPATIENT
Start: 2023-12-02 | End: 2023-12-05

## 2023-12-02 RX ORDER — CHLORHEXIDINE GLUCONATE ORAL RINSE 1.2 MG/ML
15 SOLUTION DENTAL EVERY 12 HOURS SCHEDULED
Status: DISCONTINUED | OUTPATIENT
Start: 2023-12-02 | End: 2023-12-07 | Stop reason: HOSPADM

## 2023-12-02 RX ADMIN — SPIRONOLACTONE 25 MG: 25 TABLET ORAL at 08:29

## 2023-12-02 RX ADMIN — TRAZODONE HYDROCHLORIDE 100 MG: 100 TABLET ORAL at 21:01

## 2023-12-02 RX ADMIN — CARVEDILOL 37.5 MG: 25 TABLET, FILM COATED ORAL at 16:59

## 2023-12-02 RX ADMIN — DIGOXIN 125 MCG: 125 TABLET ORAL at 08:29

## 2023-12-02 RX ADMIN — RIVAROXABAN 20 MG: 20 TABLET, FILM COATED ORAL at 16:59

## 2023-12-02 RX ADMIN — ACETAMINOPHEN 650 MG: 325 TABLET, FILM COATED ORAL at 17:21

## 2023-12-02 RX ADMIN — PERFLUTREN 0.6 ML/MIN: 6.52 INJECTION, SUSPENSION INTRAVENOUS at 10:30

## 2023-12-02 RX ADMIN — TRAZODONE HYDROCHLORIDE 100 MG: 100 TABLET ORAL at 02:13

## 2023-12-02 RX ADMIN — NOREPINEPHRINE BITARTRATE 7 MCG/MIN: 1 INJECTION, SOLUTION, CONCENTRATE INTRAVENOUS at 02:13

## 2023-12-02 RX ADMIN — AMIODARONE HYDROCHLORIDE 1 MG/MIN: 50 INJECTION, SOLUTION INTRAVENOUS at 02:12

## 2023-12-02 RX ADMIN — CHLORHEXIDINE GLUCONATE 15 ML: 1.2 SOLUTION ORAL at 02:13

## 2023-12-02 RX ADMIN — CHLORHEXIDINE GLUCONATE 15 ML: 1.2 SOLUTION ORAL at 08:29

## 2023-12-02 RX ADMIN — CARVEDILOL 37.5 MG: 25 TABLET, FILM COATED ORAL at 08:29

## 2023-12-02 RX ADMIN — AMIODARONE HYDROCHLORIDE 1 MG/MIN: 50 INJECTION, SOLUTION INTRAVENOUS at 13:25

## 2023-12-02 RX ADMIN — AMIODARONE HYDROCHLORIDE 400 MG: 200 TABLET ORAL at 16:58

## 2023-12-02 RX ADMIN — CHLORHEXIDINE GLUCONATE 15 ML: 1.2 SOLUTION ORAL at 21:01

## 2023-12-02 NOTE — ASSESSMENT & PLAN NOTE
Syncope on 12/1, while sitting on couch at home, felt " heart racing, got flushed, ringing in ears and blacked out" pt incontinent with episode  Pt received Adenosine and Amio pre-hospital for suspected SVT  Pt with similar episodes of decreased MS while at [facility] in the setting of hypotension and tachyarrhythmia      Plan  EP consulted for interrogation of on MDT ICD  Continue amiodarone  Serial neuro exams  Goal MAP >65, wean NE as able

## 2023-12-02 NOTE — ED NOTES
Report called to [address] [place] [address] at [facility].       Matt Burgos RN  12/01/23 2178

## 2023-12-02 NOTE — ED PROVIDER NOTES
History  Chief Complaint   Patient presents with    Shortness of Breath     Pt arrives via EMS for syncope, SOB and elevated HR. Pt has internal defibrillator, unsure if fired. Pt received 6mg and 12mg adenosine en route in addition to 150mg of amio. HPI patient is a 41-year-old male with history of a nonischemic cardiomyopathy previous episodes of fibrillation and ventricular tachycardia in the past.  Congestive heart failure. EF of 30%. Patient was apparently on his couch at home and became unresponsive and urinated on himself. EMS reports patient was awake when they arrived but his heart rate was 170 and his blood pressure was in the 50s. Patient has a history of atrial fibrillation so EMS gave him some adenosine hoping it was SVT but he did not convert. Patient was given 150 amiodarone prehospital with no change in his heart rhythm. Patient arrives here with a heart rate of 170 complains of some chest soreness and weakness. Initial blood pressure was in the 70s. Patient was given 150 mg of lidocaine on arrival shortly after arrival and amiodarone drip was started. Patient became progressively more hypotensive had a blood pressure of 50 became unresponsive and required bag-valve-mask ventilation. Patient was cardioverted with 200 joules with return to normal sinus rhythm. Patient remained somewhat hypotensive, gradually awoke but had a persistent blood pressure around 70. Patient was started on norepinephrine with a with gradual improvement in his blood pressure. Mental status initially was somewhat reduced but gradually patient came around and is awake and alert. Patient is currently awake and alert reports just some general chest soreness.   Past medical history of atrial fibrillation, pacemaker defibrillator, congestive heart failure, hypertension, myocarditis sleep apnea  Family history contributory  Social history, non-smoker no history drug abuse    Prior to Admission Medications Prescriptions Last Dose Informant Patient Reported? Taking? Xarelto 20 MG tablet   No No   Sig: TAKE 1 TABLET BY MOUTH EVERY DAY WITH DINNER   carvedilol (COREG) 25 mg tablet  Self No No   Sig: Take 1.5 tablets (37.5 mg total) by mouth 2 (two) times a day with meals   colchicine (COLCRYS) 0.6 mg tablet   No No   Sig: Take 1 tablet (0.6 mg total) by mouth 2 (two) times a day for 7 days   digoxin (LANOXIN) 0.125 mg tablet  Self No No   Sig: Take 1 tablet (125 mcg total) by mouth daily   dofetilide (TIKOSYN) 250 mcg capsule   No No   Sig: Take 1 capsule (250 mcg total) by mouth every 12 (twelve) hours   dofetilide (TIKOSYN) 250 mcg capsule   No No   Sig: TAKE 1 CAPSULE BY MOUTH EVERY 12 HOURS. famotidine (PEPCID) 20 mg tablet   No No   Sig: Take 1 tablet (20 mg total) by mouth daily   spironolactone (ALDACTONE) 25 mg tablet  Self No No   Sig: Take 1 tablet (25 mg total) by mouth in the morning. traZODone (DESYREL) 100 mg tablet  Self No No   Sig: Take 1 tablet (100 mg total) by mouth daily at bedtime      Facility-Administered Medications: None       Past Medical History:   Diagnosis Date    A-fib ([address]) 2020    AICD (automatic cardioverter/defibrillator) present     CHF (congestive heart failure) ([address])     Hypertension     Myocarditis ([address])     Sleep apnea     Ventricular tachycardia [facility])        Past Surgical History:   Procedure Laterality Date    CARDIAC DEFIBRILLATOR PLACEMENT      CARDIAC ELECTROPHYSIOLOGY PROCEDURE N/A 7/3/2023    Procedure: Cardiac eps/svt ablation;  Surgeon: Kathryn Cash DO;  Location: [facility]; Service: Cardiology    CARDIAC ELECTROPHYSIOLOGY PROCEDURE N/A 7/3/2023    Procedure: Cardiac eps/afib ablation;  Surgeon: Kathryn Cash DO;  Location: [facility]; Service: Cardiology    [place] ESOPHAGOGASTRODUODENOSCOPY TRANSORAL DIAGNOSTIC N/A 3/29/2017    Procedure: ESOPHAGOGASTRODUODENOSCOPY (EGD); Surgeon: Loli Jimenez MD;  Location: [facility];   Service: Gastroenterology    PA INSJ/RPLCMT PERM DFB W/TRNSVNS LDS 1/DUAL CHMBR N/A 6/2/2017    Procedure: LEAD EXTRACTION/REIMPLANTATION AND ICD GENERATOR CHANGE ;  Surgeon: Luis Mishra MD;  Location:  MAIN OR;  Service: Cardiology    PA LAPAROSCOPY SURG CHOLECYSTECTOMY N/A 5/19/2017    Procedure: Ivan Haro ;  Surgeon: Laxmi Banda MD;  Location: [facility];  Service: General    TONSILLECTOMY      TYMPANOSTOMY TUBE PLACEMENT         Family History   Problem Relation Age of Onset    No Known Problems Mother     No Known Problems Father      I have reviewed and agree with the history as documented. E-Cigarette/Vaping    E-Cigarette Use Never User      E-Cigarette/Vaping Substances    Nicotine No     THC No     CBD No     Flavoring No     Other No     Unknown No      Social History     Tobacco Use    Smoking status: Never    Smokeless tobacco: Never   Vaping Use    Vaping Use: Never used   Substance Use Topics    Alcohol use: Not Currently     Comment: OCCASIONAL    Drug use: No       Review of Systems   Constitutional:  Negative for fever. HENT:  Negative for congestion. Eyes:  Negative for pain and redness. Respiratory:  Negative for cough and shortness of breath. Cardiovascular:  Positive for palpitations. Negative for chest pain. Gastrointestinal:  Negative for abdominal pain and vomiting. Neurological:  Positive for weakness. Physical Exam  Physical Exam  Vitals and nursing note reviewed. Constitutional:       Appearance: He is well-developed. Comments: Arrived tachycardia with a ventricular rate of 170 concordant with the precordium consistent with ventricular tachycardia. HENT:      Head: Normocephalic. Right Ear: External ear normal.      Left Ear: External ear normal.      Nose: Nose normal.      Mouth/Throat:      Mouth: Mucous membranes are moist.      Pharynx: Oropharynx is clear.    Eyes:      General: Lids are normal.      Extraocular Movements: Extraocular movements intact. Pupils: Pupils are equal, round, and reactive to light. Cardiovascular:      Rate and Rhythm: Tachycardia present. Pulmonary:      Effort: Pulmonary effort is normal. No respiratory distress. Breath sounds: Normal breath sounds. Abdominal:      General: Abdomen is flat. Tenderness: There is no abdominal tenderness. Musculoskeletal:         General: No deformity. Normal range of motion. Cervical back: Normal range of motion and neck supple. Skin:     General: Skin is warm and dry. Neurological:      Mental Status: He is alert and oriented to person, place, and time.    Psychiatric:         Mood and Affect: Mood normal.         Vital Signs  ED Triage Vitals   Temperature Pulse Respirations Blood Pressure SpO2   12/01/23 2041 12/01/23 2000 12/01/23 2000 12/01/23 2000 12/01/23 2000   97.5 °F (36.4 °C) 74 19 102/57 96 %      Temp Source Heart Rate Source Patient Position - Orthostatic VS BP Location FiO2 (%)   12/01/23 2041 12/01/23 2008 12/01/23 2000 12/01/23 2000 --   Oral Monitor Lying Right arm       Pain Score       --                  Vitals:    12/01/23 2145 12/01/23 2200 12/01/23 2215 12/01/23 2230   BP: 126/70 129/68 132/66 128/70   Pulse: 72 71 69 67   Patient Position - Orthostatic VS:             Visual Acuity      ED Medications  Medications   sodium chloride 0.9 % bolus 1,000 mL (0 mL Intravenous Hold 12/1/23 2034)   amiodarone (CORDARONE) 900 mg in dextrose 5 % 500 mL infusion (0.5 mg/min Intravenous New Bag 12/1/23 2029)   norepinephrine (LEVOPHED) 1 mg/mL injection **ADS Override Pull** (has no administration in time range)   norepinephrine (LEVOPHED) 4 mg (STANDARD CONCENTRATION) IV in sodium chloride 0.9% 250 mL (10 mcg/min Intravenous Rate/Dose Change 12/1/23 2112)   EPINEPHrine 5,000 mcg (STANDARD CONCENTRATION) IV in sodium chloride 0.9% 250 mL (has no administration in time range)   lidocaine (cardiac) injection 150 mg (150 mg Intravenous Given 12/1/23 2008)   magnesium sulfate IVPB (premix) SOLN 1 g (0 g Intravenous Stopped 12/1/23 2115)   amiodarone (FOR EMS ONLY) 150 mg/3 mL injection 150 mg (0 mg Does not apply Given to EMS 12/1/23 2021)   adenosine (FOR EMS ONLY) (ADENOCARD) 6 mg/2 mL injection 18 mg (0 mg Does not apply Given to EMS 12/1/23 2021)       Diagnostic Studies  Results Reviewed       Procedure Component Value Units Date/Time    HS Troponin I 2hr [[id number]]  (Abnormal) Collected: 12/01/23 2253    Lab Status: Final result Specimen: Blood from Arm, Right Updated: 12/01/23 2322     hs TnI 2hr 6,240 ng/L      Delta 2hr hsTnI 6,160 ng/L     HS Troponin I 4hr [[id number]]     Lab Status: No result Specimen: Blood     HS Troponin 0hr (reflex protocol) [[id number]]  (Abnormal) Collected: 12/01/23 2018    Lab Status: Final result Specimen: Blood from Arm, Left Updated: 12/01/23 2057     hs TnI 0hr 80 ng/L     Basic metabolic panel [[id number]]  (Abnormal) Collected: 12/01/23 2018    Lab Status: Final result Specimen: Blood from Arm, Left Updated: 12/01/23 2049     Sodium 136 mmol/L      Potassium 4.2 mmol/L      Chloride 103 mmol/L      CO2 24 mmol/L      ANION GAP 9 mmol/L      BUN 15 mg/dL      Creatinine 1.27 mg/dL      Glucose 246 mg/dL      Calcium 9.0 mg/dL      eGFR 66 ml/min/1.73sq m     Narrative:      [place] guidelines for Chronic Kidney Disease (CKD):     Stage 1 with normal or high GFR (GFR > 90 mL/min/1.73 square meters)    Stage 2 Mild CKD (GFR = 60-89 mL/min/1.73 square meters)    Stage 3A Moderate CKD (GFR = 45-59 mL/min/1.73 square meters)    Stage 3B Moderate CKD (GFR = 30-44 mL/min/1.73 square meters)    Stage 4 Severe CKD (GFR = 15-29 mL/min/1.73 square meters)    Stage 5 End Stage CKD (GFR <15 mL/min/1.73 square meters)  Note: GFR calculation is accurate only with a steady state creatinine    Hepatic function panel [[id number]]  (Abnormal) Collected: 12/01/23 2018    Lab Status: Final result Specimen: Blood from Arm, Left Updated: 12/01/23 2049     Total Bilirubin 1.85 mg/dL      Bilirubin, Direct 0.26 mg/dL      Alkaline Phosphatase 91 U/L      AST 48 U/L      ALT 42 U/L      Total Protein 7.0 g/dL      Albumin 4.0 g/dL     CBC and differential [[id number]] Collected: 12/01/23 2018    Lab Status: Final result Specimen: Blood from Arm, Left Updated: 12/01/23 2023     WBC 9.83 Thousand/uL      RBC 5.53 Million/uL      Hemoglobin 16.2 g/dL      Hematocrit 48.6 %      MCV 88 fL      MCH 29.3 pg      MCHC 33.3 g/dL      RDW 15.1 %      MPV 10.3 fL      Platelets 445 Thousands/uL      nRBC 0 /100 WBCs      Neutrophils Relative 64 %      Immat GRANS % 0 %      Lymphocytes Relative 24 %      Monocytes Relative 10 %      Eosinophils Relative 1 %      Basophils Relative 1 %      Neutrophils Absolute 6.32 Thousands/µL      Immature Grans Absolute 0.03 Thousand/uL      Lymphocytes Absolute 2.33 Thousands/µL      Monocytes Absolute 0.96 Thousand/µL      Eosinophils Absolute 0.13 Thousand/µL      Basophils Absolute 0.06 Thousands/µL     Fingerstick Glucose (POCT) [[id number]]  (Abnormal) Collected: 12/01/23 2002    Lab Status: Final result Updated: 12/01/23 2006     POC Glucose 235 mg/dl                    XR chest 1 view portable    (Results Pending)              Procedures  ECG 12 Lead Documentation Only    Date/Time: 12/1/2023 9:20 PM    Performed by: Marc Chavis MD  Authorized by: Marc Chavis MD    Indications / Diagnosis:  Weakness,  Patient location:  ED  Previous ECG:     Previous ECG:  Compared to current    Comparison ECG info:  Prior EKG from July 6, 2023    Similarity:  Changes noted  Comments:      Initial EKG shows widened QRS complex concordant across the precordium consistent with ventricular tachycardia.   EKG post defibrillation showed normal sinus rhythm, nonspecific ST-T wave changes some inferior Q waves no ST elevations no sign of ischemia  CriticalCare Time    Date/Time: 12/1/2023 9:38 PM    Performed by: Royal Orr MD  Authorized by: Royal Orr MD    Critical care provider statement:     Critical care time (minutes):  60    Critical care start time:  12/1/2023 8:36 PM    Critical care end time:  12/1/2023 9:38 PM    Critical care time was exclusive of:  Separately billable procedures and treating other patients    Critical care was necessary to treat or prevent imminent or life-threatening deterioration of the following conditions:  Cardiac failure and respiratory failure    Critical care was time spent personally by me on the following activities:  Obtaining history from patient or surrogate, development of treatment plan with patient or surrogate, discussions with consultants, evaluation of patient's response to treatment and examination of patient    I assumed direction of critical care for this patient from another provider in my specialty: no    Cardioversion    Date/Time: 12/1/2023 9:59 PM    Performed by: Royal Orr MD  Authorized by: Royal Orr MD    Verbal consent obtained?: No    Patient identity confirmed:  Arm band  Patient sedated: No    Cardioversion basis:  Emergent  Pre-procedure rhythm:  Ventricular tachycardia  Position: Patient was placed in a supine position    Chest area exposed: Chest area was exposed    Electrodes:  Pads  Electrodes placed:  Anterior-posterior  Number of attempts:  1  Attempt 1:     Attempt 1 mode:  Synchronous    Attempt 1 waveform:  Monophasic    Attempt 1 shock (Joules):  200    Attempt 1 outcome:  Conversion to normal sinus rhythm  Post-procedure rhythm:  Normal sinus rhythm  Complications: no complications     Patient suddenly became unresponsive blood pressure was low, barely palpable pulse, patient was cardioverted and developed normal sinus rhythm. Blood pressure gradually increased with vasopressors.            ED Course         Spoke with cardiology on-call here Dr. Jovan Funes, significant history of cardiomyopathy and requiring ablation in the past.  Patient of Dr. Cisco Spencer. Advised transfer for electrophysiology. Spoke with Dr. Jorge Banda critical care at [facility], discussed the patient on pressors and amiodarone at this time. Patient was excepted to [address] [place] [address] critical care vehicle transport. Discussed with patient and his mom agreed to transport. Diagnostic testing  Electrolytes were normal                  SBIRT 22yo+      Flowsheet Row Most Recent Value   Initial Alcohol Screen: US AUDIT-C     1. How often do you have a drink containing alcohol? 0 Filed at: 12/01/2023 2015   2. How many drinks containing alcohol do you have on a typical day you are drinking? 0 Filed at: 12/01/2023 2015   3a. Male UNDER 65: How often do you have five or more drinks on one occasion? 0 Filed at: 12/01/2023 2015   Audit-C Score 0 Filed at: 12/01/2023 2015   LASHA: How many times in the past year have you. .. Used an illegal drug or used a prescription medication for non-medical reasons? Never Filed at: 12/01/2023 2015                      Medical Decision Making  Medical decision making complex 71-year-old male with history of nonischemic cardiomyopathy recurrent ventricular tachycardia and atrial fibrillation requiring ablation in July. History of nonischemic cardiomyopathy with ejection fraction of 30% on prior echocardiogram.  Patient apparently became unresponsive at home and was incontinent of urine passed out. Patient does not remember his defibrillator firing. EMS arrived and I took the EMS report where they reported the patient was in a wide-complex tachycardia initially tried some adenosine which was uneventful and they gave the patient 150 of amiodarone just prior to arrival.  Patient arrives here with a heart rate of 170 and a wide-complex ventricular tachycardia. Patient was given lidocaine 150 mg with minimal response. Patient eventually became hypotensive with a blood pressure in the 50s and required cardioversion. Patient returned normal sinus rhythm after cardioversion but remained significantly hypotensive he required some intermittent bagging for apnea and for reduced mental status. He gradually awoke is now currently awake and alert. His blood pressure initially was in the 50s but responded to norepinephrine drip. Patient was On an amiodarone and norepinephrine drip. I spoke with interventional cardiology here they recommended transfer for electrophysiology. We do not on why the patient's defibrillator did not fire despite a heart rate of 170 and ventricular tachycardia. Patient remained stable here I spoke with the transport crew. Patient was excepted by Dr. Jessy Mota at [address]. Critical care. Stable at the time of transfer    Amount and/or Complexity of Data Reviewed  Labs: ordered. Radiology: ordered. Risk  Prescription drug management. Disposition  Final diagnoses:   Ventricular tachycardia ([address])     Time reflects when diagnosis was documented in both MDM as applicable and the Disposition within this note       Time User Action Codes Description Comment    12/1/2023  8:45 PM Abisai Hernandez Add [I47.20] Ventricular tachycardia [facility])           ED Disposition       ED Disposition   Transfer to Another Facility-In Network    Condition   --    Date/Time   Fri Dec 1, 2023 2045    Comment   Alethea Mohamud should be transferred out to [facility] critical care-Dr Jessy Mota.                MD Documentation      Charlott Goldberg Most Recent Value   Patient Condition The patient has been stabilized such that within reasonable medical probability, no material deterioration of the patient condition or the condition of the unborn child(svaanna) is likely to result from the transfer   Reason for Transfer Level of Care needed not available at this facility   Benefits of Transfer Specialized equipment and/or services available at the receiving facility (Include comment)________________________   Risks of Transfer Potential for delay in receiving treatment   Accepting Physician Dr. Andie Chisholm Name, [facility] (Name & Tel number) Patient access center   Transported by (Company and Unit #) [facility]  emergency transport   Sending MD Dr. Brandy Vaca   Provider Certification General risk, such as traffic hazards, adverse weather conditions, rough terrain or turbulence, possible failure of equipment (including vehicle or aircraft), or consequences of actions of persons outside the control of the transport personnel          RN Documentation      [address] Name, [facility]    (Name & Tel number) Patient access center   Transported by (Company and Unit #) [facility]  emergency transport          Follow-up Information    None         Discharge Medication List as of 12/1/2023 11:20 PM        CONTINUE these medications which have NOT CHANGED    Details   carvedilol (COREG) 25 mg tablet Take 1.5 tablets (37.5 mg total) by mouth 2 (two) times a day with meals, Starting Wed 11/16/2022, Normal      colchicine (COLCRYS) 0.6 mg tablet Take 1 tablet (0.6 mg total) by mouth 2 (two) times a day for 7 days, Starting Thu 7/6/2023, Until Thu 7/13/2023, Normal      digoxin (LANOXIN) 0.125 mg tablet Take 1 tablet (125 mcg total) by mouth daily, Starting Wed 5/3/2023, Normal      !! dofetilide (TIKOSYN) 250 mcg capsule Take 1 capsule (250 mcg total) by mouth every 12 (twelve) hours, Starting Tue 10/31/2023, Phone In      !! dofetilide (TIKOSYN) 250 mcg capsule TAKE 1 CAPSULE BY MOUTH EVERY 12 HOURS., Starting Tue 10/31/2023, Normal      famotidine (PEPCID) 20 mg tablet Take 1 tablet (20 mg total) by mouth daily, Starting Thu 7/6/2023, Until Sat 8/5/2023, Normal      spironolactone (ALDACTONE) 25 mg tablet Take 1 tablet (25 mg total) by mouth in the morning., Starting Thu 5/12/2022, Normal traZODone (DESYREL) 100 mg tablet Take 1 tablet (100 mg total) by mouth daily at bedtime, Starting Wed 11/16/2022, Normal      Xarelto 20 MG tablet TAKE 1 TABLET BY MOUTH EVERY DAY WITH DINNER, Normal       !! - Potential duplicate medications found. Please discuss with provider. No discharge procedures on file.     PDMP Review       None            ED Provider  Electronically Signed by             Royal Orr MD  12/01/23 3808

## 2023-12-02 NOTE — ASSESSMENT & PLAN NOTE
LVEF 30% per echo 5/2023  Maintained on carvedilol and spironolactone, no ACEi/ARB/ARNi due to soft BP  8/2021 last cardiac cath  Pt unsure of dry weight, does not weigh self daily, reports LE edema correlates with Na intake    Plan  Continue coreg and spironlactone

## 2023-12-02 NOTE — ASSESSMENT & PLAN NOTE
BMI 41.8        Plan  Body mass index is 41.89 kg/m².     Recommend incorporating a more whole foods plant-predominant diet along with decreasing consumption of red meats and processed foods  Per AHA guidelines, recommend moderate-vigorous intensity exercise for 30 minutes a day for 5 days a week or a total of 150 min/week

## 2023-12-02 NOTE — H&P
[address] [facility]  H&P  Name: Hair García 53 y.o. male I MRN: [id number]  Unit/Bed#: [facility] 517-01 I Date of Admission: 12/1/2023   Date of Service: 12/2/2023 I Hospital Day: 1      Assessment/Plan   * Syncope  Assessment & Plan  Syncope on 12/1, while sitting on couch at home, felt " heart racing, got flushed, ringing in ears and blacked out" pt incontinent with episode  Pt received Adenosine and Amio pre-hospital for suspected SVT  Pt with similar episodes of decreased MS while at [facility] in the setting of hypotension and tachyarrhythmia      Plan  EP consulted for interrogation of on MDT ICD  Continue amiodarone  Serial neuro exams  Goal MAP >65, wean NE as able    Wide-complex tachycardia  Assessment & Plan  SVT vs VT  Pt received Adenosine and Amio prehospital  In [facility] ED pt symptomatic with 's hypotension and altered MS, DCC 200j x 1      Plan  Interrogation of medtronic  EP consult   Trend troponin  Continue Amio infusion      Dilated cardiomyopathy ([address])  Assessment & Plan  LVEF 30% per echo 5/2023  Maintained on carvedilol and spironolactone, no ACEi/ARB/ARNi due to soft BP  8/2021 last cardiac cath  Pt unsure of dry weight, does not weigh self daily, reports LE edema correlates with Na intake    Plan  Continue coreg and spironlactone      Paroxysmal atrial fibrillation ([address])  Assessment & Plan  Pt follows with Dr. Mayco Hernandez  He is status post cryoablation with pulmonary vein isolation and typical flutter line 7/3/2023  On Xarelto anticoagulation, maintained on carvedilol and digoxin for rate control  Status post dofetilide antiarrhythmic initiation - mild QTc prolongation on 500 mcg, stable on 250 mcg    Pt reports he missed one dose of dofetilide, coreg and Xarelto on 11/30 PM    Plan  Continue     Morbid obesity with BMI of 40.0-44.9, adult ([address])  Assessment & Plan  BMI 41.8        Plan  Body mass index is 41.89 kg/m².     Recommend incorporating a more whole foods plant-predominant diet along with decreasing consumption of red meats and processed foods  Per AHA guidelines, recommend moderate-vigorous intensity exercise for 30 minutes a day for 5 days a week or a total of 150 min/week             History of Present Illness     HPI: Merritt Willett is a 53 y.o. who presents with syncope. Pt states he missed his PM does of Tikosyn and Coreg on 11/30. On 12/1 He came home from work around 11am due to feeling "unwell" He took all his medications on 12/1 AM and PM does. About 2 hours after taking his PM does, he was sitting on the couch and had a feeling of his heart racing and feeling unwell, flushing, ringing in his ears and then went unresponsive. EMS found the patient to have pt in wide complex tachycardia and concerned for SVT so patient received Adenosine x 2 and amio bolus. At [address] and found to be in wide complex tachycardia SVT vs VT. Pt with hypotension and altered mental status, supported with BMV, and NE and DCC x 1 at 200 J with conversion to NSR and improvement in MS and Hemodynamics. On arrival to [facility] critical care pt was awake and alert and on NE at 6 mcg. Pt reports that his weight fluctuates and he does not weigh himself daily. His LE have intermittent edema that he attributes to his Na intake. History obtained from chart review and the patient. Review of Systems   Constitutional: Negative. HENT: Negative. Eyes: Negative. Respiratory:  Positive for shortness of breath. Cardiovascular:  Positive for palpitations and leg swelling. Gastrointestinal: Negative. Endocrine: Negative. Genitourinary: Negative. Musculoskeletal: Negative. Neurological:  Positive for syncope. Hematological: Negative. Psychiatric/Behavioral: Negative.        Disposition: Critical care  Historical Information   Past Medical History:  2020: A-fib ([address])  No date: AICD (automatic cardioverter/defibrillator) present  No date: CHF (congestive heart failure) ([address])  No date: Hypertension  No date: Myocarditis ([address])  No date: Sleep apnea  No date: Ventricular tachycardia [facility]) Past Surgical History:  No date: CARDIAC DEFIBRILLATOR PLACEMENT  7/3/2023: CARDIAC ELECTROPHYSIOLOGY PROCEDURE; N/A      Comment:  Procedure: Cardiac eps/svt ablation;  Surgeon: Bibiana Weeks DO;  Location: [facility]; Service:                Cardiology  7/3/2023: CARDIAC ELECTROPHYSIOLOGY PROCEDURE; N/A      Comment:  Procedure: Cardiac eps/afib ablation;  Surgeon: Bibiana Weeks DO;  Location: [facility]ARD[facility]; Service:                Cardiology  3/29/2017: [place] ESOPHAGOGASTRODUODENOSCOPY TRANSORAL DIAGNOSTIC; N/A      Comment:  Procedure: ESOPHAGOGASTRODUODENOSCOPY (EGD); Surgeon:                Benton Wilder MD;  Location: [facility];   Service:                Gastroenterology  6/2/2017: [place] INSJ/RPLCMT PERM DFB W/TRNSVNS LDS 1/DUAL CHMBR; N/A      Comment:  Procedure: LEAD EXTRACTION/REIMPLANTATION AND ICD                GENERATOR CHANGE ;  Surgeon: Brad Mendoza MD;                 Location:  MAIN OR;  Service: Cardiology  5/19/2017: [place] LAPAROSCOPY SURG CHOLECYSTECTOMY; N/A      Comment:  Procedure: LAPAROSCOPIC CHOLECYSTECTOMY ;  Surgeon:                Adam Rubio MD;  Location: [facility] MAIN OR;  Service:                General  No date: TONSILLECTOMY  No date: TYMPANOSTOMY TUBE PLACEMENT   Current Outpatient Medications   Medication Instructions    carvedilol (COREG) 37.5 mg, Oral, 2 times daily with meals    colchicine (COLCRYS) 0.6 mg, Oral, 2 times daily    digoxin (LANOXIN) 125 mcg, Oral, Daily    dofetilide (TIKOSYN) 250 mcg, Oral, Every 12 hours scheduled    dofetilide (TIKOSYN) 250 mcg, Oral, Every 12 hours    famotidine (PEPCID) 20 mg, Oral, Daily    spironolactone (ALDACTONE) 25 mg, Oral, Daily    traZODone (DESYREL) 100 mg, Oral, Daily at bedtime    Xarelto 20 MG tablet TAKE 1 TABLET BY MOUTH EVERY DAY WITH DINNER    Allergies Allergen Reactions    Vicodin [Hydrocodone-Acetaminophen] Other (See Comments)     "i get nasty"      Social History     Tobacco Use    Smoking status: Never    Smokeless tobacco: Never   Vaping Use    Vaping Use: Never used   Substance Use Topics    Alcohol use: Not Currently     Comment: OCCASIONAL    Drug use: No    Family History   Problem Relation Age of Onset    No Known Problems Mother     No Known Problems Father           Objective                            Vitals I/O      Most Recent Min/Max in 24hrs   Temp 97.8 °F (36.6 °C) Temp  Min: 97.5 °F (36.4 °C)  Max: 97.8 °F (36.6 °C)   Pulse 63 Pulse  Min: 63  Max: 170   Resp 21 Resp  Min: 14  Max: 32   /66 BP  Min: 53/29  Max: 133/62   O2 Sat 97 % SpO2  Min: 94 %  Max: 100 %      Intake/Output Summary (Last 24 hours) at 12/2/2023 0239  Last data filed at 12/2/2023 0005  Gross per 24 hour   Intake 0 ml   Output --   Net 0 ml       Diet NPO    Invasive Monitoring           Physical Exam   Physical Exam  Eyes:      Pupils: Pupils are equal, round, and reactive to light. Skin:     General: Skin is cool. Capillary Refill: Capillary refill takes less than 2 seconds. HENT:      Head: Normocephalic and atraumatic. Mouth/Throat:      Mouth: Mucous membranes are moist.   Cardiovascular:      Rate and Rhythm: Normal rate and regular rhythm. Heart sounds: No murmur heard. No friction rub. No gallop. Musculoskeletal:      Right lower leg: No edema. Left lower leg: No edema. Abdominal: General: Bowel sounds are normal.      Palpations: Abdomen is soft. Tenderness: There is no abdominal tenderness. Constitutional:       Appearance: He is well-developed. He is not toxic-appearing. Pulmonary:      Effort: Pulmonary effort is normal. No accessory muscle usage, respiratory distress or accessory muscle usage. Breath sounds: Normal breath sounds. No wheezing. Neurological:      General: No focal deficit present.       Mental Status: He is oriented to person, place and time. Motor: Strength full and intact in all extremities. Diagnostic Studies      EKG: SR, no ischemic ST changes  Imaging:  I have personally reviewed pertinent reports.    and I have personally reviewed pertinent films in PACS     Medications:  Scheduled PRN   carvedilol, 37.5 mg, BID With Meals  chlorhexidine, 15 mL, Q12H [address]  digoxin, 125 mcg, Daily  rivaroxaban, 20 mg, Daily With Dinner  spironolactone, 25 mg, Daily  traZODone, 100 mg, HS          Continuous    amiodarone (CORDARONE) 900 mg in dextrose 5 % 500 mL infusion, 1 mg/min, Last Rate: 1 mg/min (12/02/23 0212)  norepinephrine, 1-30 mcg/min, Last Rate: 7 mcg/min (12/02/23 0213)         Labs:    CBC    Recent Labs     12/01/23 2018   WBC 9.83   HGB 16.2   HCT 48.6        BMP    Recent Labs     12/01/23 2018   SODIUM 136   K 4.2      CO2 24   AGAP 9   BUN 15   CREATININE 1.27   CALCIUM 9.0       Coags    No recent results     Additional Electrolytes  No recent results       Blood Gas    No recent results  No recent results LFTs  Recent Labs     12/01/23 2018   ALT 42   AST 48*   ALKPHOS 91   ALB 4.0   TBILI 1.85*       Infectious  No recent results  Glucose  Recent Labs     12/01/23  2018   GLUC 246*             0 mins of critical care time  Anticipated Length of Stay is > 2 midnights  [place]

## 2023-12-02 NOTE — PLAN OF CARE
Problem: SAFETY ADULT  Goal: Patient will remain free of falls  Description: INTERVENTIONS:  - Educate patient/family on patient safety including physical limitations  - Instruct patient to call for assistance with activity   - Consult OT/PT to assist with strengthening/mobility   - Keep Call bell within reach  - Keep bed low and locked with side rails adjusted as appropriate  - Keep care items and personal belongings within reach  - Initiate and maintain comfort rounds  - Make Fall Risk Sign visible to staff  - Offer Toileting every 2 Hours, in advance of need  - Initiate/Maintain bed/chair alarm  - Obtain necessary fall risk management equipment: yellow socks  - Apply yellow socks and bracelet for high fall risk patients  - Consider moving patient to room near nurses station  Outcome: Progressing  Goal: Maintain or return to baseline ADL function  Description: INTERVENTIONS:  -  Assess patient's ability to carry out ADLs; assess patient's baseline for ADL function and identify physical deficits which impact ability to perform ADLs (bathing, care of mouth/teeth, toileting, grooming, dressing, etc.)  - Assess/evaluate cause of self-care deficits   - Assess range of motion  - Assess patient's mobility; develop plan if impaired  - Assess patient's need for assistive devices and provide as appropriate  - Encourage maximum independence but intervene and supervise when necessary  - Involve family in performance of ADLs  - Assess for home care needs following discharge   - Consider OT consult to assist with ADL evaluation and planning for discharge  - Provide patient education as appropriate  Outcome: Progressing  Goal: Maintains/Returns to pre admission functional level  Description: INTERVENTIONS:  - Perform AM-PAC 6 Click Basic Mobility/ Daily Activity assessment daily.  - Set and communicate daily mobility goal to care team and patient/family/caregiver.    - Collaborate with rehabilitation services on mobility goals if consulted  - Perform Range of Motion 4 times a day. - Reposition patient every 3 hours. - Dangle patient 3 times a day  - Stand patient 3 times a day  - Ambulate patient 3 times a day  - Out of bed to chair 3 times a day   - Out of bed for meals 3 times a day  - Out of bed for toileting  - Record patient progress and toleration of activity level   Outcome: Progressing     Problem: DISCHARGE PLANNING  Goal: Discharge to home or other facility with appropriate resources  Description: INTERVENTIONS:  - Identify barriers to discharge w/patient and caregiver  - Arrange for needed discharge resources and transportation as appropriate  - Identify discharge learning needs (meds, wound care, etc.)  - Arrange for interpretive services to assist at discharge as needed  - Refer to Case Management Department for coordinating discharge planning if the patient needs post-hospital services based on physician/advanced practitioner order or complex needs related to functional status, cognitive ability, or social support system  Outcome: Progressing     Problem: Knowledge Deficit  Goal: Patient/family/caregiver demonstrates understanding of disease process, treatment plan, medications, and discharge instructions  Description: Complete learning assessment and assess knowledge base.   Interventions:  - Provide teaching at level of understanding  - Provide teaching via preferred learning methods  Outcome: Progressing     Problem: CARDIOVASCULAR - ADULT  Goal: Maintains optimal cardiac output and hemodynamic stability  Description: INTERVENTIONS:  - Monitor I/O, vital signs and rhythm  - Monitor for S/S and trends of decreased cardiac output  - Administer and titrate ordered vasoactive medications to optimize hemodynamic stability  - Assess quality of pulses, skin color and temperature  - Assess for signs of decreased coronary artery perfusion  - Instruct patient to report change in severity of symptoms  Outcome: Progressing  Goal: Absence of cardiac dysrhythmias or at baseline rhythm  Description: INTERVENTIONS:  - Continuous cardiac monitoring, vital signs, obtain 12 lead EKG if ordered  - Administer antiarrhythmic and heart rate control medications as ordered  - Monitor electrolytes and administer replacement therapy as ordered  Outcome: Progressing

## 2023-12-02 NOTE — ASSESSMENT & PLAN NOTE
Pt follows with Dr. Cisco Spencer  He is status post cryoablation with pulmonary vein isolation and typical flutter line 7/3/2023  On Xarelto anticoagulation, maintained on carvedilol and digoxin for rate control  Status post dofetilide antiarrhythmic initiation - mild QTc prolongation on 500 mcg, stable on 250 mcg    Pt reports he missed one dose of dofetilide, coreg and Xarelto on 11/30 PM    Plan  Continue

## 2023-12-02 NOTE — EMTALA/ACUTE CARE TRANSFER
[address] [facility] [address]  Dept: [phone]      [id number] TRANSFER CONSENT    NAME Migue Chery                                         1975                              MRN [id number]    I have been informed of my rights regarding examination, treatment, and transfer   by Dr. Tien Garrett MD    Benefits: Specialized equipment and/or services available at the receiving facility (Include comment)________________________    Risks: Potential for delay in receiving treatment      Transfer Request   I acknowledge that my medical condition has been evaluated and explained to me by the emergency department physician or other qualified medical person and/or my attending physician who has recommended and offered to me further medical examination and treatment. I understand the Hospital's obligation with respect to the treatment and stabilization of my emergency medical condition. I nevertheless request to be transferred. I release the Hospital, the doctor, and any other persons caring for me from all responsibility or liability for any injury or ill effects that may result from my transfer and agree to accept all responsibility for the consequences of my choice to transfer, rather than receive stabilizing treatment at the Hospital. I understand that because the transfer is my request, my insurance may not provide reimbursement for the services. The Hospital will assist and direct me and my family in how to make arrangements for transfer, but the hospital is not liable for any fees charged by the transport service. In spite of this understanding, I refuse to consent to further medical examination and treatment which has been offered to me, and request transfer to [address] Name, [facility] : [address] [place] [address].  I authorize the performance of emergency medical procedures and treatments upon me in both transit and upon arrival at the receiving facility. Additionally, I authorize the release of any and all medical records to the receiving facility and request they be transported with me, if possible. I authorize the performance of emergency medical procedures and treatments upon me in both transit and upon arrival at the receiving facility. Additionally, I authorize the release of any and all medical records to the receiving facility and request they be transported with me, if possible. I understand that the safest mode of transportation during a medical emergency is an ambulance and that the Hospital advocates the use of this mode of transport. Risks of traveling to the receiving facility by car, including absence of medical control, life sustaining equipment, such as oxygen, and medical personnel has been explained to me and I fully understand them. (KVNG CORRECT BOX BELOW)  [  ]  I consent to the stated transfer and to be transported by ambulance/helicopter. [  ]  I consent to the stated transfer, but refuse transportation by ambulance and accept full responsibility for my transportation by car. I understand the risks of non-ambulance transfers and I exonerate the Hospital and its staff from any deterioration in my condition that results from this refusal.    X___________________________________________    DATE  23  TIME________  Signature of patient or legally responsible individual signing on patient behalf           RELATIONSHIP TO PATIENT_________________________          Provider Certification    NAME Almon Nageotte                                         1975                              MRN [id number]    A medical screening exam was performed on the above named patient. Based on the examination:    Condition Necessitating Transfer The encounter diagnosis was Ventricular tachycardia ([address]).     Patient Condition: The patient has been stabilized such that within reasonable medical probability, no material deterioration of the patient condition or the condition of the unborn child(savanna) is likely to result from the transfer    Reason for Transfer: Level of Care needed not available at this facility    Transfer Requirements: [address] [facility] available and qualified personnel available for treatment as acknowledged by Patient access center  Agreed to accept transfer and to provide appropriate medical treatment as acknowledged by       Dr. Gala Healy  Appropriate medical records of the examination and treatment of the patient are provided at the time of transfer   [address] _______  Transfer will be performed by qualified personnel from [facility]  emergency transport  and appropriate transfer equipment as required, including the use of necessary and appropriate life support measures. Provider Certification: I have examined the patient and explained the following risks and benefits of being transferred/refusing transfer to the patient/family:  General risk, such as traffic hazards, adverse weather conditions, rough terrain or turbulence, possible failure of equipment (including vehicle or aircraft), or consequences of actions of persons outside the control of the transport personnel      Based on these reasonable risks and benefits to the patient and/or the unborn child(savanna), and based upon the information available at the time of the patient’s examination, I certify that the medical benefits reasonably to be expected from the provision of appropriate medical treatments at another medical facility outweigh the increasing risks, if any, to the individual’s medical condition, and in the case of labor to the unborn child, from effecting the transfer.     X____________________________________________ DATE 12/01/23        TIME_______      ORIGINAL - SEND TO MEDICAL RECORDS   COPY - SEND WITH PATIENT DURING TRANSFER

## 2023-12-02 NOTE — ASSESSMENT & PLAN NOTE
SVT vs VT  Pt received Adenosine and Amio prehospital  In [facility] ED pt symptomatic with 's hypotension and altered MS, DCC 200j x 1      Plan  Interrogation of medtronic  EP consult   Trend troponin  Continue Amio infusion

## 2023-12-02 NOTE — CONSULTS
Consultation - Cardiology   Zully Navas 53 y.o. male MRN: [id number]  Unit/Bed#: [facility] 547-23 Encounter: [id number]        Inpatient consult to Electrophysiology     Date/Time  12/2/2023 12:14 AM     Performed by  Kirby Kiran MD   Authorized by  Patrisha Bamberger, CRNP             History of Present Illness   Physician Requesting Consult: Gomez Alvarado MD  Reason for Consult / Principal Problem: Syncope, WCT      Assessment:  Active Problems: There are no active Hospital Problems. Assessment/ Plan: Wide complex tachycardia  Presentation with syncope x 2  Unclear of ICD firing  EKG on arrival with WCT, no AV dissociation, however positive aVR, concordant. ?SVT vs VT  Patient is s/p SC MDT ICD, prior ICD therapy in 10/2022, 4/2023, 5/2023   10/2022: inappropriate shock for afib: digoxin started   4/2023: afib in the setting of alcohol transitioning to VT: digoxin increased   5/2023: syncope and ICD shock. ?SVT/Aflutter vs VT: amiodarone started  7/2023: EP study and aflutter ablation, no other inducible arrhythmia: dofetilide start  Compliant with meds except 1 dose last Thursday evening    Paroxysmal atrial fibrillation and atrial flutter  Device interrogations : new diagnosis of paroxysmal atrial fibrillation 11/2020  status post cryoablation with pulmonary vein isolation and typical flutter line 7/3/2023  Currently on dofetilide, digoxin, carvedilol  AC xarelto    Non ischemic cardiomyopathy, HFrEF 30%  Thought to be secondary to viral  Medtronic single-chamber ICD in situ - initially implanted in 2006, status post extraction of recalled [employer]our lead with reimplantation 6/2017. GDMT: Coreg 37.5 mg twice daily, spironolactone 25 mg daily. Blood pressure has been a limiting factor  Currently euvolemic    Hypertension  Mild CONCHIS    Plan  Currently on amiodarone drip and norepinephrine drip.   Continue  Would wean norepinephrine off first  Continue Coreg 37.5 mg twice daily  Continue digoxin 125 mcg daily  Resume dofetilide  continue rest of the GDMT: Spironolactone  Currently euvolemic, no indication for diuretics  Check transthoracic echocardiogram  Device interrogated, unable to login to clear link. Will call [employer] this morning to obtain an updated password        HPI: Cally Palacios is a 53y.o. year old male who has a history of nonischemic cardiomyopathy diagnosed at age 28 years [viral myocarditis] status post single-chamber ICD initial placement in 2006 miky lead recall status post extraction with new single-chamber Medtronic AICD lead  placement in 2017, paroxysmal atrial fibrillation with prior shock for rapid A-fib that changed to rapid VT, wide-complex tachycardia [SVT versus V. tach] status post EP study in July 2023 with cryoablation PVI and typical flutter, and initiation of dofetilide post ablation on in July 2023, nonobstructive coronary artery disease, hypertension, BMI 41. He presents following an unresponsive episode while at home sitting on the couch. Witnessed by patient's daughter. Unclear if he had a shock. Patient has been awake on EMS arrival has had heart rate 170 with hypotension. He was given adenosine and IV amiodarone bolus with no improvement. He was given IV lidocaine 150 mg and was initiated on IV amiodarone drip on arrival to [facility] ED. Patient had become unresponsive with SBPs in 50's. Given hemodynic instability, he was cardioverted with 200J with return to NSR. He regained consciousness  And his blood pressure gradually improved. Electrolytes are within normal limits. Initial troponin 80, repeat troponin  6200. Currently has stable vital signs. Denies recent angina/chest pain. States that until 1 year back he has been doing well, and in October  for his first ICD firing [see above]. No orthopnea, PND. No lower extremity swelling. Has been compliant with his medications except for 1 dose of all his medications on Thursday evening.   He had resumed all his medications the following day. Electrolytes are within normal limits. Normal renal functions. Currently he is on Levophed and amiodarone drips. EKG on arrival        Review of Systems   Constitutional: Negative. HENT: Negative. Eyes: Negative. Cardiovascular:  Positive for syncope. Negative for dyspnea on exertion, leg swelling, orthopnea and paroxysmal nocturnal dyspnea. Respiratory:  Negative for shortness of breath. Musculoskeletal: Negative. Genitourinary: Negative. Neurological:  Negative for light-headedness. All other systems reviewed and are negative. Historical Information   Past Medical History:   Diagnosis Date    A-fib [facility]) 2020    AICD (automatic cardioverter/defibrillator) present     CHF (congestive heart failure) ([address])     Hypertension     Myocarditis ([address])     Sleep apnea     Ventricular tachycardia [facility])      Past Surgical History:   Procedure Laterality Date    CARDIAC DEFIBRILLATOR PLACEMENT      CARDIAC ELECTROPHYSIOLOGY PROCEDURE N/A 7/3/2023    Procedure: Cardiac eps/svt ablation;  Surgeon: Melanie Paul DO;  Location:  CARDIAC CATH LAB; Service: Cardiology    CARDIAC ELECTROPHYSIOLOGY PROCEDURE N/A 7/3/2023    Procedure: Cardiac eps/afib ablation;  Surgeon: Melanie Paul DO;  Location:  CARDIAC CATH LAB; Service: Cardiology    VT ESOPHAGOGASTRODUODENOSCOPY TRANSORAL DIAGNOSTIC N/A 3/29/2017    Procedure: ESOPHAGOGASTRODUODENOSCOPY (EGD); Surgeon: Yaya Mancilla MD;  Location: [facility] GI LAB;   Service: Gastroenterology    VT INSJ/RPLCMT PERM DFB W/TRNSVNS LDS 1/DUAL [facility] N/A 6/2/2017    Procedure: LEAD EXTRACTION/REIMPLANTATION AND ICD GENERATOR CHANGE ;  Surgeon: Anjali Aguillon MD;  Location:  MAIN OR;  Service: Cardiology    VT LAPAROSCOPY SURG CHOLECYSTECTOMY N/A 5/19/2017    Procedure: Lars Escoto ;  Surgeon: Melissa Camarena MD;  Location: [facility] MAIN OR;  Service: General    TONSILLECTOMY      TYMPANOSTOMY TUBE PLACEMENT       Social History     Substance and Sexual Activity   Alcohol Use Not Currently    Comment: OCCASIONAL     Social History     Substance and Sexual Activity   Drug Use No     Social History     Tobacco Use   Smoking Status Never   Smokeless Tobacco Never     Family History:   Family History   Problem Relation Age of Onset    No Known Problems Mother     No Known Problems Father        Meds/Allergies   all current active meds have been reviewed  No current facility-administered medications for this encounter. Allergies   Allergen Reactions    Vicodin [Hydrocodone-Acetaminophen] Other (See Comments)     "i get nasty"       Objective   Vitals: There were no vitals taken for this visit. , There is no height or weight on file to calculate BMI.,   Systolic (38YTS), YMW:080 , Min:53 , XZ     Diastolic (80YXC), SJL:87, Min:29, Max:80    No intake or output data in the 24 hours ending 23 0015    Weight (last 2 days)       None            Invasive Devices       Peripheral Intravenous Line  Duration             Peripheral IV 23 Left Antecubital <1 day    Peripheral IV 23 Right Antecubital <1 day    Peripheral IV 23 Right Hand <1 day                        Physical Exam:   Physical Exam  Vitals and nursing note reviewed. Constitutional:       General: He is not in acute distress. Appearance: He is not ill-appearing. Cardiovascular:      Heart sounds: Normal heart sounds. No murmur heard. Pulmonary:      Effort: Pulmonary effort is normal.   Abdominal:      Palpations: Abdomen is soft. Musculoskeletal:      Right lower leg: No edema. Left lower leg: No edema. Skin:     Capillary Refill: Capillary refill takes less than 2 seconds. Comments: Cold peripheries   Neurological:      Mental Status: He is alert and oriented to person, place, and time.             Laboratory Results:        CBC with diff:   Results from last 7 days   Lab Units 23  2018   WBC Thousand/uL 9.83   HEMOGLOBIN g/dL 16.2   HEMATOCRIT % 48.6   MCV fL 88   PLATELETS Thousands/uL 182   RBC Million/uL 5.53   MCH pg 29.3   MCHC g/dL 33.3   RDW % 15.1   MPV fL 10.3   NRBC AUTO /100 WBCs 0         CMP:  Results from last 7 days   Lab Units 23   POTASSIUM mmol/L 4.2   CHLORIDE mmol/L 103   CO2 mmol/L 24   BUN mg/dL 15   CREATININE mg/dL 1.27   CALCIUM mg/dL 9.0   AST U/L 48*   ALT U/L 42   ALK PHOS U/L 91   EGFR ml/min/1.73sq m 66         BMP:  Results from last 7 days   Lab Units 23   POTASSIUM mmol/L 4.2   CHLORIDE mmol/L 103   CO2 mmol/L 24   BUN mg/dL 15   CREATININE mg/dL 1.27   CALCIUM mg/dL 9.0       BNP:  No results for input(s): "BNP" in the last 72 hours. Magnesium:       Coags:       TSH:       Hemoglobin A1C       Lipid Profile:         Cardiac testing:   Results for orders placed during the hospital encounter of 19    Echo complete with contrast if indicated    Narrative  [address] [place] [address]  [phone]    Transthoracic Echocardiogram  2D, M-mode, Doppler, and Color Doppler    Study date:  2019    Patient: Hair García  MR number: [id number]  Account number: [de-identified]  : 1975  Age: 44 years  Gender: Male  Status: Outpatient  Location: [facility]  Height: 74 in  Weight: 291 lb  BP: 118/ 64 mmHg    Indications: Dilated Cardiomyopathy. Diagnoses: I42.0 - Dilated cardiomyopathy    Sonographer:  Sandra Meza,, RCS  Interpreting Physician:  Murray Boo MD  Primary Physician:  Valentín Andrade MD  Referring Physician:  Magaly Marrufo MD  Group:  [facility]    SUMMARY    LEFT VENTRICLE:  The ventricle was moderately to markedly dilated. Ejection fraction was estimated to be 25 %. There was severe diffuse hypokinesis.   Features were consistent with a pseudonormal left ventricular filling pattern, with concomitant abnormal relaxation and increased filling pressure (grade 2 diastolic dysfunction). RIGHT VENTRICLE:  Estimated peak pressure was at least 30 mmHg. ICD lead noted. LEFT ATRIUM:  The atrium was moderately dilated. RIGHT ATRIUM:  The atrium was moderately dilated. MITRAL VALVE:  There was trace regurgitation. TRICUSPID VALVE:  There was mild regurgitation. HISTORY: PRIOR HISTORY: Risk factors: hypertension. PROCEDURE: The study was performed in the [facility]. This was a routine study. The transthoracic approach was used. The study included complete 2D imaging, M-mode, complete spectral Doppler, and color Doppler. The  heart rate was 74 bpm, at the start of the study. This was a technically difficult study. LEFT VENTRICLE: The ventricle was moderately to markedly dilated. Ejection fraction was estimated to be 25 %. There was severe diffuse hypokinesis. DOPPLER: Features were consistent with a pseudonormal left ventricular filling pattern,  with concomitant abnormal relaxation and increased filling pressure (grade 2 diastolic dysfunction). RIGHT VENTRICLE: The size was normal. Systolic function was normal. Wall thickness was normal. DOPPLER: Estimated peak pressure was at least 30 mmHg. ICD lead noted. LEFT ATRIUM: The atrium was moderately dilated. RIGHT ATRIUM: The atrium was moderately dilated. MITRAL VALVE: There was annular calcification. DOPPLER: There was trace regurgitation. AORTIC VALVE: The valve was trileaflet. Leaflets exhibited normal thickness and normal cuspal separation. The valve was not well visualized. DOPPLER: Transaortic velocity was within the normal range. There was no evidence for stenosis. There was no regurgitation. TRICUSPID VALVE: The valve structure was normal. There was normal leaflet separation. DOPPLER: The transtricuspid velocity was within the normal range. There was no evidence for stenosis. There was mild regurgitation.     PULMONIC VALVE: Leaflets exhibited normal thickness, no calcification, and normal cuspal separation. DOPPLER: The transpulmonic velocity was within the normal range. There was no regurgitation. PERICARDIUM: There was no pericardial effusion. The pericardium was normal in appearance. AORTA: The root exhibited normal size. SYSTEM MEASUREMENT TABLES    2D  %FS: 12.58 %  Ao Diam: 2.95 cm  EDV(Teich): 249.22 ml  EF(Teich): 26.31 %  ESV(Teich): 183.65 ml  IVSd: 1.03 cm  LA Area: 29.37 cm2  LA Diam: 5.32 cm  LVEDV MOD A4C: 213.51 ml  LVEF MOD A4C: 30.3 %  LVESV MOD A4C: 148.81 ml  LVIDd: 6.92 cm  LVIDs: 6.05 cm  LVLd A4C: 9.46 cm  LVLs A4C: 8.02 cm  LVPWd: 0.71 cm  RA Area: 23.69 cm2  RVIDd: 4.49 cm  SV MOD A4C: 64.7 ml  SV(Teich): 65.57 ml    CW  TR MaxP.1 mmHg  TR Vmax: 2.55 m/s    MM  TAPSE: 2.55 cm    PW  E': 0.08 m/s  E/E': 11.03  MV A Kamron: 0.5 m/s  MV Dec Greenlee: 3.77 m/s2  MV DecT: 234 ms  MV E Kamron: 0.88 m/s  MV E/A Ratio: 1.76  MV PHT: 67.86 ms  MVA By PHT: 3.24 cm2    [facility] Accredited Echocardiography Laboratory    Prepared and electronically signed by    Suzanna Whitt MD  Signed 10-Apr-2019 13:52:24    No results found for this or any previous visit. No results found for this or any previous visit. No results found for this or any previous visit. Imaging: I have personally reviewed pertinent reports. Cardiac EP device report    Result Date: 2023  Narrative: GABRIEL-SINGLE CHAMBER ICD/ACTIVE SYSTEM IS MRI CONDITIONAL CARELINK TRANSMISSION: BATTERY VOLTAGE ADEQUATE (3.5 YRS). <0.1%. ALL AVAILABLE LEAD PARAMETERS WITHIN NORMAL LIMITS. NO SIGNIFICANT HIGH RATE EPISODES. OPTI-VOL WITHIN NORMAL LIMITS. NORMAL DEVICE FUNCTION. GV       EKG reviewed personally: Wide-complex tachycardia  Telemetry reviewed personally: Regular rhythm,  marked baseline variation therefore unable to see P waves. Most likely sinus rhythm.         Code Status: Prior

## 2023-12-03 LAB
ALBUMIN SERPL BCP-MCNC: 3.6 G/DL (ref 3.5–5)
ALP SERPL-CCNC: 62 U/L (ref 34–104)
ALT SERPL W P-5'-P-CCNC: 45 U/L (ref 7–52)
ANION GAP SERPL CALCULATED.3IONS-SCNC: 5 MMOL/L
AST SERPL W P-5'-P-CCNC: 56 U/L (ref 13–39)
ATRIAL RATE: 55 BPM
ATRIAL RATE: 76 BPM
ATRIAL RATE: 90 BPM
BILIRUB DIRECT SERPL-MCNC: 0.32 MG/DL (ref 0–0.2)
BILIRUB SERPL-MCNC: 1.92 MG/DL (ref 0.2–1)
BUN SERPL-MCNC: 16 MG/DL (ref 5–25)
CALCIUM SERPL-MCNC: 8.5 MG/DL (ref 8.4–10.2)
CHLORIDE SERPL-SCNC: 102 MMOL/L (ref 96–108)
CO2 SERPL-SCNC: 24 MMOL/L (ref 21–32)
CREAT SERPL-MCNC: 1.08 MG/DL (ref 0.6–1.3)
ERYTHROCYTE [DISTWIDTH] IN BLOOD BY AUTOMATED COUNT: 15.6 % (ref 11.6–15.1)
GFR SERPL CREATININE-BSD FRML MDRD: 81 ML/MIN/1.73SQ M
GLUCOSE SERPL-MCNC: 156 MG/DL (ref 65–140)
HCT VFR BLD AUTO: 46.4 % (ref 36.5–49.3)
HGB BLD-MCNC: 15.3 G/DL (ref 12–17)
MAGNESIUM SERPL-MCNC: 1.7 MG/DL (ref 1.9–2.7)
MCH RBC QN AUTO: 29.5 PG (ref 26.8–34.3)
MCHC RBC AUTO-ENTMCNC: 33 G/DL (ref 31.4–37.4)
MCV RBC AUTO: 90 FL (ref 82–98)
P AXIS: 15 DEGREES
P AXIS: 18 DEGREES
PLATELET # BLD AUTO: 144 THOUSANDS/UL (ref 149–390)
PMV BLD AUTO: 11.6 FL (ref 8.9–12.7)
POTASSIUM SERPL-SCNC: 4.6 MMOL/L (ref 3.5–5.3)
PR INTERVAL: 192 MS
PR INTERVAL: 196 MS
PROT SERPL-MCNC: 6.3 G/DL (ref 6.4–8.4)
QRS AXIS: -17 DEGREES
QRS AXIS: 261 DEGREES
QRS AXIS: 88 DEGREES
QRSD INTERVAL: 100 MS
QRSD INTERVAL: 200 MS
QRSD INTERVAL: 94 MS
QT INTERVAL: 328 MS
QT INTERVAL: 388 MS
QT INTERVAL: 496 MS
QTC INTERVAL: 436 MS
QTC INTERVAL: 474 MS
QTC INTERVAL: 549 MS
RBC # BLD AUTO: 5.18 MILLION/UL (ref 3.88–5.62)
SODIUM SERPL-SCNC: 131 MMOL/L (ref 135–147)
T WAVE AXIS: -21 DEGREES
T WAVE AXIS: 196 DEGREES
T WAVE AXIS: 5 DEGREES
VENTRICULAR RATE: 169 BPM
VENTRICULAR RATE: 55 BPM
VENTRICULAR RATE: 76 BPM
WBC # BLD AUTO: 7.8 THOUSAND/UL (ref 4.31–10.16)

## 2023-12-03 PROCEDURE — NC001 PR NO CHARGE: Performed by: ANESTHESIOLOGY

## 2023-12-03 PROCEDURE — 85027 COMPLETE CBC AUTOMATED: CPT | Performed by: ANESTHESIOLOGY

## 2023-12-03 PROCEDURE — 80076 HEPATIC FUNCTION PANEL: CPT | Performed by: ANESTHESIOLOGY

## 2023-12-03 PROCEDURE — 99233 SBSQ HOSP IP/OBS HIGH 50: CPT | Performed by: ANESTHESIOLOGY

## 2023-12-03 PROCEDURE — 93010 ELECTROCARDIOGRAM REPORT: CPT | Performed by: INTERNAL MEDICINE

## 2023-12-03 PROCEDURE — 99232 SBSQ HOSP IP/OBS MODERATE 35: CPT | Performed by: STUDENT IN AN ORGANIZED HEALTH CARE EDUCATION/TRAINING PROGRAM

## 2023-12-03 PROCEDURE — 83735 ASSAY OF MAGNESIUM: CPT | Performed by: ANESTHESIOLOGY

## 2023-12-03 PROCEDURE — 80048 BASIC METABOLIC PNL TOTAL CA: CPT | Performed by: ANESTHESIOLOGY

## 2023-12-03 RX ORDER — SPIRONOLACTONE 25 MG/1
25 TABLET ORAL DAILY
Status: DISCONTINUED | OUTPATIENT
Start: 2023-12-03 | End: 2023-12-07 | Stop reason: HOSPADM

## 2023-12-03 RX ORDER — CARVEDILOL 25 MG/1
25 TABLET ORAL 2 TIMES DAILY WITH MEALS
Status: DISCONTINUED | OUTPATIENT
Start: 2023-12-03 | End: 2023-12-04

## 2023-12-03 RX ORDER — MAGNESIUM SULFATE HEPTAHYDRATE 40 MG/ML
2 INJECTION, SOLUTION INTRAVENOUS ONCE
Status: COMPLETED | OUTPATIENT
Start: 2023-12-03 | End: 2023-12-03

## 2023-12-03 RX ORDER — FUROSEMIDE 10 MG/ML
40 INJECTION INTRAMUSCULAR; INTRAVENOUS DAILY
Status: DISCONTINUED | OUTPATIENT
Start: 2023-12-03 | End: 2023-12-04

## 2023-12-03 RX ADMIN — MAGNESIUM SULFATE HEPTAHYDRATE 2 G: 40 INJECTION, SOLUTION INTRAVENOUS at 12:09

## 2023-12-03 RX ADMIN — CARVEDILOL 25 MG: 25 TABLET, FILM COATED ORAL at 15:31

## 2023-12-03 RX ADMIN — AMIODARONE HYDROCHLORIDE 1 MG/MIN: 50 INJECTION, SOLUTION INTRAVENOUS at 19:16

## 2023-12-03 RX ADMIN — FUROSEMIDE 40 MG: 10 INJECTION, SOLUTION INTRAMUSCULAR; INTRAVENOUS at 09:59

## 2023-12-03 RX ADMIN — CARVEDILOL 25 MG: 25 TABLET, FILM COATED ORAL at 08:10

## 2023-12-03 RX ADMIN — RIVAROXABAN 20 MG: 20 TABLET, FILM COATED ORAL at 15:31

## 2023-12-03 RX ADMIN — AMIODARONE HYDROCHLORIDE 400 MG: 200 TABLET ORAL at 15:31

## 2023-12-03 RX ADMIN — AMIODARONE HYDROCHLORIDE 400 MG: 200 TABLET ORAL at 08:10

## 2023-12-03 RX ADMIN — TRAZODONE HYDROCHLORIDE 100 MG: 100 TABLET ORAL at 21:32

## 2023-12-03 RX ADMIN — CHLORHEXIDINE GLUCONATE 15 ML: 1.2 SOLUTION ORAL at 08:10

## 2023-12-03 RX ADMIN — SPIRONOLACTONE 25 MG: 25 TABLET ORAL at 08:10

## 2023-12-03 RX ADMIN — AMIODARONE HYDROCHLORIDE 1 MG/MIN: 50 INJECTION, SOLUTION INTRAVENOUS at 02:53

## 2023-12-03 NOTE — ASSESSMENT & PLAN NOTE
SVT vs VT  Pt received Adenosine and Amio prehospital  In [place] ED pt symptomatic with 's hypotension and altered MS, DCC 200j x 1      Plan  Interrogation of medtronic  EP consult, appreciate recommendations  Continue Amio infusion with oral load  Continue coreg

## 2023-12-03 NOTE — ASSESSMENT & PLAN NOTE
BMI 41.8      Plan  Body mass index is 41.35 kg/m².     Recommend incorporating a more whole foods plant-predominant diet along with decreasing consumption of red meats and processed foods  Per AHA guidelines, recommend moderate-vigorous intensity exercise for 30 minutes a day for 5 days a week or a total of 150 min/week

## 2023-12-03 NOTE — UTILIZATION REVIEW
Initial Clinical Review    Admission: Date/Time/Statement:   Admission Orders (From admission, onward)       Ordered        12/02/23 0150  Inpatient Admission  Once                          Orders Placed This Encounter   Procedures    Inpatient Admission     Standing Status:   Standing     Number of Occurrences:   1     Order Specific Question:   Level of Care     Answer:   Critical Care [15]     Order Specific Question:   Estimated length of stay     Answer:   More than 2 Midnights     Order Specific Question:   Certification     Answer:   I certify that inpatient services are medically necessary for this patient for a duration of greater than two midnights. See H&P and MD Progress Notes for additional information about the patient's course of treatment. Initial Presentation: 53 y.o. male  PMH: NICM EF  s/p Medtronic AICD, Hx Afib which degraded to wide complex SVT vs VT and had cryoablation and PVI in July 2023, Obese class III         On  12/01  Initially brought to [facility] ER from home via EMS    for symptomatic and hypotensive wide complex tachycardia  (unclear if ICD fired)    IN ER  external 200 J cardioversion to NSR. TRANSFERRED To HIGHER Level of care/  [facility]  for  NIKE Level of care (cardiology/ EPS services)        12/02  @  0151      Admit   IP  status,  [address] [place] [address],   CRITICAL  level of care for ongoing management of  Wide complex Tachycardia w/ syncope x2. On arrival to ICU was on 5 mcg of levophed with normotension and on amio gtt. He recalled all events with exception of syncopal episode, non focal exam. NSR. No respiratory distress. AB soft. Extremities warm ans well perfused with edema . Interrogation was attempted but not successful. Plan:  continue cardiopulmonary monitoring. Interrogate device to help determine rhythm and if device is functioning properly - suspect maybe SVT .   Cont Amio gtt for now and start amiodarone 400mg BID PO.  DC dofetilide, norepinephrine gtt,  digoxin   Cont coreg, Dofetilide and Xarelto . NPO at midnight . Monitor/ replete clive.      Date: 12/03     Day 2:       Wt Readings from Last 1 Encounters:   12/02/23 (!) 148 kg (326 lb)     Additional Vital Signs:   12/02/23 1930 -- 54 Abnormal  -- 79/47 Abnormal  58 Abnormal  -- -- -- -- --   12/02/23 1900 -- 57 21 84/46 Abnormal  58 Abnormal  94 % -- -- -- --   12/02/23 1700 -- 64 27 Abnormal  101/55 75 95 % -- -- -- --   12/02/23 1615 98.5 °F (36.9 °C) 60 26 Abnormal  108/64 81 96 % -- -- None (Room air) Lying   12/02/23 1500 -- 58 19 108/65 81 96 % -- -- -- --   12/02/23 1400 -- 57 23 Abnormal  111/60 79 95 % -- -- None (Room air) --   12/02/23 1300 -- 54 Abnormal  18 93/50 66 95 % -- -- -- --   12/02/23 1200 98.3 °F (36.8 °C) 57 17 92/50 64 Abnormal  95 % -- -- None (Room air) Lying   12/02/23 0900 -- 59 16 107/62 78 96 % -- -- -- --   12/02/23 0829 -- 59 -- -- -- -- -- -- -- --   12/02/23 0800 98.1 °F (36.7 °C) 61 17 88/55 Abnormal  67 95 % -- -- None (Room air) Lying   12/02/23 0730 -- -- -- 103/55 73 -- -- -- -- --   12/02/23 0600 -- 61 20 110/64 82 98 % 28 2 L/min -- --   12/02/23 0500 -- 59 18 114/62 83 97 % -- -- -- --   12/02/23 0400 97.5 °F (36.4 °C) 64 20 123/68 90 97 % -- -- -- --   12/02/23 0215 -- 63 21 122/66 88 97 % -- -- -- --   12/02/23 0005 97.8 °F (36.6 °C) 69 32 Abnormal  121/62 89 97 % -- -- -- --     Pertinent Labs/Diagnostic Test Results:   No orders to display         Results from last 7 days   Lab Units 12/02/23  0640 12/01/23 2018   WBC Thousand/uL 13.14* 9.83   HEMOGLOBIN g/dL 16.0 16.2   HEMATOCRIT % 49.3 48.6   PLATELETS Thousands/uL 216 182   NEUTROS ABS Thousands/µL  --  6.32         Results from last 7 days   Lab Units 12/02/23  0640 12/01/23 2018   SODIUM mmol/L 136 136   POTASSIUM mmol/L 4.5 4.2   CHLORIDE mmol/L 106 103   CO2 mmol/L 23 24   ANION GAP mmol/L 7 9   BUN mg/dL 14 15   CREATININE mg/dL 1.04 1.27   EGFR ml/min/1.73sq m 85 66   CALCIUM mg/dL 9.0 9.0 MAGNESIUM mg/dL 2.0  --    PHOSPHORUS mg/dL 3.5  --      Results from last 7 days   Lab Units 12/01/23 2018   AST U/L 48*   ALT U/L 42   ALK PHOS U/L 91   TOTAL PROTEIN g/dL 7.0   ALBUMIN g/dL 4.0   TOTAL BILIRUBIN mg/dL 1.85*   BILIRUBIN DIRECT mg/dL 0.26*     Results from last 7 days   Lab Units 12/01/23 2002   POC GLUCOSE mg/dl 235*     Results from last 7 days   Lab Units 12/02/23  0640 12/01/23 2018   GLUCOSE RANDOM mg/dL 146* 246*     Results from last 7 days   Lab Units 12/01/23  2253 12/01/23 2018   HS TNI 0HR ng/L  --  80*   HS TNI 2HR ng/L 6,240*  --    HSTNI D2 ng/L 6,160*  --      Results from last 7 days   Lab Units 12/02/23  0640   DIGOXIN LVL ng/mL 0.4*     Results from last 7 days   Lab Units 12/02/23  1129   CLARITY UA  Clear   COLOR UA  Light Yellow   SPEC GRAV UA  1.010   PH UA  5.0   GLUCOSE UA mg/dl 70 (7/100%)*   KETONES UA mg/dl Negative   BLOOD UA  Negative   PROTEIN UA mg/dl Negative   NITRITE UA  Negative   BILIRUBIN UA  Negative   UROBILINOGEN UA (BE) mg/dl <2.0   LEUKOCYTES UA  Negative   WBC UA /hpf 1-2   RBC UA /hpf None Seen   BACTERIA UA /hpf None Seen   EPITHELIAL CELLS WET PREP /hpf None Seen       Past Medical History:   Diagnosis Date    A-fib ([address] ) 2020    AICD (automatic cardioverter/defibrillator) present     CHF (congestive heart failure) (HCC)     Hypertension     Myocarditis (HCC)     Sleep apnea     Ventricular tachycardia (HCC)      Present on Admission:   Syncope   Dilated cardiomyopathy (HCC)   Paroxysmal atrial fibrillation (HCC)      Admitting Diagnosis: VT (ventricular tachycardia) ([address]) [I47.20]  Age/Sex: 53 y.o. male  Admission Orders:    see above note. Scd/s b/l LE. I/O q 2H. Neuro ck q4H. OOB to chair. DIET - cardiac/ 2 gm Na/  fluid restriction 2000 ml.       Scheduled Medications:  amiodarone, 400 mg, Oral, BID With Meals  carvedilol, 37.5 mg, Oral, BID With Meals  chlorhexidine, 15 mL, Mouth/Throat, Q12H SAMUEL  rivaroxaban, 20 mg, Oral, Daily With Dinner  spironolactone, 25 mg, Oral, Daily  traZODone, 100 mg, Oral, HS      Continuous IV Infusions:  amiodarone (CORDARONE) 900 mg in dextrose 5 % 500 mL infusion, 1 mg/min, Intravenous, Continuous  norepinephrine, 1-30 mcg/min, Intravenous, Titrated      PRN Meds:  acetaminophen, 650 mg, Oral, Q6H PRN        IP CONSULT TO CASE MANAGEMENT  IP CONSULT TO ELECTROPHYSIOLOGY    Network Utilization Review Department  ATTENTION: Please call with any questions or concerns to [phone] and carefully listen to the prompts so that you are directed to the right person. All voicemails are confidential.   For Discharge needs, contact Care Management DC Support Team at [phone] opt. [phone]  Send all requests for admission clinical reviews, approved or denied determinations and any other requests to dedicated fax number below belonging to the campus where the patient is receiving treatment.  List of dedicated fax numbers for the Facilities:  [place] DENIALS (Administrative/Medical Necessity) [phone]   DISCHARGE SUPPORT TEAM (NETWORK) [id number] [address] (Maternity/NICU/Pediatrics) [phone]   [address] [address] [facility] [phone]   [address] [facility] [address] [address] [facility] [address] [facility] [address]  [address] [phone]

## 2023-12-03 NOTE — ASSESSMENT & PLAN NOTE
Syncope on 12/1, while sitting on couch at home, felt " heart racing, got flushed, ringing in ears and blacked out" pt incontinent with episode  Pt received Adenosine and Amio pre-hospital for suspected SVT  Pt with similar episodes of decreased MS while at [facility] in the setting of hypotension and tachyarrhythmia      Plan  EP consulted for interrogation of on MDT ICD  Continue amiodarone oral load with IV for now  Continue coreg  Serial neuro exams  Goal MAP >65

## 2023-12-03 NOTE — PROGRESS NOTES
[address] [facility]  Progress Note  Name: Saravanan Field  MRN: [id number]  [facility] I Date of Admission: 12/1/2023   Date of Service: 12/3/2023 I Hospital Day: 2    Assessment/Plan   Dilated cardiomyopathy [facility])  Assessment & Plan  LVEF 30% per echo 5/2023 8/2021 last cardiac cath with normal coronaries   12/2: Echo "Left ventricular cavity size is severely dilated. Wall thickness is normal. There is eccentric hypertrophy. The left ventricular ejection fraction is 10%. Systolic function is severely reduced. There is severe global hypokinesis. There is no thrombus"  Maintained on carvedilol and spironolactone, no ACEi/ARB/ARNi due to soft BP  Pt unsure of dry weight, does not weigh self daily, reports LE edema correlates with Na intake  No distress on exam, extremities are warm, tolerating room air     Plan  Continue coreg and spironolactone  Will give 40mg of IV lasix now  Close I&Os  Fluid restriction  Heart Failure consult      Wide-complex tachycardia  Assessment & Plan  SVT vs VT  Pt received Adenosine and Amio prehospital  In [place] ED pt symptomatic with 's hypotension and altered MS, DCC 200j x 1      Plan  Interrogation of medtronic  EP consult, appreciate recommendations  Continue Amio infusion with oral load  Continue coreg       * Syncope  Assessment & Plan  Syncope on 12/1, while sitting on couch at home, felt " heart racing, got flushed, ringing in ears and blacked out" pt incontinent with episode  Pt received Adenosine and Amio pre-hospital for suspected SVT  Pt with similar episodes of decreased MS while at [place] in the setting of hypotension and tachyarrhythmia      Plan  EP consulted for interrogation of on MDT ICD  Continue amiodarone oral load with IV for now  Continue coreg  Serial neuro exams  Goal MAP >65    Paroxysmal atrial fibrillation ([address])  Assessment & Plan  Pt follows with Dr. Renetta Short.  He is status post cryoablation with pulmonary vein isolation and typical flutter line 7/3/2023  PTA meds:  carvedilol, tokosin, digoxin for rate control and xarelto for [facility], [place], operated by [facility]  Pt reports he missed one dose of dofetilide, coreg and Xarelto on 11/30 PM    Plan  Continue coreg and oral amiodarone load per EP  Continue amiodarone IV today   Continue xarelto   EP following, appreciate recommendation       Morbid obesity with BMI of 40.0-44.9, adult ([facility])  Assessment & Plan  BMI 41.8      Plan  Body mass index is 41.35 kg/m². Recommend incorporating a more whole foods plant-predominant diet along with decreasing consumption of red meats and processed foods  Per AHA guidelines, recommend moderate-vigorous intensity exercise for 30 minutes a day for 5 days a week or a total of 150 min/week               Disposition: Med Surg with Telemetry    ICU Core Measures     A: Assess, Prevent, and Manage Pain Has pain been assessed? Yes  Need for changes to pain regimen? No   B: Both SAT/SAT  N/A   C: Choice of Sedation RASS Goal: N/A patient not on sedation  Need for changes to sedation or analgesia regimen? NA   D: Delirium CAM-ICU: Negative   E: Early Mobility  Plan for early mobility? Yes   F: Family Engagement Plan for family engagement today? Yes         Prophylaxis:  VTE VTE covered by:  rivaroxaban, Oral, 20 mg at 12/02/23 1659       Stress Ulcer  not ordered         Significant 24hr Events     24hr events: Remains on IV amio with oral amio load. Received evening dose of coreg and became hypotensive requiring levophed briefly. Levo now back to off. No further arrhythmias. No other acute overnight events. Subjective   Review of Systems   Reason unable to perform ROS: frustrated regarding driving restrictions.       Objective                            Vitals I/O      Most Recent Min/Max in 24hrs   Temp 98.1 °F (36.7 °C) Temp  Min: 97.8 °F (36.6 °C)  Max: 98.5 °F (36.9 °C)   Pulse 60 Pulse  Min: 52  Max: 64   Resp 20 Resp  Min: 10  Max: 27   /87 BP  Min: 77/47  Max: 122/87   O2 Sat 94 % SpO2  Min: 93 %  Max: 97 %      Intake/Output Summary (Last 24 hours) at 12/3/2023 1125  Last data filed at 12/3/2023 1000  Gross per 24 hour   Intake 2438. 16 ml   Output 1000 ml   Net 1438.16 ml       Diet Cardiovascular; Sodium 2 GM; Fluid Restriction 2000 ML    Invasive Monitoring           Physical Exam   Physical Exam  Eyes:      Extraocular Movements: Extraocular movements intact. Pupils: Pupils are equal, round, and reactive to light. Skin:     General: Skin is warm and dry. Coloration: Skin is not jaundiced. Findings: No wound. HENT:      Head: Normocephalic and atraumatic. Mouth/Throat:      Mouth: Mucous membranes are moist.   Cardiovascular:      Rate and Rhythm: Normal rate and regular rhythm. Pulses:           Radial pulses are 1+ on the right side and 1+ on the left side. Heart sounds: Heart sounds are distant. Musculoskeletal:         General: Normal range of motion. Right lower leg: Trace Edema present. Left lower leg: Trace Edema present. Abdominal:      Palpations: Abdomen is soft. Constitutional:       General: He is awake. He is not in acute distress. Appearance: He is well-developed and well-nourished. He is obese. Pulmonary:      Effort: Pulmonary effort is normal.      Breath sounds: Examination of the right-lower field reveals rales. Examination of the left-lower field reveals rales. Decreased breath sounds and rales present. Neurological:      General: No focal deficit present. Mental Status: He is alert and oriented to person, place and time. Motor: gross motor function is at baseline for patient. Strength full and intact in all extremities. Diagnostic Studies      EKG: sinus rhythm   Imaging:  I have personally reviewed pertinent reports.    and I have personally reviewed pertinent films in PACS     Medications:  Scheduled PRN   amiodarone, 400 mg, BID With Meals  carvedilol, 25 mg, BID With Meals  chlorhexidine, 15 mL, Q12H SAMUEL  furosemide, 40 mg, Daily  rivaroxaban, 20 mg, Daily With Dinner  spironolactone, 25 mg, Daily  traZODone, 100 mg, HS      acetaminophen, 650 mg, Q6H PRN       Continuous    amiodarone (CORDARONE) 900 mg in dextrose 5 % 500 mL infusion, 1 mg/min, Last Rate: 1 mg/min (12/03/23 0253)         Labs:    CBC    Recent Labs     12/02/23 0640 12/03/23 0253   WBC 13.14* 7.80   HGB 16.0 15.3   HCT 49.3 46.4    144*     BMP    Recent Labs     12/02/23 0640 12/03/23  0739   SODIUM 136 131*   K 4.5 4.6    102   CO2 23 24   AGAP 7 5   BUN 14 16   CREATININE 1.04 1.08   CALCIUM 9.0 8.5       Coags    No recent results     Additional Electrolytes  Recent Labs     12/02/23 0640 12/03/23  0739   MG 2.0 1.7*   PHOS 3.5  --           Blood Gas    No recent results  No recent results LFTs  Recent Labs     12/01/23 2018 12/03/23  0739   ALT 42 45   AST 48* 56*   ALKPHOS 91 62   ALB 4.0 3.6   TBILI 1.85* 1.92*       Infectious  No recent results  Glucose  Recent Labs     12/01/23 2018 12/02/23  0640 12/03/23  0739   GLUC 246* 146* 939 Bernadette Manjarrez

## 2023-12-03 NOTE — ASSESSMENT & PLAN NOTE
LVEF 30% per echo 5/2023 8/2021 last cardiac cath with normal coronaries   12/2: Echo "Left ventricular cavity size is severely dilated. Wall thickness is normal. There is eccentric hypertrophy. The left ventricular ejection fraction is 10%. Systolic function is severely reduced. There is severe global hypokinesis.  There is no thrombus"  Maintained on carvedilol and spironolactone, no ACEi/ARB/ARNi due to soft BP  Pt unsure of dry weight, does not weigh self daily, reports LE edema correlates with Na intake  No distress on exam, extremities are warm, tolerating room air     Plan  Continue coreg and spironolactone  Will give 40mg of IV lasix now  Close I&Os  Fluid restriction  Heart Failure consult

## 2023-12-03 NOTE — PROGRESS NOTES
Progress Note - Electrophysiology-Cardiology (EP)   Massiel Jan 53 y.o. male MRN: [id number]  Unit/Bed#: [facility] 517-01 Encounter: [id number]    Assessment:  Mr. Sadiq Olivarez presented with syncopal episode in the setting of ventricular tachycardia s/p ICD discharge and cardioversion with 200J in the ED. Plan:  Ventricular tachycardia:  -Continue IV amiodarone  -Continue p.o. amiodarone 400 mg p.o. twice daily  -Continue carvedilol at current dosing with adjustments for hypertension (favor amiodarone over carvedilol)  -Plan to transition to complete oral load of amiodarone tomorrow (400 mg p.o. 3 times daily for an additional 5 days)  -Post amnio load, would continue amiodarone 200 mg daily until outpatient follow-up    Mild hypervolemia/shortness of breath  -Recommend dose of IV diuretics today as patient has mild nonpitting edema in lower extremities and complains of mild shortness of breath. No crackles on exam however is not moving much air in the bases. -Have requested incentive spirometer    Due to syncope in the setting of VT requiring ICD shock, patient is not to drive for at least 3 months. Will need further evaluation and clearance by physician prior to returning to driving. Subjective/Objective     Subjective:   No acute events overnight. Patient notes he feels a bit deconditioned from staying in bed. Also notes a bit short of breath. Does feel legs may be slightly swollen. We discussed the plan including continuing load amiodarone. Patient will not be able to drive for 3 months at least until cleared by a physician. He should be able to work during that timeframe. Further evaluation tomorrow. He had no overt complaints other than his mild shortness of breath. Carvedilol dosing decreased due to hypotension overnight requiring short course of norepinephrine.       Objective:     Vitals: /59 (BP Location: Right arm)   Pulse 58   Temp 98.1 °F (36.7 °C) (Oral)   Resp 18   Ht 6' 2" (1.88 m)   Wt (!) 146 kg (322 lb 1.5 oz)   SpO2 93%   BMI 41.35 kg/m²   Vitals:    12/02/23 1000 12/03/23 0554   Weight: (!) 148 kg (326 lb) (!) 146 kg (322 lb 1.5 oz)     Orthostatic Blood Pressures      Flowsheet Row Most Recent Value   Blood Pressure 106/59 filed at 12/03/2023 0700   Patient Position - Orthostatic VS Lying filed at 12/03/2023 0700              Intake/Output Summary (Last 24 hours) at 12/3/2023 0804  Last data filed at 12/3/2023 0600  Gross per 24 hour   Intake 2527.58 ml   Output 875 ml   Net 1652.58 ml       Invasive Devices       Peripheral Intravenous Line  Duration             Peripheral IV 12/01/23 Left Antecubital 1 day    Peripheral IV 12/02/23 Left;Ventral (anterior) Forearm <1 day                    Review of Systems: Complete review of systems was performed and was negative or as listed above in 10 out of 10 systems    Physical Exam  General: No acute distress. Pleasant and appropriate. HEENT: Normocephalic, atraumatic. Cardiovascular: Distant heart sounds. Regular rate and rhythm. No rubs, murmurs, gallops  Pulmonary: Clear to auscultation bilaterally. Not moving much air at bases bilaterally. No mallory crackles/Rales  Abdomen: Protuberant, soft  Lower extremities: Warm, well-perfused.   Mild nonpitting edema bilaterally

## 2023-12-03 NOTE — PROGRESS NOTES
Critical Care Interval Transfer Note:    Please refer to progress note from earlier today for full details. Barriers to discharge:   Continuing IV amio and oral load  Continue monitor on telemetry for now  Evaluation by HF team tomorrow      Consults: IP CONSULT TO CASE MANAGEMENT  IP CONSULT TO ELECTROPHYSIOLOGY  IP CONSULT TO HEART FAILURE SERVICE  IP CONSULT TO CASE MANAGEMENT    Recommended to review admission imaging for incidental findings and document in discharge navigator: Chart reviewed, no known incidental findings noted at this time. Discharge Plan: Anticipate discharge in 48-72 hrs to home. Patient seen and evaluated by Critical Care today and deemed to be appropriate for transfer to Stepdown Level 2. Spoke to Dr. Annette Navas from [facility] to accept transfer. Critical care can be contacted via [employer] with any questions or concerns.

## 2023-12-03 NOTE — ASSESSMENT & PLAN NOTE
Pt follows with Dr. Ellen Farr.  He is status post cryoablation with pulmonary vein isolation and typical flutter line 7/3/2023  PTA meds:  carvedilol, tokosin, digoxin for rate control and xarelto for [facility]  Pt reports he missed one dose of dofetilide, coreg and Xarelto on 11/30 PM    Plan  Continue coreg and oral amiodarone load per EP  Continue amiodarone IV today   Continue xarelto   EP following, appreciate recommendation

## 2023-12-04 ENCOUNTER — PATIENT OUTREACH (OUTPATIENT)
Dept: CARDIOLOGY CLINIC | Facility: CLINIC | Age: 48
End: 2023-12-04

## 2023-12-04 PROBLEM — T82.110A ICD (IMPLANTABLE CARDIOVERTER-DEFIBRILLATOR) LEAD FAILURE: Chronic | Status: RESOLVED | Noted: 2017-06-03 | Resolved: 2023-12-04

## 2023-12-04 PROBLEM — G47.33 OBSTRUCTIVE SLEEP APNEA: Chronic | Status: ACTIVE | Noted: 2018-01-25

## 2023-12-04 PROBLEM — I10 HYPERTENSION: Chronic | Status: ACTIVE | Noted: 2018-01-25

## 2023-12-04 PROBLEM — I50.22 CHRONIC HFREF (HEART FAILURE WITH REDUCED EJECTION FRACTION) (HCC): Chronic | Status: ACTIVE | Noted: 2017-01-01

## 2023-12-04 LAB
ANION GAP SERPL CALCULATED.3IONS-SCNC: 10 MMOL/L
ATRIAL RATE: 67 BPM
BNP SERPL-MCNC: 391 PG/ML (ref 0–100)
BUN SERPL-MCNC: 14 MG/DL (ref 5–25)
CALCIUM SERPL-MCNC: 8.6 MG/DL (ref 8.4–10.2)
CHLORIDE SERPL-SCNC: 103 MMOL/L (ref 96–108)
CO2 SERPL-SCNC: 24 MMOL/L (ref 21–32)
CREAT SERPL-MCNC: 0.97 MG/DL (ref 0.6–1.3)
ERYTHROCYTE [DISTWIDTH] IN BLOOD BY AUTOMATED COUNT: 15.3 % (ref 11.6–15.1)
FERRITIN SERPL-MCNC: 122 NG/ML (ref 24–336)
GFR SERPL CREATININE-BSD FRML MDRD: 92 ML/MIN/1.73SQ M
GLUCOSE SERPL-MCNC: 132 MG/DL (ref 65–140)
HCT VFR BLD AUTO: 47 % (ref 36.5–49.3)
HGB BLD-MCNC: 15.8 G/DL (ref 12–17)
IRON SATN MFR SERPL: 29 % (ref 15–50)
IRON SERPL-MCNC: 94 UG/DL (ref 50–212)
MAGNESIUM SERPL-MCNC: 1.8 MG/DL (ref 1.9–2.7)
MCH RBC QN AUTO: 30.1 PG (ref 26.8–34.3)
MCHC RBC AUTO-ENTMCNC: 33.6 G/DL (ref 31.4–37.4)
MCV RBC AUTO: 90 FL (ref 82–98)
P AXIS: 27 DEGREES
PLATELET # BLD AUTO: 160 THOUSANDS/UL (ref 149–390)
PMV BLD AUTO: 11.3 FL (ref 8.9–12.7)
POTASSIUM SERPL-SCNC: 3.9 MMOL/L (ref 3.5–5.3)
PR INTERVAL: 176 MS
QRS AXIS: 69 DEGREES
QRSD INTERVAL: 100 MS
QT INTERVAL: 418 MS
QTC INTERVAL: 441 MS
RBC # BLD AUTO: 5.25 MILLION/UL (ref 3.88–5.62)
SODIUM SERPL-SCNC: 137 MMOL/L (ref 135–147)
T WAVE AXIS: 16 DEGREES
TIBC SERPL-MCNC: 320 UG/DL (ref 250–450)
UIBC SERPL-MCNC: 226 UG/DL (ref 155–355)
VENTRICULAR RATE: 67 BPM
WBC # BLD AUTO: 10.72 THOUSAND/UL (ref 4.31–10.16)

## 2023-12-04 PROCEDURE — 80048 BASIC METABOLIC PNL TOTAL CA: CPT | Performed by: NURSE PRACTITIONER

## 2023-12-04 PROCEDURE — 99232 SBSQ HOSP IP/OBS MODERATE 35: CPT | Performed by: HOSPITALIST

## 2023-12-04 PROCEDURE — 85027 COMPLETE CBC AUTOMATED: CPT | Performed by: NURSE PRACTITIONER

## 2023-12-04 PROCEDURE — 83540 ASSAY OF IRON: CPT | Performed by: PHYSICIAN ASSISTANT

## 2023-12-04 PROCEDURE — 83880 ASSAY OF NATRIURETIC PEPTIDE: CPT | Performed by: PHYSICIAN ASSISTANT

## 2023-12-04 PROCEDURE — 83550 IRON BINDING TEST: CPT | Performed by: PHYSICIAN ASSISTANT

## 2023-12-04 PROCEDURE — 99232 SBSQ HOSP IP/OBS MODERATE 35: CPT | Performed by: INTERNAL MEDICINE

## 2023-12-04 PROCEDURE — 99255 IP/OBS CONSLTJ NEW/EST HI 80: CPT | Performed by: STUDENT IN AN ORGANIZED HEALTH CARE EDUCATION/TRAINING PROGRAM

## 2023-12-04 PROCEDURE — 83735 ASSAY OF MAGNESIUM: CPT | Performed by: NURSE PRACTITIONER

## 2023-12-04 PROCEDURE — 82728 ASSAY OF FERRITIN: CPT | Performed by: PHYSICIAN ASSISTANT

## 2023-12-04 RX ORDER — MAGNESIUM SULFATE HEPTAHYDRATE 40 MG/ML
2 INJECTION, SOLUTION INTRAVENOUS ONCE
Status: COMPLETED | OUTPATIENT
Start: 2023-12-04 | End: 2023-12-04

## 2023-12-04 RX ORDER — FUROSEMIDE 40 MG/1
40 TABLET ORAL DAILY
Status: DISCONTINUED | OUTPATIENT
Start: 2023-12-04 | End: 2023-12-07 | Stop reason: HOSPADM

## 2023-12-04 RX ORDER — POTASSIUM CHLORIDE 20 MEQ/1
20 TABLET, EXTENDED RELEASE ORAL ONCE
Status: COMPLETED | OUTPATIENT
Start: 2023-12-04 | End: 2023-12-04

## 2023-12-04 RX ORDER — SACUBITRIL AND VALSARTAN 24; 26 MG/1; MG/1
1 TABLET, FILM COATED ORAL 2 TIMES DAILY
Qty: 60 TABLET | Refills: 0 | Status: SHIPPED | OUTPATIENT
Start: 2023-12-04 | End: 2023-12-04

## 2023-12-04 RX ADMIN — RIVAROXABAN 20 MG: 20 TABLET, FILM COATED ORAL at 16:03

## 2023-12-04 RX ADMIN — CHLORHEXIDINE GLUCONATE 15 ML: 1.2 SOLUTION ORAL at 08:32

## 2023-12-04 RX ADMIN — AMIODARONE HYDROCHLORIDE 400 MG: 200 TABLET ORAL at 16:03

## 2023-12-04 RX ADMIN — CHLORHEXIDINE GLUCONATE 15 ML: 1.2 SOLUTION ORAL at 21:15

## 2023-12-04 RX ADMIN — CARVEDILOL 25 MG: 25 TABLET, FILM COATED ORAL at 08:32

## 2023-12-04 RX ADMIN — POTASSIUM CHLORIDE 20 MEQ: 1500 TABLET, EXTENDED RELEASE ORAL at 13:12

## 2023-12-04 RX ADMIN — AMIODARONE HYDROCHLORIDE 400 MG: 200 TABLET ORAL at 08:32

## 2023-12-04 RX ADMIN — SPIRONOLACTONE 25 MG: 25 TABLET ORAL at 08:32

## 2023-12-04 RX ADMIN — FUROSEMIDE 40 MG: 40 TABLET ORAL at 17:14

## 2023-12-04 RX ADMIN — MAGNESIUM SULFATE HEPTAHYDRATE 2 G: 40 INJECTION, SOLUTION INTRAVENOUS at 12:46

## 2023-12-04 RX ADMIN — CARVEDILOL 25 MG: 25 TABLET, FILM COATED ORAL at 16:03

## 2023-12-04 RX ADMIN — FUROSEMIDE 40 MG: 10 INJECTION, SOLUTION INTRAMUSCULAR; INTRAVENOUS at 08:32

## 2023-12-04 RX ADMIN — AMIODARONE HYDROCHLORIDE 1 MG/MIN: 50 INJECTION, SOLUTION INTRAVENOUS at 07:08

## 2023-12-04 NOTE — ASSESSMENT & PLAN NOTE
-EP following  -pt had VT requiring ICD shock  -was hypotensive as below requiring cardioversion with 200 J, amiodarone and Levophed drips  -remains on Amio gtt, now also on PO Amio as per EP  -off of home dofetilide/Digoxin (failure of dofetilide)

## 2023-12-04 NOTE — PROGRESS NOTES
[address] [facility]  Progress Note  Name: Rahel Charles  MRN: [id number]  Unit/Bed#: [facility] 527-01 I Date of Admission: 12/1/2023   Date of Service: 12/4/2023 I Hospital Day: 3    Assessment/Plan   Wide-complex tachycardia  Assessment & Plan  -EP following  -pt had VT requiring ICD shock  -was hypotensive as below requiring cardioversion with 200 J, amiodarone and Levophed drips  -remains on Amio gtt, now also on PO Amio as per EP  -off of home dofetilide/Digoxin (failure of dofetilide)      * Syncope  Assessment & Plan  -due to hypotension due to wide-complex tachycardia/VT  -s/p cardioversion with 200 J  -was on Levophed gtt, now off. Amio gtt continues, also on oral amiodarone.  -See wide-complex tachycardia plan    Morbid obesity with BMI of 40.0-44.9, adult [facility])  Assessment & Plan  -Encourage weight loss  -Affects all aspects of care    Paroxysmal atrial fibrillation [facility])  Assessment & Plan  -s/p ablation and PVI July 2023   -cont Amio/Xarelto    Dilated cardiomyopathy ([address])  Assessment & Plan  -see above           VTE Pharmacologic Prophylaxis:   Xarelto    Mobility:   Basic Mobility Inpatient Raw Score: 20  JH-HLM Goal: 6: Walk 10 steps or more  JH-HLM Achieved: 6: Walk 10 steps or more  HLM Goal achieved. Continue to encourage appropriate mobility. Patient Centered Rounds: I performed bedside rounds with nursing staff today. Total Time Spent on Date of Encounter in care of patient: 35 mins. This time was spent on one or more of the following: performing physical exam; counseling and coordination of care; obtaining or reviewing history; documenting in the medical record; reviewing/ordering tests, medications or procedures; communicating with other healthcare professionals and discussing with patient's family/caregivers.     Current Length of Stay: 3 day(s)  Current Patient Status: Inpatient   Certification Statement: The patient will continue to require additional inpatient hospital stay due to VT  Discharge Plan: Anticipate discharge tomorrow to home. Code Status: Level 1 - Full Code    Subjective:   SOB has improved after diuretics. Denies CP. Objective:     Vitals:   Temp (24hrs), Av.4 °F (36.9 °C), Min:97.7 °F (36.5 °C), Max:98.9 °F (37.2 °C)    Temp:  [97.7 °F (36.5 °C)-98.9 °F (37.2 °C)] 98.9 °F (37.2 °C)  HR:  [57-61] 61  Resp:  [16-21] 17  BP: (109-121)/(62-80) 114/62  SpO2:  [94 %-98 %] 97 %  Body mass index is 40.93 kg/m². Input and Output Summary (last 24 hours): Intake/Output Summary (Last 24 hours) at 2023 1118  Last data filed at 2023 1043  Gross per 24 hour   Intake 1136.48 ml   Output 3720 ml   Net -2583.52 ml       Physical Exam:   Gen: NAD, AAOx3, well developed, well nourished  Eyes: EOMI, PERRLA, no scleral icterus  ENMT:  no nasal discharge, no otic discharge, moist mucous membranes  Neck:  Supple  Cardiovascular:  Regular rate and rhythm, normal S1-S2, no murmurs, rubs, or gallops  Lungs:  Clear to auscultation bilaterally, no wheezes, or rales, or rhonchi  Abdomen:  Positive bowel sounds, soft, nontender, nondistended   Skin:  Intact, no obvious lesions or rashes, trace, dependent, B/L LE edema  Neuro: Cranial nerves 2-12 are intact, non-focal, mvoes all 4 extremities      Additional Data:     Labs:  Results from last 7 days   Lab Units 23  0550 23  0253 23  0640 23   WBC Thousand/uL 10.72*   < > 13.14* 9.83   HEMOGLOBIN g/dL 15.8   < > 16.0 16.2   HEMATOCRIT % 47.0   < > 49.3 48.6   PLATELETS Thousands/uL 160   < > 216 182   NEUTROS PCT %  --   --   --  64   LYMPHS PCT %  --   --   --  24   LYMPHO PCT %  --   --  6*  --    MONOS PCT %  --   --   --  10   MONO PCT %  --   --  10  --    EOS PCT %  --   --  1 1    < > = values in this interval not displayed.      Results from last 7 days   Lab Units 23  0550 23  0739   SODIUM mmol/L 137 131*   POTASSIUM mmol/L 3.9 4.6   CHLORIDE mmol/L 103 102   CO2 mmol/L 24 24   BUN mg/dL 14 16   CREATININE mg/dL 0.97 1.08   ANION GAP mmol/L 10 5   CALCIUM mg/dL 8.6 8.5   ALBUMIN g/dL  --  3.6   TOTAL BILIRUBIN mg/dL  --  1.92*   ALK PHOS U/L  --  62   ALT U/L  --  45   AST U/L  --  56*   GLUCOSE RANDOM mg/dL 132 156*         Results from last 7 days   Lab Units 12/01/23 2002   POC GLUCOSE mg/dl 235*               Lines/Drains:  Invasive Devices       Peripheral Intravenous Line  Duration             Peripheral IV 12/01/23 Left Antecubital 2 days    Peripheral IV 12/02/23 Left;Ventral (anterior) Forearm 1 day                      Telemetry:  Telemetry Orders (From admission, onward)               24 Hour Telemetry Monitoring  Continuous x 24 Hours (Telem)        Question:  Reason for 24 Hour Telemetry  Answer:  Arrhythmias requiring acute medical intervention / PPM or ICD malfunction                     Telemetry Reviewed: Normal Sinus Rhythm  Indication for Continued Telemetry Use: Arrthymias requiring medical therapy             Recent Cultures (last 7 days):         Last 24 Hours Medication List:   Current Facility-Administered Medications   Medication Dose Route Frequency Provider Last Rate    acetaminophen  650 mg Oral Q6H PRN VILMA Sargent      amiodarone (CORDARONE) 900 mg in dextrose 5 % 500 mL infusion  1 mg/min Intravenous Continuous VILMA Sargent 1 mg/min (12/04/23 0708)    amiodarone  400 mg Oral BID With Meals VILMA Boston      carvedilol  25 mg Oral BID With Meals VILMA Sargent      chlorhexidine  15 mL Mouth/Throat Q12H [facility] HOME VILMA Gaytan      furosemide  40 mg Intravenous Daily Willy Timuradams [facility], [address] [place] [address]      rivaroxaban  20 mg Oral Daily With [employer], [facility]      spironolactone  25 mg Oral Daily VILMA Boston      traZODone  100 mg Oral HS Willymustapha [facility], [address] [facility]          Today, Patient Was Seen By: Kalyan Grande MD    **Please Note: This note may have been constructed using a voice recognition system. **

## 2023-12-04 NOTE — ASSESSMENT & PLAN NOTE
-due to hypotension due to wide-complex tachycardia/VT  -s/p cardioversion with 200 J  -was on Levophed gtt, now off.  Amio gtt continues, also on oral amiodarone.  -See wide-complex tachycardia plan

## 2023-12-04 NOTE — PROGRESS NOTES
Patient admitted to [facility]; Advanced Heart Failure Census. Outpatient Advanced Heart Failure LCSW completed electronic chart review and rounded with the HF Team. HF Team discussed Raquel Bearden with pt. Referral for outpatient social work not entered at this time. Please enter referral if outpatient resources are needed in the future.

## 2023-12-04 NOTE — PROGRESS NOTES
Progress Note - Electrophysiology - Cardiology  Peggy Crawley 53 y.o. male MRN: [id number]  Unit/Bed#: [facility] 527-01 Encounter: [id number]      Assessment:  Slow VT falling below detection zone resulting in syncope, s/p external cardioversion with 200 J in ED  -- on Tikosyn at the time, missed one day of medications prior to event  -- Tikosyn D/C'd, loading with amiodarone  Severe NICM EF 10% (previously 30% by echo May 2023)  -- etiology thought to be due to coxsackie virus many years ago  Medtronic single chamber ICD, implanted June 2017  HFrEF  Afib s/p Afib ablation July 2023 - on Xarelto  HTN  HLD  CONCHIS  H/o binge drinking    Plan:  -- device reprogrammed today to decrease VT1 zone to 154 bpm (previously 180 bpm) and ATP added to shocks, added VT monitor zone at 120 bpm  -- D/C amiodarone IV  -- continue amiodarone 400 mg po tid for 5 more days, then decrease to 200 mg once daily  -- continue carvedilol 25 mg bid  -- keep potassium >4.0, keep Mg >2.0  -- IV diuresis for volume overload  -- appreciate HF team input  -- optimize GDMT  -- will discuss VT ablation versus evaluation for advanced HF therapies  -- continue Xarelto for h/o Afib      Subjective/Objective   Peggy Crawley is a 53year old male with a history of severe NICM, Medtronic single chamber ICD, Afib s/p ablation July 2023, HTN, CONCHIS, and h/o binge drinking who was transferred to [address] [place] [address] after slow VT and syncope falling below detection zone requiring cardioversion in ED with 200 J. He was previously on Tikosyn. This was discontinued and he was initiated on amio IV and po. Prior to the VT, he missed one day of medications. In the hospital, he has remained in normal sinus rhythm. Echocardiogram documented an EF of 10%. Previous echo in May 2023 documented an EF of 30%. He reports worsening shortness of breath over the past several weeks and thinks he has become volume overloaded.  He denies chest pain, lightheadedness, dizziness, orthopnea, or PND. TELE: sinus rhythm    EKG 12/4/2023: sinus bradycardia HR 55 bpm    Objective:  Vitals: /62   Pulse 63   Temp 98.9 °F (37.2 °C)   Resp 16   Ht 6' 2" (1.88 m)   Wt (!) 145 kg (318 lb 12.6 oz)   SpO2 97%   BMI 40.93 kg/m²     Vitals:    12/03/23 0554 12/04/23 0534   Weight: (!) 146 kg (322 lb 1.5 oz) (!) 145 kg (318 lb 12.6 oz)     Orthostatic Blood Pressures      Flowsheet Row Most Recent Value   Blood Pressure 114/62 filed at 12/04/2023 1101   Patient Position - Orthostatic VS Lying filed at 12/04/2023 0831              Intake/Output Summary (Last 24 hours) at 12/4/2023 1322  Last data filed at 12/4/2023 1301  Gross per 24 hour   Intake 1254.35 ml   Output 2720 ml   Net -1465.65 ml       Invasive Devices       Peripheral Intravenous Line  Duration             Peripheral IV 12/01/23 Left Antecubital 2 days    Peripheral IV 12/02/23 Left;Ventral (anterior) Forearm 1 day                    Scheduled Meds:  Current Facility-Administered Medications   Medication Dose Route Frequency Provider Last Rate    acetaminophen  650 mg Oral Q6H PRN VILMA Caicedo      amiodarone  400 mg Oral BID With Meals VILMA Caicedo      carvedilol  25 mg Oral BID With Meals VILMA Caicedo      chlorhexidine  15 mL Mouth/Throat Q12H [address] [facility], [address] [place] [address]      furosemide  40 mg Intravenous Daily [facility] Network[facility] MORGAN      magnesium sulfate  2 g Intravenous Once MARLEN Kwon-DONAVON 2 g (12/04/23 1246)    rivaroxaban  20 mg Oral Daily With [employer], VILMA      spironolactone  25 mg Oral Daily [facility], [address] [place] [address]      traZODone  100 mg Oral HS Rachel VILMA Mosqueda       Continuous Infusions:   PRN Meds:.  acetaminophen    Review of Systems:  Review of Systems   Constitutional: Negative. HENT: Negative. Eyes: Negative.     Cardiovascular:  Negative for chest pain, palpitations and syncope. Respiratory:  Negative for cough and wheezing. Endocrine: Negative. Hematologic/Lymphatic: Negative. Skin:  Negative for rash. Musculoskeletal:  Negative for back pain. Gastrointestinal: Negative. Genitourinary: Negative. Neurological: Negative. Psychiatric/Behavioral: Negative. ROS as noted above, otherwise 12 point review of systems was performed and is negative. Physical Exam:   GEN: NAD, alert and oriented x 3, well appearing  SKIN: warm, dry without significant lesions or rashes  HEENT: NCAT  NECK: supple, no JVD appreciated  CARDIOVASCULAR: RRR, normal S1, S2 without murmurs, rubs, or gallops   LUNGS: CTA bilaterally without wheezes, rhonchi, or rales  ABDOMEN: Soft, nontender, nondistended. EXTREMITIES/VASCULAR: perfused without clubbing, cyanosis, or LE edema b/l  PSYCH: Normal mood and affect  NEURO: CN ll-Xll grossly intact       Lab Results: I have personally reviewed pertinent lab results. Results from last 7 days   Lab Units 12/04/23  0550 12/03/23  0253 12/02/23  0640   WBC Thousand/uL 10.72* 7.80 13.14*   HEMOGLOBIN g/dL 15.8 15.3 16.0   HEMATOCRIT % 47.0 46.4 49.3   PLATELETS Thousands/uL 160 144* 216     Results from last 7 days   Lab Units 12/04/23  0550 12/03/23  0739 12/02/23  0640   POTASSIUM mmol/L 3.9 4.6 4.5   CHLORIDE mmol/L 103 102 106   CO2 mmol/L 24 24 23   BUN mg/dL 14 16 14   CREATININE mg/dL 0.97 1.08 1.04   CALCIUM mg/dL 8.6 8.5 9.0         Results from last 7 days   Lab Units 12/04/23  0550 12/03/23  0739 12/02/23  0640   MAGNESIUM mg/dL 1.8* 1.7* 2.0       Imaging: I have personally reviewed pertinent reports.     Results for orders placed during the hospital encounter of 04/09/19    Echo complete with contrast if indicated    Narrative  [address] [place] [address], [address] [place] [address]  [phone]    Transthoracic Echocardiogram  2D, M-mode, Doppler, and Color Doppler    Study date: 2019    Patient: Lelo Velásquez  MR number: [id number]  Account number: [de-identified]  : 1975  Age: 44 years  Gender: Male  Status: Outpatient  Location: [facility]  Height: 74 in  Weight: 291 lb  BP: 118/ 64 mmHg    Indications: Dilated Cardiomyopathy. Diagnoses: I42.0 - Dilated cardiomyopathy    Sonographer:  Bowie RCS  Interpreting Physician:  Aletha Willett MD  Primary Physician:  Gita Phoenix, MD  Referring Physician:  Bernadette Valle MD  Group:  Lidia Puri [facility]    SUMMARY    LEFT VENTRICLE:  The ventricle was moderately to markedly dilated. Ejection fraction was estimated to be 25 %. There was severe diffuse hypokinesis. Features were consistent with a pseudonormal left ventricular filling pattern, with concomitant abnormal relaxation and increased filling pressure (grade 2 diastolic dysfunction). RIGHT VENTRICLE:  Estimated peak pressure was at least 30 mmHg. ICD lead noted. LEFT ATRIUM:  The atrium was moderately dilated. RIGHT ATRIUM:  The atrium was moderately dilated. MITRAL VALVE:  There was trace regurgitation. TRICUSPID VALVE:  There was mild regurgitation. HISTORY: PRIOR HISTORY: Risk factors: hypertension. PROCEDURE: The study was performed in the [facility]. This was a routine study. The transthoracic approach was used. The study included complete 2D imaging, M-mode, complete spectral Doppler, and color Doppler. The  heart rate was 74 bpm, at the start of the study. This was a technically difficult study. LEFT VENTRICLE: The ventricle was moderately to markedly dilated. Ejection fraction was estimated to be 25 %. There was severe diffuse hypokinesis. DOPPLER: Features were consistent with a pseudonormal left ventricular filling pattern,  with concomitant abnormal relaxation and increased filling pressure (grade 2 diastolic dysfunction).     RIGHT VENTRICLE: The size was normal. Systolic function was normal. Wall thickness was normal. DOPPLER: Estimated peak pressure was at least 30 mmHg. ICD lead noted. LEFT ATRIUM: The atrium was moderately dilated. RIGHT ATRIUM: The atrium was moderately dilated. MITRAL VALVE: There was annular calcification. DOPPLER: There was trace regurgitation. AORTIC VALVE: The valve was trileaflet. Leaflets exhibited normal thickness and normal cuspal separation. The valve was not well visualized. DOPPLER: Transaortic velocity was within the normal range. There was no evidence for stenosis. There was no regurgitation. TRICUSPID VALVE: The valve structure was normal. There was normal leaflet separation. DOPPLER: The transtricuspid velocity was within the normal range. There was no evidence for stenosis. There was mild regurgitation. PULMONIC VALVE: Leaflets exhibited normal thickness, no calcification, and normal cuspal separation. DOPPLER: The transpulmonic velocity was within the normal range. There was no regurgitation. PERICARDIUM: There was no pericardial effusion. The pericardium was normal in appearance. AORTA: The root exhibited normal size.     SYSTEM MEASUREMENT TABLES    2D  %FS: 12.58 %  Ao Diam: 2.95 cm  EDV(Teich): 249.22 ml  EF(Teich): 26.31 %  ESV(Teich): 183.65 ml  IVSd: 1.03 cm  LA Area: 29.37 cm2  LA Diam: 5.32 cm  LVEDV MOD A4C: 213.51 ml  LVEF MOD A4C: 30.3 %  LVESV MOD A4C: 148.81 ml  LVIDd: 6.92 cm  LVIDs: 6.05 cm  LVLd A4C: 9.46 cm  LVLs A4C: 8.02 cm  LVPWd: 0.71 cm  RA Area: 23.69 cm2  RVIDd: 4.49 cm  SV MOD A4C: 64.7 ml  SV(Teich): 65.57 ml    CW  TR MaxP.1 mmHg  TR Vmax: 2.55 m/s    MM  TAPSE: 2.55 cm    PW  E': 0.08 m/s  E/E': 11.03  MV A [place]: 0.5 m/s  MV Dec [place]: 3.77 m/s2  MV DecT: 234 ms  MV E [place]: 0.88 m/s  MV E/A Ratio: 1.76  MV PHT: 67.86 ms  MVA By PHT: 3.24 cm2    In[employer]oci[employer] Commission Accredited Echocardiography Laboratory    Prepared and electronically signed by    Brittney Bello MD  Signed 10-Apr-2019 13:52:24      VTE Pharmacologic Prophylaxis: Reason for no pharmacologic prophylaxis therapeutic anticoagulation  VTE Mechanical Prophylaxis: sequential compression device

## 2023-12-04 NOTE — CONSULTS
Advanced Heart Failure / Pulmonary Hypertension Service Consultation    Janay Ni 53 y.o. male  MRN: [id number]  Unit/Bed#: [facility] 527-01; Encounter: [id number]    Assessment:  Principal Problem:    Syncope  Active Problems:    Dilated cardiomyopathy ([address])    Paroxysmal atrial fibrillation ([address])    Morbid obesity with BMI of 40.0-44.9, adult ([address])    Wide-complex tachycardia    HPI:   Janay Ni is a 26-year-old man with PMH as below who presented to [facility] on 12/01/2023 with syncope at home. EMS called and noted patient to be tachycardic and hypotensive and gave adenosine (no conversion) and then IV amiodarone bolus (again no change to rhythm). Upon arrival to ED, EKGs with wide complex tachycardia. Was given lidocaine bolus and started on amiodarone drip. In ED, became unresponsive and was then cardioverted with conversion to NSR and started on vasopressors. Transferred to [facility] on 12/01 for EP evaluation. Patient seen and examined. Mom on speaker phone during encounter. Does endorse mild VALLECILLO and LE swelling for past few months. Missed PM meds on 11/30. Felt poorly on 12/01 and left work early that day. Later that night, his heart begin racing and then later syncopized. Today, feeling better in past 24 hours. Feels IV Lasix is helping. Heart failure service was consulted for "ventricular tachyarrhythmia, dilated cardiomyopathy." Patient follows with Dr. Tam Jimenes for outpatient cardiology (last seen in 05/2022). Objective: Intake/ Output: 802.9 mL / 2720 mL (net negative 1917.1 mL). Weight: 318 lbs (322 lbs on 12/03). Standing weights. Telemetry: NSR, rates in 60-70s. Zoltan qHS. MAPs: 80-90. Today's Plan:  Continue IV Lasix. Magnesium 1.8 this AM; goal >2. Will give another 2g IV magnesium today. Add BNP to AM labs. Check cMRI while inpatient. Order placed. Per outpatient cardiology notes in 2020, cMRI was deferred due to insurance issues.    Amiodarone loading/dosing per EP. Repeat iron panel and A1c. Negative HIV and TAYLOR and unremarkable SPEP in 2021. Wears CPAP as outpatient. Requesting to use inpatient. Allen check Grecia Zuleta and Guero came back at $0/month for each. Patient prefers not to be on Entresto (felt "horrible" when taking in the past). Consider starting ARB this admission. Plan:  Syncope / ventricular tachycardia   Plan per EP. Chronic HFrEF; LVEF 10%; LVIDd 7.3 cm; NYHA III; ACC/AHA Stage C   Etiology: "Nonischemic, etiology unclear. Reports occasional binge drinking in the past but not regularly. 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 Unremarkable SPEP in 2021. 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 TTE 05/16/2017: "LVEF 25% w/ grade II diastology. LVIDd 6.9 cm. Mod LAE. Trace MR, mild TR. Normal RV size and function."  TTE 04/09/2019: "LVEF 25% w/ grade II diastology. LVIDd 6.9 cm. Mod LAE. Trace MR. Mild TR. Noninvasive assessment c/f borderline to low output."  [facility] 08/30/2021: normal coronaries. TTE 10/11/2021: LVEF 25%. Normal RV. Mild MR. TTE 04/17/2023: LVEF 30%. Normal RV. Mild MR and TR.    TTE (limited) 05/01/2023: LVEF 30%. Severe global hypokinesis. Normal RV. TTE (limited) 12/02/2023: LVEF 10%. LVIDd 7.3 cm. No LV thrombus. Normal RV. Mild TR. Pharmacotherapies / Neurohormonal Blockade:  --Beta Blocker: carvedilol 25 mg q12 hours (on 37.5 mg q12 hours as outpatient). --ARNi / ACEi / ARB: No.   --Aldosterone Antagonist: spironolactone 25 mg daily. --SGLT2 Inhibitor: No.   --Home Diuretic: None. --Inpatient Diuretic: IV Lasix 40 mg daily. Sudden Cardiac Death Risk Reduction:  --Medtronic single chamber ICD in situ since 2006. --Interrogation from 11/20/2023:  <0.1%. Lead parameters WNL. OptiVol WNL. Normal device function. Electrical Resynchronization:  --Candidacy for BiV device: narrow QRS. Advanced Therapies (if appropriate):  Will continue to monitor. Atrial fibrillation / flutter   [id number] = 2 (HF, HTN). Anticoagulation on Xarelto. S/p Afib ablation in 07/2023. Rate control: BB as above. Rhythm control: as above per EP. Hypertension   Obstructive sleep apnea  History of binge drinking: drinking "a lot" on Fridays; stopped this about in late 2022. Past Medical History:   Diagnosis Date    A-fib [facility]) 2020    AICD (automatic cardioverter/defibrillator) present     CHF (congestive heart failure) ([address])     Coxsackie virus infection 2017    Hypertension     ICD (implantable cardioverter-defibrillator) lead failure 06/03/2017    Myocarditis ([address])     Sleep apnea     Ventricular tachycardia ([address])        Review of Systems   Constitutional:  Negative for activity change, appetite change, fatigue and unexpected weight change. Respiratory:  Positive for shortness of breath. Negative for cough and chest tightness. Cardiovascular:  Positive for leg swelling. Negative for chest pain and palpitations. Gastrointestinal:  Negative for abdominal distention, abdominal pain, diarrhea and nausea. Genitourinary:  Negative for decreased urine volume, dysuria and urgency. Musculoskeletal: Negative. Skin: Negative. Neurological:  Positive for syncope. Negative for dizziness, weakness and light-headedness. Psychiatric/Behavioral:  Negative for confusion and sleep disturbance. The patient is not nervous/anxious.           Current Facility-Administered Medications:     acetaminophen (TYLENOL) tablet 650 mg, 650 mg, Oral, Q6H PRN, VILMA You, 650 mg at 12/02/23 1721    amiodarone tablet 400 mg, 400 mg, Oral, BID With Meals, VILMA Leyva, 400 mg at 12/04/23 3813    carvedilol (COREG) tablet 25 mg, 25 mg, Oral, BID With Meals, VILMA Leyva, 25 mg at 12/04/23 9278    chlorhexidine (PERIDEX) 0.12 % oral rinse 15 mL, 15 mL, Mouth/Throat, Q12H [address], Dominique TSE CRNP, 15 mL at 12/04/23 0832    furosemide (LASIX) injection 40 mg, 40 mg, Intravenous, Daily, VILMA Monahan, 40 mg at 12/04/23 9945    magnesium sulfate 2 g/50 mL IVPB (premix) 2 g, 2 g, Intravenous, Once, Raul Huizar PA-C, Last Rate: 25 mL/hr at 12/04/23 1246, 2 g at 12/04/23 1246    potassium chloride (K-DUR,KLOR-CON) CR tablet 20 mEq, 20 mEq, Oral, Once, Ardelle Richmond JEAN RamírezC    rivaroxaban (XARELTO) tablet 20 mg, 20 mg, Oral, Daily With KirstenMANDEEP BowserNP, 20 mg at 12/03/23 1531    spironolactone (ALDACTONE) tablet 25 mg, 25 mg, Oral, Daily, VILMA Monahan, 25 mg at 12/04/23 5386    traZODone (DESYREL) tablet 100 mg, 100 mg, Oral, HS, MANDEEP MonahanNP, 100 mg at 12/03/23 2132    Allergies   Allergen Reactions    Vicodin [Hydrocodone-Acetaminophen] Other (See Comments)     "i get nasty"     Social History     Socioeconomic History    Marital status: Single     Spouse name: Not on file    Number of children: Not on file    Years of education: Not on file    Highest education level: Not on file   Occupational History    Not on file   Tobacco Use    Smoking status: Never    Smokeless tobacco: Never   Vaping Use    Vaping Use: Never used   Substance and Sexual Activity    Alcohol use: Not Currently     Comment: OCCASIONAL; history of binge drinking -  drinking "a lot" on Fridays; stopped this about in late 2022. (Updated 12/04/2023).     Drug use: No    Sexual activity: Not on file   Other Topics Concern    Not on file   Social History Narrative    Not on file     Social Determinants of Health     Financial Resource Strain: Not on file   Food Insecurity: No Food Insecurity (5/2/2023)    Hunger Vital Sign     Worried About Running Out of Food in the Last Year: Never true     Ran Out of Food in the Last Year: Never true   Transportation Needs: No Transportation Needs (5/2/2023)    PRAPARE - Transportation     Lack of Transportation (Medical): No     Lack of Transportation (Non-Medical): No   Physical Activity: Not on file   Stress: Not on file   Social Connections: Not on file   Intimate Partner Violence: Not on file   Housing Stability: Low Risk  (5/2/2023)    Housing Stability Vital Sign     Unable to Pay for Housing in the Last Year: No     Number of Places Lived in the Last Year: 1     Unstable Housing in the Last Year: No     Family History   Problem Relation Age of Onset    No Known Problems Mother     No Known Problems Father     Sudden death Neg Hx     Heart disease Neg Hx        Vitals:  Blood pressure 114/62, pulse 63, temperature 98.9 °F (37.2 °C), resp. rate 16, height 6' 2" (1.88 m), weight (!) 145 kg (318 lb 12.6 oz), SpO2 97 %. I/O last 3 completed shifts: In: 1992.9 [P.O.:1080; I.V.:891.3; IV Piggyback:21.7]  Out: 1641 [Urine:3045]    Weight (last 2 days)       Date/Time Weight    12/04/23 0534 145 (318.79)    12/03/23 0554 146 (322.09)    12/02/23 1000 148 (326)    12/02/23 0600 148 (326.28)    12/02/23 0155 148 (326.28)    12/02/23 0005 148 (326.28)          Wt Readings from Last 10 Encounters:   12/04/23 (!) 145 kg (318 lb 12.6 oz)   12/01/23 (!) 145 kg (320 lb)   07/03/23 (!) 145 kg (320 lb)   05/24/23 (!) 145 kg (318 lb 11.2 oz)   05/02/23 (!) 140 kg (308 lb 6.8 oz)   04/17/23 (!) 141 kg (310 lb)   12/29/22 (!) 144 kg (317 lb)   11/16/22 (!) 144 kg (317 lb)   10/11/22 (!) 140 kg (309 lb 1.4 oz)   05/12/22 (!) 146 kg (322 lb)       Vitals:    12/04/23 0729 12/04/23 0828 12/04/23 0831 12/04/23 1101   BP: 121/80  120/79 114/62   BP Location:   Right arm    Pulse:  61 61 63   Resp: 19  17 16   Temp: 98.4 °F (36.9 °C)   98.9 °F (37.2 °C)   TempSrc:       SpO2: 98%  97% 97%   Weight:       Height:           Physical Exam  Vitals reviewed. Constitutional:       General: He is awake. He is not in acute distress. Appearance: Normal appearance. He is well-developed and overweight.  He is not ill-appearing, toxic-appearing or diaphoretic. HENT:      Head: Normocephalic. Nose: Nose normal.      Mouth/Throat:      Mouth: Mucous membranes are moist.   Eyes:      General: No scleral icterus. Conjunctiva/sclera: Conjunctivae normal.   Neck:      Vascular: JVD present. Trachea: No tracheal deviation. Cardiovascular:      Rate and Rhythm: Normal rate and regular rhythm. Heart sounds: Murmur heard. Pulmonary:      Effort: Pulmonary effort is normal. No tachypnea, bradypnea or respiratory distress. Breath sounds: Decreased air movement present. No decreased breath sounds or wheezing. Abdominal:      General: Abdomen is flat. There is distension. Palpations: Abdomen is soft. Tenderness: There is no abdominal tenderness. Musculoskeletal:      Cervical back: Neck supple. Right lower le+ Edema present. Left lower le+ Edema present. Skin:     General: Skin is warm and dry. Coloration: Skin is not jaundiced or pale. Neurological:      General: No focal deficit present. Mental Status: He is alert and oriented to person, place, and time. Psychiatric:         Attention and Perception: Attention normal.         Mood and Affect: Mood normal. Affect is blunt. Speech: Speech normal.         Behavior: Behavior normal. Behavior is cooperative. Thought Content:  Thought content normal.       Lines/Drains/Airways       Active Status       None                    Labs & Results:      Results from last 7 days   Lab Units 23  0550 23  0253 23  0640   WBC Thousand/uL 10.72* 7.80 13.14*   HEMOGLOBIN g/dL 15.8 15.3 16.0   HEMATOCRIT % 47.0 46.4 49.3   PLATELETS Thousands/uL 160 144* 216         Results from last 7 days   Lab Units 23  0550 23  0739 23  0640 23   POTASSIUM mmol/L 3.9 4.6 4.5 4.2   CHLORIDE mmol/L 103 102 106 103   CO2 mmol/L 24 24 23 24   BUN mg/dL 14 16 14 15   CREATININE mg/dL 0.97 1.08 1.04 1.27   CALCIUM mg/dL 8.6 8.5 9.0 9.0   ALK PHOS U/L  --  62  --  91   ALT U/L  --  45  --  42   AST U/L  --  56*  --  48*         Ezra Sands PA-C

## 2023-12-05 LAB
ANION GAP SERPL CALCULATED.3IONS-SCNC: 8 MMOL/L
BUN SERPL-MCNC: 14 MG/DL (ref 5–25)
CALCIUM SERPL-MCNC: 7.7 MG/DL (ref 8.4–10.2)
CHLORIDE SERPL-SCNC: 106 MMOL/L (ref 96–108)
CO2 SERPL-SCNC: 26 MMOL/L (ref 21–32)
CREAT SERPL-MCNC: 0.97 MG/DL (ref 0.6–1.3)
EST. AVERAGE GLUCOSE BLD GHB EST-MCNC: 143 MG/DL
GFR SERPL CREATININE-BSD FRML MDRD: 92 ML/MIN/1.73SQ M
GLUCOSE SERPL-MCNC: 115 MG/DL (ref 65–140)
HBA1C MFR BLD: 6.6 %
MAGNESIUM SERPL-MCNC: 1.9 MG/DL (ref 1.9–2.7)
POTASSIUM SERPL-SCNC: 3.1 MMOL/L (ref 3.5–5.3)
SODIUM SERPL-SCNC: 140 MMOL/L (ref 135–147)

## 2023-12-05 PROCEDURE — 94760 N-INVAS EAR/PLS OXIMETRY 1: CPT

## 2023-12-05 PROCEDURE — 83735 ASSAY OF MAGNESIUM: CPT | Performed by: PHYSICIAN ASSISTANT

## 2023-12-05 PROCEDURE — 99232 SBSQ HOSP IP/OBS MODERATE 35: CPT | Performed by: PHYSICIAN ASSISTANT

## 2023-12-05 PROCEDURE — 99232 SBSQ HOSP IP/OBS MODERATE 35: CPT | Performed by: HOSPITALIST

## 2023-12-05 PROCEDURE — 83036 HEMOGLOBIN GLYCOSYLATED A1C: CPT | Performed by: PHYSICIAN ASSISTANT

## 2023-12-05 PROCEDURE — 94660 CPAP INITIATION&MGMT: CPT

## 2023-12-05 PROCEDURE — 80048 BASIC METABOLIC PNL TOTAL CA: CPT | Performed by: PHYSICIAN ASSISTANT

## 2023-12-05 PROCEDURE — 99232 SBSQ HOSP IP/OBS MODERATE 35: CPT | Performed by: STUDENT IN AN ORGANIZED HEALTH CARE EDUCATION/TRAINING PROGRAM

## 2023-12-05 RX ORDER — TRAZODONE HYDROCHLORIDE 50 MG/1
50 TABLET ORAL
Status: DISCONTINUED | OUTPATIENT
Start: 2023-12-05 | End: 2023-12-07 | Stop reason: HOSPADM

## 2023-12-05 RX ORDER — POTASSIUM CHLORIDE 20 MEQ/1
40 TABLET, EXTENDED RELEASE ORAL 2 TIMES DAILY
Status: COMPLETED | OUTPATIENT
Start: 2023-12-05 | End: 2023-12-05

## 2023-12-05 RX ORDER — MAGNESIUM SULFATE HEPTAHYDRATE 40 MG/ML
2 INJECTION, SOLUTION INTRAVENOUS ONCE
Status: COMPLETED | OUTPATIENT
Start: 2023-12-05 | End: 2023-12-05

## 2023-12-05 RX ADMIN — AMIODARONE HYDROCHLORIDE 400 MG: 200 TABLET ORAL at 17:34

## 2023-12-05 RX ADMIN — SACUBITRIL AND VALSARTAN 1 TABLET: 24; 26 TABLET, FILM COATED ORAL at 17:31

## 2023-12-05 RX ADMIN — SPIRONOLACTONE 25 MG: 25 TABLET ORAL at 09:31

## 2023-12-05 RX ADMIN — METOPROLOL SUCCINATE 75 MG: 50 TABLET, EXTENDED RELEASE ORAL at 09:31

## 2023-12-05 RX ADMIN — MAGNESIUM SULFATE HEPTAHYDRATE 2 G: 40 INJECTION, SOLUTION INTRAVENOUS at 09:30

## 2023-12-05 RX ADMIN — POTASSIUM CHLORIDE 40 MEQ: 1500 TABLET, EXTENDED RELEASE ORAL at 17:34

## 2023-12-05 RX ADMIN — SACUBITRIL AND VALSARTAN 1 TABLET: 24; 26 TABLET, FILM COATED ORAL at 09:31

## 2023-12-05 RX ADMIN — AMIODARONE HYDROCHLORIDE 400 MG: 200 TABLET ORAL at 09:31

## 2023-12-05 RX ADMIN — POTASSIUM CHLORIDE 40 MEQ: 1500 TABLET, EXTENDED RELEASE ORAL at 09:31

## 2023-12-05 RX ADMIN — RIVAROXABAN 20 MG: 20 TABLET, FILM COATED ORAL at 17:34

## 2023-12-05 RX ADMIN — FUROSEMIDE 40 MG: 40 TABLET ORAL at 09:31

## 2023-12-05 RX ADMIN — METOPROLOL SUCCINATE 75 MG: 50 TABLET, EXTENDED RELEASE ORAL at 17:34

## 2023-12-05 RX ADMIN — CHLORHEXIDINE GLUCONATE 15 ML: 1.2 SOLUTION ORAL at 09:30

## 2023-12-05 RX ADMIN — CHLORHEXIDINE GLUCONATE 15 ML: 1.2 SOLUTION ORAL at 21:35

## 2023-12-05 NOTE — PROGRESS NOTES
[address] [facility]  Progress Note  Name: Neil Valdez  MRN: [id number]  Unit/Bed#: [facility]PHP 408-06 I Date of Admission: 12/1/2023   Date of Service: 12/5/2023 I Hospital Day: 4    Assessment/Plan   Wide-complex tachycardia  Assessment & Plan  -EP following  -pt had slow VT below detection zone. ICD has now been reprogrammed for lower detection zone.  -was hypotensive as below requiring cardioversion with 200 J, amiodarone and Levophed drips  -was on Amio gtt, now off  -loaded with PO Amio, continue  -off of home dofetilide/Digoxin (failure of dofetilide)      * Syncope  Assessment & Plan  -due to hypotension due to wide-complex tachycardia/VT  -s/p cardioversion with 200 J  -was on Levophed gtt, now off.   -was on Amio gtt, now on oral amiodarone.  -Echo: Left Ventricle: Left ventricular cavity size is severely dilated. Wall thickness is normal. There is eccentric hypertrophy. EF 10%. Systolic function is severely reduced. There is severe global hypokinesis. Mitral Valve: There is mild regurgitation.  -check cardiac MRI  -See wide-complex tachycardia plan    Morbid obesity with BMI of 40.0-44.9, adult [facility])  Assessment & Plan  -Encourage weight loss  -Affects all aspects of care    Paroxysmal atrial fibrillation [facility])  Assessment & Plan  -s/p ablation and PVI July 2023   -cont Amio/Xarelto    Dilated cardiomyopathy ([address])  Assessment & Plan  -see above               VTE Pharmacologic Prophylaxis:   Xarelto    Mobility:   Basic Mobility Inpatient Raw Score: 20  JH-HLM Goal: 6: Walk 10 steps or more  JH-HLM Achieved: 3: Sit at edge of bed  [facility] Goal NOT achieved. Continue with multidisciplinary rounding and encourage appropriate mobility to improve upon [facility] goals. Patient Centered Rounds: I performed bedside rounds with nursing staff today. Discussions with Specialists or Other Care Team Provider: EP    Total Time Spent on Date of Encounter in care of patient: 35 mins.  This time was spent on one or more of the following: performing physical exam; counseling and coordination of care; obtaining or reviewing history; documenting in the medical record; reviewing/ordering tests, medications or procedures; communicating with other healthcare professionals and discussing with patient's family/caregivers. Current Length of Stay: 4 day(s)  Current Patient Status: Inpatient   Certification Statement: The patient will continue to require additional inpatient hospital stay due to cardiac MRI  Discharge Plan: Anticipate discharge in 24-48 hrs to home. Code Status: Level 1 - Full Code    Subjective:   Denies chest pain or shortness of breath. Objective:     Vitals:   Temp (24hrs), Av.1 °F (36.7 °C), Min:97.3 °F (36.3 °C), Max:98.9 °F (37.2 °C)    Temp:  [97.3 °F (36.3 °C)-98.9 °F (37.2 °C)] 97.3 °F (36.3 °C)  HR:  [58-63] 59  Resp:  [18-20] 18  BP: ()/(56-75) 120/73  SpO2:  [96 %-97 %] 96 %  Body mass index is 40.34 kg/m². Input and Output Summary (last 24 hours):      Intake/Output Summary (Last 24 hours) at 2023 1205  Last data filed at 2023 1151  Gross per 24 hour   Intake 1369.65 ml   Output 2825 ml   Net -1455.35 ml       Physical Exam:   Gen: NAD, AAOx3, well developed, well nourished  Eyes: EOMI, PERRLA, no scleral icterus  ENMT:  no nasal discharge, no otic discharge, moist mucous membranes  Neck:  Supple  Cardiovascular: remains Regular rate and rhythm, normal S1-S2, no murmurs, rubs, or gallops  Lungs:  Clear to auscultation bilaterally anteriorly, no wheezes, or rales, or rhonchi  Abdomen:  Positive bowel sounds, soft, nontender, nondistended   Skin:  Intact, no obvious lesions or rashes, no LE edema  Neuro: Cranial nerves 2-12 are intact, non-focal, mvoes all 4 extremities       Additional Data:     Labs:  Results from last 7 days   Lab Units 23  0550 23  0253 23  0640 23   WBC Thousand/uL 10.72*   < > 13.14* 9.83   HEMOGLOBIN g/dL 15.8   < > 16.0 16.2   HEMATOCRIT % 47.0   < > 49.3 48.6   PLATELETS Thousands/uL 160   < > 216 182   NEUTROS PCT %  --   --   --  64   LYMPHS PCT %  --   --   --  24   LYMPHO PCT %  --   --  6*  --    MONOS PCT %  --   --   --  10   MONO PCT %  --   --  10  --    EOS PCT %  --   --  1 1    < > = values in this interval not displayed. Results from last 7 days   Lab Units 12/05/23  0457 12/04/23  0550 12/03/23  0739   SODIUM mmol/L 140   < > 131*   POTASSIUM mmol/L 3.1*   < > 4.6   CHLORIDE mmol/L 106   < > 102   CO2 mmol/L 26   < > 24   BUN mg/dL 14   < > 16   CREATININE mg/dL 0.97   < > 1.08   ANION GAP mmol/L 8   < > 5   CALCIUM mg/dL 7.7*   < > 8.5   ALBUMIN g/dL  --   --  3.6   TOTAL BILIRUBIN mg/dL  --   --  1.92*   ALK PHOS U/L  --   --  62   ALT U/L  --   --  45   AST U/L  --   --  56*   GLUCOSE RANDOM mg/dL 115   < > 156*    < > = values in this interval not displayed.          Results from last 7 days   Lab Units 12/01/23 2002   POC GLUCOSE mg/dl 235*     Results from last 7 days   Lab Units 12/05/23 0457   HEMOGLOBIN A1C % 6.6*           Lines/Drains:  Invasive Devices       Peripheral Intravenous Line  Duration             Peripheral IV 12/02/23 Left;Ventral (anterior) Forearm 2 days                      Telemetry:  Telemetry Orders (From admission, onward)               24 Hour Telemetry Monitoring  Continuous x 24 Hours (Telem)        Question:  Reason for 24 Hour Telemetry  Answer:  Decompensated CHF- and any one of the following: continuous diuretic infusion or total diuretic dose >200 mg daily, associated electrolyte derangement (I.e. K < 3.0), ionotropic drip (continuous infusion), hx of ventricular arrhythmia, or new EF < 35%                     Telemetry Reviewed: Normal Sinus Rhythm  Indication for Continued Telemetry Use: Arrthymias requiring medical therapy               Recent Cultures (last 7 days):         Last 24 Hours Medication List:   Current Facility-Administered Medications Medication Dose Route Frequency Provider Last Rate    acetaminophen  650 mg Oral Q6H PRN VILMA Muhammad      amiodarone  400 mg Oral BID With Meals VILMA Muhammad      chlorhexidine  15 mL Mouth/Throat Q12H [facility] Rachel Paredes VILMA TSE      furosemide  40 mg Oral Daily Chuck Fraser MD      metoprolol succinate  75 mg Oral BID Chuck Fraser MD      potassium chloride  40 mEq Oral BID Sharron Mortimer, PA-C      rivaroxaban  20 mg Oral Daily With [employer], [address] [facility]      sacubitril-valsartan  1 tablet Oral BID Chuck Fraser MD      spironolactone  25 mg Oral Daily VILMA Muhammad      traZODone  100 mg [facility] [address]          Today, Patient Was Seen By: Wally Richter MD    **Please Note: This note may have been constructed using a voice recognition system. **

## 2023-12-05 NOTE — ASSESSMENT & PLAN NOTE
-due to hypotension due to wide-complex tachycardia/VT  -s/p cardioversion with 200 J  -was on Levophed gtt, now off.   -was on Amio gtt, now on oral amiodarone.  -Echo: Left Ventricle: Left ventricular cavity size is severely dilated. Wall thickness is normal. There is eccentric hypertrophy. EF 10%. Systolic function is severely reduced. There is severe global hypokinesis. Mitral Valve:  There is mild regurgitation.  -check cardiac MRI  -See wide-complex tachycardia plan

## 2023-12-05 NOTE — PROGRESS NOTES
Advanced Heart Failure / Pulmonary Hypertension Service Progress Note    Robert Frank 53 y.o. male   MRN: [id number]  Unit/Bed#: [facility] 527-01; Encounter: [id number]    Assessment:  Principal Problem:    Syncope  Active Problems:    Dilated cardiomyopathy ([address])    Paroxysmal atrial fibrillation ([address])    Morbid obesity with BMI of 40.0-44.9, adult ([address])    Wide-complex tachycardia      Subjective:   Robert Frank is a 50-year-old man with PMH as below who presented to [facility] on 12/01/2023 with syncope at home. EMS called and noted patient to be tachycardic and hypotensive and gave adenosine (no conversion) and then IV amiodarone bolus (again no change to rhythm). Upon arrival to ED, EKGs with wide complex tachycardia. Was given lidocaine bolus and started on amiodarone drip. In ED, became unresponsive and was then cardioverted with conversion to NSR and started on vasopressors. Transferred to [address] [place] [address] on 12/01 for EP evaluation. Patient seen and examined. No significant events overnight. No current cardiac complaints. Working on getting short term disability shorted. Objective: Intake/ Output: 1854.4 mL / 4325 mL. Weight: 314 lbs (318 lbs on 318 lbs). Telemetry: NSR, rates in 60-70s. MAPs: 70-90. Today's Plan:  Was switched to PO Lasix yesterday afternoon and to metoprolol succinate (from carvedilol) this AM.  Start Entresto 24-26 mg BID today. Potassium of 3.1 this AM; goal >4. Will give total of 80 mEq PO potassium today. Magnesium 1.9 this AM; goal >2. Will give another 2 g IV magnesium today. Will consider starting SGLT2i tomorrow. Check cMRI while inpatient. Order placed. Per outpatient cardiology notes in 2020, cMRI was deferred due to insurance issues. Amiodarone loading/dosing per EP. Hospital follow-up scheduled for 12/11/2023 at [facility] cardiology office.      Plan:  Syncope / ventricular tachycardia              Plan and driving recommendations/restrictions per EP. ICD VT zones reprogrammed on 12/04. Chronic HFrEF; LVEF 10%; LVIDd 7.3 cm; NYHA III; ACC/AHA Stage C              Etiology: "Nonischemic, etiology unclear. Reports occasional binge drinking in the past but not regularly. 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 Unremarkable SPEP in 2021. 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 TTE 05/16/2017: "LVEF 25% w/ grade II diastology. LVIDd 6.9 cm. Mod LAE. Trace MR, mild TR. Normal RV size and function."  TTE 04/09/2019: "LVEF 25% w/ grade II diastology. LVIDd 6.9 cm. Mod LAE. Trace MR. Mild TR. Noninvasive assessment c/f borderline to low output."  [facility] 08/30/2021: normal coronaries. TTE 10/11/2021: LVEF 25%. Normal RV. Mild MR. TTE 04/17/2023: LVEF 30%. Normal RV. Mild MR and TR.               TTE (limited) 05/01/2023: LVEF 30%. Severe global hypokinesis. Normal RV. TTE (limited) 12/02/2023: LVEF 10%. LVIDd 7.3 cm. No LV thrombus. Normal RV. Mild TR. Pharmacotherapies / Neurohormonal Blockade:  --Beta Blocker: metoprolol succinate 75 mg q12 hours (on carvedilol 37.5 mg q12 hours as outpatient). --ARNi / ACEi / ARB: Entresto 24-26 mg BID. --Aldosterone Antagonist: spironolactone 25 mg daily. --SGLT2 Inhibitor: No ([employer] price check: $0/month). --Home Diuretic: None. --Inpatient Diuretic: PO Lasix 40 mg daily. Sudden Cardiac Death Risk Reduction:  --[employer]tronic single chamber ICD in situ since 2006. --Interrogation from 11/20/2023:  <0.1%. Lead parameters WNL. OptiVol WNL. Normal device function. Electrical Resynchronization:  --Candidacy for BiV device: narrow QRS. Advanced Therapies (if appropriate): Will continue to monitor. Atrial fibrillation / flutter              [id number] = 2 (HF, HTN). Anticoagulation on Xarelto. S/p Afib ablation in 07/2023. Rate control: BB as above.               Rhythm control: as above per EP. Hypertension   Obstructive sleep apnea  History of binge drinking: drinking "a lot" on Fridays; stopped this about in late 2022. Vitals:   Blood pressure 120/73, pulse 59, temperature (!) 97.3 °F (36.3 °C), temperature source Axillary, resp. rate 18, height 6' 2" (1.88 m), weight (!) 143 kg (314 lb 3.2 oz), SpO2 97 %. I/O last 3 completed shifts: In: 1854.4 [P.O.:1180; I.V.:594.4; IV Piggyback:80]  Out: 5295 [Urine:5295]    Weight (last 2 days)       Date/Time Weight    12/05/23 0534 143 (314.2)    12/04/23 0534 145 (318.79)    12/03/23 0554 146 (322.09)          Wt Readings from Last 10 Encounters:   12/05/23 (!) 143 kg (314 lb 3.2 oz)   12/01/23 (!) 145 kg (320 lb)   07/03/23 (!) 145 kg (320 lb)   05/24/23 (!) 145 kg (318 lb 11.2 oz)   05/02/23 (!) 140 kg (308 lb 6.8 oz)   04/17/23 (!) 141 kg (310 lb)   12/29/22 (!) 144 kg (317 lb)   11/16/22 (!) 144 kg (317 lb)   10/11/22 (!) 140 kg (309 lb 1.4 oz)   05/12/22 (!) 146 kg (322 lb)     Vitals:    12/05/23 0230 12/05/23 0534 12/05/23 0730 12/05/23 0731   BP: 115/75  120/73 120/73   BP Location:    Right arm   Pulse:       Resp:       Temp: 98.2 °F (36.8 °C)  (!) 97.3 °F (36.3 °C) (!) 97.3 °F (36.3 °C)   TempSrc:    Axillary   SpO2:       Weight:  (!) 143 kg (314 lb 3.2 oz)     Height:           Physical Exam  Vitals reviewed. Constitutional:       General: He is awake. He is not in acute distress. Appearance: Normal appearance. He is well-developed and overweight. He is not toxic-appearing or diaphoretic. HENT:      Head: Normocephalic. Nose: Nose normal.      Mouth/Throat:      Mouth: Mucous membranes are moist.   Eyes:      General: No scleral icterus. Conjunctiva/sclera: Conjunctivae normal.   Neck:      Vascular: JVD present. Trachea: No tracheal deviation. Cardiovascular:      Rate and Rhythm: Normal rate and regular rhythm. Heart sounds: No murmur heard.   Pulmonary:      Effort: Pulmonary effort is normal. No tachypnea, bradypnea or respiratory distress. Breath sounds: Normal air entry. No decreased air movement. No decreased breath sounds or rhonchi. Abdominal:      General: There is distension. Palpations: Abdomen is soft. Tenderness: There is no abdominal tenderness. Musculoskeletal:      Cervical back: Neck supple. Right lower leg: Edema present. Left lower leg: Edema present. Skin:     General: Skin is warm and dry. Coloration: Skin is not jaundiced or pale. Neurological:      General: No focal deficit present. Mental Status: He is alert and oriented to person, place, and time. Psychiatric:         Attention and Perception: Attention normal.         Mood and Affect: Mood and affect normal.         Speech: Speech normal.         Behavior: Behavior normal. Behavior is cooperative. Thought Content:  Thought content normal.       Lines/Drains/Airways       Active Status       None                      Current Facility-Administered Medications:     acetaminophen (TYLENOL) tablet 650 mg, 650 mg, Oral, Q6H PRN, VILMA Branch, 650 mg at 12/02/23 1721    amiodarone tablet 400 mg, 400 mg, Oral, BID With Meals, IVLMA [place], 400 mg at 12/04/23 1603    chlorhexidine (PERIDEX) 0.12 % oral rinse 15 mL, 15 mL, Mouth/Throat, Q12H [facility], VILMA [place], 15 mL at 12/04/23 2115    furosemide (LASIX) tablet 40 mg, 40 mg, Oral, Daily, Nita Cushing, MD, 40 mg at 12/04/23 1714    magnesium sulfate 2 g/50 mL IVPB (premix) 2 g, 2 g, Intravenous, Once, Raul Huizar PA-C    metoprolol succinate (TOPROL-XL) 24 hr tablet 75 mg, 75 mg, Oral, BID, Nita Cushing, MD    potassium chloride (K-DUR,KLOR-CON) CR tablet 40 mEq, 40 mEq, Oral, BID, Aurora Mantilla PA-C    rivaroxaban (XARELTO) tablet 20 mg, 20 mg, Oral, Daily With Thiago AreolaVILMA, 20 mg at 12/04/23 1603    sacubitril-valsartan (ENTRESTO) 24-26 MG per tablet 1 tablet, 1 tablet, Oral, BID, Nino Durham MD    spironolactone (ALDACTONE) tablet 25 mg, 25 mg, Oral, Daily, VILMA Minor, 25 mg at 12/04/23 5442    traZODone (DESYREL) tablet 100 mg, 100 mg, Oral, HS, VILMA Minor, 100 mg at 12/03/23 2132    Labs & Results:      Results from last 7 days   Lab Units 12/04/23  0550 12/03/23  0253 12/02/23  0640   WBC Thousand/uL 10.72* 7.80 13.14*   HEMOGLOBIN g/dL 15.8 15.3 16.0   HEMATOCRIT % 47.0 46.4 49.3   PLATELETS Thousands/uL 160 144* 216         Results from last 7 days   Lab Units 12/05/23  0457 12/04/23  0550 12/03/23  0739 12/02/23  0640 12/01/23 2018   POTASSIUM mmol/L 3.1* 3.9 4.6   < > 4.2   CHLORIDE mmol/L 106 103 102   < > 103   CO2 mmol/L 26 24 24   < > 24   BUN mg/dL 14 14 16   < > 15   CREATININE mg/dL 0.97 0.97 1.08   < > 1.27   CALCIUM mg/dL 7.7* 8.6 8.5   < > 9.0   ALK PHOS U/L  --   --  62  --  91   ALT U/L  --   --  45  --  42   AST U/L  --   --  56*  --  48*    < > = values in this interval not displayed.          Chiquita Dunlap PA-C

## 2023-12-05 NOTE — UTILIZATION REVIEW
Continued Stay Review    Date: 12/5                          Current Patient Class: INpatient   Current Level of Care: Med/surg    HPI:48 y.o. male initially admitted on 12/1     Assessment/Plan:Pending cardiac MRI. Electrophysiology consult: Device reprogrammed 12/4 to decrease VT1 zone to 154 bpm (previously 180 bpm) and ATP added to shocks, added VT monitor zone at 120 bpm Amiondarone drip discontinued. Heart failure team: NICM, HFrEF, LVEF 25% in 5/2023>VT and DCCV>now 10%, LVIDD 7.3cm, RV size/fxn wnl     Vital Signs: Vital Signs (last 2 days)    Date/Time Temp Pulse Resp BP MAP (mmHg) SpO2 O2 Device O2 Interface Device Patient Position - Orthostatic VS   12/05/23 07:31:45 97.3 °F (36.3 °C) Abnormal  59 18 120/73 89 96 % None (Room air) -- Sitting   12/05/23 07:30:53 97.3 °F (36.3 °C) Abnormal  -- -- 120/73 89 -- -- -- --   12/05/23 02:30:51 98.2 °F (36.8 °C) -- -- 115/75 88 -- -- -- --   12/05/23 0000 -- -- -- -- -- 97 % -- Face mask --   12/04/23 22:42:48 -- 59 18 98/56 70 -- -- -- --   12/04/23 19:22:19 98.6 °F (37 °C) 58 18 108/65 79 -- -- -- --   12/04/23 15:03:44 98.9 °F (37.2 °C) 63 20 120/66 84 97 % -- -- Lying   12/04/23 11:01:09 98.9 °F (37.2 °C) 63 16 114/62 79 97 % -- -- --   12/04/23 08:31:08 -- 61 17 120/79 93 97 % None (Room air) -- Lying   12/04/23 0830 -- -- -- -- -- -- None (Room air) -- --   12/04/23 0828 -- 61 -- -- -- -- -- --        Pertinent Labs/Diagnostic Results:   12/2 ECHO:   Left Ventricle: Left ventricular cavity size is severely dilated. Wall thickness is normal. There is eccentric hypertrophy. The left ventricular ejection fraction is 10%. Systolic function is severely reduced. There is severe global hypokinesis. Mitral Valve: There is mild regurgitation.           Results from last 7 days   Lab Units 12/04/23  0550 12/03/23  0253 12/02/23  0640 12/01/23 2018   WBC Thousand/uL 10.72* 7.80 13.14* 9.83   HEMOGLOBIN g/dL 15.8 15.3 16.0 16.2   HEMATOCRIT % 47.0 46.4 49.3 48.6   PLATELETS Thousands/uL 160 144* 216 182   NEUTROS ABS Thousands/µL  --   --   --  6.32         Results from last 7 days   Lab Units 12/05/23 0457 12/04/23  0550 12/03/23  0739 12/02/23  0640 12/01/23 2018   SODIUM mmol/L 140 137 131* 136 136   POTASSIUM mmol/L 3.1* 3.9 4.6 4.5 4.2   CHLORIDE mmol/L 106 103 102 106 103   CO2 mmol/L 26 24 24 23 24   ANION GAP mmol/L 8 10 5 7 9   BUN mg/dL 14 14 16 14 15   CREATININE mg/dL 0.97 0.97 1.08 1.04 1.27   EGFR ml/min/1.73sq m 92 92 81 85 66   CALCIUM mg/dL 7.7* 8.6 8.5 9.0 9.0   MAGNESIUM mg/dL 1.9 1.8* 1.7* 2.0  --    PHOSPHORUS mg/dL  --   --   --  3.5  --      Results from last 7 days   Lab Units 12/03/23  0739 12/01/23 2018   AST U/L 56* 48*   ALT U/L 45 42   ALK PHOS U/L 62 91   TOTAL PROTEIN g/dL 6.3* 7.0   ALBUMIN g/dL 3.6 4.0   TOTAL BILIRUBIN mg/dL 1.92* 1.85*   BILIRUBIN DIRECT mg/dL 0.32* 0.26*     Results from last 7 days   Lab Units 12/01/23 2002   POC GLUCOSE mg/dl 235*     Results from last 7 days   Lab Units 12/05/23 0457 12/04/23  0550 12/03/23  0739 12/02/23  0640 12/01/23 2018   GLUCOSE RANDOM mg/dL 115 132 156* 146* 246*         Results from last 7 days   Lab Units 12/05/23 0457   HEMOGLOBIN A1C % 6.6*   EAG mg/dl 143     Results from last 7 days   Lab Units 12/01/23 2253 12/01/23 2018   HS TNI 0HR ng/L  --  80*   HS TNI 2HR ng/L 6,240*  --    HSTNI D2 ng/L 6,160*  --        Results from last 7 days   Lab Units 12/02/23 0640   DIGOXIN LVL ng/mL 0.4*     Results from last 7 days   Lab Units 12/04/23  0550   BNP pg/mL 391*     Results from last 7 days   Lab Units 12/04/23  0550   FERRITIN ng/mL 122   IRON SATURATION % 29   IRON ug/dL 94   TIBC ug/dL 320       Results from last 7 days   Lab Units 12/02/23  1129   CLARITY UA  Clear   COLOR UA  Light Yellow   SPEC GRAV UA  1.010   PH UA  5.0   GLUCOSE UA mg/dl 70 (7/100%)*   KETONES UA mg/dl Negative   BLOOD UA  Negative   PROTEIN UA mg/dl Negative   NITRITE UA  Negative   BILIRUBIN UA Negative   UROBILINOGEN UA (BE) mg/dl <2.0   LEUKOCYTES UA  Negative   WBC UA /hpf 1-2   RBC UA /hpf None Seen   BACTERIA UA /hpf None Seen   EPITHELIAL CELLS WET PREP /hpf None Seen         Medications:   Scheduled Medications:  amiodarone, 400 mg, Oral, BID With Meals  chlorhexidine, 15 mL, Mouth/Throat, Q12H SAMUEL  furosemide, 40 mg, Oral, Daily  metoprolol succinate, 75 mg, Oral, BID  potassium chloride, 40 mEq, Oral, BID  rivaroxaban, 20 mg, Oral, Daily With Dinner  sacubitril-valsartan, 1 tablet, Oral, BID  spironolactone, 25 mg, Oral, Daily  traZODone, 50 mg, Oral, HS      Continuous IV Infusions:     PRN Meds:  acetaminophen, 650 mg, Oral, Q6H PRN        Discharge Plan: D    Network Utilization Review Department  ATTENTION: Please call with any questions or concerns to [phone] and carefully listen to the prompts so that you are directed to the right person. All voicemails are confidential.   For Discharge needs, contact Care Management DC Support Team at [phone] opt. [phone]  Send all requests for admission clinical reviews, approved or denied determinations and any other requests to dedicated fax number below belonging to the campus where the patient is receiving treatment.  List of dedicated fax numbers for the Facilities:  [place] DENIALS (Administrative/Medical Necessity) [phone]   DISCHARGE SUPPORT TEAM (NETWORK) [id number] Aleman (Maternity/NICU/Pediatrics) [phone] [address] [facility] [phone]   [facility] [address] [place] [address] [address] [facility]  [facility] [phone]

## 2023-12-05 NOTE — ASSESSMENT & PLAN NOTE
-EP following  -pt had slow VT below detection zone.  ICD has now been reprogrammed for lower detection zone.  -was hypotensive as below requiring cardioversion with 200 J, amiodarone and Levophed drips  -was on Amio gtt, now off  -loaded with PO Amio, continue  -off of home dofetilide/Digoxin (failure of dofetilide)

## 2023-12-05 NOTE — PROGRESS NOTES
Progress Note - Electrophysiology - Cardiology  Lisa Orozco 53 y.o. male MRN: [id number]  Unit/Bed#: [facility] 527-01 Encounter: [id number]      Assessment:  Slow VT falling below detection zone resulting in syncope, s/p external cardioversion with 200 J in ED  -- on Tikosyn at the time, missed one day of medications prior to event  -- Tikosyn D/C'd, loading with amiodarone  -- device reprogrammed to decrease detection rate  Severe NICM EF 10% (previously 30% by echo May 2023)  -- etiology thought to be due to coxsackie virus many years ago  Medtronic single chamber ICD, implanted June 2017  HFrEF  Afib s/p Afib ablation July 2023 - on Xarelto  HTN  HLD  CONCHIS  Morbid obesity BMI 40  H/o binge drinking    Plan:  -- device reprogrammed 12/4 to decrease VT1 zone to 154 bpm (previously 180 bpm) and ATP added to shocks, added VT monitor zone at 120 bpm  -- amiodarone IV discontinued 12/4/2023  -- loading with oral amiodarone 400 mg po tid for 5 more days (through 12/8/2023), then decrease to 200 mg once daily  -- continue carvedilol 25 mg bid  -- keep potassium >4.0, keep Mg >2.0 - lytes repleted today  -- cardiac MRI ordered per HF team  -- IV diuretics transitioned to po per HF service  -- appreciate HF team input  -- optimize GDMT  -- will discuss VT ablation versus evaluation for advanced HF therapies  -- continue Xarelto for h/o Afib      Subjective/Objective   Lisa Orozco is a 53year old male with a history of severe NICM, Medtronic single chamber ICD, Afib s/p ablation July 2023, HTN, CONCHIS, and h/o binge drinking who [facility] slow VT and syncope falling below detection zone requiring cardioversion in ED with 200 J. He was previously on Tikosyn. This was discontinued and he was initiated on amio IV and po. Prior to this event, he missed one day of medications. In the hospital, he has remained in normal sinus rhythm. Echocardiogram documented an EF of 10%.  Previous echo in May 2023 documented an EF of 30%. He reports worsening shortness of breath over the past several weeks and thinks he has become volume overloaded. IV diuretics were changed to oral today. He is feeling well. He denies chest pain, SOB, lightheadedness, dizziness, orthopnea, or PND. TELE: sinus rhythm HR in 50s overnight, currently in 60s. No NSVT. EKG 12/4/2023: sinus bradycardia HR 55 bpm    Echo 12/2/2023:    Left Ventricle: Left ventricular cavity size is severely dilated. Wall thickness is normal. There is eccentric hypertrophy. The left ventricular ejection fraction is 10%. Systolic function is severely reduced. There is severe global hypokinesis. Mitral Valve: There is mild regurgitation.     cMRI pending    Objective:  Vitals: /73 (BP Location: Right arm)   Pulse 59   Temp (!) 97.3 °F (36.3 °C) (Axillary)   Resp 18   Ht 6' 2" (1.88 m)   Wt (!) 143 kg (314 lb 3.2 oz)   SpO2 96%   BMI 40.34 kg/m²     Vitals:    12/04/23 0534 12/05/23 0534   Weight: (!) 145 kg (318 lb 12.6 oz) (!) 143 kg (314 lb 3.2 oz)     Orthostatic Blood Pressures      Flowsheet Row Most Recent Value   Blood Pressure 120/73 filed at 12/05/2023 3686   Patient Position - Orthostatic VS Sitting filed at 12/05/2023 2505              Intake/Output Summary (Last 24 hours) at 12/5/2023 1209  Last data filed at 12/5/2023 1151  Gross per 24 hour   Intake 1369.65 ml   Output 2825 ml   Net -1455.35 ml       Invasive Devices       Peripheral Intravenous Line  Duration             Peripheral IV 12/02/23 Left;Ventral (anterior) Forearm 2 days                    Scheduled Meds:  Current Facility-Administered Medications   Medication Dose Route Frequency Provider Last Rate    acetaminophen  650 mg Oral Q6H PRN VILMA Kelley      amiodarone  400 mg Oral BID With Meals VILMA Kelley      chlorhexidine  15 mL Mouth/Throat Q12H [address], [place]      furosemide  40 mg Oral Daily Marco Antonio Menard MD      metoprolol succinate  75 mg Oral BID Marco Antonio Menard MD      potassium chloride  40 mEq Oral BID Maria Guadalupe Florse PA-C      rivaroxaban  20 mg Oral Daily With [employer] [place], [place]      sacubitril-valsartan  1 tablet Oral BID Marco Antonio Menard MD      spironolactone  25 mg Oral Daily Kenney Arena [place], [place]      traZODone  100 mg Oral HS Rachel Paredes VILMA TSE       Continuous Infusions:   PRN Meds:.  acetaminophen    Review of Systems:  Review of Systems   Constitutional: Negative. HENT: Negative. Eyes: Negative. Cardiovascular:  Negative for chest pain, palpitations and syncope. Respiratory:  Negative for cough and wheezing. Endocrine: Negative. Hematologic/Lymphatic: Negative. Skin:  Negative for rash. Musculoskeletal:  Negative for back pain. Gastrointestinal: Negative. Genitourinary: Negative. Neurological: Negative. Psychiatric/Behavioral: Negative. ROS as noted above, otherwise 12 point review of systems was performed and is negative. Physical Exam:   GEN: NAD, alert and oriented x 3, well appearing  SKIN: warm, dry without significant lesions or rashes. Left chest implant site C/D/I, no erythema or hematoma, no signs of infection. HEENT: NCAT  NECK: supple, no JVD appreciated  CARDIOVASCULAR: RRR, normal S1, S2 without murmurs, rubs, or gallops   LUNGS: CTA bilaterally without wheezes, rhonchi, or rales  ABDOMEN: Soft, nontender, nondistended. EXTREMITIES/VASCULAR: perfused without clubbing, cyanosis, or LE edema b/l  PSYCH: Normal mood and affect  NEURO: CN ll-Xll grossly intact       Lab Results: I have personally reviewed pertinent lab results.     Results from last 7 days   Lab Units 12/04/23  0550 12/03/23  0253 12/02/23  0640   WBC Thousand/uL 10.72* 7.80 13.14*   HEMOGLOBIN g/dL 15.8 15.3 16.0   HEMATOCRIT % 47.0 46.4 49.3   PLATELETS Thousands/uL 160 144* 216     Results from last 7 days   Lab Units 23  0457 23  0550 23  0739   POTASSIUM mmol/L 3.1* 3.9 4.6   CHLORIDE mmol/L 106 103 102   CO2 mmol/L 26 24 24   BUN mg/dL 14 14 16   CREATININE mg/dL 0.97 0.97 1.08   CALCIUM mg/dL 7.7* 8.6 8.5         Results from last 7 days   Lab Units 23  0457 23  0550 23  0739   MAGNESIUM mg/dL 1.9 1.8* 1.7*       Imaging: I have personally reviewed pertinent reports. Results for orders placed during the hospital encounter of 19    Echo complete with contrast if indicated    Narrative  [address] [place] [address]  [phone]    Transthoracic Echocardiogram  2D, M-mode, Doppler, and Color Doppler    Study date:  2019    Patient: Iqra Luna  MR number: [id number]  Account number: [de-identified]  : 1975  Age: 44 years  Gender: Male  Status: Outpatient  Location: [facility]  Height: 74 in  Weight: 291 lb  BP: 118/ 64 mmHg    Indications: Dilated Cardiomyopathy. Diagnoses: I42.0 - Dilated cardiomyopathy    Sonographer:  Michael RCS  Interpreting Physician:  Dayami Nix MD  Primary Physician:  Dodie Marte MD  Referring Physician:  Nettie Booker MD  Group:  [facility]    SUMMARY    LEFT VENTRICLE:  The ventricle was moderately to markedly dilated. Ejection fraction was estimated to be 25 %. There was severe diffuse hypokinesis. Features were consistent with a pseudonormal left ventricular filling pattern, with concomitant abnormal relaxation and increased filling pressure (grade 2 diastolic dysfunction). RIGHT VENTRICLE:  Estimated peak pressure was at least 30 mmHg. ICD lead noted. LEFT ATRIUM:  The atrium was moderately dilated. RIGHT ATRIUM:  The atrium was moderately dilated. MITRAL VALVE:  There was trace regurgitation. TRICUSPID VALVE:  There was mild regurgitation. HISTORY: PRIOR HISTORY: Risk factors: hypertension.     PROCEDURE: The study was performed in the [facility]. This was a routine study. The transthoracic approach was used. The study included complete 2D imaging, M-mode, complete spectral Doppler, and color Doppler. The  heart rate was 74 bpm, at the start of the study. This was a technically difficult study. LEFT VENTRICLE: The ventricle was moderately to markedly dilated. Ejection fraction was estimated to be 25 %. There was severe diffuse hypokinesis. DOPPLER: Features were consistent with a pseudonormal left ventricular filling pattern,  with concomitant abnormal relaxation and increased filling pressure (grade 2 diastolic dysfunction). RIGHT VENTRICLE: The size was normal. Systolic function was normal. Wall thickness was normal. DOPPLER: Estimated peak pressure was at least 30 mmHg. ICD lead noted. LEFT ATRIUM: The atrium was moderately dilated. RIGHT ATRIUM: The atrium was moderately dilated. MITRAL VALVE: There was annular calcification. DOPPLER: There was trace regurgitation. AORTIC VALVE: The valve was trileaflet. Leaflets exhibited normal thickness and normal cuspal separation. The valve was not well visualized. DOPPLER: Transaortic velocity was within the normal range. There was no evidence for stenosis. There was no regurgitation. TRICUSPID VALVE: The valve structure was normal. There was normal leaflet separation. DOPPLER: The transtricuspid velocity was within the normal range. There was no evidence for stenosis. There was mild regurgitation. PULMONIC VALVE: Leaflets exhibited normal thickness, no calcification, and normal cuspal separation. DOPPLER: The transpulmonic velocity was within the normal range. There was no regurgitation. PERICARDIUM: There was no pericardial effusion. The pericardium was normal in appearance. AORTA: The root exhibited normal size.     SYSTEM MEASUREMENT TABLES    2D  %FS: 12.58 %  Ao Diam: 2.95 cm  EDV(Teich): 249.22 ml  EF(Teich): 26.31 %  ESV(Teich): 183.65 ml  IVSd: 1.03 cm  LA Area: 29.37 cm2  LA Diam: 5.32 cm  LVEDV MOD A4C: 213.51 ml  LVEF MOD A4C: 30.3 %  LVESV MOD A4C: 148.81 ml  LVIDd: 6.92 cm  LVIDs: 6.05 cm  LVLd A4C: 9.46 cm  LVLs A4C: 8.02 cm  LVPWd: 0.71 cm  RA Area: 23.69 cm2  RVIDd: 4.49 cm  SV MOD A4C: 64.7 ml  SV(Teich): 65.57 ml    CW  TR MaxP.1 mmHg  TR Vmax: 2.55 m/s    MM  TAPSE: 2.55 cm    PW  E': 0.08 m/s  E/E': 11.03  MV A [place]: 0.5 m/s  MV Dec [place]: 3.77 m/s2  MV DecT: 234 ms  MV E [place]: 0.88 m/s  MV E/A Ratio: 1.76  MV PHT: 67.86 ms  MVA By PHT: 3.24 cm2    [facility] Accredited Echocardiography Laboratory    Prepared and electronically signed by    Rafael Benton MD  Signed 10-Apr-2019 13:52:24      VTE Pharmacologic Prophylaxis: Reason for no pharmacologic prophylaxis therapeutic anticoagulation  VTE Mechanical Prophylaxis: sequential compression device

## 2023-12-06 ENCOUNTER — APPOINTMENT (INPATIENT)
Dept: NON INVASIVE DIAGNOSTICS | Facility: HOSPITAL | Age: 48
DRG: 309 | End: 2023-12-06
Payer: COMMERCIAL

## 2023-12-06 LAB
ANION GAP SERPL CALCULATED.3IONS-SCNC: 9 MMOL/L
APICAL FOUR CHAMBER EJECTION FRACTION: 15 %
BUN SERPL-MCNC: 15 MG/DL (ref 5–25)
CALCIUM SERPL-MCNC: 8.6 MG/DL (ref 8.4–10.2)
CHLORIDE SERPL-SCNC: 103 MMOL/L (ref 96–108)
CO2 SERPL-SCNC: 26 MMOL/L (ref 21–32)
CREAT SERPL-MCNC: 1.01 MG/DL (ref 0.6–1.3)
E WAVE DECELERATION TIME: 188 MS
E/A RATIO: 1.68
FRACTIONAL SHORTENING: 6 (ref 28–44)
GFR SERPL CREATININE-BSD FRML MDRD: 88 ML/MIN/1.73SQ M
GLUCOSE SERPL-MCNC: 104 MG/DL (ref 65–140)
INTERVENTRICULAR SEPTUM IN DIASTOLE (PARASTERNAL SHORT AXIS VIEW): 1.1 CM
INTERVENTRICULAR SEPTUM: 1.1 CM (ref 0.6–1.1)
IVC: 1.2 MM
LEFT ATRIUM SIZE: 4.5 CM
LEFT INTERNAL DIMENSION IN SYSTOLE: 6.3 CM (ref 2.1–4)
LEFT VENTRICLE DIASTOLIC VOLUME (MOD BIPLANE): 205 ML
LEFT VENTRICLE SYSTOLIC VOLUME (MOD BIPLANE): 173 ML
LEFT VENTRICULAR INTERNAL DIMENSION IN DIASTOLE: 6.7 CM (ref 3.5–6)
LEFT VENTRICULAR POSTERIOR WALL IN END DIASTOLE: 1 CM
LEFT VENTRICULAR STROKE VOLUME: 33 ML
LV EF: 15 %
LVSV (TEICH): 33 ML
MAGNESIUM SERPL-MCNC: 2 MG/DL (ref 1.9–2.7)
MV PEAK A VEL: 0.4 M/S
MV PEAK E VEL: 67 CM/S
MV STENOSIS PRESSURE HALF TIME: 55 MS
MV VALVE AREA P 1/2 METHOD: 4
POTASSIUM SERPL-SCNC: 4 MMOL/L (ref 3.5–5.3)
RA PRESSURE ESTIMATED: 3 MMHG
RV PSP: 30 MMHG
SL CV LV EF: 15
SL CV PED ECHO LEFT VENTRICLE DIASTOLIC VOLUME (MOD BIPLANE) 2D: 231 ML
SL CV PED ECHO LEFT VENTRICLE SYSTOLIC VOLUME (MOD BIPLANE) 2D: 198 ML
SODIUM SERPL-SCNC: 138 MMOL/L (ref 135–147)
TR MAX PG: 27 MMHG
TR PEAK VELOCITY: 2.6 M/S
TRICUSPID ANNULAR PLANE SYSTOLIC EXCURSION: 2 CM
TRICUSPID VALVE PEAK REGURGITATION VELOCITY: 2.6 M/S

## 2023-12-06 PROCEDURE — 93325 DOPPLER ECHO COLOR FLOW MAPG: CPT

## 2023-12-06 PROCEDURE — 93321 DOPPLER ECHO F-UP/LMTD STD: CPT | Performed by: INTERNAL MEDICINE

## 2023-12-06 PROCEDURE — 93321 DOPPLER ECHO F-UP/LMTD STD: CPT

## 2023-12-06 PROCEDURE — 80048 BASIC METABOLIC PNL TOTAL CA: CPT | Performed by: PHYSICIAN ASSISTANT

## 2023-12-06 PROCEDURE — 93308 TTE F-UP OR LMTD: CPT

## 2023-12-06 PROCEDURE — 93325 DOPPLER ECHO COLOR FLOW MAPG: CPT | Performed by: INTERNAL MEDICINE

## 2023-12-06 PROCEDURE — 83735 ASSAY OF MAGNESIUM: CPT | Performed by: PHYSICIAN ASSISTANT

## 2023-12-06 PROCEDURE — 99232 SBSQ HOSP IP/OBS MODERATE 35: CPT | Performed by: HOSPITALIST

## 2023-12-06 PROCEDURE — 99232 SBSQ HOSP IP/OBS MODERATE 35: CPT | Performed by: STUDENT IN AN ORGANIZED HEALTH CARE EDUCATION/TRAINING PROGRAM

## 2023-12-06 PROCEDURE — 93308 TTE F-UP OR LMTD: CPT | Performed by: INTERNAL MEDICINE

## 2023-12-06 PROCEDURE — 99232 SBSQ HOSP IP/OBS MODERATE 35: CPT | Performed by: INTERNAL MEDICINE

## 2023-12-06 RX ORDER — POTASSIUM CHLORIDE 20 MEQ/1
20 TABLET, EXTENDED RELEASE ORAL DAILY
Status: DISCONTINUED | OUTPATIENT
Start: 2023-12-06 | End: 2023-12-07 | Stop reason: HOSPADM

## 2023-12-06 RX ADMIN — METOPROLOL SUCCINATE 75 MG: 50 TABLET, EXTENDED RELEASE ORAL at 09:17

## 2023-12-06 RX ADMIN — POTASSIUM CHLORIDE 20 MEQ: 1500 TABLET, EXTENDED RELEASE ORAL at 09:20

## 2023-12-06 RX ADMIN — SPIRONOLACTONE 25 MG: 25 TABLET ORAL at 09:17

## 2023-12-06 RX ADMIN — EMPAGLIFLOZIN 10 MG: 10 TABLET, FILM COATED ORAL at 15:25

## 2023-12-06 RX ADMIN — SACUBITRIL AND VALSARTAN 1 TABLET: 24; 26 TABLET, FILM COATED ORAL at 09:17

## 2023-12-06 RX ADMIN — FUROSEMIDE 40 MG: 40 TABLET ORAL at 09:17

## 2023-12-06 RX ADMIN — AMIODARONE HYDROCHLORIDE 400 MG: 200 TABLET ORAL at 09:17

## 2023-12-06 RX ADMIN — TRAZODONE HYDROCHLORIDE 50 MG: 50 TABLET ORAL at 21:40

## 2023-12-06 RX ADMIN — CHLORHEXIDINE GLUCONATE 15 ML: 1.2 SOLUTION ORAL at 09:17

## 2023-12-06 RX ADMIN — CHLORHEXIDINE GLUCONATE 15 ML: 1.2 SOLUTION ORAL at 20:38

## 2023-12-06 RX ADMIN — SACUBITRIL AND VALSARTAN 1 TABLET: 24; 26 TABLET, FILM COATED ORAL at 17:02

## 2023-12-06 RX ADMIN — PERFLUTREN 0.4 ML/MIN: 6.52 INJECTION, SUSPENSION INTRAVENOUS at 15:00

## 2023-12-06 RX ADMIN — AMIODARONE HYDROCHLORIDE 400 MG: 200 TABLET ORAL at 17:02

## 2023-12-06 RX ADMIN — METOPROLOL SUCCINATE 75 MG: 50 TABLET, EXTENDED RELEASE ORAL at 17:02

## 2023-12-06 RX ADMIN — RIVAROXABAN 20 MG: 20 TABLET, FILM COATED ORAL at 17:02

## 2023-12-06 NOTE — PROGRESS NOTES
Advanced Heart Failure / Pulmonary Hypertension Service Progress Note    Jeffrey Douglas 53 y.o. male   MRN: [id number]  Unit/Bed#: [facility] 527-01; Encounter: [id number]    Assessment:  Principal Problem:    Syncope  Active Problems:    Dilated cardiomyopathy ([address])    Paroxysmal atrial fibrillation ([address])    Morbid obesity with BMI of 40.0-44.9, adult ([address])    Wide-complex tachycardia      Subjective:   Jeffrey Douglas is a 44-year-old man with PMH as below who presented to [facility] on 12/01/2023 with syncope at home. EMS called and noted patient to be tachycardic and hypotensive and gave adenosine (no conversion) and then IV amiodarone bolus (again no change to rhythm). Upon arrival to ED, EKGs with wide complex tachycardia. Was given lidocaine bolus and started on amiodarone drip. In ED, became unresponsive and was then cardioverted with conversion to NSR and started on vasopressors. Transferred to [address] [place] [address] on 12/01 for EP evaluation. Patient seen and examined. No significant events overnight. No current cardiac complaints. Hugo Pale for discharge. Objective: Intake/ Output: 942 mL / 1750 mL. Weight: 310 lbs (314 lbs on 12/05). Telemetry: NSR, rates in 60s. Zoltan qHS. MAPs: 70-90. Today's Plan:  Start Jardiance 10 mg daily. Check cMRI while inpatient. Order placed, not yet completed. Called MRI department, earliest can be completed would be tomorrow afternoon. Unable to be completed overnight due to ICD. Will discuss with team about completing inpatient vs outpatient. Amiodarone loading/dosing per EP. Will ask HF office staff to locate patient's short term disability paperwork, and begin to complete for patient. Will inquire with CM and nursing about getting patient a digital scale prior to discharge. Plan for gradual wean of trazodone as outpatient. Hospital follow-up scheduled for 12/11/2023 at [facility] cardiology office.      Plan:  Syncope / ventricular tachycardia Plan per EP. ICD VT zones reprogrammed on 12/04/2023. Chronic HFrEF; LVEF 10%; LVIDd 7.3 cm; NYHA III; ACC/AHA Stage C              Etiology: "Nonischemic, etiology unclear. Reports occasional binge drinking in the past but not regularly. 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 Unremarkable SPEP in 2021. 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 TTE 05/16/2017: "LVEF 25% w/ grade II diastology. LVIDd 6.9 cm. Mod LAE. Trace MR, mild TR. Normal RV size and function."  TTE 04/09/2019: "LVEF 25% w/ grade II diastology. LVIDd 6.9 cm. Mod LAE. Trace MR. Mild TR. Noninvasive assessment c/f borderline to low output."  [facility] 08/30/2021: normal coronaries. TTE 10/11/2021: LVEF 25%. Normal RV. Mild MR. TTE 04/17/2023: LVEF 30%. Normal RV. Mild MR and TR.               TTE (limited) 05/01/2023: LVEF 30%. Severe global hypokinesis. Normal RV. TTE (limited) 12/02/2023: LVEF 10%. LVIDd 7.3 cm. No LV thrombus. Normal RV. Mild TR. Pharmacotherapies / Neurohormonal Blockade:  --Beta Blocker: metoprolol succinate 75 mg q12 hours (on carvedilol 37.5 mg q12 hours as outpatient). --ARNi / ACEi / ARB: Entresto 24-26 mg BID. --Aldosterone Antagonist: spironolactone 25 mg daily. --SGLT2 Inhibitor: empagliflozin 10 mg daily. --Home Diuretic: None. --Inpatient Diuretic: PO Lasix 40 mg daily with potassium 20 mEq daily. Sudden Cardiac Death Risk Reduction:  --Medtronic single chamber ICD in situ since 2006. --Interrogation from 11/20/2023:  <0.1%. Lead parameters WNL. OptiVol WNL. Normal device function. Electrical Resynchronization:  --Candidacy for BiV device: narrow QRS. Advanced Therapies (if appropriate): Will continue to monitor. Atrial fibrillation / flutter              [id number] = 2 (HF, HTN). Anticoagulation on Xarelto. S/p Afib ablation in 07/2023.               Rate control: BB as above.              Rhythm control: as above per EP. Hypertension   Obstructive sleep apnea  History of binge drinking: drinking "a lot" on Fridays; stopped this about in late 2022. Vitals:   Blood pressure 109/58, pulse 63, temperature 99.7 °F (37.6 °C), resp. rate 18, height 6' 2" (1.88 m), weight (!) 141 kg (310 lb 13.6 oz), SpO2 96 %. I/O last 3 completed shifts: In: 5799 [P.O.:1422]  Out: 1023 [Urine:3675]    Weight (last 2 days)       Date/Time Weight    12/06/23 0506 141 (310.85)    12/05/23 0534 143 (314.2)    12/04/23 0534 145 (318.79)          Wt Readings from Last 10 Encounters:   12/06/23 (!) 141 kg (310 lb 13.6 oz)   12/01/23 (!) 145 kg (320 lb)   07/03/23 (!) 145 kg (320 lb)   05/24/23 (!) 145 kg (318 lb 11.2 oz)   05/02/23 (!) 140 kg (308 lb 6.8 oz)   04/17/23 (!) 141 kg (310 lb)   12/29/22 (!) 144 kg (317 lb)   11/16/22 (!) 144 kg (317 lb)   10/11/22 (!) 140 kg (309 lb 1.4 oz)   05/12/22 (!) 146 kg (322 lb)     Vitals:    12/06/23 0223 12/06/23 0506 12/06/23 0817 12/06/23 1104   BP: 115/68  110/58 109/58   BP Location:       Pulse:       Resp: 17   18   Temp: 98.2 °F (36.8 °C)  99.3 °F (37.4 °C) 99.7 °F (37.6 °C)   TempSrc:       SpO2:       Weight:  (!) 141 kg (310 lb 13.6 oz)     Height:           Physical Exam  Vitals reviewed. Constitutional:       General: He is awake. He is not in acute distress. Appearance: Normal appearance. He is well-developed and overweight. He is not toxic-appearing or diaphoretic. HENT:      Head: Normocephalic. Nose: Nose normal.      Mouth/Throat:      Mouth: Mucous membranes are moist.   Eyes:      General: No scleral icterus. Conjunctiva/sclera: Conjunctivae normal.   Neck:      Vascular: No JVD. Trachea: No tracheal deviation. Cardiovascular:      Rate and Rhythm: Normal rate and regular rhythm. Heart sounds: No murmur heard.   Pulmonary:      Effort: Pulmonary effort is normal. No tachypnea, bradypnea or respiratory distress. Breath sounds: Normal air entry. No decreased air movement. No decreased breath sounds or wheezing. Abdominal:      Palpations: Abdomen is soft. Tenderness: There is no abdominal tenderness. Musculoskeletal:      Cervical back: Neck supple. Right lower leg: Edema (trace) present. Left lower leg: Edema (trace) present. Skin:     General: Skin is warm and dry. Coloration: Skin is not jaundiced or pale. Neurological:      General: No focal deficit present. Mental Status: He is alert and oriented to person, place, and time. Psychiatric:         Attention and Perception: Attention normal.         Mood and Affect: Mood and affect normal.         Speech: Speech normal.         Behavior: Behavior normal. Behavior is cooperative. Thought Content:  Thought content normal.       Lines/Drains/Airways       Active Status       None                      Current Facility-Administered Medications:     acetaminophen (TYLENOL) tablet 650 mg, 650 mg, Oral, Q6H PRN, VILMA Mandujano, 650 mg at 12/02/23 1721    amiodarone tablet 400 mg, 400 mg, Oral, BID With Meals, VILMA Peterson, 400 mg at 12/06/23 2174    chlorhexidine (PERIDEX) 0.12 % oral rinse 15 mL, 15 mL, Mouth/Throat, Q12H [facility] VILMA TSE, 15 mL at 12/06/23 0257    Empagliflozin (JARDIANCE) tablet 10 mg, 10 mg, Oral, Daily, Mona Mantilla PA-C    furosemide (LASIX) tablet 40 mg, 40 mg, Oral, Daily, Emelina Aguilar MD, 40 mg at 12/06/23 0917    metoprolol succinate (TOPROL-XL) 24 hr tablet 75 mg, 75 mg, Oral, BID, Emelina Aguilar MD, 75 mg at 12/06/23 0917    potassium chloride (K-DUR,KLOR-CON) CR tablet 20 mEq, 20 mEq, Oral, Daily, Ankush Adamson PA-C, 20 mEq at 12/06/23 0920    rivaroxaban (XARELTO) tablet 20 mg, 20 mg, Oral, Daily With Tung Precise CRNP, 20 mg at 12/05/23 1734    sacubitril-valsartan (ENTRESTO) 24-26 MG per tablet 1 tablet, 1 tablet, Oral, BID, Nicole Pop MD, 1 tablet at 12/06/23 3027    spironolactone (ALDACTONE) tablet 25 mg, 25 mg, Oral, Daily, VILMA Andrews, 25 mg at 12/06/23 4431    traZODone (DESYREL) tablet 50 mg, 50 mg, Oral, HS, Nicole Pop MD    Labs & Results:      Results from last 7 days   Lab Units 12/04/23  0550 12/03/23  0253 12/02/23  0640   WBC Thousand/uL 10.72* 7.80 13.14*   HEMOGLOBIN g/dL 15.8 15.3 16.0   HEMATOCRIT % 47.0 46.4 49.3   PLATELETS Thousands/uL 160 144* 216         Results from last 7 days   Lab Units 12/06/23  0455 12/05/23  0457 12/04/23  0550 12/03/23  0739 12/02/23  0640 12/01/23 2018   POTASSIUM mmol/L 4.0 3.1* 3.9 4.6   < > 4.2   CHLORIDE mmol/L 103 106 103 102   < > 103   CO2 mmol/L 26 26 24 24   < > 24   BUN mg/dL 15 14 14 16   < > 15   CREATININE mg/dL 1.01 0.97 0.97 1.08   < > 1.27   CALCIUM mg/dL 8.6 7.7* 8.6 8.5   < > 9.0   ALK PHOS U/L  --   --   --  62  --  91   ALT U/L  --   --   --  45  --  42   AST U/L  --   --   --  56*  --  48*    < > = values in this interval not displayed.          Giovanni Steve PA-C

## 2023-12-06 NOTE — PROGRESS NOTES
Progress Note - Electrophysiology - Cardiology  Robert Frank 53 y.o. male MRN: [id number]  Unit/Bed#: [facility] 527-01 Encounter: [id number]      Assessment:  Slow VT falling below detection zone resulting in syncope, s/p external cardioversion with 200 J in ED  -- on Tikosyn at the time, missed one day of medications prior to event  -- Tikosyn D/C'd, loading with amiodarone 400 mg bid  -- device reprogrammed to decrease detection rate  Severe NICM EF 10% (previously 30% by echo May 2023)  -- etiology thought to be due to coxsackie virus many years ago  -- cMRI pending  -- medical therapy optimized by heart failure team  Medtronic single chamber ICD, implanted June 2017  HFrEF  Afib s/p Afib ablation July 2023 - on Xarelto  HTN  HLD  CONCHIS  Morbid obesity BMI 40  H/o binge drinking    Plan:  -- device reprogrammed 12/4 to decrease VT1 zone to 154 bpm (previously 180 bpm) and ATP added to shocks, added VT monitor zone at 120 bpm  -- amiodarone IV discontinued 12/4/2023  -- loading with oral amiodarone 400 mg po tid through 12/8/2023, then decrease to 200 mg once daily  -- continue carvedilol 25 mg bid  -- keep potassium >4.0, keep Mg >2.0 - lytes repleted today  -- cardiac MRI ordered per HF team, pending  -- IV diuretics transitioned to po per HF service  -- appreciate HF team input  -- optimize GDMT - [place] being started today  -- continue Xarelto for h/o Afib  -- discussed no driving for 3 months, patient verbalized understanding  -- outpatient follow up with Dr. Jada Frye is a 53year old male with a history of severe NICM, Medtronic single chamber ICD, Afib s/p ablation July 2023, HTN, CONCHIS, and h/o binge drinking who was transferred to [facility] after slow VT and syncope falling below detection zone requiring cardioversion in ED with 200 J. He was previously on Tikosyn. This was discontinued and he was initiated on amio IV and po.  Prior to this event, he missed one day of medications. In the hospital, he has remained in normal sinus rhythm. Echocardiogram documented an EF of 10%. Previous echo in May 2023 documented an EF of 30%. He reports worsening shortness of breath over the past several weeks and thinks he has become volume overloaded. IV diuretics were changed to oral 12/5. Today, he is feeling well. He denies chest pain, SOB, lightheadedness, dizziness, orthopnea, or PND. TELE: sinus rhythm, no VT overnight    EKG 12/4/2023: sinus bradycardia HR 55 bpm    Echo 12/2/2023:    Left Ventricle: Left ventricular cavity size is severely dilated. Wall thickness is normal. There is eccentric hypertrophy. The left ventricular ejection fraction is 10%. Systolic function is severely reduced. There is severe global hypokinesis. Mitral Valve: There is mild regurgitation.     cMRI pending    Objective:  Vitals: /69   Pulse 65   Temp 99.7 °F (37.6 °C) (Oral)   Resp 18   Ht 6' 2" (1.88 m)   Wt (!) 141 kg (310 lb)   SpO2 98%   BMI 39.80 kg/m²     Vitals:    12/06/23 0506 12/06/23 1506   Weight: (!) 141 kg (310 lb 13.6 oz) (!) 141 kg (310 lb)     Orthostatic Blood Pressures      Flowsheet Row Most Recent Value   Blood Pressure 111/69 filed at 12/06/2023 1506   Patient Position - Orthostatic VS Lying filed at 12/05/2023 1505              Intake/Output Summary (Last 24 hours) at 12/6/2023 1511  Last data filed at 12/6/2023 8198  Gross per 24 hour   Intake 720 ml   Output 850 ml   Net -130 ml         Invasive Devices       Peripheral Intravenous Line  Duration             Peripheral IV 12/06/23 Right Antecubital <1 day                    Scheduled Meds:  Current Facility-Administered Medications   Medication Dose Route Frequency Provider Last Rate    acetaminophen  650 mg Oral Q6H PRN VILMA Abdullahi      amiodarone  400 mg Oral BID With Meals VILMA Moreno      chlorhexidine  15 mL Mouth/Throat Q12H [address] [facility] VILMA Fragoso      Empagliflozin  10 mg Oral Daily Mary Wei      furosemide  40 mg Oral Daily Katrina Hector MD      metoprolol succinate  75 mg Oral BID Katrina Hector MD      potassium chloride  20 mEq Oral Daily Mary Wei      rivaroxaban  20 mg Oral Daily With [place], [place]      sacubitril-valsartan  1 tablet Oral BID Katrina Hector MD      spironolactone  25 mg Oral Daily VILMA Caicedo      traZODone  50 mg Oral HS Katrina Hector MD       Continuous Infusions:   PRN Meds:.  acetaminophen    Review of Systems:  Review of Systems   Constitutional: Negative. HENT: Negative. Eyes: Negative. Cardiovascular:  Negative for chest pain, palpitations and syncope. Respiratory:  Negative for cough and wheezing. Endocrine: Negative. Hematologic/Lymphatic: Negative. Skin:  Negative for rash. Musculoskeletal:  Negative for back pain. Gastrointestinal: Negative. Genitourinary: Negative. Neurological: Negative. Psychiatric/Behavioral: Negative. ROS as noted above, otherwise 12 point review of systems was performed and is negative. Physical Exam:   GEN: NAD, alert and oriented x 3, well appearing  SKIN: warm, dry without significant lesions or rashes. Left chest implant site C/D/I, no erythema or hematoma, no signs of infection. HEENT: NCAT  NECK: supple, no JVD appreciated  CARDIOVASCULAR: RRR, normal S1, S2 without murmurs, rubs, or gallops   LUNGS: CTA bilaterally without wheezes, rhonchi, or rales  ABDOMEN: Soft, nontender, nondistended. EXTREMITIES/VASCULAR: perfused without clubbing, cyanosis, or LE edema b/l  PSYCH: Normal mood and affect  NEURO: CN ll-Xll grossly intact       Lab Results: I have personally reviewed pertinent lab results.     Results from last 7 days   Lab Units 12/04/23  0550 12/03/23  0253 12/02/23  0640   WBC Thousand/uL 10.72* 7.80 13.14*   HEMOGLOBIN g/dL 15.8 15.3 16.0   HEMATOCRIT % 47.0 46.4 49.3   PLATELETS Thousands/uL 160 144* 216       Results from last 7 days   Lab Units 23  0455 23  0457 23  0550   POTASSIUM mmol/L 4.0 3.1* 3.9   CHLORIDE mmol/L 103 106 103   CO2 mmol/L 26 26 24   BUN mg/dL 15 14 14   CREATININE mg/dL 1.01 0.97 0.97   CALCIUM mg/dL 8.6 7.7* 8.6           Results from last 7 days   Lab Units 23  0455 23  0457 23  0550   MAGNESIUM mg/dL 2.0 1.9 1.8*         Imaging: I have personally reviewed pertinent reports. Results for orders placed during the hospital encounter of 19    Echo complete with contrast if indicated    Narrative  [address] [place] [address], [address] [place] [address]  [phone]    Transthoracic Echocardiogram  2D, M-mode, Doppler, and Color Doppler    Study date:  2019    Patient: Gael Pineda  MR number: [id number]  Account number: [de-identified]  : 1975  Age: 44 years  Gender: Male  Status: Outpatient  [facility]: [facility]  Height: 74 in  Weight: 291 lb  BP: 118/ 64 mmHg    Indications: Dilated Cardiomyopathy. Diagnoses: I42.0 - Dilated cardiomyopathy    Sonographer:  Ohara RCS  Interpreting Physician:  Adia Vasquez MD  Primary Physician:  Marcelo Ayers MD  Referring Physician:  Alfredo Heredia MD  Group:  [facility]    SUMMARY    LEFT VENTRICLE:  The ventricle was moderately to markedly dilated. Ejection fraction was estimated to be 25 %. There was severe diffuse hypokinesis. Features were consistent with a pseudonormal left ventricular filling pattern, with concomitant abnormal relaxation and increased filling pressure (grade 2 diastolic dysfunction). RIGHT VENTRICLE:  Estimated peak pressure was at least 30 mmHg. ICD lead noted. LEFT ATRIUM:  The atrium was moderately dilated. RIGHT ATRIUM:  The atrium was moderately dilated. MITRAL VALVE:  There was trace regurgitation.     TRICUSPID VALVE:  There was mild regurgitation. HISTORY: PRIOR HISTORY: Risk factors: hypertension. PROCEDURE: The study was performed in the [facility]. This was a routine study. The transthoracic approach was used. The study included complete 2D imaging, M-mode, complete spectral Doppler, and color Doppler. The  heart rate was 74 bpm, at the start of the study. This was a technically difficult study. LEFT VENTRICLE: The ventricle was moderately to markedly dilated. Ejection fraction was estimated to be 25 %. There was severe diffuse hypokinesis. DOPPLER: Features were consistent with a pseudonormal left ventricular filling pattern,  with concomitant abnormal relaxation and increased filling pressure (grade 2 diastolic dysfunction). RIGHT VENTRICLE: The size was normal. Systolic function was normal. Wall thickness was normal. DOPPLER: Estimated peak pressure was at least 30 mmHg. ICD lead noted. LEFT ATRIUM: The atrium was moderately dilated. RIGHT ATRIUM: The atrium was moderately dilated. MITRAL VALVE: There was annular calcification. DOPPLER: There was trace regurgitation. AORTIC VALVE: The valve was trileaflet. Leaflets exhibited normal thickness and normal cuspal separation. The valve was not well visualized. DOPPLER: Transaortic velocity was within the normal range. There was no evidence for stenosis. There was no regurgitation. TRICUSPID VALVE: The valve structure was normal. There was normal leaflet separation. DOPPLER: The transtricuspid velocity was within the normal range. There was no evidence for stenosis. There was mild regurgitation. PULMONIC VALVE: Leaflets exhibited normal thickness, no calcification, and normal cuspal separation. DOPPLER: The transpulmonic velocity was within the normal range. There was no regurgitation. PERICARDIUM: There was no pericardial effusion. The pericardium was normal in appearance. AORTA: The root exhibited normal size.     SYSTEM MEASUREMENT TABLES    2D  %FS: 12.58 %  Ao Diam: 2.95 cm  EDV(Teich): 249.22 ml  EF(Teich): 26.31 %  ESV(Teich): 183.65 ml  IVSd: 1.03 cm  LA Area: 29.37 cm2  LA Diam: 5.32 cm  LVEDV MOD A4C: 213.51 ml  LVEF MOD A4C: 30.3 %  LVESV MOD A4C: 148.81 ml  LVIDd: 6.92 cm  LVIDs: 6.05 cm  LVLd A4C: 9.46 cm  LVLs A4C: 8.02 cm  LVPWd: 0.71 cm  RA Area: 23.69 cm2  RVIDd: 4.49 cm  SV MOD A4C: 64.7 ml  SV(Teich): 65.57 ml    CW  TR MaxP.1 mmHg  TR Vmax: 2.55 m/s    MM  TAPSE: 2.55 cm    PW  E': 0.08 m/s  E/E': 11.03  MV A [place]ron: 0.5 m/s  MV Dec [place]: 3.77 m/s2  MV DecT: 234 ms  MV E Kamron: 0.88 m/s  MV E/A Ratio: 1.76  MV PHT: 67.86 ms  MVA By PHT: 3.24 cm2    [employer] Accredited Echocardiography Laboratory    Prepared and electronically signed by    Gulshan Rangel MD  Signed 10-Apr-2019 13:52:24      VTE Pharmacologic Prophylaxis: Reason for no pharmacologic prophylaxis therapeutic anticoagulation  VTE Mechanical Prophylaxis: sequential compression device

## 2023-12-06 NOTE — PROGRESS NOTES
[address] [facility]  Progress Note  Name: Shasha Baker  MRN: [id number]  [facility] Date of Admission: 12/1/2023   Date of Service: 12/6/2023 I Hospital Day: 5    Assessment/Plan   Wide-complex tachycardia  Assessment & Plan  -EP following  -pt had slow VT below detection zone. ICD has now been reprogrammed for lower detection zone.  -was hypotensive as below requiring cardioversion with 200 J, amiodarone and Levophed drips  -was on Amio gtt, now off  -loaded with PO Amio, continue  -off of home dofetilide/Digoxin (failure of dofetilide)      * Syncope  Assessment & Plan  -due to hypotension due to wide-complex tachycardia/VT  -s/p cardioversion with 200 J  -was on Levophed gtt, now off.   -was on Amio gtt, now on oral amiodarone.  -Echo: Left Ventricle: Left ventricular cavity size is severely dilated. Wall thickness is normal. There is eccentric hypertrophy. EF 10%. Systolic function is severely reduced. There is severe global hypokinesis. Mitral Valve: There is mild regurgitation.  -check cardiac MRI  -See wide-complex tachycardia plan  -Entresto started  -cont Aldactone/BB/lasix    Morbid obesity with BMI of 40.0-44.9, adult [facility])  Assessment & Plan  -Encourage weight loss  -Affects all aspects of care    Paroxysmal atrial fibrillation [facility])  Assessment & Plan  -s/p ablation and PVI July 2023   -cont Amio/Xarelto    Dilated cardiomyopathy ([address])  Assessment & Plan  -see above               VTE Pharmacologic Prophylaxis:   Xarelto    Mobility:   Basic Mobility Inpatient Raw Score: 20  JH-HLM Goal: 6: Walk 10 steps or more  JH-HLM Achieved: 3: Sit at edge of bed  [facility] Goal NOT achieved. Continue with multidisciplinary rounding and encourage appropriate mobility to improve upon [facility] goals. Patient Centered Rounds: I performed bedside rounds with nursing staff today. Total Time Spent on Date of Encounter in care of patient: 35 mins.  This time was spent on one or more of the following: performing physical exam; counseling and coordination of care; obtaining or reviewing history; documenting in the medical record; reviewing/ordering tests, medications or procedures; communicating with other healthcare professionals and discussing with patient's family/caregivers. Current Length of Stay: 5 day(s)  Current Patient Status: Inpatient   Certification Statement: The patient will continue to require additional inpatient hospital stay due to awaiting cardiac MRI  Discharge Plan: Anticipate discharge in 24-48 hrs to home. Code Status: Level 1 - Full Code    Subjective:   Denies chest pain or shortness of breath. Objective:     Vitals:   Temp (24hrs), Av.6 °F (37 °C), Min:98.1 °F (36.7 °C), Max:99.3 °F (37.4 °C)    Temp:  [98.1 °F (36.7 °C)-99.3 °F (37.4 °C)] 99.3 °F (37.4 °C)  HR:  [60-63] 63  Resp:  [16-19] 17  BP: (105-115)/(53-68) 110/58  SpO2:  [96 %] 96 %  Body mass index is 39.91 kg/m². Input and Output Summary (last 24 hours):      Intake/Output Summary (Last 24 hours) at 2023 0859  Last data filed at 2023 0342  Gross per 24 hour   Intake 942 ml   Output 1750 ml   Net -808 ml       Physical Exam:   Gen: remains NAD, AAOx3, well developed, well nourished  Eyes: EOMI, PERRLA, no scleral icterus  ENMT:  no nasal discharge, no otic discharge, moist mucous membranes  Neck:  Supple  Cardiovascular: continues to remain Regular rate and rhythm, normal S1-S2, no murmurs, rubs, or gallops  Lungs:  Clear to auscultation bilaterally, no wheezes, or rales, or rhonchi  Abdomen:  Positive bowel sounds, soft, nontender, nondistended   Skin:  Intact, no obvious lesions or rashes, no LE edema  Neuro: Cranial nerves 2-12 are intact, non-focal, mvoes all 4 extremities    Additional Data:     Labs:  Results from last 7 days   Lab Units 23  0550 23  0253 23  0640 23   WBC Thousand/uL 10.72*   < > 13.14* 9.83   HEMOGLOBIN g/dL 15.8   < > 16.0 16.2   HEMATOCRIT % 47.0   < > 49.3 48.6   PLATELETS Thousands/uL 160   < > 216 182   NEUTROS PCT %  --   --   --  64   LYMPHS PCT %  --   --   --  24   LYMPHO PCT %  --   --  6*  --    MONOS PCT %  --   --   --  10   MONO PCT %  --   --  10  --    EOS PCT %  --   --  1 1    < > = values in this interval not displayed. Results from last 7 days   Lab Units 12/06/23  0455 12/04/23  0550 12/03/23  0739   SODIUM mmol/L 138   < > 131*   POTASSIUM mmol/L 4.0   < > 4.6   CHLORIDE mmol/L 103   < > 102   CO2 mmol/L 26   < > 24   BUN mg/dL 15   < > 16   CREATININE mg/dL 1.01   < > 1.08   ANION GAP mmol/L 9   < > 5   CALCIUM mg/dL 8.6   < > 8.5   ALBUMIN g/dL  --   --  3.6   TOTAL BILIRUBIN mg/dL  --   --  1.92*   ALK PHOS U/L  --   --  62   ALT U/L  --   --  45   AST U/L  --   --  56*   GLUCOSE RANDOM mg/dL 104   < > 156*    < > = values in this interval not displayed.          Results from last 7 days   Lab Units 12/01/23 2002   POC GLUCOSE mg/dl 235*     Results from last 7 days   Lab Units 12/05/23  0457   HEMOGLOBIN A1C % 6.6*           Lines/Drains:  Invasive Devices       Peripheral Intravenous Line  Duration             Peripheral IV 12/02/23 Left;Ventral (anterior) Forearm 3 days    Peripheral IV 12/06/23 Right Antecubital <1 day                      Telemetry:  Telemetry Orders (From admission, onward)               24 Hour Telemetry Monitoring  Continuous x 24 Hours (Telem)        Expiring   Question:  Reason for 24 Hour Telemetry  Answer:  Decompensated CHF- and any one of the following: continuous diuretic infusion or total diuretic dose >200 mg daily, associated electrolyte derangement (I.e. K < 3.0), ionotropic drip (continuous infusion), hx of ventricular arrhythmia, or new EF < 35%                     Telemetry Reviewed: Normal Sinus Rhythm  Indication for Continued Telemetry Use: Arrthymias requiring medical therapy             Recent Cultures (last 7 days):         Last 24 Hours Medication List: Current Facility-Administered Medications   Medication Dose Route Frequency Provider Last Rate    acetaminophen  650 mg Oral Q6H PRN VILMA Lewis      amiodarone  400 mg Oral BID With Meals VILMA Lewis      chlorhexidine  15 mL Mouth/Throat Q12H [address] [facility] VILMA TSE      furosemide  40 mg Oral Daily Filemon Reyna MD      metoprolol succinate  75 mg Oral BID Filemon Reyna MD      rivaroxaban  20 mg Oral Daily With [address] [facility] [address] [facility]      sacubitril-valsartan  1 tablet Oral BID Filemon Reyna MD      spironolactone  25 mg [facility] [address] [facility]      traZODone  50 mg Oral HS Filemon Reyna MD          Today, Patient Was Seen By: Stuart Menjivar MD    **Please Note: This note may have been constructed using a voice recognition system. **

## 2023-12-06 NOTE — ASSESSMENT & PLAN NOTE
-due to hypotension due to wide-complex tachycardia/VT  -s/p cardioversion with 200 J  -was on Levophed gtt, now off.   -was on Amio gtt, now on oral amiodarone.  -Echo: Left Ventricle: Left ventricular cavity size is severely dilated. Wall thickness is normal. There is eccentric hypertrophy. EF 10%. Systolic function is severely reduced. There is severe global hypokinesis. Mitral Valve:  There is mild regurgitation.  -check cardiac MRI  -See wide-complex tachycardia plan  -Entresto started  -cont Aldactone/BB/lasix

## 2023-12-07 ENCOUNTER — TRANSITIONAL CARE MANAGEMENT (OUTPATIENT)
Dept: FAMILY MEDICINE CLINIC | Facility: CLINIC | Age: 48
End: 2023-12-07

## 2023-12-07 ENCOUNTER — APPOINTMENT (OUTPATIENT)
Dept: RADIOLOGY | Facility: HOSPITAL | Age: 48
DRG: 309 | End: 2023-12-07
Payer: COMMERCIAL

## 2023-12-07 VITALS
TEMPERATURE: 99 F | HEART RATE: 65 BPM | RESPIRATION RATE: 18 BRPM | HEIGHT: 74 IN | WEIGHT: 307.54 LBS | OXYGEN SATURATION: 98 % | SYSTOLIC BLOOD PRESSURE: 121 MMHG | BODY MASS INDEX: 39.47 KG/M2 | DIASTOLIC BLOOD PRESSURE: 67 MMHG

## 2023-12-07 PROCEDURE — 75561 CARDIAC MRI FOR MORPH W/DYE: CPT

## 2023-12-07 PROCEDURE — 99239 HOSP IP/OBS DSCHRG MGMT >30: CPT | Performed by: HOSPITALIST

## 2023-12-07 PROCEDURE — A9585 GADOBUTROL INJECTION: HCPCS | Performed by: HOSPITALIST

## 2023-12-07 PROCEDURE — G1004 CDSM NDSC: HCPCS

## 2023-12-07 PROCEDURE — 99232 SBSQ HOSP IP/OBS MODERATE 35: CPT | Performed by: STUDENT IN AN ORGANIZED HEALTH CARE EDUCATION/TRAINING PROGRAM

## 2023-12-07 RX ORDER — GADOBUTROL 604.72 MG/ML
28 INJECTION INTRAVENOUS
Status: DISCONTINUED | OUTPATIENT
Start: 2023-12-07 | End: 2023-12-07 | Stop reason: HOSPADM

## 2023-12-07 RX ORDER — TRAZODONE HYDROCHLORIDE 50 MG/1
50 TABLET ORAL
Qty: 7 TABLET | Refills: 0 | Status: SHIPPED | OUTPATIENT
Start: 2023-12-07 | End: 2023-12-14

## 2023-12-07 RX ORDER — POTASSIUM CHLORIDE 20 MEQ/1
20 TABLET, EXTENDED RELEASE ORAL DAILY
Qty: 30 TABLET | Refills: 0 | Status: SHIPPED | OUTPATIENT
Start: 2023-12-07 | End: 2024-01-06

## 2023-12-07 RX ORDER — AMIODARONE HYDROCHLORIDE 400 MG/1
400 TABLET ORAL 2 TIMES DAILY WITH MEALS
Qty: 4 TABLET | Refills: 0 | Status: SHIPPED | OUTPATIENT
Start: 2023-12-07 | End: 2023-12-09

## 2023-12-07 RX ORDER — GADOBUTROL 604.72 MG/ML
28 INJECTION INTRAVENOUS
Status: COMPLETED | OUTPATIENT
Start: 2023-12-07 | End: 2023-12-07

## 2023-12-07 RX ORDER — METOPROLOL SUCCINATE 25 MG/1
75 TABLET, EXTENDED RELEASE ORAL 2 TIMES DAILY
Qty: 180 TABLET | Refills: 0 | Status: SHIPPED | OUTPATIENT
Start: 2023-12-07 | End: 2024-01-06

## 2023-12-07 RX ORDER — FUROSEMIDE 40 MG/1
40 TABLET ORAL DAILY
Qty: 30 TABLET | Refills: 0 | Status: SHIPPED | OUTPATIENT
Start: 2023-12-07 | End: 2024-01-06

## 2023-12-07 RX ORDER — AMIODARONE HYDROCHLORIDE 200 MG/1
200 TABLET ORAL DAILY
Qty: 30 TABLET | Refills: 0 | Status: SHIPPED | OUTPATIENT
Start: 2023-12-09 | End: 2024-01-08

## 2023-12-07 RX ADMIN — GADOBUTROL 28 ML: 604.72 INJECTION INTRAVENOUS at 11:18

## 2023-12-07 RX ADMIN — AMIODARONE HYDROCHLORIDE 400 MG: 200 TABLET ORAL at 08:31

## 2023-12-07 RX ADMIN — FUROSEMIDE 40 MG: 40 TABLET ORAL at 08:31

## 2023-12-07 RX ADMIN — METOPROLOL SUCCINATE 75 MG: 50 TABLET, EXTENDED RELEASE ORAL at 08:31

## 2023-12-07 RX ADMIN — SACUBITRIL AND VALSARTAN 1 TABLET: 24; 26 TABLET, FILM COATED ORAL at 08:32

## 2023-12-07 RX ADMIN — EMPAGLIFLOZIN 10 MG: 10 TABLET, FILM COATED ORAL at 08:32

## 2023-12-07 RX ADMIN — POTASSIUM CHLORIDE 20 MEQ: 1500 TABLET, EXTENDED RELEASE ORAL at 08:31

## 2023-12-07 RX ADMIN — SPIRONOLACTONE 25 MG: 25 TABLET ORAL at 08:31

## 2023-12-07 NOTE — ASSESSMENT & PLAN NOTE
-EP saw in consult  -pt had slow VT below detection zone.  ICD has now been reprogrammed for lower detection zone.  -was hypotensive requiring cardioversion with 200 J, amiodarone and Levophed drips  -was on Amio gtt, now off  -loaded with PO Amio, continue  -off of home dofetilide/Digoxin (failure of dofetilide)

## 2023-12-07 NOTE — DISCHARGE SUMMARY
[address] [facility]  Discharge- Zully Navas 1975, 53 y.o. male MRN: [id number]  Unit/Bed#: [facility] 527-01 Encounter: [id number]  Primary Care Provider: Era Hu PA-C   Date and time admitted to hospital: 12/1/2023 11:58 PM    Wide-complex tachycardia  Assessment & Plan  -EP saw in consult  -pt had slow VT below detection zone. ICD has now been reprogrammed for lower detection zone.  -was hypotensive requiring cardioversion with 200 J, amiodarone and Levophed drips  -was on Amio gtt, now off  -loaded with PO Amio, continue  -off of home dofetilide/Digoxin (failure of dofetilide)      * Syncope  Assessment & Plan  -due to hypotension due to wide-complex tachycardia/VT  -s/p cardioversion with 200 J  -was on Levophed gtt, now off.   -was on Amio gtt, now on oral amiodarone.  -Echo: Left Ventricle: Left ventricular cavity size is severely dilated. Wall thickness is normal. There is eccentric hypertrophy. EF 10%. Systolic function is severely reduced. There is severe global hypokinesis. Mitral Valve:  There is mild regurgitation.  -Repeat limited echo with EF 15%, no significant changes otherwise from prior study.  -cardiac MRI done, read pending  -See wide-complex tachycardia plan  -Entresto started  -cont Aldactone/BB/lasix  -cleared for d/c by cardiology    Morbid obesity with BMI of 40.0-44.9, adult [facility])  Assessment & Plan  -Encourage weight loss  -Affects all aspects of care    Paroxysmal atrial fibrillation [facility])  Assessment & Plan  -s/p ablation and PVI July 2023   -cont Amio/Xarelto    Dilated cardiomyopathy [facility])  Assessment & Plan  -see above      Medical Problems       Resolved Problems  Date Reviewed: 12/7/2023   None       Discharging Physician / Practitioner: Kalyan Grande MD  PCP: Era Hu PA-C  Admission Date:   Admission Orders (From admission, onward)       Ordered        12/02/23 0150  Inpatient Admission  Once Discharge Date: 12/07/23    Consultations During Hospital Stay:  Cardiology (heart failure and electrophysiology)    Procedures Performed:   None    Significant Findings / Test Results:   CXR: Somewhat limited study. Borderline heart. No acute disease. Echo as above    Incidental Findings:   None    Test Results Pending at Discharge (will require follow up):   Cardiac MRI official read     Outpatient Tests Requested:  None    Complications:  None    Reason for Admission: Syncope    Hospital Course:   Osvaldo Pearson is a 53 y.o. male patient who originally presented to the hospital on 12/1/2023 due to syncope as outlined in the H&P done on admission. Hospital course by problem list:        Please see above list of diagnoses and related plan for additional information. Condition at Discharge: good    Discharge Day Visit / Exam:   * Please refer to separate progress note for these details *    Discharge instructions/Information to patient and family:   See after visit summary for information provided to patient and family. Provisions for Follow-Up Care:  See after visit summary for information related to follow-up care and any pertinent home health orders. Mobility at time of Discharge:   Basic Mobility Inpatient Raw Score: 23  JH-HLM Goal: 7: Walk 25 feet or more  JH-HLM Achieved: 7: Walk 25 feet or more  HLM Goal achieved. Continue to encourage appropriate mobility. Disposition:   Home       Discharge Statement:  I spent 38 minutes discharging the patient. This time was spent on the day of discharge. I had direct contact with the patient on the day of discharge. Greater than 50% of the total time was spent examining patient, answering all patient questions, arranging and discussing plan of care with patient as well as directly providing post-discharge instructions. Additional time then spent on discharge activities.     Discharge Medications:  See after visit summary for reconciled discharge medications provided to patient and/or family.       **Please Note: This note may have been constructed using a voice recognition system**

## 2023-12-07 NOTE — PROGRESS NOTES
Advanced Heart Failure / Pulmonary Hypertension Service Progress Note    Thien Koo 53 y.o. male   MRN: [id number]  Unit/Bed#: [facility] 527-01; Encounter: [id number]    Assessment:  Principal Problem:    Syncope  Active Problems:    Dilated cardiomyopathy ([address])    Paroxysmal atrial fibrillation ([address])    Morbid obesity with BMI of 40.0-44.9, adult ([address])    Wide-complex tachycardia      Subjective:   Thien Koo is a 60-year-old man with PMH as below who presented to [facility] on 12/01/2023 with syncope at home. EMS called and noted patient to be tachycardic and hypotensive and gave adenosine (no conversion) and then IV amiodarone bolus (again no change to rhythm). Upon arrival to ED, EKGs with wide complex tachycardia. Was given lidocaine bolus and started on amiodarone drip. In ED, became unresponsive and was then cardioverted with conversion to NSR and started on vasopressors. Transferred to [address] [place] [address] on 12/01 for EP evaluation. Patient seen and examined. No significant events overnight. No current cardiac complaints. Dominique Palmer for discharge. Very concerned about documentation needed for missed work and short term disability paperwork. Objective: Intake/ Output: 1500 mL / 3350 mL. Weight: 307 lbs (310 lbs on 12/06). Telemetry: NSR, rates in 60s. MAPs: 80-85. Today's Plan: To complete cMRI today. Consider uptitration of Entresto +/- reduction of Lasix dose as outpatient as appropriate. Amiodarone loading/dosing per EP. HF office staff now has patient's short term disability paperwork, and will begin to complete for patient. Provided work note to patient prior to discharge as requested. Plan for gradual wean of trazodone as outpatient. Stable for discharge from HF standpoint once cMRI completed. Hospital follow-up scheduled for 12/11/2023 at [facility] cardiology office. Plan:  Syncope / ventricular tachycardia              Plan per EP.    ICD VT zones reprogrammed on 12/04/2023. Chronic HFrEF; LVEF 10%; LVIDd 7.3 cm; NYHA III; ACC/AHA Stage C              Etiology: "Nonischemic, etiology unclear. Reports occasional binge drinking in the past but not regularly. 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 Unremarkable SPEP in 2021. 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 TTE 05/16/2017: "LVEF 25% w/ grade II diastology. LVIDd 6.9 cm. Mod LAE. Trace MR, mild TR. Normal RV size and function."  TTE 04/09/2019: "LVEF 25% w/ grade II diastology. LVIDd 6.9 cm. Mod LAE. Trace MR. Mild TR. Noninvasive assessment c/f borderline to low output."  C 08/30/2021: normal coronaries. TTE 10/11/2021: LVEF 25%. Normal RV. Mild MR. TTE 04/17/2023: LVEF 30%. Normal RV. Mild MR and TR.               TTE (limited) 05/01/2023: LVEF 30%. Severe global hypokinesis. Normal RV. TTE (limited) 12/02/2023: LVEF 10%. LVIDd 7.3 cm. No LV thrombus. Normal RV. Mild TR. Pharmacotherapies / Neurohormonal Blockade:  --Beta Blocker: metoprolol succinate 75 mg q12 hours (on carvedilol 37.5 mg q12 hours as outpatient). --ARNi / ACEi / ARB: Entresto 24-26 mg BID. --Aldosterone Antagonist: spironolactone 25 mg daily. --SGLT2 Inhibitor: empagliflozin 10 mg daily. --Home Diuretic: None. --Inpatient Diuretic: PO Lasix 40 mg daily with potassium 20 mEq daily. Sudden Cardiac Death Risk Reduction:  --Medtronic single chamber ICD in situ since 2006. --Interrogation from 11/20/2023:  <0.1%. Lead parameters WNL. OptiVol WNL. Normal device function. Electrical Resynchronization:  --Candidacy for BiV device: narrow QRS. Advanced Therapies (if appropriate): Will continue to monitor. Atrial fibrillation / flutter              [id number] = 2 (HF, HTN). Anticoagulation on Xarelto. S/p Afib ablation in 07/2023. Rate control: BB as above.               Rhythm control: as above per EP.     Hypertension   Obstructive sleep apnea  History of binge drinking: drinking "a lot" on Fridays; stopped this about in late 2022. Vitals:   Blood pressure 121/67, pulse 65, temperature 99 °F (37.2 °C), resp. rate 18, height 6' 2" (1.88 m), weight (!) 140 kg (307 lb 8.7 oz), SpO2 98 %. I/O last 3 completed shifts: In: 6698 [P.O.:1740]  Out: 3600 [Urine:3600]    Weight (last 2 days) before discharge       Date/Time Weight    12/07/23 0533 140 (307.54)    12/06/23 15:06:57 141 (310)    12/06/23 0506 141 (310.85)    12/05/23 0534 143 (314.2)          Wt Readings from Last 10 Encounters:   12/07/23 (!) 140 kg (307 lb 8.7 oz)   12/01/23 (!) 145 kg (320 lb)   07/03/23 (!) 145 kg (320 lb)   05/24/23 (!) 145 kg (318 lb 11.2 oz)   05/02/23 (!) 140 kg (308 lb 6.8 oz)   04/17/23 (!) 141 kg (310 lb)   12/29/22 (!) 144 kg (317 lb)   11/16/22 (!) 144 kg (317 lb)   10/11/22 (!) 140 kg (309 lb 1.4 oz)   05/12/22 (!) 146 kg (322 lb)     Vitals:    12/07/23 0231 12/07/23 0533 12/07/23 0825 12/07/23 0902   BP: 110/67  115/66 121/67   Pulse:       Resp: 20   18   Temp: (!) 97.4 °F (36.3 °C)  98.6 °F (37 °C) 99 °F (37.2 °C)   TempSrc: Oral      SpO2:       Weight:  (!) 140 kg (307 lb 8.7 oz)     Height:           Physical Exam  Vitals reviewed. Constitutional:       General: He is awake. He is not in acute distress. Appearance: Normal appearance. He is well-developed and overweight. He is not toxic-appearing or diaphoretic. HENT:      Head: Normocephalic. Nose: Nose normal.      Mouth/Throat:      Mouth: Mucous membranes are moist.   Eyes:      General: No scleral icterus. Conjunctiva/sclera: Conjunctivae normal.   Neck:      Vascular: No JVD. Trachea: No tracheal deviation. Cardiovascular:      Rate and Rhythm: Normal rate and regular rhythm. Heart sounds: Murmur heard. Pulmonary:      Effort: Pulmonary effort is normal. No tachypnea, bradypnea or respiratory distress.       Breath sounds: Normal air entry. No decreased air movement. No decreased breath sounds or wheezing. Abdominal:      General: There is distension. Palpations: Abdomen is soft. Tenderness: There is no abdominal tenderness. Musculoskeletal:      Cervical back: Neck supple. Right lower leg: Edema (trace) present. Left lower leg: Edema (trace) present. Skin:     General: Skin is warm and dry. Coloration: Skin is not jaundiced or pale. Neurological:      General: No focal deficit present. Mental Status: He is alert and oriented to person, place, and time. Psychiatric:         Attention and Perception: Attention normal. He is attentive. Mood and Affect: Mood normal. Affect is blunt. Speech: Speech normal.         Behavior: Behavior normal. Behavior is cooperative. Thought Content:  Thought content normal.       Lines/Drains/Airways       Active Status       None                    Current Facility-Administered Medications:     acetaminophen (TYLENOL) tablet 650 mg, 650 mg, Oral, Q6H PRN, VILMA Amato, 650 mg at 12/02/23 1721    amiodarone tablet 400 mg, 400 mg, Oral, BID With Meals, VILMA Cates, 400 mg at 12/07/23 0831    chlorhexidine (PERIDEX) 0.12 % oral rinse 15 mL, 15 mL, Mouth/Throat, Q12H [address], JoseVILMA Hernández, 15 mL at 12/06/23 2038    Empagliflozin (JARDIANCE) tablet 10 mg, 10 mg, Oral, Daily, Raul Huizar PA-C, 10 mg at 12/07/23 7418    furosemide (LASIX) tablet 40 mg, 40 mg, Oral, Daily, Jordan Galarza MD, 40 mg at 12/07/23 0831    Gadobutrol injection (SINGLE-DOSE) SOLN 28 mL, 28 mL, Intravenous, Once in imaging, Mary Beth Pickett PA-C    metoprolol succinate (TOPROL-XL) 24 hr tablet 75 mg, 75 mg, Oral, BID, Jordan Galarza MD, 75 mg at 12/07/23 0831    potassium chloride (K-DUR,KLOR-CON) CR tablet 20 mEq, 20 mEq, Oral, Daily, Raul Huizar PA-C, 20 mEq at 12/07/23 0831    rivaroxaban (XARELTO) tablet 20 mg, 20 mg, Oral, Daily With Dinner, VILMA Moncada, 20 mg at 12/06/23 1702    sacubitril-valsartan (ENTRESTO) 24-26 MG per tablet 1 tablet, 1 tablet, Oral, BID, Yue Mcconnell MD, 1 tablet at 12/07/23 1419    spironolactone (ALDACTONE) tablet 25 mg, 25 mg, Oral, Daily, VILMA Flaherty, 25 mg at 12/07/23 0831    traZODone (DESYREL) tablet 50 mg, 50 mg, Oral, HS, Yue Mcconnell MD, 50 mg at 12/06/23 2140    Current Outpatient Medications:     amiodarone (PACERONE) 400 MG tablet, Take 1 tablet (400 mg total) by mouth 2 (two) times a day with meals for 2 days, Disp: 4 tablet, Rfl: 0    [START ON 12/9/2023] amiodarone 200 mg tablet, Take 1 tablet (200 mg total) by mouth daily Do not start before December 9, 2023., Disp: 30 tablet, Rfl: 0    Empagliflozin (JARDIANCE) 10 MG TABS tablet, Take 1 tablet (10 mg total) by mouth daily, Disp: 30 tablet, Rfl: 0    furosemide (LASIX) 40 mg tablet, Take 1 tablet (40 mg total) by mouth daily, Disp: 30 tablet, Rfl: 0    metoprolol succinate (TOPROL-XL) 25 mg 24 hr tablet, Take 3 tablets (75 mg total) by mouth 2 (two) times a day, Disp: 180 tablet, Rfl: 0    potassium chloride (K-DUR,KLOR-CON) 20 mEq tablet, Take 1 tablet (20 mEq total) by mouth daily, Disp: 30 tablet, Rfl: 0    sacubitril-valsartan (ENTRESTO) 24-26 MG TABS, Take 1 tablet by mouth 2 (two) times a day, Disp: 60 tablet, Rfl: 0    traZODone (DESYREL) 50 mg tablet, Take 1 tablet (50 mg total) by mouth daily at bedtime for 7 days, Disp: 7 tablet, Rfl: 0    famotidine (PEPCID) 20 mg tablet, Take 1 tablet (20 mg total) by mouth daily, Disp: 30 tablet, Rfl: 0    spironolactone (ALDACTONE) 25 mg tablet, Take 1 tablet (25 mg total) by mouth in the morning., Disp: 90 tablet, Rfl: 3    Xarelto 20 MG tablet, TAKE 1 TABLET BY MOUTH EVERY DAY WITH DINNER, Disp: 90 tablet, Rfl: 3    Labs & Results:      Results from last 7 days   Lab Units 12/04/23  0550 12/03/23  0253 12/02/23  0640   WBC Thousand/uL 10.72* 7.80 13.14*   HEMOGLOBIN g/dL 15.8 15.3 16.0   HEMATOCRIT % 47.0 46.4 49.3   PLATELETS Thousands/uL 160 144* 216         Results from last 7 days   Lab Units 12/06/23  0455 12/05/23  0457 12/04/23  0550 12/03/23  0739 12/02/23  0640 12/01/23 2018   POTASSIUM mmol/L 4.0 3.1* 3.9 4.6   < > 4.2   CHLORIDE mmol/L 103 106 103 102   < > 103   CO2 mmol/L 26 26 24 24   < > 24   BUN mg/dL 15 14 14 16   < > 15   CREATININE mg/dL 1.01 0.97 0.97 1.08   < > 1.27   CALCIUM mg/dL 8.6 7.7* 8.6 8.5   < > 9.0   ALK PHOS U/L  --   --   --  62  --  91   ALT U/L  --   --   --  45  --  42   AST U/L  --   --   --  56*  --  48*    < > = values in this interval not displayed.          Drew Garcia PA-C

## 2023-12-07 NOTE — PROGRESS NOTES
[address] [facility]  Progress Note  Name: Debra Ortiz  MRN: [id number]  Unit/Bed#: [facility] 106-86 I Date of Admission: 12/1/2023   Date of Service: 12/7/2023 I Hospital Day: 6    Assessment/Plan   Wide-complex tachycardia  Assessment & Plan  -EP following  -pt had slow VT below detection zone. ICD has now been reprogrammed for lower detection zone.  -was hypotensive as below requiring cardioversion with 200 J, amiodarone and Levophed drips  -was on Amio gtt, now off  -loaded with PO Amio, continue  -off of home dofetilide/Digoxin (failure of dofetilide)      * Syncope  Assessment & Plan  -due to hypotension due to wide-complex tachycardia/VT  -s/p cardioversion with 200 J  -was on Levophed gtt, now off.   -was on Amio gtt, now on oral amiodarone.  -Echo: Left Ventricle: Left ventricular cavity size is severely dilated. Wall thickness is normal. There is eccentric hypertrophy. EF 10%. Systolic function is severely reduced. There is severe global hypokinesis. Mitral Valve: There is mild regurgitation.  -Repeat limited echo with EF 15%, no significant changes otherwise from prior study.  -check cardiac MRI  -See wide-complex tachycardia plan  -Entresto started  -cont Aldactone/BB/lasix    Morbid obesity with BMI of 40.0-44.9, adult [facility])  Assessment & Plan  -Encourage weight loss  -Affects all aspects of care    Paroxysmal atrial fibrillation [facility])  Assessment & Plan  -s/p ablation and PVI July 2023   -cont Amio/Xarelto    Dilated cardiomyopathy ([address])  Assessment & Plan  -see above               VTE Pharmacologic Prophylaxis:   Xarelto    Mobility:   Basic Mobility Inpatient Raw Score: 20  JH-HLM Goal: 6: Walk 10 steps or more  JH-HLM Achieved: 7: Walk 25 feet or more  HLM Goal achieved. Continue to encourage appropriate mobility. Patient Centered Rounds: I performed bedside rounds with nursing staff today.    Discussions with Specialists or Other Care Team Provider: heart failure    Total Time Spent on Date of Encounter in care of patient: 35 mins. This time was spent on one or more of the following: performing physical exam; counseling and coordination of care; obtaining or reviewing history; documenting in the medical record; reviewing/ordering tests, medications or procedures; communicating with other healthcare professionals and discussing with patient's family/caregivers. Current Length of Stay: 6 day(s)  Current Patient Status: Inpatient   Certification Statement: The patient will continue to require additional inpatient hospital stay due to awaiting cardiac MRI  Discharge Plan: Anticipate discharge later today or tomorrow to home. Code Status: Level 1 - Full Code    Subjective:   Patient denies chest pain or shortness of breath. Objective:     Vitals:   Temp (24hrs), Av.9 °F (37.2 °C), Min:97.4 °F (36.3 °C), Max:99.7 °F (37.6 °C)    Temp:  [97.4 °F (36.3 °C)-99.7 °F (37.6 °C)] 99 °F (37.2 °C)  HR:  [65] 65  Resp:  [16-20] 18  BP: (109-121)/(58-74) 121/67  SpO2:  [98 %] 98 %  Body mass index is 39.49 kg/m². Input and Output Summary (last 24 hours):      Intake/Output Summary (Last 24 hours) at 2023 0932  Last data filed at 2023 0801  Gross per 24 hour   Intake 1138 ml   Output 3350 ml   Net -2212 ml       Physical Exam:   Gen: continues to remain NAD, AAOx3, well developed, well nourished  Eyes: EOMI, PERRLA, no scleral icterus  ENMT:  no nasal discharge, no otic discharge, moist mucous membranes  Neck:  Supple  Cardiovascular: Regular rate and rhythm, normal S1-S2, no murmurs, rubs, or gallops  Lungs:  Clear to auscultation bilaterally, no wheezes, or rales, or rhonchi  Abdomen:  remains Positive bowel sounds, soft, nontender, nondistended   Skin:  remains Intact, no obvious lesions or rashes, no LE edema  Neuro: Cranial nerves 2-12 are intact, non-focal, mvoes all 4 extremities      Additional Data:     Labs:  Results from last 7 days   Lab Units 12/04/23  0550 12/03/23  0253 12/02/23  0640 12/01/23 2018   WBC Thousand/uL 10.72*   < > 13.14* 9.83   HEMOGLOBIN g/dL 15.8   < > 16.0 16.2   HEMATOCRIT % 47.0   < > 49.3 48.6   PLATELETS Thousands/uL 160   < > 216 182   NEUTROS PCT %  --   --   --  64   LYMPHS PCT %  --   --   --  24   LYMPHO PCT %  --   --  6*  --    MONOS PCT %  --   --   --  10   MONO PCT %  --   --  10  --    EOS PCT %  --   --  1 1    < > = values in this interval not displayed. Results from last 7 days   Lab Units 12/06/23  0455 12/04/23  0550 12/03/23  0739   SODIUM mmol/L 138   < > 131*   POTASSIUM mmol/L 4.0   < > 4.6   CHLORIDE mmol/L 103   < > 102   CO2 mmol/L 26   < > 24   BUN mg/dL 15   < > 16   CREATININE mg/dL 1.01   < > 1.08   ANION GAP mmol/L 9   < > 5   CALCIUM mg/dL 8.6   < > 8.5   ALBUMIN g/dL  --   --  3.6   TOTAL BILIRUBIN mg/dL  --   --  1.92*   ALK PHOS U/L  --   --  62   ALT U/L  --   --  45   AST U/L  --   --  56*   GLUCOSE RANDOM mg/dL 104   < > 156*    < > = values in this interval not displayed.          Results from last 7 days   Lab Units 12/01/23 2002   POC GLUCOSE mg/dl 235*     Results from last 7 days   Lab Units 12/05/23 0457   HEMOGLOBIN A1C % 6.6*           Lines/Drains:  Invasive Devices       Peripheral Intravenous Line  Duration             Peripheral IV 12/06/23 Right Antecubital 1 day                    Recent Cultures (last 7 days):         Last 24 Hours Medication List:   Current Facility-Administered Medications   Medication Dose Route Frequency Provider Last Rate    acetaminophen  650 mg Oral Q6H PRN VILMA Stephenson      amiodarone  400 mg Oral BID With Meals VILMA Alvarez      chlorhexidine  15 mL Mouth/Throat Q12H [address] [facility] [place], [place]      Empagliflozin  10 mg Oral Daily Jaden Keya, PA-C      furosemide  40 mg Oral Daily Marianna Cheung MD      metoprolol succinate  75 mg Oral BID Marianna Cheung MD      potassium chloride  20 mEq Oral Daily Jaden Laura PA-C      rivaroxaban  20 mg Oral Daily With [place] and Inessa Singh [place], [place]      sacubitril-valsartan  1 tablet Oral BID Marianna Cheung MD      spironolactone  25 mg Oral Daily [address] [place], [place]      traZODone  50 mg Oral HS Marianna Cheung MD          Today, Patient Was Seen By: Nelda Capone MD    **Please Note: This note may have been constructed using a voice recognition system. **

## 2023-12-07 NOTE — ASSESSMENT & PLAN NOTE
-due to hypotension due to wide-complex tachycardia/VT  -s/p cardioversion with 200 J  -was on Levophed gtt, now off.   -was on Amio gtt, now on oral amiodarone.  -Echo: Left Ventricle: Left ventricular cavity size is severely dilated. Wall thickness is normal. There is eccentric hypertrophy. EF 10%. Systolic function is severely reduced. There is severe global hypokinesis. Mitral Valve:  There is mild regurgitation.  -Repeat limited echo with EF 15%, no significant changes otherwise from prior study.  -cardiac MRI done, read pending  -See wide-complex tachycardia plan  -Entresto started  -cont Aldactone/BB/lasix  -cleared for d/c by cardiology

## 2023-12-07 NOTE — ASSESSMENT & PLAN NOTE
-due to hypotension due to wide-complex tachycardia/VT  -s/p cardioversion with 200 J  -was on Levophed gtt, now off.   -was on Amio gtt, now on oral amiodarone.  -Echo: Left Ventricle: Left ventricular cavity size is severely dilated. Wall thickness is normal. There is eccentric hypertrophy. EF 10%. Systolic function is severely reduced. There is severe global hypokinesis. Mitral Valve:  There is mild regurgitation.  -Repeat limited echo with EF 15%, no significant changes otherwise from prior study.  -check cardiac MRI  -See wide-complex tachycardia plan  -Entresto started  -cont Aldactone/BB/lasix

## 2023-12-07 NOTE — NURSING NOTE
In Patient arrived to MRI suite from room 52. Nando [employer]  arrived to place PM in safe mode. Pacemaker  placed at  60 beats per minute. Patient taken to MRI room and placed on table where there he was prepped with cardiac monitoring and SPO2 monitoring. MRI scan begun with no issues. Scan done patient tolerated well, vital signs remained stable, no issues noted. Ronnie santos from [employer] rep reprogrammed PM to prior settings.

## 2023-12-08 ENCOUNTER — TELEPHONE (OUTPATIENT)
Dept: CARDIOLOGY CLINIC | Facility: CLINIC | Age: 48
End: 2023-12-08

## 2023-12-08 DIAGNOSIS — Z71.89 COMPLEX CARE COORDINATION: Primary | ICD-10-CM

## 2023-12-08 NOTE — TELEPHONE ENCOUNTER
----- Message from Case Aguilar PA-C sent at 12/7/2023  2:15 PM EST -----  Jerson gibbs,    This patient had slow VT and we reprogrammed the device in the hospital and started amio. He will come back to see Ofelia Miller in 3 months and has follow up with HF. Can we check his device monthly for the next 3 months to see if he's had any slow VT? There is a monitor zone at 120 now.     Thanks,  Leon Coronado

## 2023-12-11 ENCOUNTER — OFFICE VISIT (OUTPATIENT)
Dept: CARDIOLOGY CLINIC | Facility: CLINIC | Age: 48
End: 2023-12-11
Payer: COMMERCIAL

## 2023-12-11 ENCOUNTER — PATIENT OUTREACH (OUTPATIENT)
Dept: CASE MANAGEMENT | Facility: OTHER | Age: 48
End: 2023-12-11

## 2023-12-11 VITALS
DIASTOLIC BLOOD PRESSURE: 62 MMHG | RESPIRATION RATE: 16 BRPM | SYSTOLIC BLOOD PRESSURE: 100 MMHG | HEART RATE: 86 BPM | HEIGHT: 74 IN | BODY MASS INDEX: 39.14 KG/M2 | OXYGEN SATURATION: 96 % | WEIGHT: 305 LBS

## 2023-12-11 DIAGNOSIS — R00.0 WIDE-COMPLEX TACHYCARDIA: ICD-10-CM

## 2023-12-11 DIAGNOSIS — I48.0 PAROXYSMAL A-FIB (HCC): ICD-10-CM

## 2023-12-11 DIAGNOSIS — I48.0 PAROXYSMAL ATRIAL FIBRILLATION (HCC): ICD-10-CM

## 2023-12-11 DIAGNOSIS — I10 PRIMARY HYPERTENSION: Chronic | ICD-10-CM

## 2023-12-11 DIAGNOSIS — I42.0 DILATED CARDIOMYOPATHY (HCC): Primary | ICD-10-CM

## 2023-12-11 DIAGNOSIS — I50.22 CHRONIC HFREF (HEART FAILURE WITH REDUCED EJECTION FRACTION) (HCC): Chronic | ICD-10-CM

## 2023-12-11 DIAGNOSIS — I47.20 VENTRICULAR TACHYCARDIA (HCC): ICD-10-CM

## 2023-12-11 DIAGNOSIS — E11.9 T2DM (TYPE 2 DIABETES MELLITUS) (HCC): ICD-10-CM

## 2023-12-11 DIAGNOSIS — G47.33 OBSTRUCTIVE SLEEP APNEA: Chronic | ICD-10-CM

## 2023-12-11 DIAGNOSIS — Z95.810 AICD (AUTOMATIC CARDIOVERTER/DEFIBRILLATOR) PRESENT: Chronic | ICD-10-CM

## 2023-12-11 DIAGNOSIS — I50.20 HEART FAILURE WITH REDUCED EJECTION FRACTION (HCC): ICD-10-CM

## 2023-12-11 PROCEDURE — 93000 ELECTROCARDIOGRAM COMPLETE: CPT

## 2023-12-11 PROCEDURE — 99214 OFFICE O/P EST MOD 30 MIN: CPT

## 2023-12-11 RX ORDER — METOPROLOL SUCCINATE 25 MG/1
75 TABLET, EXTENDED RELEASE ORAL 2 TIMES DAILY
Qty: 360 TABLET | Refills: 2 | Status: SHIPPED | OUTPATIENT
Start: 2023-12-11 | End: 2023-12-16

## 2023-12-11 RX ORDER — AMIODARONE HYDROCHLORIDE 200 MG/1
200 TABLET ORAL DAILY
Qty: 90 TABLET | Refills: 3 | Status: SHIPPED | OUTPATIENT
Start: 2023-12-11 | End: 2024-12-05

## 2023-12-11 RX ORDER — FUROSEMIDE 40 MG/1
40 TABLET ORAL DAILY
Qty: 90 TABLET | Refills: 3 | Status: SHIPPED | OUTPATIENT
Start: 2023-12-11 | End: 2024-12-05

## 2023-12-11 RX ORDER — SPIRONOLACTONE 25 MG/1
25 TABLET ORAL DAILY
Qty: 90 TABLET | Refills: 3 | Status: SHIPPED | OUTPATIENT
Start: 2023-12-11

## 2023-12-11 RX ORDER — POTASSIUM CHLORIDE 20 MEQ/1
20 TABLET, EXTENDED RELEASE ORAL DAILY
Qty: 90 TABLET | Refills: 1 | Status: SHIPPED | OUTPATIENT
Start: 2023-12-11

## 2023-12-11 NOTE — PROGRESS NOTES
Message left with contact information for [facility], [place],  RN, Care Manager for call back [facility]

## 2023-12-11 NOTE — PROGRESS NOTES
[facility] Cardiology   Office Visit    Gael Pineda 53 y.o. male MRN: [id number]    12/11/23        Assessment/Plan:  1. NICM with EF 15% s/p MDT single chamber PPM (6/2017)  EF recently reduced from 30% (5/2023) to now 15%. Follows with advanced heart failure  Cardiac MRI shows intramyocardial fibrosis consistent with diffuse myocarditis  Continue GDMT with Entresto, Toprol-XL, Aldactone, and Jardiance    2. Ventricular tachycardia  Recent episode of slow VT with external cardioversion prompting transition from Tikosyn to amiodarone and device reprogramming for decreased detection rate  EKG shows normal sinus rhythm, QTc 488 ms, TSH is WNL  Continue amiodarone 200 mg daily and Toprol-XL. Recommend weaning off trazodone, will defer to PCP/psych. Follows with EP Dr. Tonny Costello and the device clinic. 3.  Chronic HFrEF  Appears euvolemic on exam - continue Lasix 40 mg daily  Recommend daily weights, low-sodium diet, LE elevation, and compression stockings    4. Paroxysmal atrial fibrillation s/p ablation (7/2023)  Continue amiodarone and Toprol-XL  [id number] 3 (HTN, CHF, DM) continue Xarelto for AC    5. Hypertension  Currently borderline soft, denies lightheadedness/dizziness or syncope. Encourage ambulatory monitoring  Continue Toprol-XL, Entresto, Aldactone, and Lasix    6. Hx of alcohol use/binge drinking - strongly encourage cessation of alcohol  7. CONCHIS - strongly encouraged CPAP qhs  8. T2DM, A1c 6.6        HPI: Gael Pineda is a 53y.o. year old male with history of NICM with EF 15% s/p MDT PPM, ventricular tachycardia/syncope, chronic HFrEF, paroxysmal atrial fibrillation s/p ablation, hypertension, alcohol use/binge drinking, and CONCHIS who presents for office visit. Patient recently admitted for episode of slow VT. Patient received external cardioversion. Following event, EP transitioned patient from Tikosyn to amiodarone and reprogrammed device for decreased detection rate.   Patient also found to have EF of 10% which is reduced from previously 30% in 5/2023. Repeat limited TTE revealed EF 15%. Cardiac MRI consistent with diffuse myocarditis. Patient was discharged with GDMT optimized by advanced heart failure. Since that time patient reports he has been well overall from a cardiac standpoint. Endorses strict compliance to home medications. Monitors weight routinely and follows low-sodium diet. Notes he has taken amiodarone in the past which worked well, however given concern for long-term side effects he had been transitioned to [address] prior to most recent admission. He follows with advanced heart failure Dr. Jake Kuhn and EP Dr. Sandra Zaragoza. States he will be completing lab work within the next few days. History of alcohol use noted, denies any recent use. Denies chest pain, orthopnea, LE edema, weight gain, lightheadedness/dizziness or syncope. Cardiovascular imaging:  Cardiac MRI (12/7/2023) - severely dilated LV with severely reduced LV systolic function, normal RV size and systolic function, intramyocardial fibrosis of the IVS and subepicardial fibrosis of the inferior wall consistent with diffuse myocarditis    TTE (12/6/2023) - moderately dilated LV with EF 15%, severe global hypokinesis, mild to moderate MR with an eccentrically directed jet, mild TR    Cardiac catheterization (8/30/2021) - no significant obstructive CAD       Review of Systems:  Review of Systems   Constitutional:  Negative for chills and fever. HENT:  Negative for ear pain and sore throat. Eyes:  Negative for pain and visual disturbance. Respiratory:  Negative for cough and shortness of breath. Cardiovascular:  Negative for chest pain, palpitations and leg swelling. Gastrointestinal:  Negative for abdominal pain and vomiting. Genitourinary:  Negative for dysuria and hematuria. Musculoskeletal:  Negative for arthralgias and back pain. Skin:  Negative for color change and rash.    Neurological:  Negative for seizures and syncope. All other systems reviewed and are negative. PHYSICAL EXAM:  Vitals:   Vitals:    12/11/23 1045   BP: 100/62   BP Location: Left arm   Patient Position: Sitting   Cuff Size: Standard   Pulse: 86   Resp: 16   SpO2: 96%   Weight: (!) 138 kg (305 lb)   Height: 6' 2" (1.88 m)        Physical Exam:  GEN: Alert and oriented x 3, in no acute distress. Well appearing and well nourished. HEENT: Sclera anicteric, conjunctivae pink, mucous membranes moist. Oropharynx clear. NECK: Supple, no carotid bruits, no significant JVD. Trachea midline, no thyromegaly. HEART: Regular rhythm, normal S1 and S2, no murmurs, clicks, gallops or rubs. PMI nondisplaced, no thrills. LUNGS: Clear to auscultation bilaterally; no wheezes, rales, or rhonchi. No increased work of breathing or signs of respiratory distress. ABDOMEN: Soft, nontender, nondistended, normoactive bowel sounds. EXTREMITIES: Skin warm and well perfused, no clubbing, cyanosis, or edema. NEURO: No focal findings. Normal speech. Mood and affect normal.   SKIN: Normal without suspicious lesions on exposed skin.     Follow up: 2 months or sooner as needed    Allergies   Allergen Reactions    Vicodin [Hydrocodone-Acetaminophen] Other (See Comments)     "i get nasty"         Current Outpatient Medications:     amiodarone 200 mg tablet, Take 1 tablet (200 mg total) by mouth daily, Disp: 90 tablet, Rfl: 3    Empagliflozin (JARDIANCE) 10 MG TABS tablet, Take 1 tablet (10 mg total) by mouth daily, Disp: 30 tablet, Rfl: 0    famotidine (PEPCID) 20 mg tablet, Take 1 tablet (20 mg total) by mouth daily, Disp: 30 tablet, Rfl: 0    furosemide (LASIX) 40 mg tablet, Take 1 tablet (40 mg total) by mouth daily, Disp: 90 tablet, Rfl: 3    metoprolol succinate (TOPROL-XL) 25 mg 24 hr tablet, Take 3 tablets (75 mg total) by mouth 2 (two) times a day, Disp: 360 tablet, Rfl: 2    potassium chloride (K-DUR,KLOR-CON) 20 mEq tablet, Take 1 tablet (20 mEq total) by mouth daily, Disp: 90 tablet, Rfl: 1    rivaroxaban (Xarelto) 20 mg tablet, Take 1 tablet (20 mg total) by mouth daily with dinner, Disp: 90 tablet, Rfl: 3    sacubitril-valsartan (ENTRESTO) 24-26 MG TABS, Take 1 tablet by mouth 2 (two) times a day, Disp: 180 tablet, Rfl: 3    spironolactone (ALDACTONE) 25 mg tablet, Take 1 tablet (25 mg total) by mouth daily, Disp: 90 tablet, Rfl: 3    traZODone (DESYREL) 50 mg tablet, Take 1 tablet (50 mg total) by mouth daily at bedtime for 7 days, Disp: 7 tablet, Rfl: 0    Past Medical History:   Diagnosis Date    A-fib ([address]) 2020    AICD (automatic cardioverter/defibrillator) present     CHF (congestive heart failure) (HCC)     Coxsackie virus infection 2017    Hypertension     ICD (implantable cardioverter-defibrillator) lead failure 06/03/2017    Myocarditis ([address])     Sleep apnea     Ventricular tachycardia ([address])        Family History   Problem Relation Age of Onset    No Known Problems Mother     No Known Problems Father     Sudden death Neg Hx     Heart disease Neg Hx        Past Medical History:   Diagnosis Date    A-fib ([address]) 2020    AICD (automatic cardioverter/defibrillator) present     CHF (congestive heart failure) ([address])     Coxsackie virus infection 2017    Hypertension     ICD (implantable cardioverter-defibrillator) lead failure 06/03/2017    Myocarditis ([address])     Sleep apnea     Ventricular tachycardia ([address])        Past Surgical History:   Procedure Laterality Date    CARDIAC DEFIBRILLATOR PLACEMENT      CARDIAC ELECTROPHYSIOLOGY PROCEDURE N/A 7/3/2023    Procedure: Cardiac eps/svt ablation;  Surgeon: Je Figueredo DO;  Location: [facility]; Service: Cardiology    CARDIAC ELECTROPHYSIOLOGY PROCEDURE N/A 7/3/2023    Procedure: Cardiac eps/afib ablation;  Surgeon: Je Figueredo DO;  Location: [facility];   Service: Cardiology    ME ESOPHAGOGASTRODUODENOSCOPY TRANSORAL DIAGNOSTIC N/A 3/29/2017    Procedure: ESOPHAGOGASTRODUODENOSCOPY (EGD); Surgeon: Yaya Mancilla MD;  Location: [facility]; Service: Gastroenterology    MD INSJ/RPLCMT PERM DFB W/TRNSVNS LDS 1/DUAL CHMBR N/A 6/2/2017    Procedure: LEAD EXTRACTION/REIMPLANTATION AND ICD GENERATOR CHANGE ;  Surgeon: Anjali Aguillon MD;  Location:  MAIN OR;  Service: Cardiology    MD LAPAROSCOPY SURG CHOLECYSTECTOMY N/A 5/19/2017    Procedure: Lars Escoto ;  Surgeon: Melissa Camarena MD;  Location: [facility];  Service: General    TONSILLECTOMY      TYMPANOSTOMY TUBE PLACEMENT         Social History     Socioeconomic History    Marital status: Single     Spouse name: Not on file    Number of children: Not on file    Years of education: Not on file    Highest education level: Not on file   Occupational History    Not on file   Tobacco Use    Smoking status: Never    Smokeless tobacco: Never   Vaping Use    Vaping Use: Never used   Substance and Sexual Activity    Alcohol use: Not Currently     Comment: OCCASIONAL; history of binge drinking -  drinking "a lot" on Fridays; stopped this about in late 2022. (Updated 12/04/2023). Drug use: No    Sexual activity: Not on file   Other Topics Concern    Not on file   Social History Narrative    Not on file     Social Determinants of Health     Financial Resource Strain: Not on file   Food Insecurity: No Food Insecurity (5/2/2023)    Hunger Vital Sign     Worried About Running Out of Food in the Last Year: Never true     Ran Out of Food in the Last Year: Never true   Transportation Needs: No Transportation Needs (5/2/2023)    PRAPARE - Transportation     Lack of Transportation (Medical): No     Lack of Transportation (Non-Medical):  No   Physical Activity: Not on file   Stress: Not on file   Social Connections: Not on file   Intimate Partner Violence: Not on file   Housing Stability: Low Risk  (5/2/2023)    Housing Stability Vital Sign     Unable to Pay for Housing in the Last Year: No     Number of Places Lived in the Last Year: 1     Unstable Housing in the Last Year: No             LABORATORY RESULTS:    Lab Results   Component Value Date    WBC 10.72 (H) 12/04/2023    HGB 15.8 12/04/2023    HCT 47.0 12/04/2023    MCV 90 12/04/2023     12/04/2023     Lab Results   Component Value Date    CALCIUM 8.6 12/06/2023    K 4.0 12/06/2023    CO2 26 12/06/2023     12/06/2023    BUN 15 12/06/2023    CREATININE 1.01 12/06/2023     Lab Results   Component Value Date    HGBA1C 6.6 (H) 12/05/2023       Lipid Profile:   No results found for: "CHOL"  Lab Results   Component Value Date    HDL 48 09/07/2017     Lab Results   Component Value Date    LDLCALC 75 09/07/2017     Lab Results   Component Value Date    TRIG 85 09/07/2017       The ASCVD Risk score (Adal SMALL, et al., 2019) failed to calculate for the following reasons:    Cannot find a previous HDL lab    Cannot find a previous total cholesterol lab    1. Dilated cardiomyopathy (HCC)  furosemide (LASIX) 40 mg tablet    metoprolol succinate (TOPROL-XL) 25 mg 24 hr tablet    potassium chloride (K-DUR,KLOR-CON) 20 mEq tablet    sacubitril-valsartan (ENTRESTO) 24-26 MG TABS      2. AICD (automatic cardioverter/defibrillator) present        3. Chronic HFrEF (heart failure with reduced ejection fraction) ([address])        4. Ventricular tachycardia (HCC)  POCT ECG      5. Paroxysmal atrial fibrillation (HCC)  POCT ECG      6. Primary hypertension        7. Obstructive sleep apnea        8. T2DM (type 2 diabetes mellitus) ([address])        9. Wide-complex tachycardia  amiodarone 200 mg tablet      10. Heart failure with reduced ejection fraction (HCC)  spironolactone (ALDACTONE) 25 mg tablet      11. Paroxysmal A-fib (HCC)  rivaroxaban (Xarelto) 20 mg tablet          Imaging: I have personally reviewed pertinent reports. EKG reviewed personally:  Normal sinus rhythm; lateral infarct, age undetermined; QTc 488    Recommend aggressive risk factor modification and therapeutic lifestyle changes.   Low-salt, low-calorie, low-fat, low-cholesterol diet with regular exercise and to optimize weight. Discussed concepts of atherosclerosis, including signs and symptoms of cardiac disease. Medications reviewed and possible side effects discussed. Previous studies were reviewed. Safety measures were reviewed. All questions and concerns addressed. Patient was advised to report any problems requiring medical attention. Follow-up with PCP and appropriate specialist and lab work as discussed. Return for follow up visit as scheduled or earlier, if needed. Thank you for allowing me to participate in the care and evaluation of your patient. Should you have any questions, please feel free to contact me.     Ion Maddox PA-C  12/11/2023,1:14 PM

## 2023-12-13 ENCOUNTER — APPOINTMENT (EMERGENCY)
Dept: RADIOLOGY | Facility: HOSPITAL | Age: 48
End: 2023-12-13
Payer: COMMERCIAL

## 2023-12-13 ENCOUNTER — HOSPITAL ENCOUNTER (EMERGENCY)
Facility: HOSPITAL | Age: 48
End: 2023-12-13
Attending: EMERGENCY MEDICINE
Payer: COMMERCIAL

## 2023-12-13 ENCOUNTER — HOSPITAL ENCOUNTER (INPATIENT)
Facility: HOSPITAL | Age: 48
LOS: 3 days | Discharge: ANOTHER HOSPITAL | DRG: 309 | End: 2023-12-16
Attending: STUDENT IN AN ORGANIZED HEALTH CARE EDUCATION/TRAINING PROGRAM | Admitting: STUDENT IN AN ORGANIZED HEALTH CARE EDUCATION/TRAINING PROGRAM
Payer: COMMERCIAL

## 2023-12-13 VITALS
HEIGHT: 74 IN | TEMPERATURE: 98.5 F | WEIGHT: 305 LBS | DIASTOLIC BLOOD PRESSURE: 59 MMHG | HEART RATE: 134 BPM | SYSTOLIC BLOOD PRESSURE: 125 MMHG | BODY MASS INDEX: 39.14 KG/M2 | RESPIRATION RATE: 27 BRPM | OXYGEN SATURATION: 99 %

## 2023-12-13 DIAGNOSIS — R00.0 WIDE-COMPLEX TACHYCARDIA: ICD-10-CM

## 2023-12-13 DIAGNOSIS — I42.0 DILATED CARDIOMYOPATHY (HCC): Primary | ICD-10-CM

## 2023-12-13 DIAGNOSIS — I47.20 V-TACH (HCC): Primary | ICD-10-CM

## 2023-12-13 PROBLEM — N17.9 AKI (ACUTE KIDNEY INJURY) (HCC): Status: ACTIVE | Noted: 2023-12-13

## 2023-12-13 LAB
2HR DELTA HS TROPONIN: 18 NG/L
ALBUMIN SERPL BCP-MCNC: 4.4 G/DL (ref 3.5–5)
ALP SERPL-CCNC: 84 U/L (ref 34–104)
ALT SERPL W P-5'-P-CCNC: 47 U/L (ref 7–52)
ANION GAP SERPL CALCULATED.3IONS-SCNC: 7 MMOL/L
ANION GAP SERPL CALCULATED.3IONS-SCNC: 8 MMOL/L
APTT PPP: 30 SECONDS (ref 23–37)
AST SERPL W P-5'-P-CCNC: 38 U/L (ref 13–39)
BACTERIA UR QL AUTO: NORMAL /HPF
BASOPHILS # BLD MANUAL: 0.13 THOUSAND/UL (ref 0–0.1)
BASOPHILS NFR MAR MANUAL: 1 % (ref 0–1)
BILIRUB SERPL-MCNC: 1.39 MG/DL (ref 0.2–1)
BILIRUB UR QL STRIP: NEGATIVE
BUN SERPL-MCNC: 21 MG/DL (ref 5–25)
BUN SERPL-MCNC: 22 MG/DL (ref 5–25)
CALCIUM SERPL-MCNC: 7.8 MG/DL (ref 8.4–10.2)
CALCIUM SERPL-MCNC: 9.3 MG/DL (ref 8.4–10.2)
CARDIAC TROPONIN I PNL SERPL HS: 34 NG/L
CARDIAC TROPONIN I PNL SERPL HS: 52 NG/L
CARDIAC TROPONIN I PNL SERPL HS: 84 NG/L (ref 8–18)
CHLORIDE SERPL-SCNC: 101 MMOL/L (ref 96–108)
CHLORIDE SERPL-SCNC: 107 MMOL/L (ref 96–108)
CLARITY UR: CLEAR
CO2 SERPL-SCNC: 19 MMOL/L (ref 21–32)
CO2 SERPL-SCNC: 26 MMOL/L (ref 21–32)
COLOR UR: ABNORMAL
CREAT SERPL-MCNC: 1.15 MG/DL (ref 0.6–1.3)
CREAT SERPL-MCNC: 1.35 MG/DL (ref 0.6–1.3)
EOSINOPHIL # BLD MANUAL: 0 THOUSAND/UL (ref 0–0.4)
EOSINOPHIL NFR BLD MANUAL: 0 % (ref 0–6)
ERYTHROCYTE [DISTWIDTH] IN BLOOD BY AUTOMATED COUNT: 15.3 % (ref 11.6–15.1)
ERYTHROCYTE [DISTWIDTH] IN BLOOD BY AUTOMATED COUNT: 15.6 % (ref 11.6–15.1)
GFR SERPL CREATININE-BSD FRML MDRD: 62 ML/MIN/1.73SQ M
GFR SERPL CREATININE-BSD FRML MDRD: 75 ML/MIN/1.73SQ M
GLUCOSE SERPL-MCNC: 132 MG/DL (ref 65–140)
GLUCOSE SERPL-MCNC: 151 MG/DL (ref 65–140)
GLUCOSE UR STRIP-MCNC: ABNORMAL MG/DL
HCT VFR BLD AUTO: 51 % (ref 36.5–49.3)
HCT VFR BLD AUTO: 55.5 % (ref 36.5–49.3)
HGB BLD-MCNC: 17.2 G/DL (ref 12–17)
HGB BLD-MCNC: 18.4 G/DL (ref 12–17)
HGB UR QL STRIP.AUTO: NEGATIVE
INR PPP: 1.34 (ref 0.84–1.19)
KETONES UR STRIP-MCNC: NEGATIVE MG/DL
LACTATE SERPL-SCNC: 1.7 MMOL/L (ref 0.5–2)
LEUKOCYTE ESTERASE UR QL STRIP: ABNORMAL
LYMPHOCYTES # BLD AUTO: 19 % (ref 14–44)
LYMPHOCYTES # BLD AUTO: 2.43 THOUSAND/UL (ref 0.6–4.47)
MCH RBC QN AUTO: 29.4 PG (ref 26.8–34.3)
MCH RBC QN AUTO: 29.8 PG (ref 26.8–34.3)
MCHC RBC AUTO-ENTMCNC: 33.2 G/DL (ref 31.4–37.4)
MCHC RBC AUTO-ENTMCNC: 33.7 G/DL (ref 31.4–37.4)
MCV RBC AUTO: 88 FL (ref 82–98)
MCV RBC AUTO: 89 FL (ref 82–98)
MONOCYTES # BLD AUTO: 1.02 THOUSAND/UL (ref 0–1.22)
MONOCYTES NFR BLD: 8 % (ref 4–12)
MYELOCYTES NFR BLD MANUAL: 1 % (ref 0–1)
NEUTROPHILS # BLD MANUAL: 8.97 THOUSAND/UL (ref 1.85–7.62)
NEUTS BAND NFR BLD MANUAL: 1 % (ref 0–8)
NEUTS SEG NFR BLD AUTO: 69 % (ref 43–75)
NITRITE UR QL STRIP: NEGATIVE
NON-SQ EPI CELLS URNS QL MICRO: NORMAL /HPF
PH UR STRIP.AUTO: 5.5 [PH]
PLATELET # BLD AUTO: 266 THOUSANDS/UL (ref 149–390)
PLATELET # BLD AUTO: 293 THOUSANDS/UL (ref 149–390)
PLATELET BLD QL SMEAR: ADEQUATE
PMV BLD AUTO: 10.2 FL (ref 8.9–12.7)
PMV BLD AUTO: 10.7 FL (ref 8.9–12.7)
POTASSIUM SERPL-SCNC: 4.5 MMOL/L (ref 3.5–5.3)
POTASSIUM SERPL-SCNC: 6.1 MMOL/L (ref 3.5–5.3)
PROMYELOCYTES NFR BLD MANUAL: 1 % (ref 0–0)
PROT SERPL-MCNC: 8.1 G/DL (ref 6.4–8.4)
PROT UR STRIP-MCNC: NEGATIVE MG/DL
PROTHROMBIN TIME: 17.3 SECONDS (ref 11.6–14.5)
RBC # BLD AUTO: 5.77 MILLION/UL (ref 3.88–5.62)
RBC # BLD AUTO: 6.25 MILLION/UL (ref 3.88–5.62)
RBC #/AREA URNS AUTO: NORMAL /HPF
RBC MORPH BLD: PRESENT
SODIUM SERPL-SCNC: 133 MMOL/L (ref 135–147)
SODIUM SERPL-SCNC: 135 MMOL/L (ref 135–147)
SP GR UR STRIP.AUTO: 1.02 (ref 1–1.03)
TSH SERPL DL<=0.05 MIU/L-ACNC: 1.29 UIU/ML (ref 0.45–4.5)
UROBILINOGEN UR STRIP-ACNC: <2 MG/DL
WBC # BLD AUTO: 12.81 THOUSAND/UL (ref 4.31–10.16)
WBC # BLD AUTO: 17.19 THOUSAND/UL (ref 4.31–10.16)
WBC #/AREA URNS AUTO: NORMAL /HPF

## 2023-12-13 PROCEDURE — 85007 BL SMEAR W/DIFF WBC COUNT: CPT | Performed by: EMERGENCY MEDICINE

## 2023-12-13 PROCEDURE — 83605 ASSAY OF LACTIC ACID: CPT | Performed by: NURSE PRACTITIONER

## 2023-12-13 PROCEDURE — 87040 BLOOD CULTURE FOR BACTERIA: CPT | Performed by: NURSE PRACTITIONER

## 2023-12-13 PROCEDURE — 84443 ASSAY THYROID STIM HORMONE: CPT | Performed by: EMERGENCY MEDICINE

## 2023-12-13 PROCEDURE — 99291 CRITICAL CARE FIRST HOUR: CPT

## 2023-12-13 PROCEDURE — 93005 ELECTROCARDIOGRAM TRACING: CPT

## 2023-12-13 PROCEDURE — 85027 COMPLETE CBC AUTOMATED: CPT | Performed by: EMERGENCY MEDICINE

## 2023-12-13 PROCEDURE — 84484 ASSAY OF TROPONIN QUANT: CPT | Performed by: NURSE PRACTITIONER

## 2023-12-13 PROCEDURE — 96366 THER/PROPH/DIAG IV INF ADDON: CPT

## 2023-12-13 PROCEDURE — 84484 ASSAY OF TROPONIN QUANT: CPT | Performed by: EMERGENCY MEDICINE

## 2023-12-13 PROCEDURE — 85027 COMPLETE CBC AUTOMATED: CPT | Performed by: NURSE PRACTITIONER

## 2023-12-13 PROCEDURE — 80048 BASIC METABOLIC PNL TOTAL CA: CPT | Performed by: NURSE PRACTITIONER

## 2023-12-13 PROCEDURE — 71045 X-RAY EXAM CHEST 1 VIEW: CPT

## 2023-12-13 PROCEDURE — 96365 THER/PROPH/DIAG IV INF INIT: CPT

## 2023-12-13 PROCEDURE — 96367 TX/PROPH/DG ADDL SEQ IV INF: CPT

## 2023-12-13 PROCEDURE — NC001 PR NO CHARGE: Performed by: STUDENT IN AN ORGANIZED HEALTH CARE EDUCATION/TRAINING PROGRAM

## 2023-12-13 PROCEDURE — 84145 PROCALCITONIN (PCT): CPT | Performed by: NURSE PRACTITIONER

## 2023-12-13 PROCEDURE — 99291 CRITICAL CARE FIRST HOUR: CPT | Performed by: STUDENT IN AN ORGANIZED HEALTH CARE EDUCATION/TRAINING PROGRAM

## 2023-12-13 PROCEDURE — 85730 THROMBOPLASTIN TIME PARTIAL: CPT | Performed by: NURSE PRACTITIONER

## 2023-12-13 PROCEDURE — 80053 COMPREHEN METABOLIC PANEL: CPT | Performed by: EMERGENCY MEDICINE

## 2023-12-13 PROCEDURE — 81001 URINALYSIS AUTO W/SCOPE: CPT | Performed by: NURSE PRACTITIONER

## 2023-12-13 PROCEDURE — 96368 THER/DIAG CONCURRENT INF: CPT

## 2023-12-13 PROCEDURE — 36415 COLL VENOUS BLD VENIPUNCTURE: CPT | Performed by: EMERGENCY MEDICINE

## 2023-12-13 PROCEDURE — 92960 CARDIOVERSION ELECTRIC EXT: CPT | Performed by: STUDENT IN AN ORGANIZED HEALTH CARE EDUCATION/TRAINING PROGRAM

## 2023-12-13 PROCEDURE — 85610 PROTHROMBIN TIME: CPT | Performed by: EMERGENCY MEDICINE

## 2023-12-13 PROCEDURE — 96375 TX/PRO/DX INJ NEW DRUG ADDON: CPT

## 2023-12-13 PROCEDURE — 99223 1ST HOSP IP/OBS HIGH 75: CPT | Performed by: STUDENT IN AN ORGANIZED HEALTH CARE EDUCATION/TRAINING PROGRAM

## 2023-12-13 PROCEDURE — 96376 TX/PRO/DX INJ SAME DRUG ADON: CPT

## 2023-12-13 PROCEDURE — 92960 CARDIOVERSION ELECTRIC EXT: CPT

## 2023-12-13 RX ORDER — AMIODARONE HYDROCHLORIDE 50 MG/ML
INJECTION, SOLUTION INTRAVENOUS
Status: DISCONTINUED
Start: 2023-12-13 | End: 2023-12-13 | Stop reason: WASHOUT

## 2023-12-13 RX ORDER — CHLORHEXIDINE GLUCONATE ORAL RINSE 1.2 MG/ML
15 SOLUTION DENTAL EVERY 12 HOURS SCHEDULED
Status: DISCONTINUED | OUTPATIENT
Start: 2023-12-13 | End: 2023-12-17 | Stop reason: HOSPADM

## 2023-12-13 RX ORDER — ETOMIDATE 2 MG/ML
10 INJECTION INTRAVENOUS ONCE
Status: COMPLETED | OUTPATIENT
Start: 2023-12-13 | End: 2023-12-13

## 2023-12-13 RX ORDER — EPINEPHRINE 0.1 MG/ML
INJECTION INTRAVENOUS
Status: DISCONTINUED
Start: 2023-12-13 | End: 2023-12-13 | Stop reason: HOSPADM

## 2023-12-13 RX ORDER — LIDOCAINE HYDROCHLORIDE ANHYDROUS AND DEXTROSE MONOHYDRATE .8; 5 G/100ML; G/100ML
1 INJECTION, SOLUTION INTRAVENOUS CONTINUOUS
Status: DISCONTINUED | OUTPATIENT
Start: 2023-12-13 | End: 2023-12-13

## 2023-12-13 RX ORDER — HEPARIN SODIUM 10000 [USP'U]/100ML
3-20 INJECTION, SOLUTION INTRAVENOUS
Status: DISCONTINUED | OUTPATIENT
Start: 2023-12-13 | End: 2023-12-15

## 2023-12-13 RX ORDER — KETAMINE HYDROCHLORIDE 50 MG/ML
INJECTION, SOLUTION INTRAMUSCULAR; INTRAVENOUS
Status: COMPLETED
Start: 2023-12-13 | End: 2023-12-13

## 2023-12-13 RX ORDER — MAGNESIUM SULFATE HEPTAHYDRATE 40 MG/ML
2 INJECTION, SOLUTION INTRAVENOUS ONCE
Status: COMPLETED | OUTPATIENT
Start: 2023-12-13 | End: 2023-12-13

## 2023-12-13 RX ORDER — ONDANSETRON 2 MG/ML
4 INJECTION INTRAMUSCULAR; INTRAVENOUS ONCE
Status: COMPLETED | OUTPATIENT
Start: 2023-12-13 | End: 2023-12-13

## 2023-12-13 RX ORDER — LIDOCAINE HYDROCHLORIDE ANHYDROUS AND DEXTROSE MONOHYDRATE .8; 5 G/100ML; G/100ML
0.5 INJECTION, SOLUTION INTRAVENOUS CONTINUOUS
Status: DISCONTINUED | OUTPATIENT
Start: 2023-12-13 | End: 2023-12-13 | Stop reason: HOSPADM

## 2023-12-13 RX ORDER — HEPARIN SODIUM 1000 [USP'U]/ML
4000 INJECTION, SOLUTION INTRAVENOUS; SUBCUTANEOUS EVERY 6 HOURS PRN
Status: DISCONTINUED | OUTPATIENT
Start: 2023-12-13 | End: 2023-12-15

## 2023-12-13 RX ORDER — AMIODARONE HYDROCHLORIDE 50 MG/ML
INJECTION, SOLUTION INTRAVENOUS
Status: COMPLETED
Start: 2023-12-13 | End: 2023-12-13

## 2023-12-13 RX ORDER — HEPARIN SODIUM 1000 [USP'U]/ML
2000 INJECTION, SOLUTION INTRAVENOUS; SUBCUTANEOUS EVERY 6 HOURS PRN
Status: DISCONTINUED | OUTPATIENT
Start: 2023-12-13 | End: 2023-12-15

## 2023-12-13 RX ORDER — EPINEPHRINE 1 MG/ML
INJECTION, SOLUTION, CONCENTRATE INTRAVENOUS
Status: DISCONTINUED
Start: 2023-12-13 | End: 2023-12-13 | Stop reason: WASHOUT

## 2023-12-13 RX ORDER — MAGNESIUM SULFATE HEPTAHYDRATE 40 MG/ML
2 INJECTION, SOLUTION INTRAVENOUS ONCE
Status: COMPLETED | OUTPATIENT
Start: 2023-12-13 | End: 2023-12-14

## 2023-12-13 RX ORDER — DIPHENHYDRAMINE HYDROCHLORIDE 50 MG/ML
50 INJECTION INTRAMUSCULAR; INTRAVENOUS ONCE
Status: COMPLETED | OUTPATIENT
Start: 2023-12-13 | End: 2023-12-13

## 2023-12-13 RX ORDER — KETAMINE HCL IN NACL, ISO-OSM 100MG/10ML
40 SYRINGE (ML) INJECTION ONCE
Status: COMPLETED | OUTPATIENT
Start: 2023-12-13 | End: 2023-12-13

## 2023-12-13 RX ORDER — LIDOCAINE HYDROCHLORIDE ANHYDROUS AND DEXTROSE MONOHYDRATE .8; 5 G/100ML; G/100ML
1 INJECTION, SOLUTION INTRAVENOUS CONTINUOUS
Status: DISCONTINUED | OUTPATIENT
Start: 2023-12-13 | End: 2023-12-14

## 2023-12-13 RX ADMIN — Medication 40 MG: at 17:40

## 2023-12-13 RX ADMIN — LIDOCAINE HYDROCHLORIDE 0.5 MG/MIN: 8 INJECTION, SOLUTION INTRAVENOUS at 18:28

## 2023-12-13 RX ADMIN — ONDANSETRON 4 MG: 2 INJECTION INTRAMUSCULAR; INTRAVENOUS at 17:41

## 2023-12-13 RX ADMIN — CHLORHEXIDINE GLUCONATE 15 ML: 1.2 SOLUTION ORAL at 22:08

## 2023-12-13 RX ADMIN — AMIODARONE HYDROCHLORIDE: 50 INJECTION, SOLUTION INTRAVENOUS at 22:02

## 2023-12-13 RX ADMIN — KETAMINE HYDROCHLORIDE: 50 INJECTION INTRAMUSCULAR; INTRAVENOUS at 17:45

## 2023-12-13 RX ADMIN — MAGNESIUM SULFATE HEPTAHYDRATE 2 G: 40 INJECTION, SOLUTION INTRAVENOUS at 22:08

## 2023-12-13 RX ADMIN — AMIODARONE HYDROCHLORIDE 1 MG/MIN: 50 INJECTION, SOLUTION INTRAVENOUS at 21:55

## 2023-12-13 RX ADMIN — SODIUM CHLORIDE 500 ML: 0.9 INJECTION, SOLUTION INTRAVENOUS at 17:00

## 2023-12-13 RX ADMIN — HEPARIN SODIUM 11.1 UNITS/KG/HR: 10000 INJECTION, SOLUTION INTRAVENOUS at 21:51

## 2023-12-13 RX ADMIN — ETOMIDATE 10 MG: 2 INJECTION INTRAVENOUS at 19:54

## 2023-12-13 RX ADMIN — AMIODARONE HYDROCHLORIDE 150 MG: 50 INJECTION, SOLUTION INTRAVENOUS at 22:02

## 2023-12-13 RX ADMIN — MAGNESIUM SULFATE HEPTAHYDRATE 2 G: 40 INJECTION, SOLUTION INTRAVENOUS at 15:29

## 2023-12-13 RX ADMIN — AMIODARONE HYDROCHLORIDE 1 MG/MIN: 50 INJECTION, SOLUTION INTRAVENOUS at 17:22

## 2023-12-13 RX ADMIN — DIPHENHYDRAMINE HYDROCHLORIDE 50 MG: 50 INJECTION, SOLUTION INTRAMUSCULAR; INTRAVENOUS at 17:41

## 2023-12-13 RX ADMIN — LIDOCAINE HYDROCHLORIDE ANHYDROUS AND DEXTROSE MONOHYDRATE 1 MG/MIN: .8; 5 INJECTION, SOLUTION INTRAVENOUS at 21:56

## 2023-12-13 RX ADMIN — DEXTROSE 150 MG: 50 INJECTION, SOLUTION INTRAVENOUS at 16:53

## 2023-12-13 NOTE — PROGRESS NOTES
Patient's Mom notified our HF clinic that Al Rodríguez had palpitations today, was found to be tachycardic, was brought to the ED 12/13 afternoon. I called and spoke to Al Rodríguez - he felt sudden palpitations after sitting helping daughter with homework. Continuing palpitations now in ED. Otherwise feels ok. No syncope, dizziness, fatigue, SOB, CP. He said his device was being interrogated. No shocks delivered he is aware of. Pending further workup now, may need expedited consideration for VT ablation vs advanced therapies. Note his BMI likely precludes OHT. Notably he has clinical markers and CMR for which differential includes cardiac sarcoidosis. Further evaluation may be warranted for this in expedited fashion, especially if he is nearing advanced therapies.

## 2023-12-13 NOTE — PROGRESS NOTES
Progress Note - [facility] ICU Triage Asssessment   Addy Boyd 53 y.o. male MRN: [id number]  Time Called ( Time): 1728  Date Called: 12/13/23  Room#: ED16  Person requesting evaluation: Shona Drew    Situation: This is a 53 y.o. male, with a has a past medical history of A-fib ([address]), AICD (automatic cardioverter/defibrillator) present, CHF (congestive heart failure) ([address]), Coxsackie virus infection, Hypertension, ICD (implantable cardioverter-defibrillator) lead failure, Myocarditis ([address]), Sleep apnea, and Ventricular tachycardia ([address] Glenroy Lute Hx of HFrEF (LVEF 15%, secondary to Coxsackie virus infection, on entresto/lasix/metoprolol/jardiance), hx VT (on oral Amio, Rivaroxaban), AICD in place, GERD, HTN, CONCHIS, BMI 39, who presents to [facility] ED on 12/13/2023 for palpitations found to be in VT. EP was consulted and the patient was initiated on Amiodarone and Lidocaine infusions. An attempt at cardioversion was made with the results of the same pending. The [facility] team was contacted regarding possible transfer for management of VT on lidocaine drip.      --------------------------------------------------------  Triage Assessment & Plan:       Immediate Recommendations:   # VT requiring lidocaine infusion  - Appropriate for transfer to ICU level of care. - Optimize electrolytes.   - Trend troponins  - EP consultation  # Elevated WBC: stress related vs. Infection related  - Obtain cultures  - Start empiric abx  # Dispo/GOC: Patient to be admitted to ICU level of care       Recommendations discussed with Darshan Reza       --------------------------------------------------------  SIGNATURE: Burt Copeland MD PhD  [phone]  Anesthesiology and Critical Care  Staff Physician, [facility] [address]: December 13, 2023  TIME: 5:44 PM

## 2023-12-13 NOTE — ED NOTES
PT being semi sedated by Provider David Saxena for cardioversion.      Leander Louis RN  12/13/23 2478

## 2023-12-14 PROBLEM — D72.829 LEUKOCYTOSIS: Status: ACTIVE | Noted: 2023-12-14

## 2023-12-14 LAB
ALBUMIN SERPL BCP-MCNC: 3.1 G/DL (ref 3.5–5)
ALP SERPL-CCNC: 49 U/L (ref 34–104)
ALT SERPL W P-5'-P-CCNC: 33 U/L (ref 7–52)
ANION GAP SERPL CALCULATED.3IONS-SCNC: 7 MMOL/L
APTT PPP: 54 SECONDS (ref 23–37)
APTT PPP: 75 SECONDS (ref 23–37)
APTT PPP: 78 SECONDS (ref 23–37)
AST SERPL W P-5'-P-CCNC: 29 U/L (ref 13–39)
ATRIAL RATE: 104 BPM
ATRIAL RATE: 140 BPM
ATRIAL RATE: 156 BPM
ATRIAL RATE: 78 BPM
ATRIAL RATE: 79 BPM
ATRIAL RATE: 90 BPM
BILIRUB SERPL-MCNC: 1.49 MG/DL (ref 0.2–1)
BNP SERPL-MCNC: 615 PG/ML (ref 0–100)
BUN SERPL-MCNC: 17 MG/DL (ref 5–25)
CA-I BLD-SCNC: 1.08 MMOL/L (ref 1.12–1.32)
CALCIUM ALBUM COR SERPL-MCNC: 8 MG/DL (ref 8.3–10.1)
CALCIUM SERPL-MCNC: 7.3 MG/DL (ref 8.4–10.2)
CHLORIDE SERPL-SCNC: 108 MMOL/L (ref 96–108)
CO2 SERPL-SCNC: 22 MMOL/L (ref 21–32)
CREAT SERPL-MCNC: 1.05 MG/DL (ref 0.6–1.3)
ERYTHROCYTE [DISTWIDTH] IN BLOOD BY AUTOMATED COUNT: 15.6 % (ref 11.6–15.1)
GFR SERPL CREATININE-BSD FRML MDRD: 84 ML/MIN/1.73SQ M
GLUCOSE SERPL-MCNC: 105 MG/DL (ref 65–140)
HCT VFR BLD AUTO: 47 % (ref 36.5–49.3)
HGB BLD-MCNC: 15.3 G/DL (ref 12–17)
MAGNESIUM SERPL-MCNC: 2.5 MG/DL (ref 1.9–2.7)
MCH RBC QN AUTO: 29.8 PG (ref 26.8–34.3)
MCHC RBC AUTO-ENTMCNC: 32.6 G/DL (ref 31.4–37.4)
MCV RBC AUTO: 92 FL (ref 82–98)
NRBC BLD AUTO-RTO: 0 /100 WBCS
P AXIS: 41 DEGREES
P AXIS: 51 DEGREES
P AXIS: 58 DEGREES
PHOSPHATE SERPL-MCNC: 3.2 MG/DL (ref 2.7–4.5)
PLATELET # BLD AUTO: 218 THOUSANDS/UL (ref 149–390)
PMV BLD AUTO: 10.7 FL (ref 8.9–12.7)
POTASSIUM SERPL-SCNC: 4 MMOL/L (ref 3.5–5.3)
PR INTERVAL: 120 MS
PR INTERVAL: 194 MS
PR INTERVAL: 196 MS
PR INTERVAL: 206 MS
PROCALCITONIN SERPL-MCNC: 0.11 NG/ML
PROT SERPL-MCNC: 5.4 G/DL (ref 6.4–8.4)
QRS AXIS: -28 DEGREES
QRS AXIS: -29 DEGREES
QRS AXIS: -65 DEGREES
QRS AXIS: 109 DEGREES
QRS AXIS: 123 DEGREES
QRS AXIS: 73 DEGREES
QRSD INTERVAL: 102 MS
QRSD INTERVAL: 102 MS
QRSD INTERVAL: 110 MS
QRSD INTERVAL: 194 MS
QRSD INTERVAL: 196 MS
QRSD INTERVAL: 246 MS
QT INTERVAL: 340 MS
QT INTERVAL: 348 MS
QT INTERVAL: 378 MS
QT INTERVAL: 384 MS
QT INTERVAL: 390 MS
QT INTERVAL: 396 MS
QTC INTERVAL: 433 MS
QTC INTERVAL: 457 MS
QTC INTERVAL: 477 MS
QTC INTERVAL: 519 MS
QTC INTERVAL: 602 MS
QTC INTERVAL: 618 MS
RBC # BLD AUTO: 5.13 MILLION/UL (ref 3.88–5.62)
SODIUM SERPL-SCNC: 137 MMOL/L (ref 135–147)
T WAVE AXIS: -9 DEGREES
T WAVE AXIS: 121 DEGREES
T WAVE AXIS: 248 DEGREES
T WAVE AXIS: 42 DEGREES
T WAVE AXIS: 48 DEGREES
T WAVE AXIS: 50 DEGREES
VENTRICULAR RATE: 104 BPM
VENTRICULAR RATE: 139 BPM
VENTRICULAR RATE: 140 BPM
VENTRICULAR RATE: 156 BPM
VENTRICULAR RATE: 79 BPM
VENTRICULAR RATE: 90 BPM
WBC # BLD AUTO: 13.49 THOUSAND/UL (ref 4.31–10.16)

## 2023-12-14 PROCEDURE — 83880 ASSAY OF NATRIURETIC PEPTIDE: CPT | Performed by: NURSE PRACTITIONER

## 2023-12-14 PROCEDURE — 99233 SBSQ HOSP IP/OBS HIGH 50: CPT | Performed by: STUDENT IN AN ORGANIZED HEALTH CARE EDUCATION/TRAINING PROGRAM

## 2023-12-14 PROCEDURE — 83735 ASSAY OF MAGNESIUM: CPT | Performed by: NURSE PRACTITIONER

## 2023-12-14 PROCEDURE — 93010 ELECTROCARDIOGRAM REPORT: CPT | Performed by: INTERNAL MEDICINE

## 2023-12-14 PROCEDURE — 92960 CARDIOVERSION ELECTRIC EXT: CPT | Performed by: NURSE PRACTITIONER

## 2023-12-14 PROCEDURE — 94760 N-INVAS EAR/PLS OXIMETRY 1: CPT

## 2023-12-14 PROCEDURE — NC001 PR NO CHARGE: Performed by: NURSE PRACTITIONER

## 2023-12-14 PROCEDURE — 99223 1ST HOSP IP/OBS HIGH 75: CPT | Performed by: INTERNAL MEDICINE

## 2023-12-14 PROCEDURE — 80053 COMPREHEN METABOLIC PANEL: CPT | Performed by: NURSE PRACTITIONER

## 2023-12-14 PROCEDURE — 82330 ASSAY OF CALCIUM: CPT | Performed by: NURSE PRACTITIONER

## 2023-12-14 PROCEDURE — 85027 COMPLETE CBC AUTOMATED: CPT | Performed by: NURSE PRACTITIONER

## 2023-12-14 PROCEDURE — 5A2204Z RESTORATION OF CARDIAC RHYTHM, SINGLE: ICD-10-PCS | Performed by: STUDENT IN AN ORGANIZED HEALTH CARE EDUCATION/TRAINING PROGRAM

## 2023-12-14 PROCEDURE — 84100 ASSAY OF PHOSPHORUS: CPT | Performed by: NURSE PRACTITIONER

## 2023-12-14 PROCEDURE — 93005 ELECTROCARDIOGRAM TRACING: CPT

## 2023-12-14 PROCEDURE — 85730 THROMBOPLASTIN TIME PARTIAL: CPT | Performed by: STUDENT IN AN ORGANIZED HEALTH CARE EDUCATION/TRAINING PROGRAM

## 2023-12-14 RX ORDER — FENTANYL CITRATE 50 UG/ML
100 INJECTION, SOLUTION INTRAMUSCULAR; INTRAVENOUS ONCE
Status: COMPLETED | OUTPATIENT
Start: 2023-12-14 | End: 2023-12-14

## 2023-12-14 RX ORDER — FUROSEMIDE 10 MG/ML
40 INJECTION INTRAMUSCULAR; INTRAVENOUS ONCE
Status: COMPLETED | OUTPATIENT
Start: 2023-12-14 | End: 2023-12-14

## 2023-12-14 RX ORDER — LOSARTAN POTASSIUM 25 MG/1
25 TABLET ORAL DAILY
Status: DISCONTINUED | OUTPATIENT
Start: 2023-12-14 | End: 2023-12-15

## 2023-12-14 RX ORDER — MIDAZOLAM HYDROCHLORIDE 2 MG/2ML
4 INJECTION, SOLUTION INTRAMUSCULAR; INTRAVENOUS ONCE
Status: COMPLETED | OUTPATIENT
Start: 2023-12-14 | End: 2023-12-14

## 2023-12-14 RX ORDER — MEXILETINE HYDROCHLORIDE 250 MG/1
250 CAPSULE ORAL EVERY 8 HOURS SCHEDULED
Status: DISCONTINUED | OUTPATIENT
Start: 2023-12-14 | End: 2023-12-14

## 2023-12-14 RX ORDER — FAMOTIDINE 20 MG/1
20 TABLET, FILM COATED ORAL
Status: DISCONTINUED | OUTPATIENT
Start: 2023-12-14 | End: 2023-12-17 | Stop reason: HOSPADM

## 2023-12-14 RX ORDER — MEXILETINE HYDROCHLORIDE 150 MG/1
150 CAPSULE ORAL EVERY 8 HOURS SCHEDULED
Status: DISCONTINUED | OUTPATIENT
Start: 2023-12-14 | End: 2023-12-17 | Stop reason: HOSPADM

## 2023-12-14 RX ORDER — KETAMINE HCL IN NACL, ISO-OSM 100MG/10ML
50 SYRINGE (ML) INJECTION ONCE
Status: COMPLETED | OUTPATIENT
Start: 2023-12-14 | End: 2023-12-14

## 2023-12-14 RX ORDER — CALCIUM GLUCONATE 20 MG/ML
1 INJECTION, SOLUTION INTRAVENOUS ONCE
Status: COMPLETED | OUTPATIENT
Start: 2023-12-14 | End: 2023-12-14

## 2023-12-14 RX ADMIN — Medication 50 MG: at 00:47

## 2023-12-14 RX ADMIN — CALCIUM GLUCONATE 1 G: 20 INJECTION, SOLUTION INTRAVENOUS at 11:17

## 2023-12-14 RX ADMIN — HEPARIN SODIUM 13.1 UNITS/KG/HR: 10000 INJECTION, SOLUTION INTRAVENOUS at 21:16

## 2023-12-14 RX ADMIN — AMIODARONE HYDROCHLORIDE 1 MG/MIN: 50 INJECTION, SOLUTION INTRAVENOUS at 21:22

## 2023-12-14 RX ADMIN — MEXILETINE HYDROCHLORIDE 150 MG: 150 CAPSULE ORAL at 15:46

## 2023-12-14 RX ADMIN — FENTANYL CITRATE 25 MCG: 50 INJECTION INTRAMUSCULAR; INTRAVENOUS at 00:47

## 2023-12-14 RX ADMIN — DEXTROSE 150 MG: 50 INJECTION, SOLUTION INTRAVENOUS at 00:27

## 2023-12-14 RX ADMIN — CHLORHEXIDINE GLUCONATE 15 ML: 1.2 SOLUTION ORAL at 21:16

## 2023-12-14 RX ADMIN — FUROSEMIDE 40 MG: 10 INJECTION, SOLUTION INTRAMUSCULAR; INTRAVENOUS at 15:46

## 2023-12-14 RX ADMIN — FAMOTIDINE 20 MG: 20 TABLET, FILM COATED ORAL at 11:41

## 2023-12-14 RX ADMIN — HEPARIN SODIUM 2000 UNITS: 1000 INJECTION INTRAVENOUS; SUBCUTANEOUS at 06:00

## 2023-12-14 RX ADMIN — FAMOTIDINE 20 MG: 20 TABLET, FILM COATED ORAL at 17:22

## 2023-12-14 RX ADMIN — MIDAZOLAM 2 MG: 1 INJECTION INTRAMUSCULAR; INTRAVENOUS at 00:46

## 2023-12-14 RX ADMIN — CHLORHEXIDINE GLUCONATE 15 ML: 1.2 SOLUTION ORAL at 08:10

## 2023-12-14 RX ADMIN — MEXILETINE HYDROCHLORIDE 150 MG: 150 CAPSULE ORAL at 21:16

## 2023-12-14 RX ADMIN — LOSARTAN POTASSIUM 25 MG: 25 TABLET, FILM COATED ORAL at 15:01

## 2023-12-14 RX ADMIN — AMIODARONE HYDROCHLORIDE 1 MG/MIN: 50 INJECTION, SOLUTION INTRAVENOUS at 08:19

## 2023-12-14 NOTE — ASSESSMENT & PLAN NOTE
Secondary to tachyarrhythmia, likely causing poor renal perfusion in the setting of ongoing diuretic use   Baseline creatinine: 0.93 - 1.08  Initial creatinine: 1.35 (12/13/23)     Plan:   Strict q4h I/O monitoring  Monitor without boateng catheter   Continue to follow renal function tests  Holding diuretics  Volume as indicated  Continue supportive are

## 2023-12-14 NOTE — PLAN OF CARE

## 2023-12-14 NOTE — ASSESSMENT & PLAN NOTE
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 Wall thickness is normal. LVEF 15% by biplane measurement. Systolic function is severely reduced. Severe global hypokinesis. No thrombus. RV normal. Mild to moderate MR. Mild TR.    Plan:   Holding home diuresis for now as patient appears clinically dry  Lasix 40mg PO daily   Spironolactone 25mg daily  Keep euvolemic today  Daily weights  Holding home afterload reduction agents while slightly hypotensive   Entresto 24-26mg BID   Heart failure cardiology consulted

## 2023-12-14 NOTE — CONSULTS
Consultation - Electrophysiology Cardiology (EP)   Vadim Chapa 48 y.o. male MRN: [id number]  Unit/Bed#: [facility] 516-01 Encounter: [id number]          Inpatient consult to Electrophysiology     Date/Time  12/14/2023 11:23 AM     Performed by  Cristal Stuart MD   Authorized by  VILMA Saldana     Universal Protocol   Timeout called at: 12/14/2023 12:42 PM.           PCP: Mary Beth Pickett PA-C  Outpatient Cardiologist: Rich Walls MD ; Fanny Soliz MD    Assessment/Plan     Assessment:  Principal Problem:    Wide-complex tachycardia  Active Problems:    Dilated cardiomyopathy (HCC)    Hypertension    Obstructive sleep apnea    Paroxysmal atrial fibrillation (HCC)    Chronic HFrEF (heart failure with reduced ejection fraction) (HCC)    HOANG (acute kidney injury) (HCC)    Leukocytosis      1.  Wide-complex tachycardia-  - Second admission this month, previously on 12/1 after a syncopal episode, found to be in VT and required cardioversion. Device reprogrammed 12/4 to decrease VT1 zone to 154 bpm (previously 180 bpm) and ATP added to shocks, added VT monitor zone at 120 bpm  -known history of slow VT, and atrial fibs/flutter; recent episodes of syncope with ICD shock, slow VT versus SVT?  -Patient presented to [place] ED with palpitations, he was found to be in a wide-complex tachyarrhythmia.  However, he was treated with x 2 cardioversion at 150 J, was started on lidocaine drip at 1 mg/min and amiodarone at 1 mg/min and was transferred to [facility].      -On arrival to [facility], he was noted to be in ventricular tachycardia that was treated with amiodarone bolus and cardioversion x 1 which put the patient back in normal sinus rhythm. Has stayed in NSR since.   -Medtronic single-chamber ICD (implanted June, 2017): ATP zone set at 154, appropriate impedance and capture threshold on interrogation, VT monitor zone 122-154.  Time in A-fib 0%, < 1%,  -Patient is currently on lidocaine drip at 1 mg/minute, and  amiodarone at 1 mg/min    2.  History of paroxysmal atrial fibrillation/flutter- status post A-fib ablation  -History of inappropriate ICD shock due to A-fib with RVR (10/2022)  -Now status post cryoablation with pulmonary vein isolation and typical flutter line on 7/3/2023  -Now on Xarelto and metoprolol    3.  History of slow VT  - Medtronic single-chamber ICD in situ - initially implanted in 2006, status post extraction of recalled Mamadou lead with reimplantation 6/2017; history of prior ICD therapy - 4/2023, 10/2022  - Previously maintained on amiodarone antiarrhythmic therapy - started 5/2023 after most recent ICD therapy; discontinued in June but he was put back on it outpatient    4.  Nonischemic dilated cardiomyopathy  5.  Heart failure with reduced EF    - TTE( 12/6/23): Moderate LV dilation LVIDD 7.3 cm, LVEF 15%, with severe global hypokinesis, normal LV size and function, mild to moderate MR  -CMR (12/7/2023)-severely dilated LV, severely reduced with EF of 16%, severe global hypokinesis with akinesis of the inferior and septal walls,Intramyocardial fibrosis of the interventricular septum and subepicardial fibrosis of the inferior wall are noted, this could suggest diffuse myocarditis versus scar tissue finding from  dilated cardiomyopathy.  - No obstructive CAD per cardiac catheterization 8/2021    6.  Hypertension  7.  Obstructive sleep apnea requiring nocturnal CPAP    Plan:  1. Discontinue IV Lidocaine, transition to mexiletine 200 mg p.o. every 8  2.  Will schedule the patient for dual-chamber ICD upgrade and VT ablation  3.  Switch amiodarone to oral Amio 200 mg daily  4.  Close hemodynamic monitoring  5.  Diuresis with furosemide 40 mg IV newly, monitor electrolytes            Case discussed and reviewed with Dr. Salazar pending attending addendum.Thank you for involving us in the care of your patient.  Cristal Stuart MD  Cardiology Fellow    PGY-5    ==========================================================================================    History of Present Illness   Physician Requesting Consult: Otto Gilbert MD PhD  Reason for Consult / Principal Problem: Wide-complex tachycardia-Ventricular tachycardia     HPI: Vadim Chapa is a 48 y.o. year old male with history of nonischemic dilated cardiomyopathy, heart failure with reduced ejection fraction of 10 to 15% status post Medtronic single-chamber ICD (June 2017), atrial fibrillation/flutter status post A-fib ablation July 2023, slow VT, hypertension, hyperlipidemia, obstructive sleep apnea, morbid obesity, and history of binge drinking, who was transferred from [facility] on 12/13 after feeling palpitations that would not resolve at home.  He was found to be in wide-complex tachycardia and was converted in the ED, he was started on lidocaine amnioinfusion-just prior to transfer to \[facility]\"", he went back into a wide-complex tachyarrhythmia and was converted the second time.  Upon arrival to \[facility]\"", patient was in VT with heart rates in the 150s, and required a third cardioversion. He remained hemodynamically stable during the episodes.     Similar presentation on 12/1/2023, where he presented to [facility] ED after syncopal episode.  He was found to be tachycardic and hypotensive and received adenosine without success, then IV amiodarone bolus.  At that time, on arrival to \[facility]\"" he was in wide-complex tachycardia requiring lidocaine bolus and eventually he was cardioverted to normal sinus.  He was discharged on 12/7  .        TELEMETRY: personally reviewed by myself Cristal Stuart MD No episodes since cardioversion at \[facility]\""     DEVICE INTERROGATION        HOLTER  No results found for this or any previous visit.        Review of Systems  ROS as noted above in HPI.       Historical Information   Past Medical History:   Diagnosis Date    A-fib (HCC) 2020    AICD (automatic cardioverter/defibrillator) present  "    CHF (congestive heart failure) (HCC)     Coxsackie virus infection 2017    Hypertension     ICD (implantable cardioverter-defibrillator) lead failure 06/03/2017    Myocarditis (HCC)     Sleep apnea     Ventricular tachycardia (HCC)      Past Surgical History:   Procedure Laterality Date    CARDIAC DEFIBRILLATOR PLACEMENT      CARDIAC ELECTROPHYSIOLOGY PROCEDURE N/A 7/3/2023    Procedure: Cardiac eps/svt ablation;  Surgeon: Mihai Walter DO;  Location: [facility];  Service: Cardiology    CARDIAC ELECTROPHYSIOLOGY PROCEDURE N/A 7/3/2023    Procedure: Cardiac eps/afib ablation;  Surgeon: Mihai Walter DO;  Location: [facility];  Service: Cardiology    DE ESOPHAGOGASTRODUODENOSCOPY TRANSORAL DIAGNOSTIC N/A 3/29/2017    Procedure: ESOPHAGOGASTRODUODENOSCOPY (EGD);  Surgeon: Valeriy Conte MD;  Location: [facility];  Service: Gastroenterology    DE INSJ/RPLCMT PERM DFB W/TRNSVNS LDS 1/DUAL CHMBR N/A 6/2/2017    Procedure: LEAD EXTRACTION/REIMPLANTATION AND ICD GENERATOR CHANGE ;  Surgeon: Enrique Warren MD;  Location:  MAIN OR;  Service: Cardiology    DE LAPAROSCOPY SURG CHOLECYSTECTOMY N/A 5/19/2017    Procedure: LAPAROSCOPIC CHOLECYSTECTOMY ;  Surgeon: Tanner Leyva MD;  Location: [facility];  Service: General    TONSILLECTOMY      TYMPANOSTOMY TUBE PLACEMENT       Social History     Substance and Sexual Activity   Alcohol Use Not Currently    Comment: OCCASIONAL; history of binge drinking -  drinking \"a lot\" on Fridays; stopped this about in late 2022. (Updated 12/04/2023).     Social History     Substance and Sexual Activity   Drug Use No     Social History     Tobacco Use   Smoking Status Never   Smokeless Tobacco Never     Family History: non-contributory    Meds/Allergies   Hospital Medications:   Current Facility-Administered Medications   Medication Dose Route Frequency    amiodarone (CORDARONE) 900 mg in dextrose 5 % 500 mL infusion  1 mg/min Intravenous Continuous    calcium " "gluconate 1 g in sodium chloride 0.9% 50 mL (premix)  1 g Intravenous Once    chlorhexidine (PERIDEX) 0.12 % oral rinse 15 mL  15 mL Mouth/Throat Q12H SAMUEL    heparin (porcine) 25,000 units in 0.45% NaCl 250 mL infusion (premix)  3-20 Units/kg/hr (Order-Specific) Intravenous Titrated    heparin (porcine) injection 2,000 Units  2,000 Units Intravenous Q6H PRN    heparin (porcine) injection 4,000 Units  4,000 Units Intravenous Q6H PRN    lidocaine 2000 mg in 250 mL infusion (cardiac premix)- NOT FOR TREATMENT OF PAIN  1 mg/min Intravenous Continuous     Home Medications:   Medications Prior to Admission   Medication    amiodarone 200 mg tablet    Empagliflozin (JARDIANCE) 10 MG TABS tablet    famotidine (PEPCID) 20 mg tablet    furosemide (LASIX) 40 mg tablet    metoprolol succinate (TOPROL-XL) 25 mg 24 hr tablet    potassium chloride (K-DUR,KLOR-CON) 20 mEq tablet    rivaroxaban (Xarelto) 20 mg tablet    sacubitril-valsartan (ENTRESTO) 24-26 MG TABS    spironolactone (ALDACTONE) 25 mg tablet    traZODone (DESYREL) 50 mg tablet       Allergies   Allergen Reactions    Vicodin [Hydrocodone-Acetaminophen] Other (See Comments)     \"i get nasty\"       Objective   Vitals: Blood pressure 106/59, pulse 60, temperature 97.8 °F (36.6 °C), temperature source Oral, resp. rate (!) 11, height 6' 2\" (1.88 m), weight (!) 140 kg (308 lb 10.3 oz), SpO2 96%.  Orthostatic Blood Pressures      Flowsheet Row Most Recent Value   Blood Pressure 106/59 filed at 12/14/2023 1000   Patient Position - Orthostatic VS Lying filed at 12/14/2023 0755              Intake/Output Summary (Last 24 hours) at 12/14/2023 1048  Last data filed at 12/14/2023 1000  Gross per 24 hour   Intake 1031.58 ml   Output 930 ml   Net 101.58 ml       Invasive Devices       Peripheral Intravenous Line  Duration             Peripheral IV 12/13/23 Distal;Right;Ventral (anterior) Forearm <1 day    Peripheral IV 12/13/23 Left Antecubital <1 day    Peripheral IV 12/13/23 " Right;Ventral (anterior) Forearm <1 day                    Physical Exam  GEN: Vadim Chapa appears well, alert and oriented x 3, pleasant and cooperative   HEENT:  Normocephalic, atraumatic, anicteric, moist mucous membranes  NECK: Mild JVD above clavicle noted    HEART: Normal rhythm, regular rate, normal S1 and S2, no murmurs, clicks, gallops or rubs   LUNGS: Clear to auscultation bilaterally; no wheezes, rales, or rhonchi; respiration nonlabored   ABDOMEN:  Normoactive bowel sounds, soft, no tenderness, no distention  EXTREMITIES: peripheral pulses palpable; no edema    Lab Results: I have personally reviewed pertinent lab results.    Results from last 7 days   Lab Units 12/13/23  2148 12/13/23  1726 12/13/23  1526   HS TNI 0HR ng/L  --   --  34   HS TNI 2HR ng/L  --  52*  --    HS TNI RAND ng/L 84*  --   --          Results from last 7 days   Lab Units 12/14/23  0451 12/13/23 2147 12/13/23  1526   POTASSIUM mmol/L 4.0 6.1* 4.5   CO2 mmol/L 22 19* 26   CHLORIDE mmol/L 108 107 101   BUN mg/dL 17 21 22   CREATININE mg/dL 1.05 1.15 1.35*     Results from last 7 days   Lab Units 12/14/23  0425 12/13/23 2147 12/13/23  1526   HEMOGLOBIN g/dL 15.3 17.2* 18.4*   HEMATOCRIT % 47.0 51.0* 55.5*   PLATELETS Thousands/uL 218 266 293         Results from last 7 days   Lab Units 12/14/23  0357 12/13/23 2147 12/13/23  1526   INR   --   --  1.34*   PTT seconds 54* 30  --        Imaging: I have personally reviewed pertinent reports.        Previous STRESS TEST:  No results found for this or any previous visit.     No results found for this or any previous visit.    No results found for this or any previous visit.      Previous Cath/PCI:  No results found for this or any previous visit.    No results found for this or any previous visit.    No results found for this or any previous visit.      ECHO:  Results for orders placed during the hospital encounter of 04/09/19    Echo complete with contrast if  indicated    [facility]  [address]  [place] [address]  [phone]    Transthoracic Echocardiogram  2D, M-mode, Doppler, and Color Doppler    Study date:  2019    Patient: ADALI NEWMAN  MR number: [id number]  Account number: [id number]  : 1975  Age: 43 years  Gender: Male  Status: Outpatient  Location: [facility]  Height: 74 in  Weight: 291 lb  BP: 118/ 64 mmHg    Indications: Dilated Cardiomyopathy.    Diagnoses: I42.0 - Dilated cardiomyopathy    Sonographer:  Galaviz RCS  Interpreting Physician:  Ananya Miller MD  Primary Physician:  Cynthia Julio MD  Referring Physician:  Octaviano Zaldivar MD  Group:  [facility]    SUMMARY    LEFT VENTRICLE:  The ventricle was moderately to markedly dilated.  Ejection fraction was estimated to be 25 %.  There was severe diffuse hypokinesis.  Features were consistent with a pseudonormal left ventricular filling pattern, with concomitant abnormal relaxation and increased filling pressure (grade 2 diastolic dysfunction).    RIGHT VENTRICLE:  Estimated peak pressure was at least 30 mmHg. ICD lead noted.    LEFT ATRIUM:  The atrium was moderately dilated.    RIGHT ATRIUM:  The atrium was moderately dilated.    MITRAL VALVE:  There was trace regurgitation.    TRICUSPID VALVE:  There was mild regurgitation.    HISTORY: PRIOR HISTORY: Risk factors: hypertension.    PROCEDURE: The study was performed in the [facility]. This was a routine study. The transthoracic approach was used. The study included complete 2D imaging, M-mode, complete spectral Doppler, and color Doppler. The  heart rate was 74 bpm, at the start of the study. This was a technically difficult study.    LEFT VENTRICLE: The ventricle was moderately to markedly dilated. Ejection fraction was estimated to be 25 %. There was severe diffuse hypokinesis. DOPPLER: Features were consistent with a  pseudonormal left ventricular filling pattern,  with concomitant abnormal relaxation and increased filling pressure (grade 2 diastolic dysfunction).    RIGHT VENTRICLE: The size was normal. Systolic function was normal. Wall thickness was normal. DOPPLER: Estimated peak pressure was at least 30 mmHg. ICD lead noted.    LEFT ATRIUM: The atrium was moderately dilated.    RIGHT ATRIUM: The atrium was moderately dilated.    MITRAL VALVE: There was annular calcification. DOPPLER: There was trace regurgitation.    AORTIC VALVE: The valve was trileaflet. Leaflets exhibited normal thickness and normal cuspal separation. The valve was not well visualized. DOPPLER: Transaortic velocity was within the normal range. There was no evidence for stenosis.  There was no regurgitation.    TRICUSPID VALVE: The valve structure was normal. There was normal leaflet separation. DOPPLER: The transtricuspid velocity was within the normal range. There was no evidence for stenosis. There was mild regurgitation.    PULMONIC VALVE: Leaflets exhibited normal thickness, no calcification, and normal cuspal separation. DOPPLER: The transpulmonic velocity was within the normal range. There was no regurgitation.    PERICARDIUM: There was no pericardial effusion. The pericardium was normal in appearance.    AORTA: The root exhibited normal size.    SYSTEM MEASUREMENT TABLES    2D  %FS: 12.58 %  Ao Diam: 2.95 cm  EDV(Teich): 249.22 ml  EF(Teich): 26.31 %  ESV(Teich): 183.65 ml  IVSd: 1.03 cm  LA Area: 29.37 cm2  LA Diam: 5.32 cm  LVEDV MOD A4C: 213.51 ml  LVEF MOD A4C: 30.3 %  LVESV MOD A4C: 148.81 ml  LVIDd: 6.92 cm  LVIDs: 6.05 cm  LVLd A4C: 9.46 cm  LVLs A4C: 8.02 cm  LVPWd: 0.71 cm  RA Area: 23.69 cm2  RVIDd: 4.49 cm  SV MOD A4C: 64.7 ml  SV(Teich): 65.57 ml    CW  TR MaxP.1 mmHg  TR Vmax: 2.55 m/s    MM  TAPSE: 2.55 cm    PW  E': 0.08 m/s  E/E': 11.03  MV A Kamron: 0.5 m/s  MV Dec Loving: 3.77 m/s2  MV DecT: 234 ms  MV E Kamron: 0.88 m/s  MV E/A  Ratio: 1.76  MV PHT: 67.86 ms  MVA By PHT: 3.24 cm2    [facility] in the [place] Commission Accredited Echocardiography Laboratory    Prepared and electronically signed by    Ananya Miller MD  Signed 10-Apr-2019 13:52:24    Results for orders placed during the hospital encounter of 04/16/23    Echo complete w/ contrast if indicated    Interpretation Summary    Left Ventricle: Left ventricular cavity size is dilated. Wall thickness is normal. Systolic function is severely reduced (30%). There is severe global hypokinesis. Diastolic function is severely abnormal, consistent with ungraded restrictive relaxation.    Right Ventricle: Right ventricular cavity size is normal. Systolic function is normal.    Left Atrium: The atrium is mildly dilated.    Right Atrium: The atrium is mildly dilated.    Mitral Valve: There is mild regurgitation.    Tricuspid Valve: There is mild regurgitation. The right ventricular systolic pressure is normal.      KOKI:  No results found for this or any previous visit.    No results found for this or any previous visit.      CMR:  No results found for this or any previous visit.    Results for orders placed during the hospital encounter of 12/01/23    MRI cardiac  w wo contrast    Narrative  48 year old man for evaluation of cardiomyopathy.    Technique:  1. 3 plane SSFP localizers.  2. SSFP cine imaging in long, short, and axial planes.  3. T1 weighted DIR FSE without fat saturation and T1 weighted TIR FSE in axial plane.  4. Gadolinium DTPA power injected.  5. 2D inversion recovery FGRE for delayed myocardial enhancement.  6. The patient tolerated the procedure well without complication.    Measurements:  Ronnie-septal wall 10 mm  Postero-lateral wall 7 mm  Left ventricular end-diastolic dimension 68 mm  Left ventricular end-systolic dimension 64 mm  Left ventricular end-diastolic volume 321 ml  Left ventricular end-systolic volume  268 ml  Stroke volume 53 ml  Cardiac Output 3.2 L/min  Ejection  fraction 16 %    Findings:  1. The left ventricle is severely dilated with thinning of the inferior wall.  Left ventricular systolic function is severely reduced with a measured ejection fraction of 16%.  There is severe global hypokinesis with akinesis of the inferior and septal  walls.  2. The right ventricle is normal in size with normal right ventricular systolic function.  3. The cardiac valves are not well visualized due to artifact.  4. There is no evidence of fibrofatty infiltration into the right ventricular myocardium.  6. Delayed post-gadolinium imaging intramyocardial hyperenhancement of the interventricular septum, and subepicardial hyperenhancement of the inferior wall.    Impression  Impression:  1. Severely dilated left ventricle with severely reduced left ventricular systolic function.  2. Normal right ventricular size and systolic function.  3. Intramyocardial fibrosis of the interventricular septum, and subepicardial fibrosis of the inferior wall. 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      Workstation performed: [id number]    No results found for this or any previous visit.      HOLTER  No results found for this or any previous visit.    No results found for this or any previous visit.

## 2023-12-14 NOTE — ASSESSMENT & PLAN NOTE
Currently low-normal to slightly hypotensive in the setting of tachyrhythmia     Plan:  Goal SBP < 160  Goal MAP > 65  Holding home antihypertensive medications:  Metoprolol succinate 75mg q12h   Entreseto 24-26mg BID

## 2023-12-14 NOTE — CONSULTS
Heart Failure Consult Note - Vadim Chapa 48 y.o. male MRN: [id number]    @ Encounter: [id number]      Assessment/Plan:    # Ventricular tachycardia  Second admission this month for VT. S/p cardioversions x 2 this admit, started on lidocaine drip and amiodarone drip, transferred from [place] to [facility] for further management.     # Chronic heart failure with reduced ejection fraction  Etiology: Etiology thought 2/2 coxsackie virus many years ago. Occasional binge drinking. Known cardiomyopathy with EF of 25% since 2017. Further reduction in EF on echo 12/2/23 in setting of ventricular tachycardia s/p shocks, contribution from ?myocarditis on cardiac MRI    Studies- personally reviewed by me    Cardiac MRI 12/7/23:  LVEF 16%. LVEDD 6.8cm  Normal RV size and systolic function  Intramyocardial fibrosis of the interventricular septum, and subepicardial fibrosis of the inferior wall. 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     -- TTE 12/6/23  LVEF: 15%  LVIDd: 6.7cm  RV: normal size and systolic function  MR: mild to moderate  PASP: 30mmHg, mild TR  RVOT: no notching  Other: no pericardial effusion    --TTE 5/1/23: LVEF 30%.   --TTE 4/17/23: LVEF 30%. LVIDD 7.1cm. Normal RV size and systolic function.  Mild MR. Mild TR    --TTE 10/11/21:  LVEF: 25%  LVIDd: 7.2cm  RV: normal size and systolic function  MR: mild  PASP: unable to estimate, no TR    --[facility] 8/30/21: Normal coronaries   --TTE 4/9/2019: LVEF 25% w/ grade II diastology. LVIDd 6.9 cm. Mod LAE. Trace MR. Mild TR. Noninvasive assessment c/f borderline to low output.   --TTE 5/16/2017: LVEF 25% w/ grade II diastology. LVIDd 6.9 cm. Mod LAE. Trace MR, mild TR. Normal RV size and function.       Neurohormonal Blockade:  --Beta-Blocker: metoprolol succinate 75mg BID - on hold  --ACEi, ARB or ARNi: entresto 24-26mg BID - on hold  --Aldosterone Receptor Blocker: spironolactone 25mg daily - on hold  --SGLT2 Inhibitor: Jardiance 10mg daily - on hold  --Home  "Diuretic: furosemide 40mg daily    Sudden Cardiac Death Risk Reduction:  --ICD: MDT single chamber ICD    Electrical Resynchronization:  --Candidacy for BiV device: narrow QRSd    Advanced Therapies (if appropriate):  --Inotrope:  --LVAD/Transplant Candidacy:    # Atrial fibrillation / flutter,  S/p Afib ablation in 07/2023.  [id number] = 2 (HF, HTN).  Anticoagulation on Xarelto as outpatient, currently on heparin drip  Rate control: BB as above - on hold  Rhythm control: on amiodarone  # Hypertension   # Obstructive sleep apnea  # History of binge drinking     Today's Plan:  Mildly volume up on exam  Give lasix 40mg IV x 1   VT management per EP   Resumption of GDMT as BP stabilizes      HPI:    \"48 y.o. year old male with history of nonischemic dilated cardiomyopathy, heart failure with reduced ejection fraction of 10 to 15% status post [employer] single-chamber ICD (June 2017), atrial fibrillation/flutter status post A-fib ablation July 2023, slow VT, hypertension, hyperlipidemia, obstructive sleep apnea, morbid obesity, and history of binge drinking, who was transferred from [facility] on 12/13 after feeling palpitations that would not resolve at home.  He was found to be in wide-complex tachycardia and was converted in the ED, he was started on lidocaine amnioinfusion-just prior to transfer to hospitals, he went back into a wide-complex tachyarrhythmia and was converted the second time.  Upon arrival to hospitals, patient was in VT with heart rates in the 150s, and required a third cardioversion. He remained hemodynamically stable during the episodes.\"    Admitted 12/1/23 for syncope and presented with unstable VT s/p external DCCV then transferred to hospitals. Reports missing PM dose of medication the day before but took meds for the day before onset of symptoms. Currently reporting has not had a drink in a month, drinking about 4 drinks in  the past. Also treated for mild volume overload at that time.    Patient reports " "feeling well and taking his medications prior to onset of symptoms this admission.     Past Medical History:   Diagnosis Date    A-fib ([facility]) 2020    AICD (automatic cardioverter/defibrillator) present     CHF (congestive heart failure) ([facility])     Coxsackie virus infection 2017    Hypertension     ICD (implantable cardioverter-defibrillator) lead failure 06/03/2017    Myocarditis ([facility])     Sleep apnea     Ventricular tachycardia ([facility])        Review of Systems   Constitutional:  Negative for chills and fever.   HENT:  Negative for ear pain and sore throat.    Eyes:  Negative for pain and visual disturbance.   Respiratory:  Negative for cough and shortness of breath.    Cardiovascular:  Positive for palpitations. Negative for chest pain.   Gastrointestinal:  Negative for abdominal pain and vomiting.   Genitourinary:  Negative for dysuria and hematuria.   Musculoskeletal:  Negative for arthralgias and back pain.   Skin:  Negative for color change and rash.   Neurological:  Negative for seizures and syncope.   All other systems reviewed and are negative.       Allergies   Allergen Reactions    Vicodin [Hydrocodone-Acetaminophen] Other (See Comments)     \"i get nasty\"     .    Current Facility-Administered Medications:     amiodarone (CORDARONE) 900 mg in dextrose 5 % 500 mL infusion, 1 mg/min, Intravenous, Continuous, VILMA Saldana, Last Rate: 33.3 mL/hr at 12/14/23 0819, 1 mg/min at 12/14/23 0819    chlorhexidine (PERIDEX) 0.12 % oral rinse 15 mL, 15 mL, Mouth/Throat, Q12H SAMUEL, VILMA Saldana, 15 mL at 12/14/23 0810    famotidine (PEPCID) tablet 20 mg, 20 mg, Oral, BID AC, VILMA Dailey, 20 mg at 12/14/23 1141    heparin (porcine) 25,000 units in 0.45% NaCl 250 mL infusion (premix), 3-20 Units/kg/hr (Order-Specific), Intravenous, Titrated, VILMA Saldana, Last Rate: 11.8 mL/hr at 12/14/23 0600, 13.1 Units/kg/hr at 12/14/23 0600    heparin (porcine) injection 2,000 " "Units, 2,000 Units, Intravenous, Q6H PRN, VILMA Saldana, 2,000 Units at 12/14/23 0600    heparin (porcine) injection 4,000 Units, 4,000 Units, Intravenous, Q6H PRN, VILMA Saldana    losartan (COZAAR) tablet 25 mg, 25 mg, Oral, Daily, Rachel Paredes Maryker, CRNP, 25 mg at 12/14/23 1501    mexiletine (MEXITIL) capsule 150 mg, 150 mg, Oral, Q8H SAMUEL, Rachel Paredes Shoemaker, CRNP, 150 mg at 12/14/23 1546    Social History     Socioeconomic History    Marital status: Single     Spouse name: Not on file    Number of children: Not on file    Years of education: Not on file    Highest education level: Not on file   Occupational History    Not on file   Tobacco Use    Smoking status: Never    Smokeless tobacco: Never   Vaping Use    Vaping status: Never Used   Substance and Sexual Activity    Alcohol use: Not Currently     Comment: OCCASIONAL; history of binge drinking -  drinking \"a lot\" on Fridays; stopped this about in late 2022. (Updated 12/04/2023).    Drug use: No    Sexual activity: Not on file   Other Topics Concern    Not on file   Social History Narrative    Not on file     Social Determinants of Health     Financial Resource Strain: Not on file   Food Insecurity: No Food Insecurity (5/2/2023)    Hunger Vital Sign     Worried About Running Out of Food in the Last Year: Never true     Ran Out of Food in the Last Year: Never true   Transportation Needs: No Transportation Needs (5/2/2023)    PRAPARE - Transportation     Lack of Transportation (Medical): No     Lack of Transportation (Non-Medical): No   Physical Activity: Not on file   Stress: Not on file   Social Connections: Not on file   Intimate Partner Violence: Not on file   Housing Stability: Low Risk  (5/2/2023)    Housing Stability Vital Sign     Unable to Pay for Housing in the Last Year: No     Number of Places Lived in the Last Year: 1     Unstable Housing in the Last Year: No       Family History   Problem Relation Age of " "Onset    No Known Problems Mother     No Known Problems Father     Sudden death Neg Hx     Heart disease Neg Hx        Physical Exam:    Vitals: Blood pressure 110/60, pulse 64, temperature 97.8 °F (36.6 °C), temperature source Oral, resp. rate (!) 9, height 6' 2\" (1.88 m), weight (!) 140 kg (308 lb 10.3 oz), SpO2 97%., Body mass index is 39.63 kg/m².,   Wt Readings from Last 3 Encounters:   12/14/23 (!) 140 kg (308 lb 10.3 oz)   12/13/23 (!) 138 kg (305 lb)   12/11/23 (!) 138 kg (305 lb)       Physical Exam  Constitutional:       General: He is not in acute distress.     Appearance: Normal appearance.   HENT:      Head: Normocephalic and atraumatic.      Mouth/Throat:      Mouth: Mucous membranes are moist.   Eyes:      General: No scleral icterus.     Extraocular Movements: Extraocular movements intact.   Cardiovascular:      Rate and Rhythm: Normal rate and regular rhythm.      Pulses: Normal pulses.      Heart sounds: S1 normal and S2 normal. No murmur heard.     No friction rub. No gallop.      Comments: Mildly elevated JVP  Pulmonary:      Breath sounds: Normal breath sounds.   Abdominal:      General: There is no distension.      Palpations: Abdomen is soft.      Tenderness: There is no abdominal tenderness. There is no guarding or rebound.   Musculoskeletal:         General: Normal range of motion.      Cervical back: Neck supple.      Right lower leg: No edema.      Left lower leg: No edema.   Skin:     General: Skin is warm and dry.      Capillary Refill: Capillary refill takes less than 2 seconds.   Neurological:      General: No focal deficit present.      Mental Status: He is alert and oriented to person, place, and time.   Psychiatric:         Mood and Affect: Mood normal.         Labs & Results:    Lab Results   Component Value Date    WBC 13.49 (H) 12/14/2023    HGB 15.3 12/14/2023    HCT 47.0 12/14/2023    MCV 92 12/14/2023     12/14/2023     Lab Results   Component Value Date    SODIUM 137 " "12/14/2023    K 4.0 12/14/2023     12/14/2023    CO2 22 12/14/2023    BUN 17 12/14/2023    CREATININE 1.05 12/14/2023    GLUC 105 12/14/2023    CALCIUM 7.3 (L) 12/14/2023     No results found for: \"NTBNP\"   Lab Results   Component Value Date    CHOLESTEROL 140 09/07/2017     Lab Results   Component Value Date    HDL 48 09/07/2017     Lab Results   Component Value Date    TRIG 85 09/07/2017     No results found for: \"NONHDLC\"    EKG personally reviewed by Dhara Soliz MD.       Thank you for the opportunity to participate in the care of this patient.    DHARA SOLIZ MD  ADVANCED HEART FAILURE AND MECHANICAL CIRCULATORY SUPPORT  [facility]     "

## 2023-12-14 NOTE — ASSESSMENT & PLAN NOTE
Secondary to tachyarrhythmia, likely causing poor renal perfusion in the setting of ongoing diuretic use   Baseline creatinine: 0.93 - 1.08  Initial creatinine: 1.35 (12/13/23)   Last creatinine 1.15 (12/14/23)     Plan:   Strict q4h I/O monitoring  Monitor without boateng catheter   Continue to follow renal function tests  Holding diuretics  Volume as indicated  Continue supportive are

## 2023-12-14 NOTE — ASSESSMENT & PLAN NOTE
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 Wall thickness is normal. LVEF 15% by biplane measurement. Systolic function is severely reduced. Severe global hypokinesis. No thrombus. RV normal. Mild to moderate MR. Mild TR.    Plan:   Holding home diuresis for now as patient appears clinically dry  Lasix 40mg PO daily   Spironolactone 25mg daily  Keep euvolemic today  Daily weights  Holding home afterload reduction agents while slightly hypotensive   Entresto 24-26mg BID   Holding home metoprolol succiante 75mg q12h   Heart failure cardiology consulted

## 2023-12-14 NOTE — ASSESSMENT & PLAN NOTE
Secondary to suspected coxsackievirus    Follow with heart failure cardiology as outpatient   Given high BMI, not a candidate for transplant    Plan  See above

## 2023-12-14 NOTE — PROCEDURES
Procedural Sedation    Date/Time: 12/14/2023 12:57 AM    Performed by: VILMA Saldana  Authorized by: VILMA Saldana    Immediate pre-procedure details:     Reassessment: Patient reassessed immediately prior to procedure      Reviewed: vital signs and NPO status      Verified: bag valve mask available, emergency equipment available, IV patency confirmed, oxygen available and suction available    Procedure details (see MAR for exact dosages):     Sedation start time:  12/14/2023 12:58 AM    Preoxygenation:  Nasal cannula    Sedation:  Ketamine (versed 2mg, Ketamine 50mg)    Analgesia:  Fentanyl (Fentanyl 25mcg)    Intra-procedure monitoring:  Blood pressure monitoring, frequent LOC assessments, cardiac monitor, continuous pulse oximetry and frequent vital sign checks    Intra-procedure events: none      Intra-procedure management:  Supplemental oxygen    Sedation end time:  12/14/2023 1:03 AM    Total sedation time (minutes):  5  Post-procedure details:     Post-sedation assessment completed:  12/14/2023 1:05 AM    Attendance: Constant attendance by certified staff until patient recovered      Patient tolerance:  Tolerated well, no immediate complications

## 2023-12-14 NOTE — QUICK NOTE
Patient presented as a transfer from [facility] ED with concerns for refractory VT.  He has a history of NICM with an EF of 15% s/p MDT single-chamber PPM placement as well as slow ventricular tachycardia and paroxysmal A-fib s/p ablation.  He had previously failed Tikosyn and has been on amiodarone and Toprol-XL at home.  He originally presented to the ED at [facility] on 12/13 complaining of palpitations/fatigue and was subsequently found to have a wide-complex tachycardia.  He was started on lidocaine and amiodarone drips and he underwent cardioversion x2 with a temporary return to normal sinus rhythm. Upon arrival at [facility] he had returned to a wide-complex tachycardia.  It is unclear if this is true VT versus atrial flutter with aberrancy.  ICD was interrogated at bedside and ATP was attempted with no resolution of the arrhythmia.  He later underwent bedside cardioversion and received x1 150 J shock and returned to normal sinus rhythm.  He will be monitored in the CCU closely overnight.

## 2023-12-14 NOTE — ED NOTES
Preparing for cardioversion at this time. Dr. Sushma Acosta at the bedside.       Jeremy Levy RN  12/13/23 0363

## 2023-12-14 NOTE — ASSESSMENT & PLAN NOTE
Last sleep study 2019  Not compliant with CPAP at home    Plan:   Encourage use   Monitor while in patient and apply CPAP as needed

## 2023-12-14 NOTE — ASSESSMENT & PLAN NOTE
Patient with known history of both VT as well as atrial fibrillation and atrial flutter  12/13/23: Presented to [facility] ED with palpitations and fatigue. Was noted to be in a wide complex tachycardia. Cardioversion x1 with successful conversion to NSR. Had recurrent wide complex tachycardia with unsuccessful cardioversion. Given 150mg IV amiodarone and started on infusion of amiodarone and lidocaine.     Plan:   Continue antiarrythmic's:   Lidocaine 1mg/min   Amiodarone 1mg/min  Amiodraone 150mg x1 now  2g mag now   Holding home AV lou blockers now in the setting of marginal BP   Metoprolol succinate 75mg q12h   Optimize electrolytes: K > 4.0, Mag > 2.0  Plan for ICD interrogation tonight   Possible electrical cardioversion   Consult EP

## 2023-12-14 NOTE — ASSESSMENT & PLAN NOTE
Patient with known history of both VT as well as atrial fibrillation and atrial flutter. Suspect 1:1 atrial flutter based on EKG   12/13/23: Presented to [facility] ED with palpitations and fatigue. Was noted to be in a wide complex tachycardia. Cardioversion x1 with successful conversion to NSR. Had recurrent wide complex tachycardia with unsuccessful cardioversion. Given 150mg IV amiodarone and started on infusion of amiodarone and lidocaine. Amiodarone 150mg x1 given on arrival   12/14/23: 150mg amiodarone + Cardioversion (150J) x1 to NSR     Plan:   Continue antiarrythmic's:   Lidocaine 1mg/min   Amiodarone 1mg/min  Holding home AV lou blockers now in the setting of marginal BP   Metoprolol succinate 75mg q12h   Optimize electrolytes: K > 4.0, Mag > 2.0  EP consulted, follow up recommendations

## 2023-12-14 NOTE — ED PROCEDURE NOTE
PROCEDURE  CriticalCare Time    Date/Time: 12/14/2023 3:12 AM    Performed by: Mattie Rodriguez DO  Authorized by: Mattie Rodriguez DO    Critical care provider statement:     Critical care time (minutes):  60    Critical care was necessary to treat or prevent imminent or life-threatening deterioration of the following conditions:  Cardiac failure    Critical care was time spent personally by me on the following activities:  Evaluation of patient's response to treatment, re-evaluation of patient's condition, interpretation of cardiac output measurements, ordering and performing treatments and interventions and examination of patient  Comments:      Performed cardioversion to improve circulatory function.         Mattie Rodriguez DO  12/14/23 4639

## 2023-12-14 NOTE — SEPSIS NOTE
Sepsis Note   Vadim Chapa 48 y.o. male MRN: [id number]  Unit/Bed#: [facility] 414-01 Encounter: [id number]       Initial Sepsis Screening       Row Name 12/13/23 2338                Is the patient's history suggestive of a new or worsening infection? No  -SP                  User Key  (r) = Recorded By, (t) = Taken By, (c) = Cosigned By      Initials Name Provider Type    SP VILMA Saldana Nurse Practitioner                        Body mass index is 39.2 kg/m².  Wt Readings from Last 1 Encounters:   12/13/23 (!) 138 kg (305 lb 5.4 oz)     IBW (Ideal Body Weight): 82.2 kg    Ideal body weight: 82.2 kg (181 lb 3.5 oz)  Adjusted ideal body weight: 105 kg (230 lb 13.9 oz)

## 2023-12-14 NOTE — EMTALA/ACUTE CARE TRANSFER
[address] [id number]  Dept: [phone]      XJLEJB TRANSFER CONSENT    NAME Jania Montez                                         1975                              MRN [id number]    I have been informed of my rights regarding examination, treatment, and transfer   by Dr. Jill Steel DO    Benefits: Specialized equipment and/or services available at the receiving facility (Include comment)________________________ (ep)    Risks: Potential deterioration of medical condition, Loss of IV, Possible worsening of condition or death during transfer      Consent for Transfer:  I acknowledge that my medical condition has been evaluated and explained to me by the emergency department physician or other qualified medical person and/or my attending physician, who has recommended that I be transferred to the service of  Accepting Physician: Theodore worthy  . The above potential benefits of such transfer, the potential risks associated with such transfer, and the probable risks of not being transferred have been explained to me, and I fully understand them. The doctor has explained that, in my case, the benefits of transfer outweigh the risks. I agree to be transferred. I authorize the performance of emergency medical procedures and treatments upon me in both transit and upon arrival at the receiving facility. Additionally, I authorize the release of any and all medical records to the receiving facility and request they be transported with me, if possible. I understand that the safest mode of transportation during a medical emergency is an ambulance and that the Hospital advocates the use of this mode of transport. Risks of traveling to the receiving facility by car, including absence of medical control, life sustaining equipment, such as oxygen, and medical personnel has been explained to me and I fully understand them.     ([address] CORRECT BOX BELOW)  [  ]  I consent to the stated transfer and to be transported by ambulance/helicopter. [  ]  I consent to the stated transfer, but refuse transportation by ambulance and accept full responsibility for my transportation by car. I understand the risks of non-ambulance transfers and I exonerate the Hospital and its staff from any deterioration in my condition that results from this refusal.    X___________________________________________    DATE  23  TIME________  Signature of patient or legally responsible individual signing on patient behalf           RELATIONSHIP TO PATIENT_________________________          Provider Certification    NAME Iqra Luna                                         1975                              MRN [id number]    A medical screening exam was performed on the above named patient. Based on the examination:    Condition Necessitating Transfer There were no encounter diagnoses. Patient Condition:  An emergency transfer is being made prior to stabilization due to the need for definitive care and the benefit of transfer outweighs the risk, The patient has been stabilized such that within reasonable medical probability, no material deterioration of the patient condition or the condition of the unborn child(savanna) is likely to result from the transfer    Reason for Transfer: Level of Care needed not available at this facility    Transfer Requirements: [address] available and qualified personnel available for treatment as acknowledged by    Agreed to accept transfer and to provide appropriate medical treatment as acknowledged by       [facility]  Appropriate medical records of the examination and treatment of the patient are provided at the time of transfer   [address] _______  Transfer will be performed by qualified personnel from    and appropriate transfer equipment as required, including the use of necessary and appropriate life support measures. Provider Certification: I have examined the patient and explained the following risks and benefits of being transferred/refusing transfer to the patient/family:  General risk, such as traffic hazards, adverse weather conditions, rough terrain or turbulence, possible failure of equipment (including vehicle or aircraft), or consequences of actions of persons outside the control of the transport personnel, Risk of worsening condition      Based on these reasonable risks and benefits to the patient and/or the unborn child(savanna), and based upon the information available at the time of the patient’s examination, I certify that the medical benefits reasonably to be expected from the provision of appropriate medical treatments at another medical facility outweigh the increasing risks, if any, to the individual’s medical condition, and in the case of labor to the unborn child, from effecting the transfer.     X____________________________________________ DATE 12/13/23        TIME_______      ORIGINAL - SEND TO MEDICAL RECORDS   COPY - SEND WITH PATIENT DURING TRANSFER

## 2023-12-14 NOTE — H&P
[facility]  H&P  Name: Vadim Chapa 48 y.o. male I MRN: [id number]  [facility]Bed[facility] I Date of Admission: 12/13/2023   Date of Service: 12/13/2023 I Hospital Day: 0      Assessment/Plan   * Wide-complex tachycardia  Assessment & Plan  Patient with known history of both VT as well as atrial fibrillation and atrial flutter  12/13/23: Presented to [facility] ED with palpitations and fatigue. Was noted to be in a wide complex tachycardia. Cardioversion x1 with successful conversion to NSR. Had recurrent wide complex tachycardia with unsuccessful cardioversion. Given 150mg IV amiodarone and started on infusion of amiodarone and lidocaine.     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 Wall thickness is normal. LVEF 15% by biplane measurement. Systolic function is severely reduced. Severe global hypokinesis. No thrombus. RV normal. Mild to moderate MR. Mild TR.    Plan:   Holding home diuresis for now as patient appears clinically dry  Lasix 40mg PO daily   Spironolactone 25mg daily  Keep euvolemic today  Daily weights  Holding home afterload reduction agents while slightly hypotensive   Entresto 24-26mg BID   Heart failure cardiology consulted     Paroxysmal atrial fibrillation (HCC)  Assessment & Plan  Currently in wide complex tachycardia, which is suspicious for 1:1 atrial flutter versus true ventricular arrhythmia   Known history of atrial  fibrillation, atrial flutter, and rapid ventricular responses     Plan:  Goal HR < 110   Holding all AV lou blockers at this time given low BP  Metoprolol succinate 75mg q12h   Continue current antiarrythmics:   Amiodarone 1 mg/min   Lidocaine 1mg/min    Optimize electrolytes: K > 4.0, mag > 2.0   Systemic anticoagulation: Heparin drip   In place of home Xarelto 20mg daily    Continue to monitor telemetry       HOANG (acute kidney injury) (HCC)  Assessment & Plan  Secondary to tachyarrhythmia, likely causing poor renal perfusion in the setting of ongoing diuretic use   Baseline creatinine: 0.93 - 1.08  Initial creatinine: 1.35 (12/13/23)     Plan:   Strict q4h I/O monitoring  Monitor without boateng catheter   Continue to follow renal function tests  Holding diuretics  Volume as indicated  Continue supportive are     Dilated cardiomyopathy (HCC)  Assessment & Plan  Secondary to suspected coxsackievirus    Follow with heart failure cardiology as outpatient   Given high BMI, not a candidate for transplant    Plan  See above     Leukocytosis  Assessment & Plan  Suspect secondary to leukemoid stress response from tachyarrhythmia. Do not suspect sepsis     Plan:   Current Antibiotics: none  Continue to monitor fever and WBC curve   Obtain baseline blood cultures for completeness     Hypertension  Assessment & Plan  Currently low-normal to slightly hypotensive in the setting of tachyrhythmia     Plan:  Goal SBP < 160  Goal MAP > 65  Holding home antihypertensive medications:  Metoprolol succinate 75mg q12h   Entreseto 24-26mg BID    Obstructive sleep apnea  Assessment & Plan  Last sleep study 2019  Not compliant with CPAP at home    Plan:   Encourage use   Monitor while in patient and apply CPAP as needed            History of Present Illness     HPI: Vadim Chapa is a 48 y.o. 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 He began having ICD shocks in October 2022 underwent eventual cryoablation with PVI on 7/3/23. He follows with EP who has made modifications to his antiarrythmic regimen. On 12/13/23, he presented to [facility] after feeling palpitations while at home. He was found to be in a wide-complex tachycardia and was cardioverted in the ED for suspected VT. He was started on lidocaine and amiodarone infusions at at that time. Just prior to transport to [facility], he went back into a wide-complex tachycardia and was cardioverted again, this time without conversion to NSR. He arrived to [facility] with heart rate at 150, however, his rhythm did not appear to be VT, but appeared supraventricular. He was given 150mg IV amiodarone, 2g mag, and cardiology was called to bedside for evaluation.     History obtained from chart review and the patient.    Review of Systems   Constitutional: Negative.    HENT: Negative.     Eyes: Negative.    Respiratory: Negative.  Negative for shortness of breath.    Cardiovascular:  Positive for palpitations. Negative for chest pain and leg swelling.   Gastrointestinal: Negative.  Negative for nausea.   Endocrine: Negative.    Genitourinary: Negative.    Musculoskeletal: Negative.    Allergic/Immunologic: Negative.    Neurological: Negative.  Negative for dizziness and light-headedness.   Hematological: Negative.    Psychiatric/Behavioral: Negative.     All other systems reviewed and are negative.    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  No date: Hypertension  06/03/2017: ICD (implantable cardioverter-defibrillator) lead failure  No date: Myocarditis ([facility])  No date: Sleep apnea  No date: Ventricular tachycardia ([facility]) Past Surgical History:  No date: CARDIAC DEFIBRILLATOR  "PLACEMENT  7/3/2023: CARDIAC ELECTROPHYSIOLOGY PROCEDURE; N/A      Comment:  Procedure: Cardiac eps/svt ablation;  Surgeon: Mihai Walter DO;  Location: [facility];  Service:                Cardiology  7/3/2023: CARDIAC ELECTROPHYSIOLOGY PROCEDURE; N/A      Comment:  Procedure: Cardiac eps/afib ablation;  Surgeon: Mihai Walter DO;  [facility]: [facility];  Service:                Cardiology  3/29/2017: GA ESOPHAGOGASTRODUODENOSCOPY TRANSORAL DIAGNOSTIC; N/A      Comment:  Procedure: ESOPHAGOGASTRODUODENOSCOPY (EGD);  Surgeon:                Valeriy Conte MD;  Location: [facility];  Service:                Gastroenterology  6/2/2017: GA INSJ/RPLCMT PERM DFB W/TRNSVNS LDS 1/DUAL CHMBR; N/A      Comment:  Procedure: LEAD EXTRACTION/REIMPLANTATION AND ICD                GENERATOR CHANGE ;  Surgeon: Enrique Warren MD;                 Location: [facility];  Service: Cardiology  5/19/2017: GA LAPAROSCOPY SURG CHOLECYSTECTOMY; N/A      Comment:  Procedure: LAPAROSCOPIC CHOLECYSTECTOMY ;  Surgeon:                Tanner Levya MD;  Location: [facility];  Service:                General  No date: TONSILLECTOMY  No date: TYMPANOSTOMY TUBE PLACEMENT   Current Outpatient Medications   Medication Instructions    amiodarone 200 mg, Oral, Daily    Empagliflozin (JARDIANCE) 10 mg, Oral, Daily    famotidine (PEPCID) 20 mg, Oral, Daily    furosemide (LASIX) 40 mg, Oral, Daily    metoprolol succinate (TOPROL-XL) 75 mg, Oral, 2 times daily    potassium chloride (K-DUR,KLOR-CON) 20 mEq tablet 20 mEq, Oral, Daily    rivaroxaban (XARELTO) 20 mg, Oral, Daily with dinner    sacubitril-valsartan (ENTRESTO) 24-26 MG TABS 1 tablet, Oral, 2 times daily    spironolactone (ALDACTONE) 25 mg, Oral, Daily    traZODone (DESYREL) 50 mg, Oral, Daily at bedtime    Allergies   Allergen Reactions    Vicodin [Hydrocodone-Acetaminophen] Other (See Comments)     \"i get nasty\"      Social History     Tobacco " "Use    Smoking status: Never    Smokeless tobacco: Never   Vaping Use    Vaping status: Never Used   Substance Use Topics    Alcohol use: Not Currently     Comment: OCCASIONAL; history of binge drinking -  drinking \"a lot\" on Fridays; stopped this about in late 2022. (Updated 12/04/2023).    Drug use: No    Family History   Problem Relation Age of Onset    No Known Problems Mother     No Known Problems Father     Sudden death Neg Hx     Heart disease Neg Hx           Objective                            Vitals I/O      Most Recent Min/Max in 24hrs   Temp   Temp  Min: 98.5 °F (36.9 °C)  Max: 98.5 °F (36.9 °C)   Pulse   Pulse  Min: 69  Max: 156   Resp   Resp  Min: 11  Max: 27   BP   BP  Min: 88/67  Max: 172/108   O2 Sat   SpO2  Min: 96 %  Max: 100 %    No intake or output data in the 24 hours ending 12/13/23 2137    Diet NPO; Sips with meds    Invasive Monitoring           Physical Exam   Physical Exam  Vitals and nursing note reviewed.   Eyes:      Conjunctiva/sclera: Conjunctivae normal.      Pupils: Pupils are equal, round, and reactive to light.   Skin:     General: Skin is cool.      Capillary Refill: Capillary refill takes 2 to 3 seconds.      Comments: Feet cool up to mid calf    HENT:      Head: Normocephalic and atraumatic.      Mouth/Throat:      Lips: Pink.      Mouth: Mucous membranes are dry.   Cardiovascular:      Rate and Rhythm: Tachycardia present. Rhythm irregular. No extrasystoles are present.     Pulses:           Radial pulses are 2+ on the right side and 2+ on the left side.        Dorsalis pedis pulses are 1+ on the right side and 1+ on the left side.      Heart sounds: Normal heart sounds.   Musculoskeletal:      Right lower leg: No edema.      Left lower leg: No edema.   Abdominal: General: Abdomen is protuberant. There is no distension.      Palpations: Abdomen is soft.      Tenderness: There is no abdominal tenderness.   Constitutional:       General: He is awake. He is not in acute " distress.     Appearance: He is well-developed and well-nourished. He is obese.   Pulmonary:      Effort: Pulmonary effort is normal.      Breath sounds: Examination of the right-lower field reveals decreased breath sounds. Examination of the left-lower field reveals decreased breath sounds. Decreased breath sounds present.   Psychiatric:         Behavior: Behavior is cooperative.   Neurological:      General: No focal deficit present.      Mental Status: He is alert.      GCS: GCS eye subscore is 4. GCS verbal subscore is 5. GCS motor subscore is 6.            Diagnostic Studies      EK:1 rapid atrial flutter   Imaging: No new imaging I have personally reviewed pertinent films in PACS     Medications:  Scheduled PRN   amiodarone 150 mg in dextrose 5 % 100 mL IV bolus, 150 mg, Once  amiodarone, ,   chlorhexidine, 15 mL, Q12H SAMUEL  magnesium sulfate, 2 g, Once      amiodarone, ,   heparin (porcine), 2,000 Units, Q6H PRN  heparin (porcine), 4,000 Units, Q6H PRN       Continuous    amiodarone (CORDARONE) 900 mg in dextrose 5 % 500 mL infusion, 1 mg/min  heparin (porcine), 3-20 Units/kg/hr (Order-Specific)  lidocaine in dextrose 5%, 1 mg/min         Labs:    CBC    Recent Labs     23  1526   WBC 12.81*   HGB 18.4*   HCT 55.5*      BANDSPCT 1     BMP    Recent Labs     23  1526   SODIUM 135   K 4.5      CO2 26   AGAP 8   BUN 22   CREATININE 1.35*   CALCIUM 9.3       Coags    Recent Labs     23  1526   INR 1.34*        Additional Electrolytes  No recent results       Blood Gas    No recent results  No recent results LFTs  Recent Labs     23  1526   ALT 47   AST 38   ALKPHOS 84   ALB 4.4   TBILI 1.39*       Infectious  No recent results  Glucose  Recent Labs     23  1526   GLUC 132               Anticipated Length of Stay is > 2 midnights  VILMA Saldana

## 2023-12-14 NOTE — PROCEDURES
Electrical Cardioversion    Date/Time: 12/14/2023 12:59 AM    Performed by: VILMA Saldana  Authorized by: VILMA Saldana    Verbal consent obtained?: Yes    Risks and benefits: Risks, benefits and alternatives were discussed    Consent given by:  Patient  Patient states understanding of procedure being performed: Yes    Relevant documents present and verified: Yes    Test results available and properly labeled: Yes    Radiology Images displayed and confirmed. If images not available, report reviewed: Yes    Required items: Required blood products, implants, devices and special equipment available    Patient identity confirmed:  Verbally with patient  Time out: Immediately prior to the procedure a time out was called    Patient sedated: Yes    Sedation type: moderate (conscious) sedation    Sedation:  Ketamine and midazolam  Analgesia:  Fentanyl  Sedation start:  12/14/2023 12:58 AM  Sedation end:  12/14/2023 1:03 AM  Vital signs: Vital signs monitored during sedation    Cardioversion basis:  Elective  Elective indications: failure of anti-arrhythmic medications and hemodynamic instability    Pre-procedure rhythm:  Atrial flutter  Chest area exposed: Chest area was exposed    Electrodes:  Pads  Electrodes placed:  Anterior-posterior  Number of attempts:  1  Attempt 1:     Attempt 1 mode:  Synchronous    Attempt 1 shock (Joules):  150    Attempt 1 outcome:  Conversion to normal sinus rhythm  Post-procedure rhythm:  Normal sinus rhythm  Complications: no complications    Patient tolerance:  Patient tolerated the procedure well with no immediate complications

## 2023-12-14 NOTE — ED NOTES
[facility] here to transport patient to [facility] at this time.       Naomi Sanchez RN  12/13/23 2019

## 2023-12-14 NOTE — UTILIZATION REVIEW
Initial Clinical Review    Admission: Date/Time/Statement:   Admission Orders (From admission, onward)       Ordered        12/13/23 2110  Inpatient Admission  Once                          Orders Placed This Encounter   Procedures    Inpatient Admission     Standing Status:   Standing     Number of Occurrences:   1     Order Specific Question:   Level of Care     Answer:   Critical Care [15]     Order Specific Question:   Estimated length of stay     Answer:   More than 2 Midnights     Order Specific Question:   Certification     Answer:   I certify that inpatient services are medically necessary for this patient for a duration of greater than two midnights. See H&P and MD Progress Notes for additional information about the patient's course of treatment.     ED Arrival Information       Patient not seen in ED                       No chief complaint on file.      Initial Presentation: 48 y.o. male w/ HTN, CONCHIS, PAF on Xarelto, chronic HFrEF 15% d/t dilated cardiomyopathy s/p single-chamber was transferred from [facility] to [facility] for a higher level of care.  Pt initially presented to [facility] w/ palpitations. He was found to be in a wide-complex tachycardia and was cardioverted in the ED for suspected VT. started on lidocaine and amiodarone infusions. prior to transport to [facility], he went back into a wide-complex tachycardia and was cardioverted again, this time without conversion to NSR.   Arrived at [facility] w/ HR at 150s, rhythm did not appear to be VT, but appeared supraventricular, given 150mg IV amiodarone, 2g mag.    Admit as Inpatient for evaluation and treatment of wide complex tachycardia, HOANG  Plan:  Continue antiarrythmic's: Lidocaine 1mg/min. Amiodarone 1mg/min. Amiodarone 150mg x1 now. 2g mag now. Holding home AV lou blockers now in the setting of marginal BP. Optimize electrolytes: K > 4.0, Mag > 2.0. Plan for ICD interrogation tonight . Possible electrical cardioversion. Consult EP. Holding home  diuresis for now. Keep euvolemic today. Daily weights. Heart failure cardiology consulted . Goal HR < 110. Continue to monitor telemetry. Strict q4h I/O monitoring. Monitor without boateng catheter. Continue to follow renal function tests. Continue to monitor fever and WBC curve. Obtain baseline blood cultures.     Date: 12/14   Day 2:   Critical Care Notes: No acute events ovn. Pt was successfully cardioverted to NSR/SB. Low BP with MAP's > 65. Creatinine improving. Cont to mon on tele. Cont on lidocaine, amio and hep gtt. Optimize electrolytes: K > 4.0, mag > 2.0. cont to moon renal function.     EP Consult: Wide-complex tachycardia-Pt was  found to be in a wide-complex tachyarrhythmia at [facility].   However, he was treated with x 2 cardioversion at 150 J, was started on lidocaine drip at 1 mg/min and amiodarone at 1 mg/min and was transferred to [facility]. noted to be in ventricular tachycardia at [facility], treated with amiodarone bolus and cardioversion x 1 which put the patient back in normal sinus rhythm. Has stayed in NSR since.   Plan: Discontinue IV Lidocaine, transition to mexiletine 200 mg p.o. every 8. Will schedule the patient for dual-chamber ICD upgrade and VT ablation.  Switch amiodarone to oral Amio 200 mg daily.  Close hemodynamic monitoring.  Diuresis with furosemide 40 mg IV newly, monitor electrolytes.    ED Triage Vitals [12/13/23 2112]   Temperature Pulse Respirations Blood Pressure SpO2   98.2 °F (36.8 °C) (!) 153 (!) 10 (!) 87/57 96 %      Temp Source Heart Rate Source Patient Position - Orthostatic VS BP Location FiO2 (%)   Oral Monitor Sitting Left arm --      Pain Score       No Pain          Wt Readings from Last 1 Encounters:   12/14/23 (!) 140 kg (308 lb 10.3 oz)     Additional Vital Signs:   Date/Time Temp Pulse Resp BP MAP (mmHg) SpO2 Calculated FIO2 (%) - Nasal Cannula Nasal Cannula O2 Flow Rate (L/min) O2 Device Patient Position - Orthostatic VS   12/14/23 1000 -- 60 11 Abnormal  106/59 77 96  % -- -- -- --   12/14/23 0900 -- 56 12 92/55 69 96 % -- -- -- --   12/14/23 0800 -- 66 27 Abnormal  94/60 71 93 % -- -- -- --   12/14/23 0755 97.8 °F (36.6 °C) 58 15 93/55 69 96 % 28 2 L/min Nasal cannula Lying   12/14/23 0600 -- 52 Abnormal  18 87/52 Abnormal  63 Abnormal  97 % -- -- -- --   12/14/23 0500 -- 58 17 91/60 71 97 % -- -- -- --   12/14/23 0400 98.4 °F (36.9 °C) 52 Abnormal  19 94/51 70 97 % 28 2 L/min Nasal cannula Lying   12/14/23 0300 -- 55 16 87/54 Abnormal  65 97 % -- -- -- --   12/14/23 0155 -- 58 19 86/53 Abnormal  65 96 % -- -- -- --   12/14/23 0140 -- 59 20 87/54 Abnormal  66 96 % -- -- -- --   12/14/23 0125 -- 62 21 88/55 Abnormal  67 96 % -- -- -- --   12/14/23 0105 -- 69 21 95/57 69 95 % -- -- -- --   12/14/23 0100 -- 73 17 97/55 71 96 % -- -- -- --   12/14/23 0057 -- -- -- 96/57 71 -- -- -- -- --   12/14/23 0056 -- 81 21 71/50 Abnormal  56 Abnormal  97 % -- -- -- --   12/14/23 0054 -- 141 Abnormal  13 -- -- 97 % -- -- -- --   12/14/23 0051 -- 141 Abnormal  20 95/50 65 97 % -- -- -- --   12/14/23 0031 -- 144 Abnormal  13 99/66 77 95 % -- -- -- --   12/14/23 0000 -- 144 Abnormal  14 92/51 66 96 % -- -- -- --   12/13/23 2300 -- 144 Abnormal  18 92/53 69 96 % -- -- -- --   12/13/23 2231 -- 144 Abnormal  18 99/68 75 98 % -- -- -- --   12/13/23 2222 -- 144 Abnormal  18 82/63 Abnormal  69 94 % -- -- -- --   12/13/23 2210 -- 144 Abnormal  18 89/73 Abnormal  78 96 % -- -- -- --   12/13/23 2201 -- 144 Abnormal  16 85/61 Abnormal  69 93 % -- -- -- --   12/13/23 2140 -- 147 Abnormal  13 82/57 Abnormal  66 94 % -- -- -- --   12/13/23 2136 -- 146 Abnormal  14 81/57 Abnormal  65 96 % -- -- -- --   12/13/23 2128 -- 150 Abnormal  13 101/69 74 94 % -- -- -- --   12/13/23 2122 -- 150 Abnormal  12 97/52 66 95 % -- -- -- --   12/13/23 2114 -- 116 Abnormal  11 Abnormal  89/54 Abnormal  66 96 % -- -- -- --       Pertinent Labs/Diagnostic Test Results:   12/13  EKG result: Sinus tachycardia  Anterolateral infarct ,  age undetermined      Results from last 7 days   Lab Units 12/14/23  0425 12/13/23 2147 12/13/23  1526   WBC Thousand/uL 13.49* 17.19* 12.81*   HEMOGLOBIN g/dL 15.3 17.2* 18.4*   HEMATOCRIT % 47.0 51.0* 55.5*   PLATELETS Thousands/uL 218 266 293   BANDS PCT %  --   --  1         Results from last 7 days   Lab Units 12/14/23  0451 12/14/23  0431 12/13/23 2147 12/13/23  1526   SODIUM mmol/L 137  --  133* 135   POTASSIUM mmol/L 4.0  --  6.1* 4.5   CHLORIDE mmol/L 108  --  107 101   CO2 mmol/L 22  --  19* 26   ANION GAP mmol/L 7  --  7 8   BUN mg/dL 17  --  21 22   CREATININE mg/dL 1.05  --  1.15 1.35*   EGFR ml/min/1.73sq m 84  --  75 62   CALCIUM mg/dL 7.3*  --  7.8* 9.3   CALCIUM, IONIZED mmol/L  --  1.08*  --   --    MAGNESIUM mg/dL 2.5  --   --   --    PHOSPHORUS mg/dL 3.2  --   --   --      Results from last 7 days   Lab Units 12/14/23 0451 12/13/23  1526   AST U/L 29 38   ALT U/L 33 47   ALK PHOS U/L 49 84   TOTAL PROTEIN g/dL 5.4* 8.1   ALBUMIN g/dL 3.1* 4.4   TOTAL BILIRUBIN mg/dL 1.49* 1.39*         Results from last 7 days   Lab Units 12/14/23  0451 12/13/23 2147 12/13/23  1526   GLUCOSE RANDOM mg/dL 105 151* 132           Results from last 7 days   Lab Units 12/13/23  1726 12/13/23  1526   HS TNI 0HR ng/L  --  34   HS TNI 2HR ng/L 52*  --    HSTNI D2 ng/L 18  --          Results from last 7 days   Lab Units 12/14/23  0357 12/13/23 2147 12/13/23  1526   PROTIME seconds  --   --  17.3*   INR   --   --  1.34*   PTT seconds 54* 30  --      Results from last 7 days   Lab Units 12/13/23  1526   TSH 3RD GENERATON uIU/mL 1.292         Results from last 7 days   Lab Units 12/13/23  2147   LACTIC ACID mmol/L 1.7           Results from last 7 days   Lab Units 12/13/23  2217   CLARITY UA  Clear   COLOR UA  Light Yellow   SPEC GRAV UA  1.023   PH UA  5.5   GLUCOSE UA mg/dl >=1000 (1%)*   KETONES UA mg/dl Negative   BLOOD UA  Negative   PROTEIN UA mg/dl Negative   NITRITE UA  Negative   BILIRUBIN UA  Negative    UROBILINOGEN UA (BE) mg/dl <2.0   LEUKOCYTES UA  Elevated glucose may cause decreased leukocyte values. See urine microscopic for UWBC result*   WBC UA /hpf 1-2   RBC UA /hpf None Seen   BACTERIA UA /hpf None Seen   EPITHELIAL CELLS WET PREP /hpf None Seen           Results from last 7 days   Lab Units 12/13/23  2481   BLOOD CULTURE  Received in Microbiology Lab. Culture in Progress.  Received in Microbiology Lab. Culture in Progress.                   ED Treatment:   Medication Administration - No Administrations Displayed (No Start Event Found)       None          Past Medical History:   Diagnosis Date    A-fib (HCC) 2020    AICD (automatic cardioverter/defibrillator) present     CHF (congestive heart failure) ([facility])     Coxsackie virus infection 2017    Hypertension     ICD (implantable cardioverter-defibrillator) lead failure 06/03/2017    Myocarditis ([facility])     Sleep apnea     Ventricular tachycardia ([facility])      Present on Admission:   Obstructive sleep apnea   Hypertension   Dilated cardiomyopathy ([facility])   Wide-complex tachycardia   Chronic HFrEF (heart failure with reduced ejection fraction) ([facility])   HOANG (acute kidney injury) ([facility])   Paroxysmal atrial fibrillation ([facility])   Leukocytosis      Admitting Diagnosis: V-tach ([facility]) [I47.20]  Age/Sex: 48 y.o. male  Admission Orders:  SCD  Cardio-Pulmonary Monitoring, Neuro Checks, Nursing dysphagia screen, I/O, Daily weights, Vital signs  NPO    Scheduled Medications:  chlorhexidine, 15 mL, Mouth/Throat, Q12H SAMUEL  famotidine, 20 mg, Oral, BID AC      Continuous IV Infusions:  amiodarone (CORDARONE) 900 mg in dextrose 5 % 500 mL infusion, 1 mg/min, Intravenous, Continuous  heparin (porcine), 3-20 Units/kg/hr (Order-Specific), Intravenous, Titrated  lidocaine in dextrose 5%, 1 mg/min, Intravenous, Continuous      PRN Meds:  heparin (porcine), 2,000 Units, Intravenous, Q6H PRN 12/14 x 1  heparin (porcine), 4,000 Units, Intravenous, Q6H PRN        IP CONSULT TO CASE  MANAGEMENT  IP CONSULT TO HEART FAILURE SERVICE  IP CONSULT TO ELECTROPHYSIOLOGY    Network Utilization Review Department  ATTENTION: Please call with any questions or concerns to [phone] and carefully listen to the prompts so that you are directed to the right person. All voicemails are confidential.   For Discharge needs, contact Care Management DC Support Team at [phone] opt. [phone]  Send all requests for admission clinical reviews, approved or denied determinations and any other requests to dedicated fax number below belonging to the campus where the patient is receiving treatment. List of dedicated fax numbers for the Facilities:  FACILITY NAME UR FAX NUMBER   ADMISSION DENIALS (Administrative/Medical Necessity) [phone]   DISCHARGE SUPPORT TEAM (NETWORK) [phone]   PARENT CHILD HEALTH (Maternity/NICU/Pediatrics) [phone]   [facility] [phone]   [facility] [phone]   [facility] [phone]   [facility] [phone]   [facility] [phone]   [facility] [phone]   [facility] [phone]   [facility] [phone]   [facility] [phone]   [facility] [phone]   [facility] [phone]

## 2023-12-14 NOTE — ASSESSMENT & PLAN NOTE
Currently in wide complex tachycardia, which is suspicious for 1:1 atrial flutter versus true ventricular arrhythmia   Known history of atrial fibrillation, atrial flutter, and rapid ventricular responses     Plan:  Goal HR < 110   Holding all AV lou blockers at this time given low BP  Metoprolol succinate 75mg q12h   Continue current antiarrythmics:   Amiodarone 1 mg/min   Lidocaine 1mg/min    Optimize electrolytes: K > 4.0, mag > 2.0   Systemic anticoagulation: Heparin drip   In place of home Xarelto 20mg daily    Continue to monitor telemetry

## 2023-12-14 NOTE — ASSESSMENT & PLAN NOTE
Suspect secondary to leukemoid stress response from tachyarrhythmia. Do not suspect sepsis     Plan:   Current Antibiotics: none  Continue to monitor fever and WBC curve   Obtain baseline blood cultures for completeness

## 2023-12-14 NOTE — ED CARE HANDOFF
Emergency Department Sign Out Note        Sign out and transfer of care from Dr. Milo Reid. See Separate Emergency Department note. The patient, Bertram Kee, was evaluated by the previous provider for further monitoring and transfer to [facility]. Workup Completed:  Labs, Imaging, procedure    ED Course / Workup Pending (followup):  Close monitoring and transfer. Just prior to transfer patient's went back into V. tach. Patient's blood pressure slowly decreased. Patient went down to 70 systolic. Patient remained neurologically intact and  was answering questions appropriately. Patient's symptoms were lightheadedness and palpitations. Proceeded to cardiovert patient prior to flight transfer. Etomidate was given. Patient was shocked with 100 J. Patient converted into sinus rhythm. Patient's blood pressure normalized. He tolerated the procedure. Patient was then set to transfer. Patient then returned into [facility]. However at this time blood pressure was stable in the 120s. Patient left with flight team to [facility]. Procedures  Medical Decision Making  Amount and/or Complexity of Data Reviewed  Labs: ordered. Radiology: ordered. Risk  Prescription drug management. Disposition  Final diagnoses:   None     ED Disposition       ED Disposition   Transfer to Another Facility-In Network    Condition   --    Date/Time   Wed Dec 13, 2023  5:33 PM    Comment   Bertram Kee should be transferred out to [facility].                MD Documentation      Flowsheet Row Most Recent Value   Patient Condition An emergency transfer is being made prior to stabilization due to the need for definitive care and the benefit of transfer outweighs the risk, The patient has been stabilized such that within reasonable medical probability, no material deterioration of the patient condition or the condition of the unborn child(savanna) is likely to result from the transfer   Reason for Transfer Level of Care needed not available at this facility   Benefits of Transfer Specialized equipment and/or services available at the receiving facility (Include comment)________________________  [ep]   Risks of Transfer Potential deterioration of medical condition, Loss of IV, Possible worsening of condition or death during transfer   Accepting Physician Vladimir   Provider Certification General risk, such as traffic hazards, adverse weather conditions, rough terrain or turbulence, possible failure of equipment (including vehicle or aircraft), or consequences of actions of persons outside the control of the transport personnel, Risk of worsening condition          RN Documentation      Flowsheet Row Most Recent Value   Level of Care Advanced life support          Follow-up Information    None       Discharge Medication List as of 12/13/2023  8:54 PM        CONTINUE these medications which have NOT CHANGED    Details   amiodarone 200 mg tablet Take 1 tablet (200 mg total) by mouth daily, Starting Mon 12/11/2023, Until Thu 12/5/2024, Normal      Empagliflozin (JARDIANCE) 10 MG TABS tablet Take 1 tablet (10 mg total) by mouth daily, Starting Thu 12/7/2023, Until Sat 1/6/2024, Normal      famotidine (PEPCID) 20 mg tablet Take 1 tablet (20 mg total) by mouth daily, Starting Thu 7/6/2023, Until Mon 12/11/2023, Normal      furosemide (LASIX) 40 mg tablet Take 1 tablet (40 mg total) by mouth daily, Starting Mon 12/11/2023, Until Thu 12/5/2024, Normal      metoprolol succinate (TOPROL-XL) 25 mg 24 hr tablet Take 3 tablets (75 mg total) by mouth 2 (two) times a day, Starting Mon 12/11/2023, Until Thu 12/5/2024, Normal      potassium chloride (K-DUR,KLOR-CON) 20 mEq tablet Take 1 tablet (20 mEq total) by mouth daily, Starting Mon 12/11/2023, Normal      rivaroxaban (Xarelto) 20 mg tablet Take 1 tablet (20 mg total) by mouth daily with dinner, Starting Mon 12/11/2023, Normal      sacubitril-valsartan (ENTRESTO) 24-26 MG TABS Take 1 tablet by mouth 2 (two) times a day, Starting Mon 12/11/2023, Until Thu 12/5/2024, Normal      spironolactone (ALDACTONE) 25 mg tablet Take 1 tablet (25 mg total) by mouth daily, Starting Mon 12/11/2023, Normal      traZODone (DESYREL) 50 mg tablet Take 1 tablet (50 mg total) by mouth daily at bedtime for 7 days, Starting u 12/7/2023, Until u 12/14/2023, Normal           No discharge procedures on file.        ED Provider  Electronically Signed by     Geovany Garcia DO  12/14/23 7817

## 2023-12-15 ENCOUNTER — PATIENT OUTREACH (OUTPATIENT)
Dept: FAMILY MEDICINE CLINIC | Facility: CLINIC | Age: 48
End: 2023-12-15

## 2023-12-15 LAB
ANION GAP SERPL CALCULATED.3IONS-SCNC: 8 MMOL/L
APTT PPP: 70 SECONDS (ref 23–37)
BUN SERPL-MCNC: 16 MG/DL (ref 5–25)
CA-I BLD-SCNC: 1.14 MMOL/L (ref 1.12–1.32)
CALCIUM SERPL-MCNC: 8.7 MG/DL (ref 8.4–10.2)
CHLORIDE SERPL-SCNC: 100 MMOL/L (ref 96–108)
CO2 SERPL-SCNC: 25 MMOL/L (ref 21–32)
CREAT SERPL-MCNC: 1.08 MG/DL (ref 0.6–1.3)
ERYTHROCYTE [DISTWIDTH] IN BLOOD BY AUTOMATED COUNT: 15.2 % (ref 11.6–15.1)
GFR SERPL CREATININE-BSD FRML MDRD: 81 ML/MIN/1.73SQ M
GLUCOSE SERPL-MCNC: 110 MG/DL (ref 65–140)
HCT VFR BLD AUTO: 49.2 % (ref 36.5–49.3)
HGB BLD-MCNC: 16.3 G/DL (ref 12–17)
MAGNESIUM SERPL-MCNC: 2.1 MG/DL (ref 1.9–2.7)
MCH RBC QN AUTO: 29.5 PG (ref 26.8–34.3)
MCHC RBC AUTO-ENTMCNC: 33.1 G/DL (ref 31.4–37.4)
MCV RBC AUTO: 89 FL (ref 82–98)
PHOSPHATE SERPL-MCNC: 3.6 MG/DL (ref 2.7–4.5)
PLATELET # BLD AUTO: 186 THOUSANDS/UL (ref 149–390)
PMV BLD AUTO: 10.5 FL (ref 8.9–12.7)
POTASSIUM SERPL-SCNC: 4.8 MMOL/L (ref 3.5–5.3)
RBC # BLD AUTO: 5.53 MILLION/UL (ref 3.88–5.62)
SODIUM SERPL-SCNC: 133 MMOL/L (ref 135–147)
WBC # BLD AUTO: 7.39 THOUSAND/UL (ref 4.31–10.16)

## 2023-12-15 PROCEDURE — 84100 ASSAY OF PHOSPHORUS: CPT | Performed by: NURSE PRACTITIONER

## 2023-12-15 PROCEDURE — 99233 SBSQ HOSP IP/OBS HIGH 50: CPT | Performed by: STUDENT IN AN ORGANIZED HEALTH CARE EDUCATION/TRAINING PROGRAM

## 2023-12-15 PROCEDURE — 80048 BASIC METABOLIC PNL TOTAL CA: CPT | Performed by: NURSE PRACTITIONER

## 2023-12-15 PROCEDURE — 99232 SBSQ HOSP IP/OBS MODERATE 35: CPT | Performed by: INTERNAL MEDICINE

## 2023-12-15 PROCEDURE — NC001 PR NO CHARGE: Performed by: PHYSICIAN ASSISTANT

## 2023-12-15 PROCEDURE — 85730 THROMBOPLASTIN TIME PARTIAL: CPT | Performed by: STUDENT IN AN ORGANIZED HEALTH CARE EDUCATION/TRAINING PROGRAM

## 2023-12-15 PROCEDURE — 82330 ASSAY OF CALCIUM: CPT | Performed by: NURSE PRACTITIONER

## 2023-12-15 PROCEDURE — 85027 COMPLETE CBC AUTOMATED: CPT | Performed by: NURSE PRACTITIONER

## 2023-12-15 PROCEDURE — 83735 ASSAY OF MAGNESIUM: CPT | Performed by: NURSE PRACTITIONER

## 2023-12-15 RX ORDER — AMIODARONE HYDROCHLORIDE 200 MG/1
200 TABLET ORAL DAILY
Status: DISCONTINUED | OUTPATIENT
Start: 2023-12-15 | End: 2023-12-17 | Stop reason: HOSPADM

## 2023-12-15 RX ORDER — FUROSEMIDE 40 MG/1
40 TABLET ORAL DAILY
Status: DISCONTINUED | OUTPATIENT
Start: 2023-12-15 | End: 2023-12-17 | Stop reason: HOSPADM

## 2023-12-15 RX ORDER — SPIRONOLACTONE 25 MG/1
25 TABLET ORAL DAILY
Status: DISCONTINUED | OUTPATIENT
Start: 2023-12-15 | End: 2023-12-17 | Stop reason: HOSPADM

## 2023-12-15 RX ADMIN — CHLORHEXIDINE GLUCONATE 15 ML: 1.2 SOLUTION ORAL at 08:43

## 2023-12-15 RX ADMIN — AMIODARONE HYDROCHLORIDE 1 MG/MIN: 50 INJECTION, SOLUTION INTRAVENOUS at 13:14

## 2023-12-15 RX ADMIN — CHLORHEXIDINE GLUCONATE 15 ML: 1.2 SOLUTION ORAL at 21:14

## 2023-12-15 RX ADMIN — RIVAROXABAN 20 MG: 20 TABLET, FILM COATED ORAL at 09:32

## 2023-12-15 RX ADMIN — FAMOTIDINE 20 MG: 20 TABLET, FILM COATED ORAL at 17:19

## 2023-12-15 RX ADMIN — FAMOTIDINE 20 MG: 20 TABLET, FILM COATED ORAL at 06:07

## 2023-12-15 RX ADMIN — FUROSEMIDE 40 MG: 40 TABLET ORAL at 09:32

## 2023-12-15 RX ADMIN — AMIODARONE HYDROCHLORIDE 200 MG: 200 TABLET ORAL at 09:32

## 2023-12-15 RX ADMIN — MEXILETINE HYDROCHLORIDE 150 MG: 150 CAPSULE ORAL at 05:56

## 2023-12-15 RX ADMIN — MEXILETINE HYDROCHLORIDE 150 MG: 150 CAPSULE ORAL at 21:14

## 2023-12-15 RX ADMIN — MEXILETINE HYDROCHLORIDE 150 MG: 150 CAPSULE ORAL at 14:23

## 2023-12-15 RX ADMIN — LOSARTAN POTASSIUM 25 MG: 25 TABLET, FILM COATED ORAL at 08:43

## 2023-12-15 RX ADMIN — SACUBITRIL AND VALSARTAN 1 TABLET: 24; 26 TABLET, FILM COATED ORAL at 21:14

## 2023-12-15 RX ADMIN — SACUBITRIL AND VALSARTAN 1 TABLET: 24; 26 TABLET, FILM COATED ORAL at 10:58

## 2023-12-15 RX ADMIN — SPIRONOLACTONE 25 MG: 25 TABLET ORAL at 09:32

## 2023-12-15 NOTE — PROGRESS NOTES
Critical Care Interval Transfer Note:    Please refer to progress note from earlier today for full details.     Barriers to discharge:   EP planning for VT ablation and device upgrade     Consults: IP CONSULT TO CASE MANAGEMENT  IP CONSULT TO HEART FAILURE SERVICE  IP CONSULT TO ELECTROPHYSIOLOGY      Discharge Plan: Anticipate discharge in 24-48 hrs to home.      Patient seen and evaluated by Critical Care today and deemed to be appropriate for transfer to Med Surg with Telemetry. Spoke to Dr. Cantrell from [facility] to accept transfer. Critical care can be contacted via [employer] with any questions or concerns.

## 2023-12-15 NOTE — PROGRESS NOTES
[facility]  Progress Note  Name: Vadim Chapa I  MRN: [id number]  [facility] I Date of Admission: 12/13/2023   Date of Service: 12/15/2023 I Hospital Day: 2    Assessment/Plan   * Wide-complex tachycardia  Assessment & Plan  Patient with known history of both VT as well as atrial fibrillation and atrial flutter. Suspect 1:1 atrial flutter based on EKG   12/13/23: Presented to [facility] ED with palpitations and fatigue. Was noted to be in a wide complex tachycardia. Cardioversion x1 with successful conversion to NSR. Had recurrent wide complex tachycardia with unsuccessful cardioversion. Given 150mg IV amiodarone and started on infusion of amiodarone and lidocaine. Amiodarone 150mg x1 given on arrival   12/14/23: 150mg amiodarone + Cardioversion (150J) x1 to NSR   Lidocaine infusion stopped 12/14/23    Plan:   Continue antiarrhythmics:   Amiodarone 1mg/min  Mexiletine 150mg q8h   Transition off amiodarone infusion today  Likely consideration for VT ablation with EP   Holding home AV lou blockers now in the setting of marginal BP   Metoprolol succinate 75mg q12h   Optimize electrolytes: K > 4.0, Mag > 2.0  EP consulted, follow up recommendations     Chronic HFrEF (heart failure with reduced ejection fraction) (HCC)  Assessment & Plan  Secondary to nonischemic, 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 Wall thickness is normal. LVEF 15% by biplane measurement. Systolic function is severely reduced. Severe global hypokinesis. No thrombus. RV normal. Mild to moderate MR. Mild TR.    Plan:   Diuresis PRN   Goal negative 500-1L today as tolerated  Daily weights  Losartan 25mg daily   In place of Entresto 24-26mg BID   Holding home metoprolol succiante 75mg q12h   Heart failure cardiology consulted     Paroxysmal atrial fibrillation (HCC)  Assessment & Plan  Currently in wide complex  tachycardia, which is suspicious for 1:1 atrial flutter versus true ventricular arrhythmia   Known history of atrial fibrillation, atrial flutter, and rapid ventricular responses     Plan:  Goal HR < 110   Holding all AV lou blockers at this time given low BP  Metoprolol succinate 75mg q12h   Continue current antiarrythmics:   Amiodarone 1 mg/min   Mexiletine 150mg q8h   Transition off IV amiodarone today   Optimize electrolytes: K > 4.0, mag > 2.0   Systemic anticoagulation: Heparin drip   In place of home Xarelto 20mg daily    Continue to monitor telemetry       HOANG (acute kidney injury) (HCC)  Assessment & Plan  Secondary to tachyarrhythmia, likely causing poor renal perfusion in the setting of ongoing diuretic use   Improving back in sinus rhythm   Baseline creatinine: 0.93 - 1.08  Initial creatinine: 1.35 (12/13/23)   Last creatinine 1.05 (12/14/23)     Plan:   Strict q4h I/O monitoring  Monitor without obateng catheter   Continue to follow renal function tests  Gentle diuresis   Continue supportive are     Dilated cardiomyopathy (HCC)  Assessment & Plan  Secondary to suspected coxsackievirus    Follow with heart failure cardiology as outpatient   Given high BMI, not a candidate for transplant    Plan  See above    Leukocytosis  Assessment & Plan  Suspect secondary to leukemoid stress response from tachyarrhythmia. Do not suspect sepsis   12/13/23 BCXx2: PENDING     Plan:   Current Antibiotics: none  Continue to monitor fever and WBC curve   Follow up blood cultures     Hypertension  Assessment & Plan  Hypotension associated with tachyrhythmia now resolved     Plan:  Goal SBP < 160  Goal MAP > 65  Continue losartan 25mg daily   In place of Entreseto 24-26mg BID  Holding home metoprolol succinate 75mg q12h       Obstructive sleep apnea  Assessment & Plan  Last sleep study 2019  Not compliant with CPAP at home    Plan:   Encourage use   Monitor while in patient and apply CPAP as needed              Disposition:  Med Surg with Telemetry    ICU Core Measures     A: Assess, Prevent, and Manage Pain Has pain been assessed? Yes  Need for changes to pain regimen? No   B: Both SAT/SAT  N/A   C: Choice of Sedation RASS Goal: 0 Alert and Calm or N/A patient not on sedation  Need for changes to sedation or analgesia regimen? NA   D: Delirium CAM-ICU: Negative   E: Early Mobility  Plan for early mobility? Yes   F: Family Engagement Plan for family engagement today? Yes         Prophylaxis:  VTE VTE covered by:  heparin (porcine), Intravenous, 13.1 Units/kg/hr at 12/14/23 2116  heparin (porcine), Intravenous, 2,000 Units at 12/14/23 0600  heparin (porcine), Intravenous       Stress Ulcer  covered byfamotidine (PEPCID) 20 mg tablet [[id number]] (Long-Term Med), famotidine (PEPCID) tablet 20 mg [[id number]]         Significant 24hr Events     24hr events: No acute events overnight      Subjective   Review of Systems   Constitutional:  Positive for fatigue.   Respiratory:  Negative for shortness of breath.    Cardiovascular:  Negative for chest pain and palpitations.   Gastrointestinal:  Negative for nausea.   Neurological:  Negative for dizziness.      Objective                            Vitals I/O      Most Recent Min/Max in 24hrs   Temp 97.9 °F (36.6 °C) Temp  Min: 97.8 °F (36.6 °C)  Max: 97.9 °F (36.6 °C)   Pulse 62 Pulse  Min: 52  Max: 76   Resp 17 Resp  Min: 9  Max: 36   /58 BP  Min: 87/52  Max: 136/87   O2 Sat 95 % SpO2  Min: 91 %  Max: 99 %      Intake/Output Summary (Last 24 hours) at 12/15/2023 0518  Last data filed at 12/15/2023 0400  Gross per 24 hour   Intake 2135.46 ml   Output 3375 ml   Net -1239.54 ml       Diet Cardiovascular; Cardiac; Consistent Carbohydrate Diet Level 2 (5 carb servings/75 grams CHO/meal), Fluid Restriction 1800 ML    Invasive Monitoring           Physical Exam   Physical Exam  Vitals and nursing note reviewed.   Eyes:      Conjunctiva/sclera: Conjunctivae normal.      Pupils: Pupils are equal,  round, and reactive to light.   Skin:     General: Skin is warm and dry.      Capillary Refill: Capillary refill takes less than 2 seconds.   HENT:      Head: Normocephalic and atraumatic.      Mouth/Throat:      Lips: Pink.      Mouth: Mucous membranes are dry.   Cardiovascular:      Rate and Rhythm: Normal rate and regular rhythm.      Pulses:           Radial pulses are 2+ on the right side and 2+ on the left side.        Dorsalis pedis pulses are 2+ on the right side and 2+ on the left side.      Heart sounds: Normal heart sounds.   Musculoskeletal:      Cervical back: Full passive range of motion without pain.      Right lower leg: No edema.      Left lower leg: No edema.   Abdominal: General: Abdomen is protuberant. There is no distension.      Palpations: Abdomen is soft.      Tenderness: There is no abdominal tenderness.   Constitutional:       Appearance: He is well-developed.   Pulmonary:      Effort: Pulmonary effort is normal.      Breath sounds: Normal breath sounds.   Psychiatric:         Behavior: Behavior is cooperative.   Neurological:      General: No focal deficit present.      Mental Status: He is alert.      GCS: GCS eye subscore is 4. GCS verbal subscore is 5. GCS motor subscore is 6.            Diagnostic Studies      EKG: NSR to sinus cecy  Imaging: No new imaging      Medications:  Scheduled PRN   chlorhexidine, 15 mL, Q12H SAMUEL  famotidine, 20 mg, BID AC  losartan, 25 mg, Daily  mexiletine, 150 mg, Q8H SAMUEL      heparin (porcine), 2,000 Units, Q6H PRN  heparin (porcine), 4,000 Units, Q6H PRN       Continuous    amiodarone (CORDARONE) 900 mg in dextrose 5 % 500 mL infusion, 1 mg/min, Last Rate: 1 mg/min (12/14/23 2122)  heparin (porcine), 3-20 Units/kg/hr (Order-Specific), Last Rate: 13.1 Units/kg/hr (12/14/23 2116)         Labs:    CBC    Recent Labs     12/13/23  1526 12/13/23  2147 12/14/23  0425   WBC 12.81* 17.19* 13.49*   HGB 18.4* 17.2* 15.3   HCT 55.5* 51.0* 47.0    266 218    BANDSPCT 1  --   --      BMP    Recent Labs     12/13/23  2147 12/14/23  0451   SODIUM 133* 137   K 6.1* 4.0    108   CO2 19* 22   AGAP 7 7   BUN 21 17   CREATININE 1.15 1.05   CALCIUM 7.8* 7.3*       Coags    Recent Labs     12/13/23  1526 12/13/23  2147 12/14/23  1140 12/14/23  1814   INR 1.34*  --   --   --    PTT  --    < > 78* 75*    < > = values in this interval not displayed.        Additional Electrolytes  Recent Labs     12/14/23  0431 12/14/23  0451   MG  --  2.5   PHOS  --  3.2   CAIONIZED 1.08*  --           Blood Gas    No recent results  No recent results LFTs  Recent Labs     12/13/23  1526 12/14/23  0451   ALT 47 33   AST 38 29   ALKPHOS 84 49   ALB 4.4 3.1*   TBILI 1.39* 1.49*       Infectious  Recent Labs     12/13/23 2147   PROCALCITONI 0.11     Glucose  Recent Labs     12/13/23  1526 12/13/23 2147 12/14/23  0451   GLUC 132 151* 105                   VILMA Saldana

## 2023-12-15 NOTE — ASSESSMENT & PLAN NOTE
Currently in wide complex tachycardia, which is suspicious for 1:1 atrial flutter versus true ventricular arrhythmia   Known history of atrial fibrillation, atrial flutter, and rapid ventricular responses     Plan:  Goal HR < 110   Holding all AV lou blockers at this time given low BP  Metoprolol succinate 75mg q12h   Continue current antiarrythmics:   Amiodarone 1 mg/min   Mexiletine 150mg q8h   Transition off IV amiodarone today   Optimize electrolytes: K > 4.0, mag > 2.0   Systemic anticoagulation: Heparin drip   In place of home Xarelto 20mg daily    Continue to monitor telemetry

## 2023-12-15 NOTE — QUICK NOTE
Discussed case with [facility] electrophysiology Dr. Armani Adam as well as heart failure Dr. Katz regarding patient's complex case.  He has been accepted to heart failure service at [facility] and will be seen by electrophysiology for complex VT ablation and also evaluation of possible transplant versus VAD candidacy. Patient will undergo transfer to [facility]. Time TBD as bed request is pending according to PAC. Patient. Given CD of cMRI as requested by EP at [facility] by myself personally.

## 2023-12-15 NOTE — ASSESSMENT & PLAN NOTE
Secondary to tachyarrhythmia, likely causing poor renal perfusion in the setting of ongoing diuretic use   Improving back in sinus rhythm   Baseline creatinine: 0.93 - 1.08  Initial creatinine: 1.35 (12/13/23)   Last creatinine 1.05 (12/14/23)     Plan:   Strict q4h I/O monitoring  Monitor without boateng catheter   Continue to follow renal function tests  Gentle diuresis   Continue supportive are

## 2023-12-15 NOTE — ASSESSMENT & PLAN NOTE
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 Wall thickness is normal. LVEF 15% by biplane measurement. Systolic function is severely reduced. Severe global hypokinesis. No thrombus. RV normal. Mild to moderate MR. Mild TR.    Plan:   Diuresis PRN   Goal negative 500-1L today as tolerated  Daily weights  Losartan 25mg daily   In place of Entresto 24-26mg BID   Holding home metoprolol succiante 75mg q12h   Heart failure cardiology consulted

## 2023-12-15 NOTE — PROGRESS NOTES
Complex Care Management Note:  [facility] notification for complex outpatient care management received as identified via HRR report. Chart review completed. ED visits and Hospitalizations:   Patient currently hospitalized at [facility] for wide complex tachycardia. Patient also had a recent hospitalization at [facility] from 12/01/23 to 12/07/23 due to syncope    PMH Includes: afib, CHF, nonischemic dilated cardiomyopahty  Refer to problem list for complete list of medical diagnosis. Admission or ED risk score is 82. Case meets screening criteria for complex care management. This OPCM will continue to monitor EMR for progress towards discharge and AYDIN.

## 2023-12-15 NOTE — ASSESSMENT & PLAN NOTE
Suspect secondary to leukemoid stress response from tachyarrhythmia. Do not suspect sepsis   12/13/23 BCXx2: PENDING     Plan:   Current Antibiotics: none  Continue to monitor fever and WBC curve   Follow up blood cultures

## 2023-12-15 NOTE — PROGRESS NOTES
Electrophysiology   Progress Note - Vadim Chapa 48 y.o. male MRN: [id number]  Unit/Bed#: [facility] 516-01 Encounter: [id number]          Subjective:   Patient seen and examined.  No significant events overnight. Denies lightheadedness, dizziness, syncope, headache, vision changes, diaphoresis, chest pain, palpitations, shortness of breath, PND, orthopnea, nausea, vomiting, abdominal pain or lower extremity edema.  No events recorded on telemetry.He has remained stable and has no acute complaints.     Assessment:  Principal Problem:    Wide-complex tachycardia  Active Problems:    Dilated cardiomyopathy (HCC)    Hypertension    Obstructive sleep apnea    Paroxysmal atrial fibrillation (HCC)    Chronic HFrEF (heart failure with reduced ejection fraction) (HCC)    HOANG (acute kidney injury) ([facility])    Leukocytosis       1.  Wide-complex tachycardia-  - Second admission this month, previously on 12/1 after a syncopal episode, found to be in VT and required cardioversion. Device reprogrammed 12/4 to decrease VT1 zone to 154 bpm (previously 180 bpm) and ATP added to shocks, added VT monitor zone at 120 bpm  -known history of slow VT, and atrial fibs/flutter; recent episodes of syncope with ICD shock, slow VT versus SVT?  -Patient presented to [facility] ED with palpitations, he was found to be in a wide-complex tachyarrhythmia.  However, he was treated with x 2 cardioversion at 150 J, was started on lidocaine drip at 1 mg/min and amiodarone at 1 mg/min and was transferred to [facility].  -On arrival to [facility], he was noted to be in ventricular tachycardia that was treated with amiodarone bolus and cardioversion x 1 which put the patient back in normal sinus rhythm. Has stayed in NSR since.   -Medtronic single-chamber ICD (implanted June, 2017): ATP zone set at 154, appropriate impedance and capture threshold on interrogation, VT monitor zone 122-154.  Time in A-fib 0%, < 1%,  -Patient is currently on lidocaine drip at 1 mg/minute, and  amiodarone at 1 mg/min.  Lidocaine was discontinued on 12/14, currently on amiodarone.  -Started on mexiletine 150 mg every 8 on 12/14/2023     2.  History of paroxysmal atrial fibrillation/flutter- status post A-fib ablation  -History of inappropriate ICD shock due to A-fib with RVR (10/2022)  -Now status post cryoablation with pulmonary vein isolation and typical flutter line on 7/3/2023  -Now on Xarelto and metoprolol     3.  History of slow VT  - Medtronic single-chamber ICD in situ - initially implanted in 2006, status post extraction of recalled [place] lead with reimplantation 6/2017; history of prior ICD therapy - 4/2023, 10/2022  - Previously maintained on amiodarone antiarrhythmic therapy - started 5/2023 after most recent ICD therapy; discontinued in June but he was put back on it outpatient     4.  Nonischemic dilated cardiomyopathy  5.  Heart failure with reduced EF     - TTE( 12/6/23): Moderate LV dilation LVIDD 7.3 cm, LVEF 15%, with severe global hypokinesis, normal LV size and function, mild to moderate MR  -CMR (12/7/2023)-severely dilated LV, severely reduced with EF of 16%, severe global hypokinesis with akinesis of the inferior and septal walls,Intramyocardial fibrosis of the interventricular septum and subepicardial fibrosis of the inferior wall are noted, this could suggest diffuse myocarditis versus scar tissue finding from  dilated cardiomyopathy.  - No obstructive CAD per cardiac catheterization 8/2021     6.  Hypertension  7.  Obstructive sleep apnea requiring nocturnal CPAP     Plan:  1.  Continue with mexiletine and Amio  2.  Transfer to [place] for further management, accepting doctor Armani Adam, going to HF service for VT ablation and transplant evaluation.   3. CMR obtained at [facility] is on a disk in patient's chart.   4. EP will sign off        Case discussed and reviewed with Dr. Salazar pending attending addendum.Thank you for involving us in the care of your patient.  Cristal  "MD Sade  Cardiology Fellow   PGY-5    Brief hospital course.     Vadim Chapa is a 48 y.o. year old male with history of nonischemic dilated cardiomyopathy, heart failure with reduced ejection fraction of 10 to 15% status post Medtronic single-chamber ICD (June 2017), atrial fibrillation/flutter status post A-fib ablation July 2023, slow VT, hypertension, hyperlipidemia, obstructive sleep apnea, morbid obesity, and history of binge drinking, who was transferred from [facility] on 12/13 after feeling palpitations that would not resolve at home.  He was found to be in wide-complex tachycardia and was converted in the ED, he was started on lidocaine amnioinfusion-just prior to transfer to [facility], he went back into a wide-complex tachyarrhythmia and was converted the second time.  Upon arrival to [facility], patient was in VT with heart rates in the 150s, and required a third cardioversion. He remained hemodynamically stable during the episodes. Lidocaine drip was Dced on 12/14. Started on PO Mexiletine      Prior hospitalization: Presented on 12/1/2023, to [facility] ED after a syncopal episode.  He was found to be tachycardic and hypotensive and received adenosine without success, then IV amiodarone bolus.  At that time, on arrival to [facility] he was in wide-complex tachycardia requiring lidocaine bolus and eventually he was cardioverted to normal sinus.  He was discharged on 12/7  .      Vitals: Blood pressure 122/63, pulse 77, temperature 98.1 °F (36.7 °C), temperature source Oral, resp. rate 17, height 6' 2\" (1.88 m), weight 134 kg (294 lb 12.1 oz), SpO2 96%., Body mass index is 37.84 kg/m².,   Orthostatic Blood Pressures      Flowsheet Row Most Recent Value   Blood Pressure 122/63 filed at 12/15/2023 1100   Patient Position - Orthostatic VS Lying filed at 12/14/2023 0755              Intake/Output Summary (Last 24 hours) at 12/15/2023 1124  Last data filed at 12/15/2023 1038  Gross per 24 hour   Intake 3015.55 ml   Output " 4065 ml   Net -1049.45 ml       Review of System:  Review of system was conducted and was negative except for as stated in the subjective course.    Physical Exam:    GEN: Vadim Chapa appears well, alert and oriented x 3, pleasant and cooperative   NECK: No JVD or carotid bruits   HEART: Regular rhythm, normal rate, normal S1 and S2, no murmurs, clicks, gallops or rubs   LUNGS: Clear to auscultation bilaterally; no wheezes, rales, or rhonchi; respiration nonlabored   ABDOMEN:  Normoactive bowel sounds, soft, no tenderness, no distention  EXTREMITIES: peripheral pulses palpable; no edema        Current Facility-Administered Medications:     amiodarone (CORDARONE) 900 mg in dextrose 5 % 500 mL infusion, 1 mg/min, Intravenous, Continuous, VILMA Saldana, Last Rate: 33.3 mL/hr at 12/14/23 2122, 1 mg/min at 12/14/23 2122    amiodarone tablet 200 mg, 200 mg, Oral, Daily, Miya Hatfield PA-C, 200 mg at 12/15/23 0932    chlorhexidine (PERIDEX) 0.12 % oral rinse 15 mL, 15 mL, Mouth/Throat, Q12H SAMUEL, VILMA Saldana, 15 mL at 12/15/23 0843    famotidine (PEPCID) tablet 20 mg, 20 mg, Oral, BID , VILMA Dailey, 20 mg at 12/15/23 0607    furosemide (LASIX) tablet 40 mg, 40 mg, Oral, Daily, Miya Hatfield PA-C, 40 mg at 12/15/23 0932    mexiletine (MEXITIL) capsule 150 mg, 150 mg, Oral, Q8H SAMUEL, VILMA Dailey, 150 mg at 12/15/23 0556    rivaroxaban (XARELTO) tablet 20 mg, 20 mg, Oral, Daily With Breakfast, Miya Hatfield PA-C, 20 mg at 12/15/23 0932    sacubitril-valsartan (ENTRESTO) 24-26 MG per tablet 1 tablet, 1 tablet, Oral, BID, Miya Hatfield PA-C, 1 tablet at 12/15/23 1058    spironolactone (ALDACTONE) tablet 25 mg, 25 mg, Oral, Daily, Miya Hatfield PA-C, 25 mg at 12/15/23 0932    Labs & Results:  Results from last 7 days   Lab Units 12/13/23  2148 12/13/23  1726 12/13/23  1526   HS TNI 0HR ng/L  --   --  34   HS TNI 2HR ng/L  --  52*  --    HS TNI RAND ng/L 84*   --   --          Results from last 7 days   Lab Units 12/15/23  0607 12/14/23  0451 12/13/23  2147   POTASSIUM mmol/L 4.8 4.0 6.1*   CO2 mmol/L 25 22 19*   CHLORIDE mmol/L 100 108 107   BUN mg/dL 16 17 21   CREATININE mg/dL 1.08 1.05 1.15     Results from last 7 days   Lab Units 12/15/23  0607 12/14/23  0425 12/13/23  2147   HEMOGLOBIN g/dL 16.3 15.3 17.2*   HEMATOCRIT % 49.2 47.0 51.0*   PLATELETS Thousands/uL 186 218 266           Telemetry:   Personally reviewed by Cristal Stuart MD: NSR, HR in 60s

## 2023-12-15 NOTE — ASSESSMENT & PLAN NOTE
Patient with known history of both VT as well as atrial fibrillation and atrial flutter. Suspect 1:1 atrial flutter based on EKG   12/13/23: Presented to [facility] ED with palpitations and fatigue. Was noted to be in a wide complex tachycardia. Cardioversion x1 with successful conversion to NSR. Had recurrent wide complex tachycardia with unsuccessful cardioversion. Given 150mg IV amiodarone and started on infusion of amiodarone and lidocaine. Amiodarone 150mg x1 given on arrival   12/14/23: 150mg amiodarone + Cardioversion (150J) x1 to NSR   Lidocaine infusion stopped 12/14/23    Plan:   Continue antiarrhythmics:   Amiodarone 1mg/min  Mexiletine 150mg q8h   Transition off amiodarone infusion today  Likely consideration for VT ablation with EP   Holding home AV lou blockers now in the setting of marginal BP   Metoprolol succinate 75mg q12h   Optimize electrolytes: K > 4.0, Mag > 2.0  EP consulted, follow up recommendations

## 2023-12-15 NOTE — PROGRESS NOTES
Heart Failure/ Pulmonary Hypertension Progress Note - Vadim Chapa 48 y.o. male MRN: [id number]    Unit/Bed#: [facility] 509-01 Encounter: [id number]      Assessment/Plan:    # Ventricular tachycardia  Second admission this month for VT. S/p cardioversions x 2 this admit, started on lidocaine drip and amiodarone drip, transferred from [place] to [facility] for further management.   Currently on amiodarone and mexiletine per EP     # Chronic heart failure with reduced ejection fraction  Etiology: Etiology thought 2/2 coxsackie virus many years ago. Occasional binge drinking. Known cardiomyopathy with EF of 25% since 2017. Further reduction in EF on echo 12/2/23 in setting of ventricular tachycardia s/p shocks, contribution from ?myocarditis on cardiac MRI     Studies- personally reviewed by me     Cardiac MRI 12/7/23:  LVEF 16%. LVEDD 6.8cm  Normal RV size and systolic function  Intramyocardial fibrosis of the interventricular septum, and subepicardial fibrosis of the inferior wall. 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      -- TTE 12/6/23  LVEF: 15%  LVIDd: 6.7cm  RV: normal size and systolic function  MR: mild to moderate  PASP: 30mmHg, mild TR  RVOT: no notching  Other: no pericardial effusion     --TTE 5/1/23: LVEF 30%.   --TTE 4/17/23: LVEF 30%. LVIDD 7.1cm. Normal RV size and systolic function.  Mild MR. Mild TR     --TTE 10/11/21:  LVEF: 25%  LVIDd: 7.2cm  RV: normal size and systolic function  MR: mild  PASP: unable to estimate, no TR     --[facility] 8/30/21: Normal coronaries   --TTE 4/9/2019: LVEF 25% w/ grade II diastology. LVIDd 6.9 cm. Mod LAE. Trace MR. Mild TR. Noninvasive assessment c/f borderline to low output.   --TTE 5/16/2017: LVEF 25% w/ grade II diastology. LVIDd 6.9 cm. Mod LAE. Trace MR, mild TR. Normal RV size and function.         Neurohormonal Blockade:  --Beta-Blocker: metoprolol succinate 75mg BID - on hold  --ACEi, ARB or ARNi: entresto 24-26mg BID   --Aldosterone Receptor Blocker:  "spironolactone 25mg daily  --SGLT2 Inhibitor: Jardiance 10mg daily - on hold  --Diuretic: furosemide 40mg daily     Sudden Cardiac Death Risk Reduction:  --ICD: MDT single chamber ICD     Electrical Resynchronization:  --Candidacy for BiV device: narrow QRSd     Advanced Therapies (if appropriate):  --Inotrope:  --LVAD/Transplant Candidacy:    # Atrial fibrillation / flutter,  S/p Afib ablation in 07/2023.  [id number] = 2 (HF, HTN).  Anticoagulation on Xarelto as outpatient, currently on heparin drip  Rate control: BB as above - on hold  Rhythm control: on amiodarone  # Hypertension   # Obstructive sleep apnea  # History of binge drinking       Plan:  Monitor with resumption of GDMT as above  Plan noted per EP for transfer to [facility] for consideration for VT ablation and transplant evaluation  Currently hemodynamically stable and no recurrence of VT    Subjective:   Patient seen and examined.  No significant events overnight.      Review of Systems   Constitutional:  Negative for chills and fever.   HENT:  Negative for ear pain and sore throat.    Eyes:  Negative for pain and visual disturbance.   Respiratory:  Negative for cough and shortness of breath.    Cardiovascular:  Negative for chest pain and palpitations.   Gastrointestinal:  Negative for abdominal pain and vomiting.   Genitourinary:  Negative for dysuria and hematuria.   Musculoskeletal:  Negative for arthralgias and back pain.   Skin:  Negative for color change and rash.   Neurological:  Negative for seizures and syncope.   All other systems reviewed and are negative.       Objective:   Intake/ Output: 2124/1975, -1600  Weight: 300 lbs  Tele: sinus rhythm      Vitals: Blood pressure 116/75, pulse 80, temperature 97.8 °F (36.6 °C), resp. rate 18, height 6' 2\" (1.88 m), weight (!) 136 kg (300 lb 0.7 oz), SpO2 98%., Body mass index is 38.52 kg/m²., I/O last 3 completed shifts:  In: 2945.2 [P.O.:880; I.V.:1735.2; IV Piggyback:330]  Out: 4455 [Urine:4455]  I/O " this shift:  In: 1301.7 [P.O.:480; I.V.:821.7]  Out: 1820 [Urine:1820]  Wt Readings from Last 3 Encounters:   12/15/23 (!) 136 kg (300 lb 0.7 oz)   12/13/23 (!) 138 kg (305 lb)   12/11/23 (!) 138 kg (305 lb)       Intake/Output Summary (Last 24 hours) at 12/15/2023 1608  Last data filed at 12/15/2023 1435  Gross per 24 hour   Intake 2177.97 ml   Output 4865 ml   Net -2687.03 ml     I/O last 3 completed shifts:  In: 2945.2 [P.O.:880; I.V.:1735.2; IV Piggyback:330]  Out: 4455 [Urine:4455]    No significant arrhythmias seen on telemetry review.       Physical Exam:  Vitals:    12/15/23 1300 12/15/23 1400 12/15/23 1435 12/15/23 1534   BP: 122/64 120/62  116/75   Pulse: 79 75  80   Resp: 22 18  18   Temp:    97.8 °F (36.6 °C)   TempSrc:       SpO2: 96% 94%  98%   Weight:   (!) 136 kg (300 lb 0.7 oz)    Height:           GEN: Vadim Chapa appears well, alert and oriented x 3, pleasant and cooperative   HEENT: NC/AT, moist mucosa, anicteric sclerae; extraocular muscles intact  NECK: supple, no carotid bruits   HEART: regular rhythm, normal S1 and S2, no murmurs, clicks, gallops or rubs, JVP is mildly elevated  LUNGS: clear to auscultation bilaterally; no wheezes, rales, or rhonchi   ABDOMEN: normal bowel sounds, soft, no tenderness, no distention  EXTREMITIES: peripheral pulses normal; no clubbing, cyanosis, or edema  NEURO: no focal findings   SKIN: normal without suspicious lesions on exposed skin      Current Facility-Administered Medications:     amiodarone (CORDARONE) 900 mg in dextrose 5 % 500 mL infusion, 1 mg/min, Intravenous, Continuous, Miya Hatfield PA-C, Last Rate: 33.3 mL/hr at 12/15/23 1314, 1 mg/min at 12/15/23 1314    amiodarone tablet 200 mg, 200 mg, Oral, Daily, Miya Hatfield PA-C, 200 mg at 12/15/23 0932    chlorhexidine (PERIDEX) 0.12 % oral rinse 15 mL, 15 mL, Mouth/Throat, Q12H [facility], Miya Hatfield PA-C, 15 mL at 12/15/23 0843    famotidine (PEPCID) tablet 20 mg, 20 mg, Oral, BID AC, Miya Hatfield,  PA-C, 20 mg at 12/15/23 0607    furosemide (LASIX) tablet 40 mg, 40 mg, Oral, Daily, MARLEN Beavers-DONAVON, 40 mg at 12/15/23 0932    mexiletine (MEXITIL) capsule 150 mg, 150 mg, Oral, Q8H SAMUEL, MARLEN Beavers-C, 150 mg at 12/15/23 1423    rivaroxaban (XARELTO) tablet 20 mg, 20 mg, Oral, Daily With Breakfast, MARLEN Beavers-C, 20 mg at 12/15/23 0932    sacubitril-valsartan (ENTRESTO) 24-26 MG per tablet 1 tablet, 1 tablet, Oral, BID, Miya Hatfield PA-C, 1 tablet at 12/15/23 1058    spironolactone (ALDACTONE) tablet 25 mg, 25 mg, Oral, Daily, MARLEN Beavers-DONAVON, 25 mg at 12/15/23 0932      Labs & Results:        Results from last 7 days   Lab Units 12/15/23  0607 12/14/23  0425 12/13/23  2147   WBC Thousand/uL 7.39 13.49* 17.19*   HEMOGLOBIN g/dL 16.3 15.3 17.2*   HEMATOCRIT % 49.2 47.0 51.0*   PLATELETS Thousands/uL 186 218 266         Results from last 7 days   Lab Units 12/15/23  0607 12/14/23  0451 12/13/23  2147 12/13/23  1526   POTASSIUM mmol/L 4.8 4.0 6.1* 4.5   CHLORIDE mmol/L 100 108 107 101   CO2 mmol/L 25 22 19* 26   BUN mg/dL 16 17 21 22   CREATININE mg/dL 1.08 1.05 1.15 1.35*   CALCIUM mg/dL 8.7 7.3* 7.8* 9.3   ALK PHOS U/L  --  49  --  84   ALT U/L  --  33  --  47   AST U/L  --  29  --  38     Results from last 7 days   Lab Units 12/13/23  1526   INR  1.34*         Fanny Soliz MD  Advanced Heart Failure and Mechanical Circulatory Support  [facility]

## 2023-12-15 NOTE — PLAN OF CARE
Problem: SAFETY ADULT  Goal: Patient will remain free of falls  Description: INTERVENTIONS:  - Educate patient/family on patient safety including physical limitations  - Instruct patient to call for assistance with activity   - Consult OT/PT to assist with strengthening/mobility   - Keep Call bell within reach  - Keep bed low and locked with side rails adjusted as appropriate  - Keep care items and personal belongings within reach  - Initiate and maintain comfort rounds  - Make Fall Risk Sign visible to staff  - Apply yellow socks and bracelet for high fall risk patients  - Consider moving patient to room near nurses station  12/15/2023 0738 by Antonella Ellison, RN  Outcome: Progressing  12/14/2023 1830 by Antonella Ellison, RN  Outcome: Progressing

## 2023-12-15 NOTE — ASSESSMENT & PLAN NOTE
Hypotension associated with tachyrhythmia now resolved     Plan:  Goal SBP < 160  Goal MAP > 65  Continue losartan 25mg daily   In place of Entreseto 24-26mg BID  Holding home metoprolol succinate 75mg q12h

## 2023-12-16 VITALS
HEART RATE: 78 BPM | DIASTOLIC BLOOD PRESSURE: 75 MMHG | TEMPERATURE: 98.8 F | RESPIRATION RATE: 20 BRPM | HEIGHT: 74 IN | WEIGHT: 299.2 LBS | SYSTOLIC BLOOD PRESSURE: 113 MMHG | BODY MASS INDEX: 38.4 KG/M2 | OXYGEN SATURATION: 97 %

## 2023-12-16 LAB
ALBUMIN SERPL BCP-MCNC: 3.7 G/DL (ref 3.5–5)
ALP SERPL-CCNC: 61 U/L (ref 34–104)
ALT SERPL W P-5'-P-CCNC: 35 U/L (ref 7–52)
ANION GAP SERPL CALCULATED.3IONS-SCNC: 8 MMOL/L
AST SERPL W P-5'-P-CCNC: 29 U/L (ref 13–39)
BILIRUB DIRECT SERPL-MCNC: 0.35 MG/DL (ref 0–0.2)
BILIRUB SERPL-MCNC: 2.11 MG/DL (ref 0.2–1)
BUN SERPL-MCNC: 15 MG/DL (ref 5–25)
CALCIUM SERPL-MCNC: 8.8 MG/DL (ref 8.4–10.2)
CHLORIDE SERPL-SCNC: 102 MMOL/L (ref 96–108)
CO2 SERPL-SCNC: 25 MMOL/L (ref 21–32)
CREAT SERPL-MCNC: 1.06 MG/DL (ref 0.6–1.3)
ERYTHROCYTE [DISTWIDTH] IN BLOOD BY AUTOMATED COUNT: 15 % (ref 11.6–15.1)
GFR SERPL CREATININE-BSD FRML MDRD: 83 ML/MIN/1.73SQ M
GLUCOSE SERPL-MCNC: 108 MG/DL (ref 65–140)
HCT VFR BLD AUTO: 50.6 % (ref 36.5–49.3)
HGB BLD-MCNC: 16.4 G/DL (ref 12–17)
MAGNESIUM SERPL-MCNC: 1.9 MG/DL (ref 1.9–2.7)
MCH RBC QN AUTO: 28.6 PG (ref 26.8–34.3)
MCHC RBC AUTO-ENTMCNC: 32.4 G/DL (ref 31.4–37.4)
MCV RBC AUTO: 88 FL (ref 82–98)
PHOSPHATE SERPL-MCNC: 3.8 MG/DL (ref 2.7–4.5)
PLATELET # BLD AUTO: 194 THOUSANDS/UL (ref 149–390)
PMV BLD AUTO: 10.3 FL (ref 8.9–12.7)
POTASSIUM SERPL-SCNC: 3.5 MMOL/L (ref 3.5–5.3)
PROT SERPL-MCNC: 6.7 G/DL (ref 6.4–8.4)
RBC # BLD AUTO: 5.73 MILLION/UL (ref 3.88–5.62)
SODIUM SERPL-SCNC: 135 MMOL/L (ref 135–147)
WBC # BLD AUTO: 9.54 THOUSAND/UL (ref 4.31–10.16)

## 2023-12-16 PROCEDURE — 84100 ASSAY OF PHOSPHORUS: CPT | Performed by: PHYSICIAN ASSISTANT

## 2023-12-16 PROCEDURE — 80048 BASIC METABOLIC PNL TOTAL CA: CPT | Performed by: PHYSICIAN ASSISTANT

## 2023-12-16 PROCEDURE — 99232 SBSQ HOSP IP/OBS MODERATE 35: CPT | Performed by: PHYSICIAN ASSISTANT

## 2023-12-16 PROCEDURE — 85027 COMPLETE CBC AUTOMATED: CPT | Performed by: PHYSICIAN ASSISTANT

## 2023-12-16 PROCEDURE — 83735 ASSAY OF MAGNESIUM: CPT | Performed by: PHYSICIAN ASSISTANT

## 2023-12-16 PROCEDURE — 99239 HOSP IP/OBS DSCHRG MGMT >30: CPT | Performed by: INTERNAL MEDICINE

## 2023-12-16 PROCEDURE — 80076 HEPATIC FUNCTION PANEL: CPT | Performed by: PHYSICIAN ASSISTANT

## 2023-12-16 RX ORDER — POTASSIUM CHLORIDE 20 MEQ/1
20 TABLET, EXTENDED RELEASE ORAL DAILY
Status: DISCONTINUED | OUTPATIENT
Start: 2023-12-17 | End: 2023-12-17 | Stop reason: HOSPADM

## 2023-12-16 RX ORDER — MEXILETINE HYDROCHLORIDE 150 MG/1
150 CAPSULE ORAL EVERY 8 HOURS SCHEDULED
Qty: 90 CAPSULE | Refills: 0 | Status: SHIPPED | OUTPATIENT
Start: 2023-12-16 | End: 2023-12-27 | Stop reason: ALTCHOICE

## 2023-12-16 RX ORDER — POTASSIUM CHLORIDE 20 MEQ/1
40 TABLET, EXTENDED RELEASE ORAL ONCE
Status: COMPLETED | OUTPATIENT
Start: 2023-12-16 | End: 2023-12-16

## 2023-12-16 RX ORDER — LORAZEPAM 0.5 MG/1
0.5 TABLET ORAL EVERY 8 HOURS PRN
Status: DISCONTINUED | OUTPATIENT
Start: 2023-12-16 | End: 2023-12-17 | Stop reason: HOSPADM

## 2023-12-16 RX ORDER — MAGNESIUM SULFATE HEPTAHYDRATE 40 MG/ML
2 INJECTION, SOLUTION INTRAVENOUS ONCE
Status: COMPLETED | OUTPATIENT
Start: 2023-12-16 | End: 2023-12-16

## 2023-12-16 RX ADMIN — FUROSEMIDE 40 MG: 40 TABLET ORAL at 09:13

## 2023-12-16 RX ADMIN — RIVAROXABAN 20 MG: 20 TABLET, FILM COATED ORAL at 09:13

## 2023-12-16 RX ADMIN — SACUBITRIL AND VALSARTAN 1 TABLET: 24; 26 TABLET, FILM COATED ORAL at 09:16

## 2023-12-16 RX ADMIN — MEXILETINE HYDROCHLORIDE 150 MG: 150 CAPSULE ORAL at 05:24

## 2023-12-16 RX ADMIN — POTASSIUM CHLORIDE 40 MEQ: 1500 TABLET, EXTENDED RELEASE ORAL at 11:51

## 2023-12-16 RX ADMIN — LORAZEPAM 0.5 MG: 0.5 TABLET ORAL at 20:32

## 2023-12-16 RX ADMIN — SPIRONOLACTONE 25 MG: 25 TABLET ORAL at 09:13

## 2023-12-16 RX ADMIN — FAMOTIDINE 20 MG: 20 TABLET, FILM COATED ORAL at 05:24

## 2023-12-16 RX ADMIN — AMIODARONE HYDROCHLORIDE 1 MG/MIN: 50 INJECTION, SOLUTION INTRAVENOUS at 19:39

## 2023-12-16 RX ADMIN — FAMOTIDINE 20 MG: 20 TABLET, FILM COATED ORAL at 18:10

## 2023-12-16 RX ADMIN — SACUBITRIL AND VALSARTAN 1 TABLET: 24; 26 TABLET, FILM COATED ORAL at 20:05

## 2023-12-16 RX ADMIN — AMIODARONE HYDROCHLORIDE 200 MG: 200 TABLET ORAL at 09:13

## 2023-12-16 RX ADMIN — MAGNESIUM SULFATE HEPTAHYDRATE 2 G: 40 INJECTION, SOLUTION INTRAVENOUS at 11:51

## 2023-12-16 RX ADMIN — MEXILETINE HYDROCHLORIDE 150 MG: 150 CAPSULE ORAL at 13:54

## 2023-12-16 RX ADMIN — CHLORHEXIDINE GLUCONATE 15 ML: 1.2 SOLUTION ORAL at 09:13

## 2023-12-16 RX ADMIN — AMIODARONE HYDROCHLORIDE 1 MG/MIN: 50 INJECTION, SOLUTION INTRAVENOUS at 04:50

## 2023-12-16 RX ADMIN — MEXILETINE HYDROCHLORIDE 150 MG: 150 CAPSULE ORAL at 20:05

## 2023-12-16 NOTE — DISCHARGE SUMMARY
[facility]  Discharge- Vadim Chapa 1975, 48 y.o. male MRN: [id number]  Unit/Bed#: [facility] 509-01 Encounter: [id number]  Primary Care Provider: Mary Beth Pickett PA-C   Date and time admitted to hospital: 12/13/2023  9:09 PM    No new Assessment & Plan notes have been filed under this hospital service since the last note was generated.  Service: Hospitalist            Medical Problems       Resolved Problems  Date Reviewed: 12/16/2023   None         Admission Date:   Admission Orders (From admission, onward)       Ordered        12/13/23 2110  Inpatient Admission  Once                            Admitting Diagnosis: V-tach (HCC) [I47.20]    diagnosis at the time of discharge-    Ventricular tachycardia -second admission this month for VT status post cardioversions x 2, was started on lidocaine drip and amiodarone drip and transferred from en route to [facility] for further management currently on amiodarone p.o. and mexiletine and amiodarone drip  Congestive heart failure with reduced EF following coxsackievirus infection many years ago, also occasional binge drinking with known cardiomyopathy of reduced EF  Atrial fibrillation stat/flutter status post A-fib ablation  CONCHIS  Hypertension  History of binge drinking    Procedures Performed:   Orders Placed This Encounter   Procedures    Electrical Cardioversion    Procedural Sedation     The  Summary of Hospital Course:   Patient is a 48-year-old male with history of CHF with LVEF of 15% secondary to coxsackievirus infection, atrial fibrillation status post cryoablation and PVI, history of ventricular tachycardia, AICD in place, GERD, hypertension, CONCHIS, BMI of 39, presented to [facility] on 12/13/2023 for palpitations.  Patient was found to be in V. tach versus a flutter/a fibrillation with aberrant conduction at [facility] and was transferred to [facility] for multidisciplinary management for  the same.  Noted to have wide-complex tachycardia consistent with V. tach versus a flutter and a fibrillation with aberrancy.  Electrolytes were optimized.  EP consultation was obtained.  Continued on amiodarone drip and lidocaine drip.  Patient evaluated by EP cardiology and plan was discussed with [facility] electrophys[facility] and has been accepted to heart failure service at [facility] and will be seen by EP for complex VT ablation and also evaluation of possible transplant versus VAD candidacy.  Patient will be transferred to [facility] this evening.  Patient currently on oral mexiletine and oral amiodarone and IV amiodarone drip.  HEENT-PERRLA, moist oral mucosa  Neck-supple, no JVD elevation   Respiratory-equal air entry bilaterally, no rales or rhonchi  Cardiovascular system-S1, S2 heard, no murmur or gallops or rubs  Abdomen-soft, nontender, no guarding or rigidity, bowel sounds heard  Extremities-no pedal edema  Peripheral pulses palpable  Musculoskeletal-no contractures  Central nervous system-no acute focal neurological deficit ,no sensory or motor deficit noted.  Skin-no rash noted       Significant Findings, Care, Treatment and Services Provided: cardiology consult     Complications: nil    Condition at Discharge: fair         Discharge instructions/Information to patient and family:   See after visit summary for information provided to patient and family.      Provisions for Follow-Up Care:  See after visit summary for information related to follow-up care and any pertinent home health orders.      PCP: Mary Beth Pickett PA-C    Disposition: See After Visit Summary for discharge disposition information.    Planned Readmission: No    Discharge Statement   I spent 45 minutes discharging the patient. This time was spent on the day of discharge. I had direct contact with the patient on the day of discharge. Additional documentation is required if more than 30 minutes were spent on discharge.      Discharge Medications:  See after visit summary for reconciled discharge medications provided to patient and family.

## 2023-12-16 NOTE — PROGRESS NOTES
"          Advanced Heart Failure / Pulmonary Hypertension Service Progress Note    Vadim Chapa 48 y.o. male   MRN: [id number]  Unit/Bed#: [facility] 509-01; Encounter: [id number]    Assessment:  Principal Problem:    Wide-complex tachycardia  Active Problems:    Dilated cardiomyopathy (HCC)    Hypertension    Obstructive sleep apnea    Paroxysmal atrial fibrillation (HCC)    HOANG (acute kidney injury) (HCC)    Leukocytosis    Chronic HFrEF (heart failure with reduced ejection fraction) ([facility])      Subjective:   Patient seen and examined. No significant events overnight. Frustrated about recurrent admission for arrhythmias. Reports having \"strong\" palpitations intermittently. Sensation of \"being warm all over\" after each occurrence. Denies SOB, VALLECILLO, chest pain, PND, and orthopnea. Expressing concern over finances and running out of his short term disability.     Objective:   Intake/ Output: 2526.7 mL / 2895 mL (net negative 368.3 mL).   Weight: 229 lbs (300 lbs on 12/15). Both standing weights.   Telemetry: NSR, rates in 70-80s. 7 beats NVST overnight.   MAPs: 65-90.    Today's Plan:  Awaiting bed for transfer to [facility] for VT ablation and OHT evaluation.   Outpatient BB on hold + AAD selection/dosing per EP.  Potassium 3.5 this AM; goal >4. Will give total of 40 mEq PO potassium today, and resume home K supplementation tomorrow AM.   Magnesium 1.9 this AM; goal >2. Will give 2g IV magnesium x1 today.   Outpatient SGLT2i remains on hold.     Plan:  Ventricular tachycardia    Impression: \"Second admission this month for VT. S/p cardioversions x2 this admit, started on lidocaine drip and amiodarone drip, transferred from [place] to [facility] for further management.\"   ICD VT zones reprogrammed on 12/04/2023. Trazodone wean initiated last admission (early 12/2023).   AAD selection and dosing per EP.  Awaiting bed for transfer to [facility] for VT ablation and OHT evaluation.     Chronic HFrEF; LVEF 10%; LVIDd 7.3 cm; NYHA II; ACC/AHA Stage " "C              Etiology: \"Nonischemic, etiology unclear. Reports occasional binge drinking in the past but not regularly. 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 Unremarkable SPEP in 2021. 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   TTE 05/16/2017: \"LVEF 25% w/ grade II diastology. LVIDd 6.9 cm. Mod LAE. Trace MR, mild TR. Normal RV size and function.\"  TTE 04/09/2019: \"LVEF 25% w/ grade II diastology. LVIDd 6.9 cm. Mod LAE. Trace MR. Mild TR. Noninvasive assessment c/f borderline to low output.\"  [facility]HC 08/30/2021: normal coronaries.  TTE 10/11/2021: LVEF 25%. Normal RV. Mild MR.               TTE 04/17/2023: LVEF 30%. Normal RV. Mild MR and TR.               TTE (limited) 05/01/2023: LVEF 30%. Severe global hypokinesis. Normal RV.               TTE (limited) 12/02/2023: LVEF 10%. LVIDd 7.3 cm. No LV thrombus. Normal RV. Mild TR.    cMRI 12/07/2023: LVEF 16%. LVIDd 6.8 cm. LV SV 53 mL. LV CO 3.2 L/min. \"Intramyocardial fibrosis of the interventricular septum, and subepicardial fibrosis of the inferior wall. 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 \"    Pharmacotherapies / Neurohormonal Blockade:  --Beta Blocker: Held per EP (on metoprolol succinate 75 mg q12 hours as outpatient).  --ARNi / ACEi / ARB: Entresto 24-26 mg BID.   --Aldosterone Antagonist: spironolactone 25 mg daily.   --SGLT2 Inhibitor: Held (on empagliflozin 10 mg daily as outpatient).   --Home Diuretic: Lasix 40 mg daily with potassium 20 mEq daily.   --Inpatient Diuretic: PO Lasix 40 mg daily with potassium 20 mEq daily.     Sudden Cardiac Death Risk Reduction:  --Medtronic single chamber ICD in situ since 2006.  --Interrogation from 11/20/2023:  <0.1%. Lead parameters WNL. OptiVol WNL. Normal device function.     Electrical Resynchronization:  --Candidacy for BiV device: narrow QRS.     Advanced Therapies (if appropriate): Will continue to monitor. Awaiting bed for transfer to [facility] for VT " "ablation and OHT evaluation. O positive blood type.    Atrial fibrillation / flutter              [id number] = 2 (HF, HTN).              Anticoagulation on Xarelto.               S/p Afib ablation in 07/2023.              Rate control: BB as above.              Rhythm control: as above per EP.     Hypertension   Obstructive sleep apnea  History of binge (?) drinking: history of drinking \"a lot\" on Friday nights; stopped drinking in late 2022. Last drank 1 beer about 1-2 months ago.     Vitals:   Blood pressure 110/76, pulse 71, temperature 98.2 °F (36.8 °C), temperature source Oral, resp. rate 18, height 6' 2\" (1.88 m), weight 136 kg (299 lb 3.2 oz), SpO2 97%.    I/O last 3 completed shifts:  In: 3156.6 [P.O.:1438; I.V.:1718.6]  Out: 3945 [Urine:3945]    Weight (last 2 days)       Date/Time Weight    12/16/23 0500 136 (299.2)    12/15/23 1435 136 (300.05)    12/15/23 0600 134 (294.76)    12/14/23 0600 140 (308.64)          Wt Readings from Last 10 Encounters:   12/16/23 136 kg (299 lb 3.2 oz)   12/13/23 (!) 138 kg (305 lb)   12/11/23 (!) 138 kg (305 lb)   12/07/23 (!) 140 kg (307 lb 8.7 oz)   12/01/23 (!) 145 kg (320 lb)   07/03/23 (!) 145 kg (320 lb)   05/24/23 (!) 145 kg (318 lb 11.2 oz)   05/02/23 (!) 140 kg (308 lb 6.8 oz)   04/17/23 (!) 141 kg (310 lb)   12/29/22 (!) 144 kg (317 lb)     Vitals:    12/16/23 0500 12/16/23 0703 12/16/23 0708 12/16/23 0915   BP:  (!) 83/59 110/76 110/76   Pulse:  70 76 71   Resp:  18     Temp:  98 °F (36.7 °C) 98.2 °F (36.8 °C)    TempSrc:   Oral    SpO2:  98% 95% 97%   Weight: 136 kg (299 lb 3.2 oz)      Height:           Physical Exam  Vitals reviewed.   Constitutional:       General: He is awake. He is not in acute distress.     Appearance: Normal appearance. He is well-developed. He is obese. He is not toxic-appearing or diaphoretic.   HENT:      Head: Normocephalic.      Nose: Nose normal.      Mouth/Throat:      Mouth: Mucous membranes are moist.   Eyes:      General: No " scleral icterus.     Conjunctiva/sclera: Conjunctivae normal.   Neck:      Trachea: No tracheal deviation.   Cardiovascular:      Rate and Rhythm: Normal rate and regular rhythm. No extrasystoles are present.     Heart sounds: No murmur heard.  Pulmonary:      Effort: Pulmonary effort is normal. No tachypnea, bradypnea or respiratory distress.      Breath sounds: Normal air entry. No wheezing, rhonchi or rales.   Abdominal:      General: Bowel sounds are normal.      Palpations: Abdomen is soft.      Tenderness: There is no abdominal tenderness.   Musculoskeletal:      Cervical back: Neck supple.      Right lower leg: Edema (trace) present.      Left lower leg: Edema (trace) present.   Skin:     General: Skin is dry.      Coloration: Skin is not cyanotic, jaundiced or pale.   Neurological:      General: No focal deficit present.      Mental Status: He is alert and oriented to person, place, and time.   Psychiatric:         Attention and Perception: Attention normal.         Mood and Affect: Affect normal. Mood is anxious.         Speech: Speech normal.         Behavior: Behavior normal. Behavior is cooperative.         Thought Content: Thought content normal.       Lines/Drains/Airways       Active Status       None                      Current Facility-Administered Medications:     amiodarone (CORDARONE) 900 mg in dextrose 5 % 500 mL infusion, 1 mg/min, Intravenous, Continuous, Miya Hatfield PA-C, Last Rate: 33.3 mL/hr at 12/16/23 0450, 1 mg/min at 12/16/23 0450    amiodarone tablet 200 mg, 200 mg, Oral, Daily, Miya Hatfield PA-C, 200 mg at 12/16/23 0913    chlorhexidine (PERIDEX) 0.12 % oral rinse 15 mL, 15 mL, Mouth/Throat, Q12H SAMUEL, MARLEN Beavers-C, 15 mL at 12/16/23 0913    famotidine (PEPCID) tablet 20 mg, 20 mg, Oral, BID AC, Miya Hatfield PA-C, 20 mg at 12/16/23 0524    furosemide (LASIX) tablet 40 mg, 40 mg, Oral, Daily, MARLEN Beavers-DONAVON, 40 mg at 12/16/23 0913    magnesium sulfate 2 g/50 mL IVPB  (premix) 2 g, 2 g, Intravenous, Once, Marianna Mantilla PA-C    mexiletine (MEXITIL) capsule 150 mg, 150 mg, Oral, Q8H SAMUEL, Miya Hatfield PA-C, 150 mg at 12/16/23 0524    [START ON 12/17/2023] potassium chloride (K-DUR,KLOR-CON) CR tablet 20 mEq, 20 mEq, Oral, Daily, Marianna Mantilla PA-C    potassium chloride (K-DUR,KLOR-CON) CR tablet 40 mEq, 40 mEq, Oral, Once, Marianna Mantilla PA-C    rivaroxaban (XARELTO) tablet 20 mg, 20 mg, Oral, Daily With Breakfast, Miya Hatfield PA-C, 20 mg at 12/16/23 0913    sacubitril-valsartan (ENTRESTO) 24-26 MG per tablet 1 tablet, 1 tablet, Oral, BID, Miya Hatfield PA-C, 1 tablet at 12/16/23 0916    spironolactone (ALDACTONE) tablet 25 mg, 25 mg, Oral, Daily, Miya Hatfield PA-C, 25 mg at 12/16/23 0913    Labs & Results:      Results from last 7 days   Lab Units 12/16/23  0452 12/15/23  0607 12/14/23  0425   WBC Thousand/uL 9.54 7.39 13.49*   HEMOGLOBIN g/dL 16.4 16.3 15.3   HEMATOCRIT % 50.6* 49.2 47.0   PLATELETS Thousands/uL 194 186 218         Results from last 7 days   Lab Units 12/16/23  0452 12/15/23  0607 12/14/23  0451 12/13/23  2147 12/13/23  1526   POTASSIUM mmol/L 3.5 4.8 4.0   < > 4.5   CHLORIDE mmol/L 102 100 108   < > 101   CO2 mmol/L 25 25 22   < > 26   BUN mg/dL 15 16 17   < > 22   CREATININE mg/dL 1.06 1.08 1.05   < > 1.35*   CALCIUM mg/dL 8.8 8.7 7.3*   < > 9.3   ALK PHOS U/L 61  --  49  --  84   ALT U/L 35  --  33  --  47   AST U/L 29  --  29  --  38    < > = values in this interval not displayed.     Results from last 7 days   Lab Units 12/13/23  1526   INR  1.34*     Marianna Mantilla PA-C

## 2023-12-17 NOTE — EMTALA/ACUTE CARE TRANSFER
[facility]  [address]  [place] [address]  Dept: [phone]      ACUTE CARE TRANSFER CONSENT    NAME Vadim Chapa                                         1975                              MRN [id number]    I have been informed of my rights regarding examination, treatment, and transfer   by Dr. Azucena Sims*    Benefits:  Specialized equipment and/or services available at the receiving facility (EP, transplant/VAD teams)    Risks:  Potential deterioration of medical condition.   Possible worsening of condition or death during transfer.      Consent for Transfer:  I acknowledge that my medical condition has been evaluated and explained to me by the treating physician or other qualified medical person and/or my attending physician, who has recommended that I be transferred to the service of  critical care  at [place] . The above potential benefits of such transfer, the potential risks associated with such transfer, and the probable risks of not being transferred have been explained to me, and I fully understand them.  The doctor has explained that, in my case, the benefits of transfer outweigh the risks.  I agree to be transferred.    I authorize the performance of emergency medical procedures and treatments upon me in both transit and upon arrival at the receiving facility.  Additionally, I authorize the release of any and all medical records to the receiving facility and request they be transported with me, if possible.  I understand that the safest mode of transportation during a medical emergency is an ambulance and that the Hospital advocates the use of this mode of transport. Risks of traveling to the receiving facility by car, including absence of medical control, life sustaining equipment, such as oxygen, and medical personnel has been explained to me and I fully understand them.    (KVNG CORRECT BOX BELOW)  [  ]  I consent to the stated transfer and to be  transported by ambulance/helicopter.  [  ]  I consent to the stated transfer, but refuse transportation by ambulance and accept full responsibility for my transportation by car.  I understand the risks of non-ambulance transfers and I exonerate the Hospital and its staff from any deterioration in my condition that results from this refusal.    X___________________________________________    DATE  23  TIME________  Signature of patient or legally responsible individual signing on patient behalf           RELATIONSHIP TO PATIENT_________________________          Provider Certification    NAME Vadim Chapa                                         1975                              MRN [id number]    A medical screening exam was performed on the above named patient.  Based on the examination:    Condition Necessitating Transfer Eval by [facility] service    Patient Condition:  Fair.     Reason for Transfer:  Eval by [facility] service for eval of transplant vs VAD candidacy    Transfer Requirements: Facility     Space available and qualified personnel available for treatment as acknowledged by    Agreed to accept transfer and to provide appropriate medical treatment as acknowledged by          Appropriate medical records of the examination and treatment of the patient are provided at the time of transfer   STAFF INITIAL WHEN COMPLETED _______  Transfer will be performed by qualified personnel from   [facility] and appropriate transfer equipment as required, including the use of necessary and appropriate life support measures.    Provider Certification: Pt was examined by daytime provider and HF service and explained the following risks and benefits of being transferred/refusing transfer to the patient/family:         Based on these reasonable risks and benefits to the patient and/or the unborn child(savanna), and based upon the information available at the time of the patient’s examination, I certify that the  medical benefits reasonably to be expected from the provision of appropriate medical treatments at another medical facility outweigh the increasing risks, if any, to the individual’s medical condition, and in the case of labor to the unborn child, from effecting the transfer.    X____________________________________________ DATE 12/16/23        TIME_______      ORIGINAL - SEND TO MEDICAL RECORDS   COPY - SEND WITH PATIENT DURING TRANSFER

## 2023-12-18 ENCOUNTER — TRANSITIONAL CARE MANAGEMENT (OUTPATIENT)
Dept: FAMILY MEDICINE CLINIC | Facility: CLINIC | Age: 48
End: 2023-12-18

## 2023-12-18 ENCOUNTER — DOCUMENTATION (OUTPATIENT)
Dept: CARDIOLOGY CLINIC | Facility: CLINIC | Age: 48
End: 2023-12-18

## 2023-12-19 ENCOUNTER — PATIENT OUTREACH (OUTPATIENT)
Dept: FAMILY MEDICINE CLINIC | Facility: CLINIC | Age: 48
End: 2023-12-19

## 2023-12-19 LAB
BACTERIA BLD CULT: NORMAL
BACTERIA BLD CULT: NORMAL

## 2023-12-19 NOTE — ED PROVIDER NOTES
"History  Chief Complaint   Patient presents with    Palpitations     Patient arrives via EMS with a co \"feeling tachycardia in my chest\" symptoms started approx 1.5 hours ago.  Patient reports hx of SVT, VTach, and Afib.  Patient does have a ICD. Patient reports on 200mg amiodarone daily.       48-year-old male patient presents to the emergency department for evaluation of palpitations.  The patient has a history of cardiomyopathy, AICD is implanted, and the patient had a recent bout of ventricular tachycardia.  The prehospital EKG that was sent appeared to be ventricular tachycardia and sustained a rate of approximately 140 bpm.  Upon arrival here the patient states no symptoms, including no chest pain, shortness of breath, no dizziness, lightheadedness.  The patient's blood pressure was initially stable.  The patient cardioverted in the past.  In interrogation of the patient's pacemaker showed that the settings were at such a level as to not detect the patient slow ventricular tachycardia.  I did send a copy of the EKG to the on-call cardiologist Dr. Goodman to review we are in agreement that this is a slow ventricular tachycardia.  Patient apparently recently had a decrease in his amiodarone dose from 400 mg to 200 mg.    During the course of the patient's care here the patient had a deterioration of his blood pressure and became slightly lightheaded.  Because of this the patient was prepared for cardioversion.  A some dissociative dose of ketamine was given for pain control and the patient was cardioverted with a single 200 J.  The patient tolerated the procedure well, he had an increase in his blood pressure into 170s systolic, the patient immediately cardioverted into a sinus rhythm.    The patient's recovery, after the patient was conscious and awake his care was handed off to the oncoming physician.  The patient had been transferred to the intensive care unit at [facility] for involvement of " electrophysiology.      History provided by:  Patient   used: No    Palpitations  Palpitations quality:  Regular  Timing:  Constant  Progression:  Worsening  Chronicity:  Recurrent  Associated symptoms: no diaphoresis, no hemoptysis, no near-syncope and no vomiting        Prior to Admission Medications   Prescriptions Last Dose Informant Patient Reported? Taking?   amiodarone 200 mg tablet   No No   Sig: Take 1 tablet (200 mg total) by mouth daily   famotidine (PEPCID) 20 mg tablet  Self No No   Sig: Take 1 tablet (20 mg total) by mouth daily   furosemide (LASIX) 40 mg tablet   No No   Sig: Take 1 tablet (40 mg total) by mouth daily   potassium chloride (K-DUR,KLOR-CON) 20 mEq tablet   No No   Sig: Take 1 tablet (20 mEq total) by mouth daily   rivaroxaban (Xarelto) 20 mg tablet   No No   Sig: Take 1 tablet (20 mg total) by mouth daily with dinner   sacubitril-valsartan (ENTRESTO) 24-26 MG TABS   No No   Sig: Take 1 tablet by mouth 2 (two) times a day   spironolactone (ALDACTONE) 25 mg tablet   No No   Sig: Take 1 tablet (25 mg total) by mouth daily      Facility-Administered Medications: None       Past Medical History:   Diagnosis Date    A-fib (HCC) 2020    AICD (automatic cardioverter/defibrillator) present     CHF (congestive heart failure) ([facility])     Coxsackie virus infection 2017    Hypertension     ICD (implantable cardioverter-defibrillator) lead failure 06/03/2017    Myocarditis (HCC)     Sleep apnea     Ventricular tachycardia (HCC)        Past Surgical History:   Procedure Laterality Date    CARDIAC DEFIBRILLATOR PLACEMENT      CARDIAC ELECTROPHYSIOLOGY PROCEDURE N/A 7/3/2023    Procedure: Cardiac eps/svt ablation;  Surgeon: Mihai Walter DO;  Location: [facility]ARD[facility] C[facility];  Service: Cardiology    CARDIAC ELECTROPHYSIOLOGY PROCEDURE N/A 7/3/2023    Procedure: Cardiac eps/afib ablation;  Surgeon: Mihai Walter DO;  Location: [facility] CARDIAC C[facility] LAB;  Service: Cardiology    ND  "ESOPHAGOGASTRODUODENOSCOPY TRANSORAL DIAGNOSTIC N/A 3/29/2017    Procedure: ESOPHAGOGASTRODUODENOSCOPY (EGD);  Surgeon: Valeriy Conte MD;  Location: [facility] GI LAB;  Service: Gastroenterology    AK INSJ/RPLCMT PERM DFB W/TRNSVNS LDS 1/DUAL CHMBR N/A 6/2/2017    Procedure: LEAD EXTRACTION/REIMPLANTATION AND ICD GENERATOR CHANGE ;  Surgeon: Enrique Warren MD;  Location:  MAIN OR;  Service: Cardiology    AK LAPAROSCOPY SURG CHOLECYSTECTOMY N/A 5/19/2017    Procedure: LAPAROSCOPIC CHOLECYSTECTOMY ;  Surgeon: Tanner Leyva MD;  Location: [facility] MAIN OR;  Service: General    TONSILLECTOMY      TYMPANOSTOMY TUBE PLACEMENT         Family History   Problem Relation Age of Onset    No Known Problems Mother     No Known Problems Father     Sudden death Neg Hx     Heart disease Neg Hx      I have reviewed and agree with the history as documented.    E-Cigarette/Vaping    E-Cigarette Use Never User      E-Cigarette/Vaping Substances    Nicotine No     THC No     CBD No     Flavoring No     Other No     Unknown No      Social History     Tobacco Use    Smoking status: Never    Smokeless tobacco: Never   Vaping Use    Vaping status: Never Used   Substance Use Topics    Alcohol use: Not Currently     Comment: OCCASIONAL; history of binge drinking -  drinking \"a lot\" on Fridays; stopped this about in late 2022. (Updated 12/04/2023).    Drug use: No       Review of Systems   Constitutional:  Negative for diaphoresis.   Respiratory:  Negative for hemoptysis.    Cardiovascular:  Negative for near-syncope.   Gastrointestinal:  Negative for vomiting.   All other systems reviewed and are negative.      Physical Exam  Physical Exam  Vitals and nursing note reviewed.   Constitutional:       General: He is not in acute distress.     Appearance: He is well-developed. He is not diaphoretic.   HENT:      Head: Normocephalic and atraumatic.      Right Ear: External ear normal.      Left Ear: External ear normal.   Eyes:      General: No scleral " icterus.        Right eye: No discharge.         Left eye: No discharge.      Conjunctiva/sclera: Conjunctivae normal.   Neck:      Thyroid: No thyromegaly.      Vascular: No JVD.      Trachea: No tracheal deviation.   Cardiovascular:      Rate and Rhythm: Regular rhythm. Tachycardia present.   Pulmonary:      Effort: Pulmonary effort is normal. No respiratory distress.      Breath sounds: Normal breath sounds. No stridor. No wheezing or rales.   Abdominal:      General: Bowel sounds are normal. There is no distension.      Palpations: Abdomen is soft.      Tenderness: There is no abdominal tenderness.   Musculoskeletal:         General: No tenderness or deformity. Normal range of motion.      Cervical back: Normal range of motion and neck supple.   Skin:     General: Skin is warm and dry.   Neurological:      Mental Status: He is alert and oriented to person, place, and time.      Cranial Nerves: No cranial nerve deficit.      Coordination: Coordination normal.   Psychiatric:         Behavior: Behavior normal.         Vital Signs  ED Triage Vitals [12/13/23 1459]   Temperature Pulse Respirations Blood Pressure SpO2   98.5 °F (36.9 °C) (!) 140 20 112/59 98 %      Temp Source Heart Rate Source Patient Position - Orthostatic VS BP Location FiO2 (%)   Oral Monitor Sitting Left arm --      Pain Score       --           Vitals:    12/13/23 2010 12/13/23 2011 12/13/23 2015 12/13/23 2020   BP: 105/81 118/68 118/62 125/59   Pulse: (!) 154 76 73 (!) 134   Patient Position - Orthostatic VS: Sitting            Visual Acuity  Visual Acuity      Flowsheet Row Most Recent Value   L Pupil Size (mm) 3   R Pupil Size (mm) 3            ED Medications  Medications   magnesium sulfate 2 g/50 mL IVPB (premix) 2 g (0 g Intravenous Stopped 12/13/23 1729)   amiodarone 150 mg in dextrose 5 % 100 mL IV bolus (0 mg Intravenous Stopped 12/13/23 1703)   sodium chloride 0.9 % bolus 500 mL (0 mL Intravenous Stopped 12/13/23 1800)   Ketamine HCl  40 mg (40 mg Intravenous Given 12/13/23 1740)   ondansetron (ZOFRAN) injection 4 mg (4 mg Intravenous Given 12/13/23 1741)   diphenhydrAMINE (BENADRYL) injection 50 mg (50 mg Intravenous Given 12/13/23 1741)   ketamine (KETALAR) 50 mg/mL **ADS Override Pull** (  Given 12/13/23 1745)   etomidate (AMIDATE) 2 mg/mL injection 10 mg (10 mg Intravenous Given by Other 12/13/23 1954)       Diagnostic Studies  Results Reviewed       Procedure Component Value Units Date/Time    HS Troponin I 2hr [[id number]]  (Abnormal) Collected: 12/13/23 1726    Lab Status: Final result Specimen: Blood from Arm, Left Updated: 12/13/23 1807     hs TnI 2hr 52 ng/L      Delta 2hr hsTnI 18 ng/L     Manual Differential(PHLEBS Do Not Order) [[id number]]  (Abnormal) Collected: 12/13/23 1526    Lab Status: Final result Specimen: Blood from Arm, Left Updated: 12/13/23 1620     Segmented % 69 %      Bands % 1 %      Lymphocytes % 19 %      Monocytes % 8 %      Eosinophils, % 0 %      Basophils % 1 %      Myelocytes % 1 %      Promyelocytes % 1 %      Absolute Neutrophils 8.97 Thousand/uL      Lymphocytes Absolute 2.43 Thousand/uL      Monocytes Absolute 1.02 Thousand/uL      Eosinophils Absolute 0.00 Thousand/uL      Basophils Absolute 0.13 Thousand/uL      Total Counted --     RBC Morphology Present     Platelet Estimate Adequate    Comprehensive metabolic panel [[id number]]  (Abnormal) Collected: 12/13/23 1526    Lab Status: Final result Specimen: Blood from Arm, Left Updated: 12/13/23 1610     Sodium 135 mmol/L      Potassium 4.5 mmol/L      Chloride 101 mmol/L      CO2 26 mmol/L      ANION GAP 8 mmol/L      BUN 22 mg/dL      Creatinine 1.35 mg/dL      Glucose 132 mg/dL      Calcium 9.3 mg/dL      AST 38 U/L      ALT 47 U/L      Alkaline Phosphatase 84 U/L      Total Protein 8.1 g/dL      Albumin 4.4 g/dL      Total Bilirubin 1.39 mg/dL      eGFR 62 ml/min/1.73sq m     Narrative:      National Kidney Disease Foundation guidelines for Chronic Kidney  Disease (CKD):     Stage 1 with normal or high GFR (GFR > 90 mL/min/1.73 square meters)    Stage 2 Mild CKD (GFR = 60-89 mL/min/1.73 square meters)    Stage 3A Moderate CKD (GFR = 45-59 mL/min/1.73 square meters)    Stage 3B Moderate CKD (GFR = 30-44 mL/min/1.73 square meters)    Stage 4 Severe CKD (GFR = 15-29 mL/min/1.73 square meters)    Stage 5 End Stage CKD (GFR <15 mL/min/1.73 square meters)  Note: GFR calculation is accurate only with a steady state creatinine    TSH, 3rd generation with Free T4 reflex [[id number]]  (Normal) Collected: 12/13/23 1526    Lab Status: Final result Specimen: Blood from Arm, Left Updated: 12/13/23 1605     TSH 3RD GENERATON 1.292 uIU/mL     HS Troponin 0hr (reflex protocol) [[id number]]  (Normal) Collected: 12/13/23 1526    Lab Status: Final result Specimen: Blood from Arm, Left Updated: 12/13/23 1558     hs TnI 0hr 34 ng/L     Protime-INR [[id number]]  (Abnormal) Collected: 12/13/23 1526    Lab Status: Final result Specimen: Blood from Arm, Left Updated: 12/13/23 1551     Protime 17.3 seconds      INR 1.34    CBC and differential [[id number]]  (Abnormal) Collected: 12/13/23 1526    Lab Status: Final result Specimen: Blood from Arm, Left Updated: 12/13/23 1540     WBC 12.81 Thousand/uL      RBC 6.25 Million/uL      Hemoglobin 18.4 g/dL      Hematocrit 55.5 %      MCV 89 fL      MCH 29.4 pg      MCHC 33.2 g/dL      RDW 15.3 %      MPV 10.2 fL      Platelets 293 Thousands/uL                    XR chest 1 view portable   Final Result by Renato Weaver MD (12/14 0859)   No focal infiltrate.      Slight pulmonary vascular prominence could be related to technique..                  Workstation performed: [id number]                    Procedures  CriticalCare Time    Date/Time: 12/19/2023 11:13 AM    Performed by: Nando Sheriff DO  Authorized by: Nando Sheriff DO    Critical care provider statement:     Critical care time (minutes):  40    Critical care time was exclusive of:   Separately billable procedures and treating other patients    Critical care was necessary to treat or prevent imminent or life-threatening deterioration of the following conditions:  Shock    Critical care was time spent personally by me on the following activities:  Interpretation of cardiac output measurements, ordering and performing treatments and interventions, ordering and review of laboratory studies, ordering and review of radiographic studies, re-evaluation of patient's condition, review of old charts, examination of patient and evaluation of patient's response to treatment    I assumed direction of critical care for this patient from another provider in my specialty: no             ED Course                               SBIRT 22yo+      Flowsheet Row Most Recent Value   Initial Alcohol Screen: US AUDIT-C     1. How often do you have a drink containing alcohol? 0 Filed at: 12/13/2023 1502   2. How many drinks containing alcohol do you have on a typical day you are drinking?  0 Filed at: 12/13/2023 1502   3a. Male UNDER 65: How often do you have five or more drinks on one occasion? 0 Filed at: 12/13/2023 1502   Audit-C Score 0 Filed at: 12/13/2023 1502   LASHA: How many times in the past year have you...    Used an illegal drug or used a prescription medication for non-medical reasons? Never Filed at: 12/13/2023 1502                      Medical Decision Making  Amount and/or Complexity of Data Reviewed  Labs: ordered.  Radiology: ordered.    Risk  Prescription drug management.             Disposition  Final diagnoses:   None     ED Disposition       ED Disposition   Transfer to Another Facility-In Network    Condition   --    Date/Time   Wed Dec 13, 2023 1733    Comment   Vadim Chapa should be transferred out to [facility].               MD Documentation      Flowsheet Row Most Recent Value   Patient Condition An emergency transfer is being made prior to stabilization due to the need for definitive care and the  benefit of transfer outweighs the risk, The patient has been stabilized such that within reasonable medical probability, no material deterioration of the patient condition or the condition of the unborn child(savanna) is likely to result from the transfer   Reason for Transfer Level of Care needed not available at this facility   Benefits of Transfer Specialized equipment and/or services available at the receiving facility (Include comment)________________________  [ep]   Risks of Transfer Potential deterioration of medical condition, Loss of IV, Possible worsening of condition or death during transfer   Accepting Physician Vladimir   Provider Certification General risk, such as traffic hazards, adverse weather conditions, rough terrain or turbulence, possible failure of equipment (including vehicle or aircraft), or consequences of actions of persons outside the control of the transport personnel, Risk of worsening condition          RN Documentation      Flowsheet Row Most Recent Value   Level of Care Advanced life support          Follow-up Information    None         Discharge Medication List as of 12/13/2023  8:54 PM        CONTINUE these medications which have NOT CHANGED    Details   amiodarone 200 mg tablet Take 1 tablet (200 mg total) by mouth daily, Starting Mon 12/11/2023, Until Thu 12/5/2024, Normal      famotidine (PEPCID) 20 mg tablet Take 1 tablet (20 mg total) by mouth daily, Starting Thu 7/6/2023, Until Mon 12/11/2023, Normal      furosemide (LASIX) 40 mg tablet Take 1 tablet (40 mg total) by mouth daily, Starting Mon 12/11/2023, Until Thu 12/5/2024, Normal      potassium chloride (K-DUR,KLOR-CON) 20 mEq tablet Take 1 tablet (20 mEq total) by mouth daily, Starting Mon 12/11/2023, Normal      rivaroxaban (Xarelto) 20 mg tablet Take 1 tablet (20 mg total) by mouth daily with dinner, Starting Mon 12/11/2023, Normal      sacubitril-valsartan (ENTRESTO) 24-26 MG TABS Take 1 tablet by mouth 2 (two) times a  day, Starting Mon 12/11/2023, Until Thu 12/5/2024, Normal      spironolactone (ALDACTONE) 25 mg tablet Take 1 tablet (25 mg total) by mouth daily, Starting Mon 12/11/2023, Normal      Empagliflozin (JARDIANCE) 10 MG TABS tablet Take 1 tablet (10 mg total) by mouth daily, Starting Thu 12/7/2023, Until Sat 1/6/2024, Normal      metoprolol succinate (TOPROL-XL) 25 mg 24 hr tablet Take 3 tablets (75 mg total) by mouth 2 (two) times a day, Starting Mon 12/11/2023, Until Thu 12/5/2024, Normal      traZODone (DESYREL) 50 mg tablet Take 1 tablet (50 mg total) by mouth daily at bedtime for 7 days, Starting Thu 12/7/2023, Until Thu 12/14/2023, Normal             No discharge procedures on file.    PDMP Review       None            ED Provider  Electronically Signed by             Nando Sheriff DO  12/19/23 0170

## 2023-12-19 NOTE — PROGRESS NOTES
Outpatient Care Management Note:  Inbasket ADT alert received.  On 12/16/23 patient was transferred from [facility] to [facility] for evaluation of transplant vs LVAD candidacy.    Complex CM referral will be closed. I have removed myself from the care team.

## 2023-12-26 NOTE — PROGRESS NOTES
"Advanced Heart Failure/Pulmonary Hypertension Outpatient Note - Vadim Chapa 48 y.o. male MRN: [id number]    @ Encounter: [id number]    Assessment:  48 y.o. male PMH per chart p/w HF fu. I first met Vadim Chapa 12/2023 while he was admitted for VT and HFrEF exacerbation. Had subsequent VT event again later in 12/2023 and xferred per EP team to [facility] for consideration of VT ablation.     Chronic NICM, HFrEF, LVEF 25% in 5/2023>VT and DCCV>now 10%, LVIDD 7.3cm, RV size/fxn wnl  Etiology thought 2/2 coxsackie virus many years ago   cMRI 12/07/2023: LVEF 16%. LVIDd 6.8 cm. LV SV 53 mL. LV CO 3.2 L/min. \"Intramyocardial fibrosis of the interventricular septum, and subepicardial fibrosis of the inferior wall. 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 \"   [facility] LHC 12/2023: no CAD  Coronary Findings Diagnostic Dominance: Right   Left Main: The vessel is normal angiographically and in size.   Left Anterior Descending: The vessel is normal angiographically and in size.   Left Circumflex: The vessel is normal angiographically and in size.   Right Coronary Artery: The vessel was not injected.   VT, recurrent admits for this 2023 and xferred per EP team to [facility] for consideration of VT ablation, possible advanced therapies.  12/19/23 [facility] VT ablation, epicardial access  Conclusions:   1. Successful radiofrequency ablation of epicardial focal (likely   triggered activity) VT   2. Additional late potential ablation   3. No acute complications   AF, AFL on AC, PVI and CTI ablation in July 2023   HTN  Obese  CONCHIS uses cpap regularly  Prior etoh abuse, better in 2023      I have reviewed all pertinent patient data including but not limited to:        Lab Units 12/16/23  0452 12/15/23  0607 12/14/23  0451   CREATININE mg/dL 1.06 1.08 1.05         Lab Results   Component Value Date    K 3.5 12/16/2023     Lab Results   Component Value Date    HGBA1C 6.1 12/17/2023     Lab Results   Component Value Date    VOL6BOEFAADP 1.292 " "12/13/2023     Lab Results   Component Value Date    LDLCALC 75 09/07/2017     Lab Results   Component Value Date     (H) 12/14/2023      No results found for: \"NTBNP\"       Our last encounter (full visit/chart update/med refill):  12/13/2023  TODAY'S PLAN:     12/27/23  Warm, euvolemic  No new cardiac complaints, feels generally well  Had [facility] VT ablation 12/19/23    Genetics ordered today    Cardiac PET for sarcoid concern ordered today  Strict diet instructions given    Cardiac rehab referral given    Repeat BMP next visit     Gdmt below  past gdmt seems historically limited due to multiple factors, may have only been on coreg and aldactone from [id number]; 12/2023 was inception of entresto+jardiance  Advance gdmt as able  Mild MR  Narrow QRS     Needs etoh cessation, weight loss, rigorous Rx compliance - he is actively working on these     Arrhythmia plan per EP  On amio  Off mexil  Considering addition of RA lead     using home CPAP regularly     Though not his culprit, Trazodone is undesirable in this pt w VT, advise cessation or switch, defer to primary  He sleeps poorly    Pharmacotherapies / Neurohormonal Blockade:  --Beta Blocker: was on metoprolol succinate 75 mg q12 hours> [facility] DC on lopressor 12.5 bid>switched to toprol xl 12.5 bid w plan for escalation as able  --ARNi / ACEi / ARB: Entresto 24-26 mg BID.   --Aldosterone Antagonist: spironolactone 25 mg daily.   --SGLT2 Inhibitor: empagliflozin 10 mg daily    --Home Diuretic: Lasix 40 mg daily with potassium 20 mEq daily.      Sudden Cardiac Death Risk Reduction:  --Medtronic single chamber ICD in situ since 2006.  --Interrogation:  Electrical Resynchronization:  --Candidacy for BiV device: narrow QRS.   Advanced Therapies (if appropriate):   Will continue to monitor.  BMI 38  Blood type O  Newly started more escalated gdmt 12/2023  Note good RV fxn    Studies:  I have reviewed all pertinent patient data/labs/imaging where available, including but " not limited to the below studies. Selected results may be displayed here but comprehensive listing is omitted for note clarity and can be found in the epic chart.    ECG.    Echo.    Stress.    Cath.    HPI:   48 y.o. male PMH per chart p/w HF fu. I first met Vadim Chapa 12/2023 while he was admitted for VT and HFrEF exacerbation. Had subsequent VT event again later in 12/2023 and xferred per EP team to [facility] for consideration of VT ablation.  No new CP/SOB/dizziness/palpitations/syncope.  No new fatigue.  No new unintentional weight changes.  No new leg swelling, PND, pillow orthopnea.  No new fevers, chills, cough, nausea, vomiting, diarrhea, dysuria.    Interval History:   As noted in 'plan' section above and prior epic chart notes.    Past Medical History:   Diagnosis Date    A-fib ([facility]) 2020    AICD (automatic cardioverter/defibrillator) present     CHF (congestive heart failure) ([facility])     Coxsackie virus infection 2017    Hypertension     ICD (implantable cardioverter-defibrillator) lead failure 06/03/2017    Myocarditis ([facility])     Sleep apnea     Ventricular tachycardia ([facility])      Patient Active Problem List    Diagnosis Date Noted    Leukocytosis 12/14/2023    HOANG (acute kidney injury) ([facility]) 12/13/2023    Wide-complex tachycardia 12/02/2023    Morbid obesity with BMI of 40.0-44.9, adult ([facility]) 07/02/2023    Syncope 04/16/2023    CHF (congestive heart failure) ([facility])     AICD (automatic cardioverter/defibrillator) present     ICD (implantable cardioverter-defibrillator) discharge 10/10/2022    Paroxysmal atrial fibrillation ([facility]) 11/06/2020    Dilated cardiomyopathy ([facility]) 01/25/2018    Hypertension 01/25/2018    Obstructive sleep apnea 01/25/2018    Calculus of gallbladder without cholecystitis 05/19/2017    Chronic HFrEF (heart failure with reduced ejection fraction) ([facility]) 2017       ROS:  10 point ROS negative except as specified in HPI/interval history    Allergies   Allergen Reactions    Vicodin  "[Hydrocodone-Acetaminophen] Other (See Comments)     \"i get nasty\"     Current Outpatient Medications   Medication Instructions    amiodarone 200 mg, Oral, Daily    aspirin 81 mg, Oral, Daily    Empagliflozin (JARDIANCE) 10 mg, Oral, Daily    famotidine (PEPCID) 20 mg, Oral, Daily    furosemide (LASIX) 40 mg, Oral, Daily    metoprolol succinate (TOPROL-XL) 12.5 mg, Oral, 2 times daily    potassium chloride (K-DUR,KLOR-CON) 20 mEq tablet 20 mEq, Oral, Daily    rivaroxaban (XARELTO) 20 mg, Oral, Daily with dinner    sacubitril-valsartan (ENTRESTO) 24-26 MG TABS 1 tablet, Oral, 2 times daily    spironolactone (ALDACTONE) 25 mg, Oral, Daily    traZODone (DESYREL) 50 mg, Oral, Daily at bedtime      Social History     Socioeconomic History    Marital status: Single     Spouse name: Not on file    Number of children: Not on file    Years of education: Not on file    Highest education level: Not on file   Occupational History    Not on file   Tobacco Use    Smoking status: Never    Smokeless tobacco: Never   Vaping Use    Vaping status: Never Used   Substance and Sexual Activity    Alcohol use: Not Currently     Comment: OCCASIONAL; history of binge drinking -  drinking \"a lot\" on Fridays; stopped this about in late 2022. (Updated 12/04/2023).    Drug use: No    Sexual activity: Not on file   Other Topics Concern    Not on file   Social History Narrative    Not on file     Social Determinants of Health     Financial Resource Strain: Not on file   Food Insecurity: No Food Insecurity (5/2/2023)    Hunger Vital Sign     Worried About Running Out of Food in the Last Year: Never true     Ran Out of Food in the Last Year: Never true   Transportation Needs: No Transportation Needs (5/2/2023)    PRAPARE - Transportation     Lack of Transportation (Medical): No     Lack of Transportation (Non-Medical): No   Physical Activity: Not on file   Stress: Not on file   Social Connections: Not on file   Intimate Partner Violence: Not on file " "  Housing Stability: Low Risk  (5/2/2023)    Housing Stability Vital Sign     Unable to Pay for Housing in the Last Year: No     Number of Places Lived in the Last Year: 1     Unstable Housing in the Last Year: No     Family History   Problem Relation Age of Onset    No Known Problems Mother     No Known Problems Father     Sudden death Neg Hx     Heart disease Neg Hx        Physical Exam:  Vitals:    12/27/23 1615   BP: 110/68   BP Location: Left arm   Patient Position: Sitting   Cuff Size: Large   Pulse: 79   SpO2: 99%   Weight: 134 kg (296 lb 8 oz)   Height: 6' 2\" (1.88 m)     Constitutional: NAD, non toxic  Ears/nose/mouth/throat: atraumatic  CV: RRR, nl S1S2, no murmurs/rubs/gallups, no JVD, no HJR  Resp: CTABL  GI: Soft, NTND  MSK: no swollen joints in exposed areas  Extr: No edema, warm LE  Pysche: Normal affect  Neuro: appropriate in conversation  Skin: dry and intact in exposed areas    Labs & Results:  Lab Results   Component Value Date    SODIUM 135 12/16/2023    K 3.5 12/16/2023     12/16/2023    CO2 25 12/16/2023    BUN 15 12/16/2023    CREATININE 1.06 12/16/2023    GLUC 108 12/16/2023    CALCIUM 8.8 12/16/2023     Lab Results   Component Value Date    WBC 9.54 12/16/2023    HGB 16.4 12/16/2023    HCT 50.6 (H) 12/16/2023    MCV 88 12/16/2023     12/16/2023       Counseling / Coordination of Care  Time spent today 27 minutes.  Greater than 50% of total time was spent with the patient and / or family counseling and / or coordination of care.  We discussed diagnoses, most recent studies, tests and any changes in treatment plan.    Thank you for the opportunity to participate in the care of this patient.    Migue Drummond MD  Attending Physician  Advanced Heart Failure and Transplant Cardiology  [facility]  "

## 2023-12-27 ENCOUNTER — OFFICE VISIT (OUTPATIENT)
Dept: CARDIOLOGY CLINIC | Facility: CLINIC | Age: 48
End: 2023-12-27
Payer: COMMERCIAL

## 2023-12-27 VITALS
SYSTOLIC BLOOD PRESSURE: 110 MMHG | DIASTOLIC BLOOD PRESSURE: 68 MMHG | OXYGEN SATURATION: 99 % | BODY MASS INDEX: 38.05 KG/M2 | WEIGHT: 296.5 LBS | HEART RATE: 79 BPM | HEIGHT: 74 IN

## 2023-12-27 DIAGNOSIS — I50.22 CHRONIC HFREF (HEART FAILURE WITH REDUCED EJECTION FRACTION) (HCC): ICD-10-CM

## 2023-12-27 DIAGNOSIS — Z95.810 AICD (AUTOMATIC CARDIOVERTER/DEFIBRILLATOR) PRESENT: ICD-10-CM

## 2023-12-27 DIAGNOSIS — R00.0 WIDE-COMPLEX TACHYCARDIA: ICD-10-CM

## 2023-12-27 DIAGNOSIS — G47.33 OBSTRUCTIVE SLEEP APNEA: ICD-10-CM

## 2023-12-27 DIAGNOSIS — I10 PRIMARY HYPERTENSION: ICD-10-CM

## 2023-12-27 DIAGNOSIS — N18.1 TYPE 2 DIABETES MELLITUS WITH STAGE 1 CHRONIC KIDNEY DISEASE, WITHOUT LONG-TERM CURRENT USE OF INSULIN: ICD-10-CM

## 2023-12-27 DIAGNOSIS — E11.22 TYPE 2 DIABETES MELLITUS WITH STAGE 1 CHRONIC KIDNEY DISEASE, WITHOUT LONG-TERM CURRENT USE OF INSULIN: ICD-10-CM

## 2023-12-27 DIAGNOSIS — I42.0 DILATED CARDIOMYOPATHY (HCC): Primary | ICD-10-CM

## 2023-12-27 PROCEDURE — 99214 OFFICE O/P EST MOD 30 MIN: CPT | Performed by: STUDENT IN AN ORGANIZED HEALTH CARE EDUCATION/TRAINING PROGRAM

## 2023-12-27 RX ORDER — TRAZODONE HYDROCHLORIDE 50 MG/1
50 TABLET ORAL
COMMUNITY

## 2023-12-27 RX ORDER — METOPROLOL SUCCINATE 25 MG/1
12.5 TABLET, EXTENDED RELEASE ORAL 2 TIMES DAILY
Qty: 90 TABLET | Refills: 5 | Status: SHIPPED | OUTPATIENT
Start: 2023-12-27 | End: 2025-06-19

## 2023-12-27 RX ORDER — ASPIRIN 81 MG/1
81 TABLET, CHEWABLE ORAL DAILY
COMMUNITY
Start: 2023-12-22 | End: 2024-01-21

## 2023-12-27 NOTE — PATIENT INSTRUCTIONS
DIETARY PREPARATION FOR FDG PET IMAGING TO ASSESS MYOCARDIAL INFLAMMATION    Consuming a high fat, very low carbohydrate, diet prior to your PET scan is a CRITICAL STEP and significantly increases accuracy of test.    START the Sarcoid prep diet 48 HOURS before your Sarcoid PET scan.    NO food after 4pm the night before your PET scan. Please only drink water (no other beverages).    PLEASE READ AND FOLLOW INSTRUCTIONS CAREFULLY. YOU WILL HAVE TO RESCHEDULE OR REPEAT THE PET SCAN IF THE DIET WAS NOT FOLLOWED COMPLETELY.     NO EXERCISE on the day prior to your PET scan.    For 48 HOURS before your Sarcoid PET Scan, absolutely NO caffeine or sugar or carbohydrates. NO desserts (not even desserts labeled sugar free). NO milk, coffee, tea, chocolate or any caffeinated beverages (including Sprite or root beer).    ---------------------------------    HIGH FAT, LOW CARBOHYDRATE DIET SAMPLE MENU (35g fat or more)   BREAKFAST:  3 Scrambled eggs (15g fat) cooked in oil or butter  AND 4 slices of mena (Ex. GenAudio no sugar added or similar brand with no sugar (10g fat))  AND 2 oz full fat cheddar cheese (19g fat)   LUNCH:  4oz ground jonatan beef 80/20 (20g fat) cooked in oil  OR 4 oz dark meat chicken with skin (18g fat) cooked in oil or butter (no breading or batter or flour coating)  OR 4 oz farm raised salmon (14g fat) cooked with oil or butter  AND ½ avocado (10g fat)  AND 1 egg (5g fat) cooked in oil or butter  DINNER:  4 oz ribeye steak (20g fat) cooked with oil or butter  OR 4oz ground jonatan beef 80/20 (20g fat) cooked in oil  OR 4 oz dark meat chicken with skin (18g fat) cooked in oil or butter (no breading or batter or flour coating)  OR 4 oz farm raised salmon (14g fat) cooked with oil or butter  AND ½ avocado (10g fat)  AND 1 egg (5g fat) cooked in oil or butter Patel or broil your meat. Do not grill. Do not bread or batter or coat with flour or any other type of coating    YOU CAN EAT/DRINK THE FOLLOWING:  Protein Sources:  Beef  Veal  Lamb  Pork  Turkey, with skin  Chicken, with skin  Duck, with skin  Goose, with skin  Hen, with skin  [place], with skin  Organ Meats (brain, tongue, liver, heart kidneys)  Cod  Calamari, not breaded  Flounder  Sole  Chris  Halibut  Sardines, packed in oil  Swordfish  Tuna, steak or canned packed in oil  Trout  Tilapia  [place]enton  Catfish  Bass  Shrimp Dairy:  Cheese (less than 2oz per day)  Eggs Seasonings:  Salt and Pepper  Vinegar  Ground Cinnamon  Yellow Mustad  Dill weed  Chives Oils and Fats:  Olive oil  Coconut oil  Cornflower oil  Corn oil  Soybean oil  Canola oil  Peanut oil  Sesame oil  Avocado oil  Grapeseed oil  Soybean oil  Safflower oil  Sunflower oil  Fish oil  Animal Fat (including lard)  Vegetable shortening  Butter Nuts: 1/4c or less of the following per day:  Pecans  Walnuts  Macadamia  [place] Vegetables: 1/2c or less allowed of the following:  Spinach  Parsley  Avocado  Radish  Arugula  Brussel Sprouts  Kale  Bok Moshe  Celery 1/4c or less allowed of the following:  Mushrooms  Garlic (1/2 clove)  Cabbage  Asparagus  Yellow Squash  Zucchini  Cauliflower  Broccoli  Cucumber Beverages:  Water  [place]ark[place] water  [place] water Patel or broil your meat. Do not grill. Do not bread or batter or coat with flour or any other type of coating    DO NOT EAT/DRINK THE FOLLOWING: Many processed products contain hidden sugar! NO fruits NO sugar or any food containing sugar:  Honey  Syrup  Jam/preserves  Mayonnaise/Miracle Whip  Commercial salad dressings (e.g. RanTeal Orbit, [place])  BBQ sauce  Steak sauce  Beer nuts  Candy/mints  Chewing gum  Cough drops  Ketchup  Mustard  Relish  Molasses  Peanut butter  Nut butter  Nutella NO root or starchy vegetables:  Carrots  Turnips  Parsnips  Potatoes  Beets  Corn  Sweet potatoes  [place] squash  Green peas  Yams  [place] squash NO grains or starches:  Wheat  Rye  Oats  Barley  Rice  Pasta  Quinoa  Buckwheat  Bread  Bagels  Buns  Cereals   Granola bars  Cakes  Cookies  Muffins NO beans or legumes:  Black beans  Chick peas  Baked beans  Peanuts  Kidney beans  Split peas  Lentils NO dairy products (Cheese is ok if less than 2oz):  Milk  Yogurt  Frozen yogurt  Pudding  Yogurt drinks  Ice cream NO beverages containing sugar or Aspartame or alcohol or caffeine:  Coffee  Tea  Soda  Beverages containing chocolate  Soft drinks  Flavored water  Juices  Fruit drinks (e.g. Osei?Aid, Rucker)  Sports drinks (e.g. Gatorade)  Non?alcoholic beer  Beer  Wine  Spirits NO processed meats:  Deli meat  Hot dog  Breaded or battered meat/poultry/fish Patel or broil your meat. Do not grill. Do not bread or batter or coat with flour or any other type of coating

## 2023-12-29 ENCOUNTER — DOCUMENTATION (OUTPATIENT)
Dept: CARDIOLOGY CLINIC | Facility: CLINIC | Age: 48
End: 2023-12-29

## 2024-01-08 ENCOUNTER — REMOTE DEVICE CLINIC VISIT (OUTPATIENT)
Dept: CARDIOLOGY CLINIC | Facility: CLINIC | Age: 49
End: 2024-01-08

## 2024-01-08 DIAGNOSIS — Z95.810 AICD (AUTOMATIC CARDIOVERTER/DEFIBRILLATOR) PRESENT: Primary | ICD-10-CM

## 2024-01-08 PROCEDURE — RECHECK: Performed by: INTERNAL MEDICINE

## 2024-01-08 NOTE — PROGRESS NOTES
Results for orders placed or performed in visit on 01/08/24   Cardiac EP device report    Narrative    MDT-SINGLE CHAMBER ICD/ACTIVE SYSTEM IS MRI CONDITIONAL  1 MONTH CARELINK TRANSMISSION PER DR. CHEEK TO CHECK FOR SLOW VT- NB: BATTERY VOLTAGE ADEQUATE (3.7 YRS). <0.1%. ALL AVAILABLE LEAD PARAMETERS WITHIN NORMAL LIMITS. 4 NEW DEVICE CLASSIFIED NSVT EPISODES- MOST RECENT FOR 5 BEATS, AVG CL~350MS W/ SIMIALR MORPHOLOGY TO INTRINSIC; 11 BEAT NSVT W/ RATE ACCELERATING TO 154BPM ON #400. PT ON XARELTO, ASA 81MG, AMIO & METOPROLOL. OPTI-VOL WITHIN NORMAL LIMITS. NORMAL DEVICE FUNCTION. GV

## 2024-01-15 ENCOUNTER — TELEPHONE (OUTPATIENT)
Dept: CARDIOLOGY CLINIC | Facility: CLINIC | Age: 49
End: 2024-01-15

## 2024-01-15 NOTE — TELEPHONE ENCOUNTER
Patient called about two cardiology appts he had scheduled this month.  He has a follow up with Dr Soliz in  on 1/25 and a follow up with Dr Drummond on 1/29 in [place].  Patient asked which one he should keep.    Per Dr Drummond, ok to keep visit with Dr Soliz in  if the location is more convenient for him.  Patient decided to keep the  appt and will cancel the ov with Dr Drummond on 1/29.

## 2024-01-22 ENCOUNTER — HOSPITAL ENCOUNTER (OUTPATIENT)
Dept: RADIOLOGY | Age: 49
Discharge: HOME | End: 2024-01-22
Payer: COMMERCIAL

## 2024-01-22 DIAGNOSIS — I42.0 DILATED CARDIOMYOPATHY (HCC): ICD-10-CM

## 2024-01-22 LAB — GLUCOSE SERPL-MCNC: 86 MG/DL (ref 65–140)

## 2024-01-22 PROCEDURE — 78429 MYOCRD IMG PET 1 STD W/CT: CPT

## 2024-01-22 PROCEDURE — G1004 CDSM NDSC: HCPCS

## 2024-01-22 PROCEDURE — A9552 F18 FDG: HCPCS

## 2024-01-22 PROCEDURE — 82948 REAGENT STRIP/BLOOD GLUCOSE: CPT

## 2024-01-24 ENCOUNTER — DOCUMENTATION (OUTPATIENT)
Dept: CARDIOLOGY CLINIC | Facility: CLINIC | Age: 49
End: 2024-01-24

## 2024-01-24 ENCOUNTER — TELEPHONE (OUTPATIENT)
Dept: CARDIOLOGY CLINIC | Facility: CLINIC | Age: 49
End: 2024-01-24

## 2024-01-24 NOTE — PROGRESS NOTES
Advanced Heart Failure Outpatient Progress Note - Vadim Chapa 48 y.o. male MRN: [id number]    Encounter: [id number]      Assessment/Plan:    Patient Active Problem List    Diagnosis Date Noted    Leukocytosis 12/14/2023    HOANG (acute kidney injury) ([facility]) 12/13/2023    Wide-complex tachycardia 12/02/2023    Morbid obesity with BMI of 40.0-44.9, adult ([facility]) 07/02/2023    Syncope 04/16/2023    CHF (congestive heart failure) ([facility])     AICD (automatic cardioverter/defibrillator) present     ICD (implantable cardioverter-defibrillator) discharge 10/10/2022    Paroxysmal atrial fibrillation ([facility]) 11/06/2020    Dilated cardiomyopathy ([facility]) 01/25/2018    Hypertension 01/25/2018    Obstructive sleep apnea 01/25/2018    Calculus of gallbladder without cholecystitis 05/19/2017    Chronic HFrEF (heart failure with reduced ejection fraction) ([facility]) 2017       # Chronic heart failure with reduced ejection fraction; EF 25-30%, recent decline EF to 10-15% in setting of VT  Etiology: Etiology thought 2/2 coxsackie virus many years ago. Occasional binge drinking. Known cardiomyopathy with EF of 25% since 2017. Further reduction in EF on echo 12/2/23 in setting of ventricular tachycardia s/p shocks, contribution from ?myocarditis on cardiac MRI. FDG PET negative for cardiac sarcoid  Genetic testing result pending.     Studies- personally reviewed by me     TTE 12/20/23 [facility]:  LVEF 10 to 15%.  LVIDD 6.9 cm  RV normal size and systolic function  Trivial pericardial effusion  Mild TR  Trace TR    C 12/19/23 [facility]:  Coronary Findings Diagnostic Dominance: Right   Left Main: The vessel is normal angiographically and in size.   Left Anterior Descending: The vessel is normal angiographically and in size.   Left Circumflex: The vessel is normal angiographically and in size.   Right Coronary Artery: The vessel was not injected.     Cardiac MRI 12/7/23:  LVEF 16%. LVEDD 6.8cm  Normal RV size and systolic function  Intramyocardial fibrosis of  the interventricular septum, and subepicardial fibrosis of the inferior wall. 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      -- TTE 12/6/23  LVEF: 15%  LVIDd: 6.7cm  RV: normal size and systolic function  MR: mild to moderate  PASP: 30mmHg, mild TR  RVOT: no notching  Other: no pericardial effusion     --TTE 5/1/23: LVEF 30%.   --TTE 4/17/23: LVEF 30%. LVIDD 7.1cm. Normal RV size and systolic function.  Mild MR. Mild TR     --TTE 10/11/21:  LVEF: 25%  LVIDd: 7.2cm  RV: normal size and systolic function  MR: mild  PASP: unable to estimate, no TR     --[facility] 8/30/21: Normal coronaries   --TTE 4/9/2019: LVEF 25% w/ grade II diastology. LVIDd 6.9 cm. Mod LAE. Trace MR. Mild TR. Noninvasive assessment c/f borderline to low output.   --TTE 5/16/2017: LVEF 25% w/ grade II diastology. LVIDd 6.9 cm. Mod LAE. Trace MR, mild TR. Normal RV size and function.         Neurohormonal Blockade:  --Beta-Blocker: metoprolol succinate 12.5mg BID  --ACEi, ARB or ARNi: entresto 24-26mg BID   --Aldosterone Receptor Blocker: spironolactone 25mg daily  --SGLT2 Inhibitor: Jardiance 10mg daily   --Diuretic: furosemide 40mg daily with potassium 20 meq daily     Sudden Cardiac Death Risk Reduction:  --ICD: MDT single chamber ICD  Interrogation 1/8/24:  <0.1%. 4 NSVTs 5-11 beats. OptiVol normal.     Electrical Resynchronization:  --Candidacy for BiV device: narrow QRSd     Advanced Therapies (if appropriate):  We will continue to monitor  BMI 38  Blood type O  Recent escalation of GDMT 12/2023  Normal RV size and function    # Ventricular tachycardia  S/p admissions x 2 12/2023. Transferred to [facility] for consideration of ablation  12/19/23 [facility] VT ablation, epicardial access  Conclusions:   1. Successful radiofrequency ablation of epicardial focal (likely   triggered activity) VT   2. Additional late potential ablation   3. No acute complications   Rx: metoprolol as below, amiodarone 200mg daily  Reports having palpitations, NSVTs on  last device interrogation     # Atrial fibrillation / flutter,  S/p Afib ablation in 07/2023.  [id number] = 2 (HF, HTN).  AC: xarelto  Rate control: BB as above  Rhythm control: on amiodarone  # Hypertension   # Obstructive sleep apnea, on CPAP  # Obesity  # History of binge drinking      TODAY'S PLAN:  Increase metoprolol succinate to 25mg two times a day  Obtain BMP  Continue rest of cardiac medications  Genetic testing result pending  2g sodium diet  2L fluid restriction  Daily weights  EP follow up as scheduled  Cardiac rehab  Advanced HF SW consult for [facility] transportation    HPI:     48 y.o.  year old male with history of nonischemic dilated cardiomyopathy, heart failure with reduced ejection fraction, status post Medtronic single-chamber ICD (June 2017), atrial fibrillation/flutter status post A-fib ablation July 2023, VT, hypertension, hyperlipidemia, obstructive sleep apnea, morbid obesity, and history of binge drinking who is here for follow up. I last saw him on follow up 5/12/22.    Admitted twice in 12/2023 for VT s/p DCCVs. Ultimately transferred to [facility] for consideration for VT ablation. S/p Successful radiofrequency ablation of epicardial focal (likely triggered activity) VT 12/19/23.  Seen in follow up by Dr. Drummond 12/27/23 and was feeling generally well. FDG PET ordered due to concern for sarcoid. FDG PET without increased focal FDG activity involving the left ventricular myocardium characteristic of cardiac sarcoidosis      Interval History:  1/25/24: He is here for follow-up.  Notes some lightheadedness with getting up.  Overall feeling better.  No leg swelling or abdominal distention.  Reports some lightheadedness with getting up.  Short of breath with more exertion, noted feeling short of breath getting the garbage out which was heavier than usual.  He is able to walk a flight of stairs without shortness of breath.  Noticing heart rates mostly in the 70s which was usually in the 80s before and  "feeling palpitations.  Last device interrogation 1/8/2024 with episodes of NSVT's x 4.  He needs a letter for work, he inspects industrial grade boilers      Review of Systems   Constitutional:  Negative for chills and fever.   HENT:  Negative for ear pain and sore throat.    Eyes:  Negative for pain and visual disturbance.   Respiratory:  Positive for shortness of breath. Negative for cough.    Cardiovascular:  Positive for palpitations. Negative for chest pain and leg swelling.   Gastrointestinal:  Negative for abdominal distention, abdominal pain and vomiting.   Genitourinary:  Negative for dysuria and hematuria.   Musculoskeletal:  Negative for arthralgias and back pain.   Skin:  Negative for color change and rash.   Neurological:  Positive for light-headedness. Negative for seizures and syncope.   All other systems reviewed and are negative.      Past Medical History:   Diagnosis Date    A-fib (HCC) 2020    AICD (automatic cardioverter/defibrillator) present     CHF (congestive heart failure) ([facility])     Coxsackie virus infection 2017    Hypertension     ICD (implantable cardioverter-defibrillator) lead failure 06/03/2017    Myocarditis ([facility])     Sleep apnea     Ventricular tachycardia ([facility])          Allergies   Allergen Reactions    Vicodin [Hydrocodone-Acetaminophen] Other (See Comments)     \"i get nasty\"     .    Current Outpatient Medications:     amiodarone 200 mg tablet, Take 1 tablet (200 mg total) by mouth daily, Disp: 90 tablet, Rfl: 3    aspirin 81 mg chewable tablet, Chew 81 mg daily, Disp: , Rfl:     Empagliflozin (Jardiance) 10 MG TABS tablet, Take 10 mg by mouth daily, Disp: , Rfl:     famotidine (PEPCID) 20 mg tablet, Take 1 tablet (20 mg total) by mouth daily, Disp: 30 tablet, Rfl: 0    furosemide (LASIX) 40 mg tablet, Take 1 tablet (40 mg total) by mouth daily, Disp: 90 tablet, Rfl: 3    metoprolol succinate (TOPROL-XL) 25 mg 24 hr tablet, Take 1 tablet (25 mg total) by mouth 2 (two) times a day, " "Disp: 180 tablet, Rfl: 3    potassium chloride (K-DUR,KLOR-CON) 20 mEq tablet, Take 1 tablet (20 mEq total) by mouth daily, Disp: 90 tablet, Rfl: 1    rivaroxaban (Xarelto) 20 mg tablet, Take 1 tablet (20 mg total) by mouth daily with dinner, Disp: 90 tablet, Rfl: 3    sacubitril-valsartan (ENTRESTO) 24-26 MG TABS, Take 1 tablet by mouth 2 (two) times a day, Disp: 180 tablet, Rfl: 3    spironolactone (ALDACTONE) 25 mg tablet, Take 1 tablet (25 mg total) by mouth daily, Disp: 90 tablet, Rfl: 3    traZODone (DESYREL) 50 mg tablet, Take 50 mg by mouth daily at bedtime, Disp: , Rfl:     Social History     Socioeconomic History    Marital status: Single     Spouse name: Not on file    Number of children: Not on file    Years of education: Not on file    Highest education level: Not on file   Occupational History    Not on file   Tobacco Use    Smoking status: Never    Smokeless tobacco: Never   Vaping Use    Vaping status: Never Used   Substance and Sexual Activity    Alcohol use: Not Currently     Comment: OCCASIONAL; history of binge drinking -  drinking \"a lot\" on Fridays; stopped this about in late 2022. (Updated 12/04/2023).    Drug use: No    Sexual activity: Not on file   Other Topics Concern    Not on file   Social History Narrative    Not on file     Social Determinants of Health     Financial Resource Strain: Not on file   Food Insecurity: No Food Insecurity (5/2/2023)    Hunger Vital Sign     Worried About Running Out of Food in the Last Year: Never true     Ran Out of Food in the Last Year: Never true   Transportation Needs: No Transportation Needs (5/2/2023)    PRAPARE - Transportation     Lack of Transportation (Medical): No     Lack of Transportation (Non-Medical): No   Physical Activity: Not on file   Stress: Not on file   Social Connections: Not on file   Intimate Partner Violence: Not on file   Housing Stability: Low Risk  (5/2/2023)    Housing Stability Vital Sign     Unable to Pay for Housing in the " Last Year: No     Number of Places Lived in the Last Year: 1     Unstable Housing in the Last Year: No       Family History   Problem Relation Age of Onset    No Known Problems Mother     No Known Problems Father     Sudden death Neg Hx     Heart disease Neg Hx        Physical Exam:    Vitals:   Vitals:    01/25/24 0903   BP: 110/72   Pulse: 80   Resp: 16   SpO2: 96%       Physical Exam  Constitutional:       General: He is not in acute distress.     Appearance: Normal appearance.   HENT:      Head: Normocephalic and atraumatic.      Mouth/Throat:      Mouth: Mucous membranes are moist.   Eyes:      General: No scleral icterus.     Extraocular Movements: Extraocular movements intact.   Neck:      Vascular: No JVD.   Cardiovascular:      Rate and Rhythm: Normal rate and regular rhythm.      Pulses: Normal pulses.      Heart sounds: S1 normal and S2 normal. No murmur heard.     No friction rub. No gallop.   Pulmonary:      Breath sounds: Normal breath sounds.   Abdominal:      General: There is no distension.      Palpations: Abdomen is soft.      Tenderness: There is no abdominal tenderness. There is no guarding or rebound.   Musculoskeletal:         General: Normal range of motion.      Cervical back: Neck supple.      Right lower leg: No edema.      Left lower leg: No edema.   Skin:     General: Skin is warm and dry.      Capillary Refill: Capillary refill takes less than 2 seconds.   Neurological:      General: No focal deficit present.      Mental Status: He is alert and oriented to person, place, and time.   Psychiatric:         Mood and Affect: Mood normal.         Labs & Results:    Lab Results   Component Value Date    SODIUM 135 12/16/2023    K 3.5 12/16/2023     12/16/2023    CO2 25 12/16/2023    BUN 15 12/16/2023    CREATININE 1.06 12/16/2023    GLUC 108 12/16/2023    CALCIUM 8.8 12/16/2023     Lab Results   Component Value Date    WBC 9.54 12/16/2023    HGB 16.4 12/16/2023    HCT 50.6 (H) 12/16/2023  "   MCV 88 12/16/2023     12/16/2023     No results found for: \"NTBNP\"   Lab Results   Component Value Date    CHOLESTEROL 140 09/07/2017     Lab Results   Component Value Date    HDL 48 09/07/2017     Lab Results   Component Value Date    TRIG 85 09/07/2017     No results found for: \"NONHDLC\"    EKG personally reviewed by Dhara Soliz MD.     Counseling / Coordination of Care  Time spent today 25 minutes.  Greater than 50% of total time was spent with the patient and / or family counseling and / or coordination of care. We went over current diagnosis, most recent studies and any changes in treatment.    Thank you for the opportunity to participate in the care of this patient.    DHARA SOLIZ MD  ADVANCED HEART FAILURE AND MECHANICAL CIRCULATORY SUPPORT  [facility]         "

## 2024-01-24 NOTE — TELEPHONE ENCOUNTER
Called patient and gave results.       ----- Message from Migue Drummond MD sent at 1/23/2024  1:03 PM EST -----  Please notify pt his PET scan did not show overt evidence of sarcoid in his heart. More in depth discussion during 1/25/24 appmnt with Dr. Soliz.      Thanks!    - Migue

## 2024-01-25 ENCOUNTER — OFFICE VISIT (OUTPATIENT)
Dept: CARDIOLOGY CLINIC | Facility: CLINIC | Age: 49
End: 2024-01-25
Payer: COMMERCIAL

## 2024-01-25 ENCOUNTER — TELEPHONE (OUTPATIENT)
Dept: CARDIOLOGY CLINIC | Facility: CLINIC | Age: 49
End: 2024-01-25

## 2024-01-25 VITALS
BODY MASS INDEX: 38.37 KG/M2 | DIASTOLIC BLOOD PRESSURE: 72 MMHG | SYSTOLIC BLOOD PRESSURE: 110 MMHG | WEIGHT: 299 LBS | HEART RATE: 80 BPM | OXYGEN SATURATION: 96 % | HEIGHT: 74 IN | RESPIRATION RATE: 16 BRPM

## 2024-01-25 DIAGNOSIS — I48.0 PAROXYSMAL ATRIAL FIBRILLATION (HCC): ICD-10-CM

## 2024-01-25 DIAGNOSIS — Z12.11 ENCOUNTER FOR SCREENING COLONOSCOPY: ICD-10-CM

## 2024-01-25 DIAGNOSIS — I47.20 VENTRICULAR TACHYCARDIA (HCC): ICD-10-CM

## 2024-01-25 DIAGNOSIS — I50.22 CHRONIC HFREF (HEART FAILURE WITH REDUCED EJECTION FRACTION) (HCC): Primary | ICD-10-CM

## 2024-01-25 DIAGNOSIS — I42.0 DILATED CARDIOMYOPATHY (HCC): ICD-10-CM

## 2024-01-25 PROCEDURE — 99214 OFFICE O/P EST MOD 30 MIN: CPT | Performed by: INTERNAL MEDICINE

## 2024-01-25 RX ORDER — METOPROLOL SUCCINATE 25 MG/1
25 TABLET, EXTENDED RELEASE ORAL 2 TIMES DAILY
Qty: 180 TABLET | Refills: 3 | Status: SHIPPED | OUTPATIENT
Start: 2024-01-25 | End: 2025-01-19

## 2024-01-25 NOTE — PATIENT INSTRUCTIONS
Increase metoprolol succinate to 25mg two times a day  Obtain BMP  Continue rest of cardiac medications  2g sodium diet  2L fluid restriction  Daily weights  EP follow up as scheduled  Cardiac rehab  Advanced HF SW consult for [facility] transportation

## 2024-01-25 NOTE — LETTER
January 25, 2024     Patient: Vadim Chapa  YOB: 1975  Date of Visit: 1/25/2024      To Whom it May Concern:    Vadim Chapa is under my professional care. Vadim was seen in my office on 1/25/2024. Vadim unable to return to work until next evaluation in 2 months.    If you have any questions or concerns, please don't hesitate to call.         Sincerely,          Fanny Soliz MD        CC: No Recipients

## 2024-01-29 ENCOUNTER — DOCUMENTATION (OUTPATIENT)
Dept: CARDIOLOGY CLINIC | Facility: CLINIC | Age: 49
End: 2024-01-29

## 2024-01-30 ENCOUNTER — PATIENT OUTREACH (OUTPATIENT)
Dept: CARDIOLOGY CLINIC | Facility: CLINIC | Age: 49
End: 2024-01-30

## 2024-01-30 NOTE — PROGRESS NOTES
Received new referral from Dr. Soliz for transportation assistance.    OP Advanced Heart Failure LCSW reviewed electronic record and placed outreach call to patient.     Introduced self, reason for referral and role of OP Advanced Heart Failure .    Pt stated that [facility] does not have Cardiac Rehab near his home so he plans to attend [facility] in [place]. However he's currently not permitted to drive due to heart condition and episodes of passing out per patient. 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 He's receiving STD through employer and plans to return to work and drive in March 2024.     Pt declined other needs at this time such as food, rent or utility assistance. He also declined to complete [place] application to assist with co-pays. Pt stated that he once was out of work x10 months and applied for food stamps to help feed his child and then when he returned to work, the government garnished his wages and made him pay it all back.     Pt declined to complete SOC Psychosocial Assessment.   He stated that he is not a .    [facility] Intervention:   - Provided phone contact for Sonali Hester so pt can request application.  -Informed pt about  Financial Hardship application due to his balance due over $300. Provided  Financial Counselor ph# [phone].  Pt thanked [facility] for these resources and stated that he will call LCSW if needed in future. LCSW documented pt as a one time outreach and closed referral as complete.

## 2024-02-20 DIAGNOSIS — I42.0 DILATED CARDIOMYOPATHY (HCC): Primary | ICD-10-CM

## 2024-02-20 NOTE — PROGRESS NOTES
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    We briefly reviewed implications for him and family and utility of genetics referral. He purports understanding basic overview but wishes to discuss in more depth and pursue genetics consult in future, as his Mom was just admitted to [facility] with septic shock and his plate is full, he says.     He is appreciative of call and wants to discuss at near term HF appmnt 3/14/24 with Dr. Soliz.  
Statement Selected

## 2024-02-21 ENCOUNTER — REMOTE DEVICE CLINIC VISIT (OUTPATIENT)
Dept: CARDIOLOGY CLINIC | Facility: CLINIC | Age: 49
End: 2024-02-21
Payer: COMMERCIAL

## 2024-02-21 DIAGNOSIS — Z95.810 AICD (AUTOMATIC CARDIOVERTER/DEFIBRILLATOR) PRESENT: Primary | ICD-10-CM

## 2024-02-21 PROCEDURE — 93295 DEV INTERROG REMOTE 1/2/MLT: CPT | Performed by: INTERNAL MEDICINE

## 2024-02-21 PROCEDURE — 93296 REM INTERROG EVL PM/IDS: CPT | Performed by: INTERNAL MEDICINE

## 2024-02-21 NOTE — PROGRESS NOTES
Results for orders placed or performed in visit on 02/21/24   Cardiac EP device report    Narrative    MDT-SINGLE CHAMBER ICD/ACTIVE SYSTEM IS MRI CONDITIONAL  CARELINK TRANSMISSION: BATTERY VOLTAGE ADEQUATE (3.5 YRS).  <0.1% ALL AVAILABLE LEAD PARAMETERS WITHIN NORMAL LIMITS. 1 VT-NS EPISODE W/ EGM SHOWING PROBABLE RVR VS NSVT  BPM, LASTING ONE SEC. TAKES AMIODARONE, METOPROLOL SUCC, & XARELTO. EF 10-15% (ECHO 12/20/23). OPTI-VOL WITHIN NORMAL LIMITS. NORMAL DEVICE FUNCTION. AM

## 2024-03-05 ENCOUNTER — OFFICE VISIT (OUTPATIENT)
Dept: CARDIOLOGY CLINIC | Facility: CLINIC | Age: 49
End: 2024-03-05
Payer: COMMERCIAL

## 2024-03-05 VITALS
WEIGHT: 301.8 LBS | HEART RATE: 67 BPM | BODY MASS INDEX: 38.73 KG/M2 | HEIGHT: 74 IN | SYSTOLIC BLOOD PRESSURE: 96 MMHG | DIASTOLIC BLOOD PRESSURE: 70 MMHG

## 2024-03-05 DIAGNOSIS — R00.0 WIDE-COMPLEX TACHYCARDIA: ICD-10-CM

## 2024-03-05 DIAGNOSIS — I50.20 SYSTOLIC CONGESTIVE HEART FAILURE, UNSPECIFIED HF CHRONICITY (HCC): ICD-10-CM

## 2024-03-05 DIAGNOSIS — I50.22 CHRONIC HFREF (HEART FAILURE WITH REDUCED EJECTION FRACTION) (HCC): Chronic | ICD-10-CM

## 2024-03-05 DIAGNOSIS — I48.0 PAROXYSMAL ATRIAL FIBRILLATION (HCC): ICD-10-CM

## 2024-03-05 DIAGNOSIS — I42.0 DILATED CARDIOMYOPATHY (HCC): ICD-10-CM

## 2024-03-05 DIAGNOSIS — Z45.02 ICD (IMPLANTABLE CARDIOVERTER-DEFIBRILLATOR) DISCHARGE: ICD-10-CM

## 2024-03-05 DIAGNOSIS — Z95.810 AICD (AUTOMATIC CARDIOVERTER/DEFIBRILLATOR) PRESENT: Chronic | ICD-10-CM

## 2024-03-05 DIAGNOSIS — I47.20 V-TACH (HCC): ICD-10-CM

## 2024-03-05 DIAGNOSIS — E66.01 MORBID OBESITY WITH BMI OF 40.0-44.9, ADULT (HCC): ICD-10-CM

## 2024-03-05 DIAGNOSIS — I10 HYPERTENSION, UNSPECIFIED TYPE: Chronic | ICD-10-CM

## 2024-03-05 PROCEDURE — 93000 ELECTROCARDIOGRAM COMPLETE: CPT | Performed by: INTERNAL MEDICINE

## 2024-03-05 PROCEDURE — 99214 OFFICE O/P EST MOD 30 MIN: CPT | Performed by: INTERNAL MEDICINE

## 2024-03-05 RX ORDER — DOFETILIDE 0.25 MG/1
CAPSULE ORAL
COMMUNITY
Start: 2023-12-08 | End: 2024-03-05 | Stop reason: ALTCHOICE

## 2024-03-05 RX ORDER — POTASSIUM CHLORIDE 1500 MG/1
TABLET, EXTENDED RELEASE ORAL
COMMUNITY
Start: 2023-12-22 | End: 2024-03-14 | Stop reason: SDUPTHER

## 2024-03-05 RX ORDER — COLCHICINE 0.6 MG/1
TABLET ORAL
COMMUNITY
Start: 2023-12-22 | End: 2024-03-05 | Stop reason: ALTCHOICE

## 2024-03-05 NOTE — PROGRESS NOTES
EPS follow-up - Vadim Chapa 48 y.o. male MRN: [id number]           ASSESSMENT:  1. V-tach ([facility])  POCT ECG    Echo complete w/ contrast if indicated    Hepatic function panel    TSH + Free T4    Basic metabolic panel    CBC (Includes Diff/Plt) (Refl)      2. Hypertension, unspecified type  Echo complete w/ contrast if indicated    Hepatic function panel    TSH + Free T4    Basic metabolic panel    CBC (Includes Diff/Plt) (Refl)      3. Chronic HFrEF (heart failure with reduced ejection fraction) ([facility])  Echo complete w/ contrast if indicated    Hepatic function panel    TSH + Free T4    Basic metabolic panel    CBC (Includes Diff/Plt) (Refl)    Empagliflozin (JARDIANCE) 10 MG TABS tablet      4. Dilated cardiomyopathy (HCC)  Empagliflozin (JARDIANCE) 10 MG TABS tablet      5. Paroxysmal atrial fibrillation ([facility])        6. Systolic congestive heart failure, unspecified HF chronicity ([facility])  Empagliflozin (JARDIANCE) 10 MG TABS tablet      7. AICD (automatic cardioverter/defibrillator) present        8. ICD (implantable cardioverter-defibrillator) discharge        9. Wide-complex tachycardia        10. Morbid obesity with BMI of 40.0-44.9, adult ([facility])                PLAN:  Sustained ventricular tachycardia we did discuss the long-term risks of amiodarone today however given his home situation we are both reluctant to start decreasing or down titrating the dose so he is going to stay on 200 mg for approximately 3 more months.  In 3 months if he is not having any VT we will go down to 100 mg a day I did explain to him that he should get an eye exam and we are going to perform routine amiodarone surveillance labs.  Interrogation of his device today shows only 1 episode of NSVT    2.  Nonischemic cardiomyopathy he is on appropriate medical therapy I renewed his Jardiance for him today he is followed by our advanced heart failure team    3.  Obesity he is losing weight he is lost 30+ pounds    4.  Paroxysmal atrial  "fibrillation no atrial fibrillation on his device he does take Xarelto    Overall Antonio is doing remarkably well I will follow-up with him in 3 months time    CC/HPI:   Very complex cardiac history nonischemic cardiomyopathy paroxysmal atrial fibrillation status post pulmonary vein isolation he developed worsening heart failure and ventricular tachycardia while on dofetilide he was hospitalized at [facility] where he underwent an epicardial ablation.    He is taking amiodarone 200 mg daily    He states that he feels much better since the ablation he has had no sustained ventricular tachycardia    We did interrogate his device today and he has had only 1 episode of NSVT    Unfortunately his mother developed cholecystitis and septic shock and she has been in the hospital and she is recovering he has been managing 2 households at the same time and taking care of 4 dogs while his mother's been in the hospital              ROS   He actually feels quite well very rare palpitation no chest pain no shortness of breath is he feels like his ejection fraction is probably better than 16% all other 12 point review of systems negative for complaints    Objective:     Vitals: Blood pressure 96/70, pulse 67, height 6' 2\" (1.88 m), weight (!) 137 kg (301 lb 12.8 oz)., Body mass index is 38.75 kg/m².,        Physical Exam:    GEN: Vadim Chapa appears well, alert and oriented x 3, pleasant and cooperative   HEENT: pupils equal, round, and reactive to light; extraocular muscles intact  NECK: supple, no carotid bruits   HEART: regular rhythm, normal S1 and S2, no murmurs, clicks, gallops or rubs   LUNGS: clear to auscultation bilaterally; no wheezes, rales, or rhonchi   ABDOMEN: normal bowel sounds, soft, no tenderness, no distention  EXTREMITIES: peripheral pulses normal; no clubbing, cyanosis, or edema  NEURO: no focal findings   SKIN: normal without suspicious lesions on exposed skin    Medications:  " "    Current Outpatient Medications:     amiodarone 200 mg tablet, Take 1 tablet (200 mg total) by mouth daily, Disp: 90 tablet, Rfl: 3    Empagliflozin (JARDIANCE) 10 MG TABS tablet, Take 1 tablet (10 mg total) by mouth every morning, Disp: 90 tablet, Rfl: 3    furosemide (LASIX) 40 mg tablet, Take 1 tablet (40 mg total) by mouth daily, Disp: 90 tablet, Rfl: 3    metoprolol succinate (TOPROL-XL) 25 mg 24 hr tablet, Take 1 tablet (25 mg total) by mouth 2 (two) times a day, Disp: 180 tablet, Rfl: 3    potassium chloride (K-DUR,KLOR-CON) 20 mEq tablet, Take 1 tablet (20 mEq total) by mouth daily, Disp: 90 tablet, Rfl: 1    Potassium Chloride ER 20 MEQ TBCR, , Disp: , Rfl:     rivaroxaban (Xarelto) 20 mg tablet, Take 1 tablet (20 mg total) by mouth daily with dinner, Disp: 90 tablet, Rfl: 3    sacubitril-valsartan (ENTRESTO) 24-26 MG TABS, Take 1 tablet by mouth 2 (two) times a day, Disp: 180 tablet, Rfl: 3    spironolactone (ALDACTONE) 25 mg tablet, Take 1 tablet (25 mg total) by mouth daily, Disp: 90 tablet, Rfl: 3    Empagliflozin (Jardiance) 10 MG TABS tablet, Take 10 mg by mouth daily, Disp: , Rfl:     famotidine (PEPCID) 20 mg tablet, Take 1 tablet (20 mg total) by mouth daily, Disp: 30 tablet, Rfl: 0     Family History   Problem Relation Age of Onset    No Known Problems Mother     No Known Problems Father     Sudden death Neg Hx     Heart disease Neg Hx      Social History     Socioeconomic History    Marital status: Single     Spouse name: Not on file    Number of children: Not on file    Years of education: Not on file    Highest education level: Not on file   Occupational History    Not on file   Tobacco Use    Smoking status: Never    Smokeless tobacco: Never   Vaping Use    Vaping status: Never Used   Substance and Sexual Activity    Alcohol use: Not Currently     Comment: OCCASIONAL; history of binge drinking -  drinking \"a lot\" on Fridays; stopped this about in late 2022. (Updated 12/04/2023).    Drug " "use: No    Sexual activity: Not on file   Other Topics Concern    Not on file   Social History Narrative    Not on file     Social Determinants of Health     Financial Resource Strain: Not on file   Food Insecurity: No Food Insecurity (5/2/2023)    Hunger Vital Sign     Worried About Running Out of Food in the Last Year: Never true     Ran Out of Food in the Last Year: Never true   Transportation Needs: No Transportation Needs (5/2/2023)    PRAPARE - Transportation     Lack of Transportation (Medical): No     Lack of Transportation (Non-Medical): No   Physical Activity: Not on file   Stress: Not on file   Social Connections: Not on file   Intimate Partner Violence: Not on file   Housing Stability: Low Risk  (5/2/2023)    Housing Stability Vital Sign     Unable to Pay for Housing in the Last Year: No     Number of Places Lived in the Last Year: 1     Unstable Housing in the Last Year: No     Social History     Tobacco Use   Smoking Status Never   Smokeless Tobacco Never     Social History     Substance and Sexual Activity   Alcohol Use Not Currently    Comment: OCCASIONAL; history of binge drinking -  drinking \"a lot\" on Fridays; stopped this about in late 2022. (Updated 12/04/2023).       Labs & Results:  Below is the patient's most recent value for Albumin, ALT, AST, BUN, Calcium, Chloride, Cholesterol, CO2, Creatinine, GFR, Glucose, HDL, Hematocrit, Hemoglobin, Hemoglobin A1C, LDL, Magnesium, Phosphorus, Platelets, Potassium, PSA, Sodium, Triglycerides, and WBC.   Lab Results   Component Value Date    ALT 35 12/16/2023    AST 29 12/16/2023    BUN 15 12/16/2023    CALCIUM 8.8 12/16/2023     12/16/2023    CO2 25 12/16/2023    CREATININE 1.06 12/16/2023    HDL 48 09/07/2017    HCT 50.6 (H) 12/16/2023    HGB 16.4 12/16/2023    HGBA1C 6.1 12/17/2023    MG 2.6 12/22/2023    PHOS 3.8 12/16/2023     12/16/2023    K 3.5 12/16/2023    TRIG 85 09/07/2017    WBC 9.54 12/16/2023     Note: for a comprehensive list " of the patient's lab results, access the Results Review activity.            Cardiac testing:   Results for orders placed during the hospital encounter of 19    Echo complete with contrast if indicated    [facility]  [address] [place] [address] [phone]    Transthoracic Echocardiogram  2D, M-mode, Doppler, and Color Doppler    Study date:  2019    Patient: ADALI NEWMAN  MR number: [id number]  Account number: [id number]  : 1975  Age: 43 years  Gender: Male  Status: Outpatient  Location: [facility]  Height: 74 in  Weight: 291 lb  BP: 118/ 64 mmHg    Indications: Dilated Cardiomyopathy.    Diagnoses: I42.0 - Dilated cardiomyopathy    Sonographer:  Galaviz RCS  Interpreting Physician:  Ananya Miller MD  Primary Physician:  Cynthia Julio MD  Referring Physician:  Octaviano Zaldivar MD  Group:  [facility]    SUMMARY    LEFT VENTRICLE:  The ventricle was moderately to markedly dilated.  Ejection fraction was estimated to be 25 %.  There was severe diffuse hypokinesis.  Features were consistent with a pseudonormal left ventricular filling pattern, with concomitant abnormal relaxation and increased filling pressure (grade 2 diastolic dysfunction).    RIGHT VENTRICLE:  Estimated peak pressure was at least 30 mmHg. ICD lead noted.    LEFT ATRIUM:  The atrium was moderately dilated.    RIGHT ATRIUM:  The atrium was moderately dilated.    MITRAL VALVE:  There was trace regurgitation.    TRICUSPID VALVE:  There was mild regurgitation.    HISTORY: PRIOR HISTORY: Risk factors: hypertension.    PROCEDURE: The study was performed in the [facility]. This was a routine study. The transthoracic approach was used. The study included complete 2D imaging, M-mode, complete spectral Doppler, and color Doppler. The  heart rate was 74 bpm, at the start of the study. This was a technically difficult  study.    LEFT VENTRICLE: The ventricle was moderately to markedly dilated. Ejection fraction was estimated to be 25 %. There was severe diffuse hypokinesis. DOPPLER: Features were consistent with a pseudonormal left ventricular filling pattern,  with concomitant abnormal relaxation and increased filling pressure (grade 2 diastolic dysfunction).    RIGHT VENTRICLE: The size was normal. Systolic function was normal. Wall thickness was normal. DOPPLER: Estimated peak pressure was at least 30 mmHg. ICD lead noted.    LEFT ATRIUM: The atrium was moderately dilated.    RIGHT ATRIUM: The atrium was moderately dilated.    MITRAL VALVE: There was annular calcification. DOPPLER: There was trace regurgitation.    AORTIC VALVE: The valve was trileaflet. Leaflets exhibited normal thickness and normal cuspal separation. The valve was not well visualized. DOPPLER: Transaortic velocity was within the normal range. There was no evidence for stenosis.  There was no regurgitation.    TRICUSPID VALVE: The valve structure was normal. There was normal leaflet separation. DOPPLER: The transtricuspid velocity was within the normal range. There was no evidence for stenosis. There was mild regurgitation.    PULMONIC VALVE: Leaflets exhibited normal thickness, no calcification, and normal cuspal separation. DOPPLER: The transpulmonic velocity was within the normal range. There was no regurgitation.    PERICARDIUM: There was no pericardial effusion. The pericardium was normal in appearance.    AORTA: The root exhibited normal size.    SYSTEM MEASUREMENT TABLES    2D  %FS: 12.58 %  Ao Diam: 2.95 cm  EDV(Teich): 249.22 ml  EF(Teich): 26.31 %  ESV(Teich): 183.65 ml  IVSd: 1.03 cm  LA Area: 29.37 cm2  LA Diam: 5.32 cm  LVEDV MOD A4C: 213.51 ml  LVEF MOD A4C: 30.3 %  LVESV MOD A4C: 148.81 ml  LVIDd: 6.92 cm  LVIDs: 6.05 cm  LVLd A4C: 9.46 cm  LVLs A4C: 8.02 cm  LVPWd: 0.71 cm  RA Area: 23.69 cm2  RVIDd: 4.49 cm  SV MOD A4C: 64.7 ml  SV(Teich):  65.57 ml    CW  TR MaxP.1 mmHg  TR Vmax: 2.55 m/s    MM  TAPSE: 2.55 cm    PW  E': 0.08 m/s  E/E': 11.03  MV A Kamron: 0.5 m/s  MV Dec [place]: 3.77 m/s2  MV DecT: 234 ms  MV E Kamron: 0.88 m/s  MV E/A Ratio: 1.76  MV PHT: 67.86 ms  MVA By PHT: 3.24 cm2    [facility] Accredited Echocardiography [facility]    Prepared and electronically signed by    Ananya Miller MD  Signed 10-Apr-2019 13:52:24    No results found for this or any previous visit.    No results found for this or any previous visit.    No results found for this or any previous visit.

## 2024-03-11 ENCOUNTER — APPOINTMENT (OUTPATIENT)
Dept: LAB | Facility: CLINIC | Age: 49
End: 2024-03-11
Payer: COMMERCIAL

## 2024-03-11 DIAGNOSIS — I47.20 V-TACH (HCC): ICD-10-CM

## 2024-03-11 DIAGNOSIS — I10 HYPERTENSION, UNSPECIFIED TYPE: ICD-10-CM

## 2024-03-11 DIAGNOSIS — I50.22 CHRONIC HFREF (HEART FAILURE WITH REDUCED EJECTION FRACTION) (HCC): ICD-10-CM

## 2024-03-11 LAB
ALBUMIN SERPL BCP-MCNC: 3.8 G/DL (ref 3.5–5)
ALP SERPL-CCNC: 68 U/L (ref 34–104)
ALT SERPL W P-5'-P-CCNC: 34 U/L (ref 7–52)
ANION GAP SERPL CALCULATED.3IONS-SCNC: 5 MMOL/L
AST SERPL W P-5'-P-CCNC: 28 U/L (ref 13–39)
BASOPHILS # BLD AUTO: 0.05 THOUSANDS/ÂΜL (ref 0–0.1)
BASOPHILS NFR BLD AUTO: 1 % (ref 0–1)
BILIRUB DIRECT SERPL-MCNC: 0.24 MG/DL (ref 0–0.2)
BILIRUB SERPL-MCNC: 1.48 MG/DL (ref 0.2–1)
BUN SERPL-MCNC: 12 MG/DL (ref 5–25)
CALCIUM SERPL-MCNC: 9 MG/DL (ref 8.4–10.2)
CHLORIDE SERPL-SCNC: 105 MMOL/L (ref 96–108)
CO2 SERPL-SCNC: 28 MMOL/L (ref 21–32)
CREAT SERPL-MCNC: 1.05 MG/DL (ref 0.6–1.3)
EOSINOPHIL # BLD AUTO: 0.13 THOUSAND/ÂΜL (ref 0–0.61)
EOSINOPHIL NFR BLD AUTO: 2 % (ref 0–6)
ERYTHROCYTE [DISTWIDTH] IN BLOOD BY AUTOMATED COUNT: 14.2 % (ref 11.6–15.1)
GFR SERPL CREATININE-BSD FRML MDRD: 84 ML/MIN/1.73SQ M
GLUCOSE P FAST SERPL-MCNC: 107 MG/DL (ref 65–99)
HCT VFR BLD AUTO: 49.7 % (ref 36.5–49.3)
HGB BLD-MCNC: 16.1 G/DL (ref 12–17)
IMM GRANULOCYTES # BLD AUTO: 0.02 THOUSAND/UL (ref 0–0.2)
IMM GRANULOCYTES NFR BLD AUTO: 0 % (ref 0–2)
LYMPHOCYTES # BLD AUTO: 1.55 THOUSANDS/ÂΜL (ref 0.6–4.47)
LYMPHOCYTES NFR BLD AUTO: 26 % (ref 14–44)
MCH RBC QN AUTO: 30.2 PG (ref 26.8–34.3)
MCHC RBC AUTO-ENTMCNC: 32.4 G/DL (ref 31.4–37.4)
MCV RBC AUTO: 93 FL (ref 82–98)
MONOCYTES # BLD AUTO: 0.71 THOUSAND/ÂΜL (ref 0.17–1.22)
MONOCYTES NFR BLD AUTO: 12 % (ref 4–12)
NEUTROPHILS # BLD AUTO: 3.56 THOUSANDS/ÂΜL (ref 1.85–7.62)
NEUTS SEG NFR BLD AUTO: 59 % (ref 43–75)
NRBC BLD AUTO-RTO: 0 /100 WBCS
PLATELET # BLD AUTO: 193 THOUSANDS/UL (ref 149–390)
PMV BLD AUTO: 11.6 FL (ref 8.9–12.7)
POTASSIUM SERPL-SCNC: 4 MMOL/L (ref 3.5–5.3)
PROT SERPL-MCNC: 6.7 G/DL (ref 6.4–8.4)
RBC # BLD AUTO: 5.33 MILLION/UL (ref 3.88–5.62)
SODIUM SERPL-SCNC: 138 MMOL/L (ref 135–147)
T4 FREE SERPL-MCNC: 0.95 NG/DL (ref 0.61–1.12)
TSH SERPL DL<=0.05 MIU/L-ACNC: 0.6 UIU/ML (ref 0.45–4.5)
WBC # BLD AUTO: 6.02 THOUSAND/UL (ref 4.31–10.16)

## 2024-03-11 PROCEDURE — 85025 COMPLETE CBC W/AUTO DIFF WBC: CPT

## 2024-03-11 PROCEDURE — 36415 COLL VENOUS BLD VENIPUNCTURE: CPT | Performed by: INTERNAL MEDICINE

## 2024-03-11 PROCEDURE — 80048 BASIC METABOLIC PNL TOTAL CA: CPT | Performed by: INTERNAL MEDICINE

## 2024-03-11 PROCEDURE — 84443 ASSAY THYROID STIM HORMONE: CPT

## 2024-03-11 PROCEDURE — 84439 ASSAY OF FREE THYROXINE: CPT

## 2024-03-11 PROCEDURE — 80076 HEPATIC FUNCTION PANEL: CPT

## 2024-03-14 ENCOUNTER — OFFICE VISIT (OUTPATIENT)
Dept: CARDIOLOGY CLINIC | Facility: CLINIC | Age: 49
End: 2024-03-14
Payer: COMMERCIAL

## 2024-03-14 VITALS
SYSTOLIC BLOOD PRESSURE: 100 MMHG | HEART RATE: 63 BPM | DIASTOLIC BLOOD PRESSURE: 76 MMHG | WEIGHT: 300 LBS | OXYGEN SATURATION: 96 % | BODY MASS INDEX: 38.5 KG/M2 | HEIGHT: 74 IN | RESPIRATION RATE: 16 BRPM

## 2024-03-14 DIAGNOSIS — Z95.810 AICD (AUTOMATIC CARDIOVERTER/DEFIBRILLATOR) PRESENT: ICD-10-CM

## 2024-03-14 DIAGNOSIS — I50.22 CHRONIC HFREF (HEART FAILURE WITH REDUCED EJECTION FRACTION) (HCC): Primary | ICD-10-CM

## 2024-03-14 DIAGNOSIS — I42.0 DILATED CARDIOMYOPATHY (HCC): ICD-10-CM

## 2024-03-14 DIAGNOSIS — I47.20 VENTRICULAR TACHYCARDIA (HCC): ICD-10-CM

## 2024-03-14 DIAGNOSIS — I48.0 PAROXYSMAL ATRIAL FIBRILLATION (HCC): ICD-10-CM

## 2024-03-14 PROCEDURE — 99214 OFFICE O/P EST MOD 30 MIN: CPT | Performed by: INTERNAL MEDICINE

## 2024-03-14 NOTE — PATIENT INSTRUCTIONS
Continue current medications  Echocardiogram as scheduled  2g sodium diet  2L fluid restriction  Daily weights  Weight loss

## 2024-03-14 NOTE — PROGRESS NOTES
Advanced Heart Failure Outpatient Progress Note - Vadim Chapa 48 y.o. male MRN: [id number]    Encounter: [id number]      Assessment/Plan:    Patient Active Problem List    Diagnosis Date Noted    Leukocytosis 12/14/2023    HOANG (acute kidney injury) ([facility]) 12/13/2023    Wide-complex tachycardia 12/02/2023    Morbid obesity with BMI of 40.0-44.9, adult ([facility]) 07/02/2023    Syncope 04/16/2023    CHF (congestive heart failure) ([facility])     AICD (automatic cardioverter/defibrillator) present     ICD (implantable cardioverter-defibrillator) discharge 10/10/2022    Paroxysmal atrial fibrillation ([facility]) 11/06/2020    Dilated cardiomyopathy ([facility]) 01/25/2018    Hypertension 01/25/2018    Obstructive sleep apnea 01/25/2018    Calculus of gallbladder without cholecystitis 05/19/2017    Chronic HFrEF (heart failure with reduced ejection fraction) ([facility]) 2017       # Chronic heart failure with reduced ejection fraction; EF 25-30%, recent decline EF to 10-15% in setting of VT, NYHA II  Etiology: Etiology thought 2/2 coxsackie virus many years ago. Occasional binge drinking. Known cardiomyopathy with EF of 25% since 2017. Further reduction in EF on echo 12/2/23 in setting of ventricular tachycardia s/p shocks, contribution from ?myocarditis on cardiac MRI. 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     Studies- personally reviewed by me     TTE 12/20/23 [facility]:  LVEF 10 to 15%.  LVIDD 6.9 cm  RV normal size and systolic function  Trivial pericardial effusion  Mild TR  Trace TR    C 12/19/23 [facility]:  Coronary Findings Diagnostic Dominance: Right   Left Main: The vessel is normal angiographically and in size.   Left Anterior Descending: The vessel is normal angiographically and in size.   Left Circumflex: The vessel is normal angiographically and in size.   Right Coronary Artery: The vessel was not injected.     Cardiac MRI 12/7/23:  LVEF 16%. LVEDD 6.8cm  Normal RV size and systolic  function  Intramyocardial fibrosis of the interventricular septum, and subepicardial fibrosis of the inferior wall. 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      -- TTE 12/6/23  LVEF: 15%  LVIDd: 6.7cm  RV: normal size and systolic function  MR: mild to moderate  PASP: 30mmHg, mild TR  RVOT: no notching  Other: no pericardial effusion     --TTE 5/1/23: LVEF 30%.   --TTE 4/17/23: LVEF 30%. LVIDD 7.1cm. Normal RV size and systolic function.  Mild MR. Mild TR     --TTE 10/11/21:  LVEF: 25%  LVIDd: 7.2cm  RV: normal size and systolic function  MR: mild  PASP: unable to estimate, no TR     --[facility] 8/30/21: Normal coronaries   --TTE 4/9/2019: LVEF 25% w/ grade II diastology. LVIDd 6.9 cm. Mod LAE. Trace MR. Mild TR. Noninvasive assessment c/f borderline to low output.   --TTE 5/16/2017: LVEF 25% w/ grade II diastology. LVIDd 6.9 cm. Mod LAE. Trace MR, mild TR. Normal RV size and function.         Neurohormonal Blockade:  --Beta-Blocker: metoprolol succinate 25mg BID  --ACEi, ARB or ARNi: entresto 24-26mg BID   --Aldosterone Receptor Blocker: spironolactone 25mg daily  --SGLT2 Inhibitor: Jardiance 10mg daily   --Diuretic: furosemide 40mg daily with potassium 20 meq daily     Sudden Cardiac Death Risk Reduction:  --ICD: MDT single chamber ICD  Interrogation 2/21/2024: V paced less than 0.1%.  1 NSVT  Interrogation 1/8/24:  <0.1%. 4 NSVTs 5-11 beats. OptiVol normal.     Electrical Resynchronization:  --Candidacy for BiV device: narrow QRSd     Advanced Therapies (if appropriate):  We will continue to monitor  BMI 38  Blood type O  Recent escalation of GDMT 12/2023  Normal RV size and function    # Ventricular tachycardia  S/p admissions x 2 12/2023. Transferred to [facility] for consideration of ablation  12/19/23 [facility] VT ablation, epicardial access  Conclusions:   1. Successful radiofrequency ablation of epicardial focal (likely   triggered activity) VT   2. Additional late potential ablation   3. No acute  complications   Rx: metoprolol as below, amiodarone 200mg daily  1 NSVT in office check 3/5/2024; seen by Dr. Feng plan to continue amiodarone 200 mg daily for 3 months and reassess for decreasing dose to 20 mg daily-.  Repeat echocardiogram scheduled April 2024.    # Atrial fibrillation / flutter,  S/p Afib ablation in 07/2023.  [id number] = 2 (HF, HTN).  AC: xarelto  Rate control: BB as above  Rhythm control: on amiodarone as above for VT  # Hypertension   # Obstructive sleep apnea, on CPAP  # Obesity  # History of binge drinking      TODAY'S PLAN:  He is stable from a cardiac standpoint  Euvolemic and warm on exam  Continue current medications, BP limiting for further uptitration medical therapy  Echocardiogram as scheduled  2g sodium diet  2L fluid restriction  Daily weights  Discussed about weight loss, he would like to try to do this on his own at this time      HPI:     48 y.o.  year old male with history of nonischemic dilated cardiomyopathy, heart failure with reduced ejection fraction, status post [employer] single-chamber ICD (June 2017), atrial fibrillation/flutter status post A-fib ablation July 2023, VT, hypertension, hyperlipidemia, obstructive sleep apnea, morbid obesity, and history of binge drinking who is here for follow up. I last saw him on follow up 5/12/22.    Admitted twice in 12/2023 for VT s/p DCCVs. Ultimately transferred to [facility] for consideration for VT ablation. S/p Successful radiofrequency ablation of epicardial focal (likely triggered activity) VT 12/19/23.  Seen in follow up by Dr. Drummond 12/27/23 and was feeling generally well. FDG PET ordered due to concern for sarcoid. FDG PET without increased focal FDG activity involving the left ventricular myocardium characteristic of cardiac sarcoidosis      1/25/24: He is here for follow-up.  Notes some lightheadedness with getting up.  Overall feeling better.  No leg swelling or abdominal distention.  Reports some lightheadedness with  getting up.  Short of breath with more exertion, noted feeling short of breath getting the garbage out which was heavier than usual.  He is able to walk a flight of stairs without shortness of breath.  Noticing heart rates mostly in the 70s which was usually in the 80s before and feeling palpitations.  Last device interrogation 2024 with episodes of NSVT's x 4.  He needs a letter for work, he inspects industrial grade boilers    Interval History:  3/14/24: Here for follow up. 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  Patient's mother with no heart issues, unknown biological father.  Maternal aunt and uncle  with cancer.  He is an only child.  Seen by Dr. Walter 3/5/2024 with plan to continue amiodarone for another 3 months.  If not having any issues plan to go down to 100 mg/day.  Routine testing on amiodarone therapy ordered.  In office device interrogation with 1 episode of NSVT.  He reports overall doing well.  Increasing exertional capacity. Occasional lightheadedness. He is dealing with a lot of stress with his mom being sick and trying to take care of 2 households.  He is able to walk up a flight of stairs with heavy pallets with no symptoms.  He is considering going back to work.  He is ordered for another echocardiogram as scheduled in April.  Will revisit this at that time and we will touch base with EP.   BMP 3/11/2024: Sodium 138 potassium 4 creatinine 1.05    Review of Systems   Constitutional:  Negative for chills and fever.   HENT:  Negative for ear pain and sore throat.    Eyes:  Negative for pain and visual disturbance.   Respiratory:  Positive for shortness of breath. Negative for cough.    Cardiovascular:  Positive for palpitations. Negative for chest pain and leg swelling.   Gastrointestinal:  Negative for abdominal distention, abdominal pain and vomiting.   Genitourinary:  Negative for dysuria and hematuria.   Musculoskeletal:  Negative for arthralgias and back pain.   Skin:   "Negative for color change and rash.   Neurological:  Positive for light-headedness. Negative for seizures and syncope.   All other systems reviewed and are negative.      Past Medical History:   Diagnosis Date    A-fib (HCC) 2020    AICD (automatic cardioverter/defibrillator) present     CHF (congestive heart failure) (HCC)     Coxsackie virus infection 2017    Hypertension     ICD (implantable cardioverter-defibrillator) lead failure 06/03/2017    Myocarditis (HCC)     Sleep apnea     Ventricular tachycardia (HCC)          Allergies   Allergen Reactions    Vicodin [Hydrocodone-Acetaminophen] Other (See Comments)     \"i get nasty\"     .    Current Outpatient Medications:     amiodarone 200 mg tablet, Take 1 tablet (200 mg total) by mouth daily, Disp: 90 tablet, Rfl: 3    Empagliflozin (JARDIANCE) 10 MG TABS tablet, Take 1 tablet (10 mg total) by mouth every morning, Disp: 90 tablet, Rfl: 3    famotidine (PEPCID) 20 mg tablet, Take 1 tablet (20 mg total) by mouth daily, Disp: 30 tablet, Rfl: 0    furosemide (LASIX) 40 mg tablet, Take 1 tablet (40 mg total) by mouth daily, Disp: 90 tablet, Rfl: 3    metoprolol succinate (TOPROL-XL) 25 mg 24 hr tablet, Take 1 tablet (25 mg total) by mouth 2 (two) times a day, Disp: 180 tablet, Rfl: 3    potassium chloride (K-DUR,KLOR-CON) 20 mEq tablet, Take 1 tablet (20 mEq total) by mouth daily, Disp: 90 tablet, Rfl: 1    rivaroxaban (Xarelto) 20 mg tablet, Take 1 tablet (20 mg total) by mouth daily with dinner, Disp: 90 tablet, Rfl: 3    sacubitril-valsartan (ENTRESTO) 24-26 MG TABS, Take 1 tablet by mouth 2 (two) times a day, Disp: 180 tablet, Rfl: 3    spironolactone (ALDACTONE) 25 mg tablet, Take 1 tablet (25 mg total) by mouth daily, Disp: 90 tablet, Rfl: 3    Social History     Socioeconomic History    Marital status: Single     Spouse name: Not on file    Number of children: Not on file    Years of education: Not on file    Highest education level: Not on file   Occupational " "History    Not on file   Tobacco Use    Smoking status: Never    Smokeless tobacco: Never   Vaping Use    Vaping status: Never Used   Substance and Sexual Activity    Alcohol use: Not Currently     Comment: OCCASIONAL; history of binge drinking -  drinking \"a lot\" on Fridays; stopped this about in late 2022. (Updated 12/04/2023).    Drug use: No    Sexual activity: Not on file   Other Topics Concern    Not on file   Social History Narrative    Not on file     Social Determinants of Health     Financial Resource Strain: Not on file   Food Insecurity: No Food Insecurity (5/2/2023)    Hunger Vital Sign     Worried About Running Out of Food in the Last Year: Never true     Ran Out of Food in the Last Year: Never true   Transportation Needs: No Transportation Needs (5/2/2023)    PRAPARE - Transportation     Lack of Transportation (Medical): No     Lack of Transportation (Non-Medical): No   Physical Activity: Not on file   Stress: Not on file   Social Connections: Not on file   Intimate Partner Violence: Not on file   Housing Stability: Low Risk  (5/2/2023)    Housing Stability Vital Sign     Unable to Pay for Housing in the Last Year: No     Number of Places Lived in the Last Year: 1     Unstable Housing in the Last Year: No       Family History   Problem Relation Age of Onset    No Known Problems Mother     No Known Problems Father     Sudden death Neg Hx     Heart disease Neg Hx        Physical Exam:    Vitals:   Vitals:    03/14/24 0842   BP: 100/76   Pulse: 63   Resp: 16   SpO2: 96%         Physical Exam  Constitutional:       General: He is not in acute distress.     Appearance: Normal appearance.   HENT:      Head: Normocephalic and atraumatic.      Mouth/Throat:      Mouth: Mucous membranes are moist.   Eyes:      General: No scleral icterus.     Extraocular Movements: Extraocular movements intact.   Neck:      Vascular: No JVD.   Cardiovascular:      Rate and Rhythm: Normal rate and regular rhythm.      Pulses: " "Normal pulses.      Heart sounds: S1 normal and S2 normal. No murmur heard.     No friction rub. No gallop.   Pulmonary:      Breath sounds: Normal breath sounds.   Abdominal:      General: There is no distension.      Palpations: Abdomen is soft.      Tenderness: There is no abdominal tenderness. There is no guarding or rebound.   Musculoskeletal:         General: Normal range of motion.      Cervical back: Neck supple.      Right lower leg: No edema.      Left lower leg: No edema.   Skin:     General: Skin is warm and dry.      Capillary Refill: Capillary refill takes less than 2 seconds.   Neurological:      General: No focal deficit present.      Mental Status: He is alert and oriented to person, place, and time.   Psychiatric:         Mood and Affect: Mood normal.         Labs & Results:    Lab Results   Component Value Date    SODIUM 138 03/11/2024    K 4.0 03/11/2024     03/11/2024    CO2 28 03/11/2024    BUN 12 03/11/2024    CREATININE 1.05 03/11/2024    GLUC 108 12/16/2023    CALCIUM 9.0 03/11/2024     Lab Results   Component Value Date    WBC 6.02 03/11/2024    HGB 16.1 03/11/2024    HCT 49.7 (H) 03/11/2024    MCV 93 03/11/2024     03/11/2024     No results found for: \"NTBNP\"   Lab Results   Component Value Date    CHOLESTEROL 140 09/07/2017     Lab Results   Component Value Date    HDL 48 09/07/2017     Lab Results   Component Value Date    TRIG 85 09/07/2017     No results found for: \"NONHDLC\"    EKG personally reviewed by Dhara Soliz MD.     Counseling / Coordination of Care  Time spent today 25 minutes.  Greater than 50% of total time was spent with the patient and / or family counseling and / or coordination of care. We went over current diagnosis, most recent studies and any changes in treatment.    Thank you for the opportunity to participate in the care of this patient.    DHARA SOLIZ MD  ADVANCED HEART FAILURE AND MECHANICAL CIRCULATORY SUPPORT  [facility]" NETWORK

## 2024-04-01 ENCOUNTER — TELEPHONE (OUTPATIENT)
Dept: CARDIOLOGY CLINIC | Facility: CLINIC | Age: 49
End: 2024-04-01

## 2024-04-01 DIAGNOSIS — I50.20 HEART FAILURE WITH REDUCED EJECTION FRACTION (HCC): ICD-10-CM

## 2024-04-01 NOTE — TELEPHONE ENCOUNTER
Requested medication(s) are due for refill today: yes  Patient has already received a courtesy refill: no  Other reason request has been forwarded to provider: needs override

## 2024-04-02 RX ORDER — SPIRONOLACTONE 25 MG/1
25 TABLET ORAL DAILY
Qty: 90 TABLET | Refills: 3 | Status: SHIPPED | OUTPATIENT
Start: 2024-04-02

## 2024-04-02 NOTE — TELEPHONE ENCOUNTER
Spoke with patient, patient is aware that his Spironolactone 25 mg was refilled today by  to [facility] pharmacy on file.

## 2024-04-16 ENCOUNTER — HOSPITAL ENCOUNTER (OUTPATIENT)
Dept: NON INVASIVE DIAGNOSTICS | Facility: HOSPITAL | Age: 49
Discharge: HOME | End: 2024-04-16
Attending: INTERNAL MEDICINE
Payer: COMMERCIAL

## 2024-04-16 VITALS
BODY MASS INDEX: 38.5 KG/M2 | SYSTOLIC BLOOD PRESSURE: 100 MMHG | HEIGHT: 74 IN | WEIGHT: 300 LBS | HEART RATE: 63 BPM | DIASTOLIC BLOOD PRESSURE: 76 MMHG

## 2024-04-16 DIAGNOSIS — I47.20 V-TACH (HCC): ICD-10-CM

## 2024-04-16 DIAGNOSIS — I50.22 CHRONIC HFREF (HEART FAILURE WITH REDUCED EJECTION FRACTION) (HCC): ICD-10-CM

## 2024-04-16 DIAGNOSIS — I10 HYPERTENSION, UNSPECIFIED TYPE: ICD-10-CM

## 2024-04-16 LAB
AORTIC ROOT: 2.8 CM
APICAL FOUR CHAMBER EJECTION FRACTION: 22 %
ASCENDING AORTA: 3.1 CM
BSA FOR ECHO PROCEDURE: 2.58 M2
E WAVE DECELERATION TIME: 140 MS
E/A RATIO: 1.15
FRACTIONAL SHORTENING: 5 (ref 28–44)
INTERVENTRICULAR SEPTUM IN DIASTOLE (PARASTERNAL SHORT AXIS VIEW): 0.9 CM
INTERVENTRICULAR SEPTUM: 0.9 CM (ref 0.6–1.1)
LAAS-AP2: 24.8 CM2
LAAS-AP4: 18.8 CM2
LEFT ATRIUM AREA SYSTOLE SINGLE PLANE A4C: 20.2 CM2
LEFT ATRIUM SIZE: 4.6 CM
LEFT ATRIUM VOLUME (MOD BIPLANE): 64 ML
LEFT ATRIUM VOLUME INDEX (MOD BIPLANE): 24.8 ML/M2
LEFT INTERNAL DIMENSION IN SYSTOLE: 6 CM (ref 2.1–4)
LEFT VENTRICULAR INTERNAL DIMENSION IN DIASTOLE: 6.3 CM (ref 3.5–6)
LEFT VENTRICULAR POSTERIOR WALL IN END DIASTOLE: 0.8 CM
LEFT VENTRICULAR STROKE VOLUME: 22 ML
LVSV (TEICH): 22 ML
MITRAL REGURGITATION PEAK VELOCITY: 4.42 M/S
MITRAL VALVE MEAN INFLOW VELOCITY: 3.21 M/S
MITRAL VALVE REGURGITANT PEAK GRADIENT: 78 MMHG
MV E'TISSUE VEL-LAT: 8 CM/S
MV E'TISSUE VEL-SEP: 7 CM/S
MV PEAK A VEL: 0.59 M/S
MV PEAK E VEL: 68 CM/S
MV STENOSIS PRESSURE HALF TIME: 41 MS
MV VALVE AREA P 1/2 METHOD: 5.37
RIGHT ATRIUM AREA SYSTOLE A4C: 18.4 CM2
RIGHT VENTRICLE ID DIMENSION: 4.1 CM
SL CV DOP CALC MV VTI RETROGRADE: 156.3 CM
SL CV LEFT ATRIUM LENGTH A2C: 6.4 CM
SL CV LV EF: 20
SL CV MV MEAN GRADIENT RETROGRADE: 48 MMHG
SL CV PED ECHO LEFT VENTRICLE DIASTOLIC VOLUME (MOD BIPLANE) 2D: 202 ML
SL CV PED ECHO LEFT VENTRICLE SYSTOLIC VOLUME (MOD BIPLANE) 2D: 179 ML
TR MAX PG: 15 MMHG
TR PEAK VELOCITY: 1.9 M/S
TRICUSPID ANNULAR PLANE SYSTOLIC EXCURSION: 1.9 CM
TRICUSPID VALVE PEAK REGURGITATION VELOCITY: 1.92 M/S

## 2024-04-16 PROCEDURE — 93306 TTE W/DOPPLER COMPLETE: CPT

## 2024-04-16 PROCEDURE — 93306 TTE W/DOPPLER COMPLETE: CPT | Performed by: INTERNAL MEDICINE

## 2024-04-16 RX ADMIN — PERFLUTREN 0.6 ML/MIN: 6.52 INJECTION, SUSPENSION INTRAVENOUS at 10:03

## 2024-04-17 ENCOUNTER — TELEPHONE (OUTPATIENT)
Age: 49
End: 2024-04-17

## 2024-04-17 NOTE — TELEPHONE ENCOUNTER
Pt called asking for echo results.   He stated the short term disability is running out and will need to go on long term disability if he cannot return, but it will be a big pay cut. Please advise

## 2024-04-18 NOTE — TELEPHONE ENCOUNTER
He stated if he returns back to work he cannot have restrictions.    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  He does think he can work, but doesn't really know until her tries  He stated if you feel he shouldn't work, he needs to apply for disability ASAP because it takes several months to get approved.

## 2024-04-19 ENCOUNTER — TELEPHONE (OUTPATIENT)
Age: 49
End: 2024-04-19

## 2024-04-19 NOTE — TELEPHONE ENCOUNTER
Pt called stating that he needs the march office visit note and dates on how long to extend short term disability with provider's signature. Needs to be faxed to [employer] as previously done.    Please advise

## 2024-04-26 ENCOUNTER — TELEPHONE (OUTPATIENT)
Age: 49
End: 2024-04-26

## 2024-04-26 NOTE — TELEPHONE ENCOUNTER
Spoke with pt regarding disability paperwork needing to be sent to [employer] (fax [phone]), also needs recent office notes sent. Letter and notes sent.

## 2024-04-26 NOTE — TELEPHONE ENCOUNTER
Patient called back today regarding disability.  He said he is running out of time and needs the disability paperwork signed for his employer.    Pt's ph: [phone]

## 2024-06-06 ENCOUNTER — IN-CLINIC DEVICE VISIT (OUTPATIENT)
Dept: CARDIOLOGY CLINIC | Facility: CLINIC | Age: 49
End: 2024-06-06
Payer: COMMERCIAL

## 2024-06-06 ENCOUNTER — OFFICE VISIT (OUTPATIENT)
Dept: CARDIOLOGY CLINIC | Facility: CLINIC | Age: 49
End: 2024-06-06
Payer: COMMERCIAL

## 2024-06-06 VITALS
HEIGHT: 74 IN | OXYGEN SATURATION: 97 % | HEART RATE: 72 BPM | DIASTOLIC BLOOD PRESSURE: 68 MMHG | BODY MASS INDEX: 38.27 KG/M2 | SYSTOLIC BLOOD PRESSURE: 100 MMHG | WEIGHT: 298.2 LBS | RESPIRATION RATE: 15 BRPM

## 2024-06-06 DIAGNOSIS — Z95.810 PRESENCE OF IMPLANTABLE CARDIOVERTER-DEFIBRILLATOR (ICD): Primary | ICD-10-CM

## 2024-06-06 DIAGNOSIS — I50.22 CHRONIC HFREF (HEART FAILURE WITH REDUCED EJECTION FRACTION) (HCC): Primary | ICD-10-CM

## 2024-06-06 DIAGNOSIS — Z95.810 AICD (AUTOMATIC CARDIOVERTER/DEFIBRILLATOR) PRESENT: ICD-10-CM

## 2024-06-06 DIAGNOSIS — I48.0 PAROXYSMAL ATRIAL FIBRILLATION (HCC): ICD-10-CM

## 2024-06-06 DIAGNOSIS — I47.20 VENTRICULAR TACHYCARDIA (HCC): ICD-10-CM

## 2024-06-06 DIAGNOSIS — I42.0 DILATED CARDIOMYOPATHY (HCC): ICD-10-CM

## 2024-06-06 PROCEDURE — 93282 PRGRMG EVAL IMPLANTABLE DFB: CPT | Performed by: INTERNAL MEDICINE

## 2024-06-06 PROCEDURE — 99214 OFFICE O/P EST MOD 30 MIN: CPT | Performed by: INTERNAL MEDICINE

## 2024-06-06 NOTE — PATIENT INSTRUCTIONS
Continue current medications  2g sodium diet  2L fluid restriction  Daily weights  Weight loss  Physical activities/walking as tolerated  Follow up with Natalio Raman

## 2024-06-06 NOTE — PROGRESS NOTES
Advanced Heart Failure Outpatient Progress Note - Vadim Chapa 48 y.o. male MRN: [id number]    Encounter: [id number]      Assessment/Plan:    Patient Active Problem List    Diagnosis Date Noted    Leukocytosis 12/14/2023    HOANG (acute kidney injury) ([facility]) 12/13/2023    Wide-complex tachycardia 12/02/2023    Morbid obesity with BMI of 40.0-44.9, adult ([facility]) 07/02/2023    Syncope 04/16/2023    CHF (congestive heart failure) ([facility])     AICD (automatic cardioverter/defibrillator) present     ICD (implantable cardioverter-defibrillator) discharge 10/10/2022    Paroxysmal atrial fibrillation ([facility]) 11/06/2020    Dilated cardiomyopathy ([facility]) 01/25/2018    Hypertension 01/25/2018    Obstructive sleep apnea 01/25/2018    Calculus of gallbladder without cholecystitis 05/19/2017    Chronic HFrEF (heart failure with reduced ejection fraction) ([facility]) 2017       # Chronic heart failure with reduced ejection fraction; EF 25-30%, recent decline EF to 10-15% in setting of VT, NYHA II  Etiology: Etiology thought 2/2 coxsackie virus many years ago. Occasional binge drinking. Known cardiomyopathy with EF of 25% since 2017. Further reduction in EF on echo 12/2/23 in setting of ventricular tachycardia s/p shocks, contribution from ?myocarditis on cardiac MRI. 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  Euvolemic, warm on exam     Studies- personally reviewed by me     TTE 4/16/24:  LVEF 20%  LVIDD 6.3cm  RV mildly dilated with normal systolic function  Mild MR     TTE 12/20/23 [facility]:  LVEF 10 to 15%.  LVIDD 6.9 cm  RV normal size and systolic function  Trivial pericardial effusion  Mild TR  Trace TR    C 12/19/23 [facility]:  Coronary Findings Diagnostic Dominance: Right   Left Main: The vessel is normal angiographically and in size.   Left Anterior Descending: The vessel is normal angiographically and in size.   Left Circumflex: The vessel is normal angiographically and in size.   Right  Coronary Artery: The vessel was not injected.     Cardiac MRI 12/7/23:  LVEF 16%. LVEDD 6.8cm  Normal RV size and systolic function  Intramyocardial fibrosis of the interventricular septum, and subepicardial fibrosis of the inferior wall. 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      -- TTE 12/6/23  LVEF: 15%  LVIDd: 6.7cm  RV: normal size and systolic function  MR: mild to moderate  PASP: 30mmHg, mild TR  RVOT: no notching  Other: no pericardial effusion     --TTE 5/1/23: LVEF 30%.   --TTE 4/17/23: LVEF 30%. LVIDD 7.1cm. Normal RV size and systolic function.  Mild MR. Mild TR     --TTE 10/11/21:  LVEF: 25%  LVIDd: 7.2cm  RV: normal size and systolic function  MR: mild  PASP: unable to estimate, no TR     --C 8/30/21: Normal coronaries   --TTE 4/9/2019: LVEF 25% w/ grade II diastology. LVIDd 6.9 cm. Mod LAE. Trace MR. Mild TR. Noninvasive assessment c/f borderline to low output.   --TTE 5/16/2017: LVEF 25% w/ grade II diastology. LVIDd 6.9 cm. Mod LAE. Trace MR, mild TR. Normal RV size and function.         Neurohormonal Blockade:  --Beta-Blocker: metoprolol succinate 25mg BID  --ACEi, ARB or ARNi: entresto 24-26mg BID   --Aldosterone Receptor Blocker: spironolactone 25mg daily  --SGLT2 Inhibitor: Jardiance 10mg daily   --Diuretic: furosemide 40mg daily with potassium 20 meq daily     Sudden Cardiac Death Risk Reduction:  --ICD: MDT single chamber ICD  Interrogation 6/6/24:  <0.1%. No significant high rate episodes. OptiVol normal  Interrogation 2/21/2024: V paced less than 0.1%.  1 NSVT  Interrogation 1/8/24:  <0.1%. 4 NSVTs 5-11 beats. OptiVol normal.     Electrical Resynchronization:  --Candidacy for BiV device: narrow QRSd     Advanced Therapies (if appropriate):  We will continue to monitor  BMI 38  Blood type O  RV mildly dilated with normal systolic function    # Ventricular tachycardia  S/p admissions x 2 12/2023. Transferred to [facility] for consideration of ablation  12/19/23 [facility] VT  ablation, epicardial access  Conclusions:   1. Successful radiofrequency ablation of epicardial focal (likely   triggered activity) VT   2. Additional late potential ablation   3. No acute complications   Rx: metoprolol as below, amiodarone 200mg daily  1 NSVT in office check 3/5/2024; seen by Dr. Walter with plan to continue amiodarone 200 mg daily for 3 months and reassess for decreasing dose to 100 mg daily.  Liver enzymes and TSH normal 3/11/24  # Atrial fibrillation / flutter,  S/p Afib ablation in 07/2023.  [id number] = 2 (HF, HTN).  AC: xarelto  Rate control: BB as above  Rhythm control: on amiodarone as above for VT  # Hypertension   # Obstructive sleep apnea, on CPAP and using nightly, unable to sleep without it  # Obesity  # History of binge drinking      TODAY'S PLAN:  Remains from a cardiac standpoint  Euvolemic and warm on exam  BP limiting for further uptitration medical therapy  2g sodium diet  2L fluid restriction  Daily weights  Weight loss  Follow up with Dr. Walter      HPI:     48 y.o.  year old male with history of nonischemic dilated cardiomyopathy, heart failure with reduced ejection fraction, status post Medtronic single-chamber ICD (June 2017), atrial fibrillation/flutter status post A-fib ablation July 2023, VT, hypertension, hyperlipidemia, obstructive sleep apnea, morbid obesity, and history of binge drinking who is here for follow up. I last saw him on follow up 5/12/22.    Admitted twice in 12/2023 for VT s/p DCCVs. Ultimately transferred to [facility] for consideration for VT ablation. S/p Successful radiofrequency ablation of epicardial focal (likely triggered activity) VT 12/19/23.  Seen in follow up by Dr. Drummond 12/27/23 and was feeling generally well. FDG PET ordered due to concern for sarcoid. FDG PET without increased focal FDG activity involving the left ventricular myocardium characteristic of cardiac sarcoidosis      1/25/24: He is here for follow-up.  Notes some lightheadedness with  getting up.  Overall feeling better.  No leg swelling or abdominal distention.  Reports some lightheadedness with getting up.  Short of breath with more exertion, noted feeling short of breath getting the garbage out which was heavier than usual.  He is able to walk a flight of stairs without shortness of breath.  Noticing heart rates mostly in the 70s which was usually in the 80s before and feeling palpitations.  Last device interrogation 2024 with episodes of NSVT's x 4.  He needs a letter for work, he inspects industrial grade boilers    3/14/24: Here for follow up. 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  Patient's mother with no heart issues, unknown biological father.  Maternal aunt and uncle  with cancer.  He is an only child.  Seen by Dr. Walter 3/5/2024 with plan to continue amiodarone for another 3 months.  If not having any issues plan to go down to 100 mg/day.  Routine testing on amiodarone therapy ordered.  In office device interrogation with 1 episode of NSVT.  He reports overall doing well.  Increasing exertional capacity. Occasional lightheadedness. He is dealing with a lot of stress with his mom being sick and trying to take care of 2 households.  He is able to walk up a flight of stairs with heavy pallets with no symptoms.  He is considering going back to work.  He is ordered for another echocardiogram as scheduled in April.  Will revisit this at that time and we will touch base with EP.   BMP 3/11/2024: Sodium 138 potassium 4 creatinine 1.05    24: Here for follow up. He reports overall doing well. No shortness of breath, leg swelling, PND or orthopnea. No dizziness or lightheadedness. Notes that he notices shortness of breath when he is trying to do more. He applied for social security disability and is going to lose his insurance at the end of the month. He applied for [place] MA which is still pending.       Review of Systems   Constitutional:  Negative for chills and fever.  "  HENT:  Negative for ear pain and sore throat.    Eyes:  Negative for pain and visual disturbance.   Respiratory:  Positive for shortness of breath. Negative for cough.    Cardiovascular:  Negative for chest pain, palpitations and leg swelling.   Gastrointestinal:  Negative for abdominal distention, abdominal pain and vomiting.   Genitourinary:  Negative for dysuria and hematuria.   Musculoskeletal:  Negative for arthralgias and back pain.   Skin:  Negative for color change and rash.   Neurological:  Negative for seizures, syncope and light-headedness.   All other systems reviewed and are negative.      Past Medical History:   Diagnosis Date    A-fib ([facility]) 2020    AICD (automatic cardioverter/defibrillator) present     CHF (congestive heart failure) ([facility])     Coxsackie virus infection 2017    Hypertension     ICD (implantable cardioverter-defibrillator) lead failure 06/03/2017    Myocarditis ([facility])     Sleep apnea     Ventricular tachycardia ([facility])          Allergies   Allergen Reactions    Vicodin [Hydrocodone-Acetaminophen] Other (See Comments)     \"i get nasty\"     .    Current Outpatient Medications:     amiodarone 200 mg tablet, Take 1 tablet (200 mg total) by mouth daily, Disp: 90 tablet, Rfl: 3    Empagliflozin (JARDIANCE) 10 MG TABS tablet, Take 1 tablet (10 mg total) by mouth every morning, Disp: 90 tablet, Rfl: 3    furosemide (LASIX) 40 mg tablet, Take 1 tablet (40 mg total) by mouth daily, Disp: 90 tablet, Rfl: 3    metoprolol succinate (TOPROL-XL) 25 mg 24 hr tablet, Take 1 tablet (25 mg total) by mouth 2 (two) times a day, Disp: 180 tablet, Rfl: 3    potassium chloride (K-DUR,KLOR-CON) 20 mEq tablet, Take 1 tablet (20 mEq total) by mouth daily, Disp: 90 tablet, Rfl: 1    rivaroxaban (Xarelto) 20 mg tablet, Take 1 tablet (20 mg total) by mouth daily with dinner, Disp: 90 tablet, Rfl: 3    sacubitril-valsartan (ENTRESTO) 24-26 MG TABS, Take 1 tablet by mouth 2 (two) times a day, Disp: 180 tablet, Rfl: 3   " " spironolactone (ALDACTONE) 25 mg tablet, Take 1 tablet (25 mg total) by mouth daily, Disp: 90 tablet, Rfl: 3    famotidine (PEPCID) 20 mg tablet, Take 1 tablet (20 mg total) by mouth daily, Disp: 30 tablet, Rfl: 0    Social History     Socioeconomic History    Marital status: Single     Spouse name: Not on file    Number of children: Not on file    Years of education: Not on file    Highest education level: Not on file   Occupational History    Not on file   Tobacco Use    Smoking status: Never    Smokeless tobacco: Never   Vaping Use    Vaping status: Never Used   Substance and Sexual Activity    Alcohol use: Not Currently     Comment: OCCASIONAL; history of binge drinking -  drinking \"a lot\" on Fridays; stopped this about in late 2022. (Updated 12/04/2023).    Drug use: No    Sexual activity: Not on file   Other Topics Concern    Not on file   Social History Narrative    Not on file     Social Determinants of Health     Financial Resource Strain: Not on file   Food Insecurity: No Food Insecurity (5/2/2023)    Hunger Vital Sign     Worried About Running Out of Food in the Last Year: Never true     Ran Out of Food in the Last Year: Never true   Transportation Needs: No Transportation Needs (5/2/2023)    PRAPARE - Transportation     Lack of Transportation (Medical): No     Lack of Transportation (Non-Medical): No   Physical Activity: Not on file   Stress: Not on file   Social Connections: Not on file   Intimate Partner Violence: Not on file   Housing Stability: Low Risk  (5/2/2023)    Housing Stability Vital Sign     Unable to Pay for Housing in the Last Year: No     Number of Places Lived in the Last Year: 1     Unstable Housing in the Last Year: No       Family History   Problem Relation Age of Onset    No Known Problems Mother     No Known Problems Father     Sudden death Neg Hx     Heart disease Neg Hx        Physical Exam:    Vitals:   Vitals:    06/06/24 1519   BP: 100/68   Pulse: 72   Resp: 15   SpO2: 97% " "        Physical Exam  Constitutional:       General: He is not in acute distress.     Appearance: Normal appearance.   HENT:      Head: Normocephalic and atraumatic.      Mouth/Throat:      Mouth: Mucous membranes are moist.   Eyes:      General: No scleral icterus.     Extraocular Movements: Extraocular movements intact.   Neck:      Vascular: No JVD.   Cardiovascular:      Rate and Rhythm: Normal rate and regular rhythm.      Pulses: Normal pulses.      Heart sounds: S1 normal and S2 normal. No murmur heard.     No friction rub. No gallop.   Pulmonary:      Breath sounds: Normal breath sounds.   Abdominal:      General: There is no distension.      Palpations: Abdomen is soft.      Tenderness: There is no abdominal tenderness. There is no guarding or rebound.   Musculoskeletal:         General: Normal range of motion.      Cervical back: Neck supple.      Right lower leg: No edema.      Left lower leg: No edema.   Skin:     General: Skin is warm and dry.      Capillary Refill: Capillary refill takes less than 2 seconds.   Neurological:      General: No focal deficit present.      Mental Status: He is alert and oriented to person, place, and time.   Psychiatric:         Mood and Affect: Mood normal.         Labs & Results:    Lab Results   Component Value Date    SODIUM 138 03/11/2024    K 4.0 03/11/2024     03/11/2024    CO2 28 03/11/2024    BUN 12 03/11/2024    CREATININE 1.05 03/11/2024    GLUC 108 12/16/2023    CALCIUM 9.0 03/11/2024     Lab Results   Component Value Date    WBC 6.02 03/11/2024    HGB 16.1 03/11/2024    HCT 49.7 (H) 03/11/2024    MCV 93 03/11/2024     03/11/2024     No results found for: \"NTBNP\"   Lab Results   Component Value Date    CHOLESTEROL 140 09/07/2017     Lab Results   Component Value Date    HDL 48 09/07/2017     Lab Results   Component Value Date    TRIG 85 09/07/2017     No results found for: \"NONHDLC\"    EKG personally reviewed by Fanny Soliz MD.     Counseling " / Coordination of Care  Time spent today 25 minutes.  Greater than 50% of total time was spent with the patient and / or family counseling and / or coordination of care. We went over current diagnosis, most recent studies and any changes in treatment.    Thank you for the opportunity to participate in the care of this patient.    DHARA VILLARREAL MD  ADVANCED HEART FAILURE AND MECHANICAL CIRCULATORY SUPPORT  [facility]

## 2024-06-06 NOTE — PROGRESS NOTES
MDT SC ICD/ACTIVE SYSTEM IS MRI CONDITIONAL   DEVICE INTERROGATED IN THE [place] OFFICE:  BATTERY VOLTAGE ADEQUATE (3.7 YR.).   <0.1%.  ALL LEAD PARAMETERS WITHIN NORMAL LIMITS.  NO SIGNIFICANT HIGH RATE EPISODES.  NO PROGRAMMING CHANGES MADE TO DEVICE PARAMETERS.  OPTI-VOL WITHIN NORMAL LIMITS.  NORMAL DEVICE FUNCTION.  RG

## 2024-06-24 DIAGNOSIS — I42.0 DILATED CARDIOMYOPATHY (HCC): ICD-10-CM

## 2024-06-25 RX ORDER — POTASSIUM CHLORIDE 1500 MG/1
20 TABLET, EXTENDED RELEASE ORAL DAILY
Qty: 90 TABLET | Refills: 1 | Status: SHIPPED | OUTPATIENT
Start: 2024-06-25

## 2024-07-02 ENCOUNTER — PATIENT MESSAGE (OUTPATIENT)
Dept: CARDIOLOGY CLINIC | Facility: CLINIC | Age: 49
End: 2024-07-02

## 2024-07-02 ENCOUNTER — TELEPHONE (OUTPATIENT)
Age: 49
End: 2024-07-02

## 2024-07-02 NOTE — TELEPHONE ENCOUNTER
Pt called in wanting to know if there is an email to send forms in that he needs filled out by Dr. Soliz.     Advised to send forms via [employer] and then pt requested that forms once completed be sent back via [employer].    DENICE

## 2024-07-15 ENCOUNTER — DOCUMENTATION (OUTPATIENT)
Dept: CARDIOLOGY CLINIC | Facility: CLINIC | Age: 49
End: 2024-07-15

## 2024-07-31 ENCOUNTER — TELEPHONE (OUTPATIENT)
Dept: CARDIOLOGY CLINIC | Facility: CLINIC | Age: 49
End: 2024-07-31

## 2024-07-31 NOTE — TELEPHONE ENCOUNTER
Called patient to confirm he received completed disability forms.    Patient stated he has received completed disability form from cardiology.

## 2024-09-11 ENCOUNTER — REMOTE DEVICE CLINIC VISIT (OUTPATIENT)
Dept: CARDIOLOGY CLINIC | Facility: CLINIC | Age: 49
End: 2024-09-11
Payer: COMMERCIAL

## 2024-09-11 DIAGNOSIS — Z95.810 PRESENCE OF AUTOMATIC CARDIOVERTER/DEFIBRILLATOR (AICD): Primary | ICD-10-CM

## 2024-09-11 PROCEDURE — 93295 DEV INTERROG REMOTE 1/2/MLT: CPT | Performed by: INTERNAL MEDICINE

## 2024-09-11 PROCEDURE — 93296 REM INTERROG EVL PM/IDS: CPT | Performed by: INTERNAL MEDICINE

## 2024-09-11 NOTE — PROGRESS NOTES
Results for orders placed or performed in visit on 09/11/24   Cardiac EP device report    Narrative    MDT SC ICD/ACTIVE SYSTEM IS MRI CONDITIONAL  CARELINK TRANSMISSION: BATTERY VOLTAGE ADEQUATE. (3.4 YRS)  1%. ALL AVAILABLE LEAD PARAMETERS WITHIN NORMAL LIMITS. 2 NSVT EPISODES DETECTED 7 BEATS @ 430ms; 4 BEATS @ 340ms. OPTI-VOL WITHIN NORMAL LIMITS.NORMAL DEVICE FUNCTION.---HOFF

## 2024-09-13 ENCOUNTER — OFFICE VISIT (OUTPATIENT)
Dept: CARDIOLOGY CLINIC | Facility: CLINIC | Age: 49
End: 2024-09-13
Payer: COMMERCIAL

## 2024-09-13 VITALS
DIASTOLIC BLOOD PRESSURE: 72 MMHG | OXYGEN SATURATION: 98 % | HEART RATE: 62 BPM | WEIGHT: 301 LBS | HEIGHT: 74 IN | BODY MASS INDEX: 38.63 KG/M2 | SYSTOLIC BLOOD PRESSURE: 102 MMHG | RESPIRATION RATE: 16 BRPM

## 2024-09-13 DIAGNOSIS — Z79.899 LONG TERM CURRENT USE OF AMIODARONE: ICD-10-CM

## 2024-09-13 DIAGNOSIS — E66.01 MORBID OBESITY WITH BMI OF 40.0-44.9, ADULT (HCC): ICD-10-CM

## 2024-09-13 DIAGNOSIS — I48.0 PAROXYSMAL ATRIAL FIBRILLATION (HCC): ICD-10-CM

## 2024-09-13 DIAGNOSIS — I50.20 SYSTOLIC CONGESTIVE HEART FAILURE, UNSPECIFIED HF CHRONICITY (HCC): ICD-10-CM

## 2024-09-13 DIAGNOSIS — Z95.810 AICD (AUTOMATIC CARDIOVERTER/DEFIBRILLATOR) PRESENT: Chronic | ICD-10-CM

## 2024-09-13 DIAGNOSIS — I50.22 CHRONIC HFREF (HEART FAILURE WITH REDUCED EJECTION FRACTION) (HCC): Primary | Chronic | ICD-10-CM

## 2024-09-13 DIAGNOSIS — R00.0 WIDE-COMPLEX TACHYCARDIA: ICD-10-CM

## 2024-09-13 DIAGNOSIS — I42.0 DILATED CARDIOMYOPATHY (HCC): ICD-10-CM

## 2024-09-13 DIAGNOSIS — Z45.02 ICD (IMPLANTABLE CARDIOVERTER-DEFIBRILLATOR) DISCHARGE: ICD-10-CM

## 2024-09-13 PROCEDURE — 99214 OFFICE O/P EST MOD 30 MIN: CPT | Performed by: INTERNAL MEDICINE

## 2024-09-13 NOTE — PROGRESS NOTES
EPS follow-up - Vadim Chapa 49 y.o. male MRN: [id number]           ASSESSMENT:  1. Chronic HFrEF (heart failure with reduced ejection fraction) ([facility])        2. Dilated cardiomyopathy (HCC)        3. Paroxysmal atrial fibrillation ([facility])        4. Systolic congestive heart failure, unspecified HF chronicity ([facility])        5. AICD (automatic cardioverter/defibrillator) present        6. ICD (implantable cardioverter-defibrillator) discharge        7. Morbid obesity with BMI of 40.0-44.9, adult ([facility])        8. Wide-complex tachycardia          MDT SC ICD/ACTIVE SYSTEM IS MRI CONDITIONAL  CARELINK TRANSMISSION: BATTERY VOLTAGE ADEQUATE. (3.4 YRS)  1%. ALL AVAILABLE LEAD PARAMETERS WITHIN NORMAL LIMITS. 2 NSVT EPISODES DETECTED 7 BEATS @ 430ms; 4 BEATS @ 340ms. OPTI-VOL WITHIN NORMAL LIMITS.NORMAL DEVICE FUNCTION.---HOFF            Specimen Collected: 09/11/24  1:24 AM           This transthoracic echocardiogram was performed in the echo lab. This was a routine, outpatient study. Study quality was adequate. A complete 2D, color flow Doppler, spectral Doppler, 2D, color flow Doppler and spectral Doppler transthoracic echocardiogram was performed.  The apical, parasternal, subcostal and suprasternal views were obtained. .6mL of Definity ultrasound enhancing agent was used due to poor endocardial definition.  Patient exhibited sinus rhythm.     History    Dilated cardiomyopathy, CHF, HFrEF, AICD, Syncope, HTN     Interpretation Summary  Show Result Comparison     Left Ventricle: Left ventricular cavity size is mildly dilated. Wall thickness is normal. The left ventricular ejection fraction is 20%. Systolic function is severely reduced. There is severe global hypokinesis. Diastolic function is moderately abnormal, consistent with grade II (pseudonormal) relaxation.    Right Ventricle: Right ventricular cavity size is mildly dilated. Systolic function is normal.    Left Atrium: The atrium is mildly dilated.    Mitral Valve:  "There is mild regurgitation.         PLAN:  #1.  Nonischemic cardiomyopathy LVEF 20% he is on appropriate medications as prescribed by Batool Soliz of heart failure    #2.  History of ventricular tachycardia storm he is taking amiodarone I am very reluctant to lower the dose given LVEF of 20% instability.  I ordered lab work PFTs and a chest x-ray he recently had an eye exam and was told his eyes were okay    3.  History of A-fib ablation he is appropriately anticoagulated no further A-fib    4.  ICD in situ working appropriately    5.  Long-term use of amiodarone as above    CC/HPI:   F/U VT.  S/p epi/endo ablation.  No further sustained VT on ICD. On amiodarone 200 mg daily.          Very complex cardiac history nonischemic cardiomyopathy paroxysmal atrial fibrillation status post pulmonary vein isolation he developed worsening heart failure and ventricular tachycardia while on dofetilide he was hospitalized at [facility] where he underwent an epicardial ablation.    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 rate 16, height 6' 2\" (1.88 m), weight (!) 137 kg (301 lb), SpO2 98%., Body mass index is 38.65 kg/m².,        Physical Exam:    GEN: Vadim Chapa appears well, alert and oriented x " 3, pleasant and cooperative   HEENT: pupils equal, round, and reactive to light; extraocular muscles intact  NECK: supple, no carotid bruits   HEART: regular rhythm, normal S1 and S2, no murmurs, clicks, gallops or rubs   LUNGS: clear to auscultation bilaterally; no wheezes, rales, or rhonchi   ABDOMEN: normal bowel sounds, soft, no tenderness, no distention  EXTREMITIES: peripheral pulses normal; no clubbing, cyanosis, or edema  NEURO: no focal findings   SKIN: normal without suspicious lesions on exposed skin    Medications:      Current Outpatient Medications:     amiodarone 200 mg tablet, Take 1 tablet (200 mg total) by mouth daily, Disp: 90 tablet, Rfl: 3    Empagliflozin (JARDIANCE) 10 MG TABS tablet, Take 1 tablet (10 mg total) by mouth every morning, Disp: 90 tablet, Rfl: 3    furosemide (LASIX) 40 mg tablet, Take 1 tablet (40 mg total) by mouth daily, Disp: 90 tablet, Rfl: 3    Klor-Con M20 20 MEQ tablet, TAKE 1 TABLET BY MOUTH EVERY DAY, Disp: 90 tablet, Rfl: 1    metoprolol succinate (TOPROL-XL) 25 mg 24 hr tablet, Take 1 tablet (25 mg total) by mouth 2 (two) times a day, Disp: 180 tablet, Rfl: 3    rivaroxaban (Xarelto) 20 mg tablet, Take 1 tablet (20 mg total) by mouth daily with dinner, Disp: 90 tablet, Rfl: 3    sacubitril-valsartan (ENTRESTO) 24-26 MG TABS, Take 1 tablet by mouth 2 (two) times a day, Disp: 180 tablet, Rfl: 3    spironolactone (ALDACTONE) 25 mg tablet, Take 1 tablet (25 mg total) by mouth daily, Disp: 90 tablet, Rfl: 3     Family History   Problem Relation Age of Onset    No Known Problems Mother     No Known Problems Father     Sudden death Neg Hx     Heart disease Neg Hx      Social History     Socioeconomic History    Marital status: Single     Spouse name: Not on file    Number of children: Not on file    Years of education: Not on file    Highest education level: Not on file   Occupational History    Not on file   Tobacco Use    Smoking status: Never    Smokeless tobacco: Never  "  Vaping Use    Vaping status: Never Used   Substance and Sexual Activity    Alcohol use: Not Currently     Comment: OCCASIONAL; history of binge drinking -  drinking \"a lot\" on Fridays; stopped this about in late 2022. (Updated 12/04/2023).    Drug use: No    Sexual activity: Not on file   Other Topics Concern    Not on file   Social History Narrative    Not on file     Social Determinants of Health     Financial Resource Strain: Not on file   Food Insecurity: No Food Insecurity (5/2/2023)    Hunger Vital Sign     Worried About Running Out of Food in the Last Year: Never true     Ran Out of Food in the Last Year: Never true   Transportation Needs: No Transportation Needs (5/2/2023)    PRAPARE - Transportation     Lack of Transportation (Medical): No     Lack of Transportation (Non-Medical): No   Physical Activity: Not on file   Stress: Not on file   Social Connections: Unknown (6/18/2024)    Received from [facility]     How often do you feel lonely or isolated from those around you? (Adult - for ages 18 years and over): Not on file   Intimate Partner Violence: Not on file   Housing Stability: Low Risk  (5/2/2023)    Housing Stability Vital Sign     Unable to Pay for Housing in the Last Year: No     Number of Places Lived in the Last Year: 1     Unstable Housing in the Last Year: No     Social History     Tobacco Use   Smoking Status Never   Smokeless Tobacco Never     Social History     Substance and Sexual Activity   Alcohol Use Not Currently    Comment: OCCASIONAL; history of binge drinking -  drinking \"a lot\" on Fridays; stopped this about in late 2022. (Updated 12/04/2023).       Labs & Results:  Below is the patient's most recent value for Albumin, ALT, AST, BUN, Calcium, Chloride, Cholesterol, CO2, Creatinine, GFR, Glucose, HDL, Hematocrit, Hemoglobin, Hemoglobin A1C, LDL, Magnesium, Phosphorus, Platelets, Potassium, PSA, Sodium, Triglycerides, and WBC.   Lab Results   Component Value " Date    ALT 34 2024    AST 28 2024    BUN 12 2024    CALCIUM 9.0 2024     2024    CO2 28 2024    CREATININE 1.05 2024    HDL 48 2017    HCT 49.7 (H) 2024    HGB 16.1 2024    HGBA1C 6.1 2023    MG 2.6 2023    PHOS 3.8 2023     2024    K 4.0 2024    TRIG 85 2017    WBC 6.02 2024     Note: for a comprehensive list of the patient's lab results, access the Results Review activity.            Cardiac testing:   Results for orders placed during the hospital encounter of 19    Echo complete with contrast if indicated    [id number] [place] [address] [phone]    Transthoracic Echocardiogram  2D, M-mode, Doppler, and Color Doppler    Study date:  2019    Patient: ADALI NEWMAN  MR number: [id number]  Account number: [id number]  : 1975  Age: 43 years  Gender: Male  Status: Outpatient  Location: [facility]  Height: 74 in  Weight: 291 lb  BP: 118/ 64 mmHg    Indications: Dilated Cardiomyopathy.    Diagnoses: I42.0 - Dilated cardiomyopathy    Sonographer:  Galaviz RCS  Interpreting Physician:  Ananya Miller MD  Primary Physician:  Cynthia Julio MD  Referring Physician:  Octaviano Zaldivar MD  Group:  [facility]    SUMMARY    LEFT VENTRICLE:  The ventricle was moderately to markedly dilated.  Ejection fraction was estimated to be 25 %.  There was severe diffuse hypokinesis.  Features were consistent with a pseudonormal left ventricular filling pattern, with concomitant abnormal relaxation and increased filling pressure (grade 2 diastolic dysfunction).    RIGHT VENTRICLE:  Estimated peak pressure was at least 30 mmHg. ICD lead noted.    LEFT ATRIUM:  The atrium was moderately dilated.    RIGHT ATRIUM:  The atrium was moderately dilated.    MITRAL VALVE:  There was trace  regurgitation.    TRICUSPID VALVE:  There was mild regurgitation.    HISTORY: PRIOR HISTORY: Risk factors: hypertension.    PROCEDURE: The study was performed in the [facility]. This was a routine study. The transthoracic approach was used. The study included complete 2D imaging, M-mode, complete spectral Doppler, and color Doppler. The  heart rate was 74 bpm, at the start of the study. This was a technically difficult study.    LEFT VENTRICLE: The ventricle was moderately to markedly dilated. Ejection fraction was estimated to be 25 %. There was severe diffuse hypokinesis. DOPPLER: Features were consistent with a pseudonormal left ventricular filling pattern,  with concomitant abnormal relaxation and increased filling pressure (grade 2 diastolic dysfunction).    RIGHT VENTRICLE: The size was normal. Systolic function was normal. Wall thickness was normal. DOPPLER: Estimated peak pressure was at least 30 mmHg. ICD lead noted.    LEFT ATRIUM: The atrium was moderately dilated.    RIGHT ATRIUM: The atrium was moderately dilated.    MITRAL VALVE: There was annular calcification. DOPPLER: There was trace regurgitation.    AORTIC VALVE: The valve was trileaflet. Leaflets exhibited normal thickness and normal cuspal separation. The valve was not well visualized. DOPPLER: Transaortic velocity was within the normal range. There was no evidence for stenosis.  There was no regurgitation.    TRICUSPID VALVE: The valve structure was normal. There was normal leaflet separation. DOPPLER: The transtricuspid velocity was within the normal range. There was no evidence for stenosis. There was mild regurgitation.    PULMONIC VALVE: Leaflets exhibited normal thickness, no calcification, and normal cuspal separation. DOPPLER: The transpulmonic velocity was within the normal range. There was no regurgitation.    PERICARDIUM: There was no pericardial effusion. The pericardium was normal in appearance.    AORTA: The  root exhibited normal size.    SYSTEM MEASUREMENT TABLES    2D  %FS: 12.58 %  Ao Diam: 2.95 cm  EDV(Teich): 249.22 ml  EF(Teich): 26.31 %  ESV(Teich): 183.65 ml  IVSd: 1.03 cm  LA Area: 29.37 cm2  LA Diam: 5.32 cm  LVEDV MOD A4C: 213.51 ml  LVEF MOD A4C: 30.3 %  LVESV MOD A4C: 148.81 ml  LVIDd: 6.92 cm  LVIDs: 6.05 cm  LVLd A4C: 9.46 cm  LVLs A4C: 8.02 cm  LVPWd: 0.71 cm  RA Area: 23.69 cm2  RVIDd: 4.49 cm  SV MOD A4C: 64.7 ml  SV(Teich): 65.57 ml    CW  TR MaxP.1 mmHg  TR Vmax: 2.55 m/s    MM  TAPSE: 2.55 cm    PW  E': 0.08 m/s  E/E': 11.03  MV A [place]: 0.5 m/s  MV Dec [place]: 3.77 m/s2  MV DecT: 234 ms  MV E [place]: 0.88 m/s  MV E/A Ratio: 1.76  MV PHT: 67.86 ms  MVA By PHT: 3.24 cm2    [employer] Accredited Echocardiography Laboratory    Prepared and electronically signed by    Ananya Miller MD  Signed 10-Apr-2019 13:52:24    No results found for this or any previous visit.    No results found for this or any previous visit.    No results found for this or any previous visit.

## 2024-09-19 ENCOUNTER — OFFICE VISIT (OUTPATIENT)
Dept: CARDIOLOGY CLINIC | Facility: CLINIC | Age: 49
End: 2024-09-19
Payer: COMMERCIAL

## 2024-09-19 VITALS
DIASTOLIC BLOOD PRESSURE: 60 MMHG | BODY MASS INDEX: 38.5 KG/M2 | OXYGEN SATURATION: 96 % | HEART RATE: 61 BPM | HEIGHT: 74 IN | WEIGHT: 300 LBS | RESPIRATION RATE: 16 BRPM | SYSTOLIC BLOOD PRESSURE: 100 MMHG

## 2024-09-19 DIAGNOSIS — I47.20 V-TACH (HCC): ICD-10-CM

## 2024-09-19 DIAGNOSIS — G47.33 OBSTRUCTIVE SLEEP APNEA: ICD-10-CM

## 2024-09-19 DIAGNOSIS — I50.22 CHRONIC HFREF (HEART FAILURE WITH REDUCED EJECTION FRACTION) (HCC): Primary | ICD-10-CM

## 2024-09-19 DIAGNOSIS — Z95.810 AICD (AUTOMATIC CARDIOVERTER/DEFIBRILLATOR) PRESENT: ICD-10-CM

## 2024-09-19 PROCEDURE — 99214 OFFICE O/P EST MOD 30 MIN: CPT | Performed by: INTERNAL MEDICINE

## 2024-09-19 NOTE — PATIENT INSTRUCTIONS
Amiodarone toxicity monitoring as ordered by Dr. Waletr  Sleep medicine consultation for CONCHIS management  Continue current cardiac medications  2g sodium diet  2L fluid restriction  Daily weights  Weight loss

## 2024-09-19 NOTE — PROGRESS NOTES
Advanced Heart Failure Outpatient Progress Note - Vadim Chapa 49 y.o. male MRN: [id number]    Encounter: [id number]      Assessment/Plan:    Patient Active Problem List    Diagnosis Date Noted    Leukocytosis 12/14/2023    HOANG (acute kidney injury) ([facility]) 12/13/2023    Wide-complex tachycardia 12/02/2023    Morbid obesity with BMI of 40.0-44.9, adult ([facility]) 07/02/2023    Syncope 04/16/2023    CHF (congestive heart failure) ([facility])     AICD (automatic cardioverter/defibrillator) present     ICD (implantable cardioverter-defibrillator) discharge 10/10/2022    Paroxysmal atrial fibrillation ([facility]) 11/06/2020    Dilated cardiomyopathy ([facility]) 01/25/2018    Hypertension 01/25/2018    Obstructive sleep apnea 01/25/2018    Calculus of gallbladder without cholecystitis 05/19/2017    Chronic HFrEF (heart failure with reduced ejection fraction) ([facility]) 2017       # Chronic heart failure with reduced ejection fraction; EF 25-30%, recent decline EF to 10-15% in setting of VT, NYHA II  Etiology: Etiology thought 2/2 coxsackie virus many years ago. Occasional binge drinking. Known cardiomyopathy with EF of 25% since 2017. Further reduction in EF on echo 12/2/23 in setting of ventricular tachycardia s/p shocks, contribution from ?myocarditis on cardiac MRI. 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  Euvolemic, warm on exam     Studies- personally reviewed by me     TTE 4/16/24:  LVEF 20%  LVIDD 6.3cm  RV mildly dilated with normal systolic function  Mild MR     TTE 12/20/23 [facility]:  LVEF 10 to 15%.  LVIDD 6.9 cm  RV normal size and systolic function  Trivial pericardial effusion  Mild TR  Trace TR    C 12/19/23 [facility]:  Coronary Findings Diagnostic Dominance: Right   Left Main: The vessel is normal angiographically and in size.   Left Anterior Descending: The vessel is normal angiographically and in size.   Left Circumflex: The vessel is normal angiographically and in size.   Right  Coronary Artery: The vessel was not injected.     Cardiac MRI 12/7/23:  LVEF 16%. LVEDD 6.8cm  Normal RV size and systolic function  Intramyocardial fibrosis of the interventricular septum, and subepicardial fibrosis of the inferior wall. 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      -- TTE 12/6/23  LVEF: 15%  LVIDd: 6.7cm  RV: normal size and systolic function  MR: mild to moderate  PASP: 30mmHg, mild TR  RVOT: no notching  Other: no pericardial effusion     --TTE 5/1/23: LVEF 30%.   --TTE 4/17/23: LVEF 30%. LVIDD 7.1cm. Normal RV size and systolic function.  Mild MR. Mild TR     --TTE 10/11/21:  LVEF: 25%  LVIDd: 7.2cm  RV: normal size and systolic function  MR: mild  PASP: unable to estimate, no TR     --C 8/30/21: Normal coronaries   --TTE 4/9/2019: LVEF 25% w/ grade II diastology. LVIDd 6.9 cm. Mod LAE. Trace MR. Mild TR. Noninvasive assessment c/f borderline to low output.   --TTE 5/16/2017: LVEF 25% w/ grade II diastology. LVIDd 6.9 cm. Mod LAE. Trace MR, mild TR. Normal RV size and function.         Neurohormonal Blockade:  --Beta-Blocker: metoprolol succinate 25mg BID  --ACEi, ARB or ARNi: entresto 24-26mg BID   --Aldosterone Receptor Blocker: spironolactone 25mg daily  --SGLT2 Inhibitor: Jardiance 10mg daily   --Diuretic: furosemide 40mg daily with potassium 20 meq daily     Sudden Cardiac Death Risk Reduction:  --ICD: MDT single chamber ICD  Interrogation 9/11/24:  1%. 2 brief NSVT episodes. OptiVol normal  Interrogation 6/6/24:  <0.1%. No significant high rate episodes. OptiVol normal  Interrogation 2/21/2024: V paced less than 0.1%.  1 NSVT  Interrogation 1/8/24:  <0.1%. 4 NSVTs 5-11 beats. OptiVol normal.     Electrical Resynchronization:  --Candidacy for BiV device: narrow QRSd     Advanced Therapies (if appropriate):  We will continue to monitor  BMI 38  Blood type O  RV mildly dilated with normal systolic function    # Ventricular tachycardia  S/p admissions x 2 12/2023.  Transferred to [facility] for consideration of ablation  12/19/23 [facility] VT ablation, epicardial access  Conclusions:   1. Successful radiofrequency ablation of epicardial focal (likely   triggered activity) VT   2. Additional late potential ablation   3. No acute complications   Rx: metoprolol as below, amiodarone 200mg daily  1 NSVT in office check 3/5/2024; seen by Dr. Walter with plan to continue amiodarone 200 mg daily for 3 months and reassess for decreasing dose to 100 mg daily.  Liver enzymes and TSH normal 3/11/24  Seen by Dr. Walter 9/13, continued on anti-arrhythmic therapy with current dose of amiodarone, concern regarding lowering dose given severe cardiomyopathy. Ordered amiodarone toxicity screening.    # Atrial fibrillation / flutter,  S/p Afib ablation in 07/2023.  [id number] = 2 (HF, HTN).  AC: xarelto  Rate control: BB as above  Rhythm control: on amiodarone as above for VT    # Hypertension, controlled  GDMT as above  # Obstructive sleep apnea, on CPAP and using nightly, unable to sleep without it. May need device settings checked. Referral to sleep medicine for management    # Obesity  # History of binge drinking      TODAY'S PLAN:  Euvolemic and warm on exam  BP limiting for further uptitration medical therapy  Amiodarone toxicity monitoring as ordered by Dr. Walter  Sleep medicine consultation for CONCHIS management  Continue current cardiac medications  2g sodium diet  2L fluid restriction  Daily weights  Weight loss      HPI:     49 y.o.  year old male with history of nonischemic dilated cardiomyopathy, heart failure with reduced ejection fraction, status post Med[employer]onic single-chamber ICD (June 2017), atrial fibrillation/flutter status post A-fib ablation July 2023, VT, hypertension, hyperlipidemia, obstructive sleep apnea, morbid obesity, and history of binge drinking who is here for follow up. I last saw him on follow up 5/12/22.    Admitted twice in 12/2023 for VT s/p DCCVs. Ultimately transferred to [facility]  for consideration for VT ablation. S/p Successful radiofrequency ablation of epicardial focal (likely triggered activity) VT 23.  Seen in follow up by Dr. Drummond 23 and was feeling generally well. FDG PET ordered due to concern for sarcoid. FDG PET without increased focal FDG activity involving the left ventricular myocardium characteristic of cardiac sarcoidosis      24: He is here for follow-up.  Notes some lightheadedness with getting up.  Overall feeling better.  No leg swelling or abdominal distention.  Reports some lightheadedness with getting up.  Short of breath with more exertion, noted feeling short of breath getting the garbage out which was heavier than usual.  He is able to walk a flight of stairs without shortness of breath.  Noticing heart rates mostly in the 70s which was usually in the 80s before and feeling palpitations.  Last device interrogation 2024 with episodes of NSVT's x 4.  He needs a letter for work, he inspects industrial grade boilers    3/14/24: Here for follow up. 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  Patient's mother with no heart issues, unknown biological father.  Maternal aunt and uncle  with cancer.  He is an only child.  Seen by Dr. Walter 3/5/2024 with plan to continue amiodarone for another 3 months.  If not having any issues plan to go down to 100 mg/day.  Routine testing on amiodarone therapy ordered.  In office device interrogation with 1 episode of NSVT.  He reports overall doing well.  Increasing exertional capacity. Occasional lightheadedness. He is dealing with a lot of stress with his mom being sick and trying to take care of 2 households.  He is able to walk up a flight of stairs with heavy pallets with no symptoms.  He is considering going back to work.  He is ordered for another echocardiogram as scheduled in April.  Will revisit this at that time and we will touch base with EP.   BMP 3/11/2024: Sodium 138 potassium 4 creatinine  1.05    6/6/24: Here for follow up. He reports overall doing well. No shortness of breath, leg swelling, PND or orthopnea. No dizziness or lightheadedness. Notes that he notices shortness of breath when he is trying to do more. He applied for social security disability and is going to lose his insurance at the end of the month. He applied for [place] MA which is still pending.     9/19/24: Here for follow up. He is overall feeling fine. He is currently renovating his house. He applied for disability and currently getting medical insurance coverage with [employer]. Home weights been stable between 290-292 lbs. Denies leg swelling, abdominal distension, PND or orthopnea. No palpitations, dizziness or lightheadedness. He is concerned about needing his CPAP machine changed. He is not following with sleep medicine.     Review of Systems   Constitutional:  Negative for chills and fever.   HENT:  Negative for ear pain and sore throat.    Eyes:  Negative for pain and visual disturbance.   Respiratory:  Negative for cough and shortness of breath.    Cardiovascular:  Negative for chest pain, palpitations and leg swelling.   Gastrointestinal:  Negative for abdominal distention, abdominal pain and vomiting.   Genitourinary:  Negative for dysuria and hematuria.   Musculoskeletal:  Negative for arthralgias and back pain.   Skin:  Negative for color change and rash.   Neurological:  Negative for dizziness, seizures, syncope and light-headedness.   All other systems reviewed and are negative.      Past Medical History:   Diagnosis Date    A-fib ([facility]) 2020    AICD (automatic cardioverter/defibrillator) present     CHF (congestive heart failure) ([facility])     Coxsackie virus infection 2017    Hypertension     ICD (implantable cardioverter-defibrillator) lead failure 06/03/2017    Myocarditis ([facility])     Sleep apnea     Ventricular tachycardia ([facility])          Allergies   Allergen Reactions    Vicodin [Hydrocodone-Acetaminophen] Other (See  "Comments)     \"i get nasty\"     .    Current Outpatient Medications:     amiodarone 200 mg tablet, Take 1 tablet (200 mg total) by mouth daily, Disp: 90 tablet, Rfl: 3    Empagliflozin (JARDIANCE) 10 MG TABS tablet, Take 1 tablet (10 mg total) by mouth every morning, Disp: 90 tablet, Rfl: 3    furosemide (LASIX) 40 mg tablet, Take 1 tablet (40 mg total) by mouth daily, Disp: 90 tablet, Rfl: 3    Klor-Con M20 20 MEQ tablet, TAKE 1 TABLET BY MOUTH EVERY DAY, Disp: 90 tablet, Rfl: 1    metoprolol succinate (TOPROL-XL) 25 mg 24 hr tablet, Take 1 tablet (25 mg total) by mouth 2 (two) times a day, Disp: 180 tablet, Rfl: 3    rivaroxaban (Xarelto) 20 mg tablet, Take 1 tablet (20 mg total) by mouth daily with dinner, Disp: 90 tablet, Rfl: 3    sacubitril-valsartan (ENTRESTO) 24-26 MG TABS, Take 1 tablet by mouth 2 (two) times a day, Disp: 180 tablet, Rfl: 3    spironolactone (ALDACTONE) 25 mg tablet, Take 1 tablet (25 mg total) by mouth daily, Disp: 90 tablet, Rfl: 3    Social History     Socioeconomic History    Marital status: Single     Spouse name: Not on file    Number of children: Not on file    Years of education: Not on file    Highest education level: Not on file   Occupational History    Not on file   Tobacco Use    Smoking status: Never    Smokeless tobacco: Never   Vaping Use    Vaping status: Never Used   Substance and Sexual Activity    Alcohol use: Not Currently     Comment: OCCASIONAL; history of binge drinking -  drinking \"a lot\" on Fridays; stopped this about in late 2022. (Updated 12/04/2023).    Drug use: No    Sexual activity: Not on file   Other Topics Concern    Not on file   Social History Narrative    Not on file     Social Determinants of Health     Financial Resource Strain: Not on file   Food Insecurity: No Food Insecurity (5/2/2023)    Hunger Vital Sign     Worried About Running Out of Food in the Last Year: Never true     Ran Out of Food in the Last Year: Never true   Transportation Needs: No " Transportation Needs (5/2/2023)    PRAPARE - Transportation     Lack of Transportation (Medical): No     Lack of Transportation (Non-Medical): No   Physical Activity: Not on file   Stress: Not on file   Social Connections: Unknown (6/18/2024)    Received from [facility]     How often do you feel lonely or isolated from those around you? (Adult - for ages 18 years and over): Not on file   Intimate Partner Violence: Not on file   Housing Stability: Low Risk  (5/2/2023)    Housing Stability Vital Sign     Unable to Pay for Housing in the Last Year: No     Number of Places Lived in the Last Year: 1     Unstable Housing in the Last Year: No       Family History   Problem Relation Age of Onset    No Known Problems Mother     No Known Problems Father     Sudden death Neg Hx     Heart disease Neg Hx        Physical Exam:    Vitals:   Vitals:    09/19/24 1337   BP: 100/60   Pulse: 61   Resp: 16   SpO2: 96%         Physical Exam  Constitutional:       General: He is not in acute distress.     Appearance: Normal appearance.   HENT:      Head: Normocephalic and atraumatic.      Mouth/Throat:      Mouth: Mucous membranes are moist.   Eyes:      General: No scleral icterus.     Extraocular Movements: Extraocular movements intact.   Neck:      Vascular: No JVD.   Cardiovascular:      Rate and Rhythm: Normal rate and regular rhythm.      Pulses: Normal pulses.      Heart sounds: S1 normal and S2 normal. No murmur heard.     No friction rub. No gallop.   Pulmonary:      Breath sounds: Normal breath sounds.   Abdominal:      General: There is no distension.      Palpations: Abdomen is soft.      Tenderness: There is no abdominal tenderness. There is no guarding or rebound.   Musculoskeletal:         General: Normal range of motion.      Cervical back: Neck supple.      Right lower leg: No edema.      Left lower leg: No edema.   Skin:     General: Skin is warm and dry.      Capillary Refill: Capillary refill takes  "less than 2 seconds.   Neurological:      General: No focal deficit present.      Mental Status: He is alert and oriented to person, place, and time.   Psychiatric:         Mood and Affect: Mood normal.       Labs & Results:    Lab Results   Component Value Date    SODIUM 138 03/11/2024    K 4.0 03/11/2024     03/11/2024    CO2 28 03/11/2024    BUN 12 03/11/2024    CREATININE 1.05 03/11/2024    GLUC 108 12/16/2023    CALCIUM 9.0 03/11/2024     Lab Results   Component Value Date    WBC 6.02 03/11/2024    HGB 16.1 03/11/2024    HCT 49.7 (H) 03/11/2024    MCV 93 03/11/2024     03/11/2024     No results found for: \"NTBNP\"   Lab Results   Component Value Date    CHOLESTEROL 140 09/07/2017     Lab Results   Component Value Date    HDL 48 09/07/2017     Lab Results   Component Value Date    TRIG 85 09/07/2017     No results found for: \"NONHDLC\"    EKG personally reviewed by Dhara Soliz MD.     Counseling / Coordination of Care  I have spent a total time of 30 minutes in caring for this patient on the day of the visit/encounter including Instructions for management, Impressions, Counseling / Coordination of care, Documenting in the medical record, Reviewing / ordering tests, medicine, procedures  , and Obtaining or reviewing history  .      Thank you for the opportunity to participate in the care of this patient.    DHARA SOLIZ MD  ADVANCED HEART FAILURE AND MECHANICAL CIRCULATORY SUPPORT  [facility]         "

## 2024-10-29 ENCOUNTER — OFFICE VISIT (OUTPATIENT)
Dept: FAMILY MEDICINE CLINIC | Facility: CLINIC | Age: 49
End: 2024-10-29
Payer: COMMERCIAL

## 2024-10-29 VITALS
HEART RATE: 65 BPM | HEIGHT: 74 IN | SYSTOLIC BLOOD PRESSURE: 112 MMHG | OXYGEN SATURATION: 99 % | TEMPERATURE: 98.4 F | WEIGHT: 297.8 LBS | BODY MASS INDEX: 38.22 KG/M2 | DIASTOLIC BLOOD PRESSURE: 74 MMHG

## 2024-10-29 DIAGNOSIS — K62.89 RECTAL PAIN: Primary | ICD-10-CM

## 2024-10-29 DIAGNOSIS — Z13.220 SCREENING FOR LIPID DISORDERS: ICD-10-CM

## 2024-10-29 PROCEDURE — 99203 OFFICE O/P NEW LOW 30 MIN: CPT | Performed by: PHYSICIAN ASSISTANT

## 2024-10-29 RX ORDER — TRAMADOL HYDROCHLORIDE 50 MG/1
50 TABLET ORAL EVERY 8 HOURS PRN
Qty: 15 TABLET | Refills: 0 | Status: SHIPPED | OUTPATIENT
Start: 2024-10-29 | End: 2024-10-29

## 2024-10-29 NOTE — PROGRESS NOTES
Ambulatory Visit  Name: Vadim Chapa      : 1975      MRN: [id number]  Encounter Provider: Darrel García PA-C  Encounter Date: 10/29/2024   Encounter department: [facility]    Assessment & Plan  Rectal pain  Internal hemorrhoid though pain seems out of proportion. No mass, abscess, fissure, clear fistula. Recommend colorectal referral. Tylenol, avoid straining, increase fiber. Recommend CRC screening   Orders:    Ambulatory Referral to Colorectal Surgery; Future    Screening for lipid disorders    Orders:    Lipid Panel with Direct LDL reflex; Future         History of Present Illness     Pt presents for follow up, last seen , new to me  Hx of CHF of EF of 10-15% with ICD following closely with cardiology. Meds reviewed  Non smoker  No abuse/misuse of alcohol or illicit drugs  Due for CRC screening    Main concern today is rectal pain and bleeding. Hx of abscess and drainage. Now has lump, painful, bleeds with BM. Tried the hemorrhoid suppository which doesn't help. Painful to sleep/sit sometimes.       Review of Systems   Constitutional:  Negative for chills, fatigue and fever.   HENT:  Negative for congestion, ear pain, hearing loss, nosebleeds, postnasal drip, rhinorrhea, sinus pressure, sinus pain, sneezing and sore throat.    Eyes:  Negative for pain, discharge, itching and visual disturbance.   Respiratory:  Negative for cough, chest tightness, shortness of breath and wheezing.    Cardiovascular:  Negative for chest pain, palpitations and leg swelling.   Gastrointestinal:  Negative for abdominal pain, blood in stool, constipation, diarrhea, nausea and vomiting.   Genitourinary:  Negative for frequency and urgency.   Neurological:  Negative for dizziness, light-headedness and numbness.     Past Medical History:   Diagnosis Date    A-fib (HCC)     AICD (automatic cardioverter/defibrillator) present     CHF (congestive heart failure) ([facility])     Coxsackie virus infection  "2017    Hypertension     ICD (implantable cardioverter-defibrillator) lead failure 06/03/2017    Myocarditis (HCC)     Sleep apnea     Ventricular tachycardia (HCC)      Past Surgical History:   Procedure Laterality Date    CARDIAC DEFIBRILLATOR PLACEMENT      CARDIAC ELECTROPHYSIOLOGY PROCEDURE N/A 7/3/2023    Procedure: Cardiac eps/svt ablation;  Surgeon: Mihai Walter DO;  Location: [facility];  Service: Cardiology    CARDIAC ELECTROPHYSIOLOGY PROCEDURE N/A 7/3/2023    Procedure: Cardiac eps/afib ablation;  Surgeon: Mihai Walter DO;  Location: [facility];  Service: Cardiology    WA ESOPHAGOGASTRODUODENOSCOPY TRANSORAL DIAGNOSTIC N/A 3/29/2017    Procedure: ESOPHAGOGASTRODUODENOSCOPY (EGD);  Surgeon: Valeriy Conte MD;  Location: [facility];  Service: Gastroenterology    WA INSJ/RPLCMT PERM DFB W/TRNSVNS LDS 1/DUAL CHMBR N/A 6/2/2017    Procedure: LEAD EXTRACTION/REIMPLANTATION AND ICD GENERATOR CHANGE ;  Surgeon: Enrique Warren MD;  Location:  MAIN OR;  Service: Cardiology    WA LAPAROSCOPY SURG CHOLECYSTECTOMY N/A 5/19/2017    Procedure: LAPAROSCOPIC CHOLECYSTECTOMY ;  Surgeon: Tanner Leyva MD;  Location: [facility];  Service: General    TONSILLECTOMY      TYMPANOSTOMY TUBE PLACEMENT       Family History   Problem Relation Age of Onset    No Known Problems Mother     No Known Problems Father     Sudden death Neg Hx     Heart disease Neg Hx      Social History     Tobacco Use    Smoking status: Never    Smokeless tobacco: Never   Vaping Use    Vaping status: Never Used   Substance and Sexual Activity    Alcohol use: Not Currently     Comment: OCCASIONAL; history of binge drinking -  drinking \"a lot\" on Fridays; stopped this about in late 2022. (Updated 12/04/2023).    Drug use: No    Sexual activity: Not on file     Current Outpatient Medications on File Prior to Visit   Medication Sig    amiodarone 200 mg tablet Take 1 tablet (200 mg total) by mouth daily    Empagliflozin (JARDIANCE) " "10 MG TABS tablet Take 1 tablet (10 mg total) by mouth every morning    furosemide (LASIX) 40 mg tablet Take 1 tablet (40 mg total) by mouth daily    Klor-Con M20 20 MEQ tablet TAKE 1 TABLET BY MOUTH EVERY DAY    metoprolol succinate (TOPROL-XL) 25 mg 24 hr tablet Take 1 tablet (25 mg total) by mouth 2 (two) times a day    rivaroxaban (Xarelto) 20 mg tablet Take 1 tablet (20 mg total) by mouth daily with dinner    sacubitril-valsartan (ENTRESTO) 24-26 MG TABS Take 1 tablet by mouth 2 (two) times a day    spironolactone (ALDACTONE) 25 mg tablet Take 1 tablet (25 mg total) by mouth daily     Allergies   Allergen Reactions    Vicodin [Hydrocodone-Acetaminophen] Other (See Comments)     \"i get nasty\"     Immunization History   Administered Date(s) Administered    COVID-19 MODERNA VACC 0.5 ML IM 11/12/2021    COVID-19 PFIZER VACCINE 0.3 ML IM 03/24/2021, 04/16/2021    Tdap 09/12/2022     Objective     /74   Pulse 65   Temp 98.4 °F (36.9 °C)   Ht 6' 2\" (1.88 m)   Wt 135 kg (297 lb 12.8 oz)   SpO2 99%   BMI 38.24 kg/m²     Physical Exam  Vitals and nursing note reviewed. Exam conducted with a chaperone present.   Constitutional:       Appearance: Normal appearance.   HENT:      Head: Normocephalic and atraumatic.   Pulmonary:      Effort: Pulmonary effort is normal. No respiratory distress.   Genitourinary:     Rectum: Internal hemorrhoid present. No mass, tenderness, anal fissure or external hemorrhoid.   Skin:     General: Skin is warm and dry.   Neurological:      Mental Status: He is alert and oriented to person, place, and time.         "

## 2024-10-30 ENCOUNTER — OFFICE VISIT (OUTPATIENT)
Age: 49
End: 2024-10-30
Payer: COMMERCIAL

## 2024-10-30 ENCOUNTER — PREP FOR PROCEDURE (OUTPATIENT)
Age: 49
End: 2024-10-30

## 2024-10-30 VITALS
HEART RATE: 69 BPM | HEIGHT: 74 IN | WEIGHT: 297 LBS | OXYGEN SATURATION: 98 % | DIASTOLIC BLOOD PRESSURE: 66 MMHG | BODY MASS INDEX: 38.12 KG/M2 | SYSTOLIC BLOOD PRESSURE: 101 MMHG

## 2024-10-30 DIAGNOSIS — Z12.11 SCREENING FOR COLON CANCER: Primary | ICD-10-CM

## 2024-10-30 DIAGNOSIS — K62.89 RECTAL PAIN: ICD-10-CM

## 2024-10-30 DIAGNOSIS — I50.22 CHRONIC SYSTOLIC CONGESTIVE HEART FAILURE (HCC): ICD-10-CM

## 2024-10-30 DIAGNOSIS — N17.9 AKI (ACUTE KIDNEY INJURY) (HCC): ICD-10-CM

## 2024-10-30 DIAGNOSIS — I50.22 CHRONIC HFREF (HEART FAILURE WITH REDUCED EJECTION FRACTION) (HCC): Chronic | ICD-10-CM

## 2024-10-30 DIAGNOSIS — Z95.810 AICD (AUTOMATIC CARDIOVERTER/DEFIBRILLATOR) PRESENT: Chronic | ICD-10-CM

## 2024-10-30 DIAGNOSIS — M79.18 RIGHT BUTTOCK PAIN: ICD-10-CM

## 2024-10-30 DIAGNOSIS — I42.0 DILATED CARDIOMYOPATHY (HCC): ICD-10-CM

## 2024-10-30 DIAGNOSIS — K60.30 ANAL FISTULA: ICD-10-CM

## 2024-10-30 PROCEDURE — 99203 OFFICE O/P NEW LOW 30 MIN: CPT | Performed by: COLON & RECTAL SURGERY

## 2024-10-30 NOTE — PROGRESS NOTES
Ambulatory Visit  Name: Vaidm Chapa      : 1975      MRN: [id number]  Encounter Provider: Erasmo Madison MD  Encounter Date: 10/30/2024   [facility] [place]    Assessment & Plan  Anal fistula  The patient is with a right posterior lateral anal fistula       Rectal pain  The patient is anal rectal pain is decreased as the fistula opened and drained some blood and pus.  There is no residual cellulitis  Orders:    Ambulatory Referral to Colorectal Surgery    Screening for colon cancer    Orders:    Colonoscopy; Future    Right buttock pain  Secondary to anal fistula       Chronic HFrEF (heart failure with reduced ejection fraction) ([facility])  Wt Readings from Last 3 Encounters:   10/30/24 135 kg (297 lb)   10/29/24 135 kg (297 lb 12.8 oz)   24 136 kg (300 lb)                    Dilated cardiomyopathy (HCC)         Chronic systolic congestive heart failure ([facility])  Wt Readings from Last 3 Encounters:   10/30/24 135 kg (297 lb)   10/29/24 135 kg (297 lb 12.8 oz)   24 136 kg (300 lb)                    HOANG (acute kidney injury) ([facility])         AICD (automatic cardioverter/defibrillator) present           History of Present Illness     Vadim Chapa is a 49 y.o. male who presents for evaluation and treatment of anal pain right buttock.  Patient with remote history of ischiorectal abscess 10 years ago    History obtained from : patient  Review of Systems   Constitutional:  Negative for chills and fever.   HENT:  Negative for ear pain and sore throat.    Eyes:  Negative for pain and visual disturbance.   Respiratory:  Negative for cough and shortness of breath.    Cardiovascular:  Negative for chest pain and palpitations.   Gastrointestinal:  Positive for anal bleeding. Negative for abdominal pain and vomiting.        Anal pain   Genitourinary:  Negative for dysuria and hematuria.   Musculoskeletal:  Negative for arthralgias and back pain.   Skin:   "Negative for color change and rash.   Neurological:  Negative for seizures and syncope.   All other systems reviewed and are negative.    Medical History Reviewed by provider this encounter:  Tobacco  Allergies  Meds  Problems  Med Hx  Surg Hx  Fam Hx           Objective     /66   Pulse 69   Ht 6' 2\" (1.88 m)   Wt 135 kg (297 lb)   SpO2 98%   BMI 38.13 kg/m²     Physical Exam  Vitals and nursing note reviewed.   Constitutional:       General: He is not in acute distress.     Appearance: Normal appearance. He is well-developed. He is obese.   HENT:      Head: Normocephalic and atraumatic.   Eyes:      Conjunctiva/sclera: Conjunctivae normal.   Cardiovascular:      Rate and Rhythm: Normal rate and regular rhythm.      Heart sounds: No murmur heard.  Pulmonary:      Effort: Pulmonary effort is normal. No respiratory distress.      Breath sounds: Normal breath sounds.   Abdominal:      Palpations: Abdomen is soft.      Tenderness: There is no abdominal tenderness.   Genitourinary:     Comments: Inspection of the anal margin reveals previous surgery right buttock from ischial rectal abscess.    There is evidence of an fistula secondary fistula opening 4 cm from the anal verge in the right buttock  Musculoskeletal:         General: No swelling.      Cervical back: Neck supple.   Skin:     General: Skin is warm and dry.      Capillary Refill: Capillary refill takes less than 2 seconds.   Neurological:      Mental Status: He is alert and oriented to person, place, and time.   Psychiatric:         Mood and Affect: Mood normal.         Behavior: Behavior normal.       Administrative Statements     "

## 2024-10-30 NOTE — H&P (VIEW-ONLY)
Ambulatory Visit  Name: Vadim Chapa      : 1975      MRN: [id number]  Encounter Provider: Erasmo Madison MD  Encounter Date: 10/30/2024   [facility] [place]    Assessment & Plan  Anal fistula  The patient is with a right posterior lateral anal fistula       Rectal pain  The patient is anal rectal pain is decreased as the fistula opened and drained some blood and pus.  There is no residual cellulitis  Orders:    Ambulatory Referral to Colorectal Surgery    Screening for colon cancer    Orders:    Colonoscopy; Future    Right buttock pain  Secondary to anal fistula       Chronic HFrEF (heart failure with reduced ejection fraction) ([facility])  Wt Readings from Last 3 Encounters:   10/30/24 135 kg (297 lb)   10/29/24 135 kg (297 lb 12.8 oz)   24 136 kg (300 lb)                    Dilated cardiomyopathy (HCC)         Chronic systolic congestive heart failure ([facility])  Wt Readings from Last 3 Encounters:   10/30/24 135 kg (297 lb)   10/29/24 135 kg (297 lb 12.8 oz)   24 136 kg (300 lb)                    HOANG (acute kidney injury) ([facility])         AICD (automatic cardioverter/defibrillator) present           History of Present Illness     Vadim Chapa is a 49 y.o. male who presents for evaluation and treatment of anal pain right buttock.  Patient with remote history of ischiorectal abscess 10 years ago    History obtained from : patient  Review of Systems   Constitutional:  Negative for chills and fever.   HENT:  Negative for ear pain and sore throat.    Eyes:  Negative for pain and visual disturbance.   Respiratory:  Negative for cough and shortness of breath.    Cardiovascular:  Negative for chest pain and palpitations.   Gastrointestinal:  Positive for anal bleeding. Negative for abdominal pain and vomiting.        Anal pain   Genitourinary:  Negative for dysuria and hematuria.   Musculoskeletal:  Negative for arthralgias and back pain.   Skin:   "Negative for color change and rash.   Neurological:  Negative for seizures and syncope.   All other systems reviewed and are negative.    Medical History Reviewed by provider this encounter:  Tobacco  Allergies  Meds  Problems  Med Hx  Surg Hx  Fam Hx           Objective     /66   Pulse 69   Ht 6' 2\" (1.88 m)   Wt 135 kg (297 lb)   SpO2 98%   BMI 38.13 kg/m²     Physical Exam  Vitals and nursing note reviewed.   Constitutional:       General: He is not in acute distress.     Appearance: Normal appearance. He is well-developed. He is obese.   HENT:      Head: Normocephalic and atraumatic.   Eyes:      Conjunctiva/sclera: Conjunctivae normal.   Cardiovascular:      Rate and Rhythm: Normal rate and regular rhythm.      Heart sounds: No murmur heard.  Pulmonary:      Effort: Pulmonary effort is normal. No respiratory distress.      Breath sounds: Normal breath sounds.   Abdominal:      Palpations: Abdomen is soft.      Tenderness: There is no abdominal tenderness.   Genitourinary:     Comments: Inspection of the anal margin reveals previous surgery right buttock from ischial rectal abscess.    There is evidence of an fistula secondary fistula opening 4 cm from the anal verge in the right buttock  Musculoskeletal:         General: No swelling.      Cervical back: Neck supple.   Skin:     General: Skin is warm and dry.      Capillary Refill: Capillary refill takes less than 2 seconds.   Neurological:      Mental Status: He is alert and oriented to person, place, and time.   Psychiatric:         Mood and Affect: Mood normal.         Behavior: Behavior normal.       Administrative Statements     "

## 2024-10-30 NOTE — PATIENT INSTRUCTIONS
Scheduled date of colonoscopy (as of today): 11/11/24  Physician performing colonoscopy: Gricelda  Location of colonoscopy: [place]  Bowel prep reviewed with patient: Golytely  Instructions reviewed with patient by: Mariam LAZO  Clearances: Xarelto- 1 day hold

## 2024-10-30 NOTE — ASSESSMENT & PLAN NOTE
Wt Readings from Last 3 Encounters:   10/30/24 135 kg (297 lb)   10/29/24 135 kg (297 lb 12.8 oz)   09/19/24 136 kg (300 lb)

## 2024-11-05 ENCOUNTER — APPOINTMENT (OUTPATIENT)
Dept: LAB | Facility: CLINIC | Age: 49
End: 2024-11-05
Payer: COMMERCIAL

## 2024-11-05 DIAGNOSIS — K60.30 ANAL FISTULA: ICD-10-CM

## 2024-11-05 DIAGNOSIS — Z12.11 SCREENING FOR COLON CANCER: ICD-10-CM

## 2024-11-05 DIAGNOSIS — Z13.220 SCREENING FOR LIPID DISORDERS: ICD-10-CM

## 2024-11-05 DIAGNOSIS — I50.22 CHRONIC HFREF (HEART FAILURE WITH REDUCED EJECTION FRACTION) (HCC): Chronic | ICD-10-CM

## 2024-11-05 DIAGNOSIS — I48.0 PAROXYSMAL ATRIAL FIBRILLATION (HCC): ICD-10-CM

## 2024-11-05 LAB
ALBUMIN SERPL BCG-MCNC: 4 G/DL (ref 3.5–5)
ALP SERPL-CCNC: 63 U/L (ref 34–104)
ALT SERPL W P-5'-P-CCNC: 38 U/L (ref 7–52)
ANION GAP SERPL CALCULATED.3IONS-SCNC: 10 MMOL/L (ref 4–13)
AST SERPL W P-5'-P-CCNC: 38 U/L (ref 13–39)
BASOPHILS # BLD AUTO: 0.05 THOUSANDS/ΜL (ref 0–0.1)
BASOPHILS NFR BLD AUTO: 1 % (ref 0–1)
BILIRUB DIRECT SERPL-MCNC: 0.25 MG/DL (ref 0–0.2)
BILIRUB SERPL-MCNC: 1.76 MG/DL (ref 0.2–1)
BUN SERPL-MCNC: 12 MG/DL (ref 5–25)
CALCIUM SERPL-MCNC: 8.9 MG/DL (ref 8.4–10.2)
CHLORIDE SERPL-SCNC: 102 MMOL/L (ref 96–108)
CHOLEST SERPL-MCNC: 188 MG/DL
CO2 SERPL-SCNC: 28 MMOL/L (ref 21–32)
CREAT SERPL-MCNC: 1.19 MG/DL (ref 0.6–1.3)
CRP SERPL QL: 6.9 MG/L
EOSINOPHIL # BLD AUTO: 0.09 THOUSAND/ΜL (ref 0–0.61)
EOSINOPHIL NFR BLD AUTO: 1 % (ref 0–6)
ERYTHROCYTE [DISTWIDTH] IN BLOOD BY AUTOMATED COUNT: 14.6 % (ref 11.6–15.1)
GFR SERPL CREATININE-BSD FRML MDRD: 71 ML/MIN/1.73SQ M
GLUCOSE P FAST SERPL-MCNC: 114 MG/DL (ref 65–99)
HCT VFR BLD AUTO: 50.6 % (ref 36.5–49.3)
HDLC SERPL-MCNC: 48 MG/DL
HGB BLD-MCNC: 16.2 G/DL (ref 12–17)
IMM GRANULOCYTES # BLD AUTO: 0.01 THOUSAND/UL (ref 0–0.2)
IMM GRANULOCYTES NFR BLD AUTO: 0 % (ref 0–2)
LDLC SERPL CALC-MCNC: 117 MG/DL (ref 0–100)
LYMPHOCYTES # BLD AUTO: 1.85 THOUSANDS/ΜL (ref 0.6–4.47)
LYMPHOCYTES NFR BLD AUTO: 30 % (ref 14–44)
MCH RBC QN AUTO: 29.9 PG (ref 26.8–34.3)
MCHC RBC AUTO-ENTMCNC: 32 G/DL (ref 31.4–37.4)
MCV RBC AUTO: 94 FL (ref 82–98)
MONOCYTES # BLD AUTO: 0.77 THOUSAND/ΜL (ref 0.17–1.22)
MONOCYTES NFR BLD AUTO: 12 % (ref 4–12)
NEUTROPHILS # BLD AUTO: 3.46 THOUSANDS/ΜL (ref 1.85–7.62)
NEUTS SEG NFR BLD AUTO: 56 % (ref 43–75)
NRBC BLD AUTO-RTO: 0 /100 WBCS
PLATELET # BLD AUTO: 209 THOUSANDS/UL (ref 149–390)
PMV BLD AUTO: 11.9 FL (ref 8.9–12.7)
POTASSIUM SERPL-SCNC: 3.9 MMOL/L (ref 3.5–5.3)
PROT SERPL-MCNC: 6.9 G/DL (ref 6.4–8.4)
RBC # BLD AUTO: 5.41 MILLION/UL (ref 3.88–5.62)
SODIUM SERPL-SCNC: 140 MMOL/L (ref 135–147)
T4 FREE SERPL-MCNC: 1.18 NG/DL (ref 0.61–1.12)
TRIGL SERPL-MCNC: 113 MG/DL
TSH SERPL DL<=0.05 MIU/L-ACNC: 0.36 UIU/ML (ref 0.45–4.5)
WBC # BLD AUTO: 6.23 THOUSAND/UL (ref 4.31–10.16)

## 2024-11-05 PROCEDURE — 80076 HEPATIC FUNCTION PANEL: CPT

## 2024-11-05 PROCEDURE — 80061 LIPID PANEL: CPT

## 2024-11-05 PROCEDURE — 84439 ASSAY OF FREE THYROXINE: CPT

## 2024-11-05 PROCEDURE — 84443 ASSAY THYROID STIM HORMONE: CPT

## 2024-11-05 PROCEDURE — 86140 C-REACTIVE PROTEIN: CPT

## 2024-11-05 PROCEDURE — 85025 COMPLETE CBC W/AUTO DIFF WBC: CPT

## 2024-11-07 DIAGNOSIS — E05.90 HYPERTHYROIDISM: ICD-10-CM

## 2024-11-07 DIAGNOSIS — Z95.810 AICD (AUTOMATIC CARDIOVERTER/DEFIBRILLATOR) PRESENT: Primary | ICD-10-CM

## 2024-11-07 DIAGNOSIS — I47.20 V-TACH (HCC): Primary | ICD-10-CM

## 2024-11-07 RX ORDER — DOFETILIDE 0.25 MG/1
250 CAPSULE ORAL 2 TIMES DAILY
Qty: 180 CAPSULE | Refills: 3 | Status: SHIPPED | OUTPATIENT
Start: 2024-11-07

## 2024-11-07 NOTE — PROGRESS NOTES
D/W Antonio.  Appears to be hyperthyroid.  Will stop amiodarone and start tikosyn December 1 st.

## 2024-11-08 ENCOUNTER — HOSPITAL ENCOUNTER (OUTPATIENT)
Dept: PULMONOLOGY | Facility: HOSPITAL | Age: 49
End: 2024-11-08
Payer: COMMERCIAL

## 2024-11-08 DIAGNOSIS — I50.22 CHRONIC HFREF (HEART FAILURE WITH REDUCED EJECTION FRACTION) (HCC): Chronic | ICD-10-CM

## 2024-11-08 DIAGNOSIS — Z79.899 LONG TERM CURRENT USE OF AMIODARONE: ICD-10-CM

## 2024-11-08 PROCEDURE — 94729 DIFFUSING CAPACITY: CPT | Performed by: INTERNAL MEDICINE

## 2024-11-08 PROCEDURE — 94729 DIFFUSING CAPACITY: CPT

## 2024-11-08 PROCEDURE — 94010 BREATHING CAPACITY TEST: CPT

## 2024-11-08 PROCEDURE — 94010 BREATHING CAPACITY TEST: CPT | Performed by: INTERNAL MEDICINE

## 2024-11-08 PROCEDURE — 94760 N-INVAS EAR/PLS OXIMETRY 1: CPT

## 2024-11-10 RX ORDER — SODIUM CHLORIDE, SODIUM LACTATE, POTASSIUM CHLORIDE, CALCIUM CHLORIDE 600; 310; 30; 20 MG/100ML; MG/100ML; MG/100ML; MG/100ML
125 INJECTION, SOLUTION INTRAVENOUS CONTINUOUS
Status: CANCELLED | OUTPATIENT
Start: 2024-11-10

## 2024-11-11 ENCOUNTER — ANESTHESIA EVENT (OUTPATIENT)
Dept: GASTROENTEROLOGY | Facility: HOSPITAL | Age: 49
End: 2024-11-11
Payer: COMMERCIAL

## 2024-11-11 ENCOUNTER — ANESTHESIA (OUTPATIENT)
Dept: GASTROENTEROLOGY | Facility: HOSPITAL | Age: 49
End: 2024-11-11
Payer: COMMERCIAL

## 2024-11-11 ENCOUNTER — HOSPITAL ENCOUNTER (OUTPATIENT)
Dept: GASTROENTEROLOGY | Facility: HOSPITAL | Age: 49
Setting detail: OUTPATIENT SURGERY
Discharge: HOME | End: 2024-11-11
Attending: COLON & RECTAL SURGERY
Payer: COMMERCIAL

## 2024-11-11 VITALS
HEIGHT: 74 IN | HEART RATE: 74 BPM | TEMPERATURE: 97.8 F | WEIGHT: 297.4 LBS | SYSTOLIC BLOOD PRESSURE: 98 MMHG | BODY MASS INDEX: 38.17 KG/M2 | DIASTOLIC BLOOD PRESSURE: 54 MMHG | OXYGEN SATURATION: 97 % | RESPIRATION RATE: 16 BRPM

## 2024-11-11 DIAGNOSIS — Z12.11 SCREENING FOR COLON CANCER: ICD-10-CM

## 2024-11-11 PROCEDURE — 45385 COLONOSCOPY W/LESION REMOVAL: CPT | Performed by: COLON & RECTAL SURGERY

## 2024-11-11 PROCEDURE — 88305 TISSUE EXAM BY PATHOLOGIST: CPT | Performed by: PATHOLOGY

## 2024-11-11 PROCEDURE — 45380 COLONOSCOPY AND BIOPSY: CPT | Performed by: COLON & RECTAL SURGERY

## 2024-11-11 RX ORDER — PROPOFOL 10 MG/ML
INJECTION, EMULSION INTRAVENOUS AS NEEDED
Status: DISCONTINUED | OUTPATIENT
Start: 2024-11-11 | End: 2024-11-11

## 2024-11-11 RX ORDER — GLYCOPYRROLATE 0.2 MG/ML
INJECTION INTRAMUSCULAR; INTRAVENOUS AS NEEDED
Status: DISCONTINUED | OUTPATIENT
Start: 2024-11-11 | End: 2024-11-11

## 2024-11-11 RX ORDER — EPHEDRINE SULFATE 50 MG/ML
INJECTION INTRAVENOUS AS NEEDED
Status: DISCONTINUED | OUTPATIENT
Start: 2024-11-11 | End: 2024-11-11

## 2024-11-11 RX ORDER — KETAMINE HCL IN NACL, ISO-OSM 100MG/10ML
SYRINGE (ML) INJECTION AS NEEDED
Status: DISCONTINUED | OUTPATIENT
Start: 2024-11-11 | End: 2024-11-11

## 2024-11-11 RX ORDER — SODIUM CHLORIDE, SODIUM LACTATE, POTASSIUM CHLORIDE, CALCIUM CHLORIDE 600; 310; 30; 20 MG/100ML; MG/100ML; MG/100ML; MG/100ML
125 INJECTION, SOLUTION INTRAVENOUS CONTINUOUS
Status: DISCONTINUED | OUTPATIENT
Start: 2024-11-11 | End: 2024-11-15 | Stop reason: HOSPADM

## 2024-11-11 RX ORDER — LIDOCAINE HYDROCHLORIDE 20 MG/ML
INJECTION, SOLUTION EPIDURAL; INFILTRATION; INTRACAUDAL; PERINEURAL AS NEEDED
Status: DISCONTINUED | OUTPATIENT
Start: 2024-11-11 | End: 2024-11-11

## 2024-11-11 RX ADMIN — SODIUM CHLORIDE, SODIUM LACTATE, POTASSIUM CHLORIDE, AND CALCIUM CHLORIDE: .6; .31; .03; .02 INJECTION, SOLUTION INTRAVENOUS at 09:46

## 2024-11-11 RX ADMIN — EPHEDRINE SULFATE 5 MG: 50 INJECTION, SOLUTION INTRAVENOUS at 10:58

## 2024-11-11 RX ADMIN — GLYCOPYRROLATE 0.2 MG: 0.2 INJECTION, SOLUTION INTRAMUSCULAR; INTRAVENOUS at 10:49

## 2024-11-11 RX ADMIN — EPHEDRINE SULFATE 10 MG: 50 INJECTION, SOLUTION INTRAVENOUS at 11:00

## 2024-11-11 RX ADMIN — Medication 20 MG: at 10:50

## 2024-11-11 RX ADMIN — PROPOFOL 20 MG: 10 INJECTION, EMULSION INTRAVENOUS at 11:06

## 2024-11-11 RX ADMIN — PROPOFOL 100 MG: 10 INJECTION, EMULSION INTRAVENOUS at 10:52

## 2024-11-11 RX ADMIN — PROPOFOL 25 MG: 10 INJECTION, EMULSION INTRAVENOUS at 10:54

## 2024-11-11 RX ADMIN — PROPOFOL 25 MG: 10 INJECTION, EMULSION INTRAVENOUS at 10:57

## 2024-11-11 RX ADMIN — PROPOFOL 50 MG: 10 INJECTION, EMULSION INTRAVENOUS at 10:53

## 2024-11-11 RX ADMIN — PROPOFOL 20 MG: 10 INJECTION, EMULSION INTRAVENOUS at 11:03

## 2024-11-11 RX ADMIN — LIDOCAINE HYDROCHLORIDE 100 MG: 20 INJECTION, SOLUTION EPIDURAL; INFILTRATION; INTRACAUDAL at 10:51

## 2024-11-11 RX ADMIN — PROPOFOL 25 MG: 10 INJECTION, EMULSION INTRAVENOUS at 11:00

## 2024-11-11 NOTE — INTERVAL H&P NOTE
H&P reviewed. After examining the patient I find no changes in the patients condition since the H&P had been written.    Vitals:    11/11/24 0943   BP: 112/61   Pulse: 64   Resp: 17   Temp: 98 °F (36.7 °C)   SpO2: 99%

## 2024-11-11 NOTE — ANESTHESIA PREPROCEDURE EVALUATION
Procedure:  COLONOSCOPY    Relevant Problems   CARDIO  S/p Afib/flutter ablation 7/2023 and VT ablation 12/2023.  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      GI/HEPATIC  Confirmed NPO appropriate  S/p bowel prep      /RENAL   (+) HOANG (acute kidney injury) ([facility])      HEMATOLOGY (within normal limits)      MUSCULOSKELETAL (within normal limits)      NEURO/PSYCH (within normal limits)      PULMONARY   (+) Obstructive sleep apnea   (-) Smoking   (-) URI (upper respiratory infection)      Neurology/Sleep   (+) Syncope      Cardiovascular/Peripheral Vascular   (+) Dilated cardiomyopathy (HCC)      Other   (+) BMI 38.0-38.9,adult   (+) ICD (implantable cardioverter-defibrillator) discharge   (+) Morbid obesity with BMI of 40.0-44.9, adult ([facility])   (+) Wide-complex tachycardia        Physical Exam    Airway    Mallampati score: II         Dental   No notable dental hx     Cardiovascular  Rhythm: regular, Rate: normal    Pulmonary   Breath sounds clear to auscultation    Other Findings        Anesthesia Plan  ASA Score- 4     Anesthesia Type- IV sedation with anesthesia with ASA Monitors.         Additional Monitors:     Airway Plan:     Comment: I discussed the risks and benefits of IV sedation anesthesia including the possibility of the need to convert to general anesthesia and the potential risk of awareness.  The patient was given the opportunity to ask questions, which were answered..       Plan Factors-Exercise tolerance (METS): >4 METS.Exercise comment: Patient reports can walk up two flights of stairs before having to stop to catch his breath.    Chart reviewed. EKG reviewed.  Existing labs reviewed.     Patient is not a current smoker.      Obstructive sleep apnea risk education given perioperatively.        Induction-  intravenous.    Postoperative Plan-         Informed Consent- Anesthetic plan and risks discussed with patient.  I personally reviewed this patient with the CRNA. Discussed and agreed on the Anesthesia Plan with the CRNA..      TTE 4/16/24:  Interpretation Summary    Left Ventricle: Left ventricular cavity size is mildly dilated. Wall thickness is normal. The left ventricular ejection fraction is 20%. Systolic function is severely reduced. There is severe global hypokinesis. Diastolic function is moderately abnormal, consistent with grade II (pseudonormal) relaxation.    Right Ventricle: Right ventricular cavity size is mildly dilated. Systolic function is normal.    Left Atrium: The atrium is mildly dilated.    Mitral Valve: There is mild regurgitation.      Lab Results   Component Value Date    WBC 6.23 11/05/2024    HGB 16.2 11/05/2024    HCT 50.6 (H) 11/05/2024    MCV 94 11/05/2024     11/05/2024     .lastbmp  Lab Results   Component Value Date    SODIUM 140 11/05/2024    K 3.9 11/05/2024     11/05/2024    CO2 28 11/05/2024    BUN 12 11/05/2024    CREATININE 1.19 11/05/2024    GLUC 108 12/16/2023    CALCIUM 8.9 11/05/2024     Lab Results   Component Value Date    ALT 38 11/05/2024    AST 38 11/05/2024    ALKPHOS 63 11/05/2024     Lab Results   Component Value Date    INR 1.34 (H) 12/13/2023    INR 1.07 07/03/2023    INR 1.43 (H) 05/02/2023    PROTIME 17.3 (H) 12/13/2023    PROTIME 14.1 07/03/2023    PROTIME 17.1 (H) 05/02/2023     Lab Results   Component Value Date    HGBA1C 6.1 12/17/2023

## 2024-11-11 NOTE — ANESTHESIA POSTPROCEDURE EVALUATION
Post-Op Assessment Note    CV Status:  Stable  Pain Score: 0    Pain management: adequate       Mental Status:  Alert and awake   Hydration Status:  Euvolemic   PONV Controlled:  Controlled   Airway Patency:  Patent     Post Op Vitals Reviewed: Yes    No anethesia notable event occurred.    Staff: CRNA, Anesthesiologist           Last Filed PACU Vitals:  Vitals Value Taken Time   Temp 97.8    Pulse 66    BP 98/54    Resp 16    SpO2 94% at rest on RA        Modified Kathrine:  No data recorded

## 2024-11-14 PROCEDURE — 88305 TISSUE EXAM BY PATHOLOGIST: CPT | Performed by: PATHOLOGY

## 2024-11-16 ENCOUNTER — PREP FOR PROCEDURE (OUTPATIENT)
Age: 49
End: 2024-11-16

## 2024-11-16 DIAGNOSIS — K60.30 ANAL FISTULA: Primary | ICD-10-CM

## 2024-11-20 ENCOUNTER — TELEPHONE (OUTPATIENT)
Age: 49
End: 2024-11-20

## 2024-11-20 NOTE — TELEPHONE ENCOUNTER
Breanne from [facility] called in to check the status of her medical request. Let her know that it was received and scanned into patients chart. Breanne is asking for the request to be expedited and Uploaded to portal ? Please advise.

## 2024-11-26 ENCOUNTER — PREP FOR PROCEDURE (OUTPATIENT)
Age: 49
End: 2024-11-26

## 2024-11-26 ENCOUNTER — ANESTHESIA EVENT (OUTPATIENT)
Dept: PERIOP | Facility: HOSPITAL | Age: 49
End: 2024-11-26
Payer: COMMERCIAL

## 2024-11-26 NOTE — PRE-PROCEDURE INSTRUCTIONS
Pre-Surgery Instructions:   Medication Instructions    dofetilide (TIKOSYN) 250 mcg capsule Take day of surgery.    Empagliflozin (JARDIANCE) 10 MG TABS tablet *Do NOT take for 4 days prior to your procedure, including the day of your procedure     furosemide (LASIX) 40 mg tablet Hold day of surgery.    Klor-Con M20 20 MEQ tablet Hold day of surgery.    metoprolol succinate (TOPROL-XL) 25 mg 24 hr tablet Take day of surgery.    rivaroxaban (Xarelto) 20 mg tablet Instructions provided by MD    sacubitril-valsartan (ENTRESTO) 24-26 MG TABS Hold day of surgery.    spironolactone (ALDACTONE) 25 mg tablet Hold day of surgery.   Medication instructions for day surgery reviewed. Please use only a sip of water to take your instructed medications. Avoid all over the counter vitamins, supplements and NSAIDS for one week prior to surgery per anesthesia guidelines. Tylenol is ok to take as needed.     You will receive a call one business day prior to surgery with an arrival time and hospital directions. If your surgery is scheduled on a Monday, the hospital will be calling you on the Friday prior to your surgery. If you have not heard from anyone by 8pm, please call the hospital supervisor through the hospital  at [phone]. ([place] [phone] or [place] [phone]).    Do not eat or drink anything after midnight the night before your surgery, including candy, mints, lifesavers, or chewing gum. Do not drink alcohol 24hrs before your surgery. Try not to smoke at least 24hrs before your surgery.       Follow the pre surgery showering instructions as listed in the “My Surgical Experience Booklet” or otherwise provided by your surgeon's office. Do not use a blade to shave the surgical area 1 week before surgery. It is okay to use a clean electric clippers up to 24 hours before surgery. Do not apply any lotions, creams, including makeup, cologne, deodorant, or perfumes after showering on the day of your surgery. Do  not use dry shampoo, hair spray, hair gel, or any type of hair products.     No contact lenses, eye make-up, or artificial eyelashes. Remove nail polish, including gel polish, and any artificial, gel, or acrylic nails if possible. Remove all jewelry including rings and body piercing jewelry.     Wear causal clothing that is easy to take on and off. Consider your type of surgery.    Keep any valuables, jewelry, piercings at home. Please bring any specially ordered equipment (sling, braces) if indicated.    Arrange for a responsible person to drive you to and from the hospital on the day of your surgery. Please confirm the visitor policy for the day of your procedure when you receive your phone call with an arrival time.     Call the surgeon's office with any new illnesses, exposures, or additional questions prior to surgery.    Please reference your “My Surgical Experience Booklet” for additional information to prepare for your upcoming surgery.

## 2024-11-27 ENCOUNTER — APPOINTMENT (OUTPATIENT)
Dept: LAB | Facility: CLINIC | Age: 49
End: 2024-11-27
Payer: COMMERCIAL

## 2024-11-27 DIAGNOSIS — K60.30 ANAL FISTULA: ICD-10-CM

## 2024-11-27 LAB
ANION GAP SERPL CALCULATED.3IONS-SCNC: 9 MMOL/L (ref 4–13)
BASOPHILS # BLD AUTO: 0.06 THOUSANDS/ÂΜL (ref 0–0.1)
BASOPHILS NFR BLD AUTO: 1 % (ref 0–1)
BUN SERPL-MCNC: 14 MG/DL (ref 5–25)
CALCIUM SERPL-MCNC: 9.1 MG/DL (ref 8.4–10.2)
CHLORIDE SERPL-SCNC: 104 MMOL/L (ref 96–108)
CO2 SERPL-SCNC: 27 MMOL/L (ref 21–32)
CREAT SERPL-MCNC: 1.06 MG/DL (ref 0.6–1.3)
EOSINOPHIL # BLD AUTO: 0.12 THOUSAND/ÂΜL (ref 0–0.61)
EOSINOPHIL NFR BLD AUTO: 2 % (ref 0–6)
ERYTHROCYTE [DISTWIDTH] IN BLOOD BY AUTOMATED COUNT: 14.6 % (ref 11.6–15.1)
GFR SERPL CREATININE-BSD FRML MDRD: 82 ML/MIN/1.73SQ M
GLUCOSE SERPL-MCNC: 102 MG/DL (ref 65–140)
HCT VFR BLD AUTO: 49.1 % (ref 36.5–49.3)
HGB BLD-MCNC: 15.9 G/DL (ref 12–17)
IMM GRANULOCYTES # BLD AUTO: 0.01 THOUSAND/UL (ref 0–0.2)
IMM GRANULOCYTES NFR BLD AUTO: 0 % (ref 0–2)
LYMPHOCYTES # BLD AUTO: 1.51 THOUSANDS/ÂΜL (ref 0.6–4.47)
LYMPHOCYTES NFR BLD AUTO: 26 % (ref 14–44)
MCH RBC QN AUTO: 30 PG (ref 26.8–34.3)
MCHC RBC AUTO-ENTMCNC: 32.4 G/DL (ref 31.4–37.4)
MCV RBC AUTO: 93 FL (ref 82–98)
MONOCYTES # BLD AUTO: 0.67 THOUSAND/ÂΜL (ref 0.17–1.22)
MONOCYTES NFR BLD AUTO: 11 % (ref 4–12)
NEUTROPHILS # BLD AUTO: 3.56 THOUSANDS/ÂΜL (ref 1.85–7.62)
NEUTS SEG NFR BLD AUTO: 60 % (ref 43–75)
NRBC BLD AUTO-RTO: 0 /100 WBCS
PLATELET # BLD AUTO: 187 THOUSANDS/UL (ref 149–390)
PMV BLD AUTO: 12 FL (ref 8.9–12.7)
POTASSIUM SERPL-SCNC: 4 MMOL/L (ref 3.5–5.3)
RBC # BLD AUTO: 5.3 MILLION/UL (ref 3.88–5.62)
SODIUM SERPL-SCNC: 140 MMOL/L (ref 135–147)
WBC # BLD AUTO: 5.93 THOUSAND/UL (ref 4.31–10.16)

## 2024-11-27 PROCEDURE — 85025 COMPLETE CBC W/AUTO DIFF WBC: CPT

## 2024-11-27 PROCEDURE — 36415 COLL VENOUS BLD VENIPUNCTURE: CPT

## 2024-11-27 PROCEDURE — 80048 BASIC METABOLIC PNL TOTAL CA: CPT

## 2024-12-06 ENCOUNTER — ANESTHESIA (OUTPATIENT)
Dept: PERIOP | Facility: HOSPITAL | Age: 49
End: 2024-12-06
Payer: COMMERCIAL

## 2024-12-06 ENCOUNTER — HOSPITAL ENCOUNTER (OUTPATIENT)
Facility: HOSPITAL | Age: 49
Setting detail: OUTPATIENT SURGERY
Discharge: HOME | End: 2024-12-06
Attending: COLON & RECTAL SURGERY | Admitting: COLON & RECTAL SURGERY
Payer: COMMERCIAL

## 2024-12-06 VITALS
BODY MASS INDEX: 38.37 KG/M2 | HEIGHT: 74 IN | SYSTOLIC BLOOD PRESSURE: 108 MMHG | TEMPERATURE: 98.3 F | DIASTOLIC BLOOD PRESSURE: 56 MMHG | RESPIRATION RATE: 22 BRPM | WEIGHT: 298.94 LBS | HEART RATE: 59 BPM | OXYGEN SATURATION: 94 %

## 2024-12-06 DIAGNOSIS — G89.18 POSTOPERATIVE PAIN: Primary | ICD-10-CM

## 2024-12-06 DIAGNOSIS — K62.89 ANAL OR RECTAL PAIN: ICD-10-CM

## 2024-12-06 DIAGNOSIS — K60.30 ANAL FISTULA: ICD-10-CM

## 2024-12-06 LAB
ATRIAL RATE: 60 BPM
P AXIS: 11 DEGREES
PR INTERVAL: 182 MS
QRS AXIS: 20 DEGREES
QRSD INTERVAL: 100 MS
QT INTERVAL: 484 MS
QTC INTERVAL: 484 MS
T WAVE AXIS: 36 DEGREES
VENTRICULAR RATE: 60 BPM

## 2024-12-06 PROCEDURE — 88304 TISSUE EXAM BY PATHOLOGIST: CPT | Performed by: PATHOLOGY

## 2024-12-06 PROCEDURE — 46280 REMOVE ANAL FIST COMPLEX: CPT | Performed by: COLON & RECTAL SURGERY

## 2024-12-06 PROCEDURE — 93005 ELECTROCARDIOGRAM TRACING: CPT

## 2024-12-06 PROCEDURE — C9290 INJ, BUPIVACAINE LIPOSOME: HCPCS | Performed by: COLON & RECTAL SURGERY

## 2024-12-06 RX ORDER — SODIUM CHLORIDE, SODIUM LACTATE, POTASSIUM CHLORIDE, CALCIUM CHLORIDE 600; 310; 30; 20 MG/100ML; MG/100ML; MG/100ML; MG/100ML
INJECTION, SOLUTION INTRAVENOUS CONTINUOUS PRN
Status: DISCONTINUED | OUTPATIENT
Start: 2024-12-06 | End: 2024-12-06

## 2024-12-06 RX ORDER — MAGNESIUM HYDROXIDE 1200 MG/15ML
LIQUID ORAL AS NEEDED
Status: DISCONTINUED | OUTPATIENT
Start: 2024-12-06 | End: 2024-12-06 | Stop reason: HOSPADM

## 2024-12-06 RX ORDER — MIDAZOLAM HYDROCHLORIDE 2 MG/2ML
INJECTION, SOLUTION INTRAMUSCULAR; INTRAVENOUS AS NEEDED
Status: DISCONTINUED | OUTPATIENT
Start: 2024-12-06 | End: 2024-12-06

## 2024-12-06 RX ORDER — PROPOFOL 10 MG/ML
INJECTION, EMULSION INTRAVENOUS AS NEEDED
Status: DISCONTINUED | OUTPATIENT
Start: 2024-12-06 | End: 2024-12-06

## 2024-12-06 RX ORDER — KETAMINE HCL IN NACL, ISO-OSM 100MG/10ML
SYRINGE (ML) INJECTION AS NEEDED
Status: DISCONTINUED | OUTPATIENT
Start: 2024-12-06 | End: 2024-12-06

## 2024-12-06 RX ORDER — OXYCODONE HYDROCHLORIDE 10 MG/1
10 TABLET ORAL EVERY 4 HOURS PRN
Qty: 25 TABLET | Refills: 0 | Status: SHIPPED | OUTPATIENT
Start: 2024-12-06

## 2024-12-06 RX ORDER — OXYCODONE HYDROCHLORIDE 10 MG/1
10 TABLET ORAL EVERY 4 HOURS PRN
Qty: 30 TABLET | Refills: 0 | Status: SHIPPED | OUTPATIENT
Start: 2024-12-06 | End: 2024-12-06

## 2024-12-06 RX ORDER — BUPIVACAINE HYDROCHLORIDE 5 MG/ML
INJECTION, SOLUTION EPIDURAL; INTRACAUDAL AS NEEDED
Status: DISCONTINUED | OUTPATIENT
Start: 2024-12-06 | End: 2024-12-06 | Stop reason: HOSPADM

## 2024-12-06 RX ORDER — OXYCODONE AND ACETAMINOPHEN 5; 325 MG/1; MG/1
1 TABLET ORAL EVERY 4 HOURS PRN
Status: DISCONTINUED | OUTPATIENT
Start: 2024-12-06 | End: 2024-12-06 | Stop reason: HOSPADM

## 2024-12-06 RX ORDER — FENTANYL CITRATE 50 UG/ML
INJECTION, SOLUTION INTRAMUSCULAR; INTRAVENOUS AS NEEDED
Status: DISCONTINUED | OUTPATIENT
Start: 2024-12-06 | End: 2024-12-06

## 2024-12-06 RX ORDER — GABAPENTIN 100 MG/1
100 CAPSULE ORAL 3 TIMES DAILY
Qty: 90 CAPSULE | Refills: 0 | Status: SHIPPED | OUTPATIENT
Start: 2024-12-06 | End: 2025-01-05

## 2024-12-06 RX ADMIN — PROPOFOL 30 MG: 10 INJECTION, EMULSION INTRAVENOUS at 12:49

## 2024-12-06 RX ADMIN — DEXMEDETOMIDINE HYDROCHLORIDE 4 MCG: 100 INJECTION, SOLUTION, CONCENTRATE INTRAVENOUS at 12:50

## 2024-12-06 RX ADMIN — SODIUM CHLORIDE, SODIUM LACTATE, POTASSIUM CHLORIDE, AND CALCIUM CHLORIDE: .6; .31; .03; .02 INJECTION, SOLUTION INTRAVENOUS at 12:20

## 2024-12-06 RX ADMIN — PROPOFOL 70 MCG/KG/MIN: 10 INJECTION, EMULSION INTRAVENOUS at 12:50

## 2024-12-06 RX ADMIN — Medication 20 MG: at 12:49

## 2024-12-06 RX ADMIN — MIDAZOLAM 2 MG: 1 INJECTION INTRAMUSCULAR; INTRAVENOUS at 12:43

## 2024-12-06 RX ADMIN — DEXMEDETOMIDINE HYDROCHLORIDE 4 MCG: 100 INJECTION, SOLUTION, CONCENTRATE INTRAVENOUS at 12:43

## 2024-12-06 RX ADMIN — Medication 10 MG: at 13:22

## 2024-12-06 RX ADMIN — PHENYLEPHRINE HYDROCHLORIDE 30 MCG/MIN: 10 INJECTION INTRAVENOUS at 12:49

## 2024-12-06 RX ADMIN — FENTANYL CITRATE 25 MCG: 50 INJECTION INTRAMUSCULAR; INTRAVENOUS at 12:49

## 2024-12-06 RX ADMIN — OXYCODONE HYDROCHLORIDE AND ACETAMINOPHEN 1 TABLET: 5; 325 TABLET ORAL at 14:43

## 2024-12-06 NOTE — ANESTHESIA PREPROCEDURE EVALUATION
Procedure:  FISTULOTOMY (Right: Anus)      S/p Afib/flutter ablation 7/2023 and VT ablation 12/2023. 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 Occasional binge drinking. Known cardiomyopathy with EF of 25% since 2017. Further reduction in EF on echo 12/2/23 in setting of ventricular tachycardia s/p shocks, contribution from ?myocarditis on cardiac MRI. 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  Euvolemic, warm on exam    CPAP nightly for CONCHIS    Relevant Problems   CARDIO   (+) CHF (congestive heart failure) (HCC)   (+) Hypertension   (+) Paroxysmal atrial fibrillation (HCC)      /RENAL   (+) HOANG (acute kidney injury) ([facility])      PULMONARY   (+) Obstructive sleep apnea      Cardiovascular/Peripheral Vascular   (+) Chronic HFrEF (heart failure with reduced ejection fraction) ([facility])   (+) Dilated cardiomyopathy (HCC)      Other   (+) AICD (automatic cardioverter/defibrillator) present   (+) BMI 38.0-38.9,adult   (+) ICD (implantable cardioverter-defibrillator) discharge   (+) Long term current use of amiodarone     EP report 9/2024   1%. ALL AVAILABLE LEAD PARAMETERS WITHIN NORMAL LIMITS. 2 NSVT EPISODES DETECTED 7 BEATS @ 430ms; 4 BEATS @ 340ms.     TTE 4/2024    Left Ventricle: Left ventricular cavity size is mildly dilated. Wall thickness is normal. The left ventricular ejection fraction is 20%. Systolic function is severely reduced. There is severe global hypokinesis. Diastolic function is moderately abnormal, consistent with grade II (pseudonormal) relaxation.    Right Ventricle: Right ventricular cavity size is mildly dilated. Systolic function is normal.    Left Atrium: The atrium is mildly dilated.     Mitral Valve: There is mild regurgitation.    EKG 3/2024  Sinus rhythm with PVCs otherwise normal EKG     Physical Exam    Airway    Mallampati score: II  TM Distance: >3 FB  Neck ROM: full     Dental       Cardiovascular      Pulmonary      Other Findings        Anesthesia Plan  ASA Score- 4     Anesthesia Type- IV sedation with anesthesia with ASA Monitors.         Additional Monitors:     Airway Plan:     Comment: Recent labs personally reviewed:  Lab Results       Component                Value               Date                       WBC                      5.93                11/27/2024                 HGB                      15.9                11/27/2024                 PLT                      187                 11/27/2024            Lab Results       Component                Value               Date                       K                        4.0                 11/27/2024                 BUN                      14                  11/27/2024                 CREATININE               1.06                11/27/2024            Lab Results       Component                Value               Date                       PTT                      70 (H)              12/15/2023             Lab Results       Component                Value               Date                       INR                      1.34 (H)            12/13/2023              Blood type O    I, Liset Lehman MD, have personally seen and evaluated the patient prior to anesthetic care.  I have reviewed the pre-anesthetic record, medical history, allergies, medications and any other medical records if appropriate to the anesthetic care.  If a CRNA is involved in the case, I have reviewed the CRNA assessment, if present, and agree. Patient consented for IV Sedation, general anesthesia as back up. Discussed risks of aspiration, IV infiltration, indications for conversion to general anesthesia. All questions and concerns addressed.     .       Plan  Factors-Exercise tolerance (METS): >4 METS.    Chart reviewed. EKG reviewed. Imaging results reviewed. Existing labs reviewed. Patient summary reviewed.    Patient is not a current smoker.  Patient did not smoke on day of surgery.    Obstructive sleep apnea risk education given perioperatively.        Induction- intravenous.    Postoperative Plan-         Informed Consent- Anesthetic plan and risks discussed with patient.  I personally reviewed this patient with the CRNA. Discussed and agreed on the Anesthesia Plan with the CRNA..

## 2024-12-06 NOTE — INTERVAL H&P NOTE
H&P reviewed. After examining the patient I find no changes in the patients condition since the H&P had been written.    Vitals:    12/06/24 1155   BP: 121/62   Pulse: 66   Resp: 18   Temp: (!) 97.3 °F (36.3 °C)   SpO2: 99%

## 2024-12-06 NOTE — OP NOTE
Colon Rectal Surgery Postoperative Note    PATIENT NAME: Vadim Chapa  : 1975  MRN: [id number]    [facility] OR ROOM 01    Surgery Date: 2024    Anal fistula [K60.30]    Post-Op Diagnosis Codes:     * Anal fistula [K60.30]  Right posterior lateral  Procedure(s):  FISTULOTOMY    Surgeons and Role:     * Erasmo Madison MD - Primary    Specimens:  ID Type Source Tests Collected by Time Destination   1 : FISTULA TRACK Tissue Anus TISSUE EXAM Erasmo Madison MD 2024  1:58 PM          Fluids:     Estimated Blood Loss:   Minimal    Urine: NA    Anesthesia Type:   IV Sedation with Anesthesia     Findings:   Right posterior lateral anal fistula 4 cm from the dentate line    Procedure Details:   Patient was brought to the [facility]'s operating room and identified by me as Antonio Chapa he identified me as her operating surgeon.  Informed consent was obtained prior to coming to the operating room.  The patient was placed on the operating table in the lithotomy position.  He was anesthetized.  Prepped and draped in a sterile manner.  Timeouts were done x 2.  Local perianal field block was done with Exparel rel 20 cc and 30 cc of 0.25% Marcaine 1-200,000 of epinephrine.  A medium Martines retractor was placed within the anal canal there was an inflamed papillae just at the midline posterior midline.  Blunt tip probe was placed and a secondary fistulous opening advanced to the anal canal and advanced through the primary crypto glandular area.  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  Fistulotomy was done preserving the sphincter mechanism.  Noncutting seton was placed 2-0 silk sutures were placed and secured around the external sphincter mechanism.  The anal margin skin anal canal skin and rectal mucosa were marsupialized with a running continuous 2-0 Vicryl suture..  In the base of the fistulotomy tract there was some capillary bleeding this was treated with suture ligation of Surgicel to the base of the fistulotomy site  with satisfactory hemostasis.  The Surgicel was secured with 0 chromic ligatures.  All retractors were removed from the anal canal for 1 minute reinserted into the anal canal Surgicel was then placed suture lines intact with marsupialization was done the anal seton was intact and the wounds were hemostatic.  The patient was returned recovery in satisfactory and stable condition.  Sponge instrument counts were complete upon conclusion of the operation.      Complications:   None    SIGNATURE: Erasmo Madison MD   DATE: December 6, 2024   TIME: 2:02 PM

## 2024-12-06 NOTE — ANESTHESIA POSTPROCEDURE EVALUATION
Post-Op Assessment Note    CV Status:  Stable    Pain management: adequate       Mental Status:  Sleepy and arousable   Hydration Status:  Euvolemic   PONV Controlled:  Controlled   Airway Patency:  Patent  Two or more mitigation strategies used for obstructive sleep apnea   Post Op Vitals Reviewed: Yes    No anethesia notable event occurred.    Staff: Anesthesiologist, CRNA           Last Filed PACU Vitals:  Vitals Value Taken Time   Temp 98.3    Pulse 57 12/06/24 1410   /59 12/06/24 1410   Resp 31 12/06/24 1410   SpO2 93 % 12/06/24 1410   Vitals shown include unfiled device data.    Modified Kathrine:  No data recorded

## 2024-12-06 NOTE — DISCHARGE INSTR - AVS FIRST PAGE
Tylenol 500 mg 2 tablets p.o. every 6 hours x 5 days then as needed    Oxycodone 10 mg p.o. every 3-4 hours as needed severe pain.    Sitz bath as needed anal hygiene    Colace 100 mg p.o. twice daily    MiraLAX 1 dose p.o. twice daily until BM then as needed    For postop questions call Dr. Madison at his cell [phone]    Office appointment tomorrow 10:30 AM [facility] office    Bacitracin or Neosporin topically to skin wounds twice daily

## 2024-12-07 ENCOUNTER — DOCUMENTATION (OUTPATIENT)
Age: 49
End: 2024-12-07

## 2024-12-07 DIAGNOSIS — K60.30 ANAL FISTULA: Primary | ICD-10-CM

## 2024-12-07 DIAGNOSIS — G89.18 POSTOPERATIVE PAIN: ICD-10-CM

## 2024-12-07 NOTE — PROGRESS NOTES
Assessment/Plan:   Postop day 1-status post right submuscular fistulotomy for transphincteric  fistula with insertion of anal seton.    Local wound care reviewed with patient and mother.    Patient advised to resume his Eliquis on Wednesday which would be a total 5 days since he stopped preoperatively.    Patient will follow-up in 2 weeks for recheck   Diagnoses and all orders for this visit:    Anal fistula    Postoperative pain          Subjective:     Patient ID: Vadim Chapa is a 49 y.o. male.    HPI  Presents today for recheck wound.  Postop day 1 status post submuscular fistulotomy insertion of anal seton right posterior lateral position.      Review of Systems   Constitutional:  Negative for chills and fever.   HENT:  Negative for ear pain and sore throat.    Eyes:  Negative for pain and visual disturbance.   Respiratory:  Negative for cough and shortness of breath.    Cardiovascular:  Negative for chest pain and palpitations.   Gastrointestinal:  Positive for anal bleeding. Negative for abdominal pain and vomiting.   Genitourinary:  Negative for dysuria and hematuria.   Musculoskeletal:  Negative for arthralgias and back pain.   Skin:  Negative for color change and rash.   Neurological:  Negative for seizures and syncope.   All other systems reviewed and are negative.        Objective:     Colorectal Physical Exam@      Inspection of right posterior lateral anal fistulotomy site reveals it to be clean without evidence of cellulitis or infection.  Seton in place

## 2024-12-09 PROCEDURE — 88304 TISSUE EXAM BY PATHOLOGIST: CPT | Performed by: PATHOLOGY

## 2024-12-10 PROCEDURE — 93010 ELECTROCARDIOGRAM REPORT: CPT | Performed by: INTERNAL MEDICINE

## 2024-12-10 NOTE — ANESTHESIA POSTPROCEDURE EVALUATION
Post-Op Assessment Note    CV Status:  Stable    Pain management: adequate       Mental Status:  Alert and awake   Hydration Status:  Euvolemic   PONV Controlled:  Controlled   Airway Patency:  Patent     Post Op Vitals Reviewed: Yes    No anethesia notable event occurred.    Staff: Anesthesiologist, CRNA           Last Filed PACU Vitals:  Vitals Value Taken Time   Temp 98.3 °F (36.8 °C) 12/06/24 1410   Pulse 62 12/06/24 1536   /64 12/06/24 1530   Resp 29 12/06/24 1536   SpO2 98 % 12/06/24 1536   Vitals shown include unfiled device data.    Modified Kathrine:  No data recorded

## 2024-12-11 ENCOUNTER — REMOTE DEVICE CLINIC VISIT (OUTPATIENT)
Dept: CARDIOLOGY CLINIC | Facility: CLINIC | Age: 49
End: 2024-12-11
Payer: COMMERCIAL

## 2024-12-11 DIAGNOSIS — Z95.810 AICD (AUTOMATIC CARDIOVERTER/DEFIBRILLATOR) PRESENT: Primary | ICD-10-CM

## 2024-12-11 PROCEDURE — 93296 REM INTERROG EVL PM/IDS: CPT | Performed by: INTERNAL MEDICINE

## 2024-12-11 PROCEDURE — 93295 DEV INTERROG REMOTE 1/2/MLT: CPT | Performed by: INTERNAL MEDICINE

## 2024-12-11 NOTE — PROGRESS NOTES
"Results for orders placed or performed in visit on 12/11/24   Cardiac EP device report    Narrative    MDT SC ICD/ACTIVE SYSTEM IS MRI CONDITIONAL  CARELINK TRANSMISSION: BATTERY STATUS \"3 YRS.\"  0%. ALL AVAILABLE LEAD PARAMETERS WITHIN NORMAL LIMITS. NO SIGNIFICANT HIGH RATE EPISODES. NORMAL DEVICE FUNCTION. NC         "

## 2024-12-14 ENCOUNTER — RESULTS FOLLOW-UP (OUTPATIENT)
Dept: CARDIOLOGY CLINIC | Facility: CLINIC | Age: 49
End: 2024-12-14

## 2024-12-16 ENCOUNTER — OFFICE VISIT (OUTPATIENT)
Age: 49
End: 2024-12-16
Payer: COMMERCIAL

## 2024-12-16 VITALS
HEIGHT: 74 IN | HEART RATE: 60 BPM | DIASTOLIC BLOOD PRESSURE: 70 MMHG | SYSTOLIC BLOOD PRESSURE: 112 MMHG | WEIGHT: 291.2 LBS | BODY MASS INDEX: 37.37 KG/M2 | OXYGEN SATURATION: 96 %

## 2024-12-16 DIAGNOSIS — E66.812 CLASS 2 OBESITY DUE TO EXCESS CALORIES WITHOUT SERIOUS COMORBIDITY WITH BODY MASS INDEX (BMI) OF 37.0 TO 37.9 IN ADULT: Primary | ICD-10-CM

## 2024-12-16 DIAGNOSIS — E66.09 CLASS 2 OBESITY DUE TO EXCESS CALORIES WITHOUT SERIOUS COMORBIDITY WITH BODY MASS INDEX (BMI) OF 37.0 TO 37.9 IN ADULT: Primary | ICD-10-CM

## 2024-12-16 DIAGNOSIS — G47.33 OBSTRUCTIVE SLEEP APNEA: ICD-10-CM

## 2024-12-16 PROCEDURE — 99204 OFFICE O/P NEW MOD 45 MIN: CPT | Performed by: INTERNAL MEDICINE

## 2024-12-16 NOTE — ASSESSMENT & PLAN NOTE
Compliance data not on file  Mild obstructive sleep apnea    Plan  Ordered APAP With pressure range of 8-15 cm H2O as his machine is more than 5 years old  Orders:    Ambulatory Referral to Sleep Medicine    CPAP Auto New DME

## 2024-12-16 NOTE — PROGRESS NOTES
Name: Vadim Chapa      : 1975      MRN: [id number]  Encounter Provider: Jose Daniel Anaya MD  Encounter Date: 2024   Encounter department: [facility] SLEEP MEDIC[facility] [place]    :  Assessment & Plan  Obstructive sleep apnea  Compliance data not on file  Mild obstructive sleep apnea    Plan  Ordered APAP With pressure range of 8-15 cm H2O as his machine is more than 5 years old  Orders:    Ambulatory Referral to Sleep Medicine    CPAP Auto New DME    Class 2 obesity due to excess calories without serious comorbidity with body mass index (BMI) of 37.0 to 37.9 in adult  Counseled patient on lifestyle modifications including diet and exercise  Explained that weight loss will decrease the severity of sleep apnea             History of Present Illness     49-year-old male with a past medical history of heart failure with reduced ejection fraction EF of 20%, hypertension, paroxysmal atrial fibrillation who presents for evaluation of obstructive sleep apnea.    Diagnostic PSG in 2017 showed an AHI of 8.2 at a weight of 291 pounds.    He states he is compliant with a fixed pressure of 10 cm H2O for many years.  He  has been using the same machine since 2017.  He is currently using an F20 mask.  He goes to bed at 9 PM and falls asleep by 11 PM.  He wakes up at 8 AM.  He gets 7 to 8 hours of sleep.  Prior to using CPAP he was snoring loudly with witnessed apneas.      He occasionally drinks coffee.  He occasionally drinks alcohol.  His [place] score is 5 with use of CPAP.                      Sitting and reading: Moderate chance of dozing  Watching TV: Slight chance of dozing  Sitting, inactive in a public place (e.g. a theatre or a meeting): Would never doze  As a passenger in a car for an hour without a break: Slight chance of dozing  Lying down to rest in the afternoon when circumstances permit: Slight chance of dozing  Sitting and talking to someone: Would never doze  Sitting quietly after a lunch without  alcohol: Would never doze  In a car, while stopped for a few minutes in traffic: Would never doze  Total score: 5       Review of Systems   All other systems reviewed and are negative.    Pertinent positives/negatives included in HPI and also as noted:       Pertinent Medical History         Medical History Reviewed by provider this encounter:     .  Past Medical History   Past Medical History:   Diagnosis Date    A-fib (HCC) 2020    AICD (automatic cardioverter/defibrillator) present     CHF (congestive heart failure) (HCC)     Coxsackie virus infection 2017    Hypertension     ICD (implantable cardioverter-defibrillator) lead failure 06/03/2017    Kidney stone     Myocarditis (HCC)     Sleep apnea     Ventricular tachycardia (HCC)      Past Surgical History:   Procedure Laterality Date    CARDIAC DEFIBRILLATOR PLACEMENT      CARDIAC ELECTROPHYSIOLOGY PROCEDURE N/A 07/03/2023    Procedure: Cardiac eps/svt ablation;  Surgeon: Mihai Walter DO;  [facility];  Service: Cardiology    CARDIAC ELECTROPHYSIOLOGY PROCEDURE N/A 07/03/2023    Procedure: Cardiac eps/afib ablation;  Surgeon: Mihai Walter DO;  [facility];  Service: Cardiology    COLONOSCOPY      NM ESOPHAGOGASTRODUODENOSCOPY TRANSORAL DIAGNOSTIC N/A 03/29/2017    Procedure: ESOPHAGOGASTRODUODENOSCOPY (EGD);  Surgeon: Valeriy Conte MD;  Location: [facility];  Service: Gastroenterology    NM INSJ/RPLCMT PERM DFB W/TRNSVNS LDS 1/DUAL CHMBR N/A 06/02/2017    Procedure: LEAD EXTRACTION/REIMPLANTATION AND ICD GENERATOR CHANGE ;  Surgeon: Enrique Warren MD;  [facility]: [facility];  Service: Cardiology    NM LAPAROSCOPY SURG CHOLECYSTECTOMY N/A 05/19/2017    Procedure: LAPAROSCOPIC CHOLECYSTECTOMY ;  Surgeon: Tanner Leyva MD;  [facility]: [facility];  Service: General    NM SURG TX ANAL FISTULA INTERSPHINCTERIC Right 12/6/2024    Procedure: FISTULOTOMY;  Surgeon: Erasmo Madison MD;  [facility]: [facility];  Service:  "Colorectal    TONSILLECTOMY      TYMPANOSTOMY TUBE PLACEMENT       Family History   Problem Relation Age of Onset    No Known Problems Mother     No Known Problems Father     Sudden death Neg Hx     Heart disease Neg Hx       reports that he has never smoked. He has never used smokeless tobacco. He reports that he does not currently use alcohol. He reports that he does not use drugs.  Current Outpatient Medications on File Prior to Visit   Medication Sig Dispense Refill    dofetilide (TIKOSYN) 250 mcg capsule Take 1 capsule (250 mcg total) by mouth 2 (two) times a day 180 capsule 3    Empagliflozin (JARDIANCE) 10 MG TABS tablet Take 1 tablet (10 mg total) by mouth every morning 90 tablet 3    gabapentin (Neurontin) 100 mg capsule Take 1 capsule (100 mg total) by mouth 3 (three) times a day 90 capsule 0    Klor-Con M20 20 MEQ tablet TAKE 1 TABLET BY MOUTH EVERY DAY 90 tablet 1    metoprolol succinate (TOPROL-XL) 25 mg 24 hr tablet Take 1 tablet (25 mg total) by mouth 2 (two) times a day 180 tablet 3    oxyCODONE (ROXICODONE) 10 MG TABS Take 1 tablet (10 mg total) by mouth every 4 (four) hours as needed for severe pain for up to 25 doses Max Daily Amount: 60 mg 25 tablet 0    spironolactone (ALDACTONE) 25 mg tablet Take 1 tablet (25 mg total) by mouth daily 90 tablet 3    furosemide (LASIX) 40 mg tablet Take 1 tablet (40 mg total) by mouth daily 90 tablet 3    sacubitril-valsartan (ENTRESTO) 24-26 MG TABS Take 1 tablet by mouth 2 (two) times a day 180 tablet 3     No current facility-administered medications on file prior to visit.     Allergies   Allergen Reactions    Vicodin [Hydrocodone-Acetaminophen] Other (See Comments)     \"i get nasty\"      Current Outpatient Medications on File Prior to Visit   Medication Sig Dispense Refill    dofetilide (TIKOSYN) 250 mcg capsule Take 1 capsule (250 mcg total) by mouth 2 (two) times a day 180 capsule 3    Empagliflozin (JARDIANCE) 10 MG TABS tablet Take 1 tablet (10 mg total) " "by mouth every morning 90 tablet 3    gabapentin (Neurontin) 100 mg capsule Take 1 capsule (100 mg total) by mouth 3 (three) times a day 90 capsule 0    Klor-Con M20 20 MEQ tablet TAKE 1 TABLET BY MOUTH EVERY DAY 90 tablet 1    metoprolol succinate (TOPROL-XL) 25 mg 24 hr tablet Take 1 tablet (25 mg total) by mouth 2 (two) times a day 180 tablet 3    oxyCODONE (ROXICODONE) 10 MG TABS Take 1 tablet (10 mg total) by mouth every 4 (four) hours as needed for severe pain for up to 25 doses Max Daily Amount: 60 mg 25 tablet 0    spironolactone (ALDACTONE) 25 mg tablet Take 1 tablet (25 mg total) by mouth daily 90 tablet 3    furosemide (LASIX) 40 mg tablet Take 1 tablet (40 mg total) by mouth daily 90 tablet 3    sacubitril-valsartan (ENTRESTO) 24-26 MG TABS Take 1 tablet by mouth 2 (two) times a day 180 tablet 3     No current facility-administered medications on file prior to visit.      Social History     Tobacco Use    Smoking status: Never    Smokeless tobacco: Never   Vaping Use    Vaping status: Never Used   Substance and Sexual Activity    Alcohol use: Not Currently     Comment: OCCASIONAL; history of binge drinking -  drinking \"a lot\" on Fridays; stopped this about in late 2022. (Updated 12/04/2023).    Drug use: Never    Sexual activity: Not on file     Objective   /70 (BP Location: Left arm, Patient Position: Sitting, Cuff Size: Large)   Pulse 60   Ht 6' 2\" (1.88 m)   Wt 132 kg (291 lb 3.2 oz)   SpO2 96%   BMI 37.39 kg/m²     Neck Circumference: 20.5  Physical Exam  Vitals reviewed.   HENT:      Head: Normocephalic and atraumatic.      Nose: Nose normal.      Mouth/Throat:      Mouth: Mucous membranes are moist.   Eyes:      Extraocular Movements: Extraocular movements intact.      Pupils: Pupils are equal, round, and reactive to light.   Cardiovascular:      Rate and Rhythm: Normal rate and regular rhythm.      Pulses: Normal pulses.   Pulmonary:      Effort: Pulmonary effort is normal.      Breath " "sounds: Normal breath sounds.   Abdominal:      General: Abdomen is flat. Bowel sounds are normal.      Palpations: Abdomen is soft.   Musculoskeletal:         General: Normal range of motion.      Cervical back: Normal range of motion.   Skin:     General: Skin is warm.   Neurological:      Mental Status: He is alert. Mental status is at baseline.   Psychiatric:         Mood and Affect: Mood normal.       Visit Vitals  /70 (BP Location: Left arm, Patient Position: Sitting, Cuff Size: Large)   Pulse 60   Ht 6' 2\" (1.88 m)   Wt 132 kg (291 lb 3.2 oz)   SpO2 96%   BMI 37.39 kg/m²   Smoking Status Never   BSA 2.55 m²         Data  Lab Results   Component Value Date    HGB 15.9 11/27/2024    HCT 49.1 11/27/2024    MCV 93 11/27/2024      Lab Results   Component Value Date    CALCIUM 9.1 11/27/2024    K 4.0 11/27/2024    CO2 27 11/27/2024     11/27/2024    BUN 14 11/27/2024    CREATININE 1.06 11/27/2024     Lab Results   Component Value Date    IRON 94 12/04/2023    TIBC 320 12/04/2023    FERRITIN 122 12/04/2023     Lab Results   Component Value Date    AST 38 11/05/2024    ALT 38 11/05/2024             "

## 2024-12-18 ENCOUNTER — NURSE TRIAGE (OUTPATIENT)
Age: 49
End: 2024-12-18

## 2024-12-18 DIAGNOSIS — G89.18 POST-OPERATIVE PAIN: Primary | ICD-10-CM

## 2024-12-18 RX ORDER — OXYCODONE HYDROCHLORIDE 10 MG/1
10 TABLET ORAL EVERY 4 HOURS PRN
Qty: 30 TABLET | Refills: 0 | OUTPATIENT
Start: 2024-12-18

## 2024-12-18 RX ORDER — OXYCODONE HYDROCHLORIDE 10 MG/1
10 TABLET ORAL EVERY 4 HOURS PRN
Qty: 30 TABLET | Refills: 0 | Status: SHIPPED | OUTPATIENT
Start: 2024-12-18

## 2024-12-18 NOTE — TELEPHONE ENCOUNTER
"SPOKE WITH PT, S/P FISTULOTOMY 12/6/24. PT C/O SMALL STRING PROTRUDING FROM ANUS 1-2 INCHES, FOUND THIS AM AFTER WIPING FROM BM. PT WITH CONTINUED EXCRUCIATING PAIN, PT IS OUT OF OXYCODONE, CONTINUED ON NEURONTIN. DENIES FEVER, CHILLS, N/V, RECTAL BLEEDING.               Answer Assessment - Initial Assessment Questions  1. SYMPTOM: \"What's the main symptom you're concerned about?\" (e.g., pain, fever, vomiting)      SMALL STRING PROTRUDING FROM ANUS  2. ONSET:   THIS AM  3. SURGERY: \"What surgery did you have?\"      FISTULOTOMY   4. DATE of SURGERY: \"When was the surgery?\"      12/6/24      7. PAIN: \"Is there any pain?\" If Yes, ask: \"How bad is it?\"  (Scale 1-10; or mild, moderate, severe)      SEVERE  8. FEVER: \"Do you have a fever?\" If Yes, ask: \"What is your temperature, how was it measured, and when did it start?\"      DENIES  9. VOMITING: \"Is there any vomiting?\" If Yes, ask: \"How many times?\"      DENIES  10. BLEEDING: \"Is there any bleeding?\" If Yes, ask: \"How much?\" and \"Where?\"        DENIES  11. OTHER SYMPTOMS: \"Do you have any other symptoms?\" (e.g., drainage from wound, painful urination, constipation)        DENIES    Protocols used: Post-Op Symptoms and Questions-Adult-[place]    "

## 2024-12-19 ENCOUNTER — TELEPHONE (OUTPATIENT)
Dept: SLEEP CENTER | Facility: CLINIC | Age: 49
End: 2024-12-19

## 2024-12-19 ENCOUNTER — OFFICE VISIT (OUTPATIENT)
Dept: URGENT CARE | Facility: CLINIC | Age: 49
End: 2024-12-19
Payer: COMMERCIAL

## 2024-12-19 ENCOUNTER — TELEPHONE (OUTPATIENT)
Age: 49
End: 2024-12-19

## 2024-12-19 VITALS
WEIGHT: 291 LBS | HEART RATE: 65 BPM | DIASTOLIC BLOOD PRESSURE: 66 MMHG | BODY MASS INDEX: 37.36 KG/M2 | TEMPERATURE: 97.3 F | RESPIRATION RATE: 18 BRPM | OXYGEN SATURATION: 96 % | SYSTOLIC BLOOD PRESSURE: 113 MMHG

## 2024-12-19 DIAGNOSIS — J01.00 ACUTE NON-RECURRENT MAXILLARY SINUSITIS: Primary | ICD-10-CM

## 2024-12-19 PROCEDURE — 99213 OFFICE O/P EST LOW 20 MIN: CPT | Performed by: EMERGENCY MEDICINE

## 2024-12-19 PROCEDURE — S9088 SERVICES PROVIDED IN URGENT: HCPCS | Performed by: EMERGENCY MEDICINE

## 2024-12-19 RX ORDER — AMOXICILLIN 500 MG/1
500 CAPSULE ORAL 3 TIMES DAILY
Qty: 21 CAPSULE | Refills: 0 | Status: SHIPPED | OUTPATIENT
Start: 2024-12-19 | End: 2024-12-26

## 2024-12-19 NOTE — TELEPHONE ENCOUNTER
Caller: Shasha with Dr. Oakley, reviewing Physician    Doctor: Jose Angel    Reason for call: Pt has applied for disability & the Reviewing Dr Oakley would like to set up a 10min phone call with Dr. Walter to go over the information.  Please use Case #[id number] when calling.    Call back#: [phone] - to schedule for Peer to Peer

## 2024-12-19 NOTE — PROGRESS NOTES
"  [facility]        NAME: Vadim Chapa is a 49 y.o. male  : 1975    MRN: [id number]  DATE: 2024  TIME: 1:58 PM    Assessment and Plan   Acute non-recurrent maxillary sinusitis [J01.00]  1. Acute non-recurrent maxillary sinusitis  amoxicillin (AMOXIL) 500 mg capsule            Patient Instructions     Patient Instructions    Encourage liquids and rest  F./u with PCP in 2-3 days  Use humidifier in house      Follow up with PCP in 3-5 days.  Proceed to  ER if symptoms worsen.    Chief Complaint     Chief Complaint   Patient presents with   • Sinusitis     C/o of sinus infection. States he \"can smell his infection\" when asked specific symptoms verbalizes same. Started 2-3 days ago.          History of Present Illness       49-year-old male who states that he can \"smell\" that he has a sinus infection.  Pt. States he gets this every year.  Pt. States that his house is very dry and has a pellet stove.        Review of Systems   Review of Systems   Constitutional:  Negative for chills and fever.   HENT:  Positive for sinus pressure and sinus pain. Negative for ear pain and sore throat.    Eyes:  Negative for pain and visual disturbance.   Respiratory:  Negative for cough and shortness of breath.    Cardiovascular:  Negative for chest pain and palpitations.   Gastrointestinal:  Negative for abdominal pain and vomiting.   Genitourinary:  Negative for dysuria and hematuria.   Musculoskeletal:  Negative for arthralgias and back pain.   Skin:  Negative for color change and rash.   Neurological:  Negative for seizures and syncope.   All other systems reviewed and are negative.        Current Medications       Current Outpatient Medications:   •  amoxicillin (AMOXIL) 500 mg capsule, Take 1 capsule (500 mg total) by mouth 3 (three) times a day for 7 days, Disp: 21 capsule, Rfl: 0  •  dofetilide (TIKOSYN) 250 mcg capsule, Take 1 capsule (250 mcg total) by mouth 2 (two) times a day, Disp: 180 capsule, " Rfl: 3  •  Empagliflozin (JARDIANCE) 10 MG TABS tablet, Take 1 tablet (10 mg total) by mouth every morning, Disp: 90 tablet, Rfl: 3  •  furosemide (LASIX) 40 mg tablet, Take 1 tablet (40 mg total) by mouth daily, Disp: 90 tablet, Rfl: 3  •  gabapentin (Neurontin) 100 mg capsule, Take 1 capsule (100 mg total) by mouth 3 (three) times a day, Disp: 90 capsule, Rfl: 0  •  Klor-Con M20 20 MEQ tablet, TAKE 1 TABLET BY MOUTH EVERY DAY, Disp: 90 tablet, Rfl: 1  •  metoprolol succinate (TOPROL-XL) 25 mg 24 hr tablet, Take 1 tablet (25 mg total) by mouth 2 (two) times a day, Disp: 180 tablet, Rfl: 3  •  oxyCODONE (ROXICODONE) 10 MG TABS, Take 1 tablet (10 mg total) by mouth every 4 (four) hours as needed for severe pain for up to 25 doses Max Daily Amount: 60 mg, Disp: 25 tablet, Rfl: 0  •  oxyCODONE (ROXICODONE) 10 MG TABS, Take 1 tablet (10 mg total) by mouth every 4 (four) hours as needed for moderate pain Max Daily Amount: 60 mg, Disp: 30 tablet, Rfl: 0  •  sacubitril-valsartan (ENTRESTO) 24-26 MG TABS, Take 1 tablet by mouth 2 (two) times a day, Disp: 180 tablet, Rfl: 3  •  spironolactone (ALDACTONE) 25 mg tablet, Take 1 tablet (25 mg total) by mouth daily, Disp: 90 tablet, Rfl: 3    Current Allergies     Allergies as of 12/19/2024 - Reviewed 12/19/2024   Allergen Reaction Noted   • Vicodin [hydrocodone-acetaminophen] Other (See Comments) 03/27/2017            The following portions of the patient's history were reviewed and updated as appropriate: allergies, current medications, past family history, past medical history, past social history, past surgical history and problem list.     Past Medical History:   Diagnosis Date   • A-fib ([facility]) 2020   • AICD (automatic cardioverter/defibrillator) present    • CHF (congestive heart failure) ([facility])    • Coxsackie virus infection 2017   • Hypertension    • ICD (implantable cardioverter-defibrillator) lead failure 06/03/2017   • Kidney stone    • Myocarditis (HCC)    • Sleep apnea     • Ventricular tachycardia (HCC)        Past Surgical History:   Procedure Laterality Date   • CARDIAC DEFIBRILLATOR PLACEMENT     • CARDIAC ELECTROPHYSIOLOGY PROCEDURE N/A 07/03/2023    Procedure: Cardiac eps/svt ablation;  Surgeon: Mihai Walter DO;  [facility];  Service: Cardiology   • CARDIAC ELECTROPHYSIOLOGY PROCEDURE N/A 07/03/2023    Procedure: Cardiac eps/afib ablation;  Surgeon: Mihai Walter DO;  [facility];  Service: Cardiology   • COLONOSCOPY     • ME ESOPHAGOGASTRODUODENOSCOPY TRANSORAL DIAGNOSTIC N/A 03/29/2017    Procedure: ESOPHAGOGASTRODUODENOSCOPY (EGD);  Surgeon: Valeriy Conte MD;  [facility];  Service: Gastroenterology   • ME INSJ/RPLCMT PERM DFB W/TRNSVNS LDS 1/DUAL CHMBR N/A 06/02/2017    Procedure: LEAD EXTRACTION/REIMPLANTATION AND ICD GENERATOR CHANGE ;  Surgeon: Enrique Warren MD;  [facility];  Service: Cardiology   • ME LAPAROSCOPY SURG CHOLECYSTECTOMY N/A 05/19/2017    Procedure: LAPAROSCOPIC CHOLECYSTECTOMY ;  Surgeon: Tanner Leyva MD;  [facility];  Service: General   • ME SURG TX ANAL FISTULA INTERSPHINCTERIC Right 12/6/2024    Procedure: FISTULOTOMY;  Surgeon: Erasmo Madison MD;  [facility];  Service: Colorectal   • TONSILLECTOMY     • TYMPANOSTOMY TUBE PLACEMENT         Family History   Problem Relation Age of Onset   • No Known Problems Mother    • No Known Problems Father    • Sudden death Neg Hx    • Heart disease Neg Hx          Medications have been verified.        Objective   /66   Pulse 65   Temp (!) 97.3 °F (36.3 °C)   Resp 18   Wt 132 kg (291 lb)   SpO2 96%   BMI 37.36 kg/m²        Physical Exam     Physical Exam  Vitals and nursing note reviewed.   Constitutional:       General: He is not in acute distress.     Appearance: Normal appearance. He is not ill-appearing, toxic-appearing or diaphoretic.   HENT:      Head: Normocephalic and atraumatic.      Right Ear: Tympanic  membrane normal.      Left Ear: Tympanic membrane normal.      Nose: Congestion present.      Comments: Mild R maxillary tenderness     Mouth/Throat:      Mouth: Mucous membranes are moist.      Pharynx: Oropharynx is clear. No oropharyngeal exudate or posterior oropharyngeal erythema.   Eyes:      Extraocular Movements: Extraocular movements intact.      Pupils: Pupils are equal, round, and reactive to light.   Neck:      Vascular: No carotid bruit.   Cardiovascular:      Rate and Rhythm: Normal rate and regular rhythm.      Pulses: Normal pulses.      Heart sounds: Normal heart sounds.   Pulmonary:      Effort: Pulmonary effort is normal.      Breath sounds: Normal breath sounds.   Abdominal:      General: Abdomen is flat. Bowel sounds are normal. There is no distension.      Palpations: Abdomen is soft. There is no mass.      Tenderness: There is no abdominal tenderness. There is no right CVA tenderness, left CVA tenderness, guarding or rebound.      Hernia: No hernia is present.   Musculoskeletal:         General: No swelling, tenderness, deformity or signs of injury. Normal range of motion.      Cervical back: Normal range of motion and neck supple. No rigidity. No muscular tenderness.      Right lower leg: No edema.      Left lower leg: No edema.   Lymphadenopathy:      Cervical: No cervical adenopathy.   Skin:     General: Skin is warm and dry.      Coloration: Skin is not jaundiced or pale.      Findings: No bruising, erythema, lesion or rash.   Neurological:      General: No focal deficit present.      Mental Status: He is alert and oriented to person, place, and time.      Cranial Nerves: No cranial nerve deficit.      Motor: No weakness.   Psychiatric:         Mood and Affect: Mood normal.

## 2024-12-20 NOTE — TELEPHONE ENCOUNTER
Caller: Zuleika for Dr. CAIN Oakley    Doctor: Jose Angel    Reason for call: Checking to see if Dr. Walter was available.  Relayed note that Dr. Walter is out until the New Year    Call back#: NA

## 2024-12-24 ENCOUNTER — TELEPHONE (OUTPATIENT)
Age: 49
End: 2024-12-24

## 2024-12-24 ENCOUNTER — OFFICE VISIT (OUTPATIENT)
Age: 49
End: 2024-12-24

## 2024-12-24 VITALS
BODY MASS INDEX: 37.35 KG/M2 | OXYGEN SATURATION: 96 % | SYSTOLIC BLOOD PRESSURE: 113 MMHG | HEART RATE: 71 BPM | DIASTOLIC BLOOD PRESSURE: 78 MMHG | WEIGHT: 291 LBS | HEIGHT: 74 IN

## 2024-12-24 DIAGNOSIS — K60.30 ANAL FISTULA: Primary | ICD-10-CM

## 2024-12-24 PROCEDURE — 99024 POSTOP FOLLOW-UP VISIT: CPT | Performed by: COLON & RECTAL SURGERY

## 2024-12-24 NOTE — TELEPHONE ENCOUNTER
Keith from [facility] called requesting patients recent office visit notes from 12/16/24 sent via [employer]

## 2024-12-24 NOTE — PROGRESS NOTES
Assessment/Plan:   Status post anal fistulotomy with insertion of anal seton.  Anal seton in place wound healing without incident.    Patient will follow-up in 6 weeks for removal of anal seton.  Patient will continue local anal wound care       Diagnoses and all orders for this visit:    Anal fistula          Subjective:     Patient ID: Vadim Chapa is a 49 y.o. male.    HPI  Patient is status post submuscular fistulotomy for chronic anal fistula.  On 12/6/2024  Review of Systems   Constitutional:  Negative for chills and fever.   HENT:  Negative for ear pain and sore throat.    Eyes:  Negative for pain and visual disturbance.   Respiratory:  Negative for cough and shortness of breath.    Cardiovascular:  Negative for chest pain and palpitations.   Gastrointestinal:  Positive for anal bleeding. Negative for abdominal pain and vomiting.   Genitourinary:  Negative for dysuria and hematuria.   Musculoskeletal:  Negative for arthralgias and back pain.   Skin:  Negative for color change and rash.   Neurological:  Negative for seizures and syncope.   All other systems reviewed and are negative.        Objective:     Physical Exam   Constitutional: He is oriented to person, place, and time.   Abdominal: Normal appearance.   Neurological: He is oriented to person, place, and time.   Psychiatric: His behavior is normal.   Nursing note and vitals reviewed.      Colorectal exam:    Inspection of the anal margin reveals anal seton to be in place right lateral position.  Fistulotomy healing without incident or complication.  Dressing replaced

## 2024-12-24 NOTE — TELEPHONE ENCOUNTER
Rylie Chávez MA       12/24/24 11:16 AM  Note     Keith from [employer] called requesting patients recent office visit notes from 12/16/24 sent via [employer]           Office visit note from 12-16 -2024 uploaded to [employer] via [employer]

## 2024-12-25 PROBLEM — E66.812 CLASS 2 OBESITY DUE TO EXCESS CALORIES WITHOUT SERIOUS COMORBIDITY WITH BODY MASS INDEX (BMI) OF 37.0 TO 37.9 IN ADULT: Status: ACTIVE | Noted: 2024-12-25

## 2024-12-25 PROBLEM — E66.09 CLASS 2 OBESITY DUE TO EXCESS CALORIES WITHOUT SERIOUS COMORBIDITY WITH BODY MASS INDEX (BMI) OF 37.0 TO 37.9 IN ADULT: Status: ACTIVE | Noted: 2024-12-25

## 2024-12-25 NOTE — ASSESSMENT & PLAN NOTE
Counseled patient on lifestyle modifications including diet and exercise  Explained that weight loss will decrease the severity of sleep apnea

## 2025-01-17 ENCOUNTER — TELEPHONE (OUTPATIENT)
Dept: SLEEP CENTER | Facility: CLINIC | Age: 50
End: 2025-01-17

## 2025-01-17 NOTE — TELEPHONE ENCOUNTER
Mary, my name is Antonio Chapa. I have a patient of Doctor Deng. I have this. I'm getting a new sleep machine from you guys through [employer] and I wanted to change the mask that I will be wearing to a very specific mask. It's the Air Touch N30I, but they're telling me that you guys have to change the prescription. So I need to know what I got to do to make that happen because every time I talk to someone over at [employer], it's like talking to the wall. So if you can give me a call back at [phone], I would really appreciate that. Thank you.  _________________________________________  Call to patient who states he has a particular mask he wants call the Air Touch N301.  Has not received replacement CPAP yet or been called by [employer]. Last note in [place] states attempt to contact patient.     Dr. Anaya     Please place order for Airtouch N301 for patient.   Thank you.

## 2025-01-20 ENCOUNTER — TELEPHONE (OUTPATIENT)
Age: 50
End: 2025-01-20

## 2025-01-20 DIAGNOSIS — G47.33 OSA (OBSTRUCTIVE SLEEP APNEA): Primary | ICD-10-CM

## 2025-01-20 LAB

## 2025-01-22 LAB

## 2025-01-23 ENCOUNTER — OFFICE VISIT (OUTPATIENT)
Dept: FAMILY MEDICINE CLINIC | Facility: CLINIC | Age: 50
End: 2025-01-23
Payer: COMMERCIAL

## 2025-01-23 VITALS
BODY MASS INDEX: 37.73 KG/M2 | TEMPERATURE: 97.8 F | SYSTOLIC BLOOD PRESSURE: 118 MMHG | WEIGHT: 294 LBS | OXYGEN SATURATION: 98 % | DIASTOLIC BLOOD PRESSURE: 78 MMHG | HEART RATE: 95 BPM | HEIGHT: 74 IN

## 2025-01-23 DIAGNOSIS — G47.00 INSOMNIA, UNSPECIFIED TYPE: ICD-10-CM

## 2025-01-23 DIAGNOSIS — G47.33 OSA (OBSTRUCTIVE SLEEP APNEA): ICD-10-CM

## 2025-01-23 DIAGNOSIS — G47.33 OBSTRUCTIVE SLEEP APNEA: Chronic | ICD-10-CM

## 2025-01-23 DIAGNOSIS — M54.2 CERVICAL PAIN (NECK): Primary | ICD-10-CM

## 2025-01-23 PROBLEM — D72.829 LEUKOCYTOSIS: Status: RESOLVED | Noted: 2023-12-14 | Resolved: 2025-01-23

## 2025-01-23 PROCEDURE — 99213 OFFICE O/P EST LOW 20 MIN: CPT | Performed by: NURSE PRACTITIONER

## 2025-01-23 NOTE — PROGRESS NOTES
"Name: Vadim Chapa      : 1975      MRN: [id number]  Encounter Provider: VILMA Talbert  Encounter Date: 2025   Encounter department: [facility]  :  Assessment & Plan  Cervical pain (neck)    Orders:  •  Ambulatory Referral to Physical Therapy; Future  Discussed with patient appearing to have limited ROM and tenderness in his upper back/cervical and this is contributing to his shoulder and arm pain will refer for PT patient agreeable   Obstructive sleep apnea       Patient using his CPAP nightly and is effective   CONCHIS (obstructive sleep apnea)         Insomnia, unspecified type         Patient struggles with chronic insomnia and has tried OTC products including Melatonin, Tylenol PM and Benadryl and is not helping with his sleep. Discussed with patient will need to check with cardiology with his medications to see a safe choice for insomnia        History of Present Illness   Patien there today having complaints of feeling a \"tingling\" sensation and going from his left shoulder and travels down his arm and makes him have to move his arm and present for the past several weeks and in general not sleeping well and had been on trazodone in the past and was stopped in the hospital for fear of arrthythmias and has tried many things for his sleep      Review of Systems   Constitutional:  Negative for chills and fever.   HENT:  Negative for ear pain and sore throat.    Eyes:  Negative for pain and visual disturbance.   Respiratory:  Negative for cough and shortness of breath.    Cardiovascular:  Negative for chest pain and palpitations.   Gastrointestinal:  Negative for abdominal pain and vomiting.   Genitourinary:  Negative for dysuria and hematuria.   Musculoskeletal:  Positive for arthralgias and myalgias. Negative for back pain.   Skin:  Negative for color change and rash.   Neurological:  Negative for seizures and syncope.   Psychiatric/Behavioral:  Positive for sleep " "disturbance.    All other systems reviewed and are negative.      Objective   /78 (BP Location: Left arm)   Pulse 95   Temp 97.8 °F (36.6 °C) (Temporal)   Ht 6' 2\" (1.88 m)   Wt 133 kg (294 lb)   SpO2 98%   BMI 37.75 kg/m²      Physical Exam  Constitutional:       General: He is not in acute distress.     Appearance: He is well-developed.   HENT:      Head: Normocephalic and atraumatic.   Eyes:      Pupils: Pupils are equal, round, and reactive to light.   Neck:      Thyroid: No thyromegaly.   Cardiovascular:      Rate and Rhythm: Normal rate and regular rhythm.      Heart sounds: Normal heart sounds. No murmur heard.  Pulmonary:      Effort: Pulmonary effort is normal. No respiratory distress.      Breath sounds: Normal breath sounds. No wheezing.   Abdominal:      General: Bowel sounds are normal.      Palpations: Abdomen is soft.   Musculoskeletal:      Right shoulder: Normal.      Left shoulder: Normal.      Cervical back: Spasms and tenderness present. Decreased range of motion.   Skin:     General: Skin is warm and dry.   Neurological:      Mental Status: He is alert and oriented to person, place, and time.         "

## 2025-01-24 PROBLEM — G47.00 INSOMNIA: Status: ACTIVE | Noted: 2025-01-24

## 2025-01-28 DIAGNOSIS — G47.33 OSA (OBSTRUCTIVE SLEEP APNEA): ICD-10-CM

## 2025-01-28 DIAGNOSIS — G47.00 INSOMNIA, UNSPECIFIED TYPE: Primary | ICD-10-CM

## 2025-01-28 RX ORDER — ESZOPICLONE 2 MG/1
2 TABLET, FILM COATED ORAL
Qty: 30 TABLET | Refills: 0 | Status: SHIPPED | OUTPATIENT
Start: 2025-01-28 | End: 2025-02-27

## 2025-01-29 ENCOUNTER — TELEPHONE (OUTPATIENT)
Age: 50
End: 2025-01-29

## 2025-01-29 LAB
DME PARACHUTE DELIVERY DATE ACTUAL: NORMAL
DME PARACHUTE DELIVERY DATE EXPECTED: NORMAL
DME PARACHUTE DELIVERY DATE REQUESTED: NORMAL
DME PARACHUTE ITEM DESCRIPTION: NORMAL
DME PARACHUTE ORDER STATUS: NORMAL
DME PARACHUTE SUPPLIER NAME: NORMAL
DME PARACHUTE SUPPLIER PHONE: NORMAL

## 2025-02-04 ENCOUNTER — OFFICE VISIT (OUTPATIENT)
Age: 50
End: 2025-02-04
Payer: COMMERCIAL

## 2025-02-04 VITALS
HEIGHT: 74 IN | HEART RATE: 82 BPM | DIASTOLIC BLOOD PRESSURE: 69 MMHG | BODY MASS INDEX: 37.73 KG/M2 | OXYGEN SATURATION: 97 % | WEIGHT: 294 LBS | SYSTOLIC BLOOD PRESSURE: 112 MMHG

## 2025-02-04 DIAGNOSIS — Z48.02 ENCOUNTER FOR REMOVAL OF SUTURES: ICD-10-CM

## 2025-02-04 DIAGNOSIS — K60.30 ANAL FISTULA: Primary | ICD-10-CM

## 2025-02-04 PROCEDURE — 99213 OFFICE O/P EST LOW 20 MIN: CPT | Performed by: COLON & RECTAL SURGERY

## 2025-02-04 NOTE — PROGRESS NOTES
Assessment/Plan:    Anal fistulotomy healing without incident.  Anal seton removed.    Patient will follow-up in the office in 1 month's time to assess efficacy of fistula healing post removal of anal seton.     Diagnoses and all orders for this visit:    Anal fistula    Encounter for removal of sutures          Procedures  Anal seton removed in toto without incident  Subjective:      Patient ID: Vadim Chapa is a 49 y.o. male.    HPI  Patient is status post complicated anal fistulotomy with insertion of anal seton on 12/6/2024.  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  He   Patient Active Problem List    Diagnosis Date Noted    Insomnia 01/24/2025    CONCHIS (obstructive sleep apnea) 01/20/2025    Class 2 obesity due to excess calories without serious comorbidity with body mass index (BMI) of 37.0 to 37.9 in adult 12/25/2024    BMI 38.0-38.9,adult 11/11/2024    Long term current use of amiodarone 11/08/2024    HOANG (acute kidney injury) ([facility]) 12/13/2023    Wide-complex tachycardia 12/02/2023    Morbid obesity with BMI of 40.0-44.9, adult ([facility]) 07/02/2023    Syncope 04/16/2023    CHF (congestive heart failure) ([facility])     AICD (automatic cardioverter/defibrillator) present     ICD (implantable cardioverter-defibrillator) discharge 10/10/2022    Paroxysmal atrial fibrillation ([facility]) 11/06/2020    Dilated cardiomyopathy ([facility]) 01/25/2018    Hypertension 01/25/2018    Obstructive sleep apnea 01/25/2018    Calculus of gallbladder without cholecystitis 05/19/2017     Chronic HFrEF (heart failure with reduced ejection fraction) ([facility]) 2017     He  has a past surgical history that includes Cardiac defibrillator placement; pr esophagogastroduodenoscopy transoral diagnostic (N/A, 03/29/2017); Tympanostomy tube placement; Tonsillectomy; pr laparoscopy surg cholecystectomy (N/A, 05/19/2017); pr insj/rplcmt perm dfb w/trnsvns lds 1/dual chmbr (N/A, 06/02/2017); Cardiac electrophysiology procedure (N/A, 07/03/2023); Cardiac electrophysiology procedure (N/A, 07/03/2023); Colonoscopy; and pr surg tx anal fistula intersphincteric (Right, 12/6/2024).  His family history includes No Known Problems in his father and mother.  He  reports that he has never smoked. He has never used smokeless tobacco. He reports that he does not currently use alcohol. He reports that he does not use drugs.  Current Outpatient Medications   Medication Sig Dispense Refill    dofetilide (TIKOSYN) 250 mcg capsule Take 1 capsule (250 mcg total) by mouth 2 (two) times a day 180 capsule 3    Empagliflozin (JARDIANCE) 10 MG TABS tablet Take 1 tablet (10 mg total) by mouth every morning 90 tablet 3    Entresto 24-26 MG TABS TAKE 1 TABLET BY MOUTH TWICE A  tablet 3    eszopiclone (LUNESTA) 2 mg tablet Take 1 tablet (2 mg total) by mouth daily at bedtime as needed for sleep Take immediately before bedtime 30 tablet 0    furosemide (LASIX) 40 mg tablet TAKE 1 TABLET BY MOUTH EVERY DAY 90 tablet 3    Klor-Con M20 20 MEQ tablet TAKE 1 TABLET BY MOUTH EVERY DAY 90 tablet 1    rivaroxaban (Xarelto) 20 mg tablet Take 20 mg by mouth      spironolactone (ALDACTONE) 25 mg tablet Take 1 tablet (25 mg total) by mouth daily 90 tablet 3    gabapentin (Neurontin) 100 mg capsule Take 1 capsule (100 mg total) by mouth 3 (three) times a day 90 capsule 0    metoprolol succinate (TOPROL-XL) 25 mg 24 hr tablet Take 1 tablet (25 mg total) by mouth 2 (two) times a day 180 tablet 3     No current facility-administered medications for  "this visit.     Current Outpatient Medications on File Prior to Visit   Medication Sig    dofetilide (TIKOSYN) 250 mcg capsule Take 1 capsule (250 mcg total) by mouth 2 (two) times a day    Empagliflozin (JARDIANCE) 10 MG TABS tablet Take 1 tablet (10 mg total) by mouth every morning    Entresto 24-26 MG TABS TAKE 1 TABLET BY MOUTH TWICE A DAY    eszopiclone (LUNESTA) 2 mg tablet Take 1 tablet (2 mg total) by mouth daily at bedtime as needed for sleep Take immediately before bedtime    furosemide (LASIX) 40 mg tablet TAKE 1 TABLET BY MOUTH EVERY DAY    Klor-Con M20 20 MEQ tablet TAKE 1 TABLET BY MOUTH EVERY DAY    rivaroxaban (Xarelto) 20 mg tablet Take 20 mg by mouth    spironolactone (ALDACTONE) 25 mg tablet Take 1 tablet (25 mg total) by mouth daily    gabapentin (Neurontin) 100 mg capsule Take 1 capsule (100 mg total) by mouth 3 (three) times a day    metoprolol succinate (TOPROL-XL) 25 mg 24 hr tablet Take 1 tablet (25 mg total) by mouth 2 (two) times a day     No current facility-administered medications on file prior to visit.     He is allergic to vicodin [hydrocodone-acetaminophen]..    Review of Systems   Gastrointestinal:  Positive for anal bleeding.         Objective:      /69   Pulse 82   Ht 6' 2\" (1.88 m)   Wt 133 kg (294 lb)   SpO2 97%   BMI 37.75 kg/m²          Colorectal Physical Exam    Inspection of the anal margin reveals the right lateral fistulotomy wound to be healing without incident or complication.  Anal seton in place.  "

## 2025-02-05 ENCOUNTER — OFFICE VISIT (OUTPATIENT)
Dept: URGENT CARE | Facility: CLINIC | Age: 50
End: 2025-02-05
Payer: COMMERCIAL

## 2025-02-05 VITALS
DIASTOLIC BLOOD PRESSURE: 59 MMHG | RESPIRATION RATE: 18 BRPM | TEMPERATURE: 97 F | HEART RATE: 64 BPM | SYSTOLIC BLOOD PRESSURE: 109 MMHG | OXYGEN SATURATION: 97 %

## 2025-02-05 DIAGNOSIS — J01.90 ACUTE SINUSITIS, RECURRENCE NOT SPECIFIED, UNSPECIFIED LOCATION: Primary | ICD-10-CM

## 2025-02-05 PROCEDURE — 99213 OFFICE O/P EST LOW 20 MIN: CPT

## 2025-02-05 PROCEDURE — S9088 SERVICES PROVIDED IN URGENT: HCPCS

## 2025-02-05 RX ORDER — AMOXICILLIN 500 MG/1
500 TABLET, FILM COATED ORAL 3 TIMES DAILY
Qty: 21 TABLET | Refills: 0 | Status: SHIPPED | OUTPATIENT
Start: 2025-02-05 | End: 2025-02-12

## 2025-02-05 NOTE — PROGRESS NOTES
"  [facility]        NAME: Vadim Chapa is a 49 y.o. male  : 1975    MRN: [id number]  DATE: 2025  TIME: 1:20 PM    Assessment and Plan   Acute sinusitis, recurrence not specified, unspecified location [J01.90]  1. Acute sinusitis, recurrence not specified, unspecified location  amoxicillin (AMOXIL) 500 MG tablet        Patient adamant that he needs an antibiotic for a sinus infection. Symptoms not consistent with a bacterial sinus infection. Recommend he follow up with ENT.     Patient Instructions     Take antibiotics as prescribed.  Flonase nasal spray.  Over the counter saline nasal spray  Sinus rinses mixed with distilled water.    Follow up with ENT.   Follow up with PCP in 3-5 days.  Proceed to  ER if symptoms worsen.    Chief Complaint     Chief Complaint   Patient presents with    Nasal Congestion     Pt with congestion for a few days. No fevers. Sinus pressure.         History of Present Illness       The patient presents today with complaints of nasal congestion x a few days. Denies fever/chills, sinus pain/pressure, cough, chest congestion, SOB. He states that he can \"smell that he has a sinus infection.\" Is requesting antibiotics with refills. Has not tried anything OTC for his symptoms.         Review of Systems   Review of Systems   Constitutional:  Negative for chills and fever.   HENT:  Positive for congestion, postnasal drip and rhinorrhea. Negative for ear pain, sinus pressure and sore throat.    Respiratory:  Negative for cough.    Musculoskeletal:  Negative for myalgias.   Skin:  Negative for rash.   Neurological:  Negative for headaches.         Current Medications       Current Outpatient Medications:     amoxicillin (AMOXIL) 500 MG tablet, Take 1 tablet (500 mg total) by mouth 3 (three) times a day for 7 days, Disp: 21 tablet, Rfl: 0    dofetilide (TIKOSYN) 250 mcg capsule, Take 1 capsule (250 mcg total) by mouth 2 (two) times a day, Disp: 180 capsule, Rfl: 3    " Empagliflozin (JARDIANCE) 10 MG TABS tablet, Take 1 tablet (10 mg total) by mouth every morning, Disp: 90 tablet, Rfl: 3    Entresto 24-26 MG TABS, TAKE 1 TABLET BY MOUTH TWICE A DAY, Disp: 180 tablet, Rfl: 3    eszopiclone (LUNESTA) 2 mg tablet, Take 1 tablet (2 mg total) by mouth daily at bedtime as needed for sleep Take immediately before bedtime, Disp: 30 tablet, Rfl: 0    furosemide (LASIX) 40 mg tablet, TAKE 1 TABLET BY MOUTH EVERY DAY, Disp: 90 tablet, Rfl: 3    Klor-Con M20 20 MEQ tablet, TAKE 1 TABLET BY MOUTH EVERY DAY, Disp: 90 tablet, Rfl: 1    metoprolol succinate (TOPROL-XL) 25 mg 24 hr tablet, Take 1 tablet (25 mg total) by mouth 2 (two) times a day, Disp: 180 tablet, Rfl: 3    rivaroxaban (Xarelto) 20 mg tablet, Take 20 mg by mouth, Disp: , Rfl:     spironolactone (ALDACTONE) 25 mg tablet, Take 1 tablet (25 mg total) by mouth daily, Disp: 90 tablet, Rfl: 3    gabapentin (Neurontin) 100 mg capsule, Take 1 capsule (100 mg total) by mouth 3 (three) times a day (Patient not taking: Reported on 2/5/2025), Disp: 90 capsule, Rfl: 0    Current Allergies     Allergies as of 02/05/2025 - Reviewed 02/05/2025   Allergen Reaction Noted    Vicodin [hydrocodone-acetaminophen] Other (See Comments) 03/27/2017            The following portions of the patient's history were reviewed and updated as appropriate: allergies, current medications, past family history, past medical history, past social history, past surgical history and problem list.     Past Medical History:   Diagnosis Date    A-fib (HCC) 2020    AICD (automatic cardioverter/defibrillator) present     CHF (congestive heart failure) (HCC)     Coxsackie virus infection 2017    Hypertension     ICD (implantable cardioverter-defibrillator) lead failure 06/03/2017    Kidney stone     Myocarditis (HCC)     Sleep apnea     Ventricular tachycardia (HCC)        Past Surgical History:   Procedure Laterality Date    CARDIAC DEFIBRILLATOR PLACEMENT      CARDIAC  ELECTROPHYSIOLOGY PROCEDURE N/A 07/03/2023    Procedure: Cardiac eps/svt ablation;  Surgeon: Mihai Walter DO;  Location: [facility];  Service: Cardiology    CARDIAC ELECTROPHYSIOLOGY PROCEDURE N/A 07/03/2023    Procedure: Cardiac eps/afib ablation;  Surgeon: Mihai Walter DO;  Location: [facility];  Service: Cardiology    COLONOSCOPY      IN ESOPHAGOGASTRODUODENOSCOPY TRANSORAL DIAGNOSTIC N/A 03/29/2017    Procedure: ESOPHAGOGASTRODUODENOSCOPY (EGD);  Surgeon: Valeriy Conte MD;  Location: [facility];  Service: Gastroenterology    IN INSJ/RPLCMT PERM DFB W/TRNSVNS LDS 1/DUAL CHMBR N/A 06/02/2017    Procedure: LEAD EXTRACTION/REIMPLANTATION AND ICD GENERATOR CHANGE ;  Surgeon: Enrique Warren MD;  Location: [facility];  Service: Cardiology    IN LAPAROSCOPY SURG CHOLECYSTECTOMY N/A 05/19/2017    Procedure: LAPAROSCOPIC CHOLECYSTECTOMY ;  Surgeon: Tanner Leyva MD;  Location: [facility];  Service: General    IN SURG TX ANAL FISTULA INTERSPHINCTERIC Right 12/6/2024    Procedure: FISTULOTOMY;  Surgeon: Erasmo Madison MD;  Location: [facility];  Service: Colorectal    TONSILLECTOMY      TYMPANOSTOMY TUBE PLACEMENT         Family History   Problem Relation Age of Onset    No Known Problems Mother     No Known Problems Father     Sudden death Neg Hx     Heart disease Neg Hx          Medications have been verified.        Objective   /59   Pulse 64   Temp (!) 97 °F (36.1 °C)   Resp 18   SpO2 97%        Physical Exam     Physical Exam  Vitals and nursing note reviewed.   Constitutional:       General: He is not in acute distress.     Appearance: Normal appearance. He is not ill-appearing.   HENT:      Head: Normocephalic and atraumatic.      Right Ear: Tympanic membrane, ear canal and external ear normal.      Left Ear: Tympanic membrane, ear canal and external ear normal.      Nose: Congestion present. No rhinorrhea.      Mouth/Throat:      Lips: Pink.      Mouth: Mucous membranes are  moist.      Pharynx: No oropharyngeal exudate or posterior oropharyngeal erythema.      Tonsils: No tonsillar exudate.   Eyes:      General: Vision grossly intact.      Extraocular Movements: Extraocular movements intact.      Pupils: Pupils are equal, round, and reactive to light.   Cardiovascular:      Rate and Rhythm: Normal rate and regular rhythm.      Heart sounds: Normal heart sounds. No murmur heard.  Pulmonary:      Effort: Pulmonary effort is normal. No respiratory distress.      Breath sounds: Normal breath sounds. No decreased air movement. No decreased breath sounds, wheezing, rhonchi or rales.   Musculoskeletal:         General: Normal range of motion.      Cervical back: Normal range of motion.   Lymphadenopathy:      Cervical: No cervical adenopathy.   Skin:     General: Skin is warm.      Findings: No rash.   Neurological:      Mental Status: He is alert and oriented to person, place, and time.      Motor: Motor function is intact.      Gait: Gait is intact.   Psychiatric:         Attention and Perception: Attention normal.         Mood and Affect: Mood normal.

## 2025-02-05 NOTE — PATIENT INSTRUCTIONS
Take antibiotics as prescribed.  Flonase nasal spray.  Over the counter saline nasal spray  Sinus rinses mixed with distilled water.    Follow up with ENT.   Follow up with PCP in 3-5 days.  Proceed to  ER if symptoms worsen.    Sinusitis   AMBULATORY CARE:   Sinusitis  is inflammation or infection of your sinuses. Sinusitis is most often caused by a virus. Acute sinusitis may last up to 12 weeks. Chronic sinusitis lasts longer than 12 weeks. Recurrent sinusitis means you have 4 or more infections in 1 year.        Common signs and symptoms:   Fever    Pain, pressure, redness, or swelling around the forehead, cheeks, or eyes    Thick yellow or green discharge from your nose    Tenderness when you touch your face over your sinuses    Dry cough that happens mostly at night or when you lie down    Headache and face pain that is worse when you lean forward    Tooth pain, or pain when you chew    Seek care immediately if:   You have trouble breathing or wheezing that is getting worse.    You have a stiff neck, a fever, or a bad headache.     You cannot open your eye.     Your eyeball bulges out or you cannot move your eye.     You are more sleepy than normal, or you notice changes in your ability to think, move, or talk.    You have swelling of your forehead or scalp.    Call your doctor if:   You have vision changes, such as double vision.    Your eye and eyelid are red, swollen, and painful.     Your symptoms do not improve or go away after 10 days.    You have nausea and are vomiting.    Your nose is bleeding.    You have questions or concerns about your condition or care.    Medicines:  Your symptoms may go away on their own. Your healthcare provider may recommend watchful waiting for up to 10 days before starting antibiotics. You may need any of the following:  Acetaminophen  decreases pain and fever. It is available without a doctor's order. Ask how much to take and how often to take it. Follow directions. Read the  labels of all other medicines you are using to see if they also contain acetaminophen, or ask your doctor or pharmacist. Acetaminophen can cause liver damage if not taken correctly. Do not use more than 4 grams (4,000 milligrams) total of acetaminophen in one day.     NSAIDs , such as ibuprofen, help decrease swelling, pain, and fever. This medicine is available with or without a doctor's order. NSAIDs can cause stomach bleeding or kidney problems in certain people. If you take blood thinner medicine, always ask your healthcare provider if NSAIDs are safe for you. Always read the medicine label and follow directions.    Nasal steroid sprays  may help decrease inflammation in your nose and sinuses.    Decongestants  help reduce swelling and drain mucus in the nose and sinuses. They may help you breathe easier.     Antihistamines  help dry mucus in the nose and relieve sneezing.     Antibiotics  help treat or prevent a bacterial infection.    Self-care:   Rinse your sinuses as directed.  Use a sinus rinse device to rinse your nasal passages with a saline (salt water) solution or distilled water. Do not use tap water. This will help thin the mucus in your nose and rinse away pollen and dirt. It will also help reduce swelling so you can breathe normally.    Use a humidifier  to increase air moisture in your home. This may make it easier for you to breathe and help decrease your cough.     Sleep with your head elevated.  Place an extra pillow under your head before you go to sleep to help your sinuses drain.     Drink liquids as directed.  Ask your healthcare provider how much liquid to drink each day and which liquids are best for you. Liquids will thin the mucus in your nose and help it drain. Avoid drinks that contain alcohol or caffeine.     Do not smoke, and avoid secondhand smoke.  Nicotine and other chemicals in cigarettes and cigars can make your symptoms worse. Ask your healthcare provider for information if you  currently smoke and need help to quit. E-cigarettes or smokeless tobacco still contain nicotine. Talk to your healthcare provider before you use these products.    Prevent the spread of germs:   Wash your hands often with soap and water.  Wash your hands after you use the bathroom, change a child's diaper, or sneeze. Wash your hands before you prepare or eat food.         Stay away from people who are sick.  Some germs spread easily and quickly through contact.    Follow up with your doctor as directed:  You may be referred to an ear, nose, and throat specialist. Write down your questions so you remember to ask them during your visits.   © Copyright [employer] 2022 Information is for End User's use only and may not be sold, redistributed or otherwise used for commercial purposes. All illustrations and images included in CareNotes® are the copyrighted property of [employer] or [employer]  The above information is an  only. It is not intended as medical advice for individual conditions or treatments. Talk to your doctor, nurse or pharmacist before following any medical regimen to see if it is safe and effective for you.

## 2025-02-21 DIAGNOSIS — I48.0 PAROXYSMAL ATRIAL FIBRILLATION (HCC): Primary | ICD-10-CM

## 2025-02-21 NOTE — TELEPHONE ENCOUNTER
Reason for call:   [x] Refill   [] Prior Auth  [] Other:     Office:   [] PCP/Provider -   [x] Specialty/Provider - Mihai Walter DO / [facility]     Medication: rivaroxaban (Xarelto) 20 mg tablet /  Take 20 mg by mouth     Pharmacy: [facility]/pharmacy #[id number] - [place], [place] - [address]     Does the patient have enough for 3 days?   [] Yes   [x] No - Send as HP to POD

## 2025-03-05 ENCOUNTER — TELEPHONE (OUTPATIENT)
Dept: NEUROLOGY | Facility: CLINIC | Age: 50
End: 2025-03-05

## 2025-03-05 DIAGNOSIS — I50.22 CHRONIC HFREF (HEART FAILURE WITH REDUCED EJECTION FRACTION) (HCC): Chronic | ICD-10-CM

## 2025-03-05 DIAGNOSIS — I42.0 DILATED CARDIOMYOPATHY (HCC): ICD-10-CM

## 2025-03-05 DIAGNOSIS — I50.20 SYSTOLIC CONGESTIVE HEART FAILURE, UNSPECIFIED HF CHRONICITY (HCC): ICD-10-CM

## 2025-03-06 NOTE — TELEPHONE ENCOUNTER
Refill must be reviewed and completed by the office or provider. The refill is unable to be approved or denied by the medication management team.    Last seen 09.2024 and was to return in , next office visit 07.2025 - Please review to see if the refill is appropriate.

## 2025-03-11 RX ORDER — EMPAGLIFLOZIN 10 MG/1
10 TABLET, FILM COATED ORAL EVERY MORNING
Qty: 90 TABLET | Refills: 3 | Status: SHIPPED | OUTPATIENT
Start: 2025-03-11

## 2025-03-11 NOTE — TELEPHONE ENCOUNTER
Name from pharmacy: JARDIANCE 10 MG TABLET         Will file in chart as: Jardiance 10 MG TABS tablet    Sig: TAKE 1 TABLET (10 MG TOTAL) BY MOUTH EVERY MORNING.    Disp: 90 tablet    Refills: 3    Start: 3/5/2025    Class: Normal    Non-formulary For: Chronic HFrEF (heart failure with reduced ejection fraction) (HCC); Dilated cardiomyopathy (HCC); Systolic congestive heart failure, unspecified HF chronicity (HCC)    Last ordered: 1 year ago (3/5/2024) by Mihai Walter DO    Last refill: 12/4/2024    Rx #: [id number]    Endocrinology:  Diabetes - SGLT2 Inhibitors [id number]/05/2025 01:47 AM   Protocol Details HBA1C within 180 days    eGFR is 60 or above and within 360 days    Valid encounter within last 6 months      To be filled at: [facility]/pharmacy #[id number] - [place], [place] - [address]

## 2025-03-12 ENCOUNTER — REMOTE DEVICE CLINIC VISIT (OUTPATIENT)
Dept: CARDIOLOGY CLINIC | Facility: CLINIC | Age: 50
End: 2025-03-12
Payer: COMMERCIAL

## 2025-03-12 DIAGNOSIS — I47.20 VENTRICULAR TACHYCARDIA (HCC): ICD-10-CM

## 2025-03-12 DIAGNOSIS — I48.91 ATRIAL FIBRILLATION, UNSPECIFIED TYPE (HCC): Primary | ICD-10-CM

## 2025-03-12 DIAGNOSIS — I42.0 DILATED CARDIOMYOPATHY (HCC): ICD-10-CM

## 2025-03-12 PROCEDURE — 93296 REM INTERROG EVL PM/IDS: CPT | Performed by: INTERNAL MEDICINE

## 2025-03-12 PROCEDURE — 93295 DEV INTERROG REMOTE 1/2/MLT: CPT | Performed by: INTERNAL MEDICINE

## 2025-03-12 NOTE — PROGRESS NOTES
Results for orders placed or performed in visit on 03/12/25   Cardiac EP device report    Narrative    MDT SC ICD/ACTIVE SYSTEM IS MRI CONDITIONAL  CARELINK TRANSMISSION: BATTERY VOLTAGE ADEQUATE (3.6 YRS). : 0.2%. ALL AVAILABLE LEAD PARAMETERS WITHIN NORMAL LIMITS. 2 VT-NS EPISODES W/ EGMS SHOWING RVR vs NSVT 6 BEATS @ 176 BPM & 10 BEATS @ 150 BPM. PT TAKES XARELTO, ENTRESTO, TIKOSYN, METOPROLOL SUCC. EF: 20% (ECHO 4/16/24). OPTI-VOL WITHIN NORMAL LIMITS. NORMAL DEVICE FUNCTION. CH

## 2025-03-14 ENCOUNTER — RESULTS FOLLOW-UP (OUTPATIENT)
Dept: NON INVASIVE DIAGNOSTICS | Facility: HOSPITAL | Age: 50
End: 2025-03-14

## 2025-03-18 ENCOUNTER — OFFICE VISIT (OUTPATIENT)
Dept: FAMILY MEDICINE CLINIC | Facility: CLINIC | Age: 50
End: 2025-03-18
Payer: COMMERCIAL

## 2025-03-18 VITALS
HEART RATE: 57 BPM | SYSTOLIC BLOOD PRESSURE: 116 MMHG | HEIGHT: 74 IN | WEIGHT: 294 LBS | DIASTOLIC BLOOD PRESSURE: 64 MMHG | TEMPERATURE: 97.9 F | OXYGEN SATURATION: 97 % | BODY MASS INDEX: 37.73 KG/M2

## 2025-03-18 DIAGNOSIS — I48.0 PAROXYSMAL ATRIAL FIBRILLATION (HCC): ICD-10-CM

## 2025-03-18 DIAGNOSIS — Z12.5 SCREENING FOR PROSTATE CANCER: ICD-10-CM

## 2025-03-18 DIAGNOSIS — I42.0 DILATED CARDIOMYOPATHY (HCC): ICD-10-CM

## 2025-03-18 DIAGNOSIS — R79.89 ABNORMAL TSH: ICD-10-CM

## 2025-03-18 DIAGNOSIS — Z00.00 HEALTH MAINTENANCE EXAMINATION: ICD-10-CM

## 2025-03-18 DIAGNOSIS — Z13.220 SCREENING FOR LIPID DISORDERS: ICD-10-CM

## 2025-03-18 DIAGNOSIS — Z45.02 ICD (IMPLANTABLE CARDIOVERTER-DEFIBRILLATOR) DISCHARGE: ICD-10-CM

## 2025-03-18 DIAGNOSIS — I10 PRIMARY HYPERTENSION: Primary | Chronic | ICD-10-CM

## 2025-03-18 DIAGNOSIS — R41.89 BRAIN FOG: ICD-10-CM

## 2025-03-18 DIAGNOSIS — I50.22 CHRONIC HFREF (HEART FAILURE WITH REDUCED EJECTION FRACTION) (HCC): Chronic | ICD-10-CM

## 2025-03-18 PROCEDURE — 99214 OFFICE O/P EST MOD 30 MIN: CPT | Performed by: PHYSICIAN ASSISTANT

## 2025-03-18 PROCEDURE — 99396 PREV VISIT EST AGE 40-64: CPT | Performed by: PHYSICIAN ASSISTANT

## 2025-03-18 NOTE — ASSESSMENT & PLAN NOTE
Wt Readings from Last 3 Encounters:   03/18/25 133 kg (294 lb)   02/04/25 133 kg (294 lb)   01/23/25 133 kg (294 lb)   HFrEF, continue GDMT with cardiology/advanced heart failure team  Clinically euvolemic today

## 2025-03-18 NOTE — PROGRESS NOTES
Name: Vadim Chapa      : 1975      MRN: [id number]  Encounter Provider: Darrel García PA-C  Encounter Date: 3/18/2025   Encounter department: [facility]    Assessment & Plan  Primary hypertension  BP at goal, continue current regimen  Orders:  •  Comprehensive metabolic panel; Future  •  CBC and differential; Future    Chronic HFrEF (heart failure with reduced ejection fraction) (HCC)  Wt Readings from Last 3 Encounters:   25 133 kg (294 lb)   25 133 kg (294 lb)   25 133 kg (294 lb)   HFrEF, continue GDMT with cardiology/advanced heart failure team  Clinically euvolemic today               Dilated cardiomyopathy (HCC)  As above       Paroxysmal atrial fibrillation (HCC)  NSR today  Continue medical therapy       ICD (implantable cardioverter-defibrillator) discharge  ICD in place       BMI 38.0-38.9,adult  After discussion of risks/benefits pt agreeable to begin wegovy  1#/week weight loss goal  BMI counseling as below  2 month follow up    BMI Counseling: Body mass index is 37.75 kg/m². The BMI is above normal. Nutrition recommendations include reducing portion sizes, decreasing overall calorie intake, moderation in carbohydrate intake, increasing intake of lean protein, reducing intake of saturated fat and trans fat, and reducing intake of cholesterol. Exercise recommendations include moderate aerobic physical activity for 150 minutes/week. Pharmacotherapy was ordered for patient to aid in weight loss.    Orders:  •  Semaglutide-Weight Management (WEGOVY) 0.25 MG/0.5ML; Inject 0.5 mL (0.25 mg total) under the skin once a week    Screening for lipid disorders    Orders:  •  Lipid Panel with Direct LDL reflex; Future    Screening for prostate cancer    Orders:  •  PSA, Total Screen; Future    Brain fog  Likely 2/2 stress, multiple medical conditions/medications  Cognitively stimulating exercises   Labs as below  Orders:  •  Vitamin B12; Future  •  Folate;  "Future    Abnormal TSH  Recheck   Orders:  •  TSH, 3rd generation with Free T4 reflex; Future    Health maintenance examination              History of Present Illness     Pt presents for follow up and annual physical    Hx of HFrEF with ICD, PAF, CM, morbid obesity. He follows cardiology. Chronic AC on eliquis. Hx of cardiac ablation  /64  Interested in GLP1 weight loss therapy  Utd with crc screening  FH reviewed, no changes  Non smoker  No abuse/misuse of alcohol or illicit drugs  Meds/allergies reviewed    Pt also notes \"brain fog\" and forgetfulness, completely independent with IADLs and ADLs. Forgets where he puts things. No other cognitive concerns. Notes a lot of life stress.       Review of Systems   Constitutional:  Negative for chills, fatigue and fever.   HENT:  Negative for congestion, ear pain, hearing loss, nosebleeds, postnasal drip, rhinorrhea, sinus pressure, sinus pain, sneezing and sore throat.    Eyes:  Negative for pain, discharge, itching and visual disturbance.   Respiratory:  Negative for cough, chest tightness, shortness of breath and wheezing.    Cardiovascular:  Negative for chest pain, palpitations and leg swelling.   Gastrointestinal:  Negative for abdominal pain, blood in stool, constipation, diarrhea, nausea and vomiting.   Genitourinary:  Negative for frequency and urgency.   Neurological:  Negative for dizziness, light-headedness and numbness.     Past Medical History:   Diagnosis Date   • A-fib (HCC) 2020   • AICD (automatic cardioverter/defibrillator) present    • CHF (congestive heart failure) ([facility])    • Coxsackie virus infection 2017   • Hypertension    • ICD (implantable cardioverter-defibrillator) lead failure 06/03/2017   • Kidney stone    • Myocarditis ([facility])    • Sleep apnea    • Ventricular tachycardia ([facility])      Past Surgical History:   Procedure Laterality Date   • CARDIAC DEFIBRILLATOR PLACEMENT     • CARDIAC ELECTROPHYSIOLOGY PROCEDURE N/A 07/03/2023    Procedure: " "Cardiac eps/svt ablation;  Surgeon: Mihai Walter DO;  [facility];  Service: Cardiology   • CARDIAC ELECTROPHYSIOLOGY PROCEDURE N/A 07/03/2023    Procedure: Cardiac eps/afib ablation;  Surgeon: Mihai Walter DO;  [facility];  Service: Cardiology   • COLONOSCOPY     • NH ESOPHAGOGASTRODUODENOSCOPY TRANSORAL DIAGNOSTIC N/A 03/29/2017    Procedure: ESOPHAGOGASTRODUODENOSCOPY (EGD);  Surgeon: Valeriy Conte MD;  [facility]: [facility];  Service: Gastroenterology   • NH INSJ/RPLCMT PERM DFB W/TRNSVNS LDS 1/DUAL CHMBR N/A 06/02/2017    Procedure: LEAD EXTRACTION/REIMPLANTATION AND ICD GENERATOR CHANGE ;  Surgeon: Enrique Warren MD;  [facility];  Service: Cardiology   • NH LAPAROSCOPY SURG CHOLECYSTECTOMY N/A 05/19/2017    Procedure: LAPAROSCOPIC CHOLECYSTECTOMY ;  Surgeon: Tanner Leyva MD;  [facility];  Service: General   • NH SURG TX ANAL FISTULA INTERSPHINCTERIC Right 12/6/2024    Procedure: FISTULOTOMY;  Surgeon: Erasmo Madison MD;  [facility];  Service: Colorectal   • TONSILLECTOMY     • TYMPANOSTOMY TUBE PLACEMENT       Family History   Problem Relation Age of Onset   • No Known Problems Mother    • No Known Problems Father    • Sudden death Neg Hx    • Heart disease Neg Hx      Social History     Tobacco Use   • Smoking status: Never   • Smokeless tobacco: Never   Vaping Use   • Vaping status: Never Used   Substance and Sexual Activity   • Alcohol use: Not Currently     Comment: OCCASIONAL; history of binge drinking -  drinking \"a lot\" on Fridays; stopped this about in late 2022. (Updated 12/04/2023).   • Drug use: Never   • Sexual activity: Not on file     Current Outpatient Medications on File Prior to Visit   Medication Sig   • dofetilide (TIKOSYN) 250 mcg capsule Take 1 capsule (250 mcg total) by mouth 2 (two) times a day   • Entresto 24-26 MG TABS TAKE 1 TABLET BY MOUTH TWICE A DAY   • furosemide (LASIX) 40 mg tablet TAKE 1 TABLET BY " "MOUTH EVERY DAY   • Jardiance 10 MG TABS tablet TAKE 1 TABLET (10 MG TOTAL) BY MOUTH EVERY MORNING.   • Klor-Con M20 20 MEQ tablet TAKE 1 TABLET BY MOUTH EVERY DAY   • rivaroxaban (Xarelto) 20 mg tablet Take 1 tablet (20 mg total) by mouth daily with breakfast   • spironolactone (ALDACTONE) 25 mg tablet Take 1 tablet (25 mg total) by mouth daily   • eszopiclone (LUNESTA) 2 mg tablet Take 1 tablet (2 mg total) by mouth daily at bedtime as needed for sleep Take immediately before bedtime   • metoprolol succinate (TOPROL-XL) 25 mg 24 hr tablet Take 1 tablet (25 mg total) by mouth 2 (two) times a day   • [DISCONTINUED] gabapentin (Neurontin) 100 mg capsule Take 1 capsule (100 mg total) by mouth 3 (three) times a day (Patient not taking: Reported on 2/5/2025)     Allergies   Allergen Reactions   • Vicodin [Hydrocodone-Acetaminophen] Other (See Comments)     \"i get nasty\"     Immunization History   Administered Date(s) Administered   • COVID-19 MODERNA VACC 0.5 ML IM 11/12/2021   • COVID-19 PFIZER VACCINE 0.3 ML IM 03/24/2021, 04/16/2021   • Tdap 09/12/2022     Objective   /64   Pulse 57   Temp 97.9 °F (36.6 °C)   Ht 6' 2\" (1.88 m)   Wt 133 kg (294 lb)   SpO2 97%   BMI 37.75 kg/m²     Physical Exam  Vitals and nursing note reviewed.   Constitutional:       General: He is not in acute distress.     Appearance: He is well-developed.   HENT:      Head: Normocephalic and atraumatic.   Eyes:      Conjunctiva/sclera: Conjunctivae normal.   Cardiovascular:      Rate and Rhythm: Normal rate and regular rhythm.      Heart sounds: No murmur heard.  Pulmonary:      Effort: Pulmonary effort is normal. No respiratory distress.      Breath sounds: Normal breath sounds. No wheezing, rhonchi or rales.   Musculoskeletal:         General: No swelling.      Cervical back: Neck supple.   Skin:     General: Skin is warm and dry.      Capillary Refill: Capillary refill takes less than 2 seconds.   Neurological:      Mental Status: " He is alert.   Psychiatric:         Mood and Affect: Mood normal.

## 2025-03-18 NOTE — ASSESSMENT & PLAN NOTE
After discussion of risks/benefits pt agreeable to begin wegovy  1#/week weight loss goal  BMI counseling as below  2 month follow up    BMI Counseling: Body mass index is 37.75 kg/m². The BMI is above normal. Nutrition recommendations include reducing portion sizes, decreasing overall calorie intake, moderation in carbohydrate intake, increasing intake of lean protein, reducing intake of saturated fat and trans fat, and reducing intake of cholesterol. Exercise recommendations include moderate aerobic physical activity for 150 minutes/week. Pharmacotherapy was ordered for patient to aid in weight loss.    Orders:  •  Semaglutide-Weight Management (WEGOVY) 0.25 MG/0.5ML; Inject 0.5 mL (0.25 mg total) under the skin once a week   Launch [employer] and print the 'Prescriptions from this Visit' Report

## 2025-03-18 NOTE — ASSESSMENT & PLAN NOTE
BP at goal, continue current regimen  Orders:  •  Comprehensive metabolic panel; Future  •  CBC and differential; Future

## 2025-03-19 ENCOUNTER — OFFICE VISIT (OUTPATIENT)
Age: 50
End: 2025-03-19
Payer: COMMERCIAL

## 2025-03-19 ENCOUNTER — TELEPHONE (OUTPATIENT)
Dept: FAMILY MEDICINE CLINIC | Facility: CLINIC | Age: 50
End: 2025-03-19

## 2025-03-19 VITALS
HEIGHT: 74 IN | DIASTOLIC BLOOD PRESSURE: 70 MMHG | BODY MASS INDEX: 37.73 KG/M2 | SYSTOLIC BLOOD PRESSURE: 108 MMHG | WEIGHT: 294 LBS | OXYGEN SATURATION: 96 % | HEART RATE: 59 BPM

## 2025-03-19 DIAGNOSIS — E66.09 CLASS 2 OBESITY DUE TO EXCESS CALORIES WITHOUT SERIOUS COMORBIDITY WITH BODY MASS INDEX (BMI) OF 37.0 TO 37.9 IN ADULT: ICD-10-CM

## 2025-03-19 DIAGNOSIS — G47.33 OSA (OBSTRUCTIVE SLEEP APNEA): ICD-10-CM

## 2025-03-19 DIAGNOSIS — F51.04 CHRONIC INSOMNIA: ICD-10-CM

## 2025-03-19 DIAGNOSIS — E66.812 CLASS 2 OBESITY DUE TO EXCESS CALORIES WITHOUT SERIOUS COMORBIDITY WITH BODY MASS INDEX (BMI) OF 37.0 TO 37.9 IN ADULT: ICD-10-CM

## 2025-03-19 DIAGNOSIS — G47.33 OSA (OBSTRUCTIVE SLEEP APNEA): Primary | ICD-10-CM

## 2025-03-19 DIAGNOSIS — G47.00 INSOMNIA, UNSPECIFIED TYPE: ICD-10-CM

## 2025-03-19 PROCEDURE — 99214 OFFICE O/P EST MOD 30 MIN: CPT | Performed by: INTERNAL MEDICINE

## 2025-03-19 RX ORDER — ESZOPICLONE 3 MG/1
3 TABLET, FILM COATED ORAL
Qty: 30 TABLET | Refills: 0 | Status: SHIPPED | OUTPATIENT
Start: 2025-03-19 | End: 2025-04-18

## 2025-03-19 NOTE — TELEPHONE ENCOUNTER
PA for WEGOVY) 0.25 MG/0.5ML SUBMITTED to     via    []CMM-KEY:   []Surescripts-Case ID #   []Availity-Auth ID # NDC #   []Faxed to plan   [x]Other website PromptPA [employer] - ID [id number]  []Phone call Case ID #     [x]PA sent as URGENT    All office notes, labs and other pertaining documents and studies sent. Clinical questions answered. Awaiting determination from insurance company.     Turnaround time for your insurance to make a decision on your Prior Authorization can take 7-21 business days.

## 2025-03-19 NOTE — PROGRESS NOTES
Name: Vadim Chapa      : 1975      MRN: [id number]  Encounter Provider: Jose Daniel Anaya MD  Encounter Date: 3/19/2025   Encounter department: [facility]    :  Assessment & Plan  CONCHIS (obstructive sleep apnea)  Mild obstructive sleep apnea    Previously on a fixed pressure of 10    Compliance from  - 3/13/2025  More than 4 hours 100%, 8 hours 50 minutes  On APAP 8-15  95 percentile pressure 13.0  Median leak 0.0  Residual AHI 1.8    Patient states that due to the high pressures he has to make the mask really tight and it is uncomfortable.  He has noticed air leaking    As patient feels more comfortable with the lower pressure I change pressure to 8-11  Follow-up in 6 weeks  Orders:    PAP DME Pressure Change    Class 2 obesity due to excess calories without serious comorbidity with body mass index (BMI) of 37.0 to 37.9 in adult  Counseled patient on lifestyle modifications including diet and exercise  Explained that weight loss will decrease the severity of sleep apnea  Awaiting authorization to start Wegovy       Chronic insomnia  Primarily sleep maintenance insomnia  Started Lunesta 2 mg but has not had significant benefit  He is going to increase the dose to 3 mg  Overall he still seems to get about 8 hours of sleep         History of Present Illness     49-year-old male with a past medical history of heart failure with reduced ejection fraction EF of 20%, hypertension, paroxysmal atrial fibrillation who presents for follow-up of obstructive sleep apnea.    Initial office visit  Diagnostic PSG in 2017 showed an AHI of 8.2 at a weight of 291 pounds.    He states he is compliant with a fixed pressure of 10 cm H2O for many years.  He  has been using the same machine since 2017.  He is currently using an F20 mask.  He goes to bed at 9 PM and falls asleep by 11 PM.  He wakes up at 8 AM.  He gets 7 to 8 hours of sleep.  Prior to using CPAP he was snoring loudly with witnessed apneas.       He occasionally drinks coffee.  He occasionally drinks alcohol.  His Bristow score is 5 with use of CPAP.    Current office visit  Ordered APAP 8-15 at last office visit.  He has excellent compliance.  He notes improvement in symptoms.  He does feel the higher pressures cause air leak.  He does not feel comfortable when pressures are higher than 11.  He also has frequent nighttime awakenings and was prescribed Lunesta 2 mg.  It has not been helpful so 3 mg has been ordered but he has not started it yet.                Sitting and reading: Moderate chance of dozing  Watching TV: Slight chance of dozing  Sitting, inactive in a public place (e.g. a theatre or a meeting): Would never doze  As a passenger in a car for an hour without a break: Slight chance of dozing  Lying down to rest in the afternoon when circumstances permit: Slight chance of dozing  Sitting and talking to someone: Would never doze  Sitting quietly after a lunch without alcohol: Would never doze  In a car, while stopped for a few minutes in traffic: Would never doze  Total score: 5       Review of Systems   Constitutional: Negative.    HENT: Negative.     Eyes: Negative.    Respiratory: Negative.     Cardiovascular: Negative.    Gastrointestinal: Negative.    Endocrine: Negative.    Genitourinary: Negative.    Musculoskeletal: Negative.    Skin: Negative.    Allergic/Immunologic: Negative.    Neurological: Negative.    Hematological: Negative.    Psychiatric/Behavioral: Negative.       Pertinent positives/negatives included in HPI and also as noted:       Pertinent Medical History           Medical History Reviewed by provider this encounter:  Meds     .  Past Medical History   Past Medical History:   Diagnosis Date    A-fib ([facility]) 2020    AICD (automatic cardioverter/defibrillator) present     CHF (congestive heart failure) ([facility])     Coxsackie virus infection 2017    Hypertension     ICD (implantable cardioverter-defibrillator) lead failure  06/03/2017    Kidney stone     Myocarditis (HCC)     Sleep apnea     Ventricular tachycardia (HCC)      Past Surgical History:   Procedure Laterality Date    CARDIAC DEFIBRILLATOR PLACEMENT      CARDIAC ELECTROPHYSIOLOGY PROCEDURE N/A 07/03/2023    Procedure: Cardiac eps/svt ablation;  Surgeon: Mihai Walter DO;  [facility];  Service: Cardiology    CARDIAC ELECTROPHYSIOLOGY PROCEDURE N/A 07/03/2023    Procedure: Cardiac eps/afib ablation;  Surgeon: Mihai Walter DO;  [facility];  Service: Cardiology    COLONOSCOPY      MA ESOPHAGOGASTRODUODENOSCOPY TRANSORAL DIAGNOSTIC N/A 03/29/2017    Procedure: ESOPHAGOGASTRODUODENOSCOPY (EGD);  Surgeon: Valeriy Conte MD;  [facility]: [facility];  Service: Gastroenterology    MA INSJ/RPLCMT PERM DFB W/TRNSVNS LDS 1/DUAL CHMBR N/A 06/02/2017    Procedure: LEAD EXTRACTION/REIMPLANTATION AND ICD GENERATOR CHANGE ;  Surgeon: Enrique Warren MD;  [facility];  Service: Cardiology    MA LAPAROSCOPY SURG CHOLECYSTECTOMY N/A 05/19/2017    Procedure: LAPAROSCOPIC CHOLECYSTECTOMY ;  Surgeon: Tanner Leyva MD;  [facility];  Service: General    MA SURG TX ANAL FISTULA INTERSPHINCTERIC Right 12/6/2024    Procedure: FISTULOTOMY;  Surgeon: Erasmo Madison MD;  [facility];  Service: Colorectal    TONSILLECTOMY      TYMPANOSTOMY TUBE PLACEMENT       Family History   Problem Relation Age of Onset    No Known Problems Mother     No Known Problems Father     Sudden death Neg Hx     Heart disease Neg Hx       reports that he has never smoked. He has never used smokeless tobacco. He reports that he does not currently use alcohol. He reports that he does not use drugs.  Current Outpatient Medications   Medication Instructions    dofetilide (TIKOSYN) 250 mcg, Oral, 2 times daily    Entresto 24-26 MG TABS 1 tablet, Oral, 2 times daily    eszopiclone (LUNESTA) 3 mg, Oral, Daily at bedtime PRN, Take immediately before bedtime     "furosemide (LASIX) 40 mg, Oral, Daily    Jardiance 10 mg, Oral, Every morning    Klor-Con M20 20 MEQ tablet 20 mEq, Oral, Daily    metoprolol succinate (TOPROL-XL) 25 mg, Oral, 2 times daily    rivaroxaban (XARELTO) 20 mg, Oral, Daily with breakfast    Semaglutide-Weight Management (WEGOVY) 0.25 mg, Subcutaneous, Weekly    spironolactone (ALDACTONE) 25 mg, Oral, Daily     Allergies   Allergen Reactions    Vicodin [Hydrocodone-Acetaminophen] Other (See Comments)     \"i get nasty\"      Current Outpatient Medications on File Prior to Visit   Medication Sig Dispense Refill    dofetilide (TIKOSYN) 250 mcg capsule Take 1 capsule (250 mcg total) by mouth 2 (two) times a day 180 capsule 3    Entresto 24-26 MG TABS TAKE 1 TABLET BY MOUTH TWICE A  tablet 3    furosemide (LASIX) 40 mg tablet TAKE 1 TABLET BY MOUTH EVERY DAY 90 tablet 3    Jardiance 10 MG TABS tablet TAKE 1 TABLET (10 MG TOTAL) BY MOUTH EVERY MORNING. 90 tablet 3    Klor-Con M20 20 MEQ tablet TAKE 1 TABLET BY MOUTH EVERY DAY 90 tablet 1    metoprolol succinate (TOPROL-XL) 25 mg 24 hr tablet Take 1 tablet (25 mg total) by mouth 2 (two) times a day 180 tablet 3    rivaroxaban (Xarelto) 20 mg tablet Take 1 tablet (20 mg total) by mouth daily with breakfast 90 tablet 1    spironolactone (ALDACTONE) 25 mg tablet Take 1 tablet (25 mg total) by mouth daily 90 tablet 3    Semaglutide-Weight Management (WEGOVY) 0.25 MG/0.5ML Inject 0.5 mL (0.25 mg total) under the skin once a week (Patient not taking: Reported on 3/19/2025) 2 mL 0    [DISCONTINUED] eszopiclone (LUNESTA) 2 mg tablet Take 1 tablet (2 mg total) by mouth daily at bedtime as needed for sleep Take immediately before bedtime 30 tablet 0     No current facility-administered medications on file prior to visit.      Social History     Tobacco Use    Smoking status: Never    Smokeless tobacco: Never   Vaping Use    Vaping status: Never Used   Substance and Sexual Activity    Alcohol use: Not Currently     " "Comment: OCCASIONAL; history of binge drinking -  drinking \"a lot\" on Fridays; stopped this about in late 2022. (Updated 12/04/2023).    Drug use: Never    Sexual activity: Not on file     Objective   /70 (BP Location: Left arm, Patient Position: Sitting, Cuff Size: Large)   Pulse 59   Ht 6' 2\" (1.88 m)   Wt 133 kg (294 lb)   SpO2 96%   BMI 37.75 kg/m²        Physical Exam  Vitals reviewed.   HENT:      Head: Normocephalic and atraumatic.      Nose: Nose normal.      Mouth/Throat:      Mouth: Mucous membranes are moist.   Eyes:      Extraocular Movements: Extraocular movements intact.      Pupils: Pupils are equal, round, and reactive to light.   Cardiovascular:      Rate and Rhythm: Normal rate and regular rhythm.      Pulses: Normal pulses.   Pulmonary:      Effort: Pulmonary effort is normal.      Breath sounds: Normal breath sounds.   Abdominal:      General: Abdomen is flat. Bowel sounds are normal.      Palpations: Abdomen is soft.   Musculoskeletal:         General: Normal range of motion.      Cervical back: Normal range of motion.   Skin:     General: Skin is warm.   Neurological:      Mental Status: He is alert. Mental status is at baseline.   Psychiatric:         Mood and Affect: Mood normal.       Visit Vitals  /70 (BP Location: Left arm, Patient Position: Sitting, Cuff Size: Large)   Pulse 59   Ht 6' 2\" (1.88 m)   Wt 133 kg (294 lb)   SpO2 96%   BMI 37.75 kg/m²   Smoking Status Never   BSA 2.56 m²         Data  Lab Results   Component Value Date    HGB 15.9 11/27/2024    HCT 49.1 11/27/2024    MCV 93 11/27/2024      Lab Results   Component Value Date    CALCIUM 9.1 11/27/2024    K 4.0 11/27/2024    CO2 27 11/27/2024     11/27/2024    BUN 14 11/27/2024    CREATININE 1.06 11/27/2024     Lab Results   Component Value Date    IRON 94 12/04/2023    TIBC 320 12/04/2023    FERRITIN 122 12/04/2023     Lab Results   Component Value Date    AST 38 11/05/2024    ALT 38 11/05/2024 "

## 2025-03-19 NOTE — ASSESSMENT & PLAN NOTE
Mild obstructive sleep apnea    Previously on a fixed pressure of 10    Compliance from 2/12 - 3/13/2025  More than 4 hours 100%, 8 hours 50 minutes  On APAP 8-15  95 percentile pressure 13.0  Median leak 0.0  Residual AHI 1.8    Patient states that due to the high pressures he has to make the mask really tight and it is uncomfortable.  He has noticed air leaking    As patient feels more comfortable with the lower pressure I change pressure to 8-11  Follow-up in 6 weeks  Orders:    PAP DME Pressure Change

## 2025-03-19 NOTE — ASSESSMENT & PLAN NOTE
Counseled patient on lifestyle modifications including diet and exercise  Explained that weight loss will decrease the severity of sleep apnea  Awaiting authorization to start Wegovy

## 2025-03-20 ENCOUNTER — TELEPHONE (OUTPATIENT)
Dept: SLEEP CENTER | Facility: CLINIC | Age: 50
End: 2025-03-20

## 2025-03-20 DIAGNOSIS — I50.20 HEART FAILURE WITH REDUCED EJECTION FRACTION (HCC): ICD-10-CM

## 2025-03-20 LAB
DME PARACHUTE DELIVERY DATE ACTUAL: NORMAL
DME PARACHUTE DELIVERY DATE REQUESTED: NORMAL
DME PARACHUTE ITEM DESCRIPTION: NORMAL
DME PARACHUTE ORDER STATUS: NORMAL
DME PARACHUTE SUPPLIER NAME: NORMAL
DME PARACHUTE SUPPLIER PHONE: NORMAL

## 2025-03-21 RX ORDER — SPIRONOLACTONE 25 MG/1
25 TABLET ORAL DAILY
Qty: 90 TABLET | Refills: 1 | Status: SHIPPED | OUTPATIENT
Start: 2025-03-21

## 2025-03-25 ENCOUNTER — APPOINTMENT (OUTPATIENT)
Dept: LAB | Facility: CLINIC | Age: 50
End: 2025-03-25
Payer: COMMERCIAL

## 2025-03-25 DIAGNOSIS — I10 PRIMARY HYPERTENSION: Chronic | ICD-10-CM

## 2025-03-25 DIAGNOSIS — R79.89 ABNORMAL TSH: ICD-10-CM

## 2025-03-25 DIAGNOSIS — I47.20 V-TACH (HCC): ICD-10-CM

## 2025-03-25 DIAGNOSIS — Z13.220 SCREENING FOR LIPID DISORDERS: ICD-10-CM

## 2025-03-25 DIAGNOSIS — Z12.5 SCREENING FOR PROSTATE CANCER: ICD-10-CM

## 2025-03-25 DIAGNOSIS — R41.89 BRAIN FOG: ICD-10-CM

## 2025-03-25 DIAGNOSIS — E05.90 HYPERTHYROIDISM: ICD-10-CM

## 2025-03-25 LAB
ALBUMIN SERPL BCG-MCNC: 4.1 G/DL (ref 3.5–5)
ALP SERPL-CCNC: 88 U/L (ref 34–104)
ALT SERPL W P-5'-P-CCNC: 19 U/L (ref 7–52)
ANION GAP SERPL CALCULATED.3IONS-SCNC: 9 MMOL/L (ref 4–13)
AST SERPL W P-5'-P-CCNC: 22 U/L (ref 13–39)
BASOPHILS # BLD AUTO: 0.04 THOUSANDS/ÂΜL (ref 0–0.1)
BASOPHILS NFR BLD AUTO: 1 % (ref 0–1)
BILIRUB SERPL-MCNC: 1.07 MG/DL (ref 0.2–1)
BUN SERPL-MCNC: 13 MG/DL (ref 5–25)
CALCIUM SERPL-MCNC: 8.9 MG/DL (ref 8.4–10.2)
CHLORIDE SERPL-SCNC: 105 MMOL/L (ref 96–108)
CHOLEST SERPL-MCNC: 178 MG/DL (ref ?–200)
CO2 SERPL-SCNC: 28 MMOL/L (ref 21–32)
CREAT SERPL-MCNC: 1.07 MG/DL (ref 0.6–1.3)
EOSINOPHIL # BLD AUTO: 0.16 THOUSAND/ÂΜL (ref 0–0.61)
EOSINOPHIL NFR BLD AUTO: 3 % (ref 0–6)
ERYTHROCYTE [DISTWIDTH] IN BLOOD BY AUTOMATED COUNT: 13.8 % (ref 11.6–15.1)
GFR SERPL CREATININE-BSD FRML MDRD: 81 ML/MIN/1.73SQ M
GLUCOSE P FAST SERPL-MCNC: 106 MG/DL (ref 65–99)
HCT VFR BLD AUTO: 51.8 % (ref 36.5–49.3)
HDLC SERPL-MCNC: 47 MG/DL
HGB BLD-MCNC: 16.2 G/DL (ref 12–17)
IMM GRANULOCYTES # BLD AUTO: 0.01 THOUSAND/UL (ref 0–0.2)
IMM GRANULOCYTES NFR BLD AUTO: 0 % (ref 0–2)
LDLC SERPL CALC-MCNC: 108 MG/DL (ref 0–100)
LYMPHOCYTES # BLD AUTO: 1.63 THOUSANDS/ÂΜL (ref 0.6–4.47)
LYMPHOCYTES NFR BLD AUTO: 28 % (ref 14–44)
MCH RBC QN AUTO: 29.5 PG (ref 26.8–34.3)
MCHC RBC AUTO-ENTMCNC: 31.3 G/DL (ref 31.4–37.4)
MCV RBC AUTO: 94 FL (ref 82–98)
MONOCYTES # BLD AUTO: 0.76 THOUSAND/ÂΜL (ref 0.17–1.22)
MONOCYTES NFR BLD AUTO: 13 % (ref 4–12)
NEUTROPHILS # BLD AUTO: 3.25 THOUSANDS/ÂΜL (ref 1.85–7.62)
NEUTS SEG NFR BLD AUTO: 55 % (ref 43–75)
NRBC BLD AUTO-RTO: 0 /100 WBCS
PLATELET # BLD AUTO: 202 THOUSANDS/UL (ref 149–390)
PMV BLD AUTO: 12 FL (ref 8.9–12.7)
POTASSIUM SERPL-SCNC: 4.3 MMOL/L (ref 3.5–5.3)
PROT SERPL-MCNC: 6.8 G/DL (ref 6.4–8.4)
PSA SERPL-MCNC: 1.03 NG/ML (ref 0–4)
RBC # BLD AUTO: 5.5 MILLION/UL (ref 3.88–5.62)
SODIUM SERPL-SCNC: 142 MMOL/L (ref 135–147)
TRIGL SERPL-MCNC: 116 MG/DL (ref ?–150)
WBC # BLD AUTO: 5.85 THOUSAND/UL (ref 4.31–10.16)

## 2025-03-25 PROCEDURE — G0103 PSA SCREENING: HCPCS

## 2025-03-25 PROCEDURE — 80061 LIPID PANEL: CPT

## 2025-03-25 PROCEDURE — 80053 COMPREHEN METABOLIC PANEL: CPT

## 2025-03-25 PROCEDURE — 84439 ASSAY OF FREE THYROXINE: CPT

## 2025-03-25 PROCEDURE — 84443 ASSAY THYROID STIM HORMONE: CPT

## 2025-03-25 PROCEDURE — 36415 COLL VENOUS BLD VENIPUNCTURE: CPT

## 2025-03-25 PROCEDURE — 82746 ASSAY OF FOLIC ACID SERUM: CPT

## 2025-03-25 PROCEDURE — 82607 VITAMIN B-12: CPT

## 2025-03-25 PROCEDURE — 85025 COMPLETE CBC W/AUTO DIFF WBC: CPT

## 2025-03-26 ENCOUNTER — RESULTS FOLLOW-UP (OUTPATIENT)
Dept: FAMILY MEDICINE CLINIC | Facility: CLINIC | Age: 50
End: 2025-03-26

## 2025-03-26 DIAGNOSIS — E53.8 B12 DEFICIENCY: Primary | ICD-10-CM

## 2025-03-26 LAB
FOLATE SERPL-MCNC: 13.2 NG/ML
T4 FREE SERPL-MCNC: 1.15 NG/DL (ref 0.61–1.12)
TSH SERPL DL<=0.05 MIU/L-ACNC: 0.01 UIU/ML (ref 0.45–4.5)
VIT B12 SERPL-MCNC: 204 PG/ML (ref 180–914)

## 2025-03-26 RX ORDER — LANOLIN ALCOHOL/MO/W.PET/CERES
1000 CREAM (GRAM) TOPICAL DAILY
Qty: 100 TABLET | Refills: 1 | Status: SHIPPED | OUTPATIENT
Start: 2025-03-26 | End: 2025-10-12

## 2025-03-30 DIAGNOSIS — I50.22 CHRONIC HFREF (HEART FAILURE WITH REDUCED EJECTION FRACTION) (HCC): ICD-10-CM

## 2025-03-31 ENCOUNTER — TELEPHONE (OUTPATIENT)
Dept: FAMILY MEDICINE CLINIC | Facility: CLINIC | Age: 50
End: 2025-03-31

## 2025-03-31 ENCOUNTER — TELEPHONE (OUTPATIENT)
Dept: NON INVASIVE DIAGNOSTICS | Facility: CLINIC | Age: 50
End: 2025-03-31

## 2025-03-31 DIAGNOSIS — E05.90 HYPERTHYROIDISM: Primary | ICD-10-CM

## 2025-03-31 RX ORDER — METOPROLOL SUCCINATE 25 MG/1
25 TABLET, EXTENDED RELEASE ORAL 2 TIMES DAILY
Qty: 180 TABLET | Refills: 1 | Status: SHIPPED | OUTPATIENT
Start: 2025-03-31

## 2025-03-31 NOTE — TELEPHONE ENCOUNTER
----- Message from Darrel García PA-C sent at 3/31/2025 11:18 AM EDT -----  Agree, appearing hyperthyroid, I placed a referral to Dr. Eliza Rojo  ----- Message -----  From: Walter Walter DO  Sent: 3/29/2025   4:28 PM EDT  To: Darrel García PA-C; Fanny Soliz MD; #    Team can we please call Antonio and let him know that his thyroid numbers are still a bit off?    He should be scheduled with an endocrinologist for evaluation.    Thanks    walter

## 2025-04-09 ENCOUNTER — PATIENT MESSAGE (OUTPATIENT)
Dept: FAMILY MEDICINE CLINIC | Facility: CLINIC | Age: 50
End: 2025-04-09

## 2025-04-30 ENCOUNTER — OFFICE VISIT (OUTPATIENT)
Age: 50
End: 2025-04-30
Payer: COMMERCIAL

## 2025-04-30 VITALS
BODY MASS INDEX: 37.35 KG/M2 | SYSTOLIC BLOOD PRESSURE: 122 MMHG | OXYGEN SATURATION: 98 % | HEIGHT: 74 IN | WEIGHT: 291 LBS | HEART RATE: 61 BPM | DIASTOLIC BLOOD PRESSURE: 60 MMHG

## 2025-04-30 DIAGNOSIS — F51.04 CHRONIC INSOMNIA: Primary | ICD-10-CM

## 2025-04-30 DIAGNOSIS — G47.33 OSA (OBSTRUCTIVE SLEEP APNEA): ICD-10-CM

## 2025-04-30 DIAGNOSIS — E66.09 CLASS 2 OBESITY DUE TO EXCESS CALORIES WITHOUT SERIOUS COMORBIDITY WITH BODY MASS INDEX (BMI) OF 37.0 TO 37.9 IN ADULT: ICD-10-CM

## 2025-04-30 DIAGNOSIS — E66.812 CLASS 2 OBESITY DUE TO EXCESS CALORIES WITHOUT SERIOUS COMORBIDITY WITH BODY MASS INDEX (BMI) OF 37.0 TO 37.9 IN ADULT: ICD-10-CM

## 2025-04-30 PROCEDURE — 99214 OFFICE O/P EST MOD 30 MIN: CPT | Performed by: INTERNAL MEDICINE

## 2025-04-30 RX ORDER — GABAPENTIN 100 MG/1
300 CAPSULE ORAL
Qty: 90 CAPSULE | Refills: 2 | Status: SHIPPED | OUTPATIENT
Start: 2025-04-30 | End: 2025-07-29

## 2025-04-30 RX ORDER — SEMAGLUTIDE 0.25 MG/.5ML
INJECTION, SOLUTION SUBCUTANEOUS
COMMUNITY
Start: 2025-04-14 | End: 2025-05-02

## 2025-04-30 NOTE — ASSESSMENT & PLAN NOTE
Counseled patient on lifestyle modifications including diet and exercise  Explained that weight loss will decrease the severity of sleep apnea  Started Wegovy third dose

## 2025-04-30 NOTE — ASSESSMENT & PLAN NOTE
Mild obstructive sleep apnea    Previously on a fixed pressure of 10    Compliance from 3/26 - 4/24  On APAP 8-11  More than 4 hours 100%, 9 hours and 42 minutes  Median pressure 10.3, 95 percentile pressure 10.8  Median leak 0.0  Residual AHI 1.9    Excellent compliance  Patient reports bloating, belching, aerophagia    Plan  Decrease pressure to 5-10 cm H2O  Patient has lost some weight with Wegovy  Orders:    PAP DME Pressure Change

## 2025-04-30 NOTE — PROGRESS NOTES
Name: Vadim Chapa      : 1975      MRN: [id number]  Encounter Provider: Jose Daniel Anaya MD  Encounter Date: 2025   Encounter department: [facility]    :  Assessment & Plan  Chronic insomnia  Sleep onset and sleep maintenance insomnia  Currently on 3 mg of Lunesta    Plan  Started patient on gabapentin at 100 mg with an increase to 300 mg  Orders:    gabapentin (Neurontin) 100 mg capsule; Take 3 capsules (300 mg total) by mouth daily at bedtime    CONCHIS (obstructive sleep apnea)  Mild obstructive sleep apnea    Previously on a fixed pressure of 10    Compliance from 3/26 -   On APAP 8-11  More than 4 hours 100%, 9 hours and 42 minutes  Median pressure 10.3, 95 percentile pressure 10.8  Median leak 0.0  Residual AHI 1.9    Excellent compliance  Patient reports bloating, belching, aerophagia    Plan  Decrease pressure to 5-10 cm H2O  Patient has lost some weight with Wegovy  Orders:    PAP DME Pressure Change    Class 2 obesity due to excess calories without serious comorbidity with body mass index (BMI) of 37.0 to 37.9 in adult  Counseled patient on lifestyle modifications including diet and exercise  Explained that weight loss will decrease the severity of sleep apnea  Started Wegovy third dose         History of Present Illness     49-year-old male with a past medical history of heart failure with reduced ejection fraction EF of 20%, hypertension, paroxysmal atrial fibrillation who presents for follow-up of obstructive sleep apnea.    Initial office visit  Diagnostic PSG in 2017 showed an AHI of 8.2 at a weight of 291 pounds.    He states he is compliant with a fixed pressure of 10 cm H2O for many years.  He  has been using the same machine since 2017.  He is currently using an F20 mask.  He goes to bed at 9 PM and falls asleep by 11 PM.  He wakes up at 8 AM.  He gets 7 to 8 hours of sleep.  Prior to using CPAP he was snoring loudly with witnessed apneas.      He occasionally  drinks coffee.  He occasionally drinks alcohol.  His [place] score is 5 with use of CPAP.    Current office visit  Currently on APAP 8-11.  He has excellent compliance.  He notes improvement in symptoms.  He has bloating, belching, air swallowing..  He also has frequent nighttime awakenings and was prescribed Lunesta 3 mg.  He feels like he is only getting 4 hours of sleep.  He has difficulty with sleep onset and maintenance.                      Sitting and reading: Slight chance of dozing  Watching TV: Slight chance of dozing  Sitting, inactive in a public place (e.g. a theatre or a meeting): Slight chance of dozing  As a passenger in a car for an hour without a break: Moderate chance of dozing  Lying down to rest in the afternoon when circumstances permit: Moderate chance of dozing  Sitting and talking to someone: Would never doze  Sitting quietly after a lunch without alcohol: Slight chance of dozing  In a car, while stopped for a few minutes in traffic: Would never doze  Total score: 8       Review of Systems   Constitutional: Negative.    HENT: Negative.     Eyes: Negative.    Respiratory: Negative.     Cardiovascular: Negative.    Gastrointestinal: Negative.    Endocrine: Negative.    Genitourinary: Negative.    Musculoskeletal: Negative.    Skin: Negative.    Allergic/Immunologic: Negative.    Neurological: Negative.    Hematological: Negative.    Psychiatric/Behavioral: Negative.       Pertinent positives/negatives included in HPI and also as noted:       Pertinent Medical History           Medical History Reviewed by provider this encounter:     .  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/03/2017    Kidney stone     Myocarditis ([facility])     Sleep apnea      Ventricular tachycardia ([facility])      Past Surgical History:   Procedure Laterality Date    CARDIAC DEFIBRILLATOR PLACEMENT      CARDIAC ELECTROPHYSIOLOGY PROCEDURE N/A 07/03/2023    Procedure: Cardiac eps/svt ablation;  Surgeon: Mihai Walter DO;  [facility];  Service: Cardiology    CARDIAC ELECTROPHYSIOLOGY PROCEDURE N/A 07/03/2023    Procedure: Cardiac eps/afib ablation;  Surgeon: Mihai Walter DO;  [facility];  Service: Cardiology    COLONOSCOPY      OH ESOPHAGOGASTRODUODENOSCOPY TRANSORAL DIAGNOSTIC N/A 03/29/2017    Procedure: ESOPHAGOGASTRODUODENOSCOPY (EGD);  Surgeon: Valeriy Conte MD;  [facility]: [facility];  Service: Gastroenterology    OH INSJ/RPLCMT PERM DFB W/TRNSVNS LDS 1/DUAL CHMBR N/A 06/02/2017    Procedure: LEAD EXTRACTION/REIMPLANTATION AND ICD GENERATOR CHANGE ;  Surgeon: Enrique Warren MD;  [facility];  Service: Cardiology    OH LAPAROSCOPY SURG CHOLECYSTECTOMY N/A 05/19/2017    Procedure: LAPAROSCOPIC CHOLECYSTECTOMY ;  Surgeon: Tanner Leyva MD;  [facility];  Service: General    OH SURG TX ANAL FISTULA INTERSPHINCTERIC Right 12/6/2024    Procedure: FISTULOTOMY;  Surgeon: Erasmo Madison MD;  [facility];  Service: Colorectal    TONSILLECTOMY      TYMPANOSTOMY TUBE PLACEMENT       Family History   Problem Relation Age of Onset    No Known Problems Mother     No Known Problems Father     Sudden death Neg Hx     Heart disease Neg Hx       reports that he has never smoked. He has never used smokeless tobacco. He reports that he does not currently use alcohol. He reports that he does not use drugs.  Current Outpatient Medications   Medication Instructions    dofetilide (TIKOSYN) 250 mcg, Oral, 2 times daily    Entresto 24-26 MG TABS 1 tablet, Oral, 2 times daily    eszopiclone (LUNESTA) 3 mg, Oral, Daily at bedtime PRN, Take immediately before bedtime    furosemide (LASIX) 40 mg, Oral, Daily    gabapentin (NEURONTIN) 300 mg,  "Oral, Daily at bedtime    Jardiance 10 mg, Oral, Every morning    Klor-Con M20 20 MEQ tablet 20 mEq, Oral, Daily    metoprolol succinate (TOPROL-XL) 25 mg, Oral, 2 times daily    rivaroxaban (XARELTO) 20 mg, Oral, Daily with breakfast    spironolactone (ALDACTONE) 25 mg, Oral, Daily    vitamin B-12 (VITAMIN B-12) 1,000 mcg, Oral, Daily    Wegovy 0.25 MG/0.5ML inject 0.5 ml (0.25 mg total) under the skin once a week     Allergies   Allergen Reactions    Vicodin [Hydrocodone-Acetaminophen] Other (See Comments)     \"i get nasty\"      Current Outpatient Medications on File Prior to Visit   Medication Sig Dispense Refill    dofetilide (TIKOSYN) 250 mcg capsule Take 1 capsule (250 mcg total) by mouth 2 (two) times a day 180 capsule 3    Entresto 24-26 MG TABS TAKE 1 TABLET BY MOUTH TWICE A  tablet 3    furosemide (LASIX) 40 mg tablet TAKE 1 TABLET BY MOUTH EVERY DAY 90 tablet 3    Jardiance 10 MG TABS tablet TAKE 1 TABLET (10 MG TOTAL) BY MOUTH EVERY MORNING. 90 tablet 3    Klor-Con M20 20 MEQ tablet TAKE 1 TABLET BY MOUTH EVERY DAY 90 tablet 1    metoprolol succinate (TOPROL-XL) 25 mg 24 hr tablet TAKE 1 TABLET BY MOUTH TWICE A  tablet 1    rivaroxaban (Xarelto) 20 mg tablet Take 1 tablet (20 mg total) by mouth daily with breakfast 90 tablet 1    spironolactone (ALDACTONE) 25 mg tablet TAKE 1 TABLET (25 MG TOTAL) BY MOUTH DAILY. 90 tablet 1    vitamin B-12 (VITAMIN B-12) 1,000 mcg tablet Take 1 tablet (1,000 mcg total) by mouth daily 100 tablet 1    Wegovy 0.25 MG/0.5ML inject 0.5 ml (0.25 mg total) under the skin once a week      eszopiclone (LUNESTA) 3 MG tablet Take 1 tablet (3 mg total) by mouth daily at bedtime as needed for sleep Take immediately before bedtime 30 tablet 0     No current facility-administered medications on file prior to visit.      Social History     Tobacco Use    Smoking status: Never    Smokeless tobacco: Never   Vaping Use    Vaping status: Never Used   Substance and Sexual " "Activity    Alcohol use: Not Currently     Comment: OCCASIONAL; history of binge drinking -  drinking \"a lot\" on Fridays; stopped this about in late 2022. (Updated 12/04/2023).    Drug use: Never    Sexual activity: Not on file     Objective   /60 (Patient Position: Sitting, Cuff Size: Standard)   Pulse 61   Ht 6' 2\" (1.88 m)   Wt 132 kg (291 lb)   SpO2 98%   BMI 37.36 kg/m²        Physical Exam  Vitals reviewed.   HENT:      Head: Normocephalic and atraumatic.      Nose: Nose normal.      Mouth/Throat:      Mouth: Mucous membranes are moist.   Eyes:      Extraocular Movements: Extraocular movements intact.      Pupils: Pupils are equal, round, and reactive to light.   Cardiovascular:      Rate and Rhythm: Normal rate and regular rhythm.      Pulses: Normal pulses.   Pulmonary:      Effort: Pulmonary effort is normal.      Breath sounds: Normal breath sounds.   Abdominal:      General: Abdomen is flat. Bowel sounds are normal.      Palpations: Abdomen is soft.   Musculoskeletal:         General: Normal range of motion.      Cervical back: Normal range of motion.   Skin:     General: Skin is warm.   Neurological:      Mental Status: He is alert. Mental status is at baseline.   Psychiatric:         Mood and Affect: Mood normal.       Visit Vitals  /60 (Patient Position: Sitting, Cuff Size: Standard)   Pulse 61   Ht 6' 2\" (1.88 m)   Wt 132 kg (291 lb)   SpO2 98%   BMI 37.36 kg/m²   Smoking Status Never   BSA 2.55 m²         Data  Lab Results   Component Value Date    HGB 16.2 03/25/2025    HCT 51.8 (H) 03/25/2025    MCV 94 03/25/2025      Lab Results   Component Value Date    CALCIUM 8.9 03/25/2025    K 4.3 03/25/2025    CO2 28 03/25/2025     03/25/2025    BUN 13 03/25/2025    CREATININE 1.07 03/25/2025     Lab Results   Component Value Date    IRON 94 12/04/2023    TIBC 320 12/04/2023    FERRITIN 122 12/04/2023     Lab Results   Component Value Date    AST 22 03/25/2025    ALT 19 03/25/2025 "

## 2025-04-30 NOTE — PATIENT INSTRUCTIONS
Take gabapentin up to 300 mg at night  Change pressure to 5-10  If you feel significant bloating, belching, air swallowing call the office

## 2025-05-01 ENCOUNTER — TELEPHONE (OUTPATIENT)
Age: 50
End: 2025-05-01

## 2025-05-05 ENCOUNTER — TELEPHONE (OUTPATIENT)
Dept: FAMILY MEDICINE CLINIC | Facility: CLINIC | Age: 50
End: 2025-05-05

## 2025-05-06 NOTE — TELEPHONE ENCOUNTER
PA for (WEGOVY) 0.5 MG/0.5ML SUBMITTED to     via    []CMM-KEY:   []Surescripts-Case ID #   []Availity-Auth ID # NDC #   []Faxed to plan   [x]Other website [facility][id number]  []Phone call Case ID #     [x]PA sent as URGENT    All office notes, labs and other pertaining documents and studies sent. Clinical questions answered. Awaiting determination from insurance company.     Turnaround time for your insurance to make a decision on your Prior Authorization can take 7-21 business days.

## 2025-05-06 NOTE — TELEPHONE ENCOUNTER
Elsy called to inform office that no Prior Authorization is needed for this dose of Wegovy. She states that it was filed last month, and the approval is good till October 11, 2025. She has called pharmacy and will be ordering medication for patient. Will send patient my chart message to inform him.

## 2025-05-20 ENCOUNTER — OFFICE VISIT (OUTPATIENT)
Dept: FAMILY MEDICINE CLINIC | Facility: CLINIC | Age: 50
End: 2025-05-20
Payer: COMMERCIAL

## 2025-05-20 VITALS
HEART RATE: 65 BPM | WEIGHT: 282 LBS | OXYGEN SATURATION: 97 % | TEMPERATURE: 97.4 F | HEIGHT: 74 IN | DIASTOLIC BLOOD PRESSURE: 80 MMHG | SYSTOLIC BLOOD PRESSURE: 110 MMHG | BODY MASS INDEX: 36.19 KG/M2

## 2025-05-20 DIAGNOSIS — E66.812 CLASS 2 OBESITY DUE TO EXCESS CALORIES WITHOUT SERIOUS COMORBIDITY WITH BODY MASS INDEX (BMI) OF 36.0 TO 36.9 IN ADULT: Primary | ICD-10-CM

## 2025-05-20 DIAGNOSIS — E66.09 CLASS 2 OBESITY DUE TO EXCESS CALORIES WITHOUT SERIOUS COMORBIDITY WITH BODY MASS INDEX (BMI) OF 36.0 TO 36.9 IN ADULT: Primary | ICD-10-CM

## 2025-05-20 PROCEDURE — 99213 OFFICE O/P EST LOW 20 MIN: CPT | Performed by: PHYSICIAN ASSISTANT

## 2025-05-20 NOTE — ASSESSMENT & PLAN NOTE
Doing well on wegovy, continue to uptitrate  3 month recheck    BMI Counseling: Body mass index is 36.21 kg/m². The BMI is above normal. Nutrition recommendations include reducing portion sizes, decreasing overall calorie intake, moderation in carbohydrate intake, increasing intake of lean protein, reducing intake of saturated fat and trans fat, and reducing intake of cholesterol. Exercise recommendations include moderate aerobic physical activity for 150 minutes/week. Pharmacotherapy was ordered for patient to aid in weight loss.

## 2025-05-20 NOTE — PROGRESS NOTES
Name: Vadim Chapa      : 1975      MRN: [id number]  Encounter Provider: Darrel García PA-C  Encounter Date: 2025   Encounter department: [facility]    Assessment & Plan  Class 2 obesity due to excess calories without serious comorbidity with body mass index (BMI) of 36.0 to 36.9 in adult    Doing well on wegovy, continue to uptitrate  3 month recheck    BMI Counseling: Body mass index is 36.21 kg/m². The BMI is above normal. Nutrition recommendations include reducing portion sizes, decreasing overall calorie intake, moderation in carbohydrate intake, increasing intake of lean protein, reducing intake of saturated fat and trans fat, and reducing intake of cholesterol. Exercise recommendations include moderate aerobic physical activity for 150 minutes/week. Pharmacotherapy was ordered for patient to aid in weight loss.              History of Present Illness     Pt presents for follow up since starting GLP1 therapy  Down 12# in 2 months  Down nearly 50# from heaviest weight of 330#      Review of Systems   Constitutional:  Negative for chills and fever.   HENT:  Negative for ear pain and sore throat.    Eyes:  Negative for pain and visual disturbance.   Respiratory:  Negative for cough and shortness of breath.    Cardiovascular:  Negative for chest pain and palpitations.   Gastrointestinal:  Negative for abdominal pain and vomiting.   Genitourinary:  Negative for dysuria and hematuria.   Musculoskeletal:  Negative for arthralgias and back pain.   Skin:  Negative for color change and rash.   Neurological:  Negative for seizures and syncope.   All other systems reviewed and are negative.    Past Medical History:   Diagnosis Date   • A-fib ([facility])    • AICD (automatic cardioverter/defibrillator) present    • CHF (congestive heart failure) ([facility])    • Coxsackie virus infection 2017   • Hypertension    • ICD (implantable cardioverter-defibrillator) lead failure 2017   • Kidney  "stone    • Myocarditis ([facility])    • Sleep apnea    • Ventricular tachycardia ([facility])      Past Surgical History:   Procedure Laterality Date   • CARDIAC DEFIBRILLATOR PLACEMENT     • CARDIAC ELECTROPHYSIOLOGY PROCEDURE N/A 07/03/2023    Procedure: Cardiac eps/svt ablation;  Surgeon: Mihai Walter DO;  [facility];  Service: Cardiology   • CARDIAC ELECTROPHYSIOLOGY PROCEDURE N/A 07/03/2023    Procedure: Cardiac eps/afib ablation;  Surgeon: Mihai Walter DO;  [facility];  Service: Cardiology   • COLONOSCOPY     • CA ESOPHAGOGASTRODUODENOSCOPY TRANSORAL DIAGNOSTIC N/A 03/29/2017    Procedure: ESOPHAGOGASTRODUODENOSCOPY (EGD);  Surgeon: Valeriy Conte MD;  [facility];  Service: Gastroenterology   • CA INSJ/RPLCMT PERM DFB W/TRNSVNS LDS 1/DUAL CHMBR N/A 06/02/2017    Procedure: LEAD EXTRACTION/REIMPLANTATION AND ICD GENERATOR CHANGE ;  Surgeon: Enrique Warren MD;  [facility];  Service: Cardiology   • CA LAPAROSCOPY SURG CHOLECYSTECTOMY N/A 05/19/2017    Procedure: LAPAROSCOPIC CHOLECYSTECTOMY ;  Surgeon: Tanner Leyva MD;  [facility];  Service: General   • CA SURG TX ANAL FISTULA INTERSPHINCTERIC Right 12/6/2024    Procedure: FISTULOTOMY;  Surgeon: Erasmo Madison MD;  [facility];  Service: Colorectal   • TONSILLECTOMY     • TYMPANOSTOMY TUBE PLACEMENT       Family History   Problem Relation Age of Onset   • No Known Problems Mother    • No Known Problems Father    • Sudden death Neg Hx    • Heart disease Neg Hx      Social History     Tobacco Use   • Smoking status: Never   • Smokeless tobacco: Never   Vaping Use   • Vaping status: Never Used   Substance and Sexual Activity   • Alcohol use: Not Currently     Comment: OCCASIONAL; history of binge drinking -  drinking \"a lot\" on Fridays; stopped this about in late 2022. (Updated 12/04/2023).   • Drug use: Never   • Sexual activity: Not on file     Medications[1]  Allergies   Allergen " "Reactions   • Vicodin [Hydrocodone-Acetaminophen] Other (See Comments)     \"i get nasty\"     Immunization History   Administered Date(s) Administered   • COVID-19 MODERNA VACC 0.5 ML IM 11/12/2021   • COVID-19 PFIZER VACCINE 0.3 ML IM 03/24/2021, 04/16/2021   • Tdap 09/12/2022     Objective   /80   Pulse 65   Temp (!) 97.4 °F (36.3 °C)   Ht 6' 2\" (1.88 m)   Wt 128 kg (282 lb)   SpO2 97%   BMI 36.21 kg/m²     Physical Exam  Vitals and nursing note reviewed.   Constitutional:       Appearance: Normal appearance.   HENT:      Head: Normocephalic and atraumatic.     Cardiovascular:      Rate and Rhythm: Normal rate and regular rhythm.      Heart sounds: Normal heart sounds. No murmur heard.  Pulmonary:      Effort: Pulmonary effort is normal.      Breath sounds: Normal breath sounds. No wheezing or rales.     Skin:     General: Skin is warm and dry.     Neurological:      Mental Status: He is alert.                [1]  Current Outpatient Medications on File Prior to Visit   Medication Sig   • dofetilide (TIKOSYN) 250 mcg capsule Take 1 capsule (250 mcg total) by mouth 2 (two) times a day   • Entresto 24-26 MG TABS TAKE 1 TABLET BY MOUTH TWICE A DAY   • eszopiclone (LUNESTA) 3 MG tablet Take 1 tablet (3 mg total) by mouth daily at bedtime as needed for sleep Take immediately before bedtime   • furosemide (LASIX) 40 mg tablet TAKE 1 TABLET BY MOUTH EVERY DAY   • gabapentin (Neurontin) 100 mg capsule Take 3 capsules (300 mg total) by mouth daily at bedtime   • Jardiance 10 MG TABS tablet TAKE 1 TABLET (10 MG TOTAL) BY MOUTH EVERY MORNING.   • Klor-Con M20 20 MEQ tablet TAKE 1 TABLET BY MOUTH EVERY DAY   • metoprolol succinate (TOPROL-XL) 25 mg 24 hr tablet TAKE 1 TABLET BY MOUTH TWICE A DAY   • rivaroxaban (Xarelto) 20 mg tablet Take 1 tablet (20 mg total) by mouth daily with breakfast   • Semaglutide-Weight Management (WEGOVY) 0.5 MG/0.5ML Inject 0.5 mL (0.5 mg total) under the skin once a week for 28 days   • " spironolactone (ALDACTONE) 25 mg tablet TAKE 1 TABLET (25 MG TOTAL) BY MOUTH DAILY.   • vitamin B-12 (VITAMIN B-12) 1,000 mcg tablet Take 1 tablet (1,000 mcg total) by mouth daily

## 2025-06-03 ENCOUNTER — APPOINTMENT (EMERGENCY)
Dept: CT IMAGING | Facility: HOSPITAL | Age: 50
End: 2025-06-03
Payer: COMMERCIAL

## 2025-06-03 ENCOUNTER — HOSPITAL ENCOUNTER (EMERGENCY)
Facility: HOSPITAL | Age: 50
Discharge: HOME | End: 2025-06-03
Attending: EMERGENCY MEDICINE
Payer: COMMERCIAL

## 2025-06-03 VITALS
SYSTOLIC BLOOD PRESSURE: 106 MMHG | RESPIRATION RATE: 18 BRPM | TEMPERATURE: 98.3 F | DIASTOLIC BLOOD PRESSURE: 83 MMHG | HEART RATE: 57 BPM | OXYGEN SATURATION: 98 %

## 2025-06-03 DIAGNOSIS — R11.2 NAUSEA AND VOMITING: ICD-10-CM

## 2025-06-03 DIAGNOSIS — N20.0 NEPHROLITHIASIS: Primary | ICD-10-CM

## 2025-06-03 DIAGNOSIS — R10.9 LEFT FLANK PAIN: ICD-10-CM

## 2025-06-03 DIAGNOSIS — E66.812 CLASS 2 OBESITY DUE TO EXCESS CALORIES WITHOUT SERIOUS COMORBIDITY WITH BODY MASS INDEX (BMI) OF 36.0 TO 36.9 IN ADULT: Primary | ICD-10-CM

## 2025-06-03 DIAGNOSIS — E66.09 CLASS 2 OBESITY DUE TO EXCESS CALORIES WITHOUT SERIOUS COMORBIDITY WITH BODY MASS INDEX (BMI) OF 36.0 TO 36.9 IN ADULT: Primary | ICD-10-CM

## 2025-06-03 LAB
ALBUMIN SERPL BCG-MCNC: 4.5 G/DL (ref 3.5–5)
ALP SERPL-CCNC: 72 U/L (ref 34–104)
ALT SERPL W P-5'-P-CCNC: 15 U/L (ref 7–52)
ANION GAP SERPL CALCULATED.3IONS-SCNC: 9 MMOL/L (ref 4–13)
AST SERPL W P-5'-P-CCNC: 23 U/L (ref 13–39)
BACTERIA UR QL AUTO: ABNORMAL /HPF
BASOPHILS # BLD AUTO: 0.04 THOUSANDS/ÂΜL (ref 0–0.1)
BASOPHILS NFR BLD AUTO: 0 % (ref 0–1)
BILIRUB SERPL-MCNC: 3.17 MG/DL (ref 0.2–1)
BILIRUB UR QL STRIP: NEGATIVE
BUN SERPL-MCNC: 12 MG/DL (ref 5–25)
CALCIUM SERPL-MCNC: 9.6 MG/DL (ref 8.4–10.2)
CHLORIDE SERPL-SCNC: 104 MMOL/L (ref 96–108)
CLARITY UR: CLEAR
CO2 SERPL-SCNC: 26 MMOL/L (ref 21–32)
COLOR UR: YELLOW
CREAT SERPL-MCNC: 1.2 MG/DL (ref 0.6–1.3)
EOSINOPHIL # BLD AUTO: 0.04 THOUSAND/ÂΜL (ref 0–0.61)
EOSINOPHIL NFR BLD AUTO: 0 % (ref 0–6)
ERYTHROCYTE [DISTWIDTH] IN BLOOD BY AUTOMATED COUNT: 14.3 % (ref 11.6–15.1)
GFR SERPL CREATININE-BSD FRML MDRD: 71 ML/MIN/1.73SQ M
GLUCOSE SERPL-MCNC: 102 MG/DL (ref 65–140)
GLUCOSE UR STRIP-MCNC: ABNORMAL MG/DL
HCT VFR BLD AUTO: 50.8 % (ref 36.5–49.3)
HGB BLD-MCNC: 16.4 G/DL (ref 12–17)
HGB UR QL STRIP.AUTO: ABNORMAL
IMM GRANULOCYTES # BLD AUTO: 0.04 THOUSAND/UL (ref 0–0.2)
IMM GRANULOCYTES NFR BLD AUTO: 0 % (ref 0–2)
KETONES UR STRIP-MCNC: ABNORMAL MG/DL
LEUKOCYTE ESTERASE UR QL STRIP: ABNORMAL
LIPASE SERPL-CCNC: 22 U/L (ref 11–82)
LYMPHOCYTES # BLD AUTO: 1.22 THOUSANDS/ÂΜL (ref 0.6–4.47)
LYMPHOCYTES NFR BLD AUTO: 11 % (ref 14–44)
MCH RBC QN AUTO: 29.3 PG (ref 26.8–34.3)
MCHC RBC AUTO-ENTMCNC: 32.3 G/DL (ref 31.4–37.4)
MCV RBC AUTO: 91 FL (ref 82–98)
MONOCYTES # BLD AUTO: 0.88 THOUSAND/ÂΜL (ref 0.17–1.22)
MONOCYTES NFR BLD AUTO: 8 % (ref 4–12)
MUCOUS THREADS UR QL AUTO: ABNORMAL
NEUTROPHILS # BLD AUTO: 9.19 THOUSANDS/ÂΜL (ref 1.85–7.62)
NEUTS SEG NFR BLD AUTO: 81 % (ref 43–75)
NITRITE UR QL STRIP: NEGATIVE
NON-SQ EPI CELLS URNS QL MICRO: ABNORMAL /HPF
NRBC BLD AUTO-RTO: 0 /100 WBCS
PH UR STRIP.AUTO: 5 [PH]
PLATELET # BLD AUTO: 176 THOUSANDS/UL (ref 149–390)
PMV BLD AUTO: 11.2 FL (ref 8.9–12.7)
POTASSIUM SERPL-SCNC: 4.5 MMOL/L (ref 3.5–5.3)
PROT SERPL-MCNC: 7.4 G/DL (ref 6.4–8.4)
PROT UR STRIP-MCNC: NEGATIVE MG/DL
RBC # BLD AUTO: 5.59 MILLION/UL (ref 3.88–5.62)
RBC #/AREA URNS AUTO: ABNORMAL /HPF
SODIUM SERPL-SCNC: 139 MMOL/L (ref 135–147)
SP GR UR STRIP.AUTO: 1.02 (ref 1–1.03)
UROBILINOGEN UR STRIP-ACNC: <2 MG/DL
WBC # BLD AUTO: 11.41 THOUSAND/UL (ref 4.31–10.16)
WBC #/AREA URNS AUTO: ABNORMAL /HPF

## 2025-06-03 PROCEDURE — 96375 TX/PRO/DX INJ NEW DRUG ADDON: CPT

## 2025-06-03 PROCEDURE — 83690 ASSAY OF LIPASE: CPT

## 2025-06-03 PROCEDURE — 74176 CT ABD & PELVIS W/O CONTRAST: CPT

## 2025-06-03 PROCEDURE — 36415 COLL VENOUS BLD VENIPUNCTURE: CPT

## 2025-06-03 PROCEDURE — 85025 COMPLETE CBC W/AUTO DIFF WBC: CPT

## 2025-06-03 PROCEDURE — 96361 HYDRATE IV INFUSION ADD-ON: CPT

## 2025-06-03 PROCEDURE — 99285 EMERGENCY DEPT VISIT HI MDM: CPT | Performed by: EMERGENCY MEDICINE

## 2025-06-03 PROCEDURE — 99284 EMERGENCY DEPT VISIT MOD MDM: CPT

## 2025-06-03 PROCEDURE — 81001 URINALYSIS AUTO W/SCOPE: CPT

## 2025-06-03 PROCEDURE — 80053 COMPREHEN METABOLIC PANEL: CPT

## 2025-06-03 PROCEDURE — 96374 THER/PROPH/DIAG INJ IV PUSH: CPT

## 2025-06-03 RX ORDER — SEMAGLUTIDE 1 MG/.5ML
INJECTION, SOLUTION SUBCUTANEOUS
Qty: 2 ML | Refills: 0 | Status: SHIPPED | OUTPATIENT
Start: 2025-06-03

## 2025-06-03 RX ORDER — HYDROMORPHONE HCL/PF 1 MG/ML
1 SYRINGE (ML) INJECTION ONCE
Status: COMPLETED | OUTPATIENT
Start: 2025-06-03 | End: 2025-06-03

## 2025-06-03 RX ORDER — ONDANSETRON 2 MG/ML
4 INJECTION INTRAMUSCULAR; INTRAVENOUS ONCE
Status: COMPLETED | OUTPATIENT
Start: 2025-06-03 | End: 2025-06-03

## 2025-06-03 RX ORDER — ONDANSETRON 4 MG/1
4 TABLET, FILM COATED ORAL EVERY 6 HOURS
Qty: 12 TABLET | Refills: 0 | Status: SHIPPED | OUTPATIENT
Start: 2025-06-03

## 2025-06-03 RX ORDER — KETOROLAC TROMETHAMINE 30 MG/ML
15 INJECTION, SOLUTION INTRAMUSCULAR; INTRAVENOUS ONCE
Status: COMPLETED | OUTPATIENT
Start: 2025-06-03 | End: 2025-06-03

## 2025-06-03 RX ORDER — OXYCODONE HYDROCHLORIDE 5 MG/1
5 TABLET ORAL EVERY 6 HOURS PRN
Qty: 12 TABLET | Refills: 0 | Status: SHIPPED | OUTPATIENT
Start: 2025-06-03

## 2025-06-03 RX ADMIN — ONDANSETRON 4 MG: 2 INJECTION INTRAMUSCULAR; INTRAVENOUS at 09:52

## 2025-06-03 RX ADMIN — SODIUM CHLORIDE 500 ML: 0.9 INJECTION, SOLUTION INTRAVENOUS at 09:28

## 2025-06-03 RX ADMIN — HYDROMORPHONE HYDROCHLORIDE 1 MG: 1 INJECTION, SOLUTION INTRAMUSCULAR; INTRAVENOUS; SUBCUTANEOUS at 09:51

## 2025-06-03 RX ADMIN — KETOROLAC TROMETHAMINE 15 MG: 30 INJECTION, SOLUTION INTRAMUSCULAR; INTRAVENOUS at 09:31

## 2025-06-03 NOTE — ED PROVIDER NOTES
Time reflects when diagnosis was documented in both MDM as applicable and the Disposition within this note       Time User Action Codes Description Comment    6/3/2025 11:51 AM Levy Rodriguez [N20.0] Nephrolithiasis     6/3/2025 11:51 AM Levy Rodriguez [R10.9] Left flank pain     6/3/2025 12:05 PM Levy Rodriguez [R11.2] Nausea and vomiting           ED Disposition       ED Disposition   Discharge    Condition   Stable    Date/Time   Tue [place] 3, 2025 11:50 AM    Comment   Vadim Chapa discharge to home/self care.                   Assessment & Plan       Medical Decision Making  Patient is a 49-year-old male with past medical history of CHF with ejection fraction of 20%, obesity, prior nephrolithiasis presenting to emergency department for a 3-hour history of left-sided flank pain and dysuria.  Also complaining of associated nausea and vomiting.  Having some radiation of his left flank pain into his left upper quadrant.    Vital signs stable, patient is in no acute distress.  Exam is overall benign with some left-sided CVA tenderness, but no abdominal tenderness or guarding to palpation.  No overlying rashes of the left flank.  Presentation and history concerning for UTI, obstructing nephrolithiasis, pyelonephritis, hydronephrosis.  Plan to order CBC to assess for leukocytosis or anemia.  CMP to assess for electrolyte disturbance, renal function, hepatic function.  Lipase to assess for pancreatic function given complaints of some radiation of pain to the left upper quadrant with nausea and vomiting.  Urinalysis to assess for UTI or occult hematuria.  CT abdomen pelvis renal stone study to rule out nephrolithiasis or hydronephrosis.  Will give 500 cc bolus of normal saline given reduced ejection fraction and Toradol for pain.    Workup was significant for mild leukocytosis. Elevated total bilirubin seen on CMP but patient appears to have chronic elevations of total and direct bilirubin. UA negative for UTI. CT  renal stone study demonstrating punctate nonobstructing calculus in the left ureterovesicular junction, as well as a punctate nonobstructing left renal calculus.  Reviewed results of workup with the patient and discussed measures for outpatient management of his symptoms.  Recommended adequate fluid intake to help promote urine production and passage of the stone.  Patient reports he was told not to take NSAIDs by his cardiologist secondary to his CHF.  Therefore did not prescribe or recommend any NSAIDs, rather directed the patient to take Tylenol on a scheduled basis every 6 hours.  Also provided prescription for oxycodone 5 mg for breakthrough pain.  Provided referral to urology for further management if he is unable to pass his stone.  Also discussed straining of urine to identify passage.  Reviewed return precautions with the patient including new fevers or chills, intractable nausea or vomiting, inability to urinate for greater than 8 hours, or pain not controlled with analgesics.  Patient understands and agrees to stated plan.    Amount and/or Complexity of Data Reviewed  Labs: ordered. Decision-making details documented in ED Course.  Radiology: ordered. Decision-making details documented in ED Course.    Risk  Prescription drug management.        ED Course as of 06/03/25 1600   Tue Jun 03, 2025   0942 WBC(!): 11.41   0955 LIPASE: 22   1033 Patient reassessed, reporting improvement in pain and nausea at this time. Going to bathroom and instructed to supply urine sample for analysis   1106 CT renal stone study abdomen pelvis wo contrast  Punctate nonobstructing stone within the left UVJ and punctate nonobstructing left renal calculus       Medications   ketorolac (TORADOL) injection 15 mg (15 mg Intravenous Given 6/3/25 0931)   sodium chloride 0.9 % bolus 500 mL (0 mL Intravenous Stopped 6/3/25 1211)   ondansetron (ZOFRAN) injection 4 mg (4 mg Intravenous Given 6/3/25 0952)   HYDROmorphone (DILAUDID)  injection 1 mg (1 mg Intravenous Given 6/3/25 0951)       ED Risk Strat Scores                    No data recorded        SBIRT 22yo+      Flowsheet Row Most Recent Value   Initial Alcohol Screen: US AUDIT-C     1. How often do you have a drink containing alcohol? 0 Filed at: 06/03/2025 1024   2. How many drinks containing alcohol do you have on a typical day you are drinking?  0 Filed at: 06/03/2025 1024   3a. Male UNDER 65: How often do you have five or more drinks on one occasion? 0 Filed at: 06/03/2025 1024   Audit-C Score 0 Filed at: 06/03/2025 1024   LASHA: How many times in the past year have you...    Used an illegal drug or used a prescription medication for non-medical reasons? Never Filed at: 06/03/2025 1024                            History of Present Illness       Chief Complaint   Patient presents with    Flank Pain     Pt reports waking this am with left sided flank pain, hx of stones. +N/V Slight dysuria.       Past Medical History[1]   Past Surgical History[2]   Family History[3]   Social History[4]   E-Cigarette/Vaping    E-Cigarette Use Never User       E-Cigarette/Vaping Substances    Nicotine No     THC No     CBD No     Flavoring No     Other No     Unknown No       I have reviewed and agree with the history as documented.     Antonio is a 49-year-old male with past medical history of CHF with ejection fraction of 20% and obesity presenting to the emergency department for evaluation of a 3-hour history of left-sided flank pain and dysuria.  Patient states he woke up this morning around 6 AM and needed to urinate.  He noticed some burning and tingling with urination, but denies any hematuria or abnormal urine color or odor.  He laid back down in bed and had sudden onset of left-sided flank pain.  This caused him to have an episode of vomiting, with some associated chills.  He also attempted to have a bowel movement, was able to pass a small amount of stool had some relief of his pain.  Does complain  of some radiation of his left flank pain into his left upper quadrant at times.  Denies any current nausea or abdominal pain.  He denies fever, headaches, lightheadedness, dizziness, chest pain, shortness of breath, diarrhea, numbness or weakness.  Reports a history of kidney stones approximately 17 years ago, and states his progression of symptoms is very similar to today's episode.      History provided by:  Patient and parent   used: No        Review of Systems   Constitutional:  Positive for chills. Negative for fever.   HENT: Negative.     Respiratory:  Negative for shortness of breath.    Cardiovascular:  Negative for chest pain and palpitations.   Gastrointestinal:  Positive for abdominal pain, nausea and vomiting.   Genitourinary:  Positive for dysuria and flank pain. Negative for hematuria and urgency.   Musculoskeletal:  Negative for back pain and neck pain.   Skin:  Negative for rash.   Neurological:  Negative for dizziness, weakness, light-headedness, numbness and headaches.   All other systems reviewed and are negative.          Objective       ED Triage Vitals   Temperature Pulse Blood Pressure Respirations SpO2 Patient Position - Orthostatic VS   06/03/25 0902 06/03/25 0902 06/03/25 0902 06/03/25 0902 06/03/25 0902 06/03/25 0902   98.3 °F (36.8 °C) 74 117/57 18 100 % Sitting      Temp Source Heart Rate Source BP Location FiO2 (%) Pain Score    06/03/25 0902 06/03/25 0902 06/03/25 0902 -- 06/03/25 1024    Oral Monitor Left arm  10 - Worst Possible Pain      Vitals      Date and Time Temp Pulse SpO2 Resp BP Pain Score FACES Pain Rating User   06/03/25 1211 -- 57 98 % -- 106/83 -- -- AM   06/03/25 1100 -- 70 97 % 18 126/78 -- -- AM   06/03/25 1024 -- -- -- -- -- 10 - Worst Possible Pain -- AM   06/03/25 0902 98.3 °F (36.8 °C) 74 100 % 18 117/57 -- -- LF            Physical Exam  Vitals and nursing note reviewed.   Constitutional:       General: He is not in acute distress.      Appearance: He is obese. He is not ill-appearing.   HENT:      Head: Normocephalic.      Mouth/Throat:      Mouth: Mucous membranes are moist.     Eyes:      General:         Right eye: No discharge.         Left eye: No discharge.      Conjunctiva/sclera: Conjunctivae normal.       Cardiovascular:      Rate and Rhythm: Normal rate and regular rhythm.      Pulses: Normal pulses.   Pulmonary:      Effort: Pulmonary effort is normal. No respiratory distress.      Breath sounds: Normal breath sounds.   Abdominal:      Palpations: Abdomen is soft.      Tenderness: There is no abdominal tenderness. There is left CVA tenderness. There is no right CVA tenderness or guarding.      Comments: Rounded obese abdomen     Musculoskeletal:      Cervical back: No tenderness.      Right lower leg: No edema.      Left lower leg: No edema.     Skin:     General: Skin is warm and dry.      Capillary Refill: Capillary refill takes less than 2 seconds.     Neurological:      Mental Status: He is alert.     Psychiatric:         Mood and Affect: Mood normal.         Behavior: Behavior normal.         Results Reviewed       Procedure Component Value Units Date/Time    Urine Microscopic [[id number]]  (Abnormal) Collected: 06/03/25 1039    Lab Status: Final result Specimen: Urine, Clean Catch Updated: 06/03/25 1108     RBC, UA Innumerable /hpf      WBC, UA 1-2 /hpf      Epithelial Cells Occasional /hpf      Bacteria, UA Occasional /hpf      MUCUS THREADS Moderate    UA w Reflex to Microscopic w Reflex to Culture [[id number]]  (Abnormal) Collected: 06/03/25 1039    Lab Status: Final result Specimen: Urine, Clean Catch Updated: 06/03/25 1101     Color, UA Yellow     Clarity, UA Clear     Specific Gravity, UA 1.022     pH, UA 5.0     Leukocytes, UA Elevated glucose may cause decreased leukocyte values. See urine microscopic for UWBC result     Nitrite, UA Negative     Protein, UA Negative mg/dl      Glucose, UA >=1000 (1%) mg/dl      Ketones, UA  10 (1+) mg/dl      Urobilinogen, UA <2.0 mg/dl      Bilirubin, UA Negative     Occult Blood, UA Large    Comprehensive metabolic panel [[id number]]  (Abnormal) Collected: 06/03/25 0927    Lab Status: Final result Specimen: Blood from Arm, Right Updated: 06/03/25 0953     Sodium 139 mmol/L      Potassium 4.5 mmol/L      Chloride 104 mmol/L      CO2 26 mmol/L      ANION GAP 9 mmol/L      BUN 12 mg/dL      Creatinine 1.20 mg/dL      Glucose 102 mg/dL      Calcium 9.6 mg/dL      AST 23 U/L      ALT 15 U/L      Alkaline Phosphatase 72 U/L      Total Protein 7.4 g/dL      Albumin 4.5 g/dL      Total Bilirubin 3.17 mg/dL      eGFR 71 ml/min/1.73sq m     Narrative:      National Kidney Disease Foundation guidelines for Chronic Kidney Disease (CKD):     Stage 1 with normal or high GFR (GFR > 90 mL/min/1.73 square meters)    Stage 2 Mild CKD (GFR = 60-89 mL/min/1.73 square meters)    Stage 3A Moderate CKD (GFR = 45-59 mL/min/1.73 square meters)    Stage 3B Moderate CKD (GFR = 30-44 mL/min/1.73 square meters)    Stage 4 Severe CKD (GFR = 15-29 mL/min/1.73 square meters)    Stage 5 End Stage CKD (GFR <15 mL/min/1.73 square meters)  Note: GFR calculation is accurate only with a steady state creatinine    Lipase [[id number]]  (Normal) Collected: 06/03/25 0927    Lab Status: Final result Specimen: Blood from Arm, Right Updated: 06/03/25 0953     Lipase 22 u/L     CBC and differential [[id number]]  (Abnormal) Collected: 06/03/25 0927    Lab Status: Final result Specimen: Blood from Arm, Right Updated: 06/03/25 0937     WBC 11.41 Thousand/uL      RBC 5.59 Million/uL      Hemoglobin 16.4 g/dL      Hematocrit 50.8 %      MCV 91 fL      MCH 29.3 pg      MCHC 32.3 g/dL      RDW 14.3 %      MPV 11.2 fL      Platelets 176 Thousands/uL      nRBC 0 /100 WBCs      Segmented % 81 %      Immature Grans % 0 %      Lymphocytes % 11 %      Monocytes % 8 %      Eosinophils Relative 0 %      Basophils Relative 0 %      Absolute Neutrophils 9.19  Thousands/µL      Absolute Immature Grans 0.04 Thousand/uL      Absolute Lymphocytes 1.22 Thousands/µL      Absolute Monocytes 0.88 Thousand/µL      Eosinophils Absolute 0.04 Thousand/µL      Basophils Absolute 0.04 Thousands/µL             CT renal stone study abdomen pelvis wo contrast   Final Interpretation by Andrews Brown MD (06/03 1103)      1.  Punctate nonobstructing calculus at the left ureterovesicular junction. There is also a punctate nonobstructing left renal calculus.   2.  Diffuse hepatic steatosis.      3.  Please refer to the report body for description of other incidental, chronic and/or benign findings.      I directly communicated these results to AMARIS ROMAN via "[employer] secure chat with confirmation of receipt on 6/3/2025 11:02 AM.         Workstation performed: [id number]             Procedures    ED Medication and Procedure Management   Prior to Admission Medications   Prescriptions Last Dose Informant Patient Reported? Taking?   Entresto 24-26 MG TABS  Self No No   Sig: TAKE 1 TABLET BY MOUTH TWICE A DAY   Jardiance 10 MG TABS tablet  Self No No   Sig: TAKE 1 TABLET (10 MG TOTAL) BY MOUTH EVERY MORNING.   Klor-Con M20 20 MEQ tablet  Self No No   Sig: TAKE 1 TABLET BY MOUTH EVERY DAY   Semaglutide-Weight Management (Wegovy) 1 MG/0.5ML   No No   Sig: Inject 1 mg under the skin weekly   dofetilide (TIKOSYN) 250 mcg capsule  Self No No   Sig: Take 1 capsule (250 mcg total) by mouth 2 (two) times a day   eszopiclone (LUNESTA) 3 MG tablet  Self No No   Sig: Take 1 tablet (3 mg total) by mouth daily at bedtime as needed for sleep Take immediately before bedtime   furosemide (LASIX) 40 mg tablet  Self No No   Sig: TAKE 1 TABLET BY MOUTH EVERY DAY   gabapentin (Neurontin) 100 mg capsule   No No   Sig: Take 3 capsules (300 mg total) by mouth daily at bedtime   metoprolol succinate (TOPROL-XL) 25 mg 24 hr tablet   No No   Sig: TAKE 1 TABLET BY MOUTH TWICE A DAY   rivaroxaban (Xarelto) 20 mg  tablet  Self No No   Sig: Take 1 tablet (20 mg total) by mouth daily with breakfast   spironolactone (ALDACTONE) 25 mg tablet   No No   Sig: TAKE 1 TABLET (25 MG TOTAL) BY MOUTH DAILY.   vitamin B-12 (VITAMIN B-12) 1,000 mcg tablet   No No   Sig: Take 1 tablet (1,000 mcg total) by mouth daily      Facility-Administered Medications: None     Discharge Medication List as of 6/3/2025 12:06 PM        START taking these medications    Details   ondansetron (ZOFRAN) 4 mg tablet Take 1 tablet (4 mg total) by mouth every 6 (six) hours, Starting Tue 6/3/2025, Normal      oxyCODONE (Roxicodone) 5 immediate release tablet Take 1 tablet (5 mg total) by mouth every 6 (six) hours as needed for severe pain Max Daily Amount: 20 mg, Starting Tue 6/3/2025, Normal      Semaglutide-Weight Management (Wegovy) 1 MG/0.5ML Inject 1 mg under the skin weekly, Normal           CONTINUE these medications which have NOT CHANGED    Details   dofetilide (TIKOSYN) 250 mcg capsule Take 1 capsule (250 mcg total) by mouth 2 (two) times a day, Starting Thu 11/7/2024, Normal      Entresto 24-26 MG TABS TAKE 1 TABLET BY MOUTH TWICE A DAY, Starting Fri 12/20/2024, Normal      eszopiclone (LUNESTA) 3 MG tablet Take 1 tablet (3 mg total) by mouth daily at bedtime as needed for sleep Take immediately before bedtime, Starting Wed 3/19/2025, Until Fri 4/18/2025 at 2359, Normal      furosemide (LASIX) 40 mg tablet TAKE 1 TABLET BY MOUTH EVERY DAY, Starting Fri 12/20/2024, Normal      gabapentin (Neurontin) 100 mg capsule Take 3 capsules (300 mg total) by mouth daily at bedtime, Starting Wed 4/30/2025, Until Tue 7/29/2025, Normal      Jardiance 10 MG TABS tablet TAKE 1 TABLET (10 MG TOTAL) BY MOUTH EVERY MORNING., Starting Tue 3/11/2025, Normal      Klor-Con M20 20 MEQ tablet TAKE 1 TABLET BY MOUTH EVERY DAY, Starting Fri 12/20/2024, Normal      metoprolol succinate (TOPROL-XL) 25 mg 24 hr tablet TAKE 1 TABLET BY MOUTH TWICE A DAY, Starting Mon 3/31/2025,  Normal      rivaroxaban (Xarelto) 20 mg tablet Take 1 tablet (20 mg total) by mouth daily with breakfast, Starting Fri 2/21/2025, Normal      spironolactone (ALDACTONE) 25 mg tablet TAKE 1 TABLET (25 MG TOTAL) BY MOUTH DAILY., Starting Fri 3/21/2025, Normal      vitamin B-12 (VITAMIN B-12) 1,000 mcg tablet Take 1 tablet (1,000 mcg total) by mouth daily, Starting Wed 3/26/2025, Until Sun 10/12/2025, Normal             ED SEPSIS DOCUMENTATION   Time reflects when diagnosis was documented in both MDM as applicable and the Disposition within this note       Time User Action Codes Description Comment    6/3/2025 11:51 AM Levy Rodriguez [N20.0] Nephrolithiasis     6/3/2025 11:51 AM Levy Rodriguez [R10.9] Left flank pain     6/3/2025 12:05 PM Levy Rodriguez [R11.2] Nausea and vomiting                    [1]   Past Medical History:  Diagnosis Date    A-fib (HCC) 2020    AICD (automatic cardioverter/defibrillator) present     CHF (congestive heart failure) (HCC)     Coxsackie virus infection 2017    Hypertension     ICD (implantable cardioverter-defibrillator) lead failure 06/03/2017    Kidney stone     Myocarditis (HCC)     Sleep apnea     Ventricular tachycardia (HCC)    [2]   Past Surgical History:  Procedure Laterality Date    CARDIAC DEFIBRILLATOR PLACEMENT      CARDIAC ELECTROPHYSIOLOGY PROCEDURE N/A 07/03/2023    Procedure: Cardiac eps/svt ablation;  Surgeon: Mihai Walter DO;  Location: [facility];  Service: Cardiology    CARDIAC ELECTROPHYSIOLOGY PROCEDURE N/A 07/03/2023    Procedure: Cardiac eps/afib ablation;  Surgeon: Mihai Walter DO;  Location: [facility];  Service: Cardiology    COLONOSCOPY      DE ESOPHAGOGASTRODUODENOSCOPY TRANSORAL DIAGNOSTIC N/A 03/29/2017    Procedure: ESOPHAGOGASTRODUODENOSCOPY (EGD);  Surgeon: Valeriy Conte MD;  Location: [facility];  Service: Gastroenterology    DE INSJ/RPLCMT PERM DFB W/TRNSVNS LDS 1/DUAL CHMBR N/A 06/02/2017    Procedure: LEAD  "EXTRACTION/REIMPLANTATION AND ICD GENERATOR CHANGE ;  Surgeon: Enrique Warren MD;  Location: [facility];  Service: Cardiology    NC LAPAROSCOPY SURG CHOLECYSTECTOMY N/A 05/19/2017    Procedure: LAPAROSCOPIC CHOLECYSTECTOMY ;  Surgeon: Tanner Leyva MD;  Location: [facility];  Service: General    NC SURG TX ANAL FISTULA INTERSPHINCTERIC Right 12/6/2024    Procedure: FISTULOTOMY;  Surgeon: Erasmo Madison MD;  Location: [facility];  Service: Colorectal    TONSILLECTOMY      TYMPANOSTOMY TUBE PLACEMENT     [3]   Family History  Problem Relation Name Age of Onset    No Known Problems Mother      No Known Problems Father      Sudden death Neg Hx      Heart disease Neg Hx     [4]   Social History  Tobacco Use    Smoking status: Never    Smokeless tobacco: Never   Vaping Use    Vaping status: Never Used   Substance Use Topics    Alcohol use: Not Currently     Comment: OCCASIONAL; history of binge drinking -  drinking \"a lot\" on Fridays; stopped this about in late 2022. (Updated 12/04/2023).    Drug use: Never        Lvey Rodriguez PA-C  06/03/25 1600    "

## 2025-06-03 NOTE — ED ATTENDING ATTESTATION
6/3/2025  I, Sarai Pierson MD, saw and evaluated the patient. I have discussed the patient with the resident/non-physician practitioner and agree with the resident's/non-physician practitioner's findings, Plan of Care, and MDM as documented in the resident's/non-physician practitioner's note, except where noted. All available labs and Radiology studies were reviewed.  I was present for key portions of any procedure(s) performed by the resident/non-physician practitioner and I was immediately available to provide assistance.       At this point I agree with the current assessment done in the Emergency Department.  I have conducted an independent evaluation of this patient a history and physical is as follows:    ED Course       50 yo M with PMH kidney stones presents with acute onset L flank pain that started this morning. CT consistent with ureteral stone, no HOANG or UTI, will follow up with urology outpatient    General: VSS, NAD, awake, alert. Well-nourished, well-developed. Appears stated age.   Speaking normally in full sentences.   Head: Normocephalic, atraumatic, nontender.  Eyes: PERRL, EOM-I. No diplopia.   No hyphema.   No subconjunctival hemorrhages.  Symmetrical lids.   ENT: Atraumatic external nose and ears.    MMM  No malocclusion. No stridor. Normal phonation. No drooling. Normal swallowing.   Neck: Symmetric, trachea midline. No JVD.  CV: RRR. +S1/S2  No murmurs or gallops  Peripheral pulses +2 throughout. No chest wall tenderness.   Lungs:   Unlabored No retractions  CTAB, lungs sounds equal bilateral.   No tachypnea.   Abd: +BS, soft, NT/ND.   MSK:   FROM   Back:   No rashes  Skin: Dry, intact.   Neuro: AAOx3, GCS 15, CN II-XII grossly intact.   Motor grossly intact.  Psychiatric/Behavioral: Appropriate mood and affect   Exam: deferred   Critical Care Time  Procedures

## 2025-06-03 NOTE — DISCHARGE INSTRUCTIONS
Your CT scan showed a kidney stone within your ureter, at the region where it meets the bladder. There are no signs of obstruction or infection, meaning you should be able to pass this stone on your own with adequate hydration and pain control. Please take Tylenol every 6 hours to maintain adequate pain control around the clock. You have also been prescribed oxycodone 5 mg to be taken every 6 hours as needed for severe pain. Ensure to drink plenty of water throughout the day to help with expulsion of the stone. You can use a strainer, such as a coffee filter, to help determine if you passed the stone.     Please return to the ER if you experience lack of urination for greater than 8 hours, develop fevers or chills, have intractable nausea or vomiting, or pain that is not controlled with oxycodone. You have been provided a referral to urology to follow up on passage of the stone.

## 2025-06-10 ENCOUNTER — OFFICE VISIT (OUTPATIENT)
Dept: ENDOCRINOLOGY | Facility: CLINIC | Age: 50
End: 2025-06-10
Payer: COMMERCIAL

## 2025-06-10 VITALS
TEMPERATURE: 97.8 F | HEIGHT: 74 IN | HEART RATE: 65 BPM | SYSTOLIC BLOOD PRESSURE: 118 MMHG | DIASTOLIC BLOOD PRESSURE: 70 MMHG | RESPIRATION RATE: 18 BRPM | OXYGEN SATURATION: 99 % | WEIGHT: 284.4 LBS | BODY MASS INDEX: 36.5 KG/M2

## 2025-06-10 DIAGNOSIS — E05.90 HYPERTHYROIDISM: Primary | ICD-10-CM

## 2025-06-10 PROCEDURE — 99204 OFFICE O/P NEW MOD 45 MIN: CPT | Performed by: STUDENT IN AN ORGANIZED HEALTH CARE EDUCATION/TRAINING PROGRAM

## 2025-06-10 NOTE — PROGRESS NOTES
"Name: Vadim Chapa      : 1975      MRN: [id number]  Encounter Provider: Sravanthi Kay MD  Encounter Date: 6/10/2025   Encounter department: [facility]    Chief Complaint   Patient presents with   • Advice Only   :  Assessment & Plan  Hyperthyroidism    Orders:  •  TSH + Free T4  •  T3  •  Thyroid stimulating immunoglobulin  •  NM thyroid imaging w uptake(s); Future      Assessment & Plan        Pertinent Medical History   ***         History of Present Illness {?Quick Links Encounters * My Last Note * Last Note in Specialty * Snapshot * Since Last Visit * History :36021}  History of Present Illness    Vadim Chapa is a 49 y.o. male    Review of Systems as per HPI  {Select to insert medical history sections (Optional):80886}     Medical History Reviewed by provider this encounter:     .    Objective {?Quick Links Trend Vitals * Enter New Vitals * Results Review * Timeline (Adult) * Labs * Imaging * Cardiology * Procedures * Lung Cancer Screening * Surgical eConsent :49075}  /70 (BP Location: Right arm, Patient Position: Sitting, Cuff Size: Standard)   Pulse 65   Temp 97.8 °F (36.6 °C) (Temporal)   Resp 18   Ht 6' 2\" (1.88 m)   Wt 129 kg (284 lb 6.4 oz)   SpO2 99%   BMI 36.51 kg/m²      Body mass index is 36.51 kg/m².  Wt Readings from Last 3 Encounters:   06/10/25 129 kg (284 lb 6.4 oz)   25 128 kg (282 lb)   25 132 kg (291 lb)     Physical Exam  Physical Exam      Results    Labs: I have reviewed pertinent labs including: {SL AMB ENDO LABS (Optional):38050}  {Radiology Results Review (Optional):77374}  There are no Patient Instructions on file for this visit.    Discussed with the patient and all questioned fully answered. He will call me if any problems arise.    {Administrative / Billing Section (Optional):28966}  "

## 2025-06-10 NOTE — PROGRESS NOTES
Name: Vadim Chapa      : 1975      MRN: [id number]  Encounter Provider: Sravanthi Kay MD  Encounter Date: 6/10/2025   Encounter department: [facility] [place]    Chief Complaint   Patient presents with   • Advice Only   :  Assessment & Plan  Hyperthyroidism  He has thyroid function tests consistent with hyperthyroidism with symptoms of hyperthyroidism.  The potential causes of hyperthyroidism were discussed including Graves' disease, toxic adenoma, and thyroiditis. The possibility of amiodarone-induced thyrotoxicosis was also considered. 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 which was discontinued in 2024. Thyroid function tests from 2024 showed worsening hyperthyroidism with a TSH of 0.03 and free T4 of 1.15.The treatment options for type 1 and type 2 amiodarone-induced thyrotoxicosis were explained. Further testing is required to differentiate between type 1 and type 2 thyrotoxicosis. Including TSI and a thyroid uptake scan will be ordered to assess iodine uptake. No new medications will be initiated until further information is obtained.  Orders:  •  TSH + Free T4  •  T3  •  Thyroid stimulating immunoglobulin  •  NM thyroid imaging w uptake(s); Future          Pertinent Medical History            History of Present Illness   History of Present Illness    Vadim Chapa is a 49 y.o. male who presents for evaluation of hyperthyroidism.    He was referred due to a suspected thyroid dysfunction, potentially induced by amiodarone. He has been on amiodarone intermittently for approximately 1 year, with the last course ending in 2024. 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 He reports experiencing palpitations, which have occasionally intensified. He also notes excessive sweating, skin redness, and  "occasional tremors. He does not report any current loose stools. He reports feeling hotter than those around him but does not report any ocular changes such as bulging, tearing, redness, dry eyes, or eye pain. He also does not report any neck changes, swelling, pressure, or pain. He is not currently taking biotin. His current medications include Entresto, furosemide, spironolactone, Jardiance, Wegovy 1 mg, and dofetilide.      He has lost 27 pounds since starting Wegovy 1 mg. He experiences frequent burping, described as a sulfuric burn. He does not have a gallbladder and is considering using Metamucil to manage constipation.    Supplemental Information  He has sleep apnea and uses a CPAP machine.    FAMILY HISTORY  He does not have a family history of thyroid disease, hyperthyroidism, hypothyroidism, or nodules.        Review of Systems as per [facility] [place]  Medical History Reviewed by provider this encounter:     .  Medications Ordered Prior to Encounter[1]      Medical History Reviewed by provider this encounter:     .    Objective   /70 (BP Location: Right arm, Patient Position: Sitting, Cuff Size: Standard)   Pulse 65   Temp 97.8 °F (36.6 °C) (Temporal)   Resp 18   Ht 6' 2\" (1.88 m)   Wt 129 kg (284 lb 6.4 oz)   SpO2 99%   BMI 36.51 kg/m²      Body mass index is 36.51 kg/m².  Wt Readings from Last 3 Encounters:   06/10/25 129 kg (284 lb 6.4 oz)   05/20/25 128 kg (282 lb)   04/30/25 132 kg (291 lb)     Physical Exam  Constitutional:       General: He is not in acute distress.     Appearance: He is not ill-appearing.   HENT:      Head: Normocephalic and atraumatic.   Neck:      Thyroid: No thyroid mass, thyromegaly or thyroid tenderness.   Pulmonary:      Effort: Pulmonary effort is normal. No respiratory distress.     Neurological:      Mental Status: He is oriented to person, place, and time.       Physical Exam  Neck was examined.  No significant murmurs detected in the heart.    Results  Laboratory " Studies  Thyroid test in March 2024 showed worsening hyperthyroidism. TSH was low at 0.364 and free T4 was elevated at 1.18 in November 2024. In March 2025, TSH was 0.03 and free T4 was 1.15.    Imaging  PET scan done in 2024 did not show any abnormal finding in the neck.  Labs: I have reviewed pertinent labs including:     Component      Latest Ref Rng 3/11/2024 11/5/2024 3/25/2025   TSH 3RD GENERATON      0.450 - 4.500 uIU/mL 0.598  0.364 (L)  0.013 (L)    FREE T4      0.61 - 1.12 ng/dL 0.95  1.18 (H)  1.15 (H)       Legend:  (L) Low  (H) High  Lab Results   Component Value Date    HGBA1C 6.1 12/17/2023    HGBA1C 6.6 (H) 12/05/2023      Lab Results   Component Value Date    [id number] 0.013 (L) 03/25/2025      Lab Results   Component Value Date    FREET4 1.15 (H) 03/25/2025      Radiology Results Review: I have reviewed the following images/report studies in PACS: No Thyroid Ultrasound results found in the past 2 years   There are no Patient Instructions on file for this visit.    Discussed with the patient and all questioned fully answered. He will call me if any problems arise.    Administrative Statements   I have spent a total time of 40 minutes in caring for this patient on the day of the visit/encounter including Diagnostic results, Prognosis, Risks and benefits of tx options, Patient and family education, Impressions, Counseling / Coordination of care, Documenting in the medical record, Reviewing/placing orders in the medical record (including tests, medications, and/or procedures), and Obtaining or reviewing history  .         [1]  Current Outpatient Medications on File Prior to Visit   Medication Sig Dispense Refill   • dofetilide (TIKOSYN) 250 mcg capsule Take 1 capsule (250 mcg total) by mouth 2 (two) times a day 180 capsule 3   • Entresto 24-26 MG TABS TAKE 1 TABLET BY MOUTH TWICE A  tablet 3   • eszopiclone (LUNESTA) 3 MG tablet Take 1 tablet (3 mg total) by mouth daily at bedtime as needed  for sleep Take immediately before bedtime 30 tablet 0   • furosemide (LASIX) 40 mg tablet TAKE 1 TABLET BY MOUTH EVERY DAY 90 tablet 3   • gabapentin (Neurontin) 100 mg capsule Take 3 capsules (300 mg total) by mouth daily at bedtime 90 capsule 2   • Jardiance 10 MG TABS tablet TAKE 1 TABLET (10 MG TOTAL) BY MOUTH EVERY MORNING. 90 tablet 3   • Klor-Con M20 20 MEQ tablet TAKE 1 TABLET BY MOUTH EVERY DAY 90 tablet 1   • metoprolol succinate (TOPROL-XL) 25 mg 24 hr tablet TAKE 1 TABLET BY MOUTH TWICE A  tablet 1   • ondansetron (ZOFRAN) 4 mg tablet Take 1 tablet (4 mg total) by mouth every 6 (six) hours 12 tablet 0   • oxyCODONE (Roxicodone) 5 immediate release tablet Take 1 tablet (5 mg total) by mouth every 6 (six) hours as needed for severe pain Max Daily Amount: 20 mg 12 tablet 0   • rivaroxaban (Xarelto) 20 mg tablet Take 1 tablet (20 mg total) by mouth daily with breakfast 90 tablet 1   • Semaglutide-Weight Management (Wegovy) 1 MG/0.5ML Inject 1 mg under the skin weekly 2 mL 0   • spironolactone (ALDACTONE) 25 mg tablet TAKE 1 TABLET (25 MG TOTAL) BY MOUTH DAILY. 90 tablet 1   • vitamin B-12 (VITAMIN B-12) 1,000 mcg tablet Take 1 tablet (1,000 mcg total) by mouth daily 100 tablet 1     No current facility-administered medications on file prior to visit.

## 2025-06-23 ENCOUNTER — TELEPHONE (OUTPATIENT)
Age: 50
End: 2025-06-23

## 2025-06-23 NOTE — TELEPHONE ENCOUNTER
Patient called in and states he had first dose of increased Wegovy last Wednesday and is not feeling well, having gas pains and nausea. Patient requesting to go back to 0.5 mg weekly.  Next dose is due this coming Wednesday-patient is planning on only giving himself half a dose.   Please advise

## 2025-06-24 ENCOUNTER — APPOINTMENT (OUTPATIENT)
Dept: LAB | Facility: CLINIC | Age: 50
End: 2025-06-24
Payer: COMMERCIAL

## 2025-06-24 ENCOUNTER — TELEPHONE (OUTPATIENT)
Dept: FAMILY MEDICINE CLINIC | Facility: CLINIC | Age: 50
End: 2025-06-24

## 2025-06-24 DIAGNOSIS — E66.812 CLASS 2 OBESITY DUE TO EXCESS CALORIES WITHOUT SERIOUS COMORBIDITY WITH BODY MASS INDEX (BMI) OF 36.0 TO 36.9 IN ADULT: Primary | ICD-10-CM

## 2025-06-24 DIAGNOSIS — E66.09 CLASS 2 OBESITY DUE TO EXCESS CALORIES WITHOUT SERIOUS COMORBIDITY WITH BODY MASS INDEX (BMI) OF 36.0 TO 36.9 IN ADULT: Primary | ICD-10-CM

## 2025-06-24 DIAGNOSIS — E05.90 HYPERTHYROIDISM: Primary | ICD-10-CM

## 2025-06-24 LAB
T3 SERPL-MCNC: 0.7 NG/ML (ref 0.9–1.8)
T4 FREE SERPL-MCNC: 0.87 NG/DL (ref 0.61–1.12)
TSH SERPL DL<=0.05 MIU/L-ACNC: 0.53 UIU/ML (ref 0.45–4.5)

## 2025-06-24 PROCEDURE — 84443 ASSAY THYROID STIM HORMONE: CPT

## 2025-06-24 PROCEDURE — 84439 ASSAY OF FREE THYROXINE: CPT

## 2025-06-24 RX ORDER — SEMAGLUTIDE 0.5 MG/.5ML
INJECTION, SOLUTION SUBCUTANEOUS
Qty: 2 ML | Refills: 0 | Status: SHIPPED | OUTPATIENT
Start: 2025-06-24

## 2025-06-24 NOTE — TELEPHONE ENCOUNTER
Pt stopped in asking for the .5mg of wegovy. He is having bad side effects from the increased dosage. He is due tomorrow morning for the next shot. Any way you can order it since Darrel if off? Call pt  [facility]

## 2025-06-25 ENCOUNTER — OFFICE VISIT (OUTPATIENT)
Age: 50
End: 2025-06-25
Payer: COMMERCIAL

## 2025-06-25 VITALS
WEIGHT: 276 LBS | HEART RATE: 61 BPM | DIASTOLIC BLOOD PRESSURE: 60 MMHG | OXYGEN SATURATION: 97 % | BODY MASS INDEX: 35.42 KG/M2 | HEIGHT: 74 IN | SYSTOLIC BLOOD PRESSURE: 110 MMHG

## 2025-06-25 DIAGNOSIS — G47.33 OSA (OBSTRUCTIVE SLEEP APNEA): Primary | ICD-10-CM

## 2025-06-25 DIAGNOSIS — F51.04 CHRONIC INSOMNIA: ICD-10-CM

## 2025-06-25 DIAGNOSIS — E66.09 CLASS 2 OBESITY DUE TO EXCESS CALORIES WITHOUT SERIOUS COMORBIDITY WITH BODY MASS INDEX (BMI) OF 35.0 TO 35.9 IN ADULT: ICD-10-CM

## 2025-06-25 DIAGNOSIS — E66.812 CLASS 2 OBESITY DUE TO EXCESS CALORIES WITHOUT SERIOUS COMORBIDITY WITH BODY MASS INDEX (BMI) OF 35.0 TO 35.9 IN ADULT: ICD-10-CM

## 2025-06-25 PROCEDURE — 99214 OFFICE O/P EST MOD 30 MIN: CPT | Performed by: INTERNAL MEDICINE

## 2025-06-25 RX ORDER — GABAPENTIN 100 MG/1
300 CAPSULE ORAL
Qty: 90 CAPSULE | Refills: 2 | Status: SHIPPED | OUTPATIENT
Start: 2025-06-25 | End: 2025-09-23

## 2025-06-25 NOTE — ASSESSMENT & PLAN NOTE
Mild obstructive sleep apnea    Previously on a fixed pressure of 10    Compliance from 5/21 - 6/19/2025  More than 4 hours 97%, 8 hours and 50 minutes  On APAP 5-10  95 percentile pressure 9.8  Median leak 0.0  Residual HI 1.7    Excellent compliance  Aerophagia is improved    He may still be having bloating and belching but he is unsure if it is due to CPAP or Wegovy    Plan  Continue CPAP at current settings  Orders:    Mask fitting only; Future

## 2025-06-25 NOTE — PROGRESS NOTES
Name: Vadim Chapa      : 1975      MRN: [id number]  Encounter Provider: Jose Daniel Anaya MD  Encounter Date: 2025   Encounter department: [facility] [place]    :  Assessment & Plan  Chronic insomnia  Sleep onset and sleep maintenance insomnia  Previously on 3 mg Lunesta  Started patient on gabapentin  He did note some morning drowsiness  He is unsure if this is related to his Wegovy or gabapentin, as he is decreasing his dose of Wegovy I recommended he continue to try gabapentin    Plan  Continue gabapentin up to 300 mg  Cardiology recommended holding off on trazodone despite patient reporting it working well for him    Orders:    gabapentin (Neurontin) 100 mg capsule; Take 3 capsules (300 mg total) by mouth daily at bedtime    CONCHIS (obstructive sleep apnea)  Mild obstructive sleep apnea    Previously on a fixed pressure of 10    Compliance from  - 2025  More than 4 hours 97%, 8 hours and 50 minutes  On APAP 5-10  95 percentile pressure 9.8  Median leak 0.0  Residual HI 1.7    Excellent compliance  Aerophagia is improved    He may still be having bloating and belching but he is unsure if it is due to CPAP or Wegovy    Plan  Continue CPAP at current settings  Orders:    Mask fitting only; Future    Class 2 obesity due to excess calories without serious comorbidity with body mass index (BMI) of 35.0 to 35.9 in adult    Counseled patient on lifestyle modifications including diet and exercise  Explained that weight loss will decrease the severity of sleep apnea  Currently on Wegovy            History of Present Illness     49-year-old male with a past medical history of heart failure with reduced ejection fraction EF of 20%, hypertension, paroxysmal atrial fibrillation who presents for follow-up of obstructive sleep apnea.    Initial office visit  Diagnostic PSG in 2017 showed an AHI of 8.2 at a weight of 291 pounds.    He states he is compliant with a fixed pressure of 10 cm H2O for  many years.  He  has been using the same machine since 2017.  He is currently using an F20 mask.  He goes to bed at 9 PM and falls asleep by 11 PM.  He wakes up at 8 AM.  He gets 7 to 8 hours of sleep.  Prior to using CPAP he was snoring loudly with witnessed apneas.      He occasionally drinks coffee.  He occasionally drinks alcohol.  His Balaton score is 5 with use of CPAP.    Current office visit  Currently on APAP 5-10.  He has excellent compliance.  He notes improvement in symptoms.  Pressure decrease has helped since last office visit.  His sleep schedule is highly variable.  He can sleep up to 8 hours at times.  He does have difficulty falling asleep.  He states the trazodone worked well in the past but cardiology recommended he stop it.  He tried gabapentin that was prescribed at last office visit.  He felt that it caused morning drowsiness.  However he also felt that after increasing his dose of Wegovy he had increased tiredness, bloating, belching, constipation, nausea.  He will be decreasing his Wegovy dose from 1.0 to 0.5                    Sitting and reading: Slight chance of dozing  Watching TV: Slight chance of dozing  Sitting, inactive in a public place (e.g. a theatre or a meeting): Would never doze  As a passenger in a car for an hour without a break: Slight chance of dozing  Lying down to rest in the afternoon when circumstances permit: Moderate chance of dozing  Sitting and talking to someone: Would never doze  Sitting quietly after a lunch without alcohol: Slight chance of dozing  In a car, while stopped for a few minutes in traffic: Would never doze  Total score: 6       Review of Systems   Constitutional: Negative.    HENT: Negative.     Eyes: Negative.    Respiratory: Negative.     Cardiovascular: Negative.    Gastrointestinal: Negative.    Endocrine: Negative.    Genitourinary: Negative.    Musculoskeletal: Negative.    Skin: Negative.    Allergic/Immunologic: Negative.    Neurological:  "Negative.    Hematological: Negative.    Psychiatric/Behavioral: Negative.       Pertinent positives/negatives included in HPI and also as noted:       Pertinent Medical History           Medical History Reviewed by provider this encounter:  Meds     .  Past Medical History   Past Medical History[1]  Past Surgical History[2]  Family History[3]   reports that he has never smoked. He has never used smokeless tobacco. He reports that he does not currently use alcohol. He reports that he does not use drugs.  Current Outpatient Medications   Medication Instructions    dofetilide (TIKOSYN) 250 mcg, Oral, 2 times daily    Entresto 24-26 MG TABS 1 tablet, Oral, 2 times daily    eszopiclone (LUNESTA) 3 mg, Oral, Daily at bedtime PRN, Take immediately before bedtime    furosemide (LASIX) 40 mg, Oral, Daily    gabapentin (NEURONTIN) 300 mg, Oral, Daily at bedtime    Jardiance 10 mg, Oral, Every morning    Klor-Con M20 20 MEQ tablet 20 mEq, Oral, Daily    metoprolol succinate (TOPROL-XL) 25 mg, Oral, 2 times daily    ondansetron (ZOFRAN) 4 mg, Oral, Every 6 hours    oxyCODONE (ROXICODONE) 5 mg, Oral, Every 6 hours PRN    rivaroxaban (XARELTO) 20 mg, Oral, Daily with breakfast    Semaglutide-Weight Management (Wegovy) 0.5 MG/0.5ML Inject 0.5 mg under the skin weekly    spironolactone (ALDACTONE) 25 mg, Oral, Daily    vitamin B-12 (VITAMIN B-12) 1,000 mcg, Oral, Daily   Allergies[4]   Medications Ordered Prior to Encounter[5]   Social History[6]  Objective   /60   Pulse 61   Ht 6' 2\" (1.88 m)   Wt 125 kg (276 lb)   SpO2 97%   BMI 35.44 kg/m²        Physical Exam  Vitals reviewed.   HENT:      Head: Normocephalic and atraumatic.      Nose: Nose normal.      Mouth/Throat:      Mouth: Mucous membranes are moist.     Eyes:      Extraocular Movements: Extraocular movements intact.      Pupils: Pupils are equal, round, and reactive to light.       Cardiovascular:      Rate and Rhythm: Normal rate and regular rhythm.      " "Pulses: Normal pulses.   Pulmonary:      Effort: Pulmonary effort is normal.      Breath sounds: Normal breath sounds.   Abdominal:      General: Abdomen is flat. Bowel sounds are normal.      Palpations: Abdomen is soft.     Musculoskeletal:         General: Normal range of motion.      Cervical back: Normal range of motion.     Skin:     General: Skin is warm.     Neurological:      Mental Status: He is alert. Mental status is at baseline.     Psychiatric:         Mood and Affect: Mood normal.       Visit Vitals  /60   Pulse 61   Ht 6' 2\" (1.88 m)   Wt 125 kg (276 lb)   SpO2 97%   BMI 35.44 kg/m²   Smoking Status Never   BSA 2.49 m²           Data  Lab Results   Component Value Date    HGB 16.4 06/03/2025    HCT 50.8 (H) 06/03/2025    MCV 91 06/03/2025      Lab Results   Component Value Date    CALCIUM 9.6 06/03/2025    K 4.5 06/03/2025    CO2 26 06/03/2025     06/03/2025    BUN 12 06/03/2025    CREATININE 1.20 06/03/2025     Lab Results   Component Value Date    IRON 94 12/04/2023    TIBC 320 12/04/2023    FERRITIN 122 12/04/2023     Lab Results   Component Value Date    AST 23 06/03/2025    ALT 15 06/03/2025                  [1]   Past Medical History:  Diagnosis Date    A-fib (HCC) 2020    AICD (automatic cardioverter/defibrillator) present     CHF (congestive heart failure) (HCC)     Coxsackie virus infection 2017    Hypertension     ICD (implantable cardioverter-defibrillator) lead failure 06/03/2017    Kidney stone     Myocarditis (HCC)     Sleep apnea     Ventricular tachycardia (HCC)    [2]   Past Surgical History:  Procedure Laterality Date    CARDIAC DEFIBRILLATOR PLACEMENT      CARDIAC ELECTROPHYSIOLOGY PROCEDURE N/A 07/03/2023    Procedure: Cardiac eps/svt ablation;  Surgeon: Mihai Walter DO;  Location: [facility];  Service: Cardiology    CARDIAC ELECTROPHYSIOLOGY PROCEDURE N/A 07/03/2023    Procedure: Cardiac eps/afib ablation;  Surgeon: Mihai Walter DO;  Location: [facility] " "C[facility] LAB;  Service: Cardiology    COLONOSCOPY      KY ESOPHAGOGASTRODUODENOSCOPY TRANSORAL DIAGNOSTIC N/A 03/29/2017    Procedure: ESOPHAGOGASTRODUODENOSCOPY (EGD);  Surgeon: Valeriy Conte MD;  Location: [facility];  Service: Gastroenterology    [place] INSJ/RPLCMT PERM DFB W/TRNSVNS LDS 1/DUAL CHMBR N/A 06/02/2017    Procedure: LEAD EXTRACTION/REIMPLANTATION AND ICD GENERATOR CHANGE ;  Surgeon: Enrique Warren MD;  Location: [facility];  Service: Cardiology    KY LAPAROSCOPY SURG CHOLECYSTECTOMY N/A 05/19/2017    Procedure: LAPAROSCOPIC CHOLECYSTECTOMY ;  Surgeon: Tanner Leyva MD;  Location: [facility];  Service: General    KY SURG TX ANAL FISTULA INTERSPHINCTERIC Right 12/6/2024    Procedure: FISTULOTOMY;  Surgeon: Erasmo Madison MD;  Location: [facility];  Service: Colorectal    TONSILLECTOMY      TYMPANOSTOMY TUBE PLACEMENT     [3]   Family History  Problem Relation Name Age of Onset    No Known Problems Mother      No Known Problems Father      Sudden death Neg Hx      Heart disease Neg Hx     [4]   Allergies  Allergen Reactions    Vicodin [Hydrocodone-Acetaminophen] Other (See Comments)     \"i get nasty\"   [5]   Current Outpatient Medications on File Prior to Visit   Medication Sig Dispense Refill    dofetilide (TIKOSYN) 250 mcg capsule Take 1 capsule (250 mcg total) by mouth 2 (two) times a day 180 capsule 3    Entresto 24-26 MG TABS TAKE 1 TABLET BY MOUTH TWICE A  tablet 3    furosemide (LASIX) 40 mg tablet TAKE 1 TABLET BY MOUTH EVERY DAY 90 tablet 3    Jardiance 10 MG TABS tablet TAKE 1 TABLET (10 MG TOTAL) BY MOUTH EVERY MORNING. 90 tablet 3    Klor-Con M20 20 MEQ tablet TAKE 1 TABLET BY MOUTH EVERY DAY 90 tablet 1    metoprolol succinate (TOPROL-XL) 25 mg 24 hr tablet TAKE 1 TABLET BY MOUTH TWICE A  tablet 1    oxyCODONE (Roxicodone) 5 immediate release tablet Take 1 tablet (5 mg total) by mouth every 6 (six) hours as needed for severe pain Max Daily Amount: 20 mg 12 tablet 0    " "rivaroxaban (Xarelto) 20 mg tablet Take 1 tablet (20 mg total) by mouth daily with breakfast 90 tablet 1    Semaglutide-Weight Management (Wegovy) 0.5 MG/0.5ML Inject 0.5 mg under the skin weekly 2 mL 0    spironolactone (ALDACTONE) 25 mg tablet TAKE 1 TABLET (25 MG TOTAL) BY MOUTH DAILY. 90 tablet 1    vitamin B-12 (VITAMIN B-12) 1,000 mcg tablet Take 1 tablet (1,000 mcg total) by mouth daily 100 tablet 1    eszopiclone (LUNESTA) 3 MG tablet Take 1 tablet (3 mg total) by mouth daily at bedtime as needed for sleep Take immediately before bedtime (Patient not taking: Reported on 6/25/2025) 30 tablet 0    ondansetron (ZOFRAN) 4 mg tablet Take 1 tablet (4 mg total) by mouth every 6 (six) hours (Patient not taking: Reported on 6/25/2025) 12 tablet 0    [DISCONTINUED] gabapentin (Neurontin) 100 mg capsule Take 3 capsules (300 mg total) by mouth daily at bedtime (Patient not taking: Reported on 6/25/2025) 90 capsule 2     No current facility-administered medications on file prior to visit.   [6]   Social History  Tobacco Use    Smoking status: Never    Smokeless tobacco: Never   Vaping Use    Vaping status: Never Used   Substance and Sexual Activity    Alcohol use: Not Currently     Comment: OCCASIONAL; history of binge drinking -  drinking \"a lot\" on Fridays; stopped this about in late 2022. (Updated 12/04/2023).    Drug use: Never     "

## 2025-06-26 ENCOUNTER — TELEPHONE (OUTPATIENT)
Dept: CARDIOLOGY CLINIC | Facility: CLINIC | Age: 50
End: 2025-06-26

## 2025-06-26 LAB — TSI SER-ACNC: <0.1 IU/L (ref 0–0.55)

## 2025-06-27 ENCOUNTER — IN-CLINIC DEVICE VISIT (OUTPATIENT)
Dept: CARDIOLOGY CLINIC | Facility: CLINIC | Age: 50
End: 2025-06-27
Payer: COMMERCIAL

## 2025-06-27 DIAGNOSIS — I42.0 DILATED CARDIOMYOPATHY (HCC): ICD-10-CM

## 2025-06-27 DIAGNOSIS — I47.20 VENTRICULAR TACHYCARDIA (HCC): Primary | ICD-10-CM

## 2025-06-27 DIAGNOSIS — I48.91 ATRIAL FIBRILLATION, UNSPECIFIED TYPE (HCC): ICD-10-CM

## 2025-06-27 PROCEDURE — 93282 PRGRMG EVAL IMPLANTABLE DFB: CPT | Performed by: INTERNAL MEDICINE

## 2025-06-27 NOTE — PROGRESS NOTES
Results for orders placed or performed in visit on 06/27/25   Cardiac EP device report    Narrative    MDT SC ICD/ACTIVE SYSTEM IS MRI CONDITIONAL  DEVICE INTERROGATED IN THE [place] OFFICE: BATTERY VOLTAGE ADEQUATE (3.2 YRS). : 0.4%. ALL LEAD PARAMETERS WITHIN NORMAL LIMITS. 2 VT EPISODES W/ EGMS SHOWING NSVT vs RVR 16 BEATS @ 150 BPM & 9 BEATS @ 178 BPM. PT TAKES METOPROLOL SUCC, XARELTO, ENTRESTO, TIKOSYN. EF: 20% (ECHO 4/16/24). OPTI-VOL WITHIN NORMAL LIMITS. NO PROGRAMMING CHANGES MADE TO DEVICE PARAMETERS. NORMAL DEVICE FUNCTION. CH

## 2025-06-29 DIAGNOSIS — I42.0 DILATED CARDIOMYOPATHY (HCC): ICD-10-CM

## 2025-06-30 RX ORDER — POTASSIUM CHLORIDE 1500 MG/1
20 TABLET, EXTENDED RELEASE ORAL DAILY
Qty: 90 TABLET | Refills: 1 | Status: SHIPPED | OUTPATIENT
Start: 2025-06-30

## 2025-07-01 ENCOUNTER — HOSPITAL ENCOUNTER (OUTPATIENT)
Dept: NUCLEAR MEDICINE | Facility: HOSPITAL | Age: 50
Discharge: HOME | End: 2025-07-01
Payer: COMMERCIAL

## 2025-07-01 ENCOUNTER — DOCUMENTATION (OUTPATIENT)
Dept: CARDIOLOGY CLINIC | Facility: CLINIC | Age: 50
End: 2025-07-01

## 2025-07-01 DIAGNOSIS — E05.90 HYPERTHYROIDISM: ICD-10-CM

## 2025-07-01 PROCEDURE — A9516 IODINE I-123 SOD IODIDE MIC: HCPCS

## 2025-07-01 PROCEDURE — 78014 THYROID IMAGING W/BLOOD FLOW: CPT

## 2025-07-02 ENCOUNTER — HOSPITAL ENCOUNTER (OUTPATIENT)
Dept: NUCLEAR MEDICINE | Facility: HOSPITAL | Age: 50
Discharge: HOME | End: 2025-07-02

## 2025-07-09 DIAGNOSIS — E66.09 CLASS 2 OBESITY DUE TO EXCESS CALORIES WITHOUT SERIOUS COMORBIDITY WITH BODY MASS INDEX (BMI) OF 36.0 TO 36.9 IN ADULT: ICD-10-CM

## 2025-07-09 DIAGNOSIS — E66.812 CLASS 2 OBESITY DUE TO EXCESS CALORIES WITHOUT SERIOUS COMORBIDITY WITH BODY MASS INDEX (BMI) OF 36.0 TO 36.9 IN ADULT: ICD-10-CM

## 2025-07-10 DIAGNOSIS — E66.812 CLASS 2 OBESITY DUE TO EXCESS CALORIES WITHOUT SERIOUS COMORBIDITY WITH BODY MASS INDEX (BMI) OF 36.0 TO 36.9 IN ADULT: Primary | ICD-10-CM

## 2025-07-10 DIAGNOSIS — E66.09 CLASS 2 OBESITY DUE TO EXCESS CALORIES WITHOUT SERIOUS COMORBIDITY WITH BODY MASS INDEX (BMI) OF 36.0 TO 36.9 IN ADULT: Primary | ICD-10-CM

## 2025-07-10 RX ORDER — SEMAGLUTIDE 1 MG/.5ML
INJECTION, SOLUTION SUBCUTANEOUS
Qty: 2 ML | Refills: 0 | Status: SHIPPED | OUTPATIENT
Start: 2025-07-10

## 2025-07-10 RX ORDER — SEMAGLUTIDE 0.5 MG/.5ML
INJECTION, SOLUTION SUBCUTANEOUS
Qty: 2 ML | Refills: 0 | OUTPATIENT
Start: 2025-07-10

## 2025-07-11 ENCOUNTER — TELEPHONE (OUTPATIENT)
Dept: CARDIOLOGY CLINIC | Facility: CLINIC | Age: 50
End: 2025-07-11

## 2025-07-11 ENCOUNTER — OFFICE VISIT (OUTPATIENT)
Dept: CARDIOLOGY CLINIC | Facility: CLINIC | Age: 50
End: 2025-07-11
Payer: COMMERCIAL

## 2025-07-11 VITALS
SYSTOLIC BLOOD PRESSURE: 90 MMHG | HEIGHT: 74 IN | OXYGEN SATURATION: 98 % | WEIGHT: 275 LBS | DIASTOLIC BLOOD PRESSURE: 68 MMHG | HEART RATE: 72 BPM | BODY MASS INDEX: 35.29 KG/M2 | RESPIRATION RATE: 16 BRPM

## 2025-07-11 DIAGNOSIS — R00.0 WIDE-COMPLEX TACHYCARDIA: ICD-10-CM

## 2025-07-11 DIAGNOSIS — I10 PRIMARY HYPERTENSION: Primary | Chronic | ICD-10-CM

## 2025-07-11 DIAGNOSIS — I48.0 PAROXYSMAL ATRIAL FIBRILLATION (HCC): ICD-10-CM

## 2025-07-11 DIAGNOSIS — I42.0 DILATED CARDIOMYOPATHY (HCC): ICD-10-CM

## 2025-07-11 DIAGNOSIS — E66.01 MORBID OBESITY WITH BMI OF 40.0-44.9, ADULT (HCC): ICD-10-CM

## 2025-07-11 DIAGNOSIS — I50.20 SYSTOLIC CONGESTIVE HEART FAILURE, UNSPECIFIED HF CHRONICITY (HCC): ICD-10-CM

## 2025-07-11 DIAGNOSIS — I50.22 CHRONIC HFREF (HEART FAILURE WITH REDUCED EJECTION FRACTION) (HCC): Chronic | ICD-10-CM

## 2025-07-11 PROCEDURE — 99213 OFFICE O/P EST LOW 20 MIN: CPT | Performed by: INTERNAL MEDICINE

## 2025-07-11 PROCEDURE — 93000 ELECTROCARDIOGRAM COMPLETE: CPT | Performed by: INTERNAL MEDICINE

## 2025-07-11 NOTE — PROGRESS NOTES
EPS Progress Note - Vadim Chapa 49 y.o. male MRN: [id number]           ASSESSMENT:  1. Primary hypertension        2. Chronic HFrEF (heart failure with reduced ejection fraction) ([facility])        3. Dilated cardiomyopathy (HCC)        4. Paroxysmal atrial fibrillation (HCC)        5. Systolic congestive heart failure, unspecified HF chronicity (HCC)        6. Wide-complex tachycardia        7. Morbid obesity with BMI of 40.0-44.9, adult ([facility])                PLAN:  He is stable from an electrophysiology perspective.  I did order an echocardiogram as it has been over a year given his cardiomyopathy.  QTc is acceptable on current dose of dofetilide.  I reached out to his primary advanced heart failure specialist to arrange follow-up    HPI:   Interim history presents for follow-up.  Complex cardiac history as listed above.  He has been on dofetilide after his VT ablation and from an arrhythmia perspective have been stable    When he works out side a significant amount around the house it wiped him out and he needs to rest.  He feels his heart beating harder and gets some skipped beats when he does a significant amount of activity this is stable and goes away with rest.  He has not had any episodes where he feels like he is having a fast arrhythmia similar to prior to the ablation        Review of Systems   Constitutional: Positive for weight loss. Negative for chills and fever.   HENT:  Negative for congestion and sore throat.    Eyes:  Negative for blurred vision and double vision.   Cardiovascular:  Positive for dyspnea on exertion and palpitations. Negative for chest pain, claudication, leg swelling, near-syncope, orthopnea, paroxysmal nocturnal dyspnea and syncope.   Respiratory:  Negative for cough, shortness of breath and sputum production.    Hematologic/Lymphatic: Negative for bleeding problem. Does not bruise/bleed easily.   Skin:  Negative for itching and rash.   Musculoskeletal:  Negative for arthritis and  "joint pain.   Gastrointestinal:  Negative for abdominal pain, nausea and vomiting.   Genitourinary:  Negative for hematuria.   Neurological:  Positive for light-headedness. Negative for dizziness, focal weakness, headaches and weakness.   Psychiatric/Behavioral:  Negative for depression. The patient is not nervous/anxious.         Objective:     Vitals: Blood pressure 90/68, pulse 72, resp. rate 16, height 6' 2\" (1.88 m), weight 125 kg (275 lb), SpO2 98%., Body mass index is 35.31 kg/m².,        Physical Exam:    GEN: Vadim Chapa appears well, alert and oriented x 3, pleasant and cooperative   HEENT: pupils equal, round, and reactive to light; extraocular muscles intact  NECK: supple, no carotid bruits   HEART: regular rhythm, normal S1 and S2, no murmurs, clicks, gallops or rubs   LUNGS: clear to auscultation bilaterally; no wheezes, rales, or rhonchi   ABDOMEN: normal bowel sounds, soft, no tenderness, no distention  EXTREMITIES: peripheral pulses normal; no clubbing, cyanosis, or edema  NEURO: no focal findings   SKIN: normal without suspicious lesions on exposed skin    Medications:    Current Medications[1]     Family History[2]  Social History     Socioeconomic History    Marital status: Single     Spouse name: Not on file    Number of children: Not on file    Years of education: Not on file    Highest education level: Not on file   Occupational History    Not on file   Tobacco Use    Smoking status: Never    Smokeless tobacco: Never   Vaping Use    Vaping status: Never Used   Substance and Sexual Activity    Alcohol use: Not Currently     Comment: OCCASIONAL; history of binge drinking -  drinking \"a lot\" on Fridays; stopped this about in late 2022. (Updated 12/04/2023).    Drug use: Never    Sexual activity: Not on file   Other Topics Concern    Not on file   Social History Narrative    Not on file     Social Drivers of Health     Financial Resource Strain: Not on file   Food Insecurity: No Food Insecurity " "(5/2/2023)    Hunger Vital Sign     Worried About Running Out of Food in the Last Year: Never true     Ran Out of Food in the Last Year: Never true   Transportation Needs: No Transportation Needs (5/2/2023)    PRAPARE - Transportation     Lack of Transportation (Medical): No     Lack of Transportation (Non-Medical): No   Physical Activity: Not on file   Stress: Not on file   Social Connections: Unknown (6/18/2024)    Received from [employer]    Social Whi     How often do you feel lonely or isolated from those around you? (Adult - for ages 18 years and over): Not on file   Intimate Partner Violence: Not on file   Housing Stability: Low Risk  (5/2/2023)    Housing Stability Vital Sign     Unable to Pay for Housing in the Last Year: No     Number of Places Lived in the Last Year: 1     Unstable Housing in the Last Year: No     Tobacco Use History[3]  Social History     Substance and Sexual Activity   Alcohol Use Not Currently    Comment: OCCASIONAL; history of binge drinking -  drinking \"a lot\" on Fridays; stopped this about in late 2022. (Updated 12/04/2023).       Labs & Results:  Below is the patient's most recent value for Albumin, ALT, AST, BUN, Calcium, Chloride, Cholesterol, CO2, Creatinine, GFR, Glucose, HDL, Hematocrit, Hemoglobin, Hemoglobin A1C, LDL, Magnesium, Phosphorus, Platelets, Potassium, PSA, Sodium, Triglycerides, and WBC.   Lab Results   Component Value Date    ALT 15 06/03/2025    AST 23 06/03/2025    BUN 12 06/03/2025    CALCIUM 9.6 06/03/2025     06/03/2025    CO2 26 06/03/2025    CREATININE 1.20 06/03/2025    HDL 47 03/25/2025    HCT 50.8 (H) 06/03/2025    HGB 16.4 06/03/2025    HGBA1C 6.1 12/17/2023    MG 2.6 12/22/2023    PHOS 3.8 12/16/2023     06/03/2025    K 4.5 06/03/2025    PSA 1.029 03/25/2025    TRIG 116 03/25/2025    WBC 11.41 (H) 06/03/2025     Note: for a comprehensive list of the patient's lab results, access the Results Review activity.            Cardiac " testing:   Results for orders placed during the hospital encounter of 19    Echo complete with contrast if indicated    [facility]  [address], [place] [id number] [phone]    Transthoracic Echocardiogram  2D, M-mode, Doppler, and Color Doppler    Study date:  2019    Patient: ADALI NEWMAN  MR number: [id number]  Account number: [id number]  : 1975  Age: 43 years  Gender: Male  Status: Outpatient  Location: [facility]  Height: 74 in  Weight: 291 lb  BP: 118/ 64 mmHg    Indications: Dilated Cardiomyopathy.    Diagnoses: I42.0 - Dilated cardiomyopathy    Sonographer:  Galaviz RCS  Interpreting Physician:  Ananya Miller MD  Primary Physician:  Cynthia Julio MD  Referring Physician:  Octaviano Zaldivar MD  Group:  [facility]    SUMMARY    LEFT VENTRICLE:  The ventricle was moderately to markedly dilated.  Ejection fraction was estimated to be 25 %.  There was severe diffuse hypokinesis.  Features were consistent with a pseudonormal left ventricular filling pattern, with concomitant abnormal relaxation and increased filling pressure (grade 2 diastolic dysfunction).    RIGHT VENTRICLE:  Estimated peak pressure was at least 30 mmHg. ICD lead noted.    LEFT ATRIUM:  The atrium was moderately dilated.    RIGHT ATRIUM:  The atrium was moderately dilated.    MITRAL VALVE:  There was trace regurgitation.    TRICUSPID VALVE:  There was mild regurgitation.    HISTORY: PRIOR HISTORY: Risk factors: hypertension.    PROCEDURE: The study was performed in the [facility]. This was a routine study. The transthoracic approach was used. The study included complete 2D imaging, M-mode, complete spectral Doppler, and color Doppler. The  heart rate was 74 bpm, at the start of the study. This was a technically difficult study.    LEFT VENTRICLE: The ventricle was moderately to markedly dilated. Ejection  fraction was estimated to be 25 %. There was severe diffuse hypokinesis. DOPPLER: Features were consistent with a pseudonormal left ventricular filling pattern,  with concomitant abnormal relaxation and increased filling pressure (grade 2 diastolic dysfunction).    RIGHT VENTRICLE: The size was normal. Systolic function was normal. Wall thickness was normal. DOPPLER: Estimated peak pressure was at least 30 mmHg. ICD lead noted.    LEFT ATRIUM: The atrium was moderately dilated.    RIGHT ATRIUM: The atrium was moderately dilated.    MITRAL VALVE: There was annular calcification. DOPPLER: There was trace regurgitation.    AORTIC VALVE: The valve was trileaflet. Leaflets exhibited normal thickness and normal cuspal separation. The valve was not well visualized. DOPPLER: Transaortic velocity was within the normal range. There was no evidence for stenosis.  There was no regurgitation.    TRICUSPID VALVE: The valve structure was normal. There was normal leaflet separation. DOPPLER: The transtricuspid velocity was within the normal range. There was no evidence for stenosis. There was mild regurgitation.    PULMONIC VALVE: Leaflets exhibited normal thickness, no calcification, and normal cuspal separation. DOPPLER: The transpulmonic velocity was within the normal range. There was no regurgitation.    PERICARDIUM: There was no pericardial effusion. The pericardium was normal in appearance.    AORTA: The root exhibited normal size.    SYSTEM MEASUREMENT TABLES    2D  %FS: 12.58 %  Ao Diam: 2.95 cm  EDV(Teich): 249.22 ml  EF(Teich): 26.31 %  ESV(Teich): 183.65 ml  IVSd: 1.03 cm  LA Area: 29.37 cm2  LA Diam: 5.32 cm  LVEDV MOD A4C: 213.51 ml  LVEF MOD A4C: 30.3 %  LVESV MOD A4C: 148.81 ml  LVIDd: 6.92 cm  LVIDs: 6.05 cm  LVLd A4C: 9.46 cm  LVLs A4C: 8.02 cm  LVPWd: 0.71 cm  RA Area: 23.69 cm2  RVIDd: 4.49 cm  SV MOD A4C: 64.7 ml  SV(Teich): 65.57 ml    CW  TR MaxP.1 mmHg  TR Vmax: 2.55 m/s    MM  TAPSE: 2.55 cm    PW  E':  0.08 m/s  E/E': 11.03  MV A Kamron: 0.5 m/s  MV Dec Alcorn: 3.77 m/s2  MV DecT: 234 ms  MV E Kamron: 0.88 m/s  MV E/A Ratio: 1.76  MV PHT: 67.86 ms  MVA By PHT: 3.24 cm2    [facility]    Prepared and electronically signed by    Ananya Miller MD  Signed 10-Apr-2019 13:52:24    No results found for this or any previous visit.    No results found for this or any previous visit.    No results found for this or any previous visit.                     [1]   Current Outpatient Medications:     dofetilide (TIKOSYN) 250 mcg capsule, Take 1 capsule (250 mcg total) by mouth 2 (two) times a day, Disp: 180 capsule, Rfl: 3    Entresto 24-26 MG TABS, TAKE 1 TABLET BY MOUTH TWICE A DAY, Disp: 180 tablet, Rfl: 3    furosemide (LASIX) 40 mg tablet, TAKE 1 TABLET BY MOUTH EVERY DAY, Disp: 90 tablet, Rfl: 3    gabapentin (Neurontin) 100 mg capsule, Take 3 capsules (300 mg total) by mouth daily at bedtime, Disp: 90 capsule, Rfl: 2    Jardiance 10 MG TABS tablet, TAKE 1 TABLET (10 MG TOTAL) BY MOUTH EVERY MORNING., Disp: 90 tablet, Rfl: 3    metoprolol succinate (TOPROL-XL) 25 mg 24 hr tablet, TAKE 1 TABLET BY MOUTH TWICE A DAY, Disp: 180 tablet, Rfl: 1    potassium chloride (Klor-Con M20) 20 mEq tablet, TAKE 1 TABLET BY MOUTH EVERY DAY, Disp: 90 tablet, Rfl: 1    rivaroxaban (Xarelto) 20 mg tablet, Take 1 tablet (20 mg total) by mouth daily with breakfast, Disp: 90 tablet, Rfl: 1    Semaglutide-Weight Management (Wegovy) 1 MG/0.5ML, Inject 1 mg under the skin weekly, Disp: 2 mL, Rfl: 0    spironolactone (ALDACTONE) 25 mg tablet, TAKE 1 TABLET (25 MG TOTAL) BY MOUTH DAILY., Disp: 90 tablet, Rfl: 1    eszopiclone (LUNESTA) 3 MG tablet, Take 1 tablet (3 mg total) by mouth daily at bedtime as needed for sleep Take immediately before bedtime (Patient not taking: Reported on 6/25/2025), Disp: 30 tablet, Rfl: 0    vitamin B-12 (VITAMIN B-12) 1,000 mcg tablet, Take 1 tablet (1,000 mcg total) by  mouth daily (Patient not taking: Reported on 7/11/2025), Disp: 100 tablet, Rfl: 1  [2]   Family History  Problem Relation Name Age of Onset    No Known Problems Mother      No Known Problems Father      Sudden death Neg Hx      Heart disease Neg Hx     [3]   Social History  Tobacco Use   Smoking Status Never   Smokeless Tobacco Never

## 2025-07-11 NOTE — TELEPHONE ENCOUNTER
----- Message from Fanny Soliz MD sent at 7/11/2025 12:39 PM EDT -----  Regarding: Follow up.  Thanks Walter    Clerical team, please schedule follow up with me.  ----- Message -----  From: Walter Walter DO  Sent: 7/11/2025  11:34 AM EDT  To: Fanny Soliz MD    Can you please arrange f/u with Antonio?  Preferably in [place].  No rush he is stable.  I ordered echo its been about a year. Thanks. walter

## 2025-07-11 NOTE — TELEPHONE ENCOUNTER
Spoke to patient. He was scheduled today for 10/2 at [facility]. Patient states he is fine with going to the [facility]. Does he need a sooner appt.? No availability in [place].

## 2025-07-14 NOTE — TELEPHONE ENCOUNTER
Please check if there is a sooner appointment in [place]. If not, he can keep the appointment in October.  Thank you.   Pt has establish on 02/14/20 with Dr. Wynn.

## 2025-07-25 ENCOUNTER — TELEPHONE (OUTPATIENT)
Age: 50
End: 2025-07-25

## 2025-07-30 DIAGNOSIS — E66.812 CLASS 2 OBESITY DUE TO EXCESS CALORIES WITHOUT SERIOUS COMORBIDITY WITH BODY MASS INDEX (BMI) OF 36.0 TO 36.9 IN ADULT: Primary | ICD-10-CM

## 2025-07-30 DIAGNOSIS — E66.09 CLASS 2 OBESITY DUE TO EXCESS CALORIES WITHOUT SERIOUS COMORBIDITY WITH BODY MASS INDEX (BMI) OF 36.0 TO 36.9 IN ADULT: Primary | ICD-10-CM

## 2025-07-30 RX ORDER — TIRZEPATIDE 5 MG/.5ML
5 INJECTION, SOLUTION SUBCUTANEOUS WEEKLY
Qty: 2 ML | Refills: 0 | Status: SHIPPED | OUTPATIENT
Start: 2025-07-30

## 2025-07-31 ENCOUNTER — TELEPHONE (OUTPATIENT)
Age: 50
End: 2025-07-31

## 2025-08-20 ENCOUNTER — TELEPHONE (OUTPATIENT)
Age: 50
End: 2025-08-20

## 2025-08-20 ENCOUNTER — OFFICE VISIT (OUTPATIENT)
Dept: FAMILY MEDICINE CLINIC | Facility: CLINIC | Age: 50
End: 2025-08-20
Payer: COMMERCIAL

## 2025-08-20 VITALS
SYSTOLIC BLOOD PRESSURE: 112 MMHG | DIASTOLIC BLOOD PRESSURE: 70 MMHG | WEIGHT: 268.6 LBS | TEMPERATURE: 97.9 F | HEART RATE: 70 BPM | OXYGEN SATURATION: 98 % | HEIGHT: 74 IN | BODY MASS INDEX: 34.47 KG/M2

## 2025-08-20 DIAGNOSIS — E66.9 OBESITY (BMI 30-39.9): Primary | ICD-10-CM

## 2025-08-20 PROBLEM — N17.9 AKI (ACUTE KIDNEY INJURY) (HCC): Status: RESOLVED | Noted: 2023-12-13 | Resolved: 2025-08-20

## 2025-08-20 PROBLEM — R55 SYNCOPE: Status: RESOLVED | Noted: 2023-04-16 | Resolved: 2025-08-20

## 2025-08-20 PROCEDURE — 99213 OFFICE O/P EST LOW 20 MIN: CPT | Performed by: FAMILY MEDICINE

## 2025-08-20 RX ORDER — TIRZEPATIDE 7.5 MG/.5ML
7.5 INJECTION, SOLUTION SUBCUTANEOUS WEEKLY
Qty: 2 ML | Refills: 0 | Status: SHIPPED | OUTPATIENT
Start: 2025-08-20

## 2025-08-21 ENCOUNTER — TELEPHONE (OUTPATIENT)
Dept: FAMILY MEDICINE CLINIC | Facility: CLINIC | Age: 50
End: 2025-08-21

## (undated) DEVICE — GLOVE INDICATOR PI UNDERGLOVE SZ 7 BLUE

## (undated) DEVICE — SNAP KOVER: Brand: UNBRANDED

## (undated) DEVICE — GLOVE SRG BIOGEL 7

## (undated) DEVICE — MEDI-VAC NON-CONDUCTIVE SUCTION TUBING 6MM X 1.8M (6FT.) L: Brand: CARDINAL HEALTH

## (undated) DEVICE — RADIFOCUS GLIDEWIRE: Brand: GLIDEWIRE

## (undated) DEVICE — INTENDED FOR TISSUE SEPARATION, AND OTHER PROCEDURES THAT REQUIRE A SHARP SURGICAL BLADE TO PUNCTURE OR CUT.: Brand: BARD-PARKER SAFETY BLADES SIZE 15, STERILE

## (undated) DEVICE — PINNACLE INTRODUCER SHEATH: Brand: PINNACLE

## (undated) DEVICE — CABLE CRYOCATH ELECTRICAL UMBILICAL

## (undated) DEVICE — SUT VICRYL 3-0 SH 27 IN J416H

## (undated) DEVICE — REF PATCH ENSITE X

## (undated) DEVICE — ENDOPATH XCEL BLADELESS TROCARS WITH STABILITY SLEEVES: Brand: ENDOPATH XCEL

## (undated) DEVICE — 3M™ IOBAN™ 2 ANTIMICROBIAL INCISE DRAPE 6650EZ: Brand: IOBAN™ 2

## (undated) DEVICE — SUT SILK 0 CT-1 30 IN 424H

## (undated) DEVICE — CATH ABLATION FLEXCATH ADVANCE OD12 FR STEERABLE

## (undated) DEVICE — DEFIB ADULT ELECTRODE CARDINAL

## (undated) DEVICE — TUBING SUCTION 5MM X 12 FT

## (undated) DEVICE — LASER BRIDGE ACCESSORY KIT

## (undated) DEVICE — REM POLYHESIVE ADULT PATIENT RETURN ELECTRODE: Brand: VALLEYLAB

## (undated) DEVICE — GAUZE SPONGES,16 PLY: Brand: CURITY

## (undated) DEVICE — LIGAMAX 5 MM ENDOSCOPIC MULTIPLE CLIP APPLIER: Brand: LIGAMAX

## (undated) DEVICE — MEDI-VAC YANK SUCT HNDL W/TPRD BULBOUS TIP: Brand: CARDINAL HEALTH

## (undated) DEVICE — DGW .035 FC J3MM 150CM TEF: Brand: EMERALD

## (undated) DEVICE — CATH ABLATION TACTICATH SE BI-DIR FJ CRV

## (undated) DEVICE — CATH EP ACHIEVE 20 MM MAPPING LOOP

## (undated) DEVICE — Device: Brand: LLD EZ LEAD LOCKING DEVICE

## (undated) DEVICE — DRAPE SURGIKIT SADDLE BAG

## (undated) DEVICE — 4-PORT MANIFOLD: Brand: NEPTUNE 2

## (undated) DEVICE — BRIDGE OCCLUSION CATHETER - 80 MM LENGTH BALLOON: Brand: BRIDGE™ OCCLUSION BALLOON

## (undated) DEVICE — GLOVE SRG BIOGEL ORTHOPEDIC 7.5

## (undated) DEVICE — DRAPE C-ARM BAG/DOME XR ELSTIC OPEN LF

## (undated) DEVICE — ELECTRODE LAP J HOOK E-Z CLEAN 33CM-0021

## (undated) DEVICE — ADHESIVE SKN CLSR HISTOACRYL FLEX 0.5ML LF

## (undated) DEVICE — CATH ULTRASOUND ACUNAV ICE 8FR 90CM GE VIVID-I

## (undated) DEVICE — 3M™ IOBAN™ 2 ANTIMICROBIAL INCISE DRAPE 6651EZ: Brand: IOBAN™ 2

## (undated) DEVICE — 3M™ STERI-STRIP™ REINFORCED ADHESIVE SKIN CLOSURES, R1547, 1/2 IN X 4 IN (12 MM X 100 MM), 6 STRIPS/ENVELOPE: Brand: 3M™ STERI-STRIP™

## (undated) DEVICE — CATH ABLATION CRYOCATH ARCTIC FRONT ADV PRO 28MM

## (undated) DEVICE — SUT ETHIBOND 2-0 SH/SH 36 IN X523H

## (undated) DEVICE — SURGICEL 4 X 8

## (undated) DEVICE — X-RAY DETECTABLE SPONGES,16 PLY: Brand: VISTEC

## (undated) DEVICE — PENCIL ELECTROSURG E-Z CLEAN -0035H

## (undated) DEVICE — COTTON TIP APPLICTOR 2 PK

## (undated) DEVICE — ENDOPOUCH RETRIEVER SPECIMEN RETRIEVAL BAGS: Brand: ENDOPOUCH RETRIEVER

## (undated) DEVICE — SUT CHROMIC 2-0 CT-2 27 IN 883H

## (undated) DEVICE — STERILE ACCESS CATHETER PACK: Brand: CARDINAL HEALTH

## (undated) DEVICE — 3M™ DURAPORE™ SURGICAL TAPE 1538-3, 3 INCH X 10 YARD (7,5CM X 9,1M), 4 ROLLS/BOX: Brand: 3M™ DURAPORE™

## (undated) DEVICE — DRAPE SHEET THREE QUARTER

## (undated) DEVICE — LIGHT HANDLE COVER CAMERA DISP

## (undated) DEVICE — BULB SYRINGE,IRRIGATION WITH PROTECTIVE CAP: Brand: DOVER

## (undated) DEVICE — CATH EP  INQUIRY 6F 2-5-2MM  MED CRV STERABLE  DECAPOLAR 110CM

## (undated) DEVICE — BETHLEHEM UNIVERSAL MINOR GEN: Brand: CARDINAL HEALTH

## (undated) DEVICE — PAD GROUNDING DUAL ADULT

## (undated) DEVICE — GAUZE SPONGES,USP TYPE VII GAUZE, 12 PLY: Brand: CURITY

## (undated) DEVICE — TRAY FOLEY 16FR SURESTEP TEMP SENS URIMETER STAT LOK

## (undated) DEVICE — SUT VICRYL 2-0 SH 27 IN UNDYED J417H

## (undated) DEVICE — ENDOPATH XCEL UNIVERSAL TROCAR STABLILITY SLEEVES: Brand: ENDOPATH XCEL

## (undated) DEVICE — SUT MONOCRYL 4-0 PS-2 18 IN Y496G

## (undated) DEVICE — POOLE SUCTION HANDLE: Brand: CARDINAL HEALTH

## (undated) DEVICE — SUT VICRYL 4-0 PS-2 18 IN J496G

## (undated) DEVICE — ALLENTOWN LAP CHOLE APP PACK: Brand: CARDINAL HEALTH

## (undated) DEVICE — ULTRACLEAN ACCESSORY ELECTRODE 1" (2.54 CM) COATED BLADE: Brand: ULTRACLEAN

## (undated) DEVICE — LEAD CUTTER: Brand: LEAD CUTTER

## (undated) DEVICE — ANTIBACTERIAL UNDYED BRAIDED (POLYGLACTIN 910), SYNTHETIC ABSORBABLE SUTURE: Brand: COATED VICRYL

## (undated) DEVICE — SUT SILK 2 60 IN SA8H

## (undated) DEVICE — SURGICEL 2 X 3

## (undated) DEVICE — CHLORAPREP HI-LITE 26ML ORANGE

## (undated) DEVICE — INTRO SHEATH PEEL AWAY 9 FR

## (undated) DEVICE — POV-IOD SOLUTION 4OZ BT

## (undated) DEVICE — SPONGE LAP 18 X 18 IN

## (undated) DEVICE — TELFA NON-ADHERENT ABSORBENT DRESSING: Brand: TELFA

## (undated) DEVICE — HEX-LOCKING BLADE ELECTRODE: Brand: EDGE

## (undated) DEVICE — CARDIOVASCULAR SPLIT DRAPE: Brand: CONVERTORS

## (undated) DEVICE — SYRINGE 10ML SLIP TIP LF

## (undated) DEVICE — INTENDED FOR TISSUE SEPARATION, AND OTHER PROCEDURES THAT REQUIRE A SHARP SURGICAL BLADE TO PUNCTURE OR CUT.: Brand: BARD-PARKER SAFETY BLADES SIZE 10, STERILE

## (undated) DEVICE — [HIGH FLOW INSUFFLATOR,  DO NOT USE IF PACKAGE IS DAMAGED,  KEEP DRY,  KEEP AWAY FROM SUNLIGHT,  PROTECT FROM HEAT AND RADIOACTIVE SOURCES.]: Brand: PNEUMOSURE

## (undated) DEVICE — TUBING SET COOL POINT

## (undated) DEVICE — SUT VICRYL PLUS 2-0 CTB-1 27 IN VCPB259H

## (undated) DEVICE — CATH EP  INQUIRY 6F 2-5-2MM  XLRG CRV STERABLE  DECAPOLAR 110CM

## (undated) DEVICE — CABLE CATHETER ACHIEVE MAPPING

## (undated) DEVICE — Device: Brand: VISISHEATH DILATOR SHEATH

## (undated) DEVICE — SUT SILK 0 30 IN A306H

## (undated) DEVICE — DISPOSABLE EQUIPMENT COVER: Brand: LARGE TOWEL DRAPE

## (undated) DEVICE — GUIDE SHEATH SLO 8.5 FR

## (undated) DEVICE — GLOVE SRG BIOGEL ECLIPSE 7.5

## (undated) DEVICE — AMPLATZ EXTRA STIFF WIRE GUIDE: Brand: AMPLATZ

## (undated) DEVICE — 3M™ TEGADERM™ TRANSPARENT FILM DRESSING FRAME STYLE, 1626W, 4 IN X 4-3/4 IN (10 CM X 12 CM), 50/CT 4CT/CASE: Brand: 3M™ TEGADERM™

## (undated) DEVICE — IRRIG ENDO FLO TUBING

## (undated) DEVICE — CATH COAXIAL UMBILICAL

## (undated) DEVICE — IV EXTENSION TUBING 33 IN

## (undated) DEVICE — SUT VICRYL 0 REEL 54 IN J287G

## (undated) DEVICE — SAFE SHEATH INTRODUCER 9FR X-LONG

## (undated) DEVICE — ANTI-FOG SOLUTION WITH FOAM PAD: Brand: DEVON

## (undated) DEVICE — SCD SEQUENTIAL COMPRESSION COMFORT SLEEVE MEDIUM KNEE LENGTH: Brand: KENDALL SCD

## (undated) DEVICE — INTRO SHEATH 8FR 24CM ARROW-FLEX

## (undated) DEVICE — PLUMEPEN PRO 10FT

## (undated) DEVICE — INTENDED FOR TISSUE SEPARATION, AND OTHER PROCEDURES THAT REQUIRE A SHARP SURGICAL BLADE TO PUNCTURE OR CUT.: Brand: BARD-PARKER SAFETY BLADES SIZE 11, STERILE

## (undated) DEVICE — STRL COTTON TIP APPLCTR 6IN PK: Brand: CARDINAL HEALTH

## (undated) DEVICE — AEM CORD

## (undated) DEVICE — PMI DISPOSABLE PUNCTURE CLOSURE DEVICE / SUTURE GRASPER: Brand: PMI

## (undated) DEVICE — NEEDLE TRANSSEPTAL BRK-1 71CM